# Patient Record
Sex: FEMALE | Race: WHITE | Employment: OTHER | URBAN - NONMETROPOLITAN AREA
[De-identification: names, ages, dates, MRNs, and addresses within clinical notes are randomized per-mention and may not be internally consistent; named-entity substitution may affect disease eponyms.]

---

## 2017-01-03 RX ORDER — LISINOPRIL 20 MG/1
20 TABLET ORAL DAILY
Qty: 90 TABLET | Refills: 1 | Status: SHIPPED | OUTPATIENT
Start: 2017-01-03 | End: 2017-05-02 | Stop reason: SDUPTHER

## 2017-01-05 RX ORDER — METOPROLOL TARTRATE 50 MG/1
25 TABLET, FILM COATED ORAL 2 TIMES DAILY
Qty: 90 TABLET | Refills: 1 | Status: SHIPPED | OUTPATIENT
Start: 2017-01-05 | End: 2017-05-02 | Stop reason: SDUPTHER

## 2017-01-09 ENCOUNTER — HOSPITAL ENCOUNTER (OUTPATIENT)
Dept: LAB | Age: 82
Discharge: HOME | End: 2017-01-09
Attending: INTERNAL MEDICINE | Admitting: INTERNAL MEDICINE

## 2017-01-09 LAB
ALBUMIN SERPL-MCNC: 4.1 GM/DL (ref 3.4–5)
ALP BLD-CCNC: 45 IU/L (ref 40–129)
ALT SERPL-CCNC: 9 U/L (ref 10–40)
ANION GAP SERPL CALCULATED.3IONS-SCNC: 10 MMOL/L (ref 4–16)
AST SERPL-CCNC: 20 IU/L (ref 15–37)
BILIRUB SERPL-MCNC: 0.5 MG/DL (ref 0–1)
BUN BLDV-MCNC: 12 MG/DL (ref 6–23)
CALCIUM SERPL-MCNC: 10 MG/DL (ref 8.3–10.6)
CHLORIDE BLD-SCNC: 96 MMOL/L (ref 99–110)
CHOLESTEROL: 141 MG/DL
CO2: 30 MMOL/L (ref 21–32)
CREAT SERPL-MCNC: 0.7 MG/DL (ref 0.6–1.1)
GFR AFRICAN AMERICAN: >60 ML/MIN/1.73M2
GFR NON-AFRICAN AMERICAN: >60 ML/MIN/1.73M2
GLUCOSE FASTING: 145 MG/DL (ref 70–99)
HDLC SERPL-MCNC: 42 MG/DL
LDL CHOLESTEROL DIRECT: 77 MG/DL
POTASSIUM SERPL-SCNC: 4.5 MMOL/L (ref 3.5–5.1)
SODIUM BLD-SCNC: 136 MMOL/L (ref 135–145)
TOTAL PROTEIN: 7.4 GM/DL (ref 6.4–8.2)
TRIGL SERPL-MCNC: 194 MG/DL

## 2017-01-12 ENCOUNTER — OFFICE VISIT (OUTPATIENT)
Dept: INTERNAL MEDICINE CLINIC | Age: 82
End: 2017-01-12

## 2017-01-12 VITALS
WEIGHT: 238 LBS | DIASTOLIC BLOOD PRESSURE: 72 MMHG | HEART RATE: 88 BPM | SYSTOLIC BLOOD PRESSURE: 138 MMHG | RESPIRATION RATE: 16 BRPM | OXYGEN SATURATION: 95 % | TEMPERATURE: 98.1 F | BODY MASS INDEX: 40.22 KG/M2

## 2017-01-12 DIAGNOSIS — M54.50 CHRONIC MIDLINE LOW BACK PAIN WITHOUT SCIATICA: ICD-10-CM

## 2017-01-12 DIAGNOSIS — E11.9 TYPE 2 DIABETES MELLITUS WITHOUT COMPLICATION, WITHOUT LONG-TERM CURRENT USE OF INSULIN (HCC): Primary | ICD-10-CM

## 2017-01-12 DIAGNOSIS — E66.09 NON MORBID OBESITY DUE TO EXCESS CALORIES: ICD-10-CM

## 2017-01-12 DIAGNOSIS — H40.9 GLAUCOMA OF BOTH EYES, UNSPECIFIED GLAUCOMA: ICD-10-CM

## 2017-01-12 DIAGNOSIS — I25.10 CORONARY ARTERY DISEASE INVOLVING NATIVE CORONARY ARTERY OF NATIVE HEART WITHOUT ANGINA PECTORIS: ICD-10-CM

## 2017-01-12 DIAGNOSIS — G89.29 CHRONIC MIDLINE LOW BACK PAIN WITHOUT SCIATICA: ICD-10-CM

## 2017-01-12 DIAGNOSIS — Z12.31 ENCOUNTER FOR SCREENING MAMMOGRAM FOR BREAST CANCER: ICD-10-CM

## 2017-01-12 DIAGNOSIS — I10 ESSENTIAL HYPERTENSION: ICD-10-CM

## 2017-01-12 DIAGNOSIS — E78.2 MIXED HYPERLIPIDEMIA: ICD-10-CM

## 2017-01-12 PROCEDURE — G8484 FLU IMMUNIZE NO ADMIN: HCPCS | Performed by: INTERNAL MEDICINE

## 2017-01-12 PROCEDURE — 99213 OFFICE O/P EST LOW 20 MIN: CPT | Performed by: INTERNAL MEDICINE

## 2017-01-12 PROCEDURE — G8419 CALC BMI OUT NRM PARAM NOF/U: HCPCS | Performed by: INTERNAL MEDICINE

## 2017-01-12 PROCEDURE — 1090F PRES/ABSN URINE INCON ASSESS: CPT | Performed by: INTERNAL MEDICINE

## 2017-01-12 PROCEDURE — 1123F ACP DISCUSS/DSCN MKR DOCD: CPT | Performed by: INTERNAL MEDICINE

## 2017-01-12 PROCEDURE — 1036F TOBACCO NON-USER: CPT | Performed by: INTERNAL MEDICINE

## 2017-01-12 PROCEDURE — G8427 DOCREV CUR MEDS BY ELIG CLIN: HCPCS | Performed by: INTERNAL MEDICINE

## 2017-01-12 PROCEDURE — G8599 NO ASA/ANTIPLAT THER USE RNG: HCPCS | Performed by: INTERNAL MEDICINE

## 2017-01-12 PROCEDURE — G8399 PT W/DXA RESULTS DOCUMENT: HCPCS | Performed by: INTERNAL MEDICINE

## 2017-01-12 PROCEDURE — 4040F PNEUMOC VAC/ADMIN/RCVD: CPT | Performed by: INTERNAL MEDICINE

## 2017-01-12 RX ORDER — GLIPIZIDE AND METFORMIN HCL 2.5; 5 MG/1; MG/1
2 TABLET, FILM COATED ORAL
Qty: 360 TABLET | Refills: 3 | Status: SHIPPED | OUTPATIENT
Start: 2017-01-12 | End: 2017-12-12 | Stop reason: SDUPTHER

## 2017-01-12 RX ORDER — ROPINIROLE 0.25 MG/1
0.25 TABLET, FILM COATED ORAL NIGHTLY
Qty: 30 TABLET | Refills: 3 | Status: SHIPPED | OUTPATIENT
Start: 2017-01-12 | End: 2017-02-24 | Stop reason: DRUGHIGH

## 2017-01-12 RX ORDER — ROSUVASTATIN CALCIUM 10 MG/1
10 TABLET, COATED ORAL NIGHTLY
Qty: 90 TABLET | Refills: 1 | Status: SHIPPED | OUTPATIENT
Start: 2017-01-12 | End: 2017-09-27 | Stop reason: SDUPTHER

## 2017-01-12 ASSESSMENT — PATIENT HEALTH QUESTIONNAIRE - PHQ9
SUM OF ALL RESPONSES TO PHQ QUESTIONS 1-9: 0
SUM OF ALL RESPONSES TO PHQ9 QUESTIONS 1 & 2: 0
1. LITTLE INTEREST OR PLEASURE IN DOING THINGS: 0
2. FEELING DOWN, DEPRESSED OR HOPELESS: 0

## 2017-01-18 ASSESSMENT — ENCOUNTER SYMPTOMS
RESPIRATORY NEGATIVE: 1
EYES NEGATIVE: 1
GASTROINTESTINAL NEGATIVE: 1

## 2017-02-24 RX ORDER — ROPINIROLE 0.5 MG/1
0.5 TABLET, FILM COATED ORAL NIGHTLY
Qty: 30 TABLET | Refills: 1 | Status: SHIPPED | OUTPATIENT
Start: 2017-02-24 | End: 2017-05-02 | Stop reason: SDUPTHER

## 2017-03-10 ENCOUNTER — TELEPHONE (OUTPATIENT)
Dept: INTERNAL MEDICINE CLINIC | Age: 82
End: 2017-03-10

## 2017-05-02 ENCOUNTER — OFFICE VISIT (OUTPATIENT)
Dept: INTERNAL MEDICINE CLINIC | Age: 82
End: 2017-05-02

## 2017-05-02 VITALS
RESPIRATION RATE: 16 BRPM | DIASTOLIC BLOOD PRESSURE: 78 MMHG | SYSTOLIC BLOOD PRESSURE: 136 MMHG | WEIGHT: 237 LBS | HEART RATE: 72 BPM | OXYGEN SATURATION: 96 % | BODY MASS INDEX: 40.05 KG/M2 | TEMPERATURE: 98.3 F

## 2017-05-02 DIAGNOSIS — M72.0 DUPUYTREN'S CONTRACTURE OF RIGHT HAND: ICD-10-CM

## 2017-05-02 DIAGNOSIS — E66.09 NON MORBID OBESITY DUE TO EXCESS CALORIES: ICD-10-CM

## 2017-05-02 DIAGNOSIS — K92.2 GASTROINTESTINAL HEMORRHAGE, UNSPECIFIED GASTROINTESTINAL HEMORRHAGE TYPE: ICD-10-CM

## 2017-05-02 DIAGNOSIS — E11.9 TYPE 2 DIABETES MELLITUS WITHOUT COMPLICATION, WITHOUT LONG-TERM CURRENT USE OF INSULIN (HCC): ICD-10-CM

## 2017-05-02 DIAGNOSIS — H40.9 GLAUCOMA OF BOTH EYES, UNSPECIFIED GLAUCOMA: ICD-10-CM

## 2017-05-02 DIAGNOSIS — N28.1 CYST, KIDNEY, ACQUIRED: ICD-10-CM

## 2017-05-02 DIAGNOSIS — I10 ESSENTIAL HYPERTENSION: Primary | ICD-10-CM

## 2017-05-02 DIAGNOSIS — G89.29 CHRONIC MIDLINE LOW BACK PAIN WITHOUT SCIATICA: ICD-10-CM

## 2017-05-02 DIAGNOSIS — Z23 NEED FOR PROPHYLACTIC VACCINATION AGAINST STREPTOCOCCUS PNEUMONIAE (PNEUMOCOCCUS): ICD-10-CM

## 2017-05-02 DIAGNOSIS — I25.10 CORONARY ARTERY DISEASE INVOLVING NATIVE CORONARY ARTERY OF NATIVE HEART WITHOUT ANGINA PECTORIS: ICD-10-CM

## 2017-05-02 DIAGNOSIS — E78.2 MIXED HYPERLIPIDEMIA: ICD-10-CM

## 2017-05-02 DIAGNOSIS — M54.50 CHRONIC MIDLINE LOW BACK PAIN WITHOUT SCIATICA: ICD-10-CM

## 2017-05-02 DIAGNOSIS — M19.90 ARTHRITIS: ICD-10-CM

## 2017-05-02 LAB — HBA1C MFR BLD: 6.4 %

## 2017-05-02 PROCEDURE — 1090F PRES/ABSN URINE INCON ASSESS: CPT | Performed by: INTERNAL MEDICINE

## 2017-05-02 PROCEDURE — 1036F TOBACCO NON-USER: CPT | Performed by: INTERNAL MEDICINE

## 2017-05-02 PROCEDURE — G8599 NO ASA/ANTIPLAT THER USE RNG: HCPCS | Performed by: INTERNAL MEDICINE

## 2017-05-02 PROCEDURE — 4040F PNEUMOC VAC/ADMIN/RCVD: CPT | Performed by: INTERNAL MEDICINE

## 2017-05-02 PROCEDURE — G8417 CALC BMI ABV UP PARAM F/U: HCPCS | Performed by: INTERNAL MEDICINE

## 2017-05-02 PROCEDURE — G8427 DOCREV CUR MEDS BY ELIG CLIN: HCPCS | Performed by: INTERNAL MEDICINE

## 2017-05-02 PROCEDURE — G0009 ADMIN PNEUMOCOCCAL VACCINE: HCPCS | Performed by: INTERNAL MEDICINE

## 2017-05-02 PROCEDURE — 1123F ACP DISCUSS/DSCN MKR DOCD: CPT | Performed by: INTERNAL MEDICINE

## 2017-05-02 PROCEDURE — 90670 PCV13 VACCINE IM: CPT | Performed by: INTERNAL MEDICINE

## 2017-05-02 PROCEDURE — 83036 HEMOGLOBIN GLYCOSYLATED A1C: CPT | Performed by: INTERNAL MEDICINE

## 2017-05-02 PROCEDURE — G8399 PT W/DXA RESULTS DOCUMENT: HCPCS | Performed by: INTERNAL MEDICINE

## 2017-05-02 PROCEDURE — 99214 OFFICE O/P EST MOD 30 MIN: CPT | Performed by: INTERNAL MEDICINE

## 2017-05-02 RX ORDER — ROPINIROLE 1 MG/1
1 TABLET, FILM COATED ORAL NIGHTLY
Qty: 30 TABLET | Refills: 3 | Status: SHIPPED | OUTPATIENT
Start: 2017-05-02 | End: 2017-05-17 | Stop reason: ALTCHOICE

## 2017-05-02 RX ORDER — METOPROLOL TARTRATE 50 MG/1
25 TABLET, FILM COATED ORAL 2 TIMES DAILY
Qty: 90 TABLET | Refills: 1 | Status: SHIPPED | OUTPATIENT
Start: 2017-05-02 | End: 2017-12-12 | Stop reason: SDUPTHER

## 2017-05-02 RX ORDER — LISINOPRIL 20 MG/1
20 TABLET ORAL DAILY
Qty: 90 TABLET | Refills: 1 | Status: SHIPPED | OUTPATIENT
Start: 2017-05-02 | End: 2017-12-12 | Stop reason: ALTCHOICE

## 2017-05-02 ASSESSMENT — ENCOUNTER SYMPTOMS
HEARTBURN: 0
NAUSEA: 0
GASTROINTESTINAL NEGATIVE: 1
RESPIRATORY NEGATIVE: 1
ABDOMINAL PAIN: 0
EYES NEGATIVE: 1
VOMITING: 0

## 2017-05-17 DIAGNOSIS — G25.81 RESTLESS LEG SYNDROME: Primary | ICD-10-CM

## 2017-05-17 RX ORDER — PRAMIPEXOLE DIHYDROCHLORIDE 0.12 MG/1
0.12 TABLET ORAL NIGHTLY
Qty: 30 TABLET | Refills: 0 | Status: SHIPPED | OUTPATIENT
Start: 2017-05-17 | End: 2017-08-22

## 2017-08-18 ENCOUNTER — HOSPITAL ENCOUNTER (OUTPATIENT)
Dept: LAB | Age: 82
Discharge: HOME | End: 2017-08-18
Attending: INTERNAL MEDICINE | Admitting: INTERNAL MEDICINE

## 2017-08-18 LAB
ALBUMIN SERPL-MCNC: 4 GM/DL (ref 3.4–5)
ALP BLD-CCNC: 44 IU/L (ref 40–129)
ALT SERPL-CCNC: 7 U/L (ref 10–40)
ANION GAP SERPL CALCULATED.3IONS-SCNC: 7 MMOL/L (ref 4–16)
AST SERPL-CCNC: 16 IU/L (ref 15–37)
BASOPHILS ABSOLUTE: 0.1 K/CU MM
BASOPHILS RELATIVE PERCENT: 0.9 % (ref 0–1)
BILIRUB SERPL-MCNC: 0.4 MG/DL (ref 0–1)
BUN BLDV-MCNC: 12 MG/DL (ref 6–23)
CALCIUM SERPL-MCNC: 9.9 MG/DL (ref 8.3–10.6)
CHLORIDE BLD-SCNC: 96 MMOL/L (ref 99–110)
CHOLESTEROL, FASTING: 134 MG/DL
CO2: 32 MMOL/L (ref 21–32)
CREAT SERPL-MCNC: 0.8 MG/DL (ref 0.6–1.1)
DIFFERENTIAL TYPE: ABNORMAL
EOSINOPHILS ABSOLUTE: 0.3 K/CU MM
EOSINOPHILS RELATIVE PERCENT: 4.4 % (ref 0–3)
GFR AFRICAN AMERICAN: >60 ML/MIN/1.73M2
GFR NON-AFRICAN AMERICAN: >60 ML/MIN/1.73M2
GLUCOSE FASTING: 119 MG/DL (ref 70–99)
HCT VFR BLD CALC: 40 % (ref 37–47)
HDLC SERPL-MCNC: 44 MG/DL
HEMOGLOBIN: 12.8 GM/DL (ref 12.5–16)
IMMATURE NEUTROPHIL %: 0.4 % (ref 0–0.43)
LDL CHOLESTEROL DIRECT: 69 MG/DL
LYMPHOCYTES ABSOLUTE: 3.1 K/CU MM
LYMPHOCYTES RELATIVE PERCENT: 41.5 % (ref 24–44)
MCH RBC QN AUTO: 30.1 PG (ref 27–31)
MCHC RBC AUTO-ENTMCNC: 32 % (ref 32–36)
MCV RBC AUTO: 94.1 FL (ref 78–100)
MONOCYTES ABSOLUTE: 0.8 K/CU MM
MONOCYTES RELATIVE PERCENT: 10.8 % (ref 0–4)
PDW BLD-RTO: 13.2 % (ref 11.7–14.9)
PLATELET # BLD: 273 K/CU MM (ref 140–440)
PMV BLD AUTO: 9.4 FL (ref 7.5–11.1)
POTASSIUM SERPL-SCNC: 4.1 MMOL/L (ref 3.5–5.1)
RBC # BLD: 4.25 M/CU MM (ref 4.2–5.4)
SEGMENTED NEUTROPHILS ABSOLUTE COUNT: 3.2 K/CU MM
SEGMENTED NEUTROPHILS RELATIVE PERCENT: 42 % (ref 36–66)
SODIUM BLD-SCNC: 135 MMOL/L (ref 135–145)
TOTAL IMMATURE NEUTOROPHIL: 0.03 K/CU MM
TOTAL PROTEIN: 6.9 GM/DL (ref 6.4–8.2)
TRIGLYCERIDE, FASTING: 159 MG/DL
WBC # BLD: 7.5 K/CU MM (ref 4–10.5)

## 2017-08-22 ENCOUNTER — OFFICE VISIT (OUTPATIENT)
Dept: INTERNAL MEDICINE CLINIC | Age: 82
End: 2017-08-22

## 2017-08-22 VITALS
HEIGHT: 65 IN | OXYGEN SATURATION: 95 % | SYSTOLIC BLOOD PRESSURE: 118 MMHG | BODY MASS INDEX: 39.45 KG/M2 | RESPIRATION RATE: 16 BRPM | HEART RATE: 64 BPM | WEIGHT: 236.8 LBS | DIASTOLIC BLOOD PRESSURE: 72 MMHG | TEMPERATURE: 98.2 F

## 2017-08-22 DIAGNOSIS — M19.90 ARTHRITIS: ICD-10-CM

## 2017-08-22 DIAGNOSIS — E78.2 MIXED HYPERLIPIDEMIA: ICD-10-CM

## 2017-08-22 DIAGNOSIS — K92.2 GASTROINTESTINAL HEMORRHAGE, UNSPECIFIED GASTROINTESTINAL HEMORRHAGE TYPE: ICD-10-CM

## 2017-08-22 DIAGNOSIS — H40.9 GLAUCOMA OF BOTH EYES, UNSPECIFIED GLAUCOMA: ICD-10-CM

## 2017-08-22 DIAGNOSIS — E11.9 TYPE 2 DIABETES MELLITUS WITHOUT COMPLICATION, WITH LONG-TERM CURRENT USE OF INSULIN (HCC): Primary | ICD-10-CM

## 2017-08-22 DIAGNOSIS — E66.09 NON MORBID OBESITY DUE TO EXCESS CALORIES: ICD-10-CM

## 2017-08-22 DIAGNOSIS — N28.1 CYST, KIDNEY, ACQUIRED: ICD-10-CM

## 2017-08-22 DIAGNOSIS — Z79.4 TYPE 2 DIABETES MELLITUS WITHOUT COMPLICATION, WITH LONG-TERM CURRENT USE OF INSULIN (HCC): Primary | ICD-10-CM

## 2017-08-22 DIAGNOSIS — M72.0 DUPUYTREN'S CONTRACTURE OF RIGHT HAND: ICD-10-CM

## 2017-08-22 DIAGNOSIS — I10 ESSENTIAL HYPERTENSION: ICD-10-CM

## 2017-08-22 DIAGNOSIS — G25.81 RESTLESS LEG SYNDROME: ICD-10-CM

## 2017-08-22 DIAGNOSIS — I25.10 CORONARY ARTERY DISEASE INVOLVING NATIVE CORONARY ARTERY OF NATIVE HEART WITHOUT ANGINA PECTORIS: ICD-10-CM

## 2017-08-22 LAB — HBA1C MFR BLD: 6.2 %

## 2017-08-22 PROCEDURE — 4040F PNEUMOC VAC/ADMIN/RCVD: CPT | Performed by: INTERNAL MEDICINE

## 2017-08-22 PROCEDURE — G8599 NO ASA/ANTIPLAT THER USE RNG: HCPCS | Performed by: INTERNAL MEDICINE

## 2017-08-22 PROCEDURE — G8427 DOCREV CUR MEDS BY ELIG CLIN: HCPCS | Performed by: INTERNAL MEDICINE

## 2017-08-22 PROCEDURE — G8399 PT W/DXA RESULTS DOCUMENT: HCPCS | Performed by: INTERNAL MEDICINE

## 2017-08-22 PROCEDURE — G8417 CALC BMI ABV UP PARAM F/U: HCPCS | Performed by: INTERNAL MEDICINE

## 2017-08-22 PROCEDURE — 99214 OFFICE O/P EST MOD 30 MIN: CPT | Performed by: INTERNAL MEDICINE

## 2017-08-22 PROCEDURE — 1036F TOBACCO NON-USER: CPT | Performed by: INTERNAL MEDICINE

## 2017-08-22 PROCEDURE — 1090F PRES/ABSN URINE INCON ASSESS: CPT | Performed by: INTERNAL MEDICINE

## 2017-08-22 PROCEDURE — 1123F ACP DISCUSS/DSCN MKR DOCD: CPT | Performed by: INTERNAL MEDICINE

## 2017-08-22 PROCEDURE — 83036 HEMOGLOBIN GLYCOSYLATED A1C: CPT | Performed by: INTERNAL MEDICINE

## 2017-08-24 ASSESSMENT — ENCOUNTER SYMPTOMS
RESPIRATORY NEGATIVE: 1
HEARTBURN: 0
NAUSEA: 0
VOMITING: 0
EYES NEGATIVE: 1

## 2017-09-28 RX ORDER — ROSUVASTATIN CALCIUM 10 MG/1
10 TABLET, COATED ORAL NIGHTLY
Qty: 90 TABLET | Refills: 1 | Status: SHIPPED | OUTPATIENT
Start: 2017-09-28 | End: 2018-04-05 | Stop reason: SDUPTHER

## 2017-10-20 ENCOUNTER — OFFICE VISIT (OUTPATIENT)
Dept: INTERNAL MEDICINE CLINIC | Age: 82
End: 2017-10-20

## 2017-10-20 ENCOUNTER — CARE COORDINATION (OUTPATIENT)
Dept: CARE COORDINATION | Age: 82
End: 2017-10-20

## 2017-10-20 VITALS
HEART RATE: 72 BPM | WEIGHT: 242.2 LBS | BODY MASS INDEX: 40.35 KG/M2 | HEIGHT: 65 IN | DIASTOLIC BLOOD PRESSURE: 76 MMHG | OXYGEN SATURATION: 96 % | RESPIRATION RATE: 12 BRPM | TEMPERATURE: 98.3 F | SYSTOLIC BLOOD PRESSURE: 136 MMHG

## 2017-10-20 DIAGNOSIS — Z23 NEED FOR PROPHYLACTIC VACCINATION AND INOCULATION AGAINST INFLUENZA: ICD-10-CM

## 2017-10-20 DIAGNOSIS — M25.551 RIGHT HIP PAIN: ICD-10-CM

## 2017-10-20 DIAGNOSIS — M54.41 ACUTE RIGHT-SIDED LOW BACK PAIN WITH RIGHT-SIDED SCIATICA: Primary | ICD-10-CM
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
## 2017-10-20 PROCEDURE — 4040F PNEUMOC VAC/ADMIN/RCVD: CPT | Performed by: INTERNAL MEDICINE

## 2017-10-20 PROCEDURE — 99213 OFFICE O/P EST LOW 20 MIN: CPT | Performed by: INTERNAL MEDICINE

## 2017-10-20 PROCEDURE — G8399 PT W/DXA RESULTS DOCUMENT: HCPCS | Performed by: INTERNAL MEDICINE

## 2017-10-20 PROCEDURE — 90662 IIV NO PRSV INCREASED AG IM: CPT | Performed by: INTERNAL MEDICINE

## 2017-10-20 PROCEDURE — G8417 CALC BMI ABV UP PARAM F/U: HCPCS | Performed by: INTERNAL MEDICINE

## 2017-10-20 PROCEDURE — G0008 ADMIN INFLUENZA VIRUS VAC: HCPCS | Performed by: INTERNAL MEDICINE

## 2017-10-20 PROCEDURE — 1090F PRES/ABSN URINE INCON ASSESS: CPT | Performed by: INTERNAL MEDICINE

## 2017-10-20 PROCEDURE — 1123F ACP DISCUSS/DSCN MKR DOCD: CPT | Performed by: INTERNAL MEDICINE

## 2017-10-20 PROCEDURE — G8484 FLU IMMUNIZE NO ADMIN: HCPCS | Performed by: INTERNAL MEDICINE

## 2017-10-20 PROCEDURE — 1036F TOBACCO NON-USER: CPT | Performed by: INTERNAL MEDICINE

## 2017-10-20 PROCEDURE — G8599 NO ASA/ANTIPLAT THER USE RNG: HCPCS | Performed by: INTERNAL MEDICINE

## 2017-10-20 PROCEDURE — G8427 DOCREV CUR MEDS BY ELIG CLIN: HCPCS | Performed by: INTERNAL MEDICINE

## 2017-10-20 ASSESSMENT — ENCOUNTER SYMPTOMS
EYES NEGATIVE: 1
GASTROINTESTINAL NEGATIVE: 1
RESPIRATORY NEGATIVE: 1
HEMOPTYSIS: 0
SHORTNESS OF BREATH: 0

## 2017-10-20 NOTE — PROGRESS NOTES
Kristi Sanchez  Patient's  is 1935  Seen in office on 10/20/2017      SUBJECTIVE:  Garcia Harley is a [de-identified] y. o.year old female presents today   Chief Complaint   Patient presents with    Other     ED f/u low back/R hip pain  s/p fall x2w    Results     xray and CT    Other     needs raised toilet seat     C/o pain in the right hip and back  Pt states she fell 2 weeks ago at home  She was bringing the grocery and one of the can fell on the ground. She stepped on can and it rolled and pt fell on the concrete. She fell on left knee and back  Pt states she got up some how. She was able to walk. [facility] and she walked on cement. Pain increased in the low back and right hip posteriorly and pain going down the leg  No fever or chills. No problems controlling bladder or bowel. Having difficulty getting up from toilet seat  Taking medications regularly. No side effects noted. Review of Systems   Constitutional: Negative. HENT: Negative. Negative for hearing loss and tinnitus. Eyes: Negative. Respiratory: Negative. Negative for hemoptysis and shortness of breath. Cardiovascular: Negative. Negative for palpitations and leg swelling. Gastrointestinal: Negative. Genitourinary: Positive for frequency. Musculoskeletal: Positive for back pain and joint pain. Skin: Negative. Neurological: Negative. Negative for dizziness and headaches. Endo/Heme/Allergies: Negative. Psychiatric/Behavioral: Negative. Negative for depression and suicidal ideas.        OBJECTIVE: /76 (Site: Left Arm, Position: Sitting)   Pulse 72   Temp 98.3 °F (36.8 °C)   Resp 12   Ht 5' 4.5\" (1.638 m) Comment: W shoes  Wt 242 lb 3.2 oz (109.9 kg)   LMP  (LMP Unknown)   SpO2 96%   BMI 40.93 kg/m²     Wt Readings from Last 3 Encounters:   10/20/17 242 lb 3.2 oz (109.9 kg)   10/18/17 230 lb (104.3 kg)   17 236 lb 12.8 oz (107.4 kg)      GENERAL:  Alert, oriented, pleasant, in no 08/18/2017    HGB 12.8 08/18/2017    HCT 40.0 08/18/2017     08/18/2017     Lipids:   Lab Results   Component Value Date    CHOL 141 01/09/2017    TRIG 194 (H) 01/09/2017    HDL 44 08/18/2017    LDLDIRECT 69 08/18/2017    TRIGLYCFAST 159 (H) 08/18/2017     Renal:   Lab Results   Component Value Date    BUN 12 08/18/2017    CREATININE 0.8 08/18/2017     08/18/2017    K 4.1 08/18/2017    ALT 7 08/18/2017    AST 16 08/18/2017    GLUCOSE 93 03/24/2016     PT/INR:   Lab Results   Component Value Date    INR 1.05 09/24/2014     A1C:   Lab Results   Component Value Date    LABA1C 6.2 08/22/2017           Art Verdugo MD, 10/20/2017 , 12:07 PM

## 2017-10-21 ASSESSMENT — ENCOUNTER SYMPTOMS: BACK PAIN: 1

## 2017-10-26 ENCOUNTER — TELEPHONE (OUTPATIENT)
Dept: INTERNAL MEDICINE CLINIC | Age: 82
End: 2017-10-26

## 2017-11-27 ENCOUNTER — TELEPHONE (OUTPATIENT)
Dept: INTERNAL MEDICINE CLINIC | Age: 82
End: 2017-11-27

## 2017-11-27 NOTE — TELEPHONE ENCOUNTER
Patient called to cancel her appt for tomorrow because she is ill, coughing with AMRCELO, x 1 week. Stressed importance of keeping appt and appt offered for today and pt refused. Instructed to call office if feeling worse.

## 2017-12-12 ENCOUNTER — OFFICE VISIT (OUTPATIENT)
Dept: INTERNAL MEDICINE CLINIC | Age: 82
End: 2017-12-12

## 2017-12-12 VITALS
HEIGHT: 65 IN | HEART RATE: 72 BPM | RESPIRATION RATE: 12 BRPM | SYSTOLIC BLOOD PRESSURE: 138 MMHG | DIASTOLIC BLOOD PRESSURE: 72 MMHG | BODY MASS INDEX: 39.32 KG/M2 | TEMPERATURE: 98.1 F | OXYGEN SATURATION: 95 % | WEIGHT: 236 LBS

## 2017-12-12 DIAGNOSIS — G89.29 CHRONIC MIDLINE LOW BACK PAIN WITHOUT SCIATICA: ICD-10-CM

## 2017-12-12 DIAGNOSIS — E66.09 CLASS 2 OBESITY DUE TO EXCESS CALORIES WITHOUT SERIOUS COMORBIDITY WITH BODY MASS INDEX (BMI) OF 39.0 TO 39.9 IN ADULT: ICD-10-CM

## 2017-12-12 DIAGNOSIS — I10 ESSENTIAL HYPERTENSION: ICD-10-CM

## 2017-12-12 DIAGNOSIS — M72.0 DUPUYTREN'S CONTRACTURE OF RIGHT HAND: ICD-10-CM

## 2017-12-12 DIAGNOSIS — E11.9 TYPE 2 DIABETES MELLITUS WITHOUT COMPLICATION, WITHOUT LONG-TERM CURRENT USE OF INSULIN (HCC): ICD-10-CM

## 2017-12-12 DIAGNOSIS — G25.81 RESTLESS LEG SYNDROME: ICD-10-CM

## 2017-12-12 DIAGNOSIS — M54.50 CHRONIC MIDLINE LOW BACK PAIN WITHOUT SCIATICA: ICD-10-CM

## 2017-12-12 DIAGNOSIS — R05.9 COUGH: Primary | ICD-10-CM

## 2017-12-12 DIAGNOSIS — I25.10 CORONARY ARTERY DISEASE INVOLVING NATIVE CORONARY ARTERY OF NATIVE HEART WITHOUT ANGINA PECTORIS: ICD-10-CM

## 2017-12-12 DIAGNOSIS — E78.2 MIXED HYPERLIPIDEMIA: ICD-10-CM

## 2017-12-12 DIAGNOSIS — N28.1 CYST, KIDNEY, ACQUIRED: ICD-10-CM

## 2017-12-12 LAB — HBA1C MFR BLD: 5.9 %

## 2017-12-12 PROCEDURE — G8427 DOCREV CUR MEDS BY ELIG CLIN: HCPCS | Performed by: INTERNAL MEDICINE

## 2017-12-12 PROCEDURE — 1123F ACP DISCUSS/DSCN MKR DOCD: CPT | Performed by: INTERNAL MEDICINE

## 2017-12-12 PROCEDURE — G8399 PT W/DXA RESULTS DOCUMENT: HCPCS | Performed by: INTERNAL MEDICINE

## 2017-12-12 PROCEDURE — G8484 FLU IMMUNIZE NO ADMIN: HCPCS | Performed by: INTERNAL MEDICINE

## 2017-12-12 PROCEDURE — 83036 HEMOGLOBIN GLYCOSYLATED A1C: CPT | Performed by: INTERNAL MEDICINE

## 2017-12-12 PROCEDURE — G8417 CALC BMI ABV UP PARAM F/U: HCPCS | Performed by: INTERNAL MEDICINE

## 2017-12-12 PROCEDURE — 1090F PRES/ABSN URINE INCON ASSESS: CPT | Performed by: INTERNAL MEDICINE

## 2017-12-12 PROCEDURE — 4040F PNEUMOC VAC/ADMIN/RCVD: CPT | Performed by: INTERNAL MEDICINE

## 2017-12-12 PROCEDURE — 99214 OFFICE O/P EST MOD 30 MIN: CPT | Performed by: INTERNAL MEDICINE

## 2017-12-12 PROCEDURE — G8599 NO ASA/ANTIPLAT THER USE RNG: HCPCS | Performed by: INTERNAL MEDICINE

## 2017-12-12 PROCEDURE — 1036F TOBACCO NON-USER: CPT | Performed by: INTERNAL MEDICINE

## 2017-12-12 RX ORDER — LISINOPRIL 20 MG/1
20 TABLET ORAL DAILY
Qty: 90 TABLET | Refills: 1 | Status: CANCELLED | OUTPATIENT
Start: 2017-12-12

## 2017-12-12 RX ORDER — VALSARTAN 160 MG/1
160 TABLET ORAL DAILY
Qty: 30 TABLET | Refills: 3 | Status: SHIPPED | OUTPATIENT
Start: 2017-12-12 | End: 2018-04-05 | Stop reason: SDUPTHER

## 2017-12-12 RX ORDER — GLIPIZIDE AND METFORMIN HCL 2.5; 5 MG/1; MG/1
2 TABLET, FILM COATED ORAL
Qty: 360 TABLET | Refills: 3 | Status: SHIPPED | OUTPATIENT
Start: 2017-12-12 | End: 2019-01-15 | Stop reason: SDUPTHER

## 2017-12-12 RX ORDER — METOPROLOL TARTRATE 50 MG/1
25 TABLET, FILM COATED ORAL 2 TIMES DAILY
Qty: 90 TABLET | Refills: 1 | Status: SHIPPED | OUTPATIENT
Start: 2017-12-12 | End: 2018-07-19 | Stop reason: SDUPTHER

## 2017-12-12 ASSESSMENT — ENCOUNTER SYMPTOMS
GASTROINTESTINAL NEGATIVE: 1
BACK PAIN: 0
DIARRHEA: 0
SPUTUM PRODUCTION: 0
CONSTIPATION: 0
WHEEZING: 0
SHORTNESS OF BREATH: 0
COUGH: 1
BLOOD IN STOOL: 0
SINUS PAIN: 0
HEMOPTYSIS: 0
EYES NEGATIVE: 1
SORE THROAT: 0

## 2017-12-12 NOTE — ASSESSMENT & PLAN NOTE
Pt's Hga1c is 5.9  Patient has DM. No hypoglycemia. No numbness or weakness. No dizziness. Blood sugars are good at home <100. Continue the medications.

## 2017-12-29 ENCOUNTER — HOSPITAL ENCOUNTER (OUTPATIENT)
Dept: GENERAL RADIOLOGY | Age: 82
Discharge: HOME | End: 2017-12-29
Attending: INTERNAL MEDICINE | Admitting: INTERNAL MEDICINE

## 2017-12-29 DIAGNOSIS — R05.9 COUGH: ICD-10-CM

## 2018-01-03 ENCOUNTER — TELEPHONE (OUTPATIENT)
Dept: INTERNAL MEDICINE CLINIC | Age: 83
End: 2018-01-03

## 2018-02-27 ENCOUNTER — TELEPHONE (OUTPATIENT)
Dept: INTERNAL MEDICINE CLINIC | Age: 83
End: 2018-02-27

## 2018-02-28 ENCOUNTER — TELEPHONE (OUTPATIENT)
Dept: INTERNAL MEDICINE CLINIC | Age: 83
End: 2018-02-28

## 2018-02-28 PROBLEM — I10 HYPERTENSION: Status: ACTIVE | Noted: 2018-02-28

## 2018-02-28 PROBLEM — R06.02 SHORTNESS OF BREATH: Status: ACTIVE | Noted: 2018-02-28

## 2018-02-28 PROBLEM — J10.1 INFLUENZA A: Status: ACTIVE | Noted: 2018-02-28

## 2018-02-28 PROBLEM — E11.9 TYPE 2 DIABETES MELLITUS (HCC): Status: ACTIVE | Noted: 2018-02-28

## 2018-02-28 PROBLEM — M19.90 ARTHRITIS: Status: ACTIVE | Noted: 2018-02-28

## 2018-02-28 PROBLEM — R11.2 NAUSEA AND VOMITING: Status: ACTIVE | Noted: 2018-02-28

## 2018-02-28 PROBLEM — E66.9 OBESITY: Status: ACTIVE | Noted: 2018-02-28

## 2018-02-28 PROBLEM — E78.5 HYPERLIPIDEMIA: Status: ACTIVE | Noted: 2018-02-28

## 2018-03-01 ENCOUNTER — HOSPITAL ENCOUNTER (OUTPATIENT)
Dept: OTHER | Age: 83
Discharge: HOME | End: 2018-03-01
Attending: INTERNAL MEDICINE | Admitting: INTERNAL MEDICINE

## 2018-03-07 ENCOUNTER — TELEPHONE (OUTPATIENT)
Dept: INTERNAL MEDICINE CLINIC | Age: 83
End: 2018-03-07

## 2018-03-07 NOTE — TELEPHONE ENCOUNTER
Patient phoned stating that she is in hospital with dx of pneumonia and not sure if she will be able to keep next week's appt time. She cancelled and will call us with an update next week.

## 2018-03-08 ENCOUNTER — TELEPHONE (OUTPATIENT)
Dept: INTERNAL MEDICINE CLINIC | Age: 83
End: 2018-03-08

## 2018-03-08 PROBLEM — F41.9 ANXIETY: Chronic | Status: ACTIVE | Noted: 2018-03-08

## 2018-03-08 PROBLEM — R06.02 SHORTNESS OF BREATH: Status: RESOLVED | Noted: 2018-02-28 | Resolved: 2018-03-08

## 2018-03-08 PROBLEM — R11.2 NAUSEA AND VOMITING: Status: RESOLVED | Noted: 2018-02-28 | Resolved: 2018-03-08

## 2018-03-09 ENCOUNTER — TELEPHONE (OUTPATIENT)
Dept: INTERNAL MEDICINE CLINIC | Age: 83
End: 2018-03-09

## 2018-03-12 LAB
ALBUMIN SERPL-MCNC: 3.5 GM/DL (ref 3.4–5)
ALP BLD-CCNC: 39 IU/L (ref 40–128)
ALT SERPL-CCNC: 7 U/L (ref 10–40)
ANION GAP SERPL CALCULATED.3IONS-SCNC: 11 MMOL/L (ref 4–16)
AST SERPL-CCNC: 16 IU/L (ref 15–37)
BILIRUB SERPL-MCNC: 0.3 MG/DL (ref 0–1)
BUN BLDV-MCNC: 15 MG/DL (ref 6–23)
CALCIUM SERPL-MCNC: 9 MG/DL (ref 8.3–10.6)
CHLORIDE BLD-SCNC: 93 MMOL/L (ref 99–110)
CO2: 30 MMOL/L (ref 21–32)
CREAT SERPL-MCNC: 0.8 MG/DL (ref 0.6–1.1)
GFR AFRICAN AMERICAN: >60 ML/MIN/1.73M2
GFR NON-AFRICAN AMERICAN: >60 ML/MIN/1.73M2
GLUCOSE BLD-MCNC: 126 MG/DL (ref 70–99)
HCT VFR BLD CALC: 38.4 % (ref 37–47)
HEMOGLOBIN: 12.3 GM/DL (ref 12.5–16)
MCH RBC QN AUTO: 30.4 PG (ref 27–31)
MCHC RBC AUTO-ENTMCNC: 32 % (ref 32–36)
MCV RBC AUTO: 94.8 FL (ref 78–100)
PDW BLD-RTO: 13.8 % (ref 11.7–14.9)
PLATELET # BLD: 301 K/CU MM (ref 140–440)
PMV BLD AUTO: 9.2 FL (ref 7.5–11.1)
POTASSIUM SERPL-SCNC: 4.6 MMOL/L (ref 3.5–5.1)
RBC # BLD: 4.05 M/CU MM (ref 4.2–5.4)
SODIUM BLD-SCNC: 134 MMOL/L (ref 135–145)
TOTAL PROTEIN: 5.7 GM/DL (ref 6.4–8.2)
WBC # BLD: 13.8 K/CU MM (ref 4–10.5)

## 2018-03-22 ENCOUNTER — TELEPHONE (OUTPATIENT)
Dept: INTERNAL MEDICINE CLINIC | Age: 83
End: 2018-03-22

## 2018-04-05 ENCOUNTER — OFFICE VISIT (OUTPATIENT)
Dept: INTERNAL MEDICINE CLINIC | Age: 83
End: 2018-04-05

## 2018-04-05 VITALS
HEART RATE: 98 BPM | RESPIRATION RATE: 16 BRPM | BODY MASS INDEX: 39.07 KG/M2 | SYSTOLIC BLOOD PRESSURE: 134 MMHG | TEMPERATURE: 97.5 F | OXYGEN SATURATION: 97 % | DIASTOLIC BLOOD PRESSURE: 80 MMHG | WEIGHT: 227.6 LBS

## 2018-04-05 DIAGNOSIS — R21 RASH AND NONSPECIFIC SKIN ERUPTION: ICD-10-CM

## 2018-04-05 DIAGNOSIS — G25.81 RESTLESS LEG SYNDROME: ICD-10-CM

## 2018-04-05 DIAGNOSIS — E11.9 TYPE 2 DIABETES MELLITUS WITHOUT COMPLICATION, WITHOUT LONG-TERM CURRENT USE OF INSULIN (HCC): ICD-10-CM

## 2018-04-05 DIAGNOSIS — I25.10 CORONARY ARTERY DISEASE INVOLVING NATIVE CORONARY ARTERY OF NATIVE HEART WITHOUT ANGINA PECTORIS: ICD-10-CM

## 2018-04-05 DIAGNOSIS — G89.29 CHRONIC MIDLINE LOW BACK PAIN WITHOUT SCIATICA: ICD-10-CM

## 2018-04-05 DIAGNOSIS — M54.50 CHRONIC MIDLINE LOW BACK PAIN WITHOUT SCIATICA: ICD-10-CM

## 2018-04-05 DIAGNOSIS — E78.2 MIXED HYPERLIPIDEMIA: ICD-10-CM

## 2018-04-05 DIAGNOSIS — J18.9 PNEUMONIA OF RIGHT LOWER LOBE DUE TO INFECTIOUS ORGANISM: Primary | ICD-10-CM

## 2018-04-05 DIAGNOSIS — I10 ESSENTIAL HYPERTENSION: ICD-10-CM

## 2018-04-05 PROCEDURE — 1111F DSCHRG MED/CURRENT MED MERGE: CPT | Performed by: INTERNAL MEDICINE

## 2018-04-05 PROCEDURE — G8399 PT W/DXA RESULTS DOCUMENT: HCPCS | Performed by: INTERNAL MEDICINE

## 2018-04-05 PROCEDURE — 1123F ACP DISCUSS/DSCN MKR DOCD: CPT | Performed by: INTERNAL MEDICINE

## 2018-04-05 PROCEDURE — 99214 OFFICE O/P EST MOD 30 MIN: CPT | Performed by: INTERNAL MEDICINE

## 2018-04-05 PROCEDURE — 1036F TOBACCO NON-USER: CPT | Performed by: INTERNAL MEDICINE

## 2018-04-05 PROCEDURE — 1090F PRES/ABSN URINE INCON ASSESS: CPT | Performed by: INTERNAL MEDICINE

## 2018-04-05 PROCEDURE — G8598 ASA/ANTIPLAT THER USED: HCPCS | Performed by: INTERNAL MEDICINE

## 2018-04-05 PROCEDURE — G8427 DOCREV CUR MEDS BY ELIG CLIN: HCPCS | Performed by: INTERNAL MEDICINE

## 2018-04-05 PROCEDURE — 4040F PNEUMOC VAC/ADMIN/RCVD: CPT | Performed by: INTERNAL MEDICINE

## 2018-04-05 PROCEDURE — G8417 CALC BMI ABV UP PARAM F/U: HCPCS | Performed by: INTERNAL MEDICINE

## 2018-04-05 RX ORDER — ROSUVASTATIN CALCIUM 10 MG/1
10 TABLET, COATED ORAL NIGHTLY
Qty: 90 TABLET | Refills: 1 | Status: SHIPPED | OUTPATIENT
Start: 2018-04-05 | End: 2018-08-14 | Stop reason: SDUPTHER

## 2018-04-05 RX ORDER — TRIAMCINOLONE ACETONIDE 1 MG/G
CREAM TOPICAL
Qty: 45 G | Refills: 0 | Status: SHIPPED | OUTPATIENT
Start: 2018-04-05 | End: 2018-08-09

## 2018-04-05 RX ORDER — VALSARTAN 160 MG/1
160 TABLET ORAL DAILY
Qty: 90 TABLET | Refills: 1 | Status: SHIPPED | OUTPATIENT
Start: 2018-04-05 | End: 2018-07-24 | Stop reason: ALTCHOICE

## 2018-04-05 RX ORDER — ROPINIROLE 1 MG/1
1 TABLET, FILM COATED ORAL NIGHTLY
Refills: 3 | COMMUNITY
Start: 2018-03-29 | End: 2018-05-25 | Stop reason: SDUPTHER

## 2018-04-05 ASSESSMENT — PATIENT HEALTH QUESTIONNAIRE - PHQ9
SUM OF ALL RESPONSES TO PHQ QUESTIONS 1-9: 0
SUM OF ALL RESPONSES TO PHQ9 QUESTIONS 1 & 2: 0
2. FEELING DOWN, DEPRESSED OR HOPELESS: 0
1. LITTLE INTEREST OR PLEASURE IN DOING THINGS: 0

## 2018-04-10 ENCOUNTER — HOSPITAL ENCOUNTER (OUTPATIENT)
Dept: GENERAL RADIOLOGY | Age: 83
Discharge: HOME | End: 2018-04-10
Attending: INTERNAL MEDICINE | Admitting: INTERNAL MEDICINE

## 2018-04-10 DIAGNOSIS — J18.1 UNRESOLVED LOBAR PNEUMONIA (HCC): ICD-10-CM

## 2018-04-11 ENCOUNTER — TELEPHONE (OUTPATIENT)
Dept: INTERNAL MEDICINE CLINIC | Age: 83
End: 2018-04-11

## 2018-04-15 ASSESSMENT — ENCOUNTER SYMPTOMS
BLURRED VISION: 0
DOUBLE VISION: 0
GASTROINTESTINAL NEGATIVE: 1
EYES NEGATIVE: 1

## 2018-04-23 ENCOUNTER — HOSPITAL ENCOUNTER (OUTPATIENT)
Dept: LAB | Age: 83
Discharge: HOME | End: 2018-04-23
Attending: INTERNAL MEDICINE | Admitting: INTERNAL MEDICINE

## 2018-04-23 LAB
ANION GAP SERPL CALCULATED.3IONS-SCNC: 14 MMOL/L (ref 4–16)
BASOPHILS ABSOLUTE: 0.1 K/CU MM
BASOPHILS RELATIVE PERCENT: 1 % (ref 0–1)
BUN BLDV-MCNC: 10 MG/DL (ref 6–23)
CALCIUM SERPL-MCNC: 9.8 MG/DL (ref 8.3–10.6)
CHLORIDE BLD-SCNC: 97 MMOL/L (ref 99–110)
CO2: 29 MMOL/L (ref 21–32)
CREAT SERPL-MCNC: 0.7 MG/DL (ref 0.6–1.1)
DIFFERENTIAL TYPE: ABNORMAL
EOSINOPHILS ABSOLUTE: 0.3 K/CU MM
EOSINOPHILS RELATIVE PERCENT: 3.1 % (ref 0–3)
GFR AFRICAN AMERICAN: >60 ML/MIN/1.73M2
GFR NON-AFRICAN AMERICAN: >60 ML/MIN/1.73M2
GLUCOSE FASTING: 126 MG/DL (ref 70–99)
HCT VFR BLD CALC: 42.5 % (ref 37–47)
HEMOGLOBIN: 13.6 GM/DL (ref 12.5–16)
IMMATURE NEUTROPHIL %: 0.5 % (ref 0–0.43)
LYMPHOCYTES ABSOLUTE: 3.7 K/CU MM
LYMPHOCYTES RELATIVE PERCENT: 37.6 % (ref 24–44)
MAGNESIUM: 1.8 MG/DL (ref 1.8–2.4)
MCH RBC QN AUTO: 30.9 PG (ref 27–31)
MCHC RBC AUTO-ENTMCNC: 32 % (ref 32–36)
MCV RBC AUTO: 96.6 FL (ref 78–100)
MONOCYTES ABSOLUTE: 1.2 K/CU MM
MONOCYTES RELATIVE PERCENT: 12.1 % (ref 0–4)
PDW BLD-RTO: 13.2 % (ref 11.7–14.9)
PLATELET # BLD: 255 K/CU MM (ref 140–440)
PMV BLD AUTO: 9.3 FL (ref 7.5–11.1)
POTASSIUM SERPL-SCNC: 4.3 MMOL/L (ref 3.5–5.1)
RBC # BLD: 4.4 M/CU MM (ref 4.2–5.4)
SEGMENTED NEUTROPHILS ABSOLUTE COUNT: 4.4 K/CU MM
SEGMENTED NEUTROPHILS RELATIVE PERCENT: 45.7 % (ref 36–66)
SODIUM BLD-SCNC: 140 MMOL/L (ref 135–145)
TOTAL IMMATURE NEUTOROPHIL: 0.05 K/CU MM
WBC # BLD: 9.7 K/CU MM (ref 4–10.5)

## 2018-05-04 ENCOUNTER — OFFICE VISIT (OUTPATIENT)
Dept: INTERNAL MEDICINE CLINIC | Age: 83
End: 2018-05-04

## 2018-05-04 VITALS
DIASTOLIC BLOOD PRESSURE: 80 MMHG | BODY MASS INDEX: 39.96 KG/M2 | TEMPERATURE: 97.4 F | RESPIRATION RATE: 16 BRPM | WEIGHT: 232.8 LBS | SYSTOLIC BLOOD PRESSURE: 132 MMHG | OXYGEN SATURATION: 93 % | HEART RATE: 72 BPM

## 2018-05-04 DIAGNOSIS — E11.9 TYPE 2 DIABETES MELLITUS WITHOUT COMPLICATION, WITHOUT LONG-TERM CURRENT USE OF INSULIN (HCC): ICD-10-CM

## 2018-05-04 DIAGNOSIS — K92.2 GASTROINTESTINAL HEMORRHAGE, UNSPECIFIED GASTROINTESTINAL HEMORRHAGE TYPE: ICD-10-CM

## 2018-05-04 DIAGNOSIS — I25.10 CORONARY ARTERY DISEASE INVOLVING NATIVE CORONARY ARTERY OF NATIVE HEART WITHOUT ANGINA PECTORIS: ICD-10-CM

## 2018-05-04 DIAGNOSIS — I10 ESSENTIAL HYPERTENSION: Primary | ICD-10-CM

## 2018-05-04 DIAGNOSIS — F41.9 ANXIETY: Chronic | ICD-10-CM

## 2018-05-04 DIAGNOSIS — E66.09 CLASS 2 OBESITY DUE TO EXCESS CALORIES WITHOUT SERIOUS COMORBIDITY WITH BODY MASS INDEX (BMI) OF 39.0 TO 39.9 IN ADULT: ICD-10-CM

## 2018-05-04 DIAGNOSIS — E78.2 MIXED HYPERLIPIDEMIA: ICD-10-CM

## 2018-05-04 PROCEDURE — G8417 CALC BMI ABV UP PARAM F/U: HCPCS | Performed by: INTERNAL MEDICINE

## 2018-05-04 PROCEDURE — 1123F ACP DISCUSS/DSCN MKR DOCD: CPT | Performed by: INTERNAL MEDICINE

## 2018-05-04 PROCEDURE — 99213 OFFICE O/P EST LOW 20 MIN: CPT | Performed by: INTERNAL MEDICINE

## 2018-05-04 PROCEDURE — 4040F PNEUMOC VAC/ADMIN/RCVD: CPT | Performed by: INTERNAL MEDICINE

## 2018-05-04 PROCEDURE — 1090F PRES/ABSN URINE INCON ASSESS: CPT | Performed by: INTERNAL MEDICINE

## 2018-05-04 PROCEDURE — G8427 DOCREV CUR MEDS BY ELIG CLIN: HCPCS | Performed by: INTERNAL MEDICINE

## 2018-05-04 PROCEDURE — G8598 ASA/ANTIPLAT THER USED: HCPCS | Performed by: INTERNAL MEDICINE

## 2018-05-04 PROCEDURE — 1036F TOBACCO NON-USER: CPT | Performed by: INTERNAL MEDICINE

## 2018-05-04 PROCEDURE — G8399 PT W/DXA RESULTS DOCUMENT: HCPCS | Performed by: INTERNAL MEDICINE

## 2018-05-04 ASSESSMENT — ENCOUNTER SYMPTOMS
RESPIRATORY NEGATIVE: 1
GASTROINTESTINAL NEGATIVE: 1
EYES NEGATIVE: 1
COUGH: 0

## 2018-05-24 ENCOUNTER — TELEPHONE (OUTPATIENT)
Dept: INTERNAL MEDICINE CLINIC | Age: 83
End: 2018-05-24

## 2018-05-25 RX ORDER — ROPINIROLE 1 MG/1
TABLET, FILM COATED ORAL
Qty: 135 TABLET | Refills: 1 | Status: SHIPPED | OUTPATIENT
Start: 2018-05-25 | End: 2018-12-17 | Stop reason: SDUPTHER

## 2018-07-19 RX ORDER — METOPROLOL TARTRATE 50 MG/1
25 TABLET, FILM COATED ORAL 2 TIMES DAILY
Qty: 90 TABLET | Refills: 1 | Status: SHIPPED | OUTPATIENT
Start: 2018-07-19 | End: 2019-01-15 | Stop reason: SDUPTHER

## 2018-07-24 ENCOUNTER — TELEPHONE (OUTPATIENT)
Dept: INTERNAL MEDICINE CLINIC | Age: 83
End: 2018-07-24

## 2018-07-24 DIAGNOSIS — I10 ESSENTIAL HYPERTENSION: Primary | ICD-10-CM

## 2018-07-24 RX ORDER — LOSARTAN POTASSIUM 50 MG/1
50 TABLET ORAL DAILY
Qty: 90 TABLET | Refills: 0 | Status: SHIPPED | OUTPATIENT
Start: 2018-07-24 | End: 2018-08-09

## 2018-07-31 ENCOUNTER — TELEPHONE (OUTPATIENT)
Dept: INTERNAL MEDICINE CLINIC | Age: 83
End: 2018-07-31

## 2018-07-31 NOTE — TELEPHONE ENCOUNTER
Patient called and complaining since starting losartan last week her legs and feet feel really heavy, doesn't feel good and hurts to walk. Appt made. Will not take any more Losartan.

## 2018-08-01 ENCOUNTER — TELEPHONE (OUTPATIENT)
Dept: INTERNAL MEDICINE CLINIC | Age: 83
End: 2018-08-01

## 2018-08-01 ENCOUNTER — OFFICE VISIT (OUTPATIENT)
Dept: INTERNAL MEDICINE CLINIC | Age: 83
End: 2018-08-01

## 2018-08-01 VITALS
OXYGEN SATURATION: 97 % | DIASTOLIC BLOOD PRESSURE: 80 MMHG | HEART RATE: 67 BPM | TEMPERATURE: 97.4 F | WEIGHT: 230.2 LBS | SYSTOLIC BLOOD PRESSURE: 128 MMHG | BODY MASS INDEX: 39.51 KG/M2

## 2018-08-01 DIAGNOSIS — I10 ESSENTIAL HYPERTENSION: ICD-10-CM

## 2018-08-01 DIAGNOSIS — E11.9 TYPE 2 DIABETES MELLITUS WITHOUT COMPLICATION, WITHOUT LONG-TERM CURRENT USE OF INSULIN (HCC): ICD-10-CM

## 2018-08-01 DIAGNOSIS — I25.10 CORONARY ARTERY DISEASE INVOLVING NATIVE CORONARY ARTERY OF NATIVE HEART WITHOUT ANGINA PECTORIS: ICD-10-CM

## 2018-08-01 DIAGNOSIS — R53.83 FATIGUE, UNSPECIFIED TYPE: ICD-10-CM

## 2018-08-01 DIAGNOSIS — R06.02 SOB (SHORTNESS OF BREATH): ICD-10-CM

## 2018-08-01 DIAGNOSIS — F51.01 PRIMARY INSOMNIA: Primary | ICD-10-CM

## 2018-08-01 DIAGNOSIS — E66.09 CLASS 2 OBESITY DUE TO EXCESS CALORIES WITHOUT SERIOUS COMORBIDITY WITH BODY MASS INDEX (BMI) OF 39.0 TO 39.9 IN ADULT: ICD-10-CM

## 2018-08-01 DIAGNOSIS — G25.81 RESTLESS LEG SYNDROME: ICD-10-CM

## 2018-08-01 DIAGNOSIS — E78.2 MIXED HYPERLIPIDEMIA: ICD-10-CM

## 2018-08-01 PROCEDURE — G8399 PT W/DXA RESULTS DOCUMENT: HCPCS | Performed by: INTERNAL MEDICINE

## 2018-08-01 PROCEDURE — 1101F PT FALLS ASSESS-DOCD LE1/YR: CPT | Performed by: INTERNAL MEDICINE

## 2018-08-01 PROCEDURE — 1036F TOBACCO NON-USER: CPT | Performed by: INTERNAL MEDICINE

## 2018-08-01 PROCEDURE — G8417 CALC BMI ABV UP PARAM F/U: HCPCS | Performed by: INTERNAL MEDICINE

## 2018-08-01 PROCEDURE — G8598 ASA/ANTIPLAT THER USED: HCPCS | Performed by: INTERNAL MEDICINE

## 2018-08-01 PROCEDURE — 4040F PNEUMOC VAC/ADMIN/RCVD: CPT | Performed by: INTERNAL MEDICINE

## 2018-08-01 PROCEDURE — 1090F PRES/ABSN URINE INCON ASSESS: CPT | Performed by: INTERNAL MEDICINE

## 2018-08-01 PROCEDURE — 1123F ACP DISCUSS/DSCN MKR DOCD: CPT | Performed by: INTERNAL MEDICINE

## 2018-08-01 PROCEDURE — G8427 DOCREV CUR MEDS BY ELIG CLIN: HCPCS | Performed by: INTERNAL MEDICINE

## 2018-08-01 PROCEDURE — 99214 OFFICE O/P EST MOD 30 MIN: CPT | Performed by: INTERNAL MEDICINE

## 2018-08-01 RX ORDER — TRAZODONE HYDROCHLORIDE 50 MG/1
50 TABLET ORAL NIGHTLY
Qty: 30 TABLET | Refills: 1 | Status: SHIPPED | OUTPATIENT
Start: 2018-08-01 | End: 2018-08-09

## 2018-08-01 NOTE — TELEPHONE ENCOUNTER
Patient called stating that she forgot to ask if doctor wanted her to continue the losartan 50 mg daily. YES per  Patient aware.

## 2018-08-01 NOTE — PROGRESS NOTES
Noble Hayden  Patient's  is 1935  Seen in office on 2018      SUBJECTIVE:  Willia Halsted is a 81 y. o.year old female presents today   Chief Complaint   Patient presents with    Medication Check     Patient is here for her routine checkup for hypertension and hyperlipidemia and diabetes. Patient was on Valsartan for hypertension but as it was recalled for contamination that was discontinued and patient was started on losartan milligrams daily when I was on CME. Patient is tolerating the medication. She does complain of shortness of breath without any chest pain. No pedal edema. No cough or sputum production. No nausea or vomiting. Patient has hyperlipidemia and is taking Crestor. Taking medications regularly. No side effects noted. Complains of insomnia also. Complains of fatigue  Restless leg syndrome is little better after Requip is increased    Review of Systems   Constitutional: Negative. Negative for chills and fever. HENT: Negative. Eyes: Negative. Respiratory: Negative. Cardiovascular: Negative. Negative for chest pain, palpitations and leg swelling. Gastrointestinal: Negative. Negative for blood in stool, heartburn, nausea and vomiting. Genitourinary: Negative. Musculoskeletal: Negative. Skin: Negative. Neurological: Negative. Negative for dizziness, tingling, seizures and headaches. Endo/Heme/Allergies: Negative. Psychiatric/Behavioral: The patient has insomnia. OBJECTIVE: /80   Pulse 67   Temp 97.4 °F (36.3 °C)   Wt 230 lb 3.2 oz (104.4 kg)   LMP  (LMP Unknown)   SpO2 97%   Breastfeeding? No   BMI 39.51 kg/m²     Wt Readings from Last 3 Encounters:   18 230 lb 3.2 oz (104.4 kg)   18 232 lb 12.8 oz (105.6 kg)   18 227 lb 9.6 oz (103.2 kg)      GENERAL:  Alert, oriented, pleasant, in no apparent distress. HEENT:  Conjunctiva pink, no scleral icterus. ENT clear. NECK:  Supple. No jugular venous distention noted. No masses felt,  CARDIOVASCULAR:  Normal S1 and S2    PULMONARY:  No respiratory distress. No wheezes or rales. ABDOMEN:  Soft and non-tender,no masses  or organomegaly. EXTREMITIES:  No cyanosis, clubbing, or significant edema. SKIN: Skin is warm and dry. NEUROLOGICAL:  Cranial nerves II through XII are grossly intact. IMPRESSION:    Encounter Diagnoses   Name Primary?  Primary insomnia Yes    Type 2 diabetes mellitus without complication, without long-term current use of insulin (HCC)     Essential hypertension     Mixed hyperlipidemia     Class 2 obesity due to excess calories without serious comorbidity with body mass index (BMI) of 39.0 to 39.9 in adult     CAD s/p MI, TPA in 1998     Restless leg syndrome     Fatigue, unspecified type     SOB (shortness of breath)        ASSESSMENT/PLAN:    1. SOB on exertion : refer to cardio  2. Fatigue labs ordered. CBC CMP and a TSH  3. RLS : cont requip  4. Insomnia : trazodone   5. For hypertension continue losartan 50 mg daily  6. Diabetes mellitus in control. Continue glipizidemetformin. Follow diet and  7. Coronary artery disease Continue aspirin, beta blocker and statin. Reevaluate patient because of shortness of breath. Referred to cardiologist  8. Return to office in 2 months      Hyperlipidemia continue Crestor    Orders Placed This Encounter   Procedures    CBC Auto Differential    Comprehensive Metabolic Panel    TSH without Reflex   [facility], Farzana Hope MD       Mediations reviewed with the patient. Continue current medications. Appropriate prescriptions are addressed. After visit summery provided. Follow up as directed sooner if needed. Questions answered and patient verbalizes understanding. Call for any problems, questions, or concerns.        No Known Allergies  Current Outpatient Prescriptions   Medication Sig Dispense Refill    traZODone (DESYREL) 50 MG tablet Take 1 tablet by mouth nightly

## 2018-08-07 DIAGNOSIS — E78.2 MIXED HYPERLIPIDEMIA: ICD-10-CM

## 2018-08-07 DIAGNOSIS — R53.83 FATIGUE, UNSPECIFIED TYPE: ICD-10-CM

## 2018-08-07 DIAGNOSIS — I10 ESSENTIAL HYPERTENSION: ICD-10-CM

## 2018-08-07 LAB
A/G RATIO: 1.6 (ref 1.1–2.2)
ALBUMIN SERPL-MCNC: 4 G/DL (ref 3.4–5)
ALP BLD-CCNC: 49 U/L (ref 40–129)
ALT SERPL-CCNC: 6 U/L (ref 10–40)
ANION GAP SERPL CALCULATED.3IONS-SCNC: 10 MMOL/L (ref 3–16)
AST SERPL-CCNC: 14 U/L (ref 15–37)
BASOPHILS ABSOLUTE: 0.1 K/UL (ref 0–0.2)
BASOPHILS RELATIVE PERCENT: 1 %
BILIRUB SERPL-MCNC: <0.2 MG/DL (ref 0–1)
BILIRUBIN DIRECT: <0.2 MG/DL (ref 0–0.3)
BILIRUBIN, INDIRECT: ABNORMAL MG/DL (ref 0–1)
BUN BLDV-MCNC: 12 MG/DL (ref 7–20)
CALCIUM SERPL-MCNC: 9.9 MG/DL (ref 8.3–10.6)
CHLORIDE BLD-SCNC: 95 MMOL/L (ref 99–110)
CHOLESTEROL, TOTAL: 121 MG/DL (ref 0–199)
CO2: 31 MMOL/L (ref 21–32)
CREAT SERPL-MCNC: 0.6 MG/DL (ref 0.6–1.2)
EOSINOPHILS ABSOLUTE: 0.2 K/UL (ref 0–0.6)
EOSINOPHILS RELATIVE PERCENT: 2.6 %
GFR AFRICAN AMERICAN: >60
GFR NON-AFRICAN AMERICAN: >60
GLOBULIN: 2.5 G/DL
GLUCOSE BLD-MCNC: 128 MG/DL (ref 70–99)
HCT VFR BLD CALC: 39.3 % (ref 36–48)
HDLC SERPL-MCNC: 44 MG/DL (ref 40–60)
HEMOGLOBIN: 12.8 G/DL (ref 12–16)
LDL CHOLESTEROL CALCULATED: 51 MG/DL
LYMPHOCYTES ABSOLUTE: 2.4 K/UL (ref 1–5.1)
LYMPHOCYTES RELATIVE PERCENT: 34.6 %
MAGNESIUM: 2 MG/DL (ref 1.8–2.4)
MCH RBC QN AUTO: 30.4 PG (ref 26–34)
MCHC RBC AUTO-ENTMCNC: 32.6 G/DL (ref 31–36)
MCV RBC AUTO: 93.3 FL (ref 80–100)
MONOCYTES ABSOLUTE: 0.9 K/UL (ref 0–1.3)
MONOCYTES RELATIVE PERCENT: 13.3 %
NEUTROPHILS ABSOLUTE: 3.3 K/UL (ref 1.7–7.7)
NEUTROPHILS RELATIVE PERCENT: 48.5 %
PDW BLD-RTO: 13.7 % (ref 12.4–15.4)
PLATELET # BLD: 266 K/UL (ref 135–450)
PMV BLD AUTO: 7.6 FL (ref 5–10.5)
POTASSIUM SERPL-SCNC: 5 MMOL/L (ref 3.5–5.1)
RBC # BLD: 4.22 M/UL (ref 4–5.2)
SODIUM BLD-SCNC: 136 MMOL/L (ref 136–145)
TOTAL PROTEIN: 6.5 G/DL (ref 6.4–8.2)
TRIGL SERPL-MCNC: 128 MG/DL (ref 0–150)
TSH SERPL DL<=0.05 MIU/L-ACNC: 2.61 UIU/ML (ref 0.27–4.2)
VLDLC SERPL CALC-MCNC: 26 MG/DL
WBC # BLD: 6.9 K/UL (ref 4–11)

## 2018-08-09 ENCOUNTER — INITIAL CONSULT (OUTPATIENT)
Dept: CARDIOLOGY CLINIC | Age: 83
End: 2018-08-09

## 2018-08-09 VITALS
SYSTOLIC BLOOD PRESSURE: 110 MMHG | BODY MASS INDEX: 38.52 KG/M2 | HEART RATE: 60 BPM | WEIGHT: 231.2 LBS | HEIGHT: 65 IN | DIASTOLIC BLOOD PRESSURE: 80 MMHG

## 2018-08-09 DIAGNOSIS — E11.9 TYPE 2 DIABETES MELLITUS WITHOUT COMPLICATION, WITHOUT LONG-TERM CURRENT USE OF INSULIN (HCC): ICD-10-CM

## 2018-08-09 DIAGNOSIS — I25.10 CORONARY ARTERY DISEASE INVOLVING NATIVE CORONARY ARTERY OF NATIVE HEART WITHOUT ANGINA PECTORIS: ICD-10-CM

## 2018-08-09 DIAGNOSIS — I50.32 CHRONIC DIASTOLIC CONGESTIVE HEART FAILURE (HCC): ICD-10-CM

## 2018-08-09 DIAGNOSIS — R06.02 SHORTNESS OF BREATH: Primary | ICD-10-CM

## 2018-08-09 DIAGNOSIS — E78.2 MIXED HYPERLIPIDEMIA: ICD-10-CM

## 2018-08-09 DIAGNOSIS — I10 ESSENTIAL HYPERTENSION: ICD-10-CM

## 2018-08-09 PROCEDURE — G8427 DOCREV CUR MEDS BY ELIG CLIN: HCPCS | Performed by: INTERNAL MEDICINE

## 2018-08-09 PROCEDURE — 1101F PT FALLS ASSESS-DOCD LE1/YR: CPT | Performed by: INTERNAL MEDICINE

## 2018-08-09 PROCEDURE — 1090F PRES/ABSN URINE INCON ASSESS: CPT | Performed by: INTERNAL MEDICINE

## 2018-08-09 PROCEDURE — 99204 OFFICE O/P NEW MOD 45 MIN: CPT | Performed by: INTERNAL MEDICINE

## 2018-08-09 PROCEDURE — G8417 CALC BMI ABV UP PARAM F/U: HCPCS | Performed by: INTERNAL MEDICINE

## 2018-08-09 PROCEDURE — 93000 ELECTROCARDIOGRAM COMPLETE: CPT | Performed by: INTERNAL MEDICINE

## 2018-08-09 RX ORDER — FUROSEMIDE 40 MG/1
40 TABLET ORAL DAILY
Qty: 30 TABLET | Refills: 3 | Status: SHIPPED | OUTPATIENT
Start: 2018-08-09 | End: 2018-09-06 | Stop reason: SDUPTHER

## 2018-08-09 NOTE — PATIENT INSTRUCTIONS
Patient Education        Limiting Sodium and Fluids With Heart Failure: Care Instructions  Your Care Instructions    Sodium causes your body to hold on to extra water. This may cause your heart failure symptoms to get worse. Limiting sodium may help you feel better and lower your risk of having to go to the hospital.  People get most of their sodium from processed foods. Fast food and restaurant meals also tend to be very high in sodium. Your doctor may suggest that you limit sodium to 2,000 milligrams (mg) a day or less. That is less than 1 teaspoon of salt a day, including all the salt you eat in cooked or packaged foods. Usually, you have to limit the amount of liquids you drink only if your heart failure is severe. Limiting sodium alone often is enough to help your body get rid of extra fluids. However, your doctor may tell you to limit your fluid intake to a set amount each day. Follow-up care is a key part of your treatment and safety. Be sure to make and go to all appointments, and call your doctor if you are having problems. It's also a good idea to know your test results and keep a list of the medicines you take. How can you care for yourself at home? Read food labels  · Read food labels on cans and food packages. The labels tell you how much sodium is in each serving. Make sure that you look at the serving size. If you eat more than the serving size, you have eaten more sodium than is listed for one serving. · Food labels also tell you the Percent Daily Value. If the Percent Daily Value says 50%, it means that you will get at least 50% of all the sodium you need for the entire day in one serving. Choose products with low Percent Daily Values for sodium. · Be aware that sodium can come in forms other than salt, including monosodium glutamate (MSG), sodium citrate, and sodium bicarbonate (baking soda). MSG is often added to Asian food. You can sometimes ask for food without MSG or salt.   Buy low-sodium foods  · Buy foods that are labeled \"unsalted\" (no salt added), \"sodium-free\" (less than 5 mg of sodium per serving), or \"low-sodium\" (less than 140 mg of sodium per serving). A food labeled \"light sodium\" has less than half of the full-sodium version of that food. Foods labeled \"reduced-sodium\" may still have too much sodium. · Buy fresh vegetables or plain, frozen vegetables. Buy low-sodium versions of canned vegetables, soups, and other canned goods. Prepare low-sodium meals  · Use less salt each day when cooking. Reducing salt in this way will help you adjust to the taste. Do not add salt after cooking. Take the salt shaker off the table. · Flavor your food with garlic, lemon juice, onion, vinegar, herbs, and spices instead of salt. Do not use soy sauce, steak sauce, onion salt, garlic salt, mustard, or ketchup on your food. · Make your own salad dressings, sauces, and ketchup without adding salt. · Use less salt (or none) when recipes call for it. You can often use half the salt a recipe calls for without losing flavor. Other dishes like rice, pasta, and grains do not need added salt. · Rinse canned vegetables. This removes some-but not all-of the salt. · Avoid water that has a naturally high sodium content or that has been treated with water softeners, which add sodium. Call your local water company to find out the sodium content of your water supply. If you buy bottled water, read the label and choose a sodium-free brand. Avoid high-sodium foods, such as:  · Smoked, cured, salted, and canned meat, fish, and poultry. · Ham, shelton, hot dogs, and luncheon meats. · Regular, hard, and processed cheese and regular peanut butter. · Crackers with salted tops. · Frozen prepared meals. · Canned and dried soups, broths, and bouillon, unless labeled sodium-free or low-sodium. · Canned vegetables, unless labeled sodium-free or low-sodium. · Salted snack foods such as chips and pretzels.   · [place] fries, pizza, tacos, and other fast foods. · Pickles, olives, ketchup, and other condiments, especially soy sauce, unless labeled sodium-free or low-sodium. If you cannot cook for yourself  · Have family members or friends help you, or have someone cook low-sodium meals. · Check with your local senior nutrition program to find out where meals are served and whether they offer a low-sodium option. You can often find these programs through your local health department or hospital.  · Have meals delivered to your home. Most [facility] have a Meals on [address]. These programs provide one hot meal a day for older adults, delivered to their homes. Ask whether these meals are low-sodium. Let them know that you are on a low-sodium diet. Limiting fluid intake  · Find a method that works for you. You might simply write down how much you drink every time you do. Some people keep a container filled with the amount of fluid allowed for that day. If they drink from a source other than the container, then they pour out that amount. · Measure your regular drinking glasses to find out how much fluid each one holds. Once you know this, you will not have to measure every time. · Besides water, milk, juices, and other drinks, some foods have a lot of fluid. Count any foods that will melt (such as ice cream or gelatin dessert) or liquid foods (such as soup) as part of your fluid intake for the day. Where can you learn more? Go to https://[email or web address].[email or web address] org and sign in to your [employer] account. Enter A166 in the [facility] box to learn more about \"Limiting Sodium and Fluids With Heart Failure: Care Instructions. \"     If you do not have an account, please click on the \"Sign Up Now\" link. Current as of: December 6, 2017  Content Version: 11.7  © 8979-1917 [employer]. Care instructions adapted under license by [facility] ([facility]).  If you have questions about a medical condition or this instruction, always ask your healthcare professional. [place] [address] any warranty or liability for your use of this information.

## 2018-08-09 NOTE — ASSESSMENT & PLAN NOTE
ASCVD , H/o 1997 treated with TPA , h/o cardiomyopathy Ef was 40% .  We will gets stress test ( geovanny can ) she cannot walk much

## 2018-08-09 NOTE — PROGRESS NOTES
02/28/2018    LABA1C 5.9 12/12/2017     Lab Results   Component Value Date    CHOL 121 08/07/2018    TRIG 128 08/07/2018    HDL 44 08/07/2018    LDLCALC 51 08/07/2018    LDLDIRECT 69 08/18/2017     Lab Results   Component Value Date    ALT 6 (L) 08/07/2018    AST 14 (L) 08/07/2018     TSH:   Lab Results   Component Value Date    TSH 2.61 08/07/2018         All labs, medications and tests reviewed by myself including data and history from outside source , patient and available family . Assessment & Plan:      1. Shortness of breath    2. CAD s/p MI, TPA in 1998    3. Essential hypertension    4. Mixed hyperlipidemia    5. Type 2 diabetes mellitus without complication, without long-term current use of insulin ([facility] [address]    6. Chronic diastolic congestive heart failure (HCC)         Chronic diastolic congestive heart failure (HCC)  SOB since her pneumonia few months ago, she has ankle swelling for last few weeks , will start lasix and also check echo, check bnp     CAD s/p MI, TPA in 1998  ASCVD , H/o 1997 treated with TPA , h/o cardiomyopathy Ef was 40% . We will gets stress test ( geovanny can ) she cannot walk much     Essential hypertension  She says blood pressure is generally well controlled she checks it at home. Dyslipidemia :  Abran Taylor had lab work recently,  Lipid profile was reviewed with patient. Counseled extensively and medication compliance urged. We discussed that for the  prevention of ASCVD our  goal is aggressive risk modification. Patient is encouraged to exercise even a brisk walk for 30 minutes  at least 3 to 4 times a week   Various goals were discussed and questions answered. Continue current medications. Appropriate prescriptions are addressed and refills ordered. Questions answered and patient verbalizes understanding. Call for any problems, questions, or concerns. Continue all other medications of all above medical condition listed as is.     Return in about 1 month (around

## 2018-08-09 NOTE — ASSESSMENT & PLAN NOTE
SOB since her pneumonia few months ago, she has ankle swelling for last few weeks , will start lasix and also check echo, check bnp

## 2018-08-10 ENCOUNTER — TELEPHONE (OUTPATIENT)
Dept: CARDIOLOGY CLINIC | Age: 83
End: 2018-08-10

## 2018-08-10 DIAGNOSIS — Z51.81 ENCOUNTER FOR MONITORING DIURETIC THERAPY: ICD-10-CM

## 2018-08-10 DIAGNOSIS — Z79.899 ENCOUNTER FOR MONITORING DIURETIC THERAPY: ICD-10-CM

## 2018-08-10 DIAGNOSIS — I50.32 CHRONIC DIASTOLIC CONGESTIVE HEART FAILURE (HCC): Primary | ICD-10-CM

## 2018-08-13 DIAGNOSIS — R06.02 SHORTNESS OF BREATH: ICD-10-CM

## 2018-08-13 DIAGNOSIS — I25.10 CORONARY ARTERY DISEASE INVOLVING NATIVE CORONARY ARTERY OF NATIVE HEART WITHOUT ANGINA PECTORIS: ICD-10-CM

## 2018-08-13 DIAGNOSIS — I50.32 CHRONIC DIASTOLIC CONGESTIVE HEART FAILURE (HCC): ICD-10-CM

## 2018-08-13 DIAGNOSIS — E78.2 MIXED HYPERLIPIDEMIA: ICD-10-CM

## 2018-08-13 DIAGNOSIS — Z79.899 ENCOUNTER FOR MONITORING DIURETIC THERAPY: ICD-10-CM

## 2018-08-13 DIAGNOSIS — E11.9 TYPE 2 DIABETES MELLITUS WITHOUT COMPLICATION, WITHOUT LONG-TERM CURRENT USE OF INSULIN (HCC): ICD-10-CM

## 2018-08-13 DIAGNOSIS — I10 ESSENTIAL HYPERTENSION: ICD-10-CM

## 2018-08-13 DIAGNOSIS — Z51.81 ENCOUNTER FOR MONITORING DIURETIC THERAPY: ICD-10-CM

## 2018-08-13 LAB
ANION GAP SERPL CALCULATED.3IONS-SCNC: 13 MMOL/L (ref 3–16)
BUN BLDV-MCNC: 18 MG/DL (ref 7–20)
CALCIUM SERPL-MCNC: 10.3 MG/DL (ref 8.3–10.6)
CHLORIDE BLD-SCNC: 93 MMOL/L (ref 99–110)
CO2: 29 MMOL/L (ref 21–32)
CREAT SERPL-MCNC: 0.7 MG/DL (ref 0.6–1.2)
GFR AFRICAN AMERICAN: >60
GFR NON-AFRICAN AMERICAN: >60
GLUCOSE BLD-MCNC: 112 MG/DL (ref 70–99)
POTASSIUM SERPL-SCNC: 4.3 MMOL/L (ref 3.5–5.1)
PRO-BNP: 75 PG/ML (ref 0–449)
SODIUM BLD-SCNC: 135 MMOL/L (ref 136–145)

## 2018-08-14 ENCOUNTER — OFFICE VISIT (OUTPATIENT)
Dept: INTERNAL MEDICINE CLINIC | Age: 83
End: 2018-08-14

## 2018-08-14 ENCOUNTER — CARE COORDINATION (OUTPATIENT)
Dept: CARE COORDINATION | Age: 83
End: 2018-08-14

## 2018-08-14 VITALS
WEIGHT: 229 LBS | OXYGEN SATURATION: 99 % | BODY MASS INDEX: 38.15 KG/M2 | RESPIRATION RATE: 16 BRPM | HEIGHT: 65 IN | TEMPERATURE: 97.7 F | HEART RATE: 70 BPM | DIASTOLIC BLOOD PRESSURE: 68 MMHG | SYSTOLIC BLOOD PRESSURE: 98 MMHG

## 2018-08-14 DIAGNOSIS — E66.09 CLASS 2 OBESITY DUE TO EXCESS CALORIES WITHOUT SERIOUS COMORBIDITY WITH BODY MASS INDEX (BMI) OF 39.0 TO 39.9 IN ADULT: ICD-10-CM

## 2018-08-14 DIAGNOSIS — M54.50 CHRONIC MIDLINE LOW BACK PAIN WITHOUT SCIATICA: ICD-10-CM

## 2018-08-14 DIAGNOSIS — E11.9 TYPE 2 DIABETES MELLITUS WITHOUT COMPLICATION, WITHOUT LONG-TERM CURRENT USE OF INSULIN (HCC): ICD-10-CM

## 2018-08-14 DIAGNOSIS — G89.29 CHRONIC MIDLINE LOW BACK PAIN WITHOUT SCIATICA: ICD-10-CM

## 2018-08-14 DIAGNOSIS — I50.32 CHRONIC DIASTOLIC CONGESTIVE HEART FAILURE (HCC): ICD-10-CM

## 2018-08-14 DIAGNOSIS — G25.81 RESTLESS LEG SYNDROME: ICD-10-CM

## 2018-08-14 DIAGNOSIS — I25.10 CORONARY ARTERY DISEASE INVOLVING NATIVE CORONARY ARTERY OF NATIVE HEART WITHOUT ANGINA PECTORIS: ICD-10-CM

## 2018-08-14 DIAGNOSIS — I10 ESSENTIAL HYPERTENSION: Primary | ICD-10-CM

## 2018-08-14 DIAGNOSIS — E78.2 MIXED HYPERLIPIDEMIA: ICD-10-CM

## 2018-08-14 DIAGNOSIS — R21 RASH AND NONSPECIFIC SKIN ERUPTION: ICD-10-CM

## 2018-08-14 PROBLEM — J18.9 PNEUMONIA OF RIGHT LOWER LOBE DUE TO INFECTIOUS ORGANISM: Status: RESOLVED | Noted: 2018-04-05 | Resolved: 2018-08-14

## 2018-08-14 PROCEDURE — G8598 ASA/ANTIPLAT THER USED: HCPCS | Performed by: INTERNAL MEDICINE

## 2018-08-14 PROCEDURE — G8427 DOCREV CUR MEDS BY ELIG CLIN: HCPCS | Performed by: INTERNAL MEDICINE

## 2018-08-14 PROCEDURE — 1036F TOBACCO NON-USER: CPT | Performed by: INTERNAL MEDICINE

## 2018-08-14 PROCEDURE — G8399 PT W/DXA RESULTS DOCUMENT: HCPCS | Performed by: INTERNAL MEDICINE

## 2018-08-14 PROCEDURE — 99213 OFFICE O/P EST LOW 20 MIN: CPT | Performed by: INTERNAL MEDICINE

## 2018-08-14 PROCEDURE — 4040F PNEUMOC VAC/ADMIN/RCVD: CPT | Performed by: INTERNAL MEDICINE

## 2018-08-14 PROCEDURE — G8417 CALC BMI ABV UP PARAM F/U: HCPCS | Performed by: INTERNAL MEDICINE

## 2018-08-14 PROCEDURE — 1101F PT FALLS ASSESS-DOCD LE1/YR: CPT | Performed by: INTERNAL MEDICINE

## 2018-08-14 PROCEDURE — 1090F PRES/ABSN URINE INCON ASSESS: CPT | Performed by: INTERNAL MEDICINE

## 2018-08-14 PROCEDURE — 1123F ACP DISCUSS/DSCN MKR DOCD: CPT | Performed by: INTERNAL MEDICINE

## 2018-08-14 RX ORDER — ROSUVASTATIN CALCIUM 10 MG/1
10 TABLET, COATED ORAL NIGHTLY
Qty: 90 TABLET | Refills: 1 | Status: SHIPPED | OUTPATIENT
Start: 2018-08-14 | End: 2019-01-15 | Stop reason: SDUPTHER

## 2018-08-14 RX ORDER — TRIAMCINOLONE ACETONIDE 1 MG/G
CREAM TOPICAL
Qty: 45 G | Refills: 0 | Status: SHIPPED | OUTPATIENT
Start: 2018-08-14 | End: 2018-09-17

## 2018-08-14 ASSESSMENT — ENCOUNTER SYMPTOMS
GASTROINTESTINAL NEGATIVE: 1
ORTHOPNEA: 0
SHORTNESS OF BREATH: 1
EYES NEGATIVE: 1

## 2018-08-16 DIAGNOSIS — R60.9 EDEMA, UNSPECIFIED TYPE: Primary | ICD-10-CM

## 2018-08-16 DIAGNOSIS — I50.9 CHRONIC CONGESTIVE HEART FAILURE, UNSPECIFIED HEART FAILURE TYPE (HCC): ICD-10-CM

## 2018-08-18 RX ORDER — LOSARTAN POTASSIUM 50 MG/1
50 TABLET ORAL DAILY
COMMUNITY
End: 2018-10-19 | Stop reason: SDUPTHER

## 2018-08-18 ASSESSMENT — ENCOUNTER SYMPTOMS
BLOOD IN STOOL: 0
EYES NEGATIVE: 1
HEARTBURN: 0
VOMITING: 0
RESPIRATORY NEGATIVE: 1
NAUSEA: 0
GASTROINTESTINAL NEGATIVE: 1

## 2018-08-23 DIAGNOSIS — I10 ESSENTIAL HYPERTENSION: ICD-10-CM

## 2018-08-23 LAB
ANION GAP SERPL CALCULATED.3IONS-SCNC: 15 MMOL/L (ref 3–16)
BUN BLDV-MCNC: 15 MG/DL (ref 7–20)
CALCIUM SERPL-MCNC: 10.5 MG/DL (ref 8.3–10.6)
CHLORIDE BLD-SCNC: 97 MMOL/L (ref 99–110)
CO2: 29 MMOL/L (ref 21–32)
CREAT SERPL-MCNC: 0.7 MG/DL (ref 0.6–1.2)
GFR AFRICAN AMERICAN: >60
GFR NON-AFRICAN AMERICAN: >60
GLUCOSE BLD-MCNC: 244 MG/DL (ref 70–99)
POTASSIUM SERPL-SCNC: 4.5 MMOL/L (ref 3.5–5.1)
PRO-BNP: 164 PG/ML (ref 0–449)
SODIUM BLD-SCNC: 141 MMOL/L (ref 136–145)

## 2018-08-28 ENCOUNTER — PROCEDURE VISIT (OUTPATIENT)
Dept: CARDIOLOGY CLINIC | Age: 83
End: 2018-08-28

## 2018-08-28 DIAGNOSIS — R06.02 SHORTNESS OF BREATH: ICD-10-CM

## 2018-08-28 DIAGNOSIS — I25.10 CORONARY ARTERY DISEASE INVOLVING NATIVE CORONARY ARTERY OF NATIVE HEART WITHOUT ANGINA PECTORIS: ICD-10-CM

## 2018-08-28 DIAGNOSIS — E11.9 TYPE 2 DIABETES MELLITUS WITHOUT COMPLICATION, WITHOUT LONG-TERM CURRENT USE OF INSULIN (HCC): ICD-10-CM

## 2018-08-28 DIAGNOSIS — I10 ESSENTIAL HYPERTENSION: ICD-10-CM

## 2018-08-28 DIAGNOSIS — E78.2 MIXED HYPERLIPIDEMIA: ICD-10-CM

## 2018-08-28 DIAGNOSIS — R06.02 SHORTNESS OF BREATH: Primary | ICD-10-CM

## 2018-08-28 LAB
LV EF: 53 %
LV EF: 59 %
LVEF MODALITY: NORMAL
LVEF MODALITY: NORMAL

## 2018-08-28 PROCEDURE — 93016 CV STRESS TEST SUPVJ ONLY: CPT | Performed by: INTERNAL MEDICINE

## 2018-08-28 PROCEDURE — 93017 CV STRESS TEST TRACING ONLY: CPT | Performed by: INTERNAL MEDICINE

## 2018-08-28 PROCEDURE — 93306 TTE W/DOPPLER COMPLETE: CPT | Performed by: INTERNAL MEDICINE

## 2018-08-28 PROCEDURE — 93018 CV STRESS TEST I&R ONLY: CPT | Performed by: INTERNAL MEDICINE

## 2018-08-28 PROCEDURE — 78452 HT MUSCLE IMAGE SPECT MULT: CPT | Performed by: INTERNAL MEDICINE

## 2018-08-28 PROCEDURE — A9500 TC99M SESTAMIBI: HCPCS | Performed by: INTERNAL MEDICINE

## 2018-08-30 ENCOUNTER — TELEPHONE (OUTPATIENT)
Dept: CARDIOLOGY CLINIC | Age: 83
End: 2018-08-30

## 2018-08-30 DIAGNOSIS — R21 RASH: Primary | ICD-10-CM

## 2018-09-04 ENCOUNTER — TELEPHONE (OUTPATIENT)
Dept: INTERNAL MEDICINE CLINIC | Age: 83
End: 2018-09-04

## 2018-09-06 ENCOUNTER — OFFICE VISIT (OUTPATIENT)
Dept: CARDIOLOGY CLINIC | Age: 83
End: 2018-09-06

## 2018-09-06 VITALS
BODY MASS INDEX: 37.82 KG/M2 | HEIGHT: 65 IN | DIASTOLIC BLOOD PRESSURE: 58 MMHG | HEART RATE: 72 BPM | RESPIRATION RATE: 16 BRPM | WEIGHT: 227 LBS | SYSTOLIC BLOOD PRESSURE: 110 MMHG

## 2018-09-06 DIAGNOSIS — I25.10 CORONARY ARTERY DISEASE INVOLVING NATIVE CORONARY ARTERY OF NATIVE HEART WITHOUT ANGINA PECTORIS: ICD-10-CM

## 2018-09-06 DIAGNOSIS — I50.32 CHRONIC DIASTOLIC CONGESTIVE HEART FAILURE (HCC): ICD-10-CM

## 2018-09-06 DIAGNOSIS — I10 ESSENTIAL HYPERTENSION: Primary | ICD-10-CM

## 2018-09-06 DIAGNOSIS — E11.9 TYPE 2 DIABETES MELLITUS WITHOUT COMPLICATION, WITHOUT LONG-TERM CURRENT USE OF INSULIN (HCC): ICD-10-CM

## 2018-09-06 DIAGNOSIS — E78.2 MIXED HYPERLIPIDEMIA: ICD-10-CM

## 2018-09-06 PROCEDURE — 1123F ACP DISCUSS/DSCN MKR DOCD: CPT | Performed by: INTERNAL MEDICINE

## 2018-09-06 PROCEDURE — 1090F PRES/ABSN URINE INCON ASSESS: CPT | Performed by: INTERNAL MEDICINE

## 2018-09-06 PROCEDURE — G8399 PT W/DXA RESULTS DOCUMENT: HCPCS | Performed by: INTERNAL MEDICINE

## 2018-09-06 PROCEDURE — G8417 CALC BMI ABV UP PARAM F/U: HCPCS | Performed by: INTERNAL MEDICINE

## 2018-09-06 PROCEDURE — G8427 DOCREV CUR MEDS BY ELIG CLIN: HCPCS | Performed by: INTERNAL MEDICINE

## 2018-09-06 PROCEDURE — 1101F PT FALLS ASSESS-DOCD LE1/YR: CPT | Performed by: INTERNAL MEDICINE

## 2018-09-06 PROCEDURE — 99214 OFFICE O/P EST MOD 30 MIN: CPT | Performed by: INTERNAL MEDICINE

## 2018-09-06 PROCEDURE — 1036F TOBACCO NON-USER: CPT | Performed by: INTERNAL MEDICINE

## 2018-09-06 PROCEDURE — 4040F PNEUMOC VAC/ADMIN/RCVD: CPT | Performed by: INTERNAL MEDICINE

## 2018-09-06 PROCEDURE — G8598 ASA/ANTIPLAT THER USED: HCPCS | Performed by: INTERNAL MEDICINE

## 2018-09-06 RX ORDER — KETOCONAZOLE 20 MG/G
CREAM TOPICAL
Refills: 1 | COMMUNITY
Start: 2018-08-30 | End: 2018-09-17 | Stop reason: SDUPTHER

## 2018-09-06 RX ORDER — MAGNESIUM OXIDE 400 MG/1
400 TABLET ORAL DAILY
Qty: 30 TABLET | Refills: 1 | Status: SHIPPED | OUTPATIENT
Start: 2018-09-06 | End: 2018-11-19 | Stop reason: SDUPTHER

## 2018-09-06 RX ORDER — FUROSEMIDE 40 MG/1
40 TABLET ORAL PRN
Qty: 30 TABLET | Refills: 3 | Status: ON HOLD | OUTPATIENT
Start: 2018-09-06 | End: 2019-09-05 | Stop reason: HOSPADM

## 2018-09-06 RX ORDER — POTASSIUM CHLORIDE 750 MG/1
10 TABLET, FILM COATED, EXTENDED RELEASE ORAL DAILY
Qty: 60 TABLET | Refills: 3 | Status: SHIPPED | OUTPATIENT
Start: 2018-09-06 | End: 2018-11-19

## 2018-09-06 NOTE — PROGRESS NOTES
CARDIOLOGY  NOTE    Chief Complaint: SOB    HPI:   Cristobal Bacon is a 81y.o. year old who has history as noted below. She reports that ever since her pneumonia in March 2018 her breathing was worse . She ahd stress test and started  lasix after her last visit    She feels her breathing is better after lasix but she feels her arthritis kicked in due to it . She is having kings horses,   She has no chest pressure or pain. But she cannot walk much. She has remote history of MI treated with TPA. She did have cardiomyopathy . Current Outpatient Prescriptions   Medication Sig Dispense Refill    ketoconazole (NIZORAL) 2 % cream   1    furosemide (LASIX) 40 MG tablet Take 1 tablet by mouth as needed (swelling) 30 tablet 3    magnesium oxide (MAG-OX) 400 MG tablet Take 1 tablet by mouth daily 30 tablet 1    potassium chloride (KLOR-CON) 10 MEQ extended release tablet Take 1 tablet by mouth daily 60 tablet 3    econazole nitrate 1 % cream Apply 1 g topically 2 times daily Apply topically daily.  econazole nitrate 1 % cream Apply 1 each topically 2 times daily 1 Tube 1    losartan (COZAAR) 50 MG tablet Take 50 mg by mouth daily      rosuvastatin (CRESTOR) 10 MG tablet Take 1 tablet by mouth nightly 90 tablet 1    triamcinolone (KENALOG) 0.1 % cream Apply topically 2 times daily.  45 g 0    Handicap Placard MISC by Does not apply route Duration 5 years 1 each 0    metoprolol tartrate (LOPRESSOR) 50 MG tablet Take 0.5 tablets by mouth 2 times daily 90 tablet 1    blood glucose test strips (TRUETEST TEST) strip 1 each by Other route 2 times daily DX=E11.9 100 each 5    UNABLE TO FIND Please dispense  True metrix lancets  Testing twice daily  DX=E11.9 100 each 2    rOPINIRole (REQUIP) 1 MG tablet Take 1.5 mg by mouth at hs (1 1/2 tab) 135 tablet 1    glipiZIDE-metformin (METAGLIP) 2.5-500 MG per tablet Take 2 tablets by mouth 2 times daily (before meals) 360 tablet apical-carotid delay  Eyes:  PERRL, EOMI, Conjunctiva normal, No discharge. Respiratory:  Normal breath sounds, No respiratory distress, No wheezing, No chest tenderness. ,no use of accessory muscles, NO crackles  Cardiovascular: (PMI) apex non displaced,no lifts no thrills,S1 and S2 audible, No added heart sounds, 1+ ankle edema, or volume overload, elevated JVD, No crackles  GI:  Bowel sounds normal, Soft, No tenderness, No masses, No gross visceromegaly   :  No costovertebral angle tenderness   Musculoskeletal:  No edema, no tenderness, no deformities. Back- no tenderness  Integument:  Well hydrated, no rash   Lymphatic:  No lymphadenopathy noted   Neurologic:  Alert & oriented x 3, CN 2-12 normal, normal motor function, normal sensory function, no focal deficits noted   Psychiatric:  Speech and behavior appropriate       Medical decision making and Data review:  DATA:  Lab Results   Component Value Date    TROPONINT <0.010 02/28/2018     BNP:    Lab Results   Component Value Date    PROBNP 164 08/23/2018     PT/INR:  No results found for: [employer]  Lab Results   Component Value Date    LABA1C 6.4 (H) 02/28/2018    LABA1C 5.9 12/12/2017     Lab Results   Component Value Date    CHOL 121 08/07/2018    TRIG 128 08/07/2018    HDL 44 08/07/2018    LDLCALC 51 08/07/2018    LDLDIRECT 69 08/18/2017     Lab Results   Component Value Date    ALT 6 (L) 08/07/2018    AST 14 (L) 08/07/2018     TSH:   Lab Results   Component Value Date    TSH 2.61 08/07/2018     Nuclear stress test 8/28/18     [facility] physician Dr. Ree Cheney .   [place] portion of stress test is negative for ischemia by diagnostic criteria.    fixed inferior large size severe defect in rest and stress images    Inferior wall is hypokinetic    Ef is 59 %    Mild hubert infarct ischemia    Abnormal stress test     Echo 8/28/18   Summary   Left ventricular systolic function is normal with an ejection fraction of   50-55%.  E/A flow reversal noted. Suggestive of diastolic dysfunction.   Mild septal wall asymmetrical left ventricular hypertrophy.   Mitral annular calcification is present.   No other significant valvulopathy seen.   No evidence of pericardial effusion.       All labs, medications and tests reviewed by myself including data and history from outside source , patient and available family . Assessment & Plan:      1. Essential hypertension    2. Mixed hyperlipidemia    3. Type 2 diabetes mellitus without complication, without long-term current use of insulin ([address]    4. CAD s/p MI, TPA in 1998    5. Chronic diastolic congestive heart failure (HCC)         Chronic diastolic congestive heart failure (HCC)  SOB since her pneumonia few months ago, she has ankle swelling for last few weeks , change lasix to as needed basis . Start magnesium and kdur    CAD s/p MI, TPA in 1998  ASCVD , H/o 1997 treated with TPA , h/o cardiomyopathy Ef was 40% . Stress test showed fixed inferior scar    Essential hypertension  She says blood pressure is generally well controlled she checks it at home. but she feesl dizzy , bnp is normal , change lasix to as needed basis      Dyslipidemia :  Porsche Ewing had lab work recently,  Lipid profile was reviewed with patient. Counseled extensively and medication compliance urged. We discussed that for the  prevention of ASCVD our  goal is aggressive risk modification. Patient is encouraged to exercise even a brisk walk for 30 minutes  at least 3 to 4 times a week   Various goals were discussed and questions answered. Continue current medications. Appropriate prescriptions are addressed and refills ordered. Questions answered and patient verbalizes understanding. Call for any problems, questions, or concerns. Continue all other medications of all above medical condition listed as is. Return in about 6 months (around 3/6/2019).     Please note this report has been partially produced using speech recognition software and may

## 2018-09-17 ENCOUNTER — CARE COORDINATION (OUTPATIENT)
Dept: CARE COORDINATION | Age: 83
End: 2018-09-17

## 2018-09-17 ENCOUNTER — TELEPHONE (OUTPATIENT)
Dept: INTERNAL MEDICINE CLINIC | Age: 83
End: 2018-09-17

## 2018-09-17 ENCOUNTER — OFFICE VISIT (OUTPATIENT)
Dept: INTERNAL MEDICINE CLINIC | Age: 83
End: 2018-09-17

## 2018-09-17 VITALS
DIASTOLIC BLOOD PRESSURE: 62 MMHG | WEIGHT: 227.8 LBS | OXYGEN SATURATION: 95 % | BODY MASS INDEX: 38.5 KG/M2 | TEMPERATURE: 98.3 F | SYSTOLIC BLOOD PRESSURE: 122 MMHG | HEART RATE: 80 BPM | RESPIRATION RATE: 20 BRPM

## 2018-09-17 DIAGNOSIS — E11.9 TYPE 2 DIABETES MELLITUS WITHOUT COMPLICATION, WITHOUT LONG-TERM CURRENT USE OF INSULIN (HCC): Primary | ICD-10-CM

## 2018-09-17 LAB — HBA1C MFR BLD: 7.1 %

## 2018-09-17 PROCEDURE — 1101F PT FALLS ASSESS-DOCD LE1/YR: CPT | Performed by: INTERNAL MEDICINE

## 2018-09-17 PROCEDURE — 83036 HEMOGLOBIN GLYCOSYLATED A1C: CPT | Performed by: INTERNAL MEDICINE

## 2018-09-17 PROCEDURE — 1036F TOBACCO NON-USER: CPT | Performed by: INTERNAL MEDICINE

## 2018-09-17 PROCEDURE — G8427 DOCREV CUR MEDS BY ELIG CLIN: HCPCS | Performed by: INTERNAL MEDICINE

## 2018-09-17 PROCEDURE — 99213 OFFICE O/P EST LOW 20 MIN: CPT | Performed by: INTERNAL MEDICINE

## 2018-09-17 PROCEDURE — G8399 PT W/DXA RESULTS DOCUMENT: HCPCS | Performed by: INTERNAL MEDICINE

## 2018-09-17 PROCEDURE — G8598 ASA/ANTIPLAT THER USED: HCPCS | Performed by: INTERNAL MEDICINE

## 2018-09-17 PROCEDURE — G8417 CALC BMI ABV UP PARAM F/U: HCPCS | Performed by: INTERNAL MEDICINE

## 2018-09-17 PROCEDURE — 4040F PNEUMOC VAC/ADMIN/RCVD: CPT | Performed by: INTERNAL MEDICINE

## 2018-09-17 PROCEDURE — 1090F PRES/ABSN URINE INCON ASSESS: CPT | Performed by: INTERNAL MEDICINE

## 2018-09-17 PROCEDURE — 1123F ACP DISCUSS/DSCN MKR DOCD: CPT | Performed by: INTERNAL MEDICINE

## 2018-09-17 RX ORDER — KETOCONAZOLE 20 MG/G
CREAM TOPICAL
Qty: 30 G | Refills: 1 | Status: CANCELLED | OUTPATIENT
Start: 2018-09-17

## 2018-09-17 NOTE — PROGRESS NOTES
Procedures    POCT glycosylated hemoglobin (Hb A1C)     Orders Placed This Encounter   Medications    ketoconazole (NIZORAL) 2 % cream     Sig: Apply to skin daily     Dispense:  30 g     Refill:  1           Mediations reviewed with the patient. Continue current medications. Appropriate prescriptions are addressed. After visit summery provided. Follow up as directed sooner if needed. Questions answered and patient verbalizes understanding. Call for any problems, questions, or concerns. No Known Allergies  Current Outpatient Prescriptions   Medication Sig Dispense Refill    ketoconazole (NIZORAL) 2 % cream   1    furosemide (LASIX) 40 MG tablet Take 1 tablet by mouth as needed (swelling) 30 tablet 3    magnesium oxide (MAG-OX) 400 MG tablet Take 1 tablet by mouth daily 30 tablet 1    potassium chloride (KLOR-CON) 10 MEQ extended release tablet Take 1 tablet by mouth daily 60 tablet 3    econazole nitrate 1 % cream Apply 1 g topically 2 times daily Apply topically daily.  losartan (COZAAR) 50 MG tablet Take 50 mg by mouth daily      rosuvastatin (CRESTOR) 10 MG tablet Take 1 tablet by mouth nightly 90 tablet 1    metoprolol tartrate (LOPRESSOR) 50 MG tablet Take 0.5 tablets by mouth 2 times daily 90 tablet 1    blood glucose test strips (TRUETEST TEST) strip 1 each by Other route 2 times daily DX=E11.9 100 each 5    rOPINIRole (REQUIP) 1 MG tablet Take 1.5 mg by mouth at hs (1 1/2 tab) 135 tablet 1    glipiZIDE-metformin (METAGLIP) 2.5-500 MG per tablet Take 2 tablets by mouth 2 times daily (before meals) 360 tablet 3    aspirin 81 MG EC tablet Take 1 tablet by mouth nightly.  30 tablet     Coenzyme Q10 (CO Q 10 PO) Take 200 mg by mouth 2 times daily       Handicap Placard MISC by Does not apply route Duration 5 years 1 each 0    UNABLE TO FIND Please dispense  True metrix lancets  Testing twice daily  DX=E11.9 100 each 2    Cholecalciferol (VITAMIN D PO) Take 5,000 Units by mouth three times daily        No current facility-administered medications for this visit. Past Medical History:   Diagnosis Date    Arthritis     left knee pain, sees Dr. Turner Males CAD (coronary artery disease)     sees Dr. Saloni Lujan Chronic midline low back pain without sciatica 9/28/2016    Had PT in 2016   [address] Colonoscopy refused     discussed in 7/15    DM (diabetes mellitus), type 2 ([address] 02/2006    Dupuytren's contracture of right hand     Endometrial stripe increased 9/25/2014    Saw NP Gavin Tang. Was normal exam per pt.  Gastrointestinal hemorrhage 11/2/2015    Patient had GI bleeding. Colon refused. Discussed with patient in detail.  Glaucoma 4/1/2014    H/O echocardiogram 10/02/2014    Mildly depressed left ventricular function; ejection fraction of 45%. Moderate pulmonary hypertension.  H/O echocardiogram 08/28/2018    EF 50-55%.  Mitral annular calcification is present. No other significant valvulopathy seen.  History of nuclear stress test 08/28/2018    EF 59%. ECG portion of stress test is negative for ischemia by diagnostic criteria. fixed inferior large size severe defect in rest and stress images. Mild hubert infarct ischemia.     HTN (hypertension)     Hyperlipidemia     Kidney stone     hx-\"been about 3 yrs ago\"    MI (myocardial infarction) ([address] 02/1998    treated with TPA    Obesity     Pneumonia of right lower lobe due to infectious organism ([facility] 4/5/2018    Vertigo     'have been having this since I had cataract surgery\"\"that is why they are doing the MRA of my brain(10/31/2014)     Past Surgical History:   Procedure Laterality Date    CATARACT REMOVAL Bilateral     5/14,8/14    INGUINAL HERNIA REPAIR Left 45 yrs ago     Social History   Substance Use Topics    Smoking status: Never Smoker    Smokeless tobacco: Never Used    Alcohol use No       LAB REVIEW:  CBC:   Lab Results   Component Value Date    WBC 6.9 08/07/2018    HGB 12.8 08/07/2018    HCT 39.3 08/07/2018     08/07/2018     Lipids:   Lab Results   Component Value Date    CHOL 121 08/07/2018    TRIG 128 08/07/2018    HDL 44 08/07/2018    LDLCALC 51 08/07/2018    LDLDIRECT 69 08/18/2017    TRIGLYCFAST 159 (H) 08/18/2017     Renal:   Lab Results   Component Value Date    BUN 15 08/23/2018    CREATININE 0.7 08/23/2018     08/23/2018    K 4.5 08/23/2018    K 3.0 03/08/2018    ALT 6 08/07/2018    AST 14 08/07/2018    GLUCOSE 244 08/23/2018     PT/INR:   Lab Results   Component Value Date    INR 1.05 09/24/2014     A1C:   Lab Results   Component Value Date    LABA1C 6.4 (H) 02/28/2018           Jyothi Hilton MD, 9/17/2018 , 1:55 PM

## 2018-09-17 NOTE — TELEPHONE ENCOUNTER
Patient phoned stating that her BG reading last pm was 285, felt diaphoretic went to bed after taking medication, awoke at 0400 and BG was 90 then 2 hours after eating at 0930 AH=523. \"Just dont feel right\". appt made for today and inst to bring in her BG log. Verbal understanding returned.

## 2018-09-18 RX ORDER — KETOCONAZOLE 20 MG/G
CREAM TOPICAL
Qty: 30 G | Refills: 1 | Status: SHIPPED | OUTPATIENT
Start: 2018-09-18 | End: 2019-09-16

## 2018-09-20 ENCOUNTER — CARE COORDINATION (OUTPATIENT)
Dept: CARE COORDINATION | Age: 83
End: 2018-09-20

## 2018-10-19 RX ORDER — LOSARTAN POTASSIUM 50 MG/1
50 TABLET ORAL DAILY
Qty: 30 TABLET | Refills: 5 | Status: SHIPPED | OUTPATIENT
Start: 2018-10-19 | End: 2019-05-13 | Stop reason: SDUPTHER

## 2018-10-24 ENCOUNTER — CARE COORDINATION (OUTPATIENT)
Dept: CARE COORDINATION | Age: 83
End: 2018-10-24

## 2018-10-24 ENCOUNTER — OFFICE VISIT (OUTPATIENT)
Dept: INTERNAL MEDICINE CLINIC | Age: 83
End: 2018-10-24
Payer: MEDICARE

## 2018-10-24 VITALS
SYSTOLIC BLOOD PRESSURE: 118 MMHG | WEIGHT: 230.8 LBS | OXYGEN SATURATION: 96 % | HEIGHT: 64 IN | DIASTOLIC BLOOD PRESSURE: 80 MMHG | BODY MASS INDEX: 39.4 KG/M2 | HEART RATE: 77 BPM

## 2018-10-24 DIAGNOSIS — I10 ESSENTIAL HYPERTENSION: ICD-10-CM

## 2018-10-24 DIAGNOSIS — N28.1 CYST, KIDNEY, ACQUIRED: ICD-10-CM

## 2018-10-24 DIAGNOSIS — M72.0 DUPUYTREN'S CONTRACTURE OF RIGHT HAND: ICD-10-CM

## 2018-10-24 DIAGNOSIS — I25.10 CORONARY ARTERY DISEASE INVOLVING NATIVE CORONARY ARTERY OF NATIVE HEART WITHOUT ANGINA PECTORIS: ICD-10-CM

## 2018-10-24 DIAGNOSIS — E78.2 MIXED HYPERLIPIDEMIA: ICD-10-CM

## 2018-10-24 DIAGNOSIS — G25.81 RESTLESS LEG SYNDROME: ICD-10-CM

## 2018-10-24 DIAGNOSIS — E11.9 TYPE 2 DIABETES MELLITUS WITHOUT COMPLICATION, WITHOUT LONG-TERM CURRENT USE OF INSULIN (HCC): ICD-10-CM

## 2018-10-24 DIAGNOSIS — E66.09 CLASS 2 OBESITY DUE TO EXCESS CALORIES WITHOUT SERIOUS COMORBIDITY WITH BODY MASS INDEX (BMI) OF 39.0 TO 39.9 IN ADULT: ICD-10-CM

## 2018-10-24 DIAGNOSIS — I50.32 CHRONIC DIASTOLIC CONGESTIVE HEART FAILURE (HCC): ICD-10-CM

## 2018-10-24 DIAGNOSIS — M19.90 ARTHRITIS: ICD-10-CM

## 2018-10-24 PROCEDURE — 4040F PNEUMOC VAC/ADMIN/RCVD: CPT | Performed by: INTERNAL MEDICINE

## 2018-10-24 PROCEDURE — G8598 ASA/ANTIPLAT THER USED: HCPCS | Performed by: INTERNAL MEDICINE

## 2018-10-24 PROCEDURE — G8399 PT W/DXA RESULTS DOCUMENT: HCPCS | Performed by: INTERNAL MEDICINE

## 2018-10-24 PROCEDURE — 1090F PRES/ABSN URINE INCON ASSESS: CPT | Performed by: INTERNAL MEDICINE

## 2018-10-24 PROCEDURE — 1123F ACP DISCUSS/DSCN MKR DOCD: CPT | Performed by: INTERNAL MEDICINE

## 2018-10-24 PROCEDURE — G8417 CALC BMI ABV UP PARAM F/U: HCPCS | Performed by: INTERNAL MEDICINE

## 2018-10-24 PROCEDURE — G8427 DOCREV CUR MEDS BY ELIG CLIN: HCPCS | Performed by: INTERNAL MEDICINE

## 2018-10-24 PROCEDURE — 99214 OFFICE O/P EST MOD 30 MIN: CPT | Performed by: INTERNAL MEDICINE

## 2018-10-24 PROCEDURE — 1101F PT FALLS ASSESS-DOCD LE1/YR: CPT | Performed by: INTERNAL MEDICINE

## 2018-10-24 PROCEDURE — G8484 FLU IMMUNIZE NO ADMIN: HCPCS | Performed by: INTERNAL MEDICINE

## 2018-10-24 PROCEDURE — 1036F TOBACCO NON-USER: CPT | Performed by: INTERNAL MEDICINE

## 2018-10-24 NOTE — PROGRESS NOTES
are grossly intact. IMPRESSION:    Encounter Diagnoses   Name Primary?  Type 2 diabetes mellitus without complication, without long-term current use of insulin (HCC)     Arthritis     CAD s/p MI, TPA in 1998     Chronic diastolic congestive heart failure (HCC)     Cyst, kidney, acquired     Dupuytren's contracture of right hand     Essential hypertension     Mixed hyperlipidemia     Class 2 obesity due to excess calories without serious comorbidity with body mass index (BMI) of 39.0 to 39.9 in adult     Restless leg syndrome        ASSESSMENT/PLAN:    Type 2 diabetes mellitus without complication (HCC)  Pt has DM  Taking glipizide-metformin   Follow diet and exercise  BS are good at home now. Continue same     Arthritis  Pt states she got steroid injection in one inj each shoulders  She goes to arthritic clinic in [place]    CAD s/p MI, TPA in 1998  MI, TPA,sees Dr. Patricia Becerra  Patient is on aspirin, beta blocker and statins  Patient is stable. Continue current treatment. Chronic diastolic congestive heart failure ([address]  Patient is stable. Continue current treatment. Cyst, kidney, acquired  Sees Dr. Izabel Lindsey  2-3 mm cyst . Last seen 3/16. Pt needs to see him again    Dupuytren's contracture of right hand  Pt has contracture of right hand    Essential hypertension  Hypertension in control. Follow low salt diet. Continue current treatment. Continue losartan and metoprolol    Mixed hyperlipidemia  Hyperlipidemia is stable. Follow low cholesterol diet. Continue current treatment. Patient is on crestor. Obesity  Lose weight and exercise. Restless leg syndrome  Takes Requip daily at night    Orders Placed This Encounter   Procedures    Comprehensive Metabolic Panel    Lipid, Fasting           Mediations reviewed with the patient. Continue current medications. Appropriate prescriptions are addressed. After visit summeryprovided. Follow up as directed sooner if needed.   Questions

## 2018-10-31 ASSESSMENT — ENCOUNTER SYMPTOMS
EYES NEGATIVE: 1
ALLERGIC/IMMUNOLOGIC NEGATIVE: 1
GASTROINTESTINAL NEGATIVE: 1
RESPIRATORY NEGATIVE: 1

## 2018-11-01 ENCOUNTER — HOSPITAL ENCOUNTER (OUTPATIENT)
Dept: ULTRASOUND IMAGING | Age: 83
Discharge: HOME | End: 2018-11-01
Payer: MEDICARE

## 2018-11-01 DIAGNOSIS — R10.2 ADNEXAL PAIN: ICD-10-CM

## 2018-11-01 DIAGNOSIS — N95.0 POSTMENOPAUSAL BLEEDING: ICD-10-CM

## 2018-11-01 DIAGNOSIS — N95.0 POST-MENOPAUSAL BLEEDING: ICD-10-CM

## 2018-11-01 PROCEDURE — 76830 TRANSVAGINAL US NON-OB: CPT

## 2018-11-01 PROCEDURE — 76856 US EXAM PELVIC COMPLETE: CPT

## 2018-11-16 ENCOUNTER — TELEPHONE (OUTPATIENT)
Dept: INTERNAL MEDICINE CLINIC | Age: 83
End: 2018-11-16

## 2018-11-19 ENCOUNTER — CARE COORDINATION (OUTPATIENT)
Dept: CARE COORDINATION | Age: 83
End: 2018-11-19

## 2018-11-19 ENCOUNTER — OFFICE VISIT (OUTPATIENT)
Dept: INTERNAL MEDICINE CLINIC | Age: 83
End: 2018-11-19
Payer: MEDICARE

## 2018-11-19 VITALS
DIASTOLIC BLOOD PRESSURE: 86 MMHG | SYSTOLIC BLOOD PRESSURE: 152 MMHG | RESPIRATION RATE: 20 BRPM | WEIGHT: 235 LBS | BODY MASS INDEX: 39.73 KG/M2 | HEART RATE: 72 BPM | OXYGEN SATURATION: 98 % | TEMPERATURE: 98.3 F

## 2018-11-19 DIAGNOSIS — K92.2 GASTROINTESTINAL HEMORRHAGE, UNSPECIFIED GASTROINTESTINAL HEMORRHAGE TYPE: ICD-10-CM

## 2018-11-19 DIAGNOSIS — G25.81 RESTLESS LEG SYNDROME: ICD-10-CM

## 2018-11-19 DIAGNOSIS — G89.29 CHRONIC MIDLINE LOW BACK PAIN WITHOUT SCIATICA: ICD-10-CM

## 2018-11-19 DIAGNOSIS — I50.32 CHRONIC DIASTOLIC CONGESTIVE HEART FAILURE (HCC): ICD-10-CM

## 2018-11-19 DIAGNOSIS — E78.2 MIXED HYPERLIPIDEMIA: ICD-10-CM

## 2018-11-19 DIAGNOSIS — M54.50 CHRONIC MIDLINE LOW BACK PAIN WITHOUT SCIATICA: ICD-10-CM

## 2018-11-19 DIAGNOSIS — M19.90 ARTHRITIS: ICD-10-CM

## 2018-11-19 DIAGNOSIS — I10 ESSENTIAL HYPERTENSION: ICD-10-CM

## 2018-11-19 DIAGNOSIS — I25.10 CORONARY ARTERY DISEASE INVOLVING NATIVE CORONARY ARTERY OF NATIVE HEART WITHOUT ANGINA PECTORIS: ICD-10-CM

## 2018-11-19 DIAGNOSIS — E11.9 TYPE 2 DIABETES MELLITUS WITHOUT COMPLICATION, WITHOUT LONG-TERM CURRENT USE OF INSULIN (HCC): ICD-10-CM

## 2018-11-19 DIAGNOSIS — K92.1 MELENA: Primary | ICD-10-CM

## 2018-11-19 PROCEDURE — 1036F TOBACCO NON-USER: CPT | Performed by: INTERNAL MEDICINE

## 2018-11-19 PROCEDURE — G8484 FLU IMMUNIZE NO ADMIN: HCPCS | Performed by: INTERNAL MEDICINE

## 2018-11-19 PROCEDURE — 36415 COLL VENOUS BLD VENIPUNCTURE: CPT | Performed by: INTERNAL MEDICINE

## 2018-11-19 PROCEDURE — G8427 DOCREV CUR MEDS BY ELIG CLIN: HCPCS | Performed by: INTERNAL MEDICINE

## 2018-11-19 PROCEDURE — 1101F PT FALLS ASSESS-DOCD LE1/YR: CPT | Performed by: INTERNAL MEDICINE

## 2018-11-19 PROCEDURE — 1123F ACP DISCUSS/DSCN MKR DOCD: CPT | Performed by: INTERNAL MEDICINE

## 2018-11-19 PROCEDURE — 1090F PRES/ABSN URINE INCON ASSESS: CPT | Performed by: INTERNAL MEDICINE

## 2018-11-19 PROCEDURE — G8399 PT W/DXA RESULTS DOCUMENT: HCPCS | Performed by: INTERNAL MEDICINE

## 2018-11-19 PROCEDURE — G8417 CALC BMI ABV UP PARAM F/U: HCPCS | Performed by: INTERNAL MEDICINE

## 2018-11-19 PROCEDURE — G8598 ASA/ANTIPLAT THER USED: HCPCS | Performed by: INTERNAL MEDICINE

## 2018-11-19 PROCEDURE — 4040F PNEUMOC VAC/ADMIN/RCVD: CPT | Performed by: INTERNAL MEDICINE

## 2018-11-19 PROCEDURE — 99214 OFFICE O/P EST MOD 30 MIN: CPT | Performed by: INTERNAL MEDICINE

## 2018-11-19 RX ORDER — MAGNESIUM OXIDE 400 MG/1
400 TABLET ORAL DAILY
Qty: 30 TABLET | Refills: 6 | Status: SHIPPED | OUTPATIENT
Start: 2018-11-19 | End: 2019-07-08

## 2018-11-19 NOTE — PROGRESS NOTES
Helen Joshi  Patient's  is 1935  Seen in office on 2018      SUBJECTIVE:  Isela Bhagat anand 81 y. o.year old female presents today   Chief Complaint   Patient presents with    Rectal Bleeding     has noticed dark black stool x 7-8 days. , states that she has bloating and pressure feeling in lower abd     Patient states she has black colored stool for more than a week. Denies any abdominal pain. No bright red blood. Patient did not go to the emergency room. Patient has refused colonoscopy in the past.  Patient had GI bleeding in  but has refused colonoscopy at that time. Taking medications regularly. No side effects noted. Review of Systems    OBJECTIVE: BP (!) 152/86   Pulse 72   Temp 98.3 °F (36.8 °C) (Oral)   Resp 20   Wt 235 lb (106.6 kg)   LMP  (LMP Unknown)   SpO2 98%   BMI 39.73 kg/m²     Wt Readings from Last 3 Encounters:   18 235 lb (106.6 kg)   10/24/18 230 lb 12.8 oz (104.7 kg)   18 227 lb 12.8 oz (103.3 kg)      GENERAL: - Alert, oriented, pleasant, in no apparent distress. HEENT: - Conjunctiva pink, no scleral icterus. ENT clear. NECK: -Supple. No jugular venous distention noted. No masses felt,  CARDIOVASCULAR: - Normal S1 and S2    PULMONARY: - No respiratory distress. No wheezes or rales. ABDOMEN: - Soft and non-tender,no masses  ororganomegaly. EXTREMITIES: - No cyanosis, clubbing, or significant edema. SKIN: Skin is warm and dry. NEUROLOGICAL: - Cranial nerves II through XII are grossly intact. Rectal : external hemorrhoids, stools not black. guaic neg stools. No masses. IMPRESSION:    Encounter Diagnoses   Name Primary?     Melena Yes    Essential hypertension     Mixed hyperlipidemia     Type 2 diabetes mellitus without complication, without long-term current use of insulin (HCC)     [facility]     CAD s/p MI, TPA in      Gastrointestinal hemorrhage, unspecified gastrointestinal hemorrhage type     Chronic midline low back pain without sciatica     Restless leg syndrome     Arthritis        ASSESSMENT/PLAN:    Refer to Dr Kailee Turner . Discussed with the patient in detail  Hypertension in control. Follow low salt diet. Continue current treatment. Hyperlipidemia is stable. Follow low cholesterol diet. Continue current treatment. RLS : Patient is stable. Continue current treatment. On requip  Coronary artery disease stable no angina  Congestive heart failure, diastolic stable. On Lasix  Diabetes mellitus stable. On metal clip      Mediations reviewed with the patient. Continue current medications. Appropriate prescriptions are addressed. After visit summeryprovided. Follow up as directed sooner if needed. Questions answered and patient verbalizes understanding. Call for any problems, questions, or concerns. No Known Allergies  Current Outpatient Prescriptions   Medication Sig Dispense Refill    magnesium oxide (MAG-OX) 400 MG tablet Take 1 tablet by mouth daily 30 tablet 6    losartan (COZAAR) 50 MG tablet Take 1 tablet by mouth daily 30 tablet 5    ketoconazole (NIZORAL) 2 % cream Apply to skin daily 30 g 1    furosemide (LASIX) 40 MG tablet Take 1 tablet by mouth as needed (swelling) 30 tablet 3    rosuvastatin (CRESTOR) 10 MG tablet Take 1 tablet by mouth nightly 90 tablet 1    metoprolol tartrate (LOPRESSOR) 50 MG tablet Take 0.5 tablets by mouth 2 times daily 90 tablet 1    blood glucose test strips (TRUETEST TEST) strip 1 each by Other route 2 times daily DX=E11.9 100 each 5    rOPINIRole (REQUIP) 1 MG tablet Take 1.5 mg by mouth at hs (1 1/2 tab) 135 tablet 1    glipiZIDE-metformin (METAGLIP) 2.5-500 MG per tablet Take 2 tablets by mouth 2 times daily (before meals) 360 tablet 3    aspirin 81 MG EC tablet Take 1 tablet by mouth nightly.  30 tablet     Cholecalciferol (VITAMIN D PO) Take 5,000 Units by mouth three times daily       Coenzyme Q10 (CO Q 10 PO) Take 200

## 2018-11-19 NOTE — TELEPHONE ENCOUNTER
Patient called requesting refill for Magnesium.  Refill routed to Dr. Cervantes Officer for approval.

## 2018-11-20 LAB
A/G RATIO: 1.9 (ref 1.1–2.2)
ALBUMIN SERPL-MCNC: 4.5 G/DL (ref 3.4–5)
ALP BLD-CCNC: 47 U/L (ref 40–129)
ALT SERPL-CCNC: 9 U/L (ref 10–40)
ANION GAP SERPL CALCULATED.3IONS-SCNC: 13 MMOL/L (ref 3–16)
AST SERPL-CCNC: 16 U/L (ref 15–37)
BASOPHILS ABSOLUTE: 0.1 K/UL (ref 0–0.2)
BASOPHILS RELATIVE PERCENT: 1 %
BILIRUB SERPL-MCNC: <0.2 MG/DL (ref 0–1)
BUN BLDV-MCNC: 15 MG/DL (ref 7–20)
CALCIUM SERPL-MCNC: 10.2 MG/DL (ref 8.3–10.6)
CHLORIDE BLD-SCNC: 98 MMOL/L (ref 99–110)
CO2: 28 MMOL/L (ref 21–32)
CREAT SERPL-MCNC: 0.6 MG/DL (ref 0.6–1.2)
EOSINOPHILS ABSOLUTE: 0.2 K/UL (ref 0–0.6)
EOSINOPHILS RELATIVE PERCENT: 2 %
GFR AFRICAN AMERICAN: >60
GFR NON-AFRICAN AMERICAN: >60
GLOBULIN: 2.4 G/DL
GLUCOSE BLD-MCNC: 92 MG/DL (ref 70–99)
HCT VFR BLD CALC: 41.3 % (ref 36–48)
HEMOGLOBIN: 13.5 G/DL (ref 12–16)
LYMPHOCYTES ABSOLUTE: 3.3 K/UL (ref 1–5.1)
LYMPHOCYTES RELATIVE PERCENT: 35 %
MCH RBC QN AUTO: 30.9 PG (ref 26–34)
MCHC RBC AUTO-ENTMCNC: 32.6 G/DL (ref 31–36)
MCV RBC AUTO: 94.8 FL (ref 80–100)
MONOCYTES ABSOLUTE: 0.9 K/UL (ref 0–1.3)
MONOCYTES RELATIVE PERCENT: 10 %
NEUTROPHILS ABSOLUTE: 4.8 K/UL (ref 1.7–7.7)
NEUTROPHILS RELATIVE PERCENT: 52 %
PDW BLD-RTO: 14.6 % (ref 12.4–15.4)
PLATELET # BLD: 288 K/UL (ref 135–450)
PLATELET SLIDE REVIEW: ADEQUATE
PMV BLD AUTO: 8.3 FL (ref 5–10.5)
POTASSIUM SERPL-SCNC: 4.9 MMOL/L (ref 3.5–5.1)
RBC # BLD: 4.36 M/UL (ref 4–5.2)
RBC # BLD: NORMAL 10*6/UL
SLIDE REVIEW: NORMAL
SODIUM BLD-SCNC: 139 MMOL/L (ref 136–145)
TOTAL PROTEIN: 6.9 G/DL (ref 6.4–8.2)
WBC # BLD: 9.3 K/UL (ref 4–11)

## 2018-11-21 ENCOUNTER — NURSE ONLY (OUTPATIENT)
Dept: INTERNAL MEDICINE CLINIC | Age: 83
End: 2018-11-21
Payer: MEDICARE

## 2018-11-21 DIAGNOSIS — K92.1 BLOOD IN STOOL: Primary | ICD-10-CM

## 2018-11-21 LAB
HEMOCCULT STL QL: NEGATIVE
HEMOCCULT STL QL: NEGATIVE
HEMOCCULT STL QL: NORMAL

## 2018-11-21 PROCEDURE — 82270 OCCULT BLOOD FECES: CPT | Performed by: INTERNAL MEDICINE

## 2018-11-26 ENCOUNTER — TELEPHONE (OUTPATIENT)
Dept: GASTROENTEROLOGY | Age: 83
End: 2018-11-26

## 2018-11-30 ENCOUNTER — TELEPHONE (OUTPATIENT)
Dept: CARDIOLOGY CLINIC | Age: 83
End: 2018-11-30

## 2018-12-03 ENCOUNTER — HOSPITAL ENCOUNTER (OUTPATIENT)
Dept: MAMMOGRAPHY | Age: 83
Discharge: HOME | End: 2018-12-03
Payer: MEDICARE

## 2018-12-03 DIAGNOSIS — Z12.31 VISIT FOR SCREENING MAMMOGRAM: ICD-10-CM

## 2018-12-03 PROCEDURE — 77067 SCR MAMMO BI INCL CAD: CPT

## 2018-12-04 ENCOUNTER — OFFICE VISIT (OUTPATIENT)
Dept: GASTROENTEROLOGY | Age: 83
End: 2018-12-04
Payer: MEDICARE

## 2018-12-04 ENCOUNTER — TELEPHONE (OUTPATIENT)
Dept: GASTROENTEROLOGY | Age: 83
End: 2018-12-04

## 2018-12-04 VITALS
BODY MASS INDEX: 40.34 KG/M2 | HEART RATE: 70 BPM | OXYGEN SATURATION: 96 % | SYSTOLIC BLOOD PRESSURE: 124 MMHG | DIASTOLIC BLOOD PRESSURE: 80 MMHG | HEIGHT: 64 IN

## 2018-12-04 DIAGNOSIS — K92.1 MELENA: Primary | ICD-10-CM

## 2018-12-04 PROCEDURE — 1101F PT FALLS ASSESS-DOCD LE1/YR: CPT | Performed by: NURSE PRACTITIONER

## 2018-12-04 PROCEDURE — 99204 OFFICE O/P NEW MOD 45 MIN: CPT | Performed by: NURSE PRACTITIONER

## 2018-12-04 PROCEDURE — 4040F PNEUMOC VAC/ADMIN/RCVD: CPT | Performed by: NURSE PRACTITIONER

## 2018-12-04 PROCEDURE — G8399 PT W/DXA RESULTS DOCUMENT: HCPCS | Performed by: NURSE PRACTITIONER

## 2018-12-04 PROCEDURE — G8598 ASA/ANTIPLAT THER USED: HCPCS | Performed by: NURSE PRACTITIONER

## 2018-12-04 PROCEDURE — G8417 CALC BMI ABV UP PARAM F/U: HCPCS | Performed by: NURSE PRACTITIONER

## 2018-12-04 PROCEDURE — 1090F PRES/ABSN URINE INCON ASSESS: CPT | Performed by: NURSE PRACTITIONER

## 2018-12-04 PROCEDURE — G8427 DOCREV CUR MEDS BY ELIG CLIN: HCPCS | Performed by: NURSE PRACTITIONER

## 2018-12-04 PROCEDURE — 1036F TOBACCO NON-USER: CPT | Performed by: NURSE PRACTITIONER

## 2018-12-04 PROCEDURE — 1123F ACP DISCUSS/DSCN MKR DOCD: CPT | Performed by: NURSE PRACTITIONER

## 2018-12-04 PROCEDURE — G8484 FLU IMMUNIZE NO ADMIN: HCPCS | Performed by: NURSE PRACTITIONER

## 2018-12-04 ASSESSMENT — ENCOUNTER SYMPTOMS
NAUSEA: 0
COUGH: 0
ABDOMINAL PAIN: 0
VOMITING: 0
EYE PAIN: 0
COLOR CHANGE: 0
WHEEZING: 1
BACK PAIN: 0
CONSTIPATION: 0
SHORTNESS OF BREATH: 0
DIARRHEA: 0
BLOOD IN STOOL: 0

## 2018-12-04 NOTE — PROGRESS NOTES
shortness of breath. Cardiovascular: Positive for leg swelling. Negative for chest pain and palpitations. Gastrointestinal: Negative for abdominal pain, blood in stool, constipation, diarrhea, nausea and vomiting. Endocrine: Negative for cold intolerance and heat intolerance. Genitourinary: Negative for dysuria, frequency and urgency. Musculoskeletal: Negative for back pain, myalgias and neck pain. Skin: Positive for rash (right leg). Negative for color change. Allergic/Immunologic: Negative for environmental allergies and food allergies. Neurological: Positive for dizziness and headaches. Negative for seizures. Hematological: Does not bruise/bleed easily. Psychiatric/Behavioral: Positive for sleep disturbance. Negative for dysphoric mood and suicidal ideas. The patient is not nervous/anxious. Allergies  No Known Allergies    Medications  Current Outpatient Prescriptions   Medication Sig Dispense Refill    bisacodyl (BISACODYL) 5 MG EC tablet Take 4 tablets once for colonoscopy prep 4 tablet 0    magnesium oxide (MAG-OX) 400 MG tablet Take 1 tablet by mouth daily 30 tablet 6    losartan (COZAAR) 50 MG tablet Take 1 tablet by mouth daily 30 tablet 5    ketoconazole (NIZORAL) 2 % cream Apply to skin daily 30 g 1    furosemide (LASIX) 40 MG tablet Take 1 tablet by mouth as needed (swelling) 30 tablet 3    econazole nitrate 1 % cream Apply 1 g topically 2 times daily Apply topically daily.        rosuvastatin (CRESTOR) 10 MG tablet Take 1 tablet by mouth nightly 90 tablet 1    metoprolol tartrate (LOPRESSOR) 50 MG tablet Take 0.5 tablets by mouth 2 times daily 90 tablet 1    blood glucose test strips (TRUETEST TEST) strip 1 each by Other route 2 times daily DX=E11.9 100 each 5    rOPINIRole (REQUIP) 1 MG tablet Take 1.5 mg by mouth at hs (1 1/2 tab) 135 tablet 1    glipiZIDE-metformin (METAGLIP) 2.5-500 MG per tablet Take 2 tablets by mouth 2 times daily (before meals) 360 tablet 3 Normal range of motion. Neck supple. No JVD present. No tracheal deviation present. No thyromegaly present. Cardiovascular: Normal rate, regular rhythm, normal heart sounds and intact distal pulses. Exam reveals no gallop and no friction rub. No murmur heard. Pulmonary/Chest: Effort normal and breath sounds normal. No respiratory distress. She has no wheezes. She has no rales. She exhibits no tenderness. Abdominal: Soft. Bowel sounds are normal. She exhibits no distension and no mass. There is no tenderness. There is no rebound and no guarding. Obese   Musculoskeletal: Normal range of motion. Lymphadenopathy:     She has no cervical adenopathy. Neurological: She is alert and oriented to person, place, and time. Skin: Skin is warm and dry. Psychiatric: She has a normal mood and affect. Vitals reviewed.       Nurse Only on 11/21/2018   Component Date Value Ref Range Status    OCCULT BLOOD FECAL 11/21/2018 negative   Final    11-  only two cards given    OCCULT BLOOD FECAL 11/21/2018 negative   Final    11-   Office Visit on 11/19/2018   Component Date Value Ref Range Status    WBC 11/19/2018 9.3  4.0 - 11.0 K/uL Final    RBC 11/19/2018 4.36  4.00 - 5.20 M/uL Final    Hemoglobin 11/19/2018 13.5  12.0 - 16.0 g/dL Final    Hematocrit 11/19/2018 41.3  36.0 - 48.0 % Final    MCV 11/19/2018 94.8  80.0 - 100.0 fL Final    MCH 11/19/2018 30.9  26.0 - 34.0 pg Final    MCHC 11/19/2018 32.6  31.0 - 36.0 g/dL Final    RDW 11/19/2018 14.6  12.4 - 15.4 % Final    Platelets 67/19/8598 288  135 - 450 K/uL Final    MPV 11/19/2018 8.3  5.0 - 10.5 fL Final    PLATELET SLIDE REVIEW 11/19/2018 Adequate   Final    SLIDE REVIEW 11/19/2018 see below   Final    Slide review agrees with reported results    Neutrophils % 11/19/2018 52.0  % Final    Lymphocytes % 11/19/2018 35.0  % Final    Monocytes % 11/19/2018 10.0  % Final    Eosinophils % 11/19/2018 2.0  % Final    Basophils % 11/19/2018

## 2018-12-04 NOTE — LETTER
[address]    Your procedure with Dr. Blanca Fernandes has been scheduled at the     [facility] located at     [address] [place] [address].                        2/18/19 /      10:30am_______________            Procedure Date                Arrival Time (1978 [facility] 1 hour early)       11:30am_____________  Procedure Time                               If an emergency arises and you are unable to keep your procedure appointment, you must call [facility] at [phone] and our office at [phone] within 24 hours to let us know. Not giving 24 hour notice or not showing for your procedure appointment may result in immediate dismissal from Dr. Eligio Arteaga practice. COLONOSCOPY  MIRALAX AND DULCOLAX SPLIT BOWEL PREP    To prepare for this procedure, you will need to clean out your bowels or large intestines. Review all the information you are given as soon as you can so that you are prepared and know what to expect during and after your procedure. You may need to make changes to your diet or medications. Begin this bowel prep 1 day prior to your scheduled procedure. You will need an adult to drive you home after the procedure. Your  will need to check in with you at the facility so the staff are aware of who they are and needs to stay at the facility during the entire procedure. If you  does leave during the procedure, he or she needs to give the staff a phone number where they can be reached and stay within 30 minutes of the facility. If you are taking a cab, bus, or medical transportation service home after the procedure, an adult, other than the , needs to ride with you for your safety.  You should have an adult with you to help you and check on you after the procedure at home for at least 6 hours after ? Start to drink the prep medicine mixture. Drink one, 8-ounce cup of the mixture every 10 to 15 minutes until you finish half the mixture. It is better to drink each cup quickly rather than taking small sips  ? Drink half the mixture this evening. Place the other half of the mixture in the refrigerator. You will need to drink the rest of the mixture in the morning. ? Continue to drink other clear liquids through the evening  Morning of the procedure    ? Six hours before your procedure, drink the rest of the Miralax mixture as before. You may need to set your alarm to get up to finish the prep medication if you have an early appointment time  ? Drink two 8-ounce cups of clear liquids after you finish the prep medication  ? You can drink clear liquids up to 4 hours prior to the procedure  ? Take your medicines for blood pressure, heart issues, seizures or pain with a sip of water up to 2 hours before the procedure      If you are vomiting up your prep medicine, have not had a bowel movement, or your bowels are not clear, please call our office at [phone]. If you call after normal business hours, please leave us a detailed voicemail so we can call you as soon as we get back into the office.

## 2018-12-04 NOTE — PATIENT INSTRUCTIONS
scope, to cut off some tissue. You will not feel a biopsy, if one is taken. The doctor also can use the tools to stop bleeding or to do other treatments, if needed.     · You will stay at the hospital or surgery center for 1 to 2 hours until the medicine you were given wears off. Going home   · Be sure you have someone to drive you home. Anesthesia and pain medicine make it unsafe for you to drive.     · You will be given more specific instructions about recovering from your procedure. They will cover things like diet, wound care, follow-up care, driving, and getting back to your normal routine. When should you call your doctor? · You have questions or concerns.     · You don't understand how to prepare for your procedure.     · You become ill before the procedure (such as fever, flu, or a cold).     · You need to reschedule or have changed your mind about having the procedure. Where can you learn more? Go to [email or web address] org and sign in to your [employer] account. Enter P790 in the [employer] box to learn more about \"Upper GI Endoscopy: Before Your Procedure. \"     If you do not have an account, please click on the \"Sign Up Now\" link. Current as of: March 28, 2018  Content Version: 11.8  © 3369-4161 [employer], [employer]. Care instructions adapted under license by [facility] ([facility]). If you have questions about a medical condition or this instruction, always ask your healthcare professional. [place] [address] any warranty or liability for your use of this information. Patient Education        Lower Gastrointestinal Bleeding: Care Instructions  Your Care Instructions    The digestive or gastrointestinal tract goes from the mouth to the anus. It is often called the GI tract. Bleeding in the lower GI tract can happen anywhere in your small or large intestine. It can also happen in your rectum or anus.  In some cases, it is caused by an infection,

## 2018-12-07 ENCOUNTER — TELEPHONE (OUTPATIENT)
Dept: CARDIOLOGY CLINIC | Age: 83
End: 2018-12-07

## 2018-12-07 NOTE — TELEPHONE ENCOUNTER
PA request for magnesium oxide was denied being excluded from coverage since it is available OTC. I called and spoke to pharmacist Artemio Storey) at [facility] in [place]. He states this will not be a problem at all, as this med is very inexpensive, probably less than $5/month and he will take care of letting patient know.

## 2018-12-17 RX ORDER — ROPINIROLE 1 MG/1
TABLET, FILM COATED ORAL
Qty: 135 TABLET | Refills: 1 | Status: SHIPPED | OUTPATIENT
Start: 2018-12-17 | End: 2019-01-15 | Stop reason: SDUPTHER

## 2019-01-11 DIAGNOSIS — K92.1 MELENA: ICD-10-CM

## 2019-01-11 LAB
A/G RATIO: 1.6 (ref 1.1–2.2)
A/G RATIO: 1.9 (ref 1.1–2.2)
ALBUMIN SERPL-MCNC: 4.3 G/DL (ref 3.4–5)
ALBUMIN SERPL-MCNC: 4.4 G/DL (ref 3.4–5)
ALP BLD-CCNC: 47 U/L (ref 40–129)
ALP BLD-CCNC: 48 U/L (ref 40–129)
ALT SERPL-CCNC: 6 U/L (ref 10–40)
ALT SERPL-CCNC: <5 U/L (ref 10–40)
ANION GAP SERPL CALCULATED.3IONS-SCNC: 12 MMOL/L (ref 3–16)
ANION GAP SERPL CALCULATED.3IONS-SCNC: 17 MMOL/L (ref 3–16)
AST SERPL-CCNC: 10 U/L (ref 15–37)
AST SERPL-CCNC: 11 U/L (ref 15–37)
BASOPHILS ABSOLUTE: 0 K/UL (ref 0–0.2)
BASOPHILS RELATIVE PERCENT: 0.3 %
BILIRUB SERPL-MCNC: 0.3 MG/DL (ref 0–1)
BILIRUB SERPL-MCNC: 0.3 MG/DL (ref 0–1)
BILIRUBIN DIRECT: <0.2 MG/DL (ref 0–0.3)
BILIRUBIN, INDIRECT: NORMAL MG/DL (ref 0–1)
BUN BLDV-MCNC: 18 MG/DL (ref 7–20)
BUN BLDV-MCNC: 18 MG/DL (ref 7–20)
CALCIUM SERPL-MCNC: 10 MG/DL (ref 8.3–10.6)
CALCIUM SERPL-MCNC: 10.1 MG/DL (ref 8.3–10.6)
CHLORIDE BLD-SCNC: 95 MMOL/L (ref 99–110)
CHLORIDE BLD-SCNC: 98 MMOL/L (ref 99–110)
CHOLESTEROL, FASTING: 126 MG/DL (ref 0–199)
CO2: 24 MMOL/L (ref 21–32)
CO2: 26 MMOL/L (ref 21–32)
CREAT SERPL-MCNC: <0.5 MG/DL (ref 0.6–1.2)
CREAT SERPL-MCNC: <0.5 MG/DL (ref 0.6–1.2)
EOSINOPHILS ABSOLUTE: 0 K/UL (ref 0–0.6)
EOSINOPHILS RELATIVE PERCENT: 0.1 %
GFR AFRICAN AMERICAN: >60
GFR AFRICAN AMERICAN: >60
GFR NON-AFRICAN AMERICAN: >60
GFR NON-AFRICAN AMERICAN: >60
GLOBULIN: 2.3 G/DL
GLOBULIN: 2.7 G/DL
GLUCOSE BLD-MCNC: 132 MG/DL (ref 70–99)
GLUCOSE BLD-MCNC: 135 MG/DL (ref 70–99)
HCT VFR BLD CALC: 40.3 % (ref 36–48)
HDLC SERPL-MCNC: 51 MG/DL (ref 40–60)
HEMOGLOBIN: 13.2 G/DL (ref 12–16)
INR BLD: 1 (ref 0.86–1.14)
LDL CHOLESTEROL CALCULATED: 53 MG/DL
LYMPHOCYTES ABSOLUTE: 2.7 K/UL (ref 1–5.1)
LYMPHOCYTES RELATIVE PERCENT: 22.9 %
MCH RBC QN AUTO: 31.2 PG (ref 26–34)
MCHC RBC AUTO-ENTMCNC: 32.9 G/DL (ref 31–36)
MCV RBC AUTO: 95 FL (ref 80–100)
MONOCYTES ABSOLUTE: 1.2 K/UL (ref 0–1.3)
MONOCYTES RELATIVE PERCENT: 10.7 %
NEUTROPHILS ABSOLUTE: 7.7 K/UL (ref 1.7–7.7)
NEUTROPHILS RELATIVE PERCENT: 66 %
PDW BLD-RTO: 14.4 % (ref 12.4–15.4)
PLATELET # BLD: 301 K/UL (ref 135–450)
PMV BLD AUTO: 7.9 FL (ref 5–10.5)
POTASSIUM SERPL-SCNC: 4.8 MMOL/L (ref 3.5–5.1)
POTASSIUM SERPL-SCNC: 5 MMOL/L (ref 3.5–5.1)
PROTHROMBIN TIME: 11.4 SEC (ref 9.8–13)
RBC # BLD: 4.24 M/UL (ref 4–5.2)
SODIUM BLD-SCNC: 136 MMOL/L (ref 136–145)
SODIUM BLD-SCNC: 136 MMOL/L (ref 136–145)
TOTAL PROTEIN: 6.7 G/DL (ref 6.4–8.2)
TOTAL PROTEIN: 7 G/DL (ref 6.4–8.2)
TRIGLYCERIDE, FASTING: 109 MG/DL (ref 0–150)
VLDLC SERPL CALC-MCNC: 22 MG/DL
WBC # BLD: 11.6 K/UL (ref 4–11)

## 2019-01-15 ENCOUNTER — CARE COORDINATION (OUTPATIENT)
Dept: CARE COORDINATION | Age: 84
End: 2019-01-15

## 2019-01-15 ENCOUNTER — OFFICE VISIT (OUTPATIENT)
Dept: INTERNAL MEDICINE CLINIC | Age: 84
End: 2019-01-15
Payer: MEDICARE

## 2019-01-15 VITALS
HEART RATE: 82 BPM | TEMPERATURE: 97.6 F | BODY MASS INDEX: 40.37 KG/M2 | SYSTOLIC BLOOD PRESSURE: 120 MMHG | WEIGHT: 235.2 LBS | OXYGEN SATURATION: 97 % | DIASTOLIC BLOOD PRESSURE: 68 MMHG | RESPIRATION RATE: 16 BRPM

## 2019-01-15 DIAGNOSIS — E11.9 TYPE 2 DIABETES MELLITUS WITHOUT COMPLICATION, WITHOUT LONG-TERM CURRENT USE OF INSULIN (HCC): ICD-10-CM

## 2019-01-15 DIAGNOSIS — E78.2 MIXED HYPERLIPIDEMIA: Primary | ICD-10-CM

## 2019-01-15 DIAGNOSIS — M72.0 DUPUYTREN'S CONTRACTURE OF RIGHT HAND: ICD-10-CM

## 2019-01-15 DIAGNOSIS — R29.818 SUSPECTED SLEEP APNEA: ICD-10-CM

## 2019-01-15 DIAGNOSIS — I10 ESSENTIAL HYPERTENSION: ICD-10-CM

## 2019-01-15 DIAGNOSIS — I25.10 CORONARY ARTERY DISEASE INVOLVING NATIVE CORONARY ARTERY OF NATIVE HEART WITHOUT ANGINA PECTORIS: ICD-10-CM

## 2019-01-15 DIAGNOSIS — G25.81 RESTLESS LEG SYNDROME: ICD-10-CM

## 2019-01-15 DIAGNOSIS — E66.09 CLASS 2 OBESITY DUE TO EXCESS CALORIES WITHOUT SERIOUS COMORBIDITY WITH BODY MASS INDEX (BMI) OF 39.0 TO 39.9 IN ADULT: ICD-10-CM

## 2019-01-15 DIAGNOSIS — M19.90 ARTHRITIS: ICD-10-CM

## 2019-01-15 DIAGNOSIS — K92.2 GASTROINTESTINAL HEMORRHAGE, UNSPECIFIED GASTROINTESTINAL HEMORRHAGE TYPE: ICD-10-CM

## 2019-01-15 DIAGNOSIS — I50.32 CHRONIC DIASTOLIC CONGESTIVE HEART FAILURE (HCC): ICD-10-CM

## 2019-01-15 DIAGNOSIS — E66.01 MORBID OBESITY WITH BMI OF 40.0-44.9, ADULT (HCC): ICD-10-CM

## 2019-01-15 PROCEDURE — G8399 PT W/DXA RESULTS DOCUMENT: HCPCS | Performed by: INTERNAL MEDICINE

## 2019-01-15 PROCEDURE — 99214 OFFICE O/P EST MOD 30 MIN: CPT | Performed by: INTERNAL MEDICINE

## 2019-01-15 PROCEDURE — G8482 FLU IMMUNIZE ORDER/ADMIN: HCPCS | Performed by: INTERNAL MEDICINE

## 2019-01-15 PROCEDURE — G8599 NO ASA/ANTIPLAT THER USE RNG: HCPCS | Performed by: INTERNAL MEDICINE

## 2019-01-15 PROCEDURE — 4040F PNEUMOC VAC/ADMIN/RCVD: CPT | Performed by: INTERNAL MEDICINE

## 2019-01-15 PROCEDURE — 1036F TOBACCO NON-USER: CPT | Performed by: INTERNAL MEDICINE

## 2019-01-15 PROCEDURE — 1101F PT FALLS ASSESS-DOCD LE1/YR: CPT | Performed by: INTERNAL MEDICINE

## 2019-01-15 PROCEDURE — G8427 DOCREV CUR MEDS BY ELIG CLIN: HCPCS | Performed by: INTERNAL MEDICINE

## 2019-01-15 PROCEDURE — 1123F ACP DISCUSS/DSCN MKR DOCD: CPT | Performed by: INTERNAL MEDICINE

## 2019-01-15 PROCEDURE — 1090F PRES/ABSN URINE INCON ASSESS: CPT | Performed by: INTERNAL MEDICINE

## 2019-01-15 PROCEDURE — G8417 CALC BMI ABV UP PARAM F/U: HCPCS | Performed by: INTERNAL MEDICINE

## 2019-01-15 RX ORDER — ROPINIROLE 1 MG/1
TABLET, FILM COATED ORAL
Qty: 135 TABLET | Refills: 1 | Status: SHIPPED | OUTPATIENT
Start: 2019-01-15 | End: 2019-03-06 | Stop reason: ALTCHOICE

## 2019-01-15 RX ORDER — GLIPIZIDE AND METFORMIN HCL 2.5; 5 MG/1; MG/1
2 TABLET, FILM COATED ORAL
Qty: 360 TABLET | Refills: 3 | Status: SHIPPED | OUTPATIENT
Start: 2019-01-15 | End: 2019-08-20 | Stop reason: ALTCHOICE

## 2019-01-15 RX ORDER — METOPROLOL TARTRATE 50 MG/1
25 TABLET, FILM COATED ORAL 2 TIMES DAILY
Qty: 90 TABLET | Refills: 1 | Status: SHIPPED | OUTPATIENT
Start: 2019-01-15 | End: 2019-05-13 | Stop reason: SDUPTHER

## 2019-01-15 RX ORDER — ROSUVASTATIN CALCIUM 10 MG/1
10 TABLET, COATED ORAL NIGHTLY
Qty: 90 TABLET | Refills: 1 | Status: SHIPPED | OUTPATIENT
Start: 2019-01-15 | End: 2019-08-12 | Stop reason: SDUPTHER

## 2019-01-15 ASSESSMENT — ENCOUNTER SYMPTOMS
RESPIRATORY NEGATIVE: 1
ALLERGIC/IMMUNOLOGIC NEGATIVE: 1
GASTROINTESTINAL NEGATIVE: 1
EYES NEGATIVE: 1

## 2019-02-13 ENCOUNTER — TELEPHONE (OUTPATIENT)
Dept: GASTROENTEROLOGY | Age: 84
End: 2019-02-13

## 2019-03-06 ENCOUNTER — OFFICE VISIT (OUTPATIENT)
Dept: INTERNAL MEDICINE CLINIC | Age: 84
End: 2019-03-06
Payer: MEDICARE

## 2019-03-06 VITALS
RESPIRATION RATE: 20 BRPM | OXYGEN SATURATION: 98 % | WEIGHT: 235.8 LBS | HEART RATE: 80 BPM | TEMPERATURE: 97.5 F | BODY MASS INDEX: 40.47 KG/M2 | DIASTOLIC BLOOD PRESSURE: 68 MMHG | SYSTOLIC BLOOD PRESSURE: 138 MMHG

## 2019-03-06 DIAGNOSIS — G25.81 RLS (RESTLESS LEGS SYNDROME): Primary | ICD-10-CM

## 2019-03-06 DIAGNOSIS — K92.2 GASTROINTESTINAL HEMORRHAGE, UNSPECIFIED GASTROINTESTINAL HEMORRHAGE TYPE: ICD-10-CM

## 2019-03-06 PROCEDURE — 4040F PNEUMOC VAC/ADMIN/RCVD: CPT | Performed by: INTERNAL MEDICINE

## 2019-03-06 PROCEDURE — G8427 DOCREV CUR MEDS BY ELIG CLIN: HCPCS | Performed by: INTERNAL MEDICINE

## 2019-03-06 PROCEDURE — G8399 PT W/DXA RESULTS DOCUMENT: HCPCS | Performed by: INTERNAL MEDICINE

## 2019-03-06 PROCEDURE — 1101F PT FALLS ASSESS-DOCD LE1/YR: CPT | Performed by: INTERNAL MEDICINE

## 2019-03-06 PROCEDURE — 1036F TOBACCO NON-USER: CPT | Performed by: INTERNAL MEDICINE

## 2019-03-06 PROCEDURE — 1090F PRES/ABSN URINE INCON ASSESS: CPT | Performed by: INTERNAL MEDICINE

## 2019-03-06 PROCEDURE — 36415 COLL VENOUS BLD VENIPUNCTURE: CPT | Performed by: INTERNAL MEDICINE

## 2019-03-06 PROCEDURE — 1123F ACP DISCUSS/DSCN MKR DOCD: CPT | Performed by: INTERNAL MEDICINE

## 2019-03-06 PROCEDURE — 99213 OFFICE O/P EST LOW 20 MIN: CPT | Performed by: INTERNAL MEDICINE

## 2019-03-06 PROCEDURE — G8599 NO ASA/ANTIPLAT THER USE RNG: HCPCS | Performed by: INTERNAL MEDICINE

## 2019-03-06 PROCEDURE — G8417 CALC BMI ABV UP PARAM F/U: HCPCS | Performed by: INTERNAL MEDICINE

## 2019-03-06 PROCEDURE — G8482 FLU IMMUNIZE ORDER/ADMIN: HCPCS | Performed by: INTERNAL MEDICINE

## 2019-03-06 RX ORDER — POTASSIUM CHLORIDE 750 MG/1
10 TABLET, FILM COATED, EXTENDED RELEASE ORAL DAILY
Refills: 3 | COMMUNITY
Start: 2019-02-08 | End: 2019-03-28

## 2019-03-06 RX ORDER — PRAMIPEXOLE DIHYDROCHLORIDE 0.12 MG/1
0.12 TABLET ORAL NIGHTLY
Qty: 30 TABLET | Refills: 0 | Status: SHIPPED | OUTPATIENT
Start: 2019-03-06 | End: 2019-03-28

## 2019-03-06 ASSESSMENT — PATIENT HEALTH QUESTIONNAIRE - PHQ9
1. LITTLE INTEREST OR PLEASURE IN DOING THINGS: 1
SUM OF ALL RESPONSES TO PHQ9 QUESTIONS 1 & 2: 1
SUM OF ALL RESPONSES TO PHQ QUESTIONS 1-9: 1
SUM OF ALL RESPONSES TO PHQ QUESTIONS 1-9: 1
2. FEELING DOWN, DEPRESSED OR HOPELESS: 0

## 2019-03-07 LAB — FERRITIN: 34.4 NG/ML (ref 15–150)

## 2019-03-11 ENCOUNTER — CARE COORDINATION (OUTPATIENT)
Dept: CARE COORDINATION | Age: 84
End: 2019-03-11

## 2019-03-28 ENCOUNTER — OFFICE VISIT (OUTPATIENT)
Dept: CARDIOLOGY CLINIC | Age: 84
End: 2019-03-28
Payer: MEDICARE

## 2019-03-28 VITALS
DIASTOLIC BLOOD PRESSURE: 66 MMHG | WEIGHT: 236 LBS | RESPIRATION RATE: 16 BRPM | HEART RATE: 64 BPM | BODY MASS INDEX: 39.32 KG/M2 | SYSTOLIC BLOOD PRESSURE: 120 MMHG | HEIGHT: 65 IN

## 2019-03-28 DIAGNOSIS — E78.2 MIXED HYPERLIPIDEMIA: ICD-10-CM

## 2019-03-28 DIAGNOSIS — I25.10 CORONARY ARTERY DISEASE INVOLVING NATIVE CORONARY ARTERY OF NATIVE HEART WITHOUT ANGINA PECTORIS: Primary | ICD-10-CM

## 2019-03-28 DIAGNOSIS — I50.32 CHRONIC DIASTOLIC CONGESTIVE HEART FAILURE (HCC): ICD-10-CM

## 2019-03-28 DIAGNOSIS — I10 ESSENTIAL HYPERTENSION: ICD-10-CM

## 2019-03-28 PROCEDURE — 1036F TOBACCO NON-USER: CPT | Performed by: NURSE PRACTITIONER

## 2019-03-28 PROCEDURE — 1123F ACP DISCUSS/DSCN MKR DOCD: CPT | Performed by: NURSE PRACTITIONER

## 2019-03-28 PROCEDURE — G8482 FLU IMMUNIZE ORDER/ADMIN: HCPCS | Performed by: NURSE PRACTITIONER

## 2019-03-28 PROCEDURE — 99214 OFFICE O/P EST MOD 30 MIN: CPT | Performed by: NURSE PRACTITIONER

## 2019-03-28 PROCEDURE — G8599 NO ASA/ANTIPLAT THER USE RNG: HCPCS | Performed by: NURSE PRACTITIONER

## 2019-03-28 PROCEDURE — G8417 CALC BMI ABV UP PARAM F/U: HCPCS | Performed by: NURSE PRACTITIONER

## 2019-03-28 PROCEDURE — 4040F PNEUMOC VAC/ADMIN/RCVD: CPT | Performed by: NURSE PRACTITIONER

## 2019-03-28 PROCEDURE — G8399 PT W/DXA RESULTS DOCUMENT: HCPCS | Performed by: NURSE PRACTITIONER

## 2019-03-28 PROCEDURE — G8427 DOCREV CUR MEDS BY ELIG CLIN: HCPCS | Performed by: NURSE PRACTITIONER

## 2019-03-28 PROCEDURE — 1090F PRES/ABSN URINE INCON ASSESS: CPT | Performed by: NURSE PRACTITIONER

## 2019-03-28 RX ORDER — ROPINIROLE 1 MG/1
1 TABLET, FILM COATED ORAL NIGHTLY
Refills: 1 | COMMUNITY
Start: 2019-03-08 | End: 2019-05-13 | Stop reason: SDUPTHER

## 2019-04-11 ENCOUNTER — OFFICE VISIT (OUTPATIENT)
Dept: SURGERY | Age: 84
End: 2019-04-11
Payer: MEDICARE

## 2019-04-11 VITALS
SYSTOLIC BLOOD PRESSURE: 140 MMHG | WEIGHT: 228.2 LBS | OXYGEN SATURATION: 97 % | BODY MASS INDEX: 38.57 KG/M2 | DIASTOLIC BLOOD PRESSURE: 80 MMHG | HEART RATE: 78 BPM | RESPIRATION RATE: 16 BRPM

## 2019-04-11 DIAGNOSIS — Z12.11 SCREEN FOR COLON CANCER: Primary | ICD-10-CM

## 2019-04-11 PROCEDURE — G8599 NO ASA/ANTIPLAT THER USE RNG: HCPCS | Performed by: SURGERY

## 2019-04-11 PROCEDURE — G8427 DOCREV CUR MEDS BY ELIG CLIN: HCPCS | Performed by: SURGERY

## 2019-04-11 PROCEDURE — 1036F TOBACCO NON-USER: CPT | Performed by: SURGERY

## 2019-04-11 PROCEDURE — 4040F PNEUMOC VAC/ADMIN/RCVD: CPT | Performed by: SURGERY

## 2019-04-11 PROCEDURE — 1123F ACP DISCUSS/DSCN MKR DOCD: CPT | Performed by: SURGERY

## 2019-04-11 PROCEDURE — G8417 CALC BMI ABV UP PARAM F/U: HCPCS | Performed by: SURGERY

## 2019-04-11 PROCEDURE — G8399 PT W/DXA RESULTS DOCUMENT: HCPCS | Performed by: SURGERY

## 2019-04-11 PROCEDURE — 1090F PRES/ABSN URINE INCON ASSESS: CPT | Performed by: SURGERY

## 2019-04-11 PROCEDURE — 99203 OFFICE O/P NEW LOW 30 MIN: CPT | Performed by: SURGERY

## 2019-04-11 RX ORDER — HYDROXYCHLOROQUINE SULFATE 200 MG/1
200 TABLET, FILM COATED ORAL DAILY
COMMUNITY

## 2019-04-27 ENCOUNTER — HOSPITAL ENCOUNTER (EMERGENCY)
Age: 84
Discharge: HOME | End: 2019-04-27
Attending: EMERGENCY MEDICINE
Payer: MEDICARE

## 2019-04-27 ENCOUNTER — APPOINTMENT (OUTPATIENT)
Dept: CT IMAGING | Age: 84
End: 2019-04-27
Payer: MEDICARE

## 2019-04-27 VITALS
RESPIRATION RATE: 20 BRPM | SYSTOLIC BLOOD PRESSURE: 146 MMHG | TEMPERATURE: 97.5 F | OXYGEN SATURATION: 99 % | DIASTOLIC BLOOD PRESSURE: 79 MMHG | HEART RATE: 88 BPM | WEIGHT: 228 LBS | HEIGHT: 65 IN | BODY MASS INDEX: 37.99 KG/M2
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
## 2019-04-27 DIAGNOSIS — S20.219A CONTUSION OF CHEST WALL, UNSPECIFIED LATERALITY, INITIAL ENCOUNTER: ICD-10-CM

## 2019-04-27 PROCEDURE — 6370000000 HC RX 637 (ALT 250 FOR IP): Performed by: EMERGENCY MEDICINE

## 2019-04-27 PROCEDURE — 93005 ELECTROCARDIOGRAM TRACING: CPT | Performed by: EMERGENCY MEDICINE

## 2019-04-27 PROCEDURE — 99284 EMERGENCY DEPT VISIT MOD MDM: CPT

## 2019-04-27 PROCEDURE — 93010 ELECTROCARDIOGRAM REPORT: CPT | Performed by: INTERNAL MEDICINE

## 2019-04-27 PROCEDURE — 71250 CT THORAX DX C-: CPT

## 2019-04-27 RX ORDER — ACETAMINOPHEN 500 MG
1000 TABLET ORAL ONCE
Status: COMPLETED | OUTPATIENT
Start: 2019-04-27 | End: 2019-04-27

## 2019-04-27 RX ADMIN — ACETAMINOPHEN 1000 MG: 500 TABLET ORAL at 12:45

## 2019-04-27 ASSESSMENT — PAIN DESCRIPTION - LOCATION: LOCATION: CHEST

## 2019-04-27 ASSESSMENT — PAIN DESCRIPTION - ORIENTATION: ORIENTATION: RIGHT;LEFT

## 2019-04-27 ASSESSMENT — PAIN SCALES - GENERAL
PAINLEVEL_OUTOF10: 7
PAINLEVEL_OUTOF10: 7

## 2019-04-27 ASSESSMENT — PAIN DESCRIPTION - DESCRIPTORS: DESCRIPTORS: ACHING

## 2019-04-27 ASSESSMENT — PAIN DESCRIPTION - FREQUENCY: FREQUENCY: CONTINUOUS
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
## 2019-04-27 NOTE — ED NOTES
Discharge instructions were reviewed and she called her brother who will come and pick the patient up. The patient was walked to the waiting room where she is waiting on her family.           Adis Jj RN  04/27/19 [id number]

## 2019-04-27 NOTE — ED NOTES
The patient was given a phone to call her family.                        Florentino Guzmán RN  04/27/19 [id number]
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
## 2019-04-28 ASSESSMENT — ENCOUNTER SYMPTOMS
SORE THROAT: 0
CHOKING: 0
PHOTOPHOBIA: 0
ANAL BLEEDING: 0
RECTAL PAIN: 0
COLOR CHANGE: 0
STRIDOR: 0
APNEA: 0
CONSTIPATION: 0
EYE REDNESS: 0
BACK PAIN: 0
EYE ITCHING: 0

## 2019-04-28 NOTE — PROGRESS NOTES
Chief Complaint   Patient presents with    New Patient     Discuss possible colonoscopy       SUBJECTIVE:  HPI: Pt presents today forevaluation and possible need of colonoscopy. Pt has  had colonoscopy in the past.  Patient has been experiencing no pain. Denies bleeding. There is no family history of colon cancer. I have reviewed the patient's(pertinent information to this visit) medical history, family history(scanned in  the Media tab under \"patient questioner\"), social history and review ofsystems with the patient today in the office. Past Surgical History:   Procedure Laterality Date    CATARACT REMOVAL Bilateral     5/14,8/14    INGUINAL HERNIA REPAIR Left 39 yrs ago     Past Medical History:   Diagnosis Date    Arthritis     left knee pain, sees Dr. Nicolette Hirsch CAD (coronary artery disease)     sees Dr. Ema Sams Chronic midline low back pain without sciatica 9/28/2016    Had PT in 2016   [address] [facility] Colonoscopy refused     discussed in 7/15    DM (diabetes mellitus), type 2 ([facility] [address] 02/2006    Dupuytren's contracture of right hand     Endometrial stripe increased 9/25/2014    Saw NP Jerral Aquas. Was normal exam per pt.  Gastrointestinal hemorrhage 11/2/2015    Patient had GI bleeding. Colon refused. Discussed with patient in detail.  Glaucoma 4/1/2014    H/O echocardiogram 10/02/2014    Mildly depressed left ventricular function; ejection fraction of 45%. Moderate pulmonary hypertension.  H/O echocardiogram 08/28/2018    EF 50-55%.  Mitral annular calcification is present. No other significant valvulopathy seen.  History of nuclear stress test 08/28/2018    EF 59%. ECG portion of stress test is negative for ischemia by diagnostic criteria. fixed inferior large size severe defect in rest and stress images. Mild hubert infarct ischemia.     HTN (hypertension)     Hyperlipidemia     Kidney stone     hx-\"been about 3 yrs ago\"    MI (myocardial infarction) ([facility] [address] 02/1998 treated with TPA    Obesity     Pneumonia of right lower lobe due to infectious organism [facility]) 4/5/2018    Vertigo     'have been having this since I had cataract surgery\"\"that is why they are doing the MRA of my brain(10/31/2014)     Family History   Problem Relation Age of Onset   [facility] Parkinsonism Mother     Heart Attack Father     Heart Disease Father     Stroke Sister     Cancer Sister         breast ca    High Blood Pressure Brother     High Blood Pressure Brother     Cancer Brother         \"stomach cancer\"    Colon Cancer Neg Hx     Stomach Cancer Neg Hx      Social History     Socioeconomic History    Marital status: Single     Spouse name: Not on file    Number of children: Not on file    Years of education: Not on file    Highest education level: Not on file   Occupational History    Not on file   Social Needs    Financial resource strain: Not on file    Food insecurity:     Worry: Not on file     Inability: Not on file    Transportation needs:     Medical: Not on file     Non-medical: Not on file   Tobacco Use    Smoking status: Never Smoker    Smokeless tobacco: Never Used   Substance and Sexual Activity    Alcohol use: No     Alcohol/week: 0.0 oz    Drug use: No    Sexual activity: Not Currently     Partners: Male   Lifestyle    Physical activity:     Days per week: Not on file     Minutes per session: Not on file    Stress: Not on file   Relationships    Social connections:     Talks on phone: Not on file     Gets together: Not on file     Attends [employer] service: Not on file     Active member of club or organization: Not on file     Attends meetings of clubs or organizations: Not on file     Relationship status: Not on file    Intimate partner violence:     Fear of current or ex partner: Not on file     Emotionally abused: Not on file     Physically abused: Not on file     Forced sexual activity: Not on file   Other Topics Concern    Not on file   Social History Narrative  Not on file       Current Outpatient Medications   Medication Sig Dispense Refill    hydroxychloroquine (PLAQUENIL) 200 MG tablet Take by mouth daily      rOPINIRole (REQUIP) 1 MG tablet Take 1 mg by mouth nightly   1    rosuvastatin (CRESTOR) 10 MG tablet Take 1 tablet by mouth nightly 90 tablet 1    metoprolol tartrate (LOPRESSOR) 50 MG tablet Take 0.5 tablets by mouth 2 times daily (Patient taking differently: Take 25 mg by mouth 2 times daily Pt has been taking 50 mg, twice daily.) 90 tablet 1    glipiZIDE-metformin (METAGLIP) 2.5-500 MG per tablet Take 2 tablets by mouth 2 times daily (before meals) 360 tablet 3    bisacodyl (BISACODYL) 5 MG EC tablet Take 4 tablets once for colonoscopy prep 4 tablet 0    magnesium oxide (MAG-OX) 400 MG tablet Take 1 tablet by mouth daily 30 tablet 6    losartan (COZAAR) 50 MG tablet Take 1 tablet by mouth daily 30 tablet 5    ketoconazole (NIZORAL) 2 % cream Apply to skin daily 30 g 1    furosemide (LASIX) 40 MG tablet Take 1 tablet by mouth as needed (swelling) 30 tablet 3    econazole nitrate 1 % cream Apply 1 g topically 2 times daily Apply topically daily.  blood glucose test strips (TRUETEST TEST) strip 1 each by Other route 2 times daily DX=E11.9 100 each 5    UNABLE TO FIND Please dispense  True metrix lancets  Testing twice daily  DX=E11.9 100 each 2    aspirin 81 MG EC tablet Take 1 tablet by mouth nightly. 30 tablet     Cholecalciferol (VITAMIN D PO) Take 5,000 Units by mouth three times daily       Coenzyme Q10 (CO Q 10 PO) Take 200 mg by mouth 2 times daily        No current facility-administered medications for this visit. No Known Allergies    Review of Systems:         Review of Systems   Constitutional: Negative for chills and fever. HENT: Negative for ear pain, mouth sores, sore throat and tinnitus. Eyes: Negative for photophobia, redness and itching. Respiratory: Negative for apnea, choking and stridor.     Cardiovascular:

## 2019-05-01 LAB
EKG ATRIAL RATE: 81 BPM
EKG DIAGNOSIS: NORMAL
EKG P AXIS: -1 DEGREES
EKG P-R INTERVAL: 124 MS
EKG Q-T INTERVAL: 392 MS
EKG QRS DURATION: 88 MS
EKG QTC CALCULATION (BAZETT): 455 MS
EKG R AXIS: 14 DEGREES
EKG T AXIS: 22 DEGREES
EKG VENTRICULAR RATE: 81 BPM

## 2019-05-13 ENCOUNTER — CARE COORDINATION (OUTPATIENT)
Dept: CARE COORDINATION | Age: 84
End: 2019-05-13

## 2019-05-13 ENCOUNTER — OFFICE VISIT (OUTPATIENT)
Dept: INTERNAL MEDICINE CLINIC | Age: 84
End: 2019-05-13
Payer: MEDICARE

## 2019-05-13 VITALS
HEART RATE: 71 BPM | WEIGHT: 236 LBS | HEIGHT: 65 IN | DIASTOLIC BLOOD PRESSURE: 64 MMHG | BODY MASS INDEX: 39.32 KG/M2 | OXYGEN SATURATION: 96 % | SYSTOLIC BLOOD PRESSURE: 132 MMHG

## 2019-05-13 DIAGNOSIS — E11.9 TYPE 2 DIABETES MELLITUS WITHOUT COMPLICATION, WITHOUT LONG-TERM CURRENT USE OF INSULIN (HCC): Primary | ICD-10-CM

## 2019-05-13 DIAGNOSIS — K92.2 GASTROINTESTINAL HEMORRHAGE, UNSPECIFIED GASTROINTESTINAL HEMORRHAGE TYPE: ICD-10-CM

## 2019-05-13 DIAGNOSIS — R29.818 SUSPECTED SLEEP APNEA: ICD-10-CM

## 2019-05-13 DIAGNOSIS — M19.90 ARTHRITIS: ICD-10-CM

## 2019-05-13 DIAGNOSIS — G25.81 RESTLESS LEG SYNDROME: ICD-10-CM

## 2019-05-13 DIAGNOSIS — N28.1 CYST, KIDNEY, ACQUIRED: ICD-10-CM

## 2019-05-13 DIAGNOSIS — E66.01 MORBID OBESITY WITH BMI OF 40.0-44.9, ADULT (HCC): ICD-10-CM

## 2019-05-13 DIAGNOSIS — E78.2 MIXED HYPERLIPIDEMIA: ICD-10-CM

## 2019-05-13 DIAGNOSIS — I10 ESSENTIAL HYPERTENSION: ICD-10-CM

## 2019-05-13 DIAGNOSIS — I25.10 CORONARY ARTERY DISEASE INVOLVING NATIVE CORONARY ARTERY OF NATIVE HEART WITHOUT ANGINA PECTORIS: ICD-10-CM

## 2019-05-13 DIAGNOSIS — M72.0 DUPUYTREN'S CONTRACTURE OF RIGHT HAND: ICD-10-CM

## 2019-05-13 DIAGNOSIS — I50.32 CHRONIC DIASTOLIC CONGESTIVE HEART FAILURE (HCC): ICD-10-CM

## 2019-05-13 PROCEDURE — 1090F PRES/ABSN URINE INCON ASSESS: CPT | Performed by: INTERNAL MEDICINE

## 2019-05-13 PROCEDURE — 99214 OFFICE O/P EST MOD 30 MIN: CPT | Performed by: INTERNAL MEDICINE

## 2019-05-13 PROCEDURE — 1036F TOBACCO NON-USER: CPT | Performed by: INTERNAL MEDICINE

## 2019-05-13 PROCEDURE — 1123F ACP DISCUSS/DSCN MKR DOCD: CPT | Performed by: INTERNAL MEDICINE

## 2019-05-13 PROCEDURE — G8427 DOCREV CUR MEDS BY ELIG CLIN: HCPCS | Performed by: INTERNAL MEDICINE

## 2019-05-13 PROCEDURE — G8399 PT W/DXA RESULTS DOCUMENT: HCPCS | Performed by: INTERNAL MEDICINE

## 2019-05-13 PROCEDURE — G8417 CALC BMI ABV UP PARAM F/U: HCPCS | Performed by: INTERNAL MEDICINE

## 2019-05-13 PROCEDURE — G8599 NO ASA/ANTIPLAT THER USE RNG: HCPCS | Performed by: INTERNAL MEDICINE

## 2019-05-13 PROCEDURE — 4040F PNEUMOC VAC/ADMIN/RCVD: CPT | Performed by: INTERNAL MEDICINE

## 2019-05-13 RX ORDER — LOSARTAN POTASSIUM 50 MG/1
50 TABLET ORAL DAILY
Qty: 30 TABLET | Refills: 5 | Status: SHIPPED | OUTPATIENT
Start: 2019-05-13 | End: 2019-10-23 | Stop reason: SDUPTHER

## 2019-05-13 RX ORDER — ROPINIROLE 0.5 MG/1
0.5 TABLET, FILM COATED ORAL 3 TIMES DAILY
Qty: 90 TABLET | Refills: 3 | Status: SHIPPED | OUTPATIENT
Start: 2019-05-13 | End: 2019-07-30 | Stop reason: SDUPTHER

## 2019-05-13 RX ORDER — METOPROLOL TARTRATE 50 MG/1
50 TABLET, FILM COATED ORAL 2 TIMES DAILY
Qty: 180 TABLET | Refills: 1 | Status: SHIPPED | OUTPATIENT
Start: 2019-05-13 | End: 2019-11-06 | Stop reason: DRUGHIGH
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
## 2019-05-19 ASSESSMENT — ENCOUNTER SYMPTOMS
ALLERGIC/IMMUNOLOGIC NEGATIVE: 1
GASTROINTESTINAL NEGATIVE: 1
EYES NEGATIVE: 1

## 2019-05-19 NOTE — ASSESSMENT & PLAN NOTE
Patient has diabetes mellitus.   Blood sugars are stable  Continue current medications of glipizide and metformin

## 2019-05-19 NOTE — ASSESSMENT & PLAN NOTE
Patient has coronary artery disease stable. Sees cardiologist.  Continue taking the medications.   No angina

## 2019-05-19 NOTE — ASSESSMENT & PLAN NOTE
Hyperlipidemia is stable. Follow low cholesterol diet. Continue current treatment. Patient is on crestor.

## 2019-06-13 ENCOUNTER — OFFICE VISIT (OUTPATIENT)
Dept: INTERNAL MEDICINE CLINIC | Age: 84
End: 2019-06-13
Payer: MEDICARE

## 2019-06-13 ENCOUNTER — CARE COORDINATION (OUTPATIENT)
Dept: CARE COORDINATION | Age: 84
End: 2019-06-13

## 2019-06-13 VITALS
SYSTOLIC BLOOD PRESSURE: 118 MMHG | BODY MASS INDEX: 39.28 KG/M2 | DIASTOLIC BLOOD PRESSURE: 70 MMHG | WEIGHT: 232.4 LBS | HEART RATE: 76 BPM | RESPIRATION RATE: 16 BRPM | OXYGEN SATURATION: 96 % | TEMPERATURE: 97.7 F

## 2019-06-13 DIAGNOSIS — K52.9 GASTROENTERITIS: ICD-10-CM

## 2019-06-13 DIAGNOSIS — R19.7 DIARRHEA, UNSPECIFIED TYPE: Primary | ICD-10-CM

## 2019-06-13 LAB
A/G RATIO: 1.5 (ref 1.1–2.2)
ALBUMIN SERPL-MCNC: 4 G/DL (ref 3.4–5)
ALP BLD-CCNC: 47 U/L (ref 40–129)
ALT SERPL-CCNC: 7 U/L (ref 10–40)
ANION GAP SERPL CALCULATED.3IONS-SCNC: 13 MMOL/L (ref 3–16)
AST SERPL-CCNC: 17 U/L (ref 15–37)
BASOPHILS ABSOLUTE: 0.1 K/UL (ref 0–0.2)
BASOPHILS RELATIVE PERCENT: 0.8 %
BILIRUB SERPL-MCNC: 0.3 MG/DL (ref 0–1)
BUN BLDV-MCNC: 8 MG/DL (ref 7–20)
CALCIUM SERPL-MCNC: 10.4 MG/DL (ref 8.3–10.6)
CHLORIDE BLD-SCNC: 97 MMOL/L (ref 99–110)
CO2: 28 MMOL/L (ref 21–32)
CREAT SERPL-MCNC: 0.6 MG/DL (ref 0.6–1.2)
EOSINOPHILS ABSOLUTE: 0.1 K/UL (ref 0–0.6)
EOSINOPHILS RELATIVE PERCENT: 1.6 %
GFR AFRICAN AMERICAN: >60
GFR NON-AFRICAN AMERICAN: >60
GLOBULIN: 2.7 G/DL
GLUCOSE BLD-MCNC: 86 MG/DL (ref 70–99)
HCT VFR BLD CALC: 38.7 % (ref 36–48)
HEMOGLOBIN: 13.1 G/DL (ref 12–16)
LYMPHOCYTES ABSOLUTE: 1.8 K/UL (ref 1–5.1)
LYMPHOCYTES RELATIVE PERCENT: 24.7 %
MCH RBC QN AUTO: 31.8 PG (ref 26–34)
MCHC RBC AUTO-ENTMCNC: 33.8 G/DL (ref 31–36)
MCV RBC AUTO: 94.3 FL (ref 80–100)
MONOCYTES ABSOLUTE: 1.1 K/UL (ref 0–1.3)
MONOCYTES RELATIVE PERCENT: 15.2 %
NEUTROPHILS ABSOLUTE: 4.1 K/UL (ref 1.7–7.7)
NEUTROPHILS RELATIVE PERCENT: 57.7 %
PDW BLD-RTO: 13.3 % (ref 12.4–15.4)
PLATELET # BLD: 253 K/UL (ref 135–450)
PMV BLD AUTO: 8.9 FL (ref 5–10.5)
POTASSIUM SERPL-SCNC: 4.6 MMOL/L (ref 3.5–5.1)
RBC # BLD: 4.1 M/UL (ref 4–5.2)
SODIUM BLD-SCNC: 138 MMOL/L (ref 136–145)
TOTAL PROTEIN: 6.7 G/DL (ref 6.4–8.2)
WBC # BLD: 7.1 K/UL (ref 4–11)

## 2019-06-13 PROCEDURE — 1036F TOBACCO NON-USER: CPT | Performed by: INTERNAL MEDICINE

## 2019-06-13 PROCEDURE — 1123F ACP DISCUSS/DSCN MKR DOCD: CPT | Performed by: INTERNAL MEDICINE

## 2019-06-13 PROCEDURE — 1090F PRES/ABSN URINE INCON ASSESS: CPT | Performed by: INTERNAL MEDICINE

## 2019-06-13 PROCEDURE — G8599 NO ASA/ANTIPLAT THER USE RNG: HCPCS | Performed by: INTERNAL MEDICINE

## 2019-06-13 PROCEDURE — G8399 PT W/DXA RESULTS DOCUMENT: HCPCS | Performed by: INTERNAL MEDICINE

## 2019-06-13 PROCEDURE — 4040F PNEUMOC VAC/ADMIN/RCVD: CPT | Performed by: INTERNAL MEDICINE

## 2019-06-13 PROCEDURE — G8427 DOCREV CUR MEDS BY ELIG CLIN: HCPCS | Performed by: INTERNAL MEDICINE

## 2019-06-13 PROCEDURE — 99213 OFFICE O/P EST LOW 20 MIN: CPT | Performed by: INTERNAL MEDICINE

## 2019-06-13 PROCEDURE — 36415 COLL VENOUS BLD VENIPUNCTURE: CPT | Performed by: INTERNAL MEDICINE

## 2019-06-13 PROCEDURE — G8417 CALC BMI ABV UP PARAM F/U: HCPCS | Performed by: INTERNAL MEDICINE

## 2019-06-13 RX ORDER — ONDANSETRON 4 MG/1
4 TABLET, FILM COATED ORAL EVERY 8 HOURS PRN
Qty: 15 TABLET | Refills: 0 | Status: SHIPPED | OUTPATIENT
Start: 2019-06-13 | End: 2019-07-08

## 2019-06-13 RX ORDER — CIPROFLOXACIN 500 MG/1
500 TABLET, FILM COATED ORAL 2 TIMES DAILY
Qty: 6 TABLET | Refills: 0 | Status: SHIPPED | OUTPATIENT
Start: 2019-06-13 | End: 2019-07-08

## 2019-06-13 NOTE — CARE COORDINATION
Met with pt briefly at pcp ov as she did not feel well and preferred cc f/u with her at later date. Encouraged hydration and educated on diabetes sick day info, pt voices undertsanding. Will continue to follow.

## 2019-06-13 NOTE — PROGRESS NOTES
Deana Delgado  Patient's  is 1935  Seen in office on 2019      SUBJECTIVE:  Franklyn Morrissey anand 81 y. o.year old female presents today   Chief Complaint   Patient presents with    Nausea & Vomiting     since Saturday    Diarrhea     Pt states she developed NVD diarrhea on 19  Pt took imodium and diarrhea improved but started again tomorrow  Has loose stools watery. No blood in the stools  No fever or chills  No HA  Nausea , no vomiting   No on any antibiotic   Taking medications regularly. No side effects noted. Review of Systems    OBJECTIVE: /70   Pulse 76   Temp 97.7 °F (36.5 °C)   Resp 16   Wt 232 lb 6.4 oz (105.4 kg)   LMP  (LMP Unknown)   SpO2 96%   BMI 39.28 kg/m²     Wt Readings from Last 3 Encounters:   19 232 lb 6.4 oz (105.4 kg)   19 236 lb (107 kg)   19 228 lb (103.4 kg)      GENERAL: - Alert, oriented, pleasant, in no apparent distress. HEENT: - Conjunctiva pink, no scleral icterus. ENT clear. NECK: -Supple. No jugular venous distention noted. No masses felt,  CARDIOVASCULAR: - Normal S1 and S2    PULMONARY: - No respiratory distress. No wheezes or rales. ABDOMEN: - Soft and non-tender,no masses  ororganomegaly. EXTREMITIES: - No cyanosis, clubbing, or significant edema. SKIN: Skin is warm and dry. NEUROLOGICAL: - Cranial nerves II through XII are grossly intact. IMPRESSION:    Encounter Diagnoses   Name Primary?  Diarrhea, unspecified type Yes    Gastroenteritis        ASSESSMENT/PLAN:    Will check CBC  And CMP  If not better : stool c/s  Increase fluid intake and take gatorade  cipro 500 mg bid for 3 days  May take imodium  zofran 4 mg q 8 hours prn  If not better go to ER and RTO. Mediations reviewed with the patient. Continue current medications. Appropriate prescriptions are addressed. After visit summeryprovided. Follow up as directed sooner if needed. Questions answered and patient verbalizes understanding.  Call for any problems, questions, or concerns. No Known Allergies  Current Outpatient Medications   Medication Sig Dispense Refill    rOPINIRole (REQUIP) 0.5 MG tablet Take 1 tablet by mouth 3 times daily 90 tablet 3    metoprolol tartrate (LOPRESSOR) 50 MG tablet Take 1 tablet by mouth 2 times daily 180 tablet 1    losartan (COZAAR) 50 MG tablet Take 1 tablet by mouth daily 30 tablet 5    hydroxychloroquine (PLAQUENIL) 200 MG tablet Take by mouth daily      rosuvastatin (CRESTOR) 10 MG tablet Take 1 tablet by mouth nightly 90 tablet 1    glipiZIDE-metformin (METAGLIP) 2.5-500 MG per tablet Take 2 tablets by mouth 2 times daily (before meals) 360 tablet 3    magnesium oxide (MAG-OX) 400 MG tablet Take 1 tablet by mouth daily 30 tablet 6    ketoconazole (NIZORAL) 2 % cream Apply to skin daily 30 g 1    furosemide (LASIX) 40 MG tablet Take 1 tablet by mouth as needed (swelling) 30 tablet 3    econazole nitrate 1 % cream Apply 1 g topically 2 times daily Apply topically daily.  aspirin 81 MG EC tablet Take 1 tablet by mouth nightly. 30 tablet     Cholecalciferol (VITAMIN D PO) Take 5,000 Units by mouth three times daily       Coenzyme Q10 (CO Q 10 PO) Take 200 mg by mouth 2 times daily       bisacodyl (BISACODYL) 5 MG EC tablet Take 4 tablets once for colonoscopy prep 4 tablet 0    blood glucose test strips (TRUETEST TEST) strip 1 each by Other route 2 times daily DX=E11.9 100 each 5    UNABLE TO FIND Please dispense  True metrix lancets  Testing twice daily  DX=E11.9 100 each 2     No current facility-administered medications for this visit.       Past Medical History:   Diagnosis Date    Arthritis     left knee pain, sees Dr. Mckeon Risk CAD (coronary artery disease)     sees Dr. Flores Common Chronic midline low back pain without sciatica 9/28/2016    Had PT in 2016   [address] Colonoscopy refused     discussed in 7/15    [facility]di[facility]    Dupuytren's contracture of right hand     Endometrial stripe increased 9/25/2014    Saw NP Jayce Hernandez. Was normal exam per pt.  Gastrointestinal hemorrhage 11/2/2015    Patient had GI bleeding. Colon refused. Discussed with patient in detail.  Glaucoma 4/1/2014    H/O echocardiogram 10/02/2014    Mildly depressed left ventricular function; ejection fraction of 45%. Moderate pulmonary hypertension.  H/O echocardiogram 08/28/2018    EF 50-55%.  Mitral annular calcification is present. No other significant valvulopathy seen.  History of nuclear stress test 08/28/2018    EF 59%. ECG portion of stress test is negative for ischemia by diagnostic criteria. fixed inferior large size severe defect in rest and stress images. Mild hubert infarct ischemia.     HTN (hypertension)     Hyperlipidemia     Kidney stone     hx-\"been about 3 yrs ago\"    MI (myocardial infarction) ([facility] 02/1998    treated with TPA    Obesity     Pneumonia of right lower lobe due to infectious organism ([facility] 4/5/2018    Vertigo     'have been having this since I had cataract surgery\"\"that is why they are doing the MRA of my brain(10/31/2014)     Past Surgical History:   Procedure Laterality Date    CATARACT REMOVAL Bilateral     5/14,8/14    INGUINAL HERNIA REPAIR Left 39 yrs ago     Social History     Tobacco Use    Smoking status: Never Smoker    Smokeless tobacco: Never Used   Substance Use Topics    Alcohol use: No     Alcohol/week: 0.0 oz       LAB REVIEW:  CBC:   Lab Results   Component Value Date    WBC 11.6 01/11/2019    HGB 13.2 01/11/2019    HCT 40.3 01/11/2019     01/11/2019     Lipids:   Lab Results   Component Value Date    CHOL 121 08/07/2018    TRIG 128 08/07/2018    HDL 51 01/11/2019    LDLCALC 53 01/11/2019    LDLDIRECT 69 08/18/2017    TRIGLYCFAST 109 01/11/2019     Renal:   Lab Results   Component Value Date    BUN 18 01/11/2019    BUN 18 01/11/2019    CREATININE <0.5 01/11/2019    CREATININE <0.5 01/11/2019     01/11/2019     01/11/2019    K 4.8 01/11/2019    K 5.0 01/11/2019    K 3.0 03/08/2018    ALT <5 01/11/2019    ALT 6 01/11/2019    AST 10 01/11/2019    AST 11 01/11/2019    GLUCOSE 132 01/11/2019    GLUCOSE 135 01/11/2019     PT/INR:   Lab Results   Component Value Date    INR 1.00 01/11/2019     A1C:   Lab Results   Component Value Date    LABA1C 7.1 09/17/2018           Jesica Pierce MD, 6/13/2019 , 1:28 PM

## 2019-07-08 ENCOUNTER — HOSPITAL ENCOUNTER (EMERGENCY)
Age: 84
Discharge: HOME | End: 2019-07-08
Attending: EMERGENCY MEDICINE
Payer: MEDICARE

## 2019-07-08 ENCOUNTER — TELEPHONE (OUTPATIENT)
Dept: INTERNAL MEDICINE CLINIC | Age: 84
End: 2019-07-08

## 2019-07-08 VITALS
TEMPERATURE: 97.6 F | HEIGHT: 64 IN | BODY MASS INDEX: 37.56 KG/M2 | OXYGEN SATURATION: 98 % | SYSTOLIC BLOOD PRESSURE: 152 MMHG | WEIGHT: 220 LBS | HEART RATE: 70 BPM | DIASTOLIC BLOOD PRESSURE: 61 MMHG | RESPIRATION RATE: 16 BRPM

## 2019-07-08 DIAGNOSIS — R19.7 DIARRHEA, UNSPECIFIED TYPE: Primary | ICD-10-CM

## 2019-07-08 LAB
ANION GAP SERPL CALCULATED.3IONS-SCNC: 8 MMOL/L (ref 4–16)
BASOPHILS ABSOLUTE: 0.1 K/CU MM
BASOPHILS RELATIVE PERCENT: 0.7 % (ref 0–1)
BUN BLDV-MCNC: 12 MG/DL (ref 6–23)
CALCIUM SERPL-MCNC: 9.7 MG/DL (ref 8.3–10.6)
CHLORIDE BLD-SCNC: 90 MMOL/L (ref 99–110)
CO2: 30 MMOL/L (ref 21–32)
CREAT SERPL-MCNC: 0.8 MG/DL (ref 0.6–1.1)
DIFFERENTIAL TYPE: ABNORMAL
EOSINOPHILS ABSOLUTE: 0 K/CU MM
EOSINOPHILS RELATIVE PERCENT: 0.3 % (ref 0–3)
GFR AFRICAN AMERICAN: >60 ML/MIN/1.73M2
GFR NON-AFRICAN AMERICAN: >60 ML/MIN/1.73M2
GLUCOSE BLD-MCNC: 100 MG/DL (ref 70–99)
HCT VFR BLD CALC: 40.4 % (ref 37–47)
HEMOCCULT SP1 STL QL: NORMAL
HEMOGLOBIN: 13.2 GM/DL (ref 12.5–16)
IMMATURE NEUTROPHIL %: 0.5 % (ref 0–0.43)
LYMPHOCYTES ABSOLUTE: 1.8 K/CU MM
LYMPHOCYTES RELATIVE PERCENT: 18.5 % (ref 24–44)
MCH RBC QN AUTO: 30.1 PG (ref 27–31)
MCHC RBC AUTO-ENTMCNC: 32.7 % (ref 32–36)
MCV RBC AUTO: 92 FL (ref 78–100)
MONOCYTES ABSOLUTE: 1.5 K/CU MM
MONOCYTES RELATIVE PERCENT: 14.7 % (ref 0–4)
PDW BLD-RTO: 12.7 % (ref 11.7–14.9)
PLATELET # BLD: 249 K/CU MM (ref 140–440)
PMV BLD AUTO: 9.4 FL (ref 7.5–11.1)
POTASSIUM SERPL-SCNC: 4.6 MMOL/L (ref 3.5–5.1)
RBC # BLD: 4.39 M/CU MM (ref 4.2–5.4)
SEGMENTED NEUTROPHILS ABSOLUTE COUNT: 6.5 K/CU MM
SEGMENTED NEUTROPHILS RELATIVE PERCENT: 65.3 % (ref 36–66)
SODIUM BLD-SCNC: 128 MMOL/L (ref 135–145)
TOTAL IMMATURE NEUTOROPHIL: 0.05 K/CU MM
WBC # BLD: 9.9 K/CU MM (ref 4–10.5)

## 2019-07-08 PROCEDURE — 2580000003 HC RX 258: Performed by: EMERGENCY MEDICINE

## 2019-07-08 PROCEDURE — 87324 CLOSTRIDIUM AG IA: CPT

## 2019-07-08 PROCEDURE — 80048 BASIC METABOLIC PNL TOTAL CA: CPT

## 2019-07-08 PROCEDURE — 85025 COMPLETE CBC W/AUTO DIFF WBC: CPT

## 2019-07-08 PROCEDURE — 87077 CULTURE AEROBIC IDENTIFY: CPT

## 2019-07-08 PROCEDURE — 87046 STOOL CULTR AEROBIC BACT EA: CPT

## 2019-07-08 PROCEDURE — G0328 FECAL BLOOD SCRN IMMUNOASSAY: HCPCS

## 2019-07-08 PROCEDURE — 99284 EMERGENCY DEPT VISIT MOD MDM: CPT

## 2019-07-08 PROCEDURE — 87045 FECES CULTURE AEROBIC BACT: CPT

## 2019-07-08 RX ORDER — 0.9 % SODIUM CHLORIDE 0.9 %
500 INTRAVENOUS SOLUTION INTRAVENOUS ONCE
Status: COMPLETED | OUTPATIENT
Start: 2019-07-08 | End: 2019-07-08

## 2019-07-08 RX ORDER — ANTACID TABLETS 500 MG/1
2 TABLET, CHEWABLE ORAL 2 TIMES DAILY
COMMUNITY
End: 2019-07-30

## 2019-07-08 RX ADMIN — SODIUM CHLORIDE 500 ML: 9 INJECTION, SOLUTION INTRAVENOUS at 11:40

## 2019-07-08 ASSESSMENT — PAIN DESCRIPTION - LOCATION: LOCATION: ABDOMEN

## 2019-07-08 ASSESSMENT — ENCOUNTER SYMPTOMS
RESPIRATORY NEGATIVE: 1
NAUSEA: 1
DIARRHEA: 1
EYES NEGATIVE: 1

## 2019-07-08 ASSESSMENT — PAIN DESCRIPTION - PAIN TYPE: TYPE: ACUTE PAIN

## 2019-07-08 ASSESSMENT — PAIN DESCRIPTION - FREQUENCY: FREQUENCY: CONTINUOUS

## 2019-07-08 ASSESSMENT — PAIN SCALES - GENERAL: PAINLEVEL_OUTOF10: 8

## 2019-07-08 ASSESSMENT — PAIN DESCRIPTION - ORIENTATION: ORIENTATION: UPPER;LOWER

## 2019-07-08 NOTE — ED NOTES
Discharge instructions reviewed; patient verbalized understanding; patient transferred from ED via private vehicle by family.       Yinka Gold RN  07/08/19 [id number]

## 2019-07-08 NOTE — ED PROVIDER NOTES
mouth nightly. 30 tablet     Cholecalciferol (VITAMIN D PO) Take 5,000 Units by mouth three times daily       Coenzyme Q10 (CO Q 10 PO) Take 200 mg by mouth 2 times daily        No Known Allergies      ROS:    Review of Systems   Constitutional: Positive for fatigue. Negative for chills, diaphoresis and fever. HENT: Negative. Eyes: Negative. Respiratory: Negative. Gastrointestinal: Positive for diarrhea and nausea. Genitourinary: Negative. All other systems reviewed and are negative. Nursing Notes Reviewed    Physical Exam:  ED Triage Vitals   Enc Vitals Group      BP       Pulse       Resp       Temp       Temp src       SpO2       Weight       Height       Head Circumference       Peak Flow       Pain Score       Pain Loc       Pain Edu? Excl. in [address]? Physical Exam   Constitutional: She is oriented to person, place, and time. Vital signs are normal. She appears well-developed and well-nourished. She is active. HENT:   Head: Normocephalic and atraumatic. Right Ear: External ear normal.   Left Ear: External ear normal.   Mouth/Throat: Oropharynx is clear and moist.   Eyes: Pupils are equal, round, and reactive to light. Conjunctivae and EOM are normal. Right eye exhibits no discharge. Left eye exhibits no discharge. No scleral icterus. Neck: Normal range of motion. Neck supple. No JVD present. No tracheal deviation present. No thyromegaly present. Cardiovascular: Normal rate, regular rhythm, normal heart sounds and intact distal pulses. Exam reveals no gallop and no friction rub. No murmur heard. Pulmonary/Chest: Effort normal and breath sounds normal. No stridor. No respiratory distress. She has no wheezes. She has no rales. She exhibits no tenderness. Abdominal: Soft. She exhibits no distension and no mass. Bowel sounds are increased. There is no tenderness. There is no rebound, no guarding and no CVA tenderness. No hernia. Musculoskeletal: Normal range of motion. She exhibits no edema, tenderness or deformity. Lymphadenopathy:     She has no cervical adenopathy. Neurological: She is alert and oriented to person, place, and time. She has normal strength and normal reflexes. She displays normal reflexes. No cranial nerve deficit or sensory deficit. Skin: Skin is warm and dry. No rash noted. No erythema. No pallor. Psychiatric: She has a normal mood and affect. Her speech is normal and behavior is normal. Judgment and thought content normal.   Nursing note and vitals reviewed.       I have reviewed and interpreted all of the currently available lab results from this visit (ifapplicable):  Results for orders placed or performed during the hospital encounter of 97/00/56   Basic Metabolic Panel w/ Reflex to MG   Result Value Ref Range    Sodium 128 (L) 135 - 145 MMOL/L    Potassium 4.6 3.5 - 5.1 MMOL/L    Chloride 90 (L) 99 - 110 mMol/L    CO2 30 21 - 32 MMOL/L    Anion Gap 8 4 - 16    BUN 12 6 - 23 MG/DL    CREATININE 0.8 0.6 - 1.1 MG/DL    Glucose 100 (H) 70 - 99 MG/DL    Calcium 9.7 8.3 - 10.6 MG/DL    GFR Non-African American >60 >60 mL/min/1.73m2    GFR African American >60 >60 mL/min/1.73m2   CBC Auto Differential   Result Value Ref Range    WBC 9.9 4.0 - 10.5 K/CU MM    RBC 4.39 4.2 - 5.4 M/CU MM    Hemoglobin 13.2 12.5 - 16.0 GM/DL    Hematocrit 40.4 37 - 47 %    MCV 92.0 78 - 100 FL    MCH 30.1 27 - 31 PG    MCHC 32.7 32.0 - 36.0 %    RDW 12.7 11.7 - 14.9 %    Platelets 227 335 - 858 K/CU MM    MPV 9.4 7.5 - 11.1 FL    Differential Type AUTOMATED DIFFERENTIAL     Segs Relative 65.3 36 - 66 %    Lymphocytes % 18.5 (L) 24 - 44 %    Monocytes % 14.7 (H) 0 - 4 %    Eosinophils % 0.3 0 - 3 %    Basophils % 0.7 0 - 1 %    Segs Absolute 6.5 K/CU MM    Lymphocytes # 1.8 K/CU MM    Monocytes # 1.5 K/CU MM    Eosinophils # 0.0 K/CU MM    Basophils # 0.1 K/CU MM    Immature Neutrophil % 0.5 (H) 0 - 0.43 %    Total Immature Neutrophil 0.05 K/CU MM   Blood occult stool screen

## 2019-07-09 ENCOUNTER — CARE COORDINATION (OUTPATIENT)
Dept: CARE COORDINATION | Age: 84
End: 2019-07-09

## 2019-07-09 LAB
CLOSTRIDIUM DIFFICILE, PCR: NORMAL
CLOSTRIDIUM DIFFICILE, PCR: NORMAL

## 2019-07-10 ENCOUNTER — TELEPHONE (OUTPATIENT)
Dept: INTERNAL MEDICINE CLINIC | Age: 84
End: 2019-07-10

## 2019-07-11 ENCOUNTER — OFFICE VISIT (OUTPATIENT)
Dept: INTERNAL MEDICINE CLINIC | Age: 84
End: 2019-07-11
Payer: MEDICARE

## 2019-07-11 VITALS
SYSTOLIC BLOOD PRESSURE: 132 MMHG | WEIGHT: 233 LBS | OXYGEN SATURATION: 98 % | HEIGHT: 64 IN | DIASTOLIC BLOOD PRESSURE: 78 MMHG | HEART RATE: 62 BPM | BODY MASS INDEX: 39.78 KG/M2

## 2019-07-11 DIAGNOSIS — R19.7 DIARRHEA, UNSPECIFIED TYPE: Primary | ICD-10-CM

## 2019-07-11 DIAGNOSIS — E11.9 TYPE 2 DIABETES MELLITUS WITHOUT COMPLICATION, WITHOUT LONG-TERM CURRENT USE OF INSULIN (HCC): ICD-10-CM

## 2019-07-11 PROCEDURE — 1036F TOBACCO NON-USER: CPT | Performed by: INTERNAL MEDICINE

## 2019-07-11 PROCEDURE — G8399 PT W/DXA RESULTS DOCUMENT: HCPCS | Performed by: INTERNAL MEDICINE

## 2019-07-11 PROCEDURE — 1123F ACP DISCUSS/DSCN MKR DOCD: CPT | Performed by: INTERNAL MEDICINE

## 2019-07-11 PROCEDURE — G8427 DOCREV CUR MEDS BY ELIG CLIN: HCPCS | Performed by: INTERNAL MEDICINE

## 2019-07-11 PROCEDURE — 1090F PRES/ABSN URINE INCON ASSESS: CPT | Performed by: INTERNAL MEDICINE

## 2019-07-11 PROCEDURE — G8599 NO ASA/ANTIPLAT THER USE RNG: HCPCS | Performed by: INTERNAL MEDICINE

## 2019-07-11 PROCEDURE — 4040F PNEUMOC VAC/ADMIN/RCVD: CPT | Performed by: INTERNAL MEDICINE

## 2019-07-11 PROCEDURE — G8417 CALC BMI ABV UP PARAM F/U: HCPCS | Performed by: INTERNAL MEDICINE

## 2019-07-11 PROCEDURE — 99213 OFFICE O/P EST LOW 20 MIN: CPT | Performed by: INTERNAL MEDICINE

## 2019-07-11 RX ORDER — CHOLESTYRAMINE 4 G/9G
1 POWDER, FOR SUSPENSION ORAL 2 TIMES DAILY
Qty: 60 PACKET | Refills: 0 | Status: SHIPPED | OUTPATIENT
Start: 2019-07-11 | End: 2019-08-09 | Stop reason: SDUPTHER

## 2019-07-11 NOTE — PROGRESS NOTES
Jose Andrade  Patient's  is 1935  Seen in office on 2019      SUBJECTIVE:  Pinky Melecio michel 81 y. o.year old female presents today   Chief Complaint   Patient presents with    [facility] she had diarrhea again and felt very weak. Went to ER>   Diarrhea : loose yellow watery. CBC normal     Stool culture is normal  C.diff neg   Hemoccult is neg. No fever or chills  No NVD  Patient is metformin for DM. Pt had refused colonoscopy in the past. She has seen Dr Robert Yang who told her to get colonoscopy but she has not made up her mind maria teresa. Taking medications regularly. No side effects noted. Review of Systems    OBJECTIVE: /78   Pulse 62   Ht 5' 4\" (1.626 m)   Wt 233 lb (105.7 kg)   LMP  (LMP Unknown)   SpO2 98%   BMI 39.99 kg/m²     Wt Readings from Last 3 Encounters:   19 233 lb (105.7 kg)   19 220 lb (99.8 kg)   19 232 lb 6.4 oz (105.4 kg)      GENERAL: - Alert, oriented, pleasant, in no apparent distress. HEENT: - Conjunctiva pink, no scleral icterus. ENT clear. NECK: -Supple. No jugular venous distention noted. No masses felt,  CARDIOVASCULAR: - Normal S1 and S2    PULMONARY: - No respiratory distress. No wheezes or rales. ABDOMEN: - Soft and non-tender,no masses  ororganomegaly. EXTREMITIES: - No cyanosis, clubbing, or significant edema. SKIN: Skin is warm and dry. NEUROLOGICAL: - Cranial nerves II through XII are grossly intact. IMPRESSION:    Encounter Diagnoses   Name Primary?  Diarrhea, unspecified type Yes    Type 2 diabetes mellitus without complication, without long-term current use of insulin ([facility])        ASSESSMENT/PLAN:    Diarrhea : take Rexine Balling  If persist may need to d/c metformin  Needs GI w/u also which pt declined. Keep f/u appt. Mediations reviewed with the patient. Continue current medications. Appropriate prescriptions are addressed. After visit summeryprovided.   Follow up as directed

## 2019-07-12 LAB
CULTURE: NORMAL
Lab: NORMAL
SPECIMEN: NORMAL

## 2019-07-24 ENCOUNTER — TELEPHONE (OUTPATIENT)
Dept: INTERNAL MEDICINE CLINIC | Age: 84
End: 2019-07-24

## 2019-07-25 ENCOUNTER — TELEPHONE (OUTPATIENT)
Dept: INTERNAL MEDICINE CLINIC | Age: 84
End: 2019-07-25

## 2019-07-30 ENCOUNTER — OFFICE VISIT (OUTPATIENT)
Dept: INTERNAL MEDICINE CLINIC | Age: 84
End: 2019-07-30
Payer: MEDICARE

## 2019-07-30 VITALS
SYSTOLIC BLOOD PRESSURE: 124 MMHG | OXYGEN SATURATION: 96 % | TEMPERATURE: 97.8 F | HEART RATE: 92 BPM | RESPIRATION RATE: 20 BRPM | BODY MASS INDEX: 38.72 KG/M2 | DIASTOLIC BLOOD PRESSURE: 64 MMHG | WEIGHT: 225.6 LBS

## 2019-07-30 DIAGNOSIS — R29.818 SUSPECTED SLEEP APNEA: ICD-10-CM

## 2019-07-30 DIAGNOSIS — I10 ESSENTIAL HYPERTENSION: ICD-10-CM

## 2019-07-30 DIAGNOSIS — K92.2 GASTROINTESTINAL HEMORRHAGE, UNSPECIFIED GASTROINTESTINAL HEMORRHAGE TYPE: ICD-10-CM

## 2019-07-30 DIAGNOSIS — M19.90 ARTHRITIS: ICD-10-CM

## 2019-07-30 DIAGNOSIS — I50.32 CHRONIC DIASTOLIC CONGESTIVE HEART FAILURE (HCC): ICD-10-CM

## 2019-07-30 DIAGNOSIS — E78.2 MIXED HYPERLIPIDEMIA: ICD-10-CM

## 2019-07-30 DIAGNOSIS — G25.81 RESTLESS LEG SYNDROME: ICD-10-CM

## 2019-07-30 DIAGNOSIS — E11.9 TYPE 2 DIABETES MELLITUS WITHOUT COMPLICATION, WITHOUT LONG-TERM CURRENT USE OF INSULIN (HCC): Primary | ICD-10-CM

## 2019-07-30 DIAGNOSIS — K59.1 FUNCTIONAL DIARRHEA: ICD-10-CM

## 2019-07-30 DIAGNOSIS — I25.10 CORONARY ARTERY DISEASE INVOLVING NATIVE CORONARY ARTERY OF NATIVE HEART WITHOUT ANGINA PECTORIS: ICD-10-CM

## 2019-07-30 LAB — HBA1C MFR BLD: 6 %

## 2019-07-30 PROCEDURE — 4040F PNEUMOC VAC/ADMIN/RCVD: CPT | Performed by: INTERNAL MEDICINE

## 2019-07-30 PROCEDURE — 1090F PRES/ABSN URINE INCON ASSESS: CPT | Performed by: INTERNAL MEDICINE

## 2019-07-30 PROCEDURE — G8399 PT W/DXA RESULTS DOCUMENT: HCPCS | Performed by: INTERNAL MEDICINE

## 2019-07-30 PROCEDURE — 36415 COLL VENOUS BLD VENIPUNCTURE: CPT | Performed by: INTERNAL MEDICINE

## 2019-07-30 PROCEDURE — 1036F TOBACCO NON-USER: CPT | Performed by: INTERNAL MEDICINE

## 2019-07-30 PROCEDURE — G8427 DOCREV CUR MEDS BY ELIG CLIN: HCPCS | Performed by: INTERNAL MEDICINE

## 2019-07-30 PROCEDURE — 99213 OFFICE O/P EST LOW 20 MIN: CPT | Performed by: INTERNAL MEDICINE

## 2019-07-30 PROCEDURE — 83036 HEMOGLOBIN GLYCOSYLATED A1C: CPT | Performed by: INTERNAL MEDICINE

## 2019-07-30 PROCEDURE — G8417 CALC BMI ABV UP PARAM F/U: HCPCS | Performed by: INTERNAL MEDICINE

## 2019-07-30 PROCEDURE — G8599 NO ASA/ANTIPLAT THER USE RNG: HCPCS | Performed by: INTERNAL MEDICINE

## 2019-07-30 PROCEDURE — 1123F ACP DISCUSS/DSCN MKR DOCD: CPT | Performed by: INTERNAL MEDICINE

## 2019-07-30 RX ORDER — GLIPIZIDE 5 MG/1
5 TABLET, FILM COATED, EXTENDED RELEASE ORAL
Qty: 60 TABLET | Refills: 3 | Status: SHIPPED | OUTPATIENT
Start: 2019-07-30 | End: 2019-11-06 | Stop reason: DRUGHIGH

## 2019-07-30 RX ORDER — ROPINIROLE 0.5 MG/1
0.5 TABLET, FILM COATED ORAL 3 TIMES DAILY
Qty: 90 TABLET | Refills: 3 | Status: SHIPPED | OUTPATIENT
Start: 2019-07-30 | End: 2019-10-17 | Stop reason: SDUPTHER

## 2019-07-31 LAB
ANION GAP SERPL CALCULATED.3IONS-SCNC: 13 MMOL/L (ref 3–16)
BUN BLDV-MCNC: 10 MG/DL (ref 7–20)
CALCIUM SERPL-MCNC: 10.5 MG/DL (ref 8.3–10.6)
CHLORIDE BLD-SCNC: 86 MMOL/L (ref 99–110)
CO2: 28 MMOL/L (ref 21–32)
CREAT SERPL-MCNC: 0.7 MG/DL (ref 0.6–1.2)
GFR AFRICAN AMERICAN: >60
GFR NON-AFRICAN AMERICAN: >60
GLUCOSE BLD-MCNC: 102 MG/DL (ref 70–99)
POTASSIUM SERPL-SCNC: 5 MMOL/L (ref 3.5–5.1)
SODIUM BLD-SCNC: 127 MMOL/L (ref 136–145)

## 2019-08-05 ASSESSMENT — ENCOUNTER SYMPTOMS
ABDOMINAL DISTENTION: 0
RESPIRATORY NEGATIVE: 1
EYES NEGATIVE: 1
ABDOMINAL PAIN: 0
ALLERGIC/IMMUNOLOGIC NEGATIVE: 1
ANAL BLEEDING: 0

## 2019-08-09 RX ORDER — CHOLESTYRAMINE LIGHT 4 G/5.7G
POWDER, FOR SUSPENSION ORAL
Qty: 60 PACKET | Refills: 3 | Status: SHIPPED | OUTPATIENT
Start: 2019-08-09 | End: 2020-01-13

## 2019-08-12 DIAGNOSIS — E78.2 MIXED HYPERLIPIDEMIA: ICD-10-CM

## 2019-08-12 RX ORDER — ROSUVASTATIN CALCIUM 10 MG/1
10 TABLET, COATED ORAL NIGHTLY
Qty: 90 TABLET | Refills: 1 | Status: SHIPPED | OUTPATIENT
Start: 2019-08-12 | End: 2020-02-03 | Stop reason: SDUPTHER

## 2019-08-20 ENCOUNTER — APPOINTMENT (OUTPATIENT)
Dept: GENERAL RADIOLOGY | Age: 84
End: 2019-08-20
Payer: MEDICARE

## 2019-08-20 ENCOUNTER — HOSPITAL ENCOUNTER (EMERGENCY)
Age: 84
Discharge: HOME | End: 2019-08-20
Attending: FAMILY MEDICINE
Payer: MEDICARE

## 2019-08-20 ENCOUNTER — APPOINTMENT (OUTPATIENT)
Dept: CT IMAGING | Age: 84
End: 2019-08-20
Payer: MEDICARE

## 2019-08-20 VITALS
WEIGHT: 220 LBS | OXYGEN SATURATION: 95 % | HEART RATE: 68 BPM | SYSTOLIC BLOOD PRESSURE: 148 MMHG | DIASTOLIC BLOOD PRESSURE: 73 MMHG | HEIGHT: 64 IN | BODY MASS INDEX: 37.56 KG/M2 | TEMPERATURE: 97.8 F | RESPIRATION RATE: 15 BRPM

## 2019-08-20 DIAGNOSIS — K44.9 HIATAL HERNIA WITH GASTROESOPHAGEAL REFLUX: ICD-10-CM

## 2019-08-20 DIAGNOSIS — R10.13 EPIGASTRIC PAIN: Primary | ICD-10-CM

## 2019-08-20 DIAGNOSIS — K21.9 HIATAL HERNIA WITH GASTROESOPHAGEAL REFLUX: ICD-10-CM

## 2019-08-20 LAB
ALBUMIN SERPL-MCNC: 4 GM/DL (ref 3.4–5)
ALP BLD-CCNC: 45 IU/L (ref 40–129)
ALT SERPL-CCNC: 6 U/L (ref 10–40)
ANION GAP SERPL CALCULATED.3IONS-SCNC: 10 MMOL/L (ref 4–16)
AST SERPL-CCNC: 17 IU/L (ref 15–37)
BACTERIA: ABNORMAL /HPF
BASOPHILS ABSOLUTE: 0.1 K/CU MM
BASOPHILS RELATIVE PERCENT: 0.7 % (ref 0–1)
BILIRUB SERPL-MCNC: 0.4 MG/DL (ref 0–1)
BILIRUBIN URINE: NEGATIVE MG/DL
BLOOD, URINE: NEGATIVE
BUN BLDV-MCNC: 9 MG/DL (ref 6–23)
CALCIUM SERPL-MCNC: 10.1 MG/DL (ref 8.3–10.6)
CAST TYPE: ABNORMAL /HPF
CHLORIDE BLD-SCNC: 93 MMOL/L (ref 99–110)
CLARITY: CLEAR
CO2: 29 MMOL/L (ref 21–32)
COLOR: YELLOW
CREAT SERPL-MCNC: 0.8 MG/DL (ref 0.6–1.1)
CRYSTAL TYPE: ABNORMAL /HPF
DIFFERENTIAL TYPE: ABNORMAL
EOSINOPHILS ABSOLUTE: 0.1 K/CU MM
EOSINOPHILS RELATIVE PERCENT: 1 % (ref 0–3)
EPITHELIAL CELLS, UA: ABNORMAL /HPF
GFR AFRICAN AMERICAN: >60 ML/MIN/1.73M2
GFR NON-AFRICAN AMERICAN: >60 ML/MIN/1.73M2
GLUCOSE BLD-MCNC: 102 MG/DL (ref 70–99)
GLUCOSE BLD-MCNC: 110 MG/DL (ref 70–99)
GLUCOSE, URINE: NEGATIVE MG/DL
HCT VFR BLD CALC: 38.6 % (ref 37–47)
HEMOGLOBIN: 12.7 GM/DL (ref 12.5–16)
IMMATURE NEUTROPHIL %: 0.4 % (ref 0–0.43)
KETONES, URINE: NEGATIVE MG/DL
LEUKOCYTE ESTERASE, URINE: NEGATIVE
LIPASE: 18 IU/L (ref 13–60)
LYMPHOCYTES ABSOLUTE: 1.7 K/CU MM
LYMPHOCYTES RELATIVE PERCENT: 23.7 % (ref 24–44)
MCH RBC QN AUTO: 30.3 PG (ref 27–31)
MCHC RBC AUTO-ENTMCNC: 32.9 % (ref 32–36)
MCV RBC AUTO: 92.1 FL (ref 78–100)
MONOCYTES ABSOLUTE: 1.1 K/CU MM
MONOCYTES RELATIVE PERCENT: 15.6 % (ref 0–4)
MUCUS: NEGATIVE HPF
NITRITE URINE, QUANTITATIVE: NEGATIVE
PDW BLD-RTO: 13.2 % (ref 11.7–14.9)
PH, URINE: 5.5 (ref 5–8)
PLATELET # BLD: 247 K/CU MM (ref 140–440)
PMV BLD AUTO: 9.2 FL (ref 7.5–11.1)
POTASSIUM SERPL-SCNC: 4.5 MMOL/L (ref 3.5–5.1)
PROTEIN UA: NEGATIVE MG/DL
RBC # BLD: 4.19 M/CU MM (ref 4.2–5.4)
RBC URINE: ABNORMAL /HPF (ref 0–6)
SEGMENTED NEUTROPHILS ABSOLUTE COUNT: 4.3 K/CU MM
SEGMENTED NEUTROPHILS RELATIVE PERCENT: 58.6 % (ref 36–66)
SODIUM BLD-SCNC: 132 MMOL/L (ref 135–145)
SPECIFIC GRAVITY UA: 1.01 (ref 1–1.03)
TOTAL IMMATURE NEUTOROPHIL: 0.03 K/CU MM
TOTAL PROTEIN: 6.8 GM/DL (ref 6.4–8.2)
TROPONIN T: <0.01 NG/ML
UROBILINOGEN, URINE: 0.2 MG/DL (ref 0.2–1)
VOLUME, (UVOL): 12 ML (ref 10–12)
WBC # BLD: 7.3 K/CU MM (ref 4–10.5)
WBC UA: ABNORMAL /HPF (ref 0–5)

## 2019-08-20 PROCEDURE — 6360000004 HC RX CONTRAST MEDICATION: Performed by: FAMILY MEDICINE

## 2019-08-20 PROCEDURE — 93005 ELECTROCARDIOGRAM TRACING: CPT | Performed by: FAMILY MEDICINE

## 2019-08-20 PROCEDURE — 85025 COMPLETE CBC W/AUTO DIFF WBC: CPT

## 2019-08-20 PROCEDURE — 83690 ASSAY OF LIPASE: CPT

## 2019-08-20 PROCEDURE — 6370000000 HC RX 637 (ALT 250 FOR IP): Performed by: FAMILY MEDICINE

## 2019-08-20 PROCEDURE — 99285 EMERGENCY DEPT VISIT HI MDM: CPT

## 2019-08-20 PROCEDURE — 81001 URINALYSIS AUTO W/SCOPE: CPT

## 2019-08-20 PROCEDURE — 96375 TX/PRO/DX INJ NEW DRUG ADDON: CPT

## 2019-08-20 PROCEDURE — 82962 GLUCOSE BLOOD TEST: CPT

## 2019-08-20 PROCEDURE — 84484 ASSAY OF TROPONIN QUANT: CPT

## 2019-08-20 PROCEDURE — 6360000002 HC RX W HCPCS: Performed by: FAMILY MEDICINE

## 2019-08-20 PROCEDURE — 2580000003 HC RX 258: Performed by: FAMILY MEDICINE

## 2019-08-20 PROCEDURE — 80053 COMPREHEN METABOLIC PANEL: CPT

## 2019-08-20 PROCEDURE — 96374 THER/PROPH/DIAG INJ IV PUSH: CPT

## 2019-08-20 PROCEDURE — 71045 X-RAY EXAM CHEST 1 VIEW: CPT

## 2019-08-20 PROCEDURE — 74177 CT ABD & PELVIS W/CONTRAST: CPT

## 2019-08-20 RX ORDER — SODIUM CHLORIDE 9 MG/ML
INJECTION, SOLUTION INTRAVENOUS CONTINUOUS
Status: DISCONTINUED | OUTPATIENT
Start: 2019-08-20 | End: 2019-08-21 | Stop reason: HOSPADM

## 2019-08-20 RX ORDER — DIPHENHYDRAMINE HYDROCHLORIDE 50 MG/ML
12.5 INJECTION INTRAMUSCULAR; INTRAVENOUS ONCE
Status: COMPLETED | OUTPATIENT
Start: 2019-08-20 | End: 2019-08-20

## 2019-08-20 RX ORDER — KETOROLAC TROMETHAMINE 30 MG/ML
15 INJECTION, SOLUTION INTRAMUSCULAR; INTRAVENOUS ONCE
Status: COMPLETED | OUTPATIENT
Start: 2019-08-20 | End: 2019-08-20

## 2019-08-20 RX ORDER — METOCLOPRAMIDE HYDROCHLORIDE 5 MG/ML
5 INJECTION INTRAMUSCULAR; INTRAVENOUS ONCE
Status: COMPLETED | OUTPATIENT
Start: 2019-08-20 | End: 2019-08-20

## 2019-08-20 RX ORDER — OMEPRAZOLE 20 MG/1
20 CAPSULE, DELAYED RELEASE ORAL
Qty: 30 CAPSULE | Refills: 0 | Status: SHIPPED | OUTPATIENT
Start: 2019-08-20 | End: 2019-11-06 | Stop reason: ALTCHOICE

## 2019-08-20 RX ORDER — MAGNESIUM HYDROXIDE/ALUMINUM HYDROXICE/SIMETHICONE 120; 1200; 1200 MG/30ML; MG/30ML; MG/30ML
30 SUSPENSION ORAL ONCE
Status: COMPLETED | OUTPATIENT
Start: 2019-08-20 | End: 2019-08-20

## 2019-08-20 RX ADMIN — IOPAMIDOL 75 ML: 755 INJECTION, SOLUTION INTRAVENOUS at 22:28

## 2019-08-20 RX ADMIN — DIPHENHYDRAMINE HYDROCHLORIDE 12.5 MG: 50 INJECTION INTRAMUSCULAR; INTRAVENOUS at 21:16

## 2019-08-20 RX ADMIN — ALUMINUM HYDROXIDE, MAGNESIUM HYDROXIDE, AND SIMETHICONE 30 ML: 200; 200; 20 SUSPENSION ORAL at 21:19

## 2019-08-20 RX ADMIN — KETOROLAC TROMETHAMINE 15 MG: 30 INJECTION, SOLUTION INTRAMUSCULAR at 21:16

## 2019-08-20 RX ADMIN — SODIUM CHLORIDE: 9 INJECTION, SOLUTION INTRAVENOUS at 21:16

## 2019-08-20 RX ADMIN — METOCLOPRAMIDE 5 MG: 5 INJECTION, SOLUTION INTRAMUSCULAR; INTRAVENOUS at 21:16

## 2019-08-20 ASSESSMENT — PAIN DESCRIPTION - LOCATION: LOCATION: ABDOMEN;CHEST;HEAD

## 2019-08-20 ASSESSMENT — PAIN SCALES - GENERAL
PAINLEVEL_OUTOF10: 6
PAINLEVEL_OUTOF10: 6

## 2019-08-20 ASSESSMENT — PAIN DESCRIPTION - PAIN TYPE: TYPE: ACUTE PAIN

## 2019-08-21 ENCOUNTER — CARE COORDINATION (OUTPATIENT)
Dept: CARE COORDINATION | Age: 84
End: 2019-08-21

## 2019-08-21 PROCEDURE — 93010 ELECTROCARDIOGRAM REPORT: CPT | Performed by: INTERNAL MEDICINE

## 2019-08-21 NOTE — ED NOTES
Patient given AVS, discharge instructions and prescriptions. Verbalizes understanding no further needs identified at this time.      Krystal Panda RN  08/20/19 [id number]

## 2019-08-21 NOTE — ED PROVIDER NOTES
Triage Chief Complaint:   Chest Pain (States that she thinks its just indigestion, however she has a heart hx, S&S started yesterday evening ) and Headache (states it is bothering her eyes )    [place]:  Shawn Kelsey is a 81 y.o. female that presents with reflux type symptoms to include midepigastric pain and burning under her sternum as well as several increased episodes of belching. Patient also complains of chronic near daily headaches that she currently has 1. No thunderclap onset, no change in previous character or intensity, not the worst headache of her life. No unilateral vision changes, unilateral weakness, word finding difficulty or aphasia. No treatment tried for any of the above. Symptoms primarily started after a large meal last night.     ROS:  General:  No fevers, no chills, no weakness  Eyes:  No recent vison changes, no discharge  ENT:  No sore throat, no nasal congestion, no hearing changes  Cardiovascular: As above  Respiratory:  No shortness of breath, no cough, no wheezing  Gastrointestinal: As above, chronic diarrhea, midepigastric pain   Musculoskeletal:  No muscle pain, no joint pain  Skin:  No rash, no pruritis, no easy bruising  Neurologic:  No speech problems, mild headache, no extremity numbness, no extremity tingling, no extremity weakness  Psychiatric:  No anxiety, no hallucinations or delusions, no suicidal or homicidal ideation  Genitourinary:  No dysuria, no hematuria  Endocrine:  No unexpected weight gain, no unexpected weight loss  Extremities:  no edema, no pain    Past Medical History:   Diagnosis Date    Arthritis     left knee pain, sees Dr. Kelvin Chang CAD (coronary artery disease)     sees Dr. Annmarie Chauhan Chronic midline low back pain without sciatica 9/28/2016    Had PT in 2016    Claustrophobia     Colonoscopy refused     discussed in 7/15    DM [facility]di[facility] 02/2006    Dupuytren's contracture of right hand     Endometrial stripe increased 9/25/2014 skin daily 30 g 1    furosemide (LASIX) 40 MG tablet Take 1 tablet by mouth as needed (swelling) 30 tablet 3    econazole nitrate 1 % cream Apply 1 g topically 2 times daily Apply topically daily.  blood glucose test strips (TRUETEST TEST) strip 1 each by Other route 2 times daily DX=E11.9 100 each 5    UNABLE TO FIND Please dispense  True metrix lancets  Testing twice daily  DX=E11.9 100 each 2    aspirin 81 MG EC tablet Take 1 tablet by mouth nightly. 30 tablet     Cholecalciferol (VITAMIN D PO) Take 5,000 Units by mouth three times daily       Coenzyme Q10 (CO Q 10 PO) Take 200 mg by mouth 2 times daily        No Known Allergies    Nursing Notes Reviewed    Physical Exam:  ED Triage Vitals   Enc Vitals Group      BP 08/20/19 2036 (!) 147/68      Pulse 08/20/19 2036 63      Resp 08/20/19 2036 20      Temp 08/20/19 2036 97.8 °F (36.6 °C)      Temp Source 08/20/19 2036 Oral      SpO2 08/20/19 2045 95 %      Weight 08/20/19 2036 220 lb (99.8 kg)      Height 08/20/19 2036 5' 4\" (1.626 m)      Head Circumference --       Peak Flow --       Pain Score --       Pain Loc --       Pain Edu? --       Excl. in [address]? --        My pulse ox interpretation is - normal    General appearance:  No acute distress. Patient appears younger than stated age of 81. Vital signs are normal.  Skin:  Warm. Dry. No petechiae or purpura. Eye:  Extraocular movements intact. PERRLA  Ears, nose, mouth and throat:  Oral mucosa moist, no trismus. Tympanic membranes bilaterally normal.  Oropharynx with no exudate or erythema. Neck:  Trachea midline. Supple. No cervical lymphadenopathy  Extremity:  No swelling. Normal ROM. No calf pain or asymmetric swelling. No lower extremity edema  Heart:  Regular rate and rhythm, normal S1 & S2, no extra heart sounds. Perfusion:  Intact, capillary refill less than 2 seconds  Respiratory:  Lungs clear to auscultation bilaterally. Respirations nonlabored.      Abdominal:  Normal bowel sounds. Soft. No peritoneal signs. No hepatosplenomegaly. Mild midepigastric and right upper quadrant tenderness with no guarding and a negative Herzog's  Back:  No CVA tenderness to palpation. No bruising. No CTL tenderness to palpation or step-off  Neurological:  Alert and oriented times 3. No focal neuro deficits. Cranial nerves II through XII are grossly intact. Patient is surprisingly spry with a normal gait taking to account her age. No pronator drift.   Negative Romberg          Psychiatric:  Appropriate    I have reviewed and interpreted all of the currently available lab results from this visit (if applicable):  Results for orders placed or performed during the hospital encounter of 08/20/19   CBC Auto Differential   Result Value Ref Range    WBC 7.3 4.0 - 10.5 K/CU MM    RBC 4.19 (L) 4.2 - 5.4 M/CU MM    Hemoglobin 12.7 12.5 - 16.0 GM/DL    Hematocrit 38.6 37 - 47 %    MCV 92.1 78 - 100 FL    MCH 30.3 27 - 31 PG    MCHC 32.9 32.0 - 36.0 %    RDW 13.2 11.7 - 14.9 %    Platelets 717 315 - 139 K/CU MM    MPV 9.2 7.5 - 11.1 FL    Differential Type AUTOMATED DIFFERENTIAL     Segs Relative 58.6 36 - 66 %    Lymphocytes % 23.7 (L) 24 - 44 %    Monocytes % 15.6 (H) 0 - 4 %    Eosinophils % 1.0 0 - 3 %    Basophils % 0.7 0 - 1 %    Segs Absolute 4.3 K/CU MM    Lymphocytes # 1.7 K/CU MM    Monocytes # 1.1 K/CU MM    Eosinophils # 0.1 K/CU MM    Basophils # 0.1 K/CU MM    Immature Neutrophil % 0.4 0 - 0.43 %    Total Immature Neutrophil 0.03 K/CU MM   Comprehensive Metabolic Panel   Result Value Ref Range    Sodium 132 (L) 135 - 145 MMOL/L    Potassium 4.5 3.5 - 5.1 MMOL/L    Chloride 93 (L) 99 - 110 mMol/L    CO2 29 21 - 32 MMOL/L    BUN 9 6 - 23 MG/DL    CREATININE 0.8 0.6 - 1.1 MG/DL    Glucose 110 (H) 70 - 99 MG/DL    Calcium 10.1 8.3 - 10.6 MG/DL    Alb 4.0 3.4 - 5.0 GM/DL    Total Protein 6.8 6.4 - 8.2 GM/DL    Total Bilirubin 0.4 0.0 - 1.0 MG/DL    ALT 6 (L) 10 - 40 U/L    AST 17 15 - 37 IU/L Alkaline Phosphatase 45 40 - 129 IU/L    GFR Non-African American >60 >60 mL/min/1.73m2    GFR African American >60 >60 mL/min/1.73m2    Anion Gap 10 4 - 16   Lipase   Result Value Ref Range    Lipase 18 13 - 60 IU/L   Troponin   Result Value Ref Range    Troponin T <0.010 <0.01 NG/ML   Urinalysis with microscopic   Result Value Ref Range    Color, UA YELLOW YELLOW    Clarity, UA CLEAR CLEAR    Glucose, Urine NEGATIVE NEGATIVE MG/DL    Bilirubin Urine NEGATIVE NEGATIVE MG/DL    Ketones, Urine NEGATIVE NEGATIVE MG/DL    Specific Gravity, UA 1.010 1.001 - 1.035    Blood, Urine NEGATIVE NEGATIVE    pH, Urine 5.5 5.0 - 8.0    Protein, UA NEGATIVE NEGATIVE MG/DL    Urobilinogen, Urine 0.2 0.2 - 1.0 MG/DL    Nitrite Urine, Quantitative NEGATIVE NEGATIVE    Leukocyte Esterase, Urine NEGATIVE NEGATIVE    Volume, (UVOL) 12 10 - 12 ML    RBC, UA NO CELLS SEEN 0 - 6 /HPF    WBC, UA 1 TO 2 0 - 5 /HPF    Epi Cells 3 TO 5  SQUAMOUS   /HPF    Cast Type NO CAST FORMS SEEN NO CAST FORMS SEEN /HPF    Bacteria, UA OCCASIONAL (A) NEGATIVE /HPF    Crystal Type NONE SEEN NEGATIVE /HPF    Mucus, UA NEGATIVE NEGATIVE HPF   POCT Glucose   Result Value Ref Range    POC Glucose 102 (H) 70 - 99 MG/DL   EKG 12 Lead   Result Value Ref Range    Ventricular Rate 62 BPM    Atrial Rate 62 BPM    P-R Interval 150 ms    QRS Duration 94 ms    Q-T Interval 440 ms    QTc Calculation (Bazett) 446 ms    P Axis 45 degrees    R Axis 39 degrees    T Axis 33 degrees    Diagnosis       Normal sinus rhythm  Normal ECG  When compared with ECG of 27-APR-2019 12:53,  No significant change was found        Radiographs (if obtained):  [] The following radiograph was interpreted by myself in the absence of a radiologist:   [] Radiologist's Report Reviewed:  CT ABDOMEN PELVIS W IV CONTRAST   Preliminary Result   1. No acute findings in the abdomen or pelvis. 2. Unchanged small hiatal hernia.    3. Indeterminate 1 cm exophytic right upper pole renal lesion, unchanged in   size since 2014. Consider yearly follow-up imaging to ensure stability. 4. Coronary artery disease and mitral valvular calcification. 5. Atherosclerosis. XR CHEST PORTABLE   Final Result   No acute findings in the chest.               EKG (if obtained): (All EKG's are interpreted by myself in the absence of a cardiologist) EKG compared to April of this year is negative for dynamic or morphologic change. Normal sinus rhythm. Rate 62. Normal axis. . QRS 94. QTc 446. Normal R wave progression. No flipped T wave. No Q waves. No ST elevation or depression. No Brugada. Normal EKG. Chart review shows recent radiographs:  Ct Abdomen Pelvis W Iv Contrast    Result Date: 8/20/2019  EXAMINATION: CT OF THE ABDOMEN AND PELVIS WITH CONTRAST 8/20/2019 9:58 pm TECHNIQUE: CT of the abdomen and pelvis was performed with the administration of intravenous contrast. Multiplanar reformatted images are provided for review. Dose modulation, iterative reconstruction, and/or weight based adjustment of the mA/kV was utilized to reduce the radiation dose to as low as reasonably achievable. COMPARISON: 09/24/2014 HISTORY: ORDERING SYSTEM PROVIDED HISTORY: same as cxr, additionally diarrhea for three months TECHNOLOGIST PROVIDED HISTORY: IV contrast only. Thank you. Reason for Exam: Epigastric pain, additionally diarrhea for three months, 75 ml isovue 370 Acuity: Acute Type of Exam: Initial Additional signs and symptoms: Epigastric pain, additionally diarrhea for three months, 75 ml isovue 370 FINDINGS: Lower Chest:  Visualized portion of the lower chest demonstrates no acute abnormality. Unchanged right medial lower lobe scarring. There are coronary artery calcifications and dense mitral valvular calcifications. Organs: The liver, spleen, pancreas, kidneys and adrenal glands are without acute findings.   A few hypoattenuating lesions in the liver were present on the comparison study and likely reflect hepatic hemangiomas. The gallbladder is present without radiopaque cholelithiasis. There is an indeterminate 1 cm exophytic right upper pole renal lesion which appears grossly unchanged since 2014. GI/Bowel: Unchanged small hiatal hernia. There is no mechanical bowel obstruction. The appendix is normal in caliber. Pelvis: The uterus is present. No adnexal mass. A 1.7 cm left ovarian cystic lesion is likely benign. The urinary bladder is partially distended without contour abnormality. Peritoneum/Retroperitoneum: No ascites or pneumoperitoneum. Severe calcified atherosclerotic plaque throughout the aorta and its branching vessels. No retroperitoneal or mesenteric lymphadenopathy. Bones/Soft Tissues: No acute or aggressive osseous lesions. 1. No acute findings in the abdomen or pelvis. 2. Unchanged small hiatal hernia. 3. Indeterminate 1 cm exophytic right upper pole renal lesion, unchanged in size since 2014. Consider yearly follow-up imaging to ensure stability. 4. Coronary artery disease and mitral valvular calcification. 5. Atherosclerosis. Xr Chest Portable    Result Date: 8/20/2019  EXAMINATION: ONE XRAY VIEW OF THE CHEST 8/20/2019 8:50 pm COMPARISON: Chest radiograph 04/10/2018 HISTORY: ORDERING SYSTEM PROVIDED HISTORY: epigastric, ruq pain also with referred right shoulder pain and reflux symtpoms TECHNOLOGIST PROVIDED HISTORY: Reason for exam:->epigastric, ruq pain also with referred right shoulder pain and reflux symtpoms Reason for Exam: epigastric, ruq pain also with referred right shoulder pain and reflux symtpoms Acuity: Acute Type of Exam: Initial Additional signs and symptoms: epigastric, ruq pain also with referred right shoulder pain and reflux symtpoms FINDINGS: Clear lungs. No findings of pneumothorax or pleural effusion. Normal mediastinal, hilar, and cardiac contours. Atherosclerotic calcification in the aorta. No obvious acute fracture. Joints maintain anatomic alignment.

## 2019-08-22 ENCOUNTER — TELEPHONE (OUTPATIENT)
Dept: INTERNAL MEDICINE CLINIC | Age: 84
End: 2019-08-22

## 2019-08-22 NOTE — TELEPHONE ENCOUNTER
[facility] Transitions Initial Follow Up Call    Outreach made within 2 business days of discharge: Yes    Patient: Cesar Swain Patient : 1935   MRN: [id number]  Reason for Admission: There are no discharge diagnoses documented for the most recent discharge. Discharge Date: 19       Spoke with: Patient     Discharge department/facility: [facility]     TCM Interactive Patient Contact:  Was patient able to fill all prescriptions: Yes  Was patient instructed to bring all medications to the follow-up visit: Yes  Is patient taking all medications as directed in the discharge summary? Yes  Does patient understand their discharge instructions: Yes  Does patient have questions or concerns that need addressed prior to 7-14 day follow up office visit: no    Scheduled appointment with PCP within 7-14 days- Patient states she spoke with Martha Chawla already and will call back in a few days to make an appointment.      Follow Up  Future Appointments   Date Time Provider Rose Rendon   2019  2:15 PM Cleveland Holden MD [facility] GEN SURG [facility]   10/2/2019  1:00 PM Shravan Plasencia MD [facility]   10/3/2019  1:30 PM Linda Concepcion MD [place], [place]

## 2019-08-23 LAB
EKG ATRIAL RATE: 62 BPM
EKG DIAGNOSIS: NORMAL
EKG P AXIS: 45 DEGREES
EKG P-R INTERVAL: 150 MS
EKG Q-T INTERVAL: 440 MS
EKG QRS DURATION: 94 MS
EKG QTC CALCULATION (BAZETT): 446 MS
EKG R AXIS: 39 DEGREES
EKG T AXIS: 33 DEGREES
EKG VENTRICULAR RATE: 62 BPM

## 2019-08-27 ENCOUNTER — TELEPHONE (OUTPATIENT)
Dept: INTERNAL MEDICINE CLINIC | Age: 84
End: 2019-08-27

## 2019-09-01 ENCOUNTER — APPOINTMENT (OUTPATIENT)
Dept: GENERAL RADIOLOGY | Age: 84
DRG: 641 | End: 2019-09-01
Payer: MEDICARE

## 2019-09-01 ENCOUNTER — HOSPITAL ENCOUNTER (INPATIENT)
Age: 84
LOS: 4 days | Discharge: HOME HEALTH | DRG: 641 | End: 2019-09-05
Attending: EMERGENCY MEDICINE | Admitting: HOSPITALIST
Payer: MEDICARE

## 2019-09-01 DIAGNOSIS — J06.9 UPPER RESPIRATORY TRACT INFECTION, UNSPECIFIED TYPE: ICD-10-CM

## 2019-09-01 DIAGNOSIS — E87.1 HYPONATREMIA: Primary | ICD-10-CM

## 2019-09-01 LAB
ALBUMIN SERPL-MCNC: 4.1 GM/DL (ref 3.4–5)
ALP BLD-CCNC: 53 IU/L (ref 40–129)
ALT SERPL-CCNC: 5 U/L (ref 7–35)
ANION GAP SERPL CALCULATED.3IONS-SCNC: 19 MMOL/L (ref 4–16)
AST SERPL-CCNC: 21 IU/L (ref 15–37)
BACTERIA: NEGATIVE /HPF
BASOPHILS ABSOLUTE: 0 K/CU MM
BASOPHILS RELATIVE PERCENT: 0.3 % (ref 0–1)
BILIRUB SERPL-MCNC: 0.5 MG/DL (ref 0–1)
BILIRUBIN URINE: NEGATIVE MG/DL
BLOOD, URINE: NEGATIVE
BUN BLDV-MCNC: 6 MG/DL (ref 6–23)
BUN BLDV-MCNC: 6 MG/DL (ref 6–23)
CALCIUM SERPL-MCNC: 9.3 MG/DL (ref 8.3–10.6)
CALCIUM SERPL-MCNC: 9.5 MG/DL (ref 8.3–10.6)
CAST TYPE: ABNORMAL /HPF
CHLORIDE BLD-SCNC: 80 MMOL/L (ref 99–110)
CHLORIDE BLD-SCNC: 82 MMOL/L (ref 99–110)
CLARITY: CLEAR
CO2: 19 MMOL/L (ref 21–32)
CO2: 25 MMOL/L (ref 21–32)
COLOR: YELLOW
CREAT SERPL-MCNC: 0.5 MG/DL (ref 0.6–1.1)
CREAT SERPL-MCNC: 0.5 MG/DL (ref 0.6–1.1)
CRYSTAL TYPE: ABNORMAL /HPF
DIFFERENTIAL TYPE: ABNORMAL
EOSINOPHILS ABSOLUTE: 0 K/CU MM
EOSINOPHILS RELATIVE PERCENT: 0 % (ref 0–3)
EPITHELIAL CELLS, UA: ABNORMAL /HPF
GFR AFRICAN AMERICAN: >60 ML/MIN/1.73M2
GFR AFRICAN AMERICAN: >60 ML/MIN/1.73M2
GFR NON-AFRICAN AMERICAN: >60 ML/MIN/1.73M2
GFR NON-AFRICAN AMERICAN: >60 ML/MIN/1.73M2
GLUCOSE BLD-MCNC: 103 MG/DL (ref 70–99)
GLUCOSE BLD-MCNC: 107 MG/DL (ref 70–99)
GLUCOSE BLD-MCNC: 150 MG/DL (ref 70–99)
GLUCOSE BLD-MCNC: 97 MG/DL (ref 70–99)
GLUCOSE, URINE: NEGATIVE MG/DL
HCT VFR BLD CALC: 39.3 % (ref 37–47)
HEMOGLOBIN: 13.3 GM/DL (ref 12.5–16)
IMMATURE NEUTROPHIL %: 0.5 % (ref 0–0.43)
KETONES, URINE: ABNORMAL MG/DL
LEUKOCYTE ESTERASE, URINE: NEGATIVE
LYMPHOCYTES ABSOLUTE: 1 K/CU MM
LYMPHOCYTES RELATIVE PERCENT: 15.1 % (ref 24–44)
MCH RBC QN AUTO: 30 PG (ref 27–31)
MCHC RBC AUTO-ENTMCNC: 33.8 % (ref 32–36)
MCV RBC AUTO: 88.7 FL (ref 78–100)
MONOCYTES ABSOLUTE: 1.1 K/CU MM
MONOCYTES RELATIVE PERCENT: 16.7 % (ref 0–4)
MUCUS: NEGATIVE HPF
NITRITE URINE, QUANTITATIVE: NEGATIVE
PDW BLD-RTO: 12.9 % (ref 11.7–14.9)
PH, URINE: 5.5 (ref 5–8)
PLATELET # BLD: 205 K/CU MM (ref 140–440)
PMV BLD AUTO: 9.4 FL (ref 7.5–11.1)
POTASSIUM SERPL-SCNC: 4 MMOL/L (ref 3.5–5.1)
POTASSIUM SERPL-SCNC: 4.1 MMOL/L (ref 3.5–5.1)
PRO-BNP: 834.2 PG/ML
PROTEIN UA: NEGATIVE MG/DL
RBC # BLD: 4.43 M/CU MM (ref 4.2–5.4)
RBC URINE: ABNORMAL /HPF (ref 0–6)
SEGMENTED NEUTROPHILS ABSOLUTE COUNT: 4.4 K/CU MM
SEGMENTED NEUTROPHILS RELATIVE PERCENT: 67.4 % (ref 36–66)
SODIUM BLD-SCNC: 120 MMOL/L (ref 135–145)
SODIUM BLD-SCNC: ABNORMAL MMOL/L (ref 135–145)
SPECIFIC GRAVITY UA: 1 (ref 1–1.03)
TOTAL IMMATURE NEUTOROPHIL: 0.03 K/CU MM
TOTAL PROTEIN: 7 GM/DL (ref 6.4–8.2)
TROPONIN T: <0.01 NG/ML
UROBILINOGEN, URINE: 0.2 MG/DL (ref 0.2–1)
VOLUME, (UVOL): 12 ML (ref 10–12)
WBC # BLD: 6.5 K/CU MM (ref 4–10.5)
WBC UA: ABNORMAL /HPF (ref 0–5)

## 2019-09-01 PROCEDURE — 83930 ASSAY OF BLOOD OSMOLALITY: CPT

## 2019-09-01 PROCEDURE — 94640 AIRWAY INHALATION TREATMENT: CPT

## 2019-09-01 PROCEDURE — 80053 COMPREHEN METABOLIC PANEL: CPT

## 2019-09-01 PROCEDURE — 6370000000 HC RX 637 (ALT 250 FOR IP): Performed by: HOSPITALIST

## 2019-09-01 PROCEDURE — 84484 ASSAY OF TROPONIN QUANT: CPT

## 2019-09-01 PROCEDURE — 82962 GLUCOSE BLOOD TEST: CPT

## 2019-09-01 PROCEDURE — 80048 BASIC METABOLIC PNL TOTAL CA: CPT

## 2019-09-01 PROCEDURE — 99284 EMERGENCY DEPT VISIT MOD MDM: CPT

## 2019-09-01 PROCEDURE — 6370000000 HC RX 637 (ALT 250 FOR IP): Performed by: NURSE PRACTITIONER

## 2019-09-01 PROCEDURE — 2140000000 HC CCU INTERMEDIATE R&B

## 2019-09-01 PROCEDURE — 83880 ASSAY OF NATRIURETIC PEPTIDE: CPT

## 2019-09-01 PROCEDURE — 71045 X-RAY EXAM CHEST 1 VIEW: CPT

## 2019-09-01 PROCEDURE — 2580000003 HC RX 258: Performed by: EMERGENCY MEDICINE

## 2019-09-01 PROCEDURE — 6370000000 HC RX 637 (ALT 250 FOR IP): Performed by: EMERGENCY MEDICINE

## 2019-09-01 PROCEDURE — 36415 COLL VENOUS BLD VENIPUNCTURE: CPT

## 2019-09-01 PROCEDURE — 83935 ASSAY OF URINE OSMOLALITY: CPT

## 2019-09-01 PROCEDURE — 81001 URINALYSIS AUTO W/SCOPE: CPT

## 2019-09-01 PROCEDURE — 6360000002 HC RX W HCPCS: Performed by: HOSPITALIST

## 2019-09-01 PROCEDURE — 85025 COMPLETE CBC W/AUTO DIFF WBC: CPT

## 2019-09-01 PROCEDURE — 84300 ASSAY OF URINE SODIUM: CPT

## 2019-09-01 RX ORDER — ACETAMINOPHEN 325 MG/1
650 TABLET ORAL EVERY 4 HOURS PRN
Status: DISCONTINUED | OUTPATIENT
Start: 2019-09-01 | End: 2019-09-05 | Stop reason: HOSPADM

## 2019-09-01 RX ORDER — METOPROLOL TARTRATE 50 MG/1
50 TABLET, FILM COATED ORAL 2 TIMES DAILY
Status: DISCONTINUED | OUTPATIENT
Start: 2019-09-01 | End: 2019-09-05 | Stop reason: HOSPADM

## 2019-09-01 RX ORDER — PANTOPRAZOLE SODIUM 40 MG/1
40 TABLET, DELAYED RELEASE ORAL
Status: DISCONTINUED | OUTPATIENT
Start: 2019-09-02 | End: 2019-09-05 | Stop reason: HOSPADM

## 2019-09-01 RX ORDER — GLIPIZIDE 5 MG/1
5 TABLET ORAL
Status: DISCONTINUED | OUTPATIENT
Start: 2019-09-02 | End: 2019-09-05 | Stop reason: HOSPADM

## 2019-09-01 RX ORDER — SODIUM CHLORIDE 0.9 % (FLUSH) 0.9 %
10 SYRINGE (ML) INJECTION EVERY 12 HOURS SCHEDULED
Status: DISCONTINUED | OUTPATIENT
Start: 2019-09-01 | End: 2019-09-05 | Stop reason: HOSPADM

## 2019-09-01 RX ORDER — POTASSIUM CHLORIDE 7.45 MG/ML
10 INJECTION INTRAVENOUS PRN
Status: DISCONTINUED | OUTPATIENT
Start: 2019-09-01 | End: 2019-09-04

## 2019-09-01 RX ORDER — HYDROXYCHLOROQUINE SULFATE 200 MG/1
200 TABLET, FILM COATED ORAL 2 TIMES DAILY
Status: DISCONTINUED | OUTPATIENT
Start: 2019-09-01 | End: 2019-09-05 | Stop reason: HOSPADM

## 2019-09-01 RX ORDER — HYDROXYCHLOROQUINE SULFATE 200 MG/1
200 TABLET, FILM COATED ORAL 2 TIMES DAILY
Status: DISCONTINUED | OUTPATIENT
Start: 2019-09-02 | End: 2019-09-01

## 2019-09-01 RX ORDER — SODIUM CHLORIDE 9 MG/ML
INJECTION, SOLUTION INTRAVENOUS CONTINUOUS
Status: DISCONTINUED | OUTPATIENT
Start: 2019-09-01 | End: 2019-09-01

## 2019-09-01 RX ORDER — MAGNESIUM SULFATE 1 G/100ML
1 INJECTION INTRAVENOUS PRN
Status: DISCONTINUED | OUTPATIENT
Start: 2019-09-01 | End: 2019-09-05 | Stop reason: HOSPADM

## 2019-09-01 RX ORDER — DEXTROSE MONOHYDRATE 50 MG/ML
100 INJECTION, SOLUTION INTRAVENOUS PRN
Status: DISCONTINUED | OUTPATIENT
Start: 2019-09-01 | End: 2019-09-05 | Stop reason: HOSPADM

## 2019-09-01 RX ORDER — DEXTROSE MONOHYDRATE 25 G/50ML
12.5 INJECTION, SOLUTION INTRAVENOUS PRN
Status: DISCONTINUED | OUTPATIENT
Start: 2019-09-01 | End: 2019-09-05 | Stop reason: HOSPADM

## 2019-09-01 RX ORDER — LANOLIN ALCOHOL/MO/W.PET/CERES
3 CREAM (GRAM) TOPICAL ONCE
Status: COMPLETED | OUTPATIENT
Start: 2019-09-02 | End: 2019-09-04

## 2019-09-01 RX ORDER — ASPIRIN 81 MG/1
81 TABLET ORAL NIGHTLY
Status: DISCONTINUED | OUTPATIENT
Start: 2019-09-01 | End: 2019-09-01

## 2019-09-01 RX ORDER — SODIUM CHLORIDE 0.9 % (FLUSH) 0.9 %
10 SYRINGE (ML) INJECTION PRN
Status: DISCONTINUED | OUTPATIENT
Start: 2019-09-01 | End: 2019-09-05 | Stop reason: HOSPADM

## 2019-09-01 RX ORDER — KETOROLAC TROMETHAMINE 30 MG/ML
15 INJECTION, SOLUTION INTRAMUSCULAR; INTRAVENOUS ONCE
Status: DISCONTINUED | OUTPATIENT
Start: 2019-09-01 | End: 2019-09-01

## 2019-09-01 RX ORDER — HYDROXYCHLOROQUINE SULFATE 200 MG/1
200 TABLET, FILM COATED ORAL DAILY
Status: DISCONTINUED | OUTPATIENT
Start: 2019-09-01 | End: 2019-09-01

## 2019-09-01 RX ORDER — ROPINIROLE 0.25 MG/1
0.5 TABLET, FILM COATED ORAL 3 TIMES DAILY
Status: DISCONTINUED | OUTPATIENT
Start: 2019-09-01 | End: 2019-09-05 | Stop reason: HOSPADM

## 2019-09-01 RX ORDER — ROSUVASTATIN CALCIUM 5 MG/1
10 TABLET, COATED ORAL NIGHTLY
Status: DISCONTINUED | OUTPATIENT
Start: 2019-09-02 | End: 2019-09-01 | Stop reason: CLARIF

## 2019-09-01 RX ORDER — IPRATROPIUM BROMIDE AND ALBUTEROL SULFATE 2.5; .5 MG/3ML; MG/3ML
1 SOLUTION RESPIRATORY (INHALATION) ONCE
Status: COMPLETED | OUTPATIENT
Start: 2019-09-01 | End: 2019-09-01

## 2019-09-01 RX ORDER — LOSARTAN POTASSIUM 50 MG/1
50 TABLET ORAL DAILY
Status: DISCONTINUED | OUTPATIENT
Start: 2019-09-01 | End: 2019-09-04

## 2019-09-01 RX ORDER — ONDANSETRON 2 MG/ML
4 INJECTION INTRAMUSCULAR; INTRAVENOUS EVERY 6 HOURS PRN
Status: DISCONTINUED | OUTPATIENT
Start: 2019-09-01 | End: 2019-09-05 | Stop reason: HOSPADM

## 2019-09-01 RX ORDER — GUAIFENESIN/DEXTROMETHORPHAN 100-10MG/5
5 SYRUP ORAL EVERY 4 HOURS PRN
Status: DISCONTINUED | OUTPATIENT
Start: 2019-09-01 | End: 2019-09-02

## 2019-09-01 RX ORDER — NICOTINE POLACRILEX 4 MG
15 LOZENGE BUCCAL PRN
Status: DISCONTINUED | OUTPATIENT
Start: 2019-09-01 | End: 2019-09-05 | Stop reason: HOSPADM

## 2019-09-01 RX ORDER — SODIUM CHLORIDE 9 MG/ML
INJECTION, SOLUTION INTRAVENOUS CONTINUOUS
Status: DISCONTINUED | OUTPATIENT
Start: 2019-09-01 | End: 2019-09-04

## 2019-09-01 RX ORDER — ASPIRIN 81 MG/1
81 TABLET ORAL DAILY
Status: DISCONTINUED | OUTPATIENT
Start: 2019-09-01 | End: 2019-09-05 | Stop reason: HOSPADM

## 2019-09-01 RX ORDER — ATORVASTATIN CALCIUM 40 MG/1
40 TABLET, FILM COATED ORAL NIGHTLY
Status: DISCONTINUED | OUTPATIENT
Start: 2019-09-01 | End: 2019-09-05 | Stop reason: HOSPADM

## 2019-09-01 RX ORDER — POTASSIUM CHLORIDE 20 MEQ/1
40 TABLET, EXTENDED RELEASE ORAL PRN
Status: DISCONTINUED | OUTPATIENT
Start: 2019-09-01 | End: 2019-09-04

## 2019-09-01 RX ADMIN — IPRATROPIUM BROMIDE AND ALBUTEROL SULFATE 1 AMPULE: .5; 3 SOLUTION RESPIRATORY (INHALATION) at 15:57

## 2019-09-01 RX ADMIN — HYDROXYCHLOROQUINE SULFATE 200 MG: 200 TABLET, FILM COATED ENTERAL at 20:05

## 2019-09-01 RX ADMIN — ENOXAPARIN SODIUM 40 MG: 40 INJECTION SUBCUTANEOUS at 17:25

## 2019-09-01 RX ADMIN — METOPROLOL TARTRATE 50 MG: 50 TABLET ORAL at 20:05

## 2019-09-01 RX ADMIN — ACETAMINOPHEN 650 MG: 325 TABLET ORAL at 20:57

## 2019-09-01 RX ADMIN — VITAMIN D, TAB 1000IU (100/BT) 1000 UNITS: 25 TAB at 20:57

## 2019-09-01 RX ADMIN — GUAIFENESIN AND DEXTROMETHORPHAN 5 ML: 100; 10 SYRUP ORAL at 22:08

## 2019-09-01 RX ADMIN — SODIUM CHLORIDE: 900 INJECTION INTRAVENOUS at 15:56

## 2019-09-01 RX ADMIN — ATORVASTATIN CALCIUM 40 MG: 40 TABLET, FILM COATED ORAL at 20:05

## 2019-09-01 RX ADMIN — ROPINIROLE HYDROCHLORIDE 0.5 MG: 0.25 TABLET, FILM COATED ORAL at 20:05

## 2019-09-01 RX ADMIN — ACETAMINOPHEN 650 MG: 325 TABLET ORAL at 17:24

## 2019-09-01 ASSESSMENT — ENCOUNTER SYMPTOMS
SINUS PAIN: 1
SHORTNESS OF BREATH: 1
COUGH: 1
EYE ITCHING: 1
RHINORRHEA: 1
VOMITING: 1
ABDOMINAL PAIN: 0
DIARRHEA: 0
SORE THROAT: 1

## 2019-09-01 ASSESSMENT — PAIN SCALES - GENERAL
PAINLEVEL_OUTOF10: 3
PAINLEVEL_OUTOF10: 6
PAINLEVEL_OUTOF10: 0
PAINLEVEL_OUTOF10: 8
PAINLEVEL_OUTOF10: 0
PAINLEVEL_OUTOF10: 0

## 2019-09-01 ASSESSMENT — PAIN DESCRIPTION - PAIN TYPE
TYPE: ACUTE PAIN
TYPE: ACUTE PAIN

## 2019-09-01 ASSESSMENT — PAIN DESCRIPTION - LOCATION
LOCATION: OTHER (COMMENT)
LOCATION: THROAT;CHEST

## 2019-09-01 ASSESSMENT — PAIN DESCRIPTION - DESCRIPTORS: DESCRIPTORS: SORE

## 2019-09-01 NOTE — H&P
[address] HOSPITALIST History & Physical      PCP: Any Barba MD    Date of Admission: 9/1/2019    of Service: Pt seen/examined on 09/01/19 and Admitted to Inpatient    Hx taken from patient    Chief Complaint: Cough  History Of Present Illness: The patient is a 81 y.o. female PMHx obesity, MI, hypertension, diabetes mellitus, arthritis  Patient states that for the last week she has had a cold. She describes it as a sore throat and runny nose and sinus pain. She also had eye itching. Her sore throat got better with gargling. Her cough sometimes brings up some yellow phlegm. She occasionally has some shortness of breath. She has been feeling weak for the past 1 week. She has had some vomiting from her cough and dizziness from her cough. No abdominal pain and no diarrhea. She states that she has been drinking a lot of water since she has been vomiting so much and has not been able to eat. No chest pain. No fevers. In the ER she was found to have a sodium level of 118. Past Medical History:        Diagnosis Date    Arthritis     left knee pain, sees Dr. Limmie Gilford CAD (coronary artery disease)     sees Dr. Michael Owens Chronic midline low back pain without sciatica 9/28/2016    Had PT in 2016   [address] Colonoscopy refused     discussed in 7/15    DM (diabetes mellitus), type 2 ([facility] 02/2006    Dupuytren's contracture of right hand     Endometrial stripe increased 9/25/2014    Saw NP La Nash. Was normal exam per pt.  Gastrointestinal hemorrhage 11/2/2015    Patient had GI bleeding. Colon refused. Discussed with patient in detail.  Glaucoma 4/1/2014    H/O echocardiogram 10/02/2014    Mildly depressed left ventricular function; ejection fraction of 45%. Moderate pulmonary hypertension.  H/O echocardiogram 08/28/2018    EF 50-55%.  Mitral annular calcification is present. No other significant valvulopathy seen.  History of nuclear stress test 08/28/2018    EF 59%.

## 2019-09-02 ENCOUNTER — APPOINTMENT (OUTPATIENT)
Dept: CT IMAGING | Age: 84
DRG: 641 | End: 2019-09-02
Payer: MEDICARE

## 2019-09-02 LAB
ADENOVIRUS DETECTION BY PCR: NOT DETECTED
ANION GAP SERPL CALCULATED.3IONS-SCNC: 2 MMOL/L (ref 4–16)
ANION GAP SERPL CALCULATED.3IONS-SCNC: 6 MMOL/L (ref 4–16)
ANION GAP SERPL CALCULATED.3IONS-SCNC: 7 MMOL/L (ref 4–16)
BORDETELLA PERTUSSIS PCR: NOT DETECTED
BUN BLDV-MCNC: 5 MG/DL (ref 6–23)
BUN BLDV-MCNC: 6 MG/DL (ref 6–23)
BUN BLDV-MCNC: 6 MG/DL (ref 6–23)
CALCIUM SERPL-MCNC: 9.1 MG/DL (ref 8.3–10.6)
CALCIUM SERPL-MCNC: 9.3 MG/DL (ref 8.3–10.6)
CALCIUM SERPL-MCNC: 9.6 MG/DL (ref 8.3–10.6)
CHLAMYDOPHILA PNEUMONIA PCR: NOT DETECTED
CHLORIDE BLD-SCNC: 84 MMOL/L (ref 99–110)
CHLORIDE BLD-SCNC: 86 MMOL/L (ref 99–110)
CHLORIDE BLD-SCNC: 89 MMOL/L (ref 99–110)
CO2: 30 MMOL/L (ref 21–32)
CO2: 32 MMOL/L (ref 21–32)
CO2: 36 MMOL/L (ref 21–32)
CORONAVIRUS 229E PCR: NOT DETECTED
CORONAVIRUS HKU1 PCR: NOT DETECTED
CORONAVIRUS NL63 PCR: NOT DETECTED
CORONAVIRUS OC43 PCR: NOT DETECTED
CREAT SERPL-MCNC: 0.5 MG/DL (ref 0.6–1.1)
CREAT SERPL-MCNC: 0.5 MG/DL (ref 0.6–1.1)
CREAT SERPL-MCNC: 0.6 MG/DL (ref 0.6–1.1)
GFR AFRICAN AMERICAN: >60 ML/MIN/1.73M2
GFR NON-AFRICAN AMERICAN: >60 ML/MIN/1.73M2
GLUCOSE BLD-MCNC: 101 MG/DL (ref 70–99)
GLUCOSE BLD-MCNC: 107 MG/DL (ref 70–99)
GLUCOSE BLD-MCNC: 124 MG/DL (ref 70–99)
GLUCOSE BLD-MCNC: 132 MG/DL (ref 70–99)
GLUCOSE BLD-MCNC: 149 MG/DL (ref 70–99)
GLUCOSE BLD-MCNC: 84 MG/DL (ref 70–99)
GLUCOSE BLD-MCNC: 97 MG/DL (ref 70–99)
GLUCOSE BLD-MCNC: 98 MG/DL (ref 70–99)
HCT VFR BLD CALC: 37.7 % (ref 37–47)
HEMOGLOBIN: 12.6 GM/DL (ref 12.5–16)
HUMAN METAPNEUMOVIRUS PCR: NOT DETECTED
INFLUENZA A BY PCR: NOT DETECTED
INFLUENZA A H1 (2009) PCR: NOT DETECTED
INFLUENZA A H1 PANDEMIC PCR: NOT DETECTED
INFLUENZA A H3 PCR: NOT DETECTED
INFLUENZA B BY PCR: NOT DETECTED
MCH RBC QN AUTO: 30.1 PG (ref 27–31)
MCHC RBC AUTO-ENTMCNC: 33.4 % (ref 32–36)
MCV RBC AUTO: 90 FL (ref 78–100)
MYCOPLASMA PNEUMONIAE PCR: NOT DETECTED
OSMOLALITY URINE: ABNORMAL MOS/L (ref 292–1090)
OSMOLALITY: ABNORMAL MOS/L (ref 280–300)
PARAINFLUENZA 1 PCR: ABNORMAL
PARAINFLUENZA 2 PCR: NOT DETECTED
PARAINFLUENZA 3 PCR: NOT DETECTED
PARAINFLUENZA 4 PCR: NOT DETECTED
PDW BLD-RTO: 12.9 % (ref 11.7–14.9)
PLATELET # BLD: 192 K/CU MM (ref 140–440)
PMV BLD AUTO: 9.6 FL (ref 7.5–11.1)
POTASSIUM SERPL-SCNC: 3.9 MMOL/L (ref 3.5–5.1)
POTASSIUM SERPL-SCNC: 4 MMOL/L (ref 3.5–5.1)
POTASSIUM SERPL-SCNC: 4.2 MMOL/L (ref 3.5–5.1)
PROCALCITONIN: 0.06
RBC # BLD: 4.19 M/CU MM (ref 4.2–5.4)
RHINOVIRUS ENTEROVIRUS PCR: NOT DETECTED
RSV PCR: NOT DETECTED
SODIUM BLD-SCNC: 123 MMOL/L (ref 135–145)
SODIUM BLD-SCNC: 124 MMOL/L (ref 135–145)
SODIUM BLD-SCNC: 125 MMOL/L (ref 135–145)
SODIUM URINE: 13 MMOL/L (ref 35–167)
WBC # BLD: 6.8 K/CU MM (ref 4–10.5)

## 2019-09-02 PROCEDURE — 94150 VITAL CAPACITY TEST: CPT

## 2019-09-02 PROCEDURE — 2140000000 HC CCU INTERMEDIATE R&B

## 2019-09-02 PROCEDURE — 87081 CULTURE SCREEN ONLY: CPT

## 2019-09-02 PROCEDURE — 87581 M.PNEUMON DNA AMP PROBE: CPT

## 2019-09-02 PROCEDURE — 70450 CT HEAD/BRAIN W/O DYE: CPT

## 2019-09-02 PROCEDURE — 6370000000 HC RX 637 (ALT 250 FOR IP): Performed by: HOSPITALIST

## 2019-09-02 PROCEDURE — 87798 DETECT AGENT NOS DNA AMP: CPT

## 2019-09-02 PROCEDURE — 2580000003 HC RX 258: Performed by: HOSPITALIST

## 2019-09-02 PROCEDURE — 87486 CHLMYD PNEUM DNA AMP PROBE: CPT

## 2019-09-02 PROCEDURE — 85027 COMPLETE CBC AUTOMATED: CPT

## 2019-09-02 PROCEDURE — 71250 CT THORAX DX C-: CPT

## 2019-09-02 PROCEDURE — 84145 PROCALCITONIN (PCT): CPT

## 2019-09-02 PROCEDURE — 87633 RESP VIRUS 12-25 TARGETS: CPT

## 2019-09-02 PROCEDURE — 6360000002 HC RX W HCPCS: Performed by: HOSPITALIST

## 2019-09-02 PROCEDURE — 80048 BASIC METABOLIC PNL TOTAL CA: CPT

## 2019-09-02 PROCEDURE — 87430 STREP A AG IA: CPT

## 2019-09-02 PROCEDURE — 6370000000 HC RX 637 (ALT 250 FOR IP): Performed by: NURSE PRACTITIONER

## 2019-09-02 PROCEDURE — 82962 GLUCOSE BLOOD TEST: CPT

## 2019-09-02 PROCEDURE — 36415 COLL VENOUS BLD VENIPUNCTURE: CPT

## 2019-09-02 PROCEDURE — 87324 CLOSTRIDIUM AG IA: CPT

## 2019-09-02 RX ORDER — PSEUDOEPHEDRINE HYDROCHLORIDE 30 MG/1
30 TABLET ORAL EVERY 4 HOURS PRN
Status: DISCONTINUED | OUTPATIENT
Start: 2019-09-02 | End: 2019-09-05 | Stop reason: HOSPADM

## 2019-09-02 RX ORDER — BENZONATATE 100 MG/1
100 CAPSULE ORAL 3 TIMES DAILY PRN
Status: DISCONTINUED | OUTPATIENT
Start: 2019-09-02 | End: 2019-09-05 | Stop reason: HOSPADM

## 2019-09-02 RX ORDER — FLUTICASONE PROPIONATE 50 MCG
1 SPRAY, SUSPENSION (ML) NASAL DAILY
Status: DISCONTINUED | OUTPATIENT
Start: 2019-09-02 | End: 2019-09-05 | Stop reason: HOSPADM

## 2019-09-02 RX ORDER — GUAIFENESIN/DEXTROMETHORPHAN 100-10MG/5
10 SYRUP ORAL EVERY 4 HOURS PRN
Status: DISCONTINUED | OUTPATIENT
Start: 2019-09-02 | End: 2019-09-05 | Stop reason: HOSPADM

## 2019-09-02 RX ADMIN — METOPROLOL TARTRATE 50 MG: 50 TABLET ORAL at 20:44

## 2019-09-02 RX ADMIN — ONDANSETRON 4 MG: 2 INJECTION INTRAMUSCULAR; INTRAVENOUS at 21:41

## 2019-09-02 RX ADMIN — GUAIFENESIN AND DEXTROMETHORPHAN 10 ML: 100; 10 SYRUP ORAL at 12:10

## 2019-09-02 RX ADMIN — PANTOPRAZOLE SODIUM 40 MG: 40 TABLET, DELAYED RELEASE ORAL at 06:18

## 2019-09-02 RX ADMIN — ENOXAPARIN SODIUM 40 MG: 40 INJECTION SUBCUTANEOUS at 08:24

## 2019-09-02 RX ADMIN — METOPROLOL TARTRATE 50 MG: 50 TABLET ORAL at 08:24

## 2019-09-02 RX ADMIN — LOSARTAN POTASSIUM 50 MG: 50 TABLET ORAL at 08:24

## 2019-09-02 RX ADMIN — GUAIFENESIN AND DEXTROMETHORPHAN 10 ML: 100; 10 SYRUP ORAL at 23:16

## 2019-09-02 RX ADMIN — BENZONATATE 100 MG: 100 CAPSULE ORAL at 21:43

## 2019-09-02 RX ADMIN — SODIUM CHLORIDE: 9 INJECTION, SOLUTION INTRAVENOUS at 12:10

## 2019-09-02 RX ADMIN — ACETAMINOPHEN 650 MG: 325 TABLET ORAL at 20:43

## 2019-09-02 RX ADMIN — HYDROXYCHLOROQUINE SULFATE 200 MG: 200 TABLET, FILM COATED ENTERAL at 08:24

## 2019-09-02 RX ADMIN — PSEUDOEPHEDRINE HCL 30 MG: 30 TABLET, FILM COATED ORAL at 09:59

## 2019-09-02 RX ADMIN — BENZONATATE 100 MG: 100 CAPSULE ORAL at 16:33

## 2019-09-02 RX ADMIN — PSEUDOEPHEDRINE HCL 30 MG: 30 TABLET, FILM COATED ORAL at 19:34

## 2019-09-02 RX ADMIN — BENZONATATE 100 MG: 100 CAPSULE ORAL at 08:23

## 2019-09-02 RX ADMIN — GUAIFENESIN AND DEXTROMETHORPHAN 5 ML: 100; 10 SYRUP ORAL at 03:10

## 2019-09-02 RX ADMIN — ACETAMINOPHEN 650 MG: 325 TABLET ORAL at 04:53

## 2019-09-02 RX ADMIN — ASPIRIN 81 MG: 81 TABLET ORAL at 08:23

## 2019-09-02 RX ADMIN — ACETAMINOPHEN 650 MG: 325 TABLET ORAL at 00:56

## 2019-09-02 RX ADMIN — ROPINIROLE HYDROCHLORIDE 0.5 MG: 0.25 TABLET, FILM COATED ORAL at 08:23

## 2019-09-02 RX ADMIN — ACETAMINOPHEN 650 MG: 325 TABLET ORAL at 16:33

## 2019-09-02 RX ADMIN — VITAMIN D, TAB 1000IU (100/BT) 1000 UNITS: 25 TAB at 08:23

## 2019-09-02 RX ADMIN — ATORVASTATIN CALCIUM 40 MG: 40 TABLET, FILM COATED ORAL at 20:44

## 2019-09-02 RX ADMIN — GUAIFENESIN AND DEXTROMETHORPHAN 10 ML: 100; 10 SYRUP ORAL at 19:07

## 2019-09-02 RX ADMIN — VITAMIN D, TAB 1000IU (100/BT) 1000 UNITS: 25 TAB at 20:43

## 2019-09-02 RX ADMIN — ROPINIROLE HYDROCHLORIDE 0.5 MG: 0.25 TABLET, FILM COATED ORAL at 20:43

## 2019-09-02 RX ADMIN — ROPINIROLE HYDROCHLORIDE 0.5 MG: 0.25 TABLET, FILM COATED ORAL at 15:26

## 2019-09-02 RX ADMIN — FLUTICASONE PROPIONATE 1 SPRAY: 50 SPRAY, METERED NASAL at 09:59

## 2019-09-02 RX ADMIN — HYDROXYCHLOROQUINE SULFATE 200 MG: 200 TABLET, FILM COATED ENTERAL at 20:44

## 2019-09-02 ASSESSMENT — PAIN DESCRIPTION - PAIN TYPE
TYPE: ACUTE PAIN

## 2019-09-02 ASSESSMENT — PAIN SCALES - GENERAL
PAINLEVEL_OUTOF10: 0
PAINLEVEL_OUTOF10: 0
PAINLEVEL_OUTOF10: 2
PAINLEVEL_OUTOF10: 3
PAINLEVEL_OUTOF10: 0
PAINLEVEL_OUTOF10: 2
PAINLEVEL_OUTOF10: 0
PAINLEVEL_OUTOF10: 3
PAINLEVEL_OUTOF10: 3

## 2019-09-02 ASSESSMENT — PAIN DESCRIPTION - DESCRIPTORS
DESCRIPTORS: HEADACHE
DESCRIPTORS: HEADACHE

## 2019-09-02 ASSESSMENT — PAIN DESCRIPTION - LOCATION
LOCATION: HEAD
LOCATION: HEAD

## 2019-09-02 NOTE — PLAN OF CARE
Problem: Falls - Risk of:  Goal: Will remain free from falls  9/2/2019 0754 by Amira Clayton RN  Outcome: Ongoing  9/1/2019 2112 by Yoli Dougherty RN  Outcome: Ongoing  Goal: Absence of physical injury  9/2/2019 0754 by Amira Clayton RN  Outcome: Ongoing  9/1/2019 2112 by Yoli Dougherty RN  Outcome: Ongoing     Problem: Pain:  Goal: Pain level will decrease  9/2/2019 0754 by Amira Clayton RN  Outcome: Ongoing  9/1/2019 2112 by Yoil Dougherty RN  Outcome: Ongoing  Goal: Control of acute pain  9/2/2019 0754 by Amira Clayton RN  Outcome: Ongoing  9/1/2019 2112 by Yoli Dougherty RN  Outcome: Ongoing  Goal: Control of chronic pain  9/2/2019 0754 by Amira Clayton RN  Outcome: Ongoing  9/1/2019 2112 by Yoli Dougherty RN  Outcome: Ongoing     Problem: Discharge Planning:  Goal: Discharged to appropriate level of care  9/2/2019 0754 by Amira Clayton RN  Outcome: Ongoing  9/1/2019 2112 by Yoli Dougherty RN  Outcome: Ongoing

## 2019-09-02 NOTE — PROGRESS NOTES
Patient c/o wanting something \"to make her cough go away\". Simone LLANES notified , new order for one time dose of melatonin 3 mg received.      Yuri Mcdermott   11:43 PM

## 2019-09-03 LAB
ANION GAP SERPL CALCULATED.3IONS-SCNC: 10 MMOL/L (ref 4–16)
ANION GAP SERPL CALCULATED.3IONS-SCNC: 3 MMOL/L (ref 4–16)
ANION GAP SERPL CALCULATED.3IONS-SCNC: 9 MMOL/L (ref 4–16)
BUN BLDV-MCNC: 5 MG/DL (ref 6–23)
BUN BLDV-MCNC: 6 MG/DL (ref 6–23)
BUN BLDV-MCNC: 6 MG/DL (ref 6–23)
CALCIUM SERPL-MCNC: 9 MG/DL (ref 8.3–10.6)
CALCIUM SERPL-MCNC: 9.1 MG/DL (ref 8.3–10.6)
CALCIUM SERPL-MCNC: 9.3 MG/DL (ref 8.3–10.6)
CHLORIDE BLD-SCNC: 88 MMOL/L (ref 99–110)
CHLORIDE BLD-SCNC: 90 MMOL/L (ref 99–110)
CHLORIDE BLD-SCNC: 90 MMOL/L (ref 99–110)
CLOSTRIDIUM DIFFICILE, PCR: NORMAL
CO2: 26 MMOL/L (ref 21–32)
CO2: 29 MMOL/L (ref 21–32)
CO2: 34 MMOL/L (ref 21–32)
CREAT SERPL-MCNC: 0.6 MG/DL (ref 0.6–1.1)
GFR AFRICAN AMERICAN: >60 ML/MIN/1.73M2
GFR NON-AFRICAN AMERICAN: >60 ML/MIN/1.73M2
GLUCOSE BLD-MCNC: 104 MG/DL (ref 70–99)
GLUCOSE BLD-MCNC: 106 MG/DL (ref 70–99)
GLUCOSE BLD-MCNC: 107 MG/DL (ref 70–99)
GLUCOSE BLD-MCNC: 113 MG/DL (ref 70–99)
GLUCOSE BLD-MCNC: 121 MG/DL (ref 70–99)
GLUCOSE BLD-MCNC: 94 MG/DL (ref 70–99)
GLUCOSE BLD-MCNC: 97 MG/DL (ref 70–99)
POTASSIUM SERPL-SCNC: 3.4 MMOL/L (ref 3.5–5.1)
POTASSIUM SERPL-SCNC: 3.8 MMOL/L (ref 3.5–5.1)
POTASSIUM SERPL-SCNC: 3.8 MMOL/L (ref 3.5–5.1)
SODIUM BLD-SCNC: 124 MMOL/L (ref 135–145)
SODIUM BLD-SCNC: 127 MMOL/L (ref 135–145)
SODIUM BLD-SCNC: 128 MMOL/L (ref 135–145)

## 2019-09-03 PROCEDURE — 36415 COLL VENOUS BLD VENIPUNCTURE: CPT

## 2019-09-03 PROCEDURE — 6370000000 HC RX 637 (ALT 250 FOR IP): Performed by: HOSPITALIST

## 2019-09-03 PROCEDURE — 94760 N-INVAS EAR/PLS OXIMETRY 1: CPT

## 2019-09-03 PROCEDURE — 2140000000 HC CCU INTERMEDIATE R&B

## 2019-09-03 PROCEDURE — 82962 GLUCOSE BLOOD TEST: CPT

## 2019-09-03 PROCEDURE — 6370000000 HC RX 637 (ALT 250 FOR IP): Performed by: NURSE PRACTITIONER

## 2019-09-03 PROCEDURE — 2580000003 HC RX 258: Performed by: HOSPITALIST

## 2019-09-03 PROCEDURE — 94640 AIRWAY INHALATION TREATMENT: CPT

## 2019-09-03 PROCEDURE — 6370000000 HC RX 637 (ALT 250 FOR IP): Performed by: FAMILY MEDICINE

## 2019-09-03 PROCEDURE — 6360000002 HC RX W HCPCS: Performed by: HOSPITALIST

## 2019-09-03 PROCEDURE — 80048 BASIC METABOLIC PNL TOTAL CA: CPT

## 2019-09-03 RX ORDER — IPRATROPIUM BROMIDE AND ALBUTEROL SULFATE 2.5; .5 MG/3ML; MG/3ML
1 SOLUTION RESPIRATORY (INHALATION)
Status: DISCONTINUED | OUTPATIENT
Start: 2019-09-03 | End: 2019-09-05 | Stop reason: HOSPADM

## 2019-09-03 RX ADMIN — ATORVASTATIN CALCIUM 40 MG: 40 TABLET, FILM COATED ORAL at 21:29

## 2019-09-03 RX ADMIN — PSEUDOEPHEDRINE HCL 30 MG: 30 TABLET, FILM COATED ORAL at 08:44

## 2019-09-03 RX ADMIN — ROPINIROLE HYDROCHLORIDE 0.5 MG: 0.25 TABLET, FILM COATED ORAL at 08:44

## 2019-09-03 RX ADMIN — PSEUDOEPHEDRINE HCL 30 MG: 30 TABLET, FILM COATED ORAL at 14:15

## 2019-09-03 RX ADMIN — ACETAMINOPHEN 650 MG: 325 TABLET ORAL at 21:33

## 2019-09-03 RX ADMIN — ASPIRIN 81 MG: 81 TABLET ORAL at 08:44

## 2019-09-03 RX ADMIN — HYDROXYCHLOROQUINE SULFATE 200 MG: 200 TABLET, FILM COATED ENTERAL at 08:44

## 2019-09-03 RX ADMIN — LOSARTAN POTASSIUM 50 MG: 50 TABLET ORAL at 08:44

## 2019-09-03 RX ADMIN — VITAMIN D, TAB 1000IU (100/BT) 1000 UNITS: 25 TAB at 08:44

## 2019-09-03 RX ADMIN — GUAIFENESIN AND DEXTROMETHORPHAN 10 ML: 100; 10 SYRUP ORAL at 21:33

## 2019-09-03 RX ADMIN — HYDROXYCHLOROQUINE SULFATE 200 MG: 200 TABLET, FILM COATED ENTERAL at 21:29

## 2019-09-03 RX ADMIN — METOPROLOL TARTRATE 50 MG: 50 TABLET ORAL at 21:29

## 2019-09-03 RX ADMIN — PSEUDOEPHEDRINE HCL 30 MG: 30 TABLET, FILM COATED ORAL at 21:35

## 2019-09-03 RX ADMIN — ACETAMINOPHEN 650 MG: 325 TABLET ORAL at 02:41

## 2019-09-03 RX ADMIN — METOPROLOL TARTRATE 50 MG: 50 TABLET ORAL at 08:44

## 2019-09-03 RX ADMIN — BENZONATATE 100 MG: 100 CAPSULE ORAL at 14:16

## 2019-09-03 RX ADMIN — ACETAMINOPHEN 650 MG: 325 TABLET ORAL at 08:44

## 2019-09-03 RX ADMIN — ENOXAPARIN SODIUM 40 MG: 40 INJECTION SUBCUTANEOUS at 08:44

## 2019-09-03 RX ADMIN — BENZONATATE 100 MG: 100 CAPSULE ORAL at 08:44

## 2019-09-03 RX ADMIN — FLUTICASONE PROPIONATE 1 SPRAY: 50 SPRAY, METERED NASAL at 08:43

## 2019-09-03 RX ADMIN — IPRATROPIUM BROMIDE AND ALBUTEROL SULFATE 1 AMPULE: .5; 3 SOLUTION RESPIRATORY (INHALATION) at 14:00

## 2019-09-03 RX ADMIN — ROPINIROLE HYDROCHLORIDE 0.5 MG: 0.25 TABLET, FILM COATED ORAL at 14:15

## 2019-09-03 RX ADMIN — SODIUM CHLORIDE: 9 INJECTION, SOLUTION INTRAVENOUS at 02:43

## 2019-09-03 RX ADMIN — IPRATROPIUM BROMIDE AND ALBUTEROL SULFATE 1 AMPULE: .5; 3 SOLUTION RESPIRATORY (INHALATION) at 20:05

## 2019-09-03 RX ADMIN — ROPINIROLE HYDROCHLORIDE 0.5 MG: 0.25 TABLET, FILM COATED ORAL at 21:29

## 2019-09-03 RX ADMIN — PSEUDOEPHEDRINE HCL 30 MG: 30 TABLET, FILM COATED ORAL at 02:41

## 2019-09-03 RX ADMIN — ACETAMINOPHEN 650 MG: 325 TABLET ORAL at 14:16

## 2019-09-03 RX ADMIN — GUAIFENESIN AND DEXTROMETHORPHAN 10 ML: 100; 10 SYRUP ORAL at 11:24

## 2019-09-03 RX ADMIN — GUAIFENESIN AND DEXTROMETHORPHAN 10 ML: 100; 10 SYRUP ORAL at 04:32

## 2019-09-03 RX ADMIN — VITAMIN D, TAB 1000IU (100/BT) 1000 UNITS: 25 TAB at 21:29

## 2019-09-03 ASSESSMENT — PAIN DESCRIPTION - DESCRIPTORS
DESCRIPTORS: HEADACHE

## 2019-09-03 ASSESSMENT — PAIN SCALES - GENERAL
PAINLEVEL_OUTOF10: 3
PAINLEVEL_OUTOF10: 3
PAINLEVEL_OUTOF10: 0
PAINLEVEL_OUTOF10: 3
PAINLEVEL_OUTOF10: 3
PAINLEVEL_OUTOF10: 0
PAINLEVEL_OUTOF10: 0
PAINLEVEL_OUTOF10: 3
PAINLEVEL_OUTOF10: 0

## 2019-09-03 ASSESSMENT — PAIN DESCRIPTION - PAIN TYPE
TYPE: ACUTE PAIN

## 2019-09-03 ASSESSMENT — PAIN DESCRIPTION - LOCATION
LOCATION: HEAD

## 2019-09-04 LAB
ANION GAP SERPL CALCULATED.3IONS-SCNC: 3 MMOL/L (ref 4–16)
ANION GAP SERPL CALCULATED.3IONS-SCNC: 5 MMOL/L (ref 4–16)
ANION GAP SERPL CALCULATED.3IONS-SCNC: 6 MMOL/L (ref 4–16)
ANION GAP SERPL CALCULATED.3IONS-SCNC: 9 MMOL/L (ref 4–16)
BUN BLDV-MCNC: 4 MG/DL (ref 6–23)
BUN BLDV-MCNC: 4 MG/DL (ref 6–23)
BUN BLDV-MCNC: 5 MG/DL (ref 6–23)
BUN BLDV-MCNC: 6 MG/DL (ref 6–23)
CALCIUM SERPL-MCNC: 9 MG/DL (ref 8.3–10.6)
CALCIUM SERPL-MCNC: 9.2 MG/DL (ref 8.3–10.6)
CALCIUM SERPL-MCNC: 9.3 MG/DL (ref 8.3–10.6)
CALCIUM SERPL-MCNC: 9.3 MG/DL (ref 8.3–10.6)
CHLORIDE BLD-SCNC: 91 MMOL/L (ref 99–110)
CHLORIDE BLD-SCNC: 91 MMOL/L (ref 99–110)
CHLORIDE BLD-SCNC: 92 MMOL/L (ref 99–110)
CHLORIDE BLD-SCNC: 94 MMOL/L (ref 99–110)
CO2: 30 MMOL/L (ref 21–32)
CO2: 32 MMOL/L (ref 21–32)
CO2: 32 MMOL/L (ref 21–32)
CO2: 34 MMOL/L (ref 21–32)
CREAT SERPL-MCNC: 0.5 MG/DL (ref 0.6–1.1)
CREAT SERPL-MCNC: 0.6 MG/DL (ref 0.6–1.1)
CULTURE: ABNORMAL
GFR AFRICAN AMERICAN: >60 ML/MIN/1.73M2
GFR NON-AFRICAN AMERICAN: >60 ML/MIN/1.73M2
GLUCOSE BLD-MCNC: 101 MG/DL (ref 70–99)
GLUCOSE BLD-MCNC: 102 MG/DL (ref 70–99)
GLUCOSE BLD-MCNC: 111 MG/DL (ref 70–99)
GLUCOSE BLD-MCNC: 113 MG/DL (ref 70–99)
GLUCOSE BLD-MCNC: 159 MG/DL (ref 70–99)
GLUCOSE BLD-MCNC: 193 MG/DL (ref 70–99)
GLUCOSE BLD-MCNC: 68 MG/DL (ref 70–99)
GLUCOSE BLD-MCNC: 83 MG/DL (ref 70–99)
GLUCOSE BLD-MCNC: 84 MG/DL (ref 70–99)
Lab: ABNORMAL
POTASSIUM SERPL-SCNC: 3.5 MMOL/L (ref 3.5–5.1)
POTASSIUM SERPL-SCNC: 3.5 MMOL/L (ref 3.5–5.1)
POTASSIUM SERPL-SCNC: 3.7 MMOL/L (ref 3.5–5.1)
POTASSIUM SERPL-SCNC: 3.8 MMOL/L (ref 3.5–5.1)
SODIUM BLD-SCNC: 128 MMOL/L (ref 135–145)
SODIUM BLD-SCNC: 129 MMOL/L (ref 135–145)
SODIUM BLD-SCNC: 129 MMOL/L (ref 135–145)
SODIUM BLD-SCNC: 133 MMOL/L (ref 135–145)
SPECIMEN: ABNORMAL
STREP A DIRECT SCREEN: NEGATIVE

## 2019-09-04 PROCEDURE — 36415 COLL VENOUS BLD VENIPUNCTURE: CPT

## 2019-09-04 PROCEDURE — 2580000003 HC RX 258: Performed by: HOSPITALIST

## 2019-09-04 PROCEDURE — 6370000000 HC RX 637 (ALT 250 FOR IP): Performed by: FAMILY MEDICINE

## 2019-09-04 PROCEDURE — 2140000000 HC CCU INTERMEDIATE R&B

## 2019-09-04 PROCEDURE — 6370000000 HC RX 637 (ALT 250 FOR IP): Performed by: HOSPITALIST

## 2019-09-04 PROCEDURE — 6370000000 HC RX 637 (ALT 250 FOR IP): Performed by: NURSE PRACTITIONER

## 2019-09-04 PROCEDURE — 6360000002 HC RX W HCPCS: Performed by: HOSPITALIST

## 2019-09-04 PROCEDURE — 80048 BASIC METABOLIC PNL TOTAL CA: CPT

## 2019-09-04 PROCEDURE — 82962 GLUCOSE BLOOD TEST: CPT

## 2019-09-04 PROCEDURE — 94640 AIRWAY INHALATION TREATMENT: CPT

## 2019-09-04 RX ORDER — AMLODIPINE BESYLATE 5 MG/1
5 TABLET ORAL DAILY
Status: DISCONTINUED | OUTPATIENT
Start: 2019-09-04 | End: 2019-09-05

## 2019-09-04 RX ORDER — LOSARTAN POTASSIUM 100 MG/1
100 TABLET ORAL DAILY
Status: DISCONTINUED | OUTPATIENT
Start: 2019-09-05 | End: 2019-09-05 | Stop reason: HOSPADM

## 2019-09-04 RX ADMIN — METOPROLOL TARTRATE 50 MG: 50 TABLET ORAL at 20:59

## 2019-09-04 RX ADMIN — GUAIFENESIN AND DEXTROMETHORPHAN 10 ML: 100; 10 SYRUP ORAL at 11:26

## 2019-09-04 RX ADMIN — ACETAMINOPHEN 650 MG: 325 TABLET ORAL at 18:42

## 2019-09-04 RX ADMIN — GUAIFENESIN AND DEXTROMETHORPHAN 10 ML: 100; 10 SYRUP ORAL at 18:42

## 2019-09-04 RX ADMIN — BENZONATATE 100 MG: 100 CAPSULE ORAL at 18:42

## 2019-09-04 RX ADMIN — ACETAMINOPHEN 650 MG: 325 TABLET ORAL at 11:26

## 2019-09-04 RX ADMIN — LOSARTAN POTASSIUM 50 MG: 50 TABLET ORAL at 08:01

## 2019-09-04 RX ADMIN — IPRATROPIUM BROMIDE AND ALBUTEROL SULFATE 1 AMPULE: .5; 3 SOLUTION RESPIRATORY (INHALATION) at 15:35

## 2019-09-04 RX ADMIN — ATORVASTATIN CALCIUM 40 MG: 40 TABLET, FILM COATED ORAL at 20:59

## 2019-09-04 RX ADMIN — VITAMIN D, TAB 1000IU (100/BT) 1000 UNITS: 25 TAB at 08:01

## 2019-09-04 RX ADMIN — HYDROXYCHLOROQUINE SULFATE 200 MG: 200 TABLET, FILM COATED ENTERAL at 20:59

## 2019-09-04 RX ADMIN — PSEUDOEPHEDRINE HCL 30 MG: 30 TABLET, FILM COATED ORAL at 05:06

## 2019-09-04 RX ADMIN — VITAMIN D, TAB 1000IU (100/BT) 1000 UNITS: 25 TAB at 20:59

## 2019-09-04 RX ADMIN — ROPINIROLE HYDROCHLORIDE 0.5 MG: 0.25 TABLET, FILM COATED ORAL at 20:58

## 2019-09-04 RX ADMIN — PSEUDOEPHEDRINE HCL 30 MG: 30 TABLET, FILM COATED ORAL at 11:27

## 2019-09-04 RX ADMIN — GLIPIZIDE 5 MG: 5 TABLET ORAL at 15:32

## 2019-09-04 RX ADMIN — ACETAMINOPHEN 650 MG: 325 TABLET ORAL at 03:55

## 2019-09-04 RX ADMIN — GUAIFENESIN AND DEXTROMETHORPHAN 10 ML: 100; 10 SYRUP ORAL at 03:56

## 2019-09-04 RX ADMIN — IPRATROPIUM BROMIDE AND ALBUTEROL SULFATE 1 AMPULE: .5; 3 SOLUTION RESPIRATORY (INHALATION) at 19:44

## 2019-09-04 RX ADMIN — BENZONATATE 100 MG: 100 CAPSULE ORAL at 11:27

## 2019-09-04 RX ADMIN — HYDROXYCHLOROQUINE SULFATE 200 MG: 200 TABLET, FILM COATED ENTERAL at 08:01

## 2019-09-04 RX ADMIN — SODIUM CHLORIDE, PRESERVATIVE FREE 10 ML: 5 INJECTION INTRAVENOUS at 20:59

## 2019-09-04 RX ADMIN — AMLODIPINE BESYLATE 5 MG: 5 TABLET ORAL at 11:26

## 2019-09-04 RX ADMIN — SODIUM CHLORIDE: 9 INJECTION, SOLUTION INTRAVENOUS at 07:56

## 2019-09-04 RX ADMIN — ROPINIROLE HYDROCHLORIDE 0.5 MG: 0.25 TABLET, FILM COATED ORAL at 08:01

## 2019-09-04 RX ADMIN — PSEUDOEPHEDRINE HCL 30 MG: 30 TABLET, FILM COATED ORAL at 18:43

## 2019-09-04 RX ADMIN — PANTOPRAZOLE SODIUM 40 MG: 40 TABLET, DELAYED RELEASE ORAL at 05:56

## 2019-09-04 RX ADMIN — ASPIRIN 81 MG: 81 TABLET ORAL at 08:01

## 2019-09-04 RX ADMIN — MELATONIN TAB 3 MG 3 MG: 3 TAB at 20:58

## 2019-09-04 RX ADMIN — ROPINIROLE HYDROCHLORIDE 0.5 MG: 0.25 TABLET, FILM COATED ORAL at 15:32

## 2019-09-04 RX ADMIN — ENOXAPARIN SODIUM 40 MG: 40 INJECTION SUBCUTANEOUS at 08:01

## 2019-09-04 RX ADMIN — IPRATROPIUM BROMIDE AND ALBUTEROL SULFATE 1 AMPULE: .5; 3 SOLUTION RESPIRATORY (INHALATION) at 07:20

## 2019-09-04 ASSESSMENT — PAIN DESCRIPTION - LOCATION
LOCATION: HEAD
LOCATION: HEAD;EAR
LOCATION: HEAD;EAR

## 2019-09-04 ASSESSMENT — PAIN SCALES - GENERAL
PAINLEVEL_OUTOF10: 3
PAINLEVEL_OUTOF10: 0
PAINLEVEL_OUTOF10: 3
PAINLEVEL_OUTOF10: 0
PAINLEVEL_OUTOF10: 3
PAINLEVEL_OUTOF10: 0
PAINLEVEL_OUTOF10: 3
PAINLEVEL_OUTOF10: 0

## 2019-09-04 ASSESSMENT — PAIN DESCRIPTION - PAIN TYPE
TYPE: ACUTE PAIN

## 2019-09-04 ASSESSMENT — PAIN DESCRIPTION - DESCRIPTORS
DESCRIPTORS: HEADACHE
DESCRIPTORS: ACHING;HEADACHE;DISCOMFORT
DESCRIPTORS: ACHING;HEADACHE

## 2019-09-04 ASSESSMENT — PAIN DESCRIPTION - PROGRESSION
CLINICAL_PROGRESSION: GRADUALLY WORSENING
CLINICAL_PROGRESSION: GRADUALLY IMPROVING
CLINICAL_PROGRESSION: RAPIDLY WORSENING

## 2019-09-04 ASSESSMENT — PAIN DESCRIPTION - ONSET
ONSET: ON-GOING
ONSET: ON-GOING

## 2019-09-04 ASSESSMENT — PAIN DESCRIPTION - FREQUENCY
FREQUENCY: CONTINUOUS
FREQUENCY: CONTINUOUS

## 2019-09-04 ASSESSMENT — PAIN DESCRIPTION - ORIENTATION
ORIENTATION: RIGHT
ORIENTATION: RIGHT

## 2019-09-04 ASSESSMENT — PAIN - FUNCTIONAL ASSESSMENT
PAIN_FUNCTIONAL_ASSESSMENT: PREVENTS OR INTERFERES WITH MANY ACTIVE NOT PASSIVE ACTIVITIES
PAIN_FUNCTIONAL_ASSESSMENT: PREVENTS OR INTERFERES WITH ALL ACTIVE AND SOME PASSIVE ACTIVITIES

## 2019-09-04 NOTE — PROGRESS NOTES
NA reported to this RN abd fold was noted to have some excoriation and redness, with some areas having slihgt blood drainage. This RN assessed abd fold and noted tiny areas of open weeping tears. Serosangiousness drainage noted. Calazime applied with inter-dry. Patient c/o of \"tight feeling in my feet\", feet noted to be edematous as noted in shift assessment, edema stable. Patient educated and encouraged to elevate bilat feet instead of allowing feet to dangle. Patient verbalized understanding with teach back, refused to elevate feet at this time because \"they hurt and I keep coughing and urinating myself so I'll wait a little bit. \" Patient re-educated on importance of decreasing edema with bilat feet elevation. Patient appears stable, no s/s of distress, able to make needs known, call light in reach. Will continue to monitor.

## 2019-09-04 NOTE — PLAN OF CARE
Patient educated on the following at this time, patient verbalized understanding with teach back. All questions/concerns addressed, will continue to educate.        Problem: Falls - Risk of:  Goal: Will remain free from falls  Description  Will remain free from falls  Outcome: Ongoing  Goal: Absence of physical injury  Description  Absence of physical injury  Outcome: Ongoing     Problem: Pain:  Goal: Pain level will decrease  Description  Pain level will decrease  Outcome: Ongoing  Goal: Control of acute pain  Description  Control of acute pain  Outcome: Ongoing  Goal: Control of chronic pain  Description  Control of chronic pain  Outcome: Ongoing     Problem: Discharge Planning:  Goal: Discharged to appropriate level of care  Description  Discharged to appropriate level of care  Outcome: Ongoing     Problem: Serum Glucose Level - Abnormal:  Goal: Ability to maintain appropriate glucose levels will improve  Description  Ability to maintain appropriate glucose levels will improve  Outcome: Ongoing     Problem: Sensory Perception - Impaired:  Goal: Ability to maintain a stable neurologic state will improve  Description  Ability to maintain a stable neurologic state will improve  Outcome: Ongoing

## 2019-09-05 VITALS
RESPIRATION RATE: 16 BRPM | DIASTOLIC BLOOD PRESSURE: 70 MMHG | WEIGHT: 220.9 LBS | BODY MASS INDEX: 37.71 KG/M2 | HEIGHT: 64 IN | OXYGEN SATURATION: 97 % | HEART RATE: 67 BPM | TEMPERATURE: 97.4 F | SYSTOLIC BLOOD PRESSURE: 156 MMHG

## 2019-09-05 PROBLEM — J06.9 UPPER RESPIRATORY TRACT INFECTION: Status: RESOLVED | Noted: 2019-09-05 | Resolved: 2019-09-05

## 2019-09-05 PROBLEM — J06.9 UPPER RESPIRATORY TRACT INFECTION: Status: ACTIVE | Noted: 2019-09-05

## 2019-09-05 PROBLEM — E87.1 HYPONATREMIA: Status: RESOLVED | Noted: 2019-09-01 | Resolved: 2019-09-05

## 2019-09-05 LAB
ANION GAP SERPL CALCULATED.3IONS-SCNC: 10 MMOL/L (ref 4–16)
ANION GAP SERPL CALCULATED.3IONS-SCNC: 16 MMOL/L (ref 4–16)
BUN BLDV-MCNC: 4 MG/DL (ref 6–23)
BUN BLDV-MCNC: 4 MG/DL (ref 6–23)
CALCIUM SERPL-MCNC: 9.4 MG/DL (ref 8.3–10.6)
CALCIUM SERPL-MCNC: 9.5 MG/DL (ref 8.3–10.6)
CHLORIDE BLD-SCNC: 93 MMOL/L (ref 99–110)
CHLORIDE BLD-SCNC: 93 MMOL/L (ref 99–110)
CO2: 23 MMOL/L (ref 21–32)
CO2: 30 MMOL/L (ref 21–32)
CREAT SERPL-MCNC: 0.5 MG/DL (ref 0.6–1.1)
CREAT SERPL-MCNC: 0.5 MG/DL (ref 0.6–1.1)
GFR AFRICAN AMERICAN: >60 ML/MIN/1.73M2
GFR AFRICAN AMERICAN: >60 ML/MIN/1.73M2
GFR NON-AFRICAN AMERICAN: >60 ML/MIN/1.73M2
GFR NON-AFRICAN AMERICAN: >60 ML/MIN/1.73M2
GLUCOSE BLD-MCNC: 100 MG/DL (ref 70–99)
GLUCOSE BLD-MCNC: 86 MG/DL (ref 70–99)
GLUCOSE BLD-MCNC: 94 MG/DL (ref 70–99)
GLUCOSE BLD-MCNC: 97 MG/DL (ref 70–99)
POTASSIUM SERPL-SCNC: 3.5 MMOL/L (ref 3.5–5.1)
POTASSIUM SERPL-SCNC: 3.8 MMOL/L (ref 3.5–5.1)
SODIUM BLD-SCNC: 132 MMOL/L (ref 135–145)
SODIUM BLD-SCNC: 133 MMOL/L (ref 135–145)

## 2019-09-05 PROCEDURE — 94640 AIRWAY INHALATION TREATMENT: CPT

## 2019-09-05 PROCEDURE — 6370000000 HC RX 637 (ALT 250 FOR IP): Performed by: HOSPITALIST

## 2019-09-05 PROCEDURE — 6370000000 HC RX 637 (ALT 250 FOR IP): Performed by: NURSE PRACTITIONER

## 2019-09-05 PROCEDURE — 82962 GLUCOSE BLOOD TEST: CPT

## 2019-09-05 PROCEDURE — 6360000002 HC RX W HCPCS: Performed by: HOSPITALIST

## 2019-09-05 PROCEDURE — 6370000000 HC RX 637 (ALT 250 FOR IP): Performed by: FAMILY MEDICINE

## 2019-09-05 PROCEDURE — 36415 COLL VENOUS BLD VENIPUNCTURE: CPT

## 2019-09-05 PROCEDURE — 80048 BASIC METABOLIC PNL TOTAL CA: CPT

## 2019-09-05 RX ORDER — GUAIFENESIN/DEXTROMETHORPHAN 100-10MG/5
10 SYRUP ORAL EVERY 4 HOURS PRN
Qty: 118 ML | Refills: 0 | Status: SHIPPED | OUTPATIENT
Start: 2019-09-05 | End: 2019-09-10

## 2019-09-05 RX ORDER — AMLODIPINE BESYLATE 10 MG/1
10 TABLET ORAL DAILY
Qty: 30 TABLET | Refills: 0 | Status: SHIPPED | OUTPATIENT
Start: 2019-09-05 | End: 2020-02-03

## 2019-09-05 RX ORDER — AMLODIPINE BESYLATE 10 MG/1
10 TABLET ORAL DAILY
Status: DISCONTINUED | OUTPATIENT
Start: 2019-09-05 | End: 2019-09-05 | Stop reason: HOSPADM

## 2019-09-05 RX ORDER — ALBUTEROL SULFATE 90 UG/1
2 AEROSOL, METERED RESPIRATORY (INHALATION) EVERY 6 HOURS PRN
Qty: 1 INHALER | Refills: 0 | Status: SHIPPED | OUTPATIENT
Start: 2019-09-05 | End: 2020-01-13

## 2019-09-05 RX ADMIN — BENZONATATE 100 MG: 100 CAPSULE ORAL at 05:19

## 2019-09-05 RX ADMIN — IPRATROPIUM BROMIDE AND ALBUTEROL SULFATE 1 AMPULE: .5; 3 SOLUTION RESPIRATORY (INHALATION) at 07:30

## 2019-09-05 RX ADMIN — ROPINIROLE HYDROCHLORIDE 0.5 MG: 0.25 TABLET, FILM COATED ORAL at 13:53

## 2019-09-05 RX ADMIN — GUAIFENESIN AND DEXTROMETHORPHAN 10 ML: 100; 10 SYRUP ORAL at 13:52

## 2019-09-05 RX ADMIN — GUAIFENESIN AND DEXTROMETHORPHAN 10 ML: 100; 10 SYRUP ORAL at 09:36

## 2019-09-05 RX ADMIN — IPRATROPIUM BROMIDE AND ALBUTEROL SULFATE 1 AMPULE: .5; 3 SOLUTION RESPIRATORY (INHALATION) at 00:17

## 2019-09-05 RX ADMIN — HYDROXYCHLOROQUINE SULFATE 200 MG: 200 TABLET, FILM COATED ENTERAL at 09:35

## 2019-09-05 RX ADMIN — PSEUDOEPHEDRINE HCL 30 MG: 30 TABLET, FILM COATED ORAL at 10:42

## 2019-09-05 RX ADMIN — ACETAMINOPHEN 650 MG: 325 TABLET ORAL at 13:52

## 2019-09-05 RX ADMIN — ACETAMINOPHEN 650 MG: 325 TABLET ORAL at 00:21

## 2019-09-05 RX ADMIN — METOPROLOL TARTRATE 50 MG: 50 TABLET ORAL at 09:34

## 2019-09-05 RX ADMIN — ASPIRIN 81 MG: 81 TABLET ORAL at 09:34

## 2019-09-05 RX ADMIN — PANTOPRAZOLE SODIUM 40 MG: 40 TABLET, DELAYED RELEASE ORAL at 05:19

## 2019-09-05 RX ADMIN — GUAIFENESIN AND DEXTROMETHORPHAN 10 ML: 100; 10 SYRUP ORAL at 05:19

## 2019-09-05 RX ADMIN — IPRATROPIUM BROMIDE AND ALBUTEROL SULFATE 1 AMPULE: .5; 3 SOLUTION RESPIRATORY (INHALATION) at 10:59

## 2019-09-05 RX ADMIN — ACETAMINOPHEN 650 MG: 325 TABLET ORAL at 05:38

## 2019-09-05 RX ADMIN — VITAMIN D, TAB 1000IU (100/BT) 1000 UNITS: 25 TAB at 09:34

## 2019-09-05 RX ADMIN — ACETAMINOPHEN 650 MG: 325 TABLET ORAL at 09:36

## 2019-09-05 RX ADMIN — GLIPIZIDE 5 MG: 5 TABLET ORAL at 09:34

## 2019-09-05 RX ADMIN — AMLODIPINE BESYLATE 10 MG: 10 TABLET ORAL at 09:34

## 2019-09-05 RX ADMIN — LOSARTAN POTASSIUM 100 MG: 100 TABLET ORAL at 09:34

## 2019-09-05 RX ADMIN — GUAIFENESIN AND DEXTROMETHORPHAN 10 ML: 100; 10 SYRUP ORAL at 00:15

## 2019-09-05 RX ADMIN — ENOXAPARIN SODIUM 40 MG: 40 INJECTION SUBCUTANEOUS at 09:35

## 2019-09-05 RX ADMIN — ROPINIROLE HYDROCHLORIDE 0.5 MG: 0.25 TABLET, FILM COATED ORAL at 09:34

## 2019-09-05 ASSESSMENT — PAIN DESCRIPTION - LOCATION: LOCATION: HEAD

## 2019-09-05 ASSESSMENT — PAIN SCALES - GENERAL
PAINLEVEL_OUTOF10: 3
PAINLEVEL_OUTOF10: 3
PAINLEVEL_OUTOF10: 0
PAINLEVEL_OUTOF10: 3
PAINLEVEL_OUTOF10: 0
PAINLEVEL_OUTOF10: 3

## 2019-09-05 ASSESSMENT — PAIN DESCRIPTION - PAIN TYPE
TYPE: CHRONIC PAIN
TYPE: CHRONIC PAIN

## 2019-09-05 ASSESSMENT — PAIN DESCRIPTION - DESCRIPTORS
DESCRIPTORS: ACHING
DESCRIPTORS: ACHING

## 2019-09-05 ASSESSMENT — PAIN DESCRIPTION - FREQUENCY: FREQUENCY: INTERMITTENT

## 2019-09-05 NOTE — PLAN OF CARE
Problem: Falls - Risk of:  Goal: Will remain free from falls  Description  Will remain free from falls  9/4/2019 2212 by Corina Mendez RN  Outcome: Ongoing  9/4/2019 2111 by Corina Mendez RN  Outcome: Ongoing  9/4/2019 1322 by Shantel Brar RN  Outcome: Ongoing  Goal: Absence of physical injury  Description  Absence of physical injury  9/4/2019 2212 by Corina Mendez RN  Outcome: Ongoing  9/4/2019 2111 by Corina Mendez RN  Outcome: Ongoing  9/4/2019 1322 by Shantel Brar RN  Outcome: Ongoing     Problem: Pain:  Goal: Pain level will decrease  Description  Pain level will decrease  9/4/2019 2212 by Corina Mendez RN  Outcome: Ongoing  9/4/2019 2111 by Corina Mendez RN  Outcome: Ongoing  9/4/2019 1322 by Shantel Brar RN  Outcome: Ongoing  Goal: Control of acute pain  Description  Control of acute pain  9/4/2019 2212 by Corina Mendez RN  Outcome: Ongoing  9/4/2019 2111 by Corina Mendez RN  Outcome: Ongoing  9/4/2019 1322 by Shantel Brar RN  Outcome: Ongoing  Goal: Control of chronic pain  Description  Control of chronic pain  9/4/2019 2212 by Corina Mendez RN  Outcome: Ongoing  9/4/2019 2111 by Corina Mendez RN  Outcome: Ongoing  9/4/2019 1322 by Shantel Brar RN  Outcome: Ongoing     Problem: Discharge Planning:  Goal: Discharged to appropriate level of care  Description  Discharged to appropriate level of care  9/4/2019 2212 by Corina Mendez RN  Outcome: Ongoing  9/4/2019 2111 by Corina Mendez RN  Outcome: Ongoing  9/4/2019 1322 by Shantel Brar RN  Outcome: Ongoing     Problem: Serum Glucose Level - Abnormal:  Goal: Ability to maintain appropriate glucose levels will improve  Description  Ability to maintain appropriate glucose levels will improve  9/4/2019 2212 by Corina Mendez RN  Outcome: Ongoing  9/4/2019 2111 by Corina eMndez RN  Outcome: Ongoing  9/4/2019 1322 by Shantel Brar RN  Outcome: Ongoing     Problem: Sensory Perception -

## 2019-09-05 NOTE — PROGRESS NOTES
Skin assessment performed by this nurse prior to discharge. Scattered bruising noted over lower abdomen and BUE's. Pt has redness in groin area that has been treated with calmoseptne. BLE's slightly swolen with +1 pitting edema.

## 2019-09-05 NOTE — PROGRESS NOTES
[facility]. Prescriptions called in and hardcopies were shredded.

## 2019-09-05 NOTE — DISCHARGE SUMMARY
MG extended release tablet  Take 1 tablet by mouth 2 times daily (before meals)             guaiFENesin-dextromethorphan (ROBITUSSIN DM) 100-10 MG/5ML syrup  Take 10 mLs by mouth every 4 hours as needed for Cough             hydroxychloroquine (PLAQUENIL) 200 MG tablet  Take 200 mg by mouth 2 times daily              ketoconazole (NIZORAL) 2 % cream  Apply to skin daily             losartan (COZAAR) 50 MG tablet  Take 1 tablet by mouth daily             metoprolol tartrate (LOPRESSOR) 50 MG tablet  Take 1 tablet by mouth 2 times daily             omeprazole (PRILOSEC) 20 MG delayed release capsule  Take 1 capsule by mouth every morning (before breakfast)             rOPINIRole (REQUIP) 0.5 MG tablet  Take 1 tablet by mouth 3 times daily             rosuvastatin (CRESTOR) 10 MG tablet  Take 1 tablet by mouth nightly             UNABLE TO FIND  Please dispense  True metrix lancets  Testing twice daily  DX=E11.9                  Code Status: Full Code     Consults: PT/OT and     Diet: Diabetic    Activity: As tolerated   work:    Discharged Condition: Stable    Prognosis: Good    Disposition: Home with home health      Follow-up with   1. PCP within   2-3    Days    Follow up labs: Basic       Discharge Physician Signed: Jessica Garnica M.D. Time spent on discharge in the examination, evaluation, counseling and review of medications and discharge plan: 50 minutes    Electronically signed by Jessica Garnica MD on 9/5/2019 at 9:38 AM      Comment: Please note this report has been produced using speech recognition software and may contain errors related to that system including errors in grammar, punctuation, and spelling, as well as words and phrases that may be inappropriate.  If there are any questions or concerns please feel free to contact the dictating provider for clarification

## 2019-09-05 NOTE — PROGRESS NOTES
Patient refuses to have bed alarm on , she has been given education about this since this RN admitted the patient by all staff. Patient verbalized understanding of purpose/reasons for bed and chair alarm . She says \" I don't want them on. \"    Kaylee Cunningham  10:40 PM

## 2019-09-05 NOTE — PROGRESS NOTES
Patient's blood sugar is 68. She is asymptomatic and is provided with evening snacks and juice. Blood glucose is rechecked and now 159. Will monitor.      Nikhil Cherry  10:14 PM

## 2019-09-06 ENCOUNTER — CARE COORDINATION (OUTPATIENT)
Dept: CASE MANAGEMENT | Age: 84
End: 2019-09-06

## 2019-09-06 DIAGNOSIS — J18.9 PNEUMONIA DUE TO INFECTIOUS ORGANISM, UNSPECIFIED LATERALITY, UNSPECIFIED PART OF LUNG: Primary | ICD-10-CM

## 2019-09-06 PROCEDURE — 1111F DSCHRG MED/CURRENT MED MERGE: CPT | Performed by: INTERNAL MEDICINE

## 2019-09-09 ENCOUNTER — CARE COORDINATION (OUTPATIENT)
Dept: CASE MANAGEMENT | Age: 84
End: 2019-09-09

## 2019-09-09 ENCOUNTER — TELEPHONE (OUTPATIENT)
Dept: INTERNAL MEDICINE CLINIC | Age: 84
End: 2019-09-09

## 2019-09-12 ENCOUNTER — CARE COORDINATION (OUTPATIENT)
Dept: CASE MANAGEMENT | Age: 84
End: 2019-09-12

## 2019-09-12 NOTE — CARE COORDINATION
[facility] Transitions Follow Up Call    2019    Patient: Ranjan Medrano  Patient : 1935   MRN: [id number]  Reason for Admission:   Hyponatremia  Discharge Date: 19 RARS: Readmission Risk Score: 25         Spoke with:   patient    Care Transitions Subsequent and Final Call    Schedule Follow Up Appointment with PCP:  Completed  Subsequent and Final Calls  Have your medications changed?:  No  Do you have any questions related to your medications?:  No  Do you currently have any active services?:  No  Are you currently active with any services?:  Home Health  Do you have any needs or concerns that I can assist you with?:  No  Identified Barriers:  None  Care Transitions Interventions  Other Interventions: Follow Up:   Spoke with patient. Reports that she is feeling better. Still has slight cough. Taking cough medication as directed. Patient reports that her appetite and her energy level are slightly improved. Denies increased SOB, CP or worsening symptoms. Reviewed CHF zone management and s/s to report to MD.   Reports that she is not sure if she has a scale. Informed of CTN plan to update [facility] with request for follow up. Patient is active with [facility] and [facility]. Instructed on the availability of a [place] [id number] nurse on call  for any change in patient condition or worsening symptoms. Confirmed PCP follow up tomorrow. Patient denies any questions or concerns. Agreeable to continued Care Transition. Confirmed CTN contact information. Encouraged call back if needs arise. Collaborated with [facility]/ [facility] in r/t patient need for scale. [facility] to follow up.    Future Appointments   Date Time Provider Rose Rendon   2019  9:45 AM Katt Wesley MD [facility]   10/2/2019  1:00 PM Katt Wesley MD [facility]   10/3/2019  1:30 PM Majo Ta MD [facility] Obrien RN

## 2019-09-13 ENCOUNTER — TELEPHONE (OUTPATIENT)
Dept: INTERNAL MEDICINE CLINIC | Age: 84
End: 2019-09-13

## 2019-09-13 ENCOUNTER — CARE COORDINATION (OUTPATIENT)
Dept: CARE COORDINATION | Age: 84
End: 2019-09-13

## 2019-09-13 ENCOUNTER — CARE COORDINATION (OUTPATIENT)
Dept: CASE MANAGEMENT | Age: 84
End: 2019-09-13

## 2019-09-13 NOTE — CARE COORDINATION
[facility] Transitions Follow Up Call    2019    Patient: Jomar Mcintyre  Patient : 1935   MRN: [id number]  Reason for Admission:  hyponatremia  Discharge Date: 19 RARS: Readmission Risk Score: 25         Spoke with:   patient    Care Transitions Subsequent and Final Call    Schedule Follow Up Appointment with PCP:  Declined  Subsequent and Final Calls  Have your medications changed?:  No  Do you have any questions related to your medications?:  No  Do you currently have any active services?:  No  Are you currently active with any services?:  Home Health  Do you have any needs or concerns that I can assist you with?:  No  Identified Barriers:  None  Care Transitions Interventions  Other Interventions: Follow Up:  Collaborated with [facility]. Reports that patient left her OV today d/t vomiting. Re-scheduled for Monday. Spoke with patient. Reports that she is feeling better since she got home. Patient reports that she was coughing so hard in the Provider's office that she felt like she was going to vomit. Reports that she thinks that a scent may have triggered her cough. Reports that her cough is not as bad as it was in the hospital.  Reports that she is taking her cough medication as directed and is drinking plenty of fluids. Appetite is fair. Patient reports that she is sleeping well. Reminded patient of [facility] RN on call availability  for any change in condition or worsening symptoms. Patient verbalized understanding. Denies any questions or concerns. Agreeable to continued Care Transition. CTN contact information given. Encouraged call back if needs arise.    Future Appointments   Date Time Provider Rose Rendon   2019  2:00 PM Roxi García MD [facility]   10/2/2019  1:00 PM Roxi García MD [facility]   10/3/2019  1:30 PM Danyell Lora MD [facility] Ryan RN

## 2019-09-16 ENCOUNTER — CARE COORDINATION (OUTPATIENT)
Dept: CARE COORDINATION | Age: 84
End: 2019-09-16

## 2019-09-16 ENCOUNTER — OFFICE VISIT (OUTPATIENT)
Dept: INTERNAL MEDICINE CLINIC | Age: 84
End: 2019-09-16
Payer: MEDICARE

## 2019-09-16 VITALS
SYSTOLIC BLOOD PRESSURE: 124 MMHG | DIASTOLIC BLOOD PRESSURE: 60 MMHG | WEIGHT: 219 LBS | BODY MASS INDEX: 37.59 KG/M2 | OXYGEN SATURATION: 96 % | HEART RATE: 68 BPM | TEMPERATURE: 97.9 F | RESPIRATION RATE: 16 BRPM

## 2019-09-16 DIAGNOSIS — E78.2 MIXED HYPERLIPIDEMIA: ICD-10-CM

## 2019-09-16 DIAGNOSIS — N28.1 CYST, KIDNEY, ACQUIRED: ICD-10-CM

## 2019-09-16 DIAGNOSIS — K59.1 FUNCTIONAL DIARRHEA: ICD-10-CM

## 2019-09-16 DIAGNOSIS — E11.9 TYPE 2 DIABETES MELLITUS WITHOUT COMPLICATION, WITHOUT LONG-TERM CURRENT USE OF INSULIN (HCC): ICD-10-CM

## 2019-09-16 DIAGNOSIS — B34.8 PARAINFLUENZA VIRUS INFECTION: ICD-10-CM

## 2019-09-16 DIAGNOSIS — M19.90 ARTHRITIS: ICD-10-CM

## 2019-09-16 DIAGNOSIS — E66.01 MORBID OBESITY WITH BMI OF 40.0-44.9, ADULT (HCC): ICD-10-CM

## 2019-09-16 DIAGNOSIS — I25.10 CORONARY ARTERY DISEASE INVOLVING NATIVE CORONARY ARTERY OF NATIVE HEART WITHOUT ANGINA PECTORIS: ICD-10-CM

## 2019-09-16 DIAGNOSIS — E87.1 HYPONATREMIA: Primary | ICD-10-CM

## 2019-09-16 DIAGNOSIS — G25.81 RESTLESS LEG SYNDROME: ICD-10-CM

## 2019-09-16 DIAGNOSIS — I10 ESSENTIAL HYPERTENSION: ICD-10-CM

## 2019-09-16 DIAGNOSIS — I50.32 CHRONIC DIASTOLIC CONGESTIVE HEART FAILURE (HCC): ICD-10-CM

## 2019-09-16 LAB
ANION GAP SERPL CALCULATED.3IONS-SCNC: 14 MMOL/L (ref 3–16)
BUN BLDV-MCNC: 10 MG/DL (ref 7–20)
CALCIUM SERPL-MCNC: 9.9 MG/DL (ref 8.3–10.6)
CHLORIDE BLD-SCNC: 96 MMOL/L (ref 99–110)
CO2: 25 MMOL/L (ref 21–32)
CREAT SERPL-MCNC: 0.5 MG/DL (ref 0.6–1.2)
GFR AFRICAN AMERICAN: >60
GFR NON-AFRICAN AMERICAN: >60
GLUCOSE BLD-MCNC: 82 MG/DL (ref 70–99)
POTASSIUM SERPL-SCNC: 4.8 MMOL/L (ref 3.5–5.1)
SODIUM BLD-SCNC: 135 MMOL/L (ref 136–145)

## 2019-09-16 PROCEDURE — 36415 COLL VENOUS BLD VENIPUNCTURE: CPT | Performed by: INTERNAL MEDICINE

## 2019-09-16 PROCEDURE — 1111F DSCHRG MED/CURRENT MED MERGE: CPT | Performed by: INTERNAL MEDICINE

## 2019-09-16 PROCEDURE — 99495 TRANSJ CARE MGMT MOD F2F 14D: CPT | Performed by: INTERNAL MEDICINE

## 2019-09-16 NOTE — PROGRESS NOTES
Post-Discharge Transitional Care Management Services or Hospital Follow Up      Rosmery Sandoval   YOB: 1935    Date of Office Visit:  9/16/2019  Date of Hospital Admission: 9/1/19  Date of Hospital Discharge: 9/5/19  Risk of hospital readmission (high >=14%. Medium >=10%) :Readmission Risk Score: 18      Care management risk score Rising risk (score 2-5) and Complex Care (Scores >=6): 7     Non face to face  following discharge, date last encounter closed (first attempt may have been earlier): 9/6/2019 10:40 AM    Call initiated 2 business days of discharge: Yes    Patient Active Problem List   Diagnosis    Essential hypertension    Mixed hyperlipidemia    [facility] mellitus without complication [facility] [facility]    Dupuytren's contracture of right hand    Arthritis    CAD s/p MI, TPA in 1998    Gastrointestinal hemorrhage    Cyst, kidney, acquired    Chronic midline low back pain without sciatica    Restless leg syndrome    Anxiety    Chronic diastolic congestive heart failure ([facility] [facility]    Suspected sleep apnea    Morbid obesity with BMI of 40.0-44.9, adult ([facility] [facility]    Functional diarrhea       No Known Allergies    Medications listed as ordered at the time of discharge from hospital   Jayshree Tate   Home Medication Instructions AGUEDA:    Printed on:09/16/19 1500   Medication Information                      albuterol sulfate HFA (PROAIR HFA) 108 (90 Base) MCG/ACT inhaler  Inhale 2 puffs into the lungs every 6 hours as needed for Wheezing             amLODIPine (NORVASC) 10 MG tablet  Take 1 tablet by mouth daily             aspirin 81 MG EC tablet  Take 1 tablet by mouth nightly.              blood glucose test strips (TRUETEST TEST) strip  1 each by Other route 2 times daily DX=E11.9             Cholecalciferol (VITAMIN D PO)  Take 5,000 Units by mouth three times daily              cholestyramine light 4 g packet  TAKE 1 PACKET BY MOUTH 2 TIMES DAILY             Coenzyme Q10 (CO Q 10 PO)  Take 200 mg by mouth 2 times daily Indications: takes 400mg daily              glipiZIDE (GLUCOTROL XL) 5 MG extended release tablet  Take 1 tablet by mouth 2 times daily (before meals)             hydroxychloroquine (PLAQUENIL) 200 MG tablet  Take 200 mg by mouth 2 times daily              losartan (COZAAR) 50 MG tablet  Take 1 tablet by mouth daily             metoprolol tartrate (LOPRESSOR) 50 MG tablet  Take 1 tablet by mouth 2 times daily             omeprazole (PRILOSEC) 20 MG delayed release capsule  Take 1 capsule by mouth every morning (before breakfast)             rOPINIRole (REQUIP) 0.5 MG tablet  Take 1 tablet by mouth 3 times daily             rosuvastatin (CRESTOR) 10 MG tablet  Take 1 tablet by mouth nightly             UNABLE TO FIND  Please dispense  True metrix lancets  Testing twice daily  DX=E11.9                   Medications marked \"taking\" at this time  Outpatient Medications Marked as Taking for the 9/16/19 encounter (Office Visit) with Justin Keith MD   Medication Sig Dispense Refill    amLODIPine (NORVASC) 10 MG tablet Take 1 tablet by mouth daily 30 tablet 0    albuterol sulfate HFA (PROAIR HFA) 108 (90 Base) MCG/ACT inhaler Inhale 2 puffs into the lungs every 6 hours as needed for Wheezing 1 Inhaler 0    omeprazole (PRILOSEC) 20 MG delayed release capsule Take 1 capsule by mouth every morning (before breakfast) (Patient taking differently: Take 20 mg by mouth daily as needed ) 30 capsule 0    rosuvastatin (CRESTOR) 10 MG tablet Take 1 tablet by mouth nightly 90 tablet 1    glipiZIDE (GLUCOTROL XL) 5 MG extended release tablet Take 1 tablet by mouth 2 times daily (before meals) 60 tablet 3    rOPINIRole (REQUIP) 0.5 MG tablet Take 1 tablet by mouth 3 times daily 90 tablet 3    metoprolol tartrate (LOPRESSOR) 50 MG tablet Take 1 tablet by mouth 2 times daily 180 tablet 1    losartan (COZAAR) 50 MG tablet Take 1 tablet by mouth daily 30 tablet 5    hydroxychloroquine

## 2019-09-16 NOTE — CARE COORDINATION
read food labels?:  Yes  How many restaurant meals do you eat per week?:  0  Do you salt your food before tasting it?:  No     No patient-reported symptoms      Symptoms:   None:  Yes      Symptom course:  stable  Weight trend:  stable  Salt intake watch compared to last visit:  improved      and   General Assessment    Do you have any symptoms that are causing concern?:  No

## 2019-09-17 NOTE — ASSESSMENT & PLAN NOTE
Patient has diabetes mellitus blood sugars are staying normal.  Patient is on glipizide 5 mg twice a day. No hypoglycemia reported. Hemoglobin A1c was 6.0 in July 2019.

## 2019-09-18 ENCOUNTER — CARE COORDINATION (OUTPATIENT)
Dept: CASE MANAGEMENT | Age: 84
End: 2019-09-18

## 2019-09-19 LAB
ALBUMIN SERPL-MCNC: 3.7 G/DL
ALP BLD-CCNC: 46 U/L
ALT SERPL-CCNC: 16 U/L
ANION GAP SERPL CALCULATED.3IONS-SCNC: 1.4 MMOL/L
AST SERPL-CCNC: 25 U/L
BILIRUB SERPL-MCNC: 0.4 MG/DL (ref 0.1–1.4)
BUN BLDV-MCNC: 13 MG/DL
CALCIUM SERPL-MCNC: 9.5 MG/DL
CHLORIDE BLD-SCNC: 98 MMOL/L
CO2: 30 MMOL/L
CREAT SERPL-MCNC: 0.6 MG/DL
GFR CALCULATED: 95.7
GLUCOSE BLD-MCNC: 103 MG/DL
POTASSIUM SERPL-SCNC: 4.5 MMOL/L
SEDIMENTATION RATE, ERYTHROCYTE: 6
SODIUM BLD-SCNC: 134 MMOL/L
TOTAL PROTEIN: 6.4

## 2019-09-25 ENCOUNTER — CARE COORDINATION (OUTPATIENT)
Dept: CASE MANAGEMENT | Age: 84
End: 2019-09-25

## 2019-09-26 ENCOUNTER — TELEPHONE (OUTPATIENT)
Dept: INTERNAL MEDICINE CLINIC | Age: 84
End: 2019-09-26

## 2019-09-27 ENCOUNTER — TELEPHONE (OUTPATIENT)
Dept: INTERNAL MEDICINE CLINIC | Age: 84
End: 2019-09-27

## 2019-09-27 NOTE — PATIENT INSTRUCTIONS
Patient Education        Learning About Low Blood Sugar (Hypoglycemia) in Diabetes  What is low blood sugar (hypoglycemia)? Hypoglycemia means that your blood sugar is low and your body (especially your brain) is not getting enough fuel. If you have diabetes, your blood sugar can go too low if you take too much of some diabetes medicines. It can also go too low if you miss a meal. And it can happen if you exercise too hard without eating enough food. Some medicines used to treat other health problems can cause low blood sugar too. What are the symptoms? Symptoms of low blood sugar can start quickly. It may take just 10 to 15 minutes. If you have had diabetes for many years, you may not realize that your blood sugar is low until it drops very low. · If your blood sugar level drops below 70 (mild low blood sugar), you may feel tired, anxious, dizzy, weak, shaky, or sweaty. You may have a fast heartbeat or blurry vision. · If your blood sugar level continues to drop (usually below 40), your behavior may change. You may feel more irritable. You may find it hard to concentrate or talk. And you may feel unsteady when you stand or walk. You may become too weak or confused to eat something with sugar to raise your blood sugar level. · If your blood sugar level drops very low (usually below 20), you may pass out (lose consciousness). Or you may have a seizure or stroke. If you have symptoms of severe low blood sugar, you need to get medical care right away. If you had a low blood sugar level during the night, you may wake up tired or with a headache. Or you may sweat so much during the night that your pajamas or sheets are damp when you wake up. How is low blood sugar treated? You can treat low blood sugar by eating or drinking something that has 15 grams of carbohydrate. These should be quick-sugar foods.  Check your blood sugar level again 15 minutes after having a quick-sugar food to make sure your level is

## 2019-10-03 ENCOUNTER — OFFICE VISIT (OUTPATIENT)
Dept: INTERNAL MEDICINE CLINIC | Age: 84
End: 2019-10-03
Payer: MEDICARE

## 2019-10-03 ENCOUNTER — OFFICE VISIT (OUTPATIENT)
Dept: CARDIOLOGY CLINIC | Age: 84
End: 2019-10-03
Payer: MEDICARE

## 2019-10-03 ENCOUNTER — TELEPHONE (OUTPATIENT)
Dept: CARDIOLOGY CLINIC | Age: 84
End: 2019-10-03

## 2019-10-03 VITALS
OXYGEN SATURATION: 97 % | WEIGHT: 218 LBS | RESPIRATION RATE: 16 BRPM | HEART RATE: 68 BPM | BODY MASS INDEX: 37.42 KG/M2 | TEMPERATURE: 97.9 F | DIASTOLIC BLOOD PRESSURE: 76 MMHG | SYSTOLIC BLOOD PRESSURE: 130 MMHG

## 2019-10-03 VITALS
BODY MASS INDEX: 37.05 KG/M2 | DIASTOLIC BLOOD PRESSURE: 62 MMHG | HEART RATE: 76 BPM | HEIGHT: 64 IN | WEIGHT: 217 LBS | RESPIRATION RATE: 16 BRPM | SYSTOLIC BLOOD PRESSURE: 104 MMHG

## 2019-10-03 DIAGNOSIS — N28.1 CYST, KIDNEY, ACQUIRED: ICD-10-CM

## 2019-10-03 DIAGNOSIS — E78.2 MIXED HYPERLIPIDEMIA: ICD-10-CM

## 2019-10-03 DIAGNOSIS — G89.29 CHRONIC MIDLINE LOW BACK PAIN WITHOUT SCIATICA: ICD-10-CM

## 2019-10-03 DIAGNOSIS — E87.1 HYPONATREMIA: ICD-10-CM

## 2019-10-03 DIAGNOSIS — R29.818 SUSPECTED SLEEP APNEA: ICD-10-CM

## 2019-10-03 DIAGNOSIS — I50.32 CHRONIC DIASTOLIC CONGESTIVE HEART FAILURE (HCC): ICD-10-CM

## 2019-10-03 DIAGNOSIS — M54.50 CHRONIC MIDLINE LOW BACK PAIN WITHOUT SCIATICA: ICD-10-CM

## 2019-10-03 DIAGNOSIS — E11.9 TYPE 2 DIABETES MELLITUS WITHOUT COMPLICATION, WITHOUT LONG-TERM CURRENT USE OF INSULIN (HCC): ICD-10-CM

## 2019-10-03 DIAGNOSIS — I10 ESSENTIAL HYPERTENSION: ICD-10-CM

## 2019-10-03 DIAGNOSIS — I25.10 CORONARY ARTERY DISEASE INVOLVING NATIVE CORONARY ARTERY OF NATIVE HEART WITHOUT ANGINA PECTORIS: Primary | ICD-10-CM

## 2019-10-03 DIAGNOSIS — E66.01 MORBID OBESITY WITH BMI OF 40.0-44.9, ADULT (HCC): ICD-10-CM

## 2019-10-03 DIAGNOSIS — G25.81 RESTLESS LEG SYNDROME: ICD-10-CM

## 2019-10-03 DIAGNOSIS — K92.2 GASTROINTESTINAL HEMORRHAGE, UNSPECIFIED GASTROINTESTINAL HEMORRHAGE TYPE: ICD-10-CM

## 2019-10-03 DIAGNOSIS — K59.1 FUNCTIONAL DIARRHEA: ICD-10-CM

## 2019-10-03 DIAGNOSIS — I25.10 CORONARY ARTERY DISEASE INVOLVING NATIVE CORONARY ARTERY OF NATIVE HEART WITHOUT ANGINA PECTORIS: ICD-10-CM

## 2019-10-03 PROCEDURE — G8427 DOCREV CUR MEDS BY ELIG CLIN: HCPCS | Performed by: INTERNAL MEDICINE

## 2019-10-03 PROCEDURE — G8484 FLU IMMUNIZE NO ADMIN: HCPCS | Performed by: INTERNAL MEDICINE

## 2019-10-03 PROCEDURE — 1090F PRES/ABSN URINE INCON ASSESS: CPT | Performed by: INTERNAL MEDICINE

## 2019-10-03 PROCEDURE — G8428 CUR MEDS NOT DOCUMENT: HCPCS | Performed by: INTERNAL MEDICINE

## 2019-10-03 PROCEDURE — 1111F DSCHRG MED/CURRENT MED MERGE: CPT | Performed by: INTERNAL MEDICINE

## 2019-10-03 PROCEDURE — G8598 ASA/ANTIPLAT THER USED: HCPCS | Performed by: INTERNAL MEDICINE

## 2019-10-03 PROCEDURE — 4040F PNEUMOC VAC/ADMIN/RCVD: CPT | Performed by: INTERNAL MEDICINE

## 2019-10-03 PROCEDURE — 1036F TOBACCO NON-USER: CPT | Performed by: INTERNAL MEDICINE

## 2019-10-03 PROCEDURE — 99214 OFFICE O/P EST MOD 30 MIN: CPT | Performed by: INTERNAL MEDICINE

## 2019-10-03 PROCEDURE — 1123F ACP DISCUSS/DSCN MKR DOCD: CPT | Performed by: INTERNAL MEDICINE

## 2019-10-03 PROCEDURE — G8399 PT W/DXA RESULTS DOCUMENT: HCPCS | Performed by: INTERNAL MEDICINE

## 2019-10-03 PROCEDURE — G8417 CALC BMI ABV UP PARAM F/U: HCPCS | Performed by: INTERNAL MEDICINE

## 2019-10-03 RX ORDER — BLOOD-GLUCOSE METER
EACH MISCELLANEOUS
COMMUNITY
End: 2019-10-03 | Stop reason: SDUPTHER

## 2019-10-03 RX ORDER — GRISEOFULVIN 500 MG/1
500 TABLET ORAL DAILY
Refills: 1 | COMMUNITY
Start: 2019-09-27 | End: 2020-02-03 | Stop reason: ALTCHOICE

## 2019-10-03 RX ORDER — POTASSIUM CHLORIDE 20 MEQ/1
20 TABLET, EXTENDED RELEASE ORAL DAILY
Qty: 90 TABLET | Refills: 1 | Status: SHIPPED | OUTPATIENT
Start: 2019-10-03 | End: 2020-04-15 | Stop reason: SDUPTHER

## 2019-10-03 RX ORDER — CLOTRIMAZOLE AND BETAMETHASONE DIPROPIONATE 10; .64 MG/G; MG/G
CREAM TOPICAL
Qty: 45 G | Refills: 0 | Status: SHIPPED | OUTPATIENT
Start: 2019-10-03 | End: 2019-11-06 | Stop reason: ALTCHOICE

## 2019-10-03 RX ORDER — BLOOD-GLUCOSE METER
EACH MISCELLANEOUS
Qty: 1 DEVICE | Refills: 0 | Status: SHIPPED | OUTPATIENT
Start: 2019-10-03 | End: 2020-09-24 | Stop reason: SDUPTHER

## 2019-10-03 RX ORDER — FUROSEMIDE 20 MG/1
20 TABLET ORAL DAILY
Qty: 90 TABLET | Refills: 1 | Status: SHIPPED | OUTPATIENT
Start: 2019-10-03 | End: 2020-07-08 | Stop reason: SDUPTHER

## 2019-10-04 ENCOUNTER — TELEPHONE (OUTPATIENT)
Dept: INTERNAL MEDICINE CLINIC | Age: 84
End: 2019-10-04

## 2019-10-07 RX ORDER — WEIGH SCALE
MISCELLANEOUS MISCELLANEOUS
COMMUNITY
End: 2019-10-07 | Stop reason: SDUPTHER

## 2019-10-07 RX ORDER — DIAPER,BRIEF,ADULT, DISPOSABLE
EACH MISCELLANEOUS
COMMUNITY
End: 2019-10-07 | Stop reason: SDUPTHER

## 2019-10-07 RX ORDER — DIAPER,BRIEF,ADULT, DISPOSABLE
EACH MISCELLANEOUS
Qty: 60 EACH | Refills: 5 | Status: SHIPPED | OUTPATIENT
Start: 2019-10-07 | End: 2019-11-06 | Stop reason: ALTCHOICE

## 2019-10-07 RX ORDER — WEIGH SCALE
MISCELLANEOUS MISCELLANEOUS
Qty: 1 EACH | Refills: 0 | Status: SHIPPED | OUTPATIENT
Start: 2019-10-07 | End: 2019-11-06 | Stop reason: ALTCHOICE

## 2019-10-17 ENCOUNTER — NURSE ONLY (OUTPATIENT)
Dept: INTERNAL MEDICINE CLINIC | Age: 84
End: 2019-10-17

## 2019-10-17 DIAGNOSIS — I10 ESSENTIAL HYPERTENSION: ICD-10-CM

## 2019-10-17 DIAGNOSIS — E78.2 MIXED HYPERLIPIDEMIA: ICD-10-CM

## 2019-10-17 DIAGNOSIS — I50.32 CHRONIC DIASTOLIC CONGESTIVE HEART FAILURE (HCC): Primary | ICD-10-CM

## 2019-10-17 DIAGNOSIS — I25.10 CORONARY ARTERY DISEASE INVOLVING NATIVE CORONARY ARTERY OF NATIVE HEART WITHOUT ANGINA PECTORIS: ICD-10-CM

## 2019-10-17 LAB
A/G RATIO: 1.7 (ref 1.1–2.2)
ALBUMIN SERPL-MCNC: 4.5 G/DL (ref 3.4–5)
ALP BLD-CCNC: 56 U/L (ref 40–129)
ALT SERPL-CCNC: 7 U/L (ref 10–40)
ANION GAP SERPL CALCULATED.3IONS-SCNC: 12 MMOL/L (ref 3–16)
AST SERPL-CCNC: 17 U/L (ref 15–37)
BILIRUB SERPL-MCNC: 0.4 MG/DL (ref 0–1)
BUN BLDV-MCNC: 13 MG/DL (ref 7–20)
CALCIUM SERPL-MCNC: 10.5 MG/DL (ref 8.3–10.6)
CHLORIDE BLD-SCNC: 99 MMOL/L (ref 99–110)
CHOLESTEROL, FASTING: 161 MG/DL (ref 0–199)
CO2: 28 MMOL/L (ref 21–32)
CREAT SERPL-MCNC: 0.6 MG/DL (ref 0.6–1.2)
GFR AFRICAN AMERICAN: >60
GFR NON-AFRICAN AMERICAN: >60
GLOBULIN: 2.6 G/DL
GLUCOSE BLD-MCNC: 126 MG/DL (ref 70–99)
HDLC SERPL-MCNC: 71 MG/DL (ref 40–60)
LDL CHOLESTEROL CALCULATED: 60 MG/DL
POTASSIUM SERPL-SCNC: 4.4 MMOL/L (ref 3.5–5.1)
SODIUM BLD-SCNC: 139 MMOL/L (ref 136–145)
TOTAL PROTEIN: 7.1 G/DL (ref 6.4–8.2)
TRIGLYCERIDE, FASTING: 151 MG/DL (ref 0–150)
VLDLC SERPL CALC-MCNC: 30 MG/DL

## 2019-10-17 RX ORDER — ROPINIROLE 0.5 MG/1
0.5 TABLET, FILM COATED ORAL 3 TIMES DAILY
Qty: 90 TABLET | Refills: 3 | Status: SHIPPED | OUTPATIENT
Start: 2019-10-17 | End: 2019-11-06 | Stop reason: ALTCHOICE

## 2019-10-22 ENCOUNTER — TELEPHONE (OUTPATIENT)
Dept: CARDIOLOGY CLINIC | Age: 84
End: 2019-10-22

## 2019-10-23 RX ORDER — LOSARTAN POTASSIUM 50 MG/1
50 TABLET ORAL DAILY
Qty: 90 TABLET | Refills: 1 | Status: SHIPPED | OUTPATIENT
Start: 2019-10-23 | End: 2020-02-03 | Stop reason: DRUGHIGH

## 2019-11-06 ENCOUNTER — OFFICE VISIT (OUTPATIENT)
Dept: INTERNAL MEDICINE CLINIC | Age: 84
End: 2019-11-06
Payer: MEDICARE

## 2019-11-06 ENCOUNTER — CARE COORDINATION (OUTPATIENT)
Dept: CARE COORDINATION | Age: 84
End: 2019-11-06

## 2019-11-06 VITALS
TEMPERATURE: 98.7 F | HEIGHT: 64 IN | OXYGEN SATURATION: 98 % | HEART RATE: 72 BPM | WEIGHT: 225 LBS | SYSTOLIC BLOOD PRESSURE: 130 MMHG | DIASTOLIC BLOOD PRESSURE: 70 MMHG | BODY MASS INDEX: 38.41 KG/M2

## 2019-11-06 DIAGNOSIS — Z23 NEED FOR PROPHYLACTIC VACCINATION AND INOCULATION AGAINST VARICELLA: ICD-10-CM

## 2019-11-06 DIAGNOSIS — I10 ESSENTIAL HYPERTENSION: ICD-10-CM

## 2019-11-06 DIAGNOSIS — K92.2 GASTROINTESTINAL HEMORRHAGE, UNSPECIFIED GASTROINTESTINAL HEMORRHAGE TYPE: ICD-10-CM

## 2019-11-06 DIAGNOSIS — M54.50 CHRONIC MIDLINE LOW BACK PAIN WITHOUT SCIATICA: ICD-10-CM

## 2019-11-06 DIAGNOSIS — I25.10 CORONARY ARTERY DISEASE INVOLVING NATIVE CORONARY ARTERY OF NATIVE HEART WITHOUT ANGINA PECTORIS: ICD-10-CM

## 2019-11-06 DIAGNOSIS — E78.2 MIXED HYPERLIPIDEMIA: ICD-10-CM

## 2019-11-06 DIAGNOSIS — G25.81 RESTLESS LEG SYNDROME: ICD-10-CM

## 2019-11-06 DIAGNOSIS — K59.1 FUNCTIONAL DIARRHEA: ICD-10-CM

## 2019-11-06 DIAGNOSIS — R29.818 SUSPECTED SLEEP APNEA: ICD-10-CM

## 2019-11-06 DIAGNOSIS — M19.90 ARTHRITIS: ICD-10-CM

## 2019-11-06 DIAGNOSIS — I50.32 CHRONIC DIASTOLIC CONGESTIVE HEART FAILURE (HCC): ICD-10-CM

## 2019-11-06 DIAGNOSIS — G89.29 CHRONIC MIDLINE LOW BACK PAIN WITHOUT SCIATICA: ICD-10-CM

## 2019-11-06 DIAGNOSIS — E11.9 TYPE 2 DIABETES MELLITUS WITHOUT COMPLICATION, WITHOUT LONG-TERM CURRENT USE OF INSULIN (HCC): Primary | ICD-10-CM

## 2019-11-06 DIAGNOSIS — M72.0 DUPUYTREN'S CONTRACTURE OF RIGHT HAND: ICD-10-CM

## 2019-11-06 LAB
CHP ED QC CHECK: ABNORMAL
GLUCOSE BLD-MCNC: 140 MG/DL

## 2019-11-06 PROCEDURE — 1123F ACP DISCUSS/DSCN MKR DOCD: CPT | Performed by: INTERNAL MEDICINE

## 2019-11-06 PROCEDURE — G8598 ASA/ANTIPLAT THER USED: HCPCS | Performed by: INTERNAL MEDICINE

## 2019-11-06 PROCEDURE — G8484 FLU IMMUNIZE NO ADMIN: HCPCS | Performed by: INTERNAL MEDICINE

## 2019-11-06 PROCEDURE — 82962 GLUCOSE BLOOD TEST: CPT | Performed by: INTERNAL MEDICINE

## 2019-11-06 PROCEDURE — G8417 CALC BMI ABV UP PARAM F/U: HCPCS | Performed by: INTERNAL MEDICINE

## 2019-11-06 PROCEDURE — 1090F PRES/ABSN URINE INCON ASSESS: CPT | Performed by: INTERNAL MEDICINE

## 2019-11-06 PROCEDURE — 4040F PNEUMOC VAC/ADMIN/RCVD: CPT | Performed by: INTERNAL MEDICINE

## 2019-11-06 PROCEDURE — G8399 PT W/DXA RESULTS DOCUMENT: HCPCS | Performed by: INTERNAL MEDICINE

## 2019-11-06 PROCEDURE — G8427 DOCREV CUR MEDS BY ELIG CLIN: HCPCS | Performed by: INTERNAL MEDICINE

## 2019-11-06 PROCEDURE — 99214 OFFICE O/P EST MOD 30 MIN: CPT | Performed by: INTERNAL MEDICINE

## 2019-11-06 PROCEDURE — 1036F TOBACCO NON-USER: CPT | Performed by: INTERNAL MEDICINE

## 2019-11-06 RX ORDER — GLIPIZIDE 5 MG/1
5 TABLET, FILM COATED, EXTENDED RELEASE ORAL DAILY
Qty: 30 TABLET | Refills: 3 | Status: SHIPPED
Start: 2019-11-06 | End: 2020-01-31 | Stop reason: SDUPTHER

## 2019-11-06 RX ORDER — PRAMIPEXOLE DIHYDROCHLORIDE 0.12 MG/1
0.12 TABLET ORAL NIGHTLY
Qty: 30 TABLET | Refills: 1 | Status: SHIPPED | OUTPATIENT
Start: 2019-11-06 | End: 2020-01-13

## 2019-11-11 RX ORDER — ROPINIROLE 0.5 MG/1
TABLET, FILM COATED ORAL
Qty: 90 TABLET | Refills: 3 | Status: SHIPPED | OUTPATIENT
Start: 2019-11-11 | End: 2020-01-13 | Stop reason: ALTCHOICE

## 2019-12-26 ENCOUNTER — CARE COORDINATION (OUTPATIENT)
Dept: CARE COORDINATION | Age: 84
End: 2019-12-26

## 2020-01-03 ENCOUNTER — TELEPHONE (OUTPATIENT)
Dept: INTERNAL MEDICINE CLINIC | Age: 85
End: 2020-01-03

## 2020-01-13 ENCOUNTER — OFFICE VISIT (OUTPATIENT)
Dept: INTERNAL MEDICINE CLINIC | Age: 85
End: 2020-01-13
Payer: MEDICARE

## 2020-01-13 ENCOUNTER — TELEPHONE (OUTPATIENT)
Dept: INTERNAL MEDICINE CLINIC | Age: 85
End: 2020-01-13

## 2020-01-13 VITALS
OXYGEN SATURATION: 97 % | HEART RATE: 68 BPM | RESPIRATION RATE: 16 BRPM | BODY MASS INDEX: 39.31 KG/M2 | TEMPERATURE: 97.5 F | DIASTOLIC BLOOD PRESSURE: 72 MMHG | SYSTOLIC BLOOD PRESSURE: 138 MMHG | WEIGHT: 229 LBS

## 2020-01-13 PROCEDURE — 1036F TOBACCO NON-USER: CPT | Performed by: INTERNAL MEDICINE

## 2020-01-13 PROCEDURE — 4040F PNEUMOC VAC/ADMIN/RCVD: CPT | Performed by: INTERNAL MEDICINE

## 2020-01-13 PROCEDURE — G8417 CALC BMI ABV UP PARAM F/U: HCPCS | Performed by: INTERNAL MEDICINE

## 2020-01-13 PROCEDURE — 1090F PRES/ABSN URINE INCON ASSESS: CPT | Performed by: INTERNAL MEDICINE

## 2020-01-13 PROCEDURE — 99213 OFFICE O/P EST LOW 20 MIN: CPT | Performed by: INTERNAL MEDICINE

## 2020-01-13 PROCEDURE — G8399 PT W/DXA RESULTS DOCUMENT: HCPCS | Performed by: INTERNAL MEDICINE

## 2020-01-13 PROCEDURE — 1123F ACP DISCUSS/DSCN MKR DOCD: CPT | Performed by: INTERNAL MEDICINE

## 2020-01-13 PROCEDURE — G8484 FLU IMMUNIZE NO ADMIN: HCPCS | Performed by: INTERNAL MEDICINE

## 2020-01-13 PROCEDURE — G8427 DOCREV CUR MEDS BY ELIG CLIN: HCPCS | Performed by: INTERNAL MEDICINE

## 2020-01-13 RX ORDER — ROPINIROLE 2 MG/1
2 TABLET, FILM COATED, EXTENDED RELEASE ORAL NIGHTLY
Qty: 30 TABLET | Refills: 3 | Status: SHIPPED | OUTPATIENT
Start: 2020-01-13 | End: 2020-02-03 | Stop reason: ALTCHOICE

## 2020-01-13 ASSESSMENT — PATIENT HEALTH QUESTIONNAIRE - PHQ9
1. LITTLE INTEREST OR PLEASURE IN DOING THINGS: 1
SUM OF ALL RESPONSES TO PHQ9 QUESTIONS 1 & 2: 2
SUM OF ALL RESPONSES TO PHQ QUESTIONS 1-9: 2
2. FEELING DOWN, DEPRESSED OR HOPELESS: 1
SUM OF ALL RESPONSES TO PHQ QUESTIONS 1-9: 2

## 2020-01-13 NOTE — PROGRESS NOTES
Lennox Millstone  Patient's  is 1935  Seen in office on 2020      SUBJECTIVE:  Keisha Lynch anand 81 y. o.year old female presents today   Chief Complaint   Patient presents with    Follow-up     arthritis    Leg Pain      medication not really helping    Fatigue     Is here for follow-up of leg pain and arthritis. Says some fatigue  Pt has RLS and is taking 4 pills of requip and I will start helping the specialist here. Chest pain. No shortness of breath. No cough or sputum production  Taking medications regularly. No side effects noted. Review of Systems    OBJECTIVE: /72   Pulse 68   Temp 97.5 °F (36.4 °C) (Oral)   Resp 16   Wt 229 lb (103.9 kg)   LMP  (LMP Unknown)   SpO2 97%   BMI 39.31 kg/m²     Wt Readings from Last 3 Encounters:   20 229 lb (103.9 kg)   19 225 lb (102.1 kg)   10/03/19 218 lb (98.9 kg)      GENERAL: - Alert, oriented, pleasant, in no apparent distress. HEENT: - Conjunctiva pink, no scleral icterus. ENT clear. NECK: -Supple. No jugular venous distention noted. No masses felt,  CARDIOVASCULAR: - Normal S1 and S2    PULMONARY: - No respiratory distress. No wheezes or rales. ABDOMEN: - Soft and non-tender,no masses  ororganomegaly. EXTREMITIES: - No cyanosis, clubbing, or significant edema. Calf tenderness. Is no edema  SKIN: Skin is warm and dry. NEUROLOGICAL: - Cranial nerves II through XII are grossly intact. IMPRESSION:    Encounter Diagnoses   Name Primary?  Arthritis Yes    Restless leg syndrome     Visit for screening mammogram        ASSESSMENT/PLAN:    Arthritis : arthritis clinic : she declined steroid injections in the shoulder and right hip   RLS : change requip to XL 2 mg at hs. Keep follow-up appointment  Mediations reviewed with the patient. Continue current medications. Appropriate prescriptions are addressed. After visit summeryprovided. Follow up as directed sooner if needed.   Questions answered and patient verbalizes understanding. Call for any problems, questions, or concerns. No Known Allergies  Current Outpatient Medications   Medication Sig Dispense Refill    rOPINIRole (REQUIP) 0.5 MG tablet Take 0.5 mg at hs for 3 days and then 1.0 mg at hs for 3 days and then 1.5 mg at hs daily (Patient taking differently: Take 0.5 mg by mouth 3 times daily Take 0.5 mg at hs for 3 days and then 1.0 mg at hs for 3 days and then 1.5 mg at hs daily) 90 tablet 3    glipiZIDE (GLUCOTROL XL) 5 MG extended release tablet Take 1 tablet by mouth daily 30 tablet 3    metoprolol tartrate (LOPRESSOR) 25 MG tablet TAKE ONE TABLET BY MOUTH TWO TIMES A  tablet 1    losartan (COZAAR) 50 MG tablet Take 1 tablet by mouth daily (Patient taking differently: Take 25 mg by mouth 2 times daily ) 90 tablet 1    furosemide (LASIX) 20 MG tablet Take 1 tablet by mouth daily (Patient taking differently: Take 20 mg by mouth as needed Indications: per Dr. Edwin Camilo ) 90 tablet 1    potassium chloride (KLOR-CON M) 20 MEQ extended release tablet Take 1 tablet by mouth daily 90 tablet 1    Blood Glucose Monitoring Suppl (TRUE METRIX METER) JUSTO Dispense one meter, DX E11.9 testing once daily 1 Device 0    amLODIPine (NORVASC) 10 MG tablet Take 1 tablet by mouth daily 30 tablet 0    rosuvastatin (CRESTOR) 10 MG tablet Take 1 tablet by mouth nightly 90 tablet 1    hydroxychloroquine (PLAQUENIL) 200 MG tablet Take 200 mg by mouth 2 times daily       aspirin 81 MG EC tablet Take 1 tablet by mouth nightly.  30 tablet     Cholecalciferol (VITAMIN D PO) Take 5,000 Units by mouth three times daily       Coenzyme Q10 (CO Q 10 PO) Take 200 mg by mouth 2 times daily Indications: takes 400mg daily       blood glucose test strips (TRUE METRIX BLOOD GLUCOSE TEST) strip Dispense true metrix test strips, DX=E11.9, testing once daily 100 each 3    griseofulvin (GRIFULVIN V) 500 MG tablet Take 500 mg by mouth daily  1    blood glucose test strips drinks       LAB REVIEW:  CBC:   Lab Results   Component Value Date    WBC 6.8 09/02/2019    HGB 12.6 09/02/2019    HCT 37.7 09/02/2019     09/02/2019     Lipids:   Lab Results   Component Value Date    CHOL 121 08/07/2018    TRIG 128 08/07/2018    HDL 71 (H) 10/17/2019    LDLCALC 60 10/17/2019    LDLDIRECT 69 08/18/2017    TRIGLYCFAST 151 (H) 10/17/2019     Renal:   Lab Results   Component Value Date    BUN 13 10/17/2019    CREATININE 0.6 10/17/2019     10/17/2019    K 4.4 10/17/2019    K 3.0 03/08/2018    ALT 7 10/17/2019    AST 17 10/17/2019    GLUCOSE 140 11/06/2019     PT/INR:   Lab Results   Component Value Date    INR 1.00 01/11/2019     A1C:   Lab Results   Component Value Date    LABA1C 6.0 07/30/2019           Kalee Welch MD, 1/13/2020 , 1:21 PM

## 2020-01-31 RX ORDER — GLIPIZIDE 5 MG/1
5 TABLET, FILM COATED, EXTENDED RELEASE ORAL DAILY
Qty: 30 TABLET | Refills: 3 | Status: SHIPPED | OUTPATIENT
Start: 2020-01-31 | End: 2020-06-02

## 2020-02-03 ENCOUNTER — OFFICE VISIT (OUTPATIENT)
Dept: INTERNAL MEDICINE CLINIC | Age: 85
End: 2020-02-03
Payer: MEDICARE

## 2020-02-03 VITALS
DIASTOLIC BLOOD PRESSURE: 68 MMHG | HEART RATE: 82 BPM | BODY MASS INDEX: 39.87 KG/M2 | WEIGHT: 232.3 LBS | TEMPERATURE: 97.8 F | RESPIRATION RATE: 16 BRPM | OXYGEN SATURATION: 93 % | SYSTOLIC BLOOD PRESSURE: 136 MMHG

## 2020-02-03 LAB — HBA1C MFR BLD: 6.5 %

## 2020-02-03 PROCEDURE — 1090F PRES/ABSN URINE INCON ASSESS: CPT | Performed by: INTERNAL MEDICINE

## 2020-02-03 PROCEDURE — 4040F PNEUMOC VAC/ADMIN/RCVD: CPT | Performed by: INTERNAL MEDICINE

## 2020-02-03 PROCEDURE — G8484 FLU IMMUNIZE NO ADMIN: HCPCS | Performed by: INTERNAL MEDICINE

## 2020-02-03 PROCEDURE — 83036 HEMOGLOBIN GLYCOSYLATED A1C: CPT | Performed by: INTERNAL MEDICINE

## 2020-02-03 PROCEDURE — G8399 PT W/DXA RESULTS DOCUMENT: HCPCS | Performed by: INTERNAL MEDICINE

## 2020-02-03 PROCEDURE — G8427 DOCREV CUR MEDS BY ELIG CLIN: HCPCS | Performed by: INTERNAL MEDICINE

## 2020-02-03 PROCEDURE — 1036F TOBACCO NON-USER: CPT | Performed by: INTERNAL MEDICINE

## 2020-02-03 PROCEDURE — 1123F ACP DISCUSS/DSCN MKR DOCD: CPT | Performed by: INTERNAL MEDICINE

## 2020-02-03 PROCEDURE — 99214 OFFICE O/P EST MOD 30 MIN: CPT | Performed by: INTERNAL MEDICINE

## 2020-02-03 PROCEDURE — G8417 CALC BMI ABV UP PARAM F/U: HCPCS | Performed by: INTERNAL MEDICINE

## 2020-02-03 RX ORDER — ROSUVASTATIN CALCIUM 10 MG/1
10 TABLET, COATED ORAL NIGHTLY
Qty: 90 TABLET | Refills: 1 | Status: SHIPPED | OUTPATIENT
Start: 2020-02-03 | End: 2020-09-09 | Stop reason: SDUPTHER

## 2020-02-03 RX ORDER — LOSARTAN POTASSIUM 50 MG/1
50 TABLET ORAL DAILY
COMMUNITY
End: 2020-04-24

## 2020-02-03 RX ORDER — ROPINIROLE 1 MG/1
1 TABLET, FILM COATED ORAL 2 TIMES DAILY
Qty: 60 TABLET | Refills: 3 | Status: SHIPPED | OUTPATIENT
Start: 2020-02-03 | End: 2020-05-22

## 2020-02-03 NOTE — PROGRESS NOTES
Crys Thorpe  Patient's  is 1935  Seen in office on 2/3/2020      SUBJECTIVE:  Prakashette Slider anand 81 y. o.year old female presents today   Chief Complaint   Patient presents with    Diabetes     A1C=6.5     Patient is here for follow-up of diabetes, hypertension, hyperlipidemia and diarrhea  Patient was supposed to get a colonoscopy done but she refused she does not want a colonoscopy done. Diarrhea has resolved  Patient has diabetes mellitus blood sugars are stable. Patient has hypertension. Taking medications No headaches, no chest pain, no palpitations and no dizziness. Patient has hyperlipidemia. Taking medications. No abdominal pain, no nausea or vomiting. No myalgias. Taking medications regularly. No side effects noted. Review of Systems   Constitutional: Negative. HENT: Negative. Eyes: Negative. Respiratory: Negative. Cardiovascular: Negative. Gastrointestinal: Negative. Endocrine: Negative. Genitourinary: Negative. Musculoskeletal: Negative. Skin: Negative. Allergic/Immunologic: Negative. Neurological: Negative. Psychiatric/Behavioral: Negative. OBJECTIVE: /68   Pulse 82   Temp 97.8 °F (36.6 °C)   Resp 16   Wt 232 lb 4.8 oz (105.4 kg)   LMP  (LMP Unknown)   SpO2 93%   BMI 39.87 kg/m²     Wt Readings from Last 3 Encounters:   20 232 lb 4.8 oz (105.4 kg)   20 229 lb (103.9 kg)   19 225 lb (102.1 kg)      GENERAL: - Alert, oriented, pleasant, in no apparent distress. HEENT: - Conjunctiva pink, no scleral icterus. ENT clear. NECK: -Supple. No jugular venous distention noted. No masses felt,  CARDIOVASCULAR: - Normal S1 and S2    PULMONARY: - No respiratory distress. No wheezes or rales. ABDOMEN: - Soft and non-tender,no masses  ororganomegaly. EXTREMITIES: - No cyanosis, clubbing, or significant edema. SKIN: Skin is warm and dry. NEUROLOGICAL: - Cranial nerves II through XII are grossly intact. IMPRESSION:    Encounter Diagnoses   Name Primary?  Type 2 diabetes mellitus without complication, without long-term current use of insulin ([facility]) Yes    Mixed hyperlipidemia     Arthritis     CAD s/p MI, TPA in 1998     Chronic diastolic congestive heart failure (HCC)     Cyst, kidney, acquired     Dupuytren's contracture of right hand     Essential hypertension     Functional diarrhea     Gastrointestinal hemorrhage, unspecified gastrointestinal hemorrhage type     Morbid obesity with BMI of 40.0-44.9, adult ([facility])     Restless leg syndrome     Suspected sleep apnea        ASSESSMENT/PLAN:     Type 2 diabetes mellitus without complication ([facility] [address]  She has diabetes mellitus. Hemoglobin A1c is 6.5. Patient is not on metformin anymore. Taking glipizide and the blood sugars are stable. Arthritis  Patient is going to the arthritis clinic. She is taking Plaquenil for pain control    CAD s/p MI, TPA in 1998  Patient has history of coronary artery disease. Denies any angina. Is taking Crestor, beta-blocker and aspirin. Patient denies any angina    Chronic diastolic congestive heart failure ([facility] [address]  Patient is stable no angina no congestive heart failure symptoms    Cyst, kidney, acquired  Patient is a cyst in the kidney for some time and is stable    Dupuytren's contracture of right hand  She has contracture of the right hand. Essential hypertension  She has hypertension that is well controlled. Continue losartan and metoprolol    Functional diarrhea  Diarrhea has resolved now. Gastrointestinal hemorrhage  Does not want to get colonoscopy done. Discussed with patient detail that she has GI bleeding and the diarrhea needs colonoscopy but is still she is refusing. Mixed hyperlipidemia  Hyperlipidemia is stable. Follow low cholesterol diet. Continue current treatment. Patient is on crestor.     Morbid obesity with BMI of 40.0-44.9, adult ([facility] [address]  Diet and lose weight    Restless leg syndrome  Takes Requip 2 mg daily at night    Suspected sleep apnea  Pt declined sleep test       Diarrhea resolved. refused colonosocopy     Orders Placed This Encounter   Procedures    POCT glycosylated hemoglobin (Hb A1C)         Mediations reviewed with the patient. Continue current medications. Appropriate prescriptions are addressed. After visit summeryprovided. Follow up as directed sooner if needed. Questions answered and patient verbalizes understanding. Call for any problems, questions, or concerns. No Known Allergies  Current Outpatient Medications   Medication Sig Dispense Refill    losartan (COZAAR) 50 MG tablet Take 50 mg by mouth daily      glipiZIDE (GLUCOTROL XL) 5 MG extended release tablet Take 1 tablet by mouth daily 30 tablet 3    rOPINIRole (REQUIP XL) 2 MG extended release tablet Take 1 tablet by mouth nightly 30 tablet 3    metoprolol tartrate (LOPRESSOR) 25 MG tablet TAKE ONE TABLET BY MOUTH TWO TIMES A  tablet 1    furosemide (LASIX) 20 MG tablet Take 1 tablet by mouth daily (Patient taking differently: Take 20 mg by mouth as needed Indications: per Dr. Sofi Fonseca ) 90 tablet 1    potassium chloride (KLOR-CON M) 20 MEQ extended release tablet Take 1 tablet by mouth daily 90 tablet 1    Blood Glucose Monitoring Suppl (TRUE METRIX METER) JUSTO Dispense one meter, DX E11.9 testing once daily 1 Device 0    blood glucose test strips (TRUE METRIX BLOOD GLUCOSE TEST) strip Dispense true metrix test strips, DX=E11.9, testing once daily 100 each 3    rosuvastatin (CRESTOR) 10 MG tablet Take 1 tablet by mouth nightly 90 tablet 1    hydroxychloroquine (PLAQUENIL) 200 MG tablet Take 200 mg by mouth 2 times daily       blood glucose test strips (TRUETEST TEST) strip 1 each by Other route 2 times daily DX=E11.9 100 each 5    aspirin 81 MG EC tablet Take 1 tablet by mouth nightly.  30 tablet     Cholecalciferol (VITAMIN D PO) Take 5,000 Units by mouth three times daily      

## 2020-02-09 ASSESSMENT — ENCOUNTER SYMPTOMS
GASTROINTESTINAL NEGATIVE: 1
ALLERGIC/IMMUNOLOGIC NEGATIVE: 1
EYES NEGATIVE: 1
RESPIRATORY NEGATIVE: 1

## 2020-02-10 NOTE — ASSESSMENT & PLAN NOTE
She has diabetes mellitus. Hemoglobin A1c is 6.5. Patient is not on metformin anymore. Taking glipizide and the blood sugars are stable.

## 2020-02-10 NOTE — ASSESSMENT & PLAN NOTE
Does not want to get colonoscopy done. Discussed with patient detail that she has GI bleeding and the diarrhea needs colonoscopy but is still she is refusing.

## 2020-04-15 RX ORDER — POTASSIUM CHLORIDE 20 MEQ/1
20 TABLET, EXTENDED RELEASE ORAL DAILY
Qty: 90 TABLET | Refills: 1 | Status: SHIPPED | OUTPATIENT
Start: 2020-04-15 | End: 2020-09-24 | Stop reason: SDUPTHER

## 2020-04-16 RX ORDER — POTASSIUM CHLORIDE 20 MEQ/1
20 TABLET, EXTENDED RELEASE ORAL DAILY
Qty: 90 TABLET | Refills: 1 | OUTPATIENT
Start: 2020-04-16

## 2020-04-24 RX ORDER — LOSARTAN POTASSIUM 50 MG/1
50 TABLET ORAL DAILY
Qty: 90 TABLET | Refills: 1 | Status: SHIPPED
Start: 2020-04-24 | End: 2020-06-12 | Stop reason: SDUPTHER

## 2020-04-24 RX ORDER — LOSARTAN POTASSIUM 50 MG/1
TABLET ORAL
Qty: 90 TABLET | Refills: 1 | Status: SHIPPED | OUTPATIENT
Start: 2020-04-24 | End: 2020-09-24 | Stop reason: SDUPTHER

## 2020-04-30 ENCOUNTER — TELEPHONE (OUTPATIENT)
Dept: PRIMARY CARE CLINIC | Age: 85
End: 2020-04-30

## 2020-05-22 RX ORDER — ROPINIROLE 1 MG/1
1 TABLET, FILM COATED ORAL 2 TIMES DAILY
Qty: 60 TABLET | Refills: 3 | Status: SHIPPED | OUTPATIENT
Start: 2020-05-22 | End: 2020-09-09 | Stop reason: SDUPTHER

## 2020-05-22 RX ORDER — ROPINIROLE 1 MG/1
1 TABLET, FILM COATED ORAL 2 TIMES DAILY
Qty: 60 TABLET | Refills: 3 | OUTPATIENT
Start: 2020-05-22

## 2020-06-01 ENCOUNTER — TELEPHONE (OUTPATIENT)
Dept: INTERNAL MEDICINE CLINIC | Age: 85
End: 2020-06-01

## 2020-06-01 NOTE — TELEPHONE ENCOUNTER
Patient stated she started taking mucinex D3 due to not feeling well.  She stopped and wanted to make sure it was ok to take

## 2020-06-02 RX ORDER — GLIPIZIDE 5 MG/1
5 TABLET, FILM COATED, EXTENDED RELEASE ORAL DAILY
Qty: 30 TABLET | Refills: 3 | Status: SHIPPED | OUTPATIENT
Start: 2020-06-02 | End: 2020-09-24 | Stop reason: SDUPTHER

## 2020-06-05 ENCOUNTER — TELEPHONE (OUTPATIENT)
Dept: INTERNAL MEDICINE CLINIC | Age: 85
End: 2020-06-05

## 2020-06-05 RX ORDER — ALBUTEROL SULFATE 90 UG/1
2 AEROSOL, METERED RESPIRATORY (INHALATION) 4 TIMES DAILY PRN
Qty: 1 INHALER | Refills: 0 | Status: SHIPPED | OUTPATIENT
Start: 2020-06-05 | End: 2020-07-24 | Stop reason: SDUPTHER

## 2020-06-12 ENCOUNTER — VIRTUAL VISIT (OUTPATIENT)
Dept: INTERNAL MEDICINE CLINIC | Age: 85
End: 2020-06-12
Payer: MEDICARE

## 2020-06-12 VITALS — HEIGHT: 64 IN | WEIGHT: 232.37 LBS | BODY MASS INDEX: 39.67 KG/M2

## 2020-06-12 PROCEDURE — 99213 OFFICE O/P EST LOW 20 MIN: CPT | Performed by: INTERNAL MEDICINE

## 2020-06-12 PROCEDURE — 4040F PNEUMOC VAC/ADMIN/RCVD: CPT | Performed by: INTERNAL MEDICINE

## 2020-06-12 PROCEDURE — G0438 PPPS, INITIAL VISIT: HCPCS | Performed by: INTERNAL MEDICINE

## 2020-06-12 PROCEDURE — 1123F ACP DISCUSS/DSCN MKR DOCD: CPT | Performed by: INTERNAL MEDICINE

## 2020-06-12 SDOH — ECONOMIC STABILITY: TRANSPORTATION INSECURITY
IN THE PAST 12 MONTHS, HAS LACK OF TRANSPORTATION KEPT YOU FROM MEETINGS, WORK, OR FROM GETTING THINGS NEEDED FOR DAILY LIVING?: PATIENT DECLINED

## 2020-06-12 SDOH — ECONOMIC STABILITY: TRANSPORTATION INSECURITY
IN THE PAST 12 MONTHS, HAS THE LACK OF TRANSPORTATION KEPT YOU FROM MEDICAL APPOINTMENTS OR FROM GETTING MEDICATIONS?: PATIENT DECLINED

## 2020-06-12 SDOH — ECONOMIC STABILITY: INCOME INSECURITY: HOW HARD IS IT FOR YOU TO PAY FOR THE VERY BASICS LIKE FOOD, HOUSING, MEDICAL CARE, AND HEATING?: PATIENT DECLINED

## 2020-06-12 SDOH — ECONOMIC STABILITY: FOOD INSECURITY: WITHIN THE PAST 12 MONTHS, THE FOOD YOU BOUGHT JUST DIDN'T LAST AND YOU DIDN'T HAVE MONEY TO GET MORE.: PATIENT DECLINED

## 2020-06-12 SDOH — ECONOMIC STABILITY: FOOD INSECURITY: WITHIN THE PAST 12 MONTHS, YOU WORRIED THAT YOUR FOOD WOULD RUN OUT BEFORE YOU GOT MONEY TO BUY MORE.: PATIENT DECLINED

## 2020-06-12 ASSESSMENT — PATIENT HEALTH QUESTIONNAIRE - PHQ9
SUM OF ALL RESPONSES TO PHQ QUESTIONS 1-9: 2
SUM OF ALL RESPONSES TO PHQ QUESTIONS 1-9: 2

## 2020-06-12 ASSESSMENT — LIFESTYLE VARIABLES: HOW OFTEN DO YOU HAVE A DRINK CONTAINING ALCOHOL: 0

## 2020-06-12 NOTE — PATIENT INSTRUCTIONS
Personalized Preventive Plan for Michael Mac - 6/12/2020  Medicare offers a range of preventive health benefits. Some of the tests and screenings are paid in full while other may be subject to a deductible, co-insurance, and/or copay. Some of these benefits include a comprehensive review of your medical history including lifestyle, illnesses that may run in your family, and various assessments and screenings as appropriate. After reviewing your medical record and screening and assessments performed today your provider may have ordered immunizations, labs, imaging, and/or referrals for you. A list of these orders (if applicable) as well as your Preventive Care list are included within your After Visit Summary for your review. Other Preventive Recommendations:    · A preventive eye exam performed by an eye specialist is recommended every 1-2 years to screen for glaucoma; cataracts, macular degeneration, and other eye disorders. · A preventive dental visit is recommended every 6 months. · Try to get at least 150 minutes of exercise per week or 10,000 steps per day on a pedometer . · Order or download the FREE \"Exercise & Physical Activity: Your Everyday Guide\" from The "[employer]. Call 2-799.929.9657 or search The [employer] online. · You need 0661-5894 mg of calcium and 8824-7982 IU of vitamin D per day. It is possible to meet your calcium requirement with diet alone, but a vitamin D supplement is usually necessary to meet this goal.  · When exposed to the sun, use a sunscreen that protects against both UVA and UVB radiation with an SPF of 30 or greater. Reapply every 2 to 3 hours or after sweating, drying off with a towel, or swimming. · Always wear a seat belt when traveling in a car. Always wear a helmet when riding a bicycle or motorcycle.

## 2020-06-12 NOTE — PROGRESS NOTES
Daryle Cosier is a 81 y.o. female evaluated via telephone on 6/12/2020. Consent:  She and/or health care decision maker is aware that that she may receive a bill for this telephone service, depending on her insurance coverage, and has provided verbal consent to proceed: Yes      Documentation:  I communicated with the patient and/or health care decision maker about     Patient states she had cold symptoms about 10 days ago and states that she feels short of breath when she is doing well. She denies any pedal edema  She did vacuum today without much problem  She has requested albuterol inhaler because that has helped her in the past  She has not filled it yet. Patient denies any chest pain. No orthopnea. No pedal edema. She does complain of cramps in the leg bilaterally and she is using Requip up to 4 4 mg a day  Advised patient not to use with more than what is directed. Stay at 2 mg at bedtime  She wants the medicine to be changed when she comes for office visit in 2 weeks. Details of this discussion including any medical advice provided:     Diabetes mellitus in good control per patient  Restless leg syndrome still bothering her  Mild shortness of breath and requesting albuterol. Does not want any x-rays etc.    Advised patient to return to office in 2 weeks needs some blood test to be done      I affirm this is a Patient Initiated Episode with a Patient who has not had a related appointment within my department in the past 7 days or scheduled within the next 24 hours.     Patient identification was verified at the start of the visit: Yes    Total Time: minutes: 11-20 minutes    Note: not billable if this call serves to triage the patient into an appointment for the relevant concern      Amanda Yun

## 2020-06-16 ENCOUNTER — TELEPHONE (OUTPATIENT)
Dept: INTERNAL MEDICINE CLINIC | Age: 85
End: 2020-06-16

## 2020-06-23 ENCOUNTER — OFFICE VISIT (OUTPATIENT)
Dept: INTERNAL MEDICINE CLINIC | Age: 85
End: 2020-06-23
Payer: MEDICARE

## 2020-06-23 VITALS
DIASTOLIC BLOOD PRESSURE: 68 MMHG | SYSTOLIC BLOOD PRESSURE: 132 MMHG | OXYGEN SATURATION: 96 % | RESPIRATION RATE: 16 BRPM | HEART RATE: 76 BPM | TEMPERATURE: 97.9 F

## 2020-06-23 PROCEDURE — 1036F TOBACCO NON-USER: CPT | Performed by: INTERNAL MEDICINE

## 2020-06-23 PROCEDURE — 1123F ACP DISCUSS/DSCN MKR DOCD: CPT | Performed by: INTERNAL MEDICINE

## 2020-06-23 PROCEDURE — G8427 DOCREV CUR MEDS BY ELIG CLIN: HCPCS | Performed by: INTERNAL MEDICINE

## 2020-06-23 PROCEDURE — G8417 CALC BMI ABV UP PARAM F/U: HCPCS | Performed by: INTERNAL MEDICINE

## 2020-06-23 PROCEDURE — 99214 OFFICE O/P EST MOD 30 MIN: CPT | Performed by: INTERNAL MEDICINE

## 2020-06-23 PROCEDURE — G8399 PT W/DXA RESULTS DOCUMENT: HCPCS | Performed by: INTERNAL MEDICINE

## 2020-06-23 PROCEDURE — 36415 COLL VENOUS BLD VENIPUNCTURE: CPT | Performed by: INTERNAL MEDICINE

## 2020-06-23 PROCEDURE — 1090F PRES/ABSN URINE INCON ASSESS: CPT | Performed by: INTERNAL MEDICINE

## 2020-06-23 PROCEDURE — 4040F PNEUMOC VAC/ADMIN/RCVD: CPT | Performed by: INTERNAL MEDICINE

## 2020-06-23 ASSESSMENT — ENCOUNTER SYMPTOMS
ALLERGIC/IMMUNOLOGIC NEGATIVE: 1
GASTROINTESTINAL NEGATIVE: 1
EYES NEGATIVE: 1
SHORTNESS OF BREATH: 1

## 2020-06-23 NOTE — PROGRESS NOTES
Beatrice Lanes  Patient's  is 1935  Seen in office on 2020      SUBJECTIVE:  Tracee Garcia anand 81 y. o.year old female presents today   Chief Complaint   Patient presents with    Diabetes     BG this am 112 mg/dL     Pt is here for f/u of DM HTN arthritis   Patient has DM. No hypoglycemia. No numbness or weakness. No dizziness. Blood sugars are good at home. Today   Pt has chronic diastolic CHF> she is on lasix. Pt c/o GIORDANO when she is outside and walks more that usual. No SOB at home. Denies any chest pain. Mild edema. Patient has hypertension. Taking medications No headaches, no chest pain, no palpitations and no dizziness. No fever or chills. No h/o smoking. Pt requested albuterol inhaler few weeks ago and pt states it helps SOB>   No PFT      Taking medications regularly. No side effects noted. Review of Systems   Constitutional: Negative. HENT: Negative. Eyes: Negative. Respiratory: Positive for shortness of breath. Cardiovascular: Negative. Negative for chest pain, palpitations and leg swelling. Gastrointestinal: Negative. Endocrine: Negative. Genitourinary: Negative. Musculoskeletal: Negative. Skin: Negative. Allergic/Immunologic: Negative. Neurological: Negative. Hematological: Negative. Psychiatric/Behavioral: Negative. OBJECTIVE: /68   Pulse 76   Temp 97.9 °F (36.6 °C) (Oral)   Resp 16   LMP  (LMP Unknown)   SpO2 96%     Wt Readings from Last 3 Encounters:   20 232 lb 5.8 oz (105.4 kg)   20 232 lb 4.8 oz (105.4 kg)   20 229 lb (103.9 kg)      GENERAL: - Alert, oriented, pleasant, in no apparent distress. HEENT: - Conjunctiva pink, no scleral icterus. ENT clear. NECK: -Supple. No jugular venous distention noted. No masses felt,  CARDIOVASCULAR: - Normal S1 and S2    PULMONARY: - No respiratory distress. No wheezes or rales. ABDOMEN: - Soft and non-tender,no masses  ororganomegaly.   EXTREMITIES: - No of right hand    Return to office in 3 months. Mediations reviewed with the patient. Continue current medications. Appropriate prescriptions are addressed. After visit summeryprovided. Follow up as directed sooner if needed. Questions answered and patient verbalizes understanding. Call for any problems, questions, or concerns. No Known Allergies  Current Outpatient Medications   Medication Sig Dispense Refill    albuterol sulfate HFA (VENTOLIN HFA) 108 (90 Base) MCG/ACT inhaler Inhale 2 puffs into the lungs 4 times daily as needed for Wheezing 1 Inhaler 0    glipiZIDE (GLUCOTROL XL) 5 MG extended release tablet TAKE 1 TABLET BY MOUTH DAILY 30 tablet 3    rOPINIRole (REQUIP) 1 MG tablet TAKE 1 TABLET BY MOUTH 2 TIMES DAILY 60 tablet 3    losartan (COZAAR) 50 MG tablet TAKE 1 TABLET BY MOUTH DAILY 90 tablet 1    metoprolol tartrate (LOPRESSOR) 25 MG tablet TAKE ONE TABLET BY MOUTH TWO TIMES A  tablet 1    potassium chloride (KLOR-CON M) 20 MEQ extended release tablet Take 1 tablet by mouth daily 90 tablet 1    rosuvastatin (CRESTOR) 10 MG tablet Take 1 tablet by mouth nightly 90 tablet 1    furosemide (LASIX) 20 MG tablet Take 1 tablet by mouth daily (Patient taking differently: Take 20 mg by mouth as needed Indications: per Dr. Filipe Gonzalez ) 90 tablet 1    Blood Glucose Monitoring Suppl (TRUE METRIX METER) JUSTO Dispense one meter, DX E11.9 testing once daily 1 Device 0    blood glucose test strips (TRUE METRIX BLOOD GLUCOSE TEST) strip Dispense true metrix test strips, DX=E11.9, testing once daily 100 each 3    hydroxychloroquine (PLAQUENIL) 200 MG tablet Take 200 mg by mouth 2 times daily       blood glucose test strips (TRUETEST TEST) strip 1 each by Other route 2 times daily DX=E11.9 100 each 5    aspirin 81 MG EC tablet Take 1 tablet by mouth nightly.  30 tablet     Cholecalciferol (VITAMIN D PO) Take 5,000 Units by mouth 2 times daily as needed Indications: pt states take TID      [facility] Coenzyme Q10 (CO Q 10 PO) Take 200 mg by mouth 2 times daily Indications: takes 400mg daily        No current facility-administered medications for this visit. Past Medical History:   Diagnosis Date    Arthritis     left knee pain, sees Dr. Cindi Raymond CAD (coronary artery disease)     sees Dr. Francis Hernandez Chronic midline low back pain without sciatica 9/28/2016    Had PT in 2016   [address] Colonoscopy refused     discussed in 7/15    DM (diabetes mellitus), type 2 ([facility] [address] 02/2006    Dupuytren's contracture of right hand     Endometrial stripe increased 9/25/2014    Saw NP Teresitai Brunner. Was normal exam per pt.  Gastrointestinal hemorrhage 11/2/2015    Patient had GI bleeding. Colon refused. Discussed with patient in detail.  Glaucoma 4/1/2014    H/O echocardiogram 10/02/2014    Mildly depressed left ventricular function; ejection fraction of 45%. Moderate pulmonary hypertension.  H/O echocardiogram 08/28/2018    EF 50-55%.  Mitral annular calcification is present. No other significant valvulopathy seen.  History of nuclear stress test 08/28/2018    EF 59%. ECG portion of stress test is negative for ischemia by diagnostic criteria. fixed inferior large size severe defect in rest and stress images. Mild hubert infarct ischemia.  HTN (hypertension)     Hyperlipidemia     Kidney stone     hx-\"been about 3 yrs ago\"    MI (myocardial infarction) ([facility] [address] 02/1998    treated with TPA    Obesity     Pneumonia of right lower lobe due to infectious organism ([facility] [address] 4/5/2018    Vertigo     'have been having this since I had cataract surgery\"\"that is why they are doing the MRA of my brain(10/31/2014)     Past Surgical History:   Procedure Laterality Date    CATARACT REMOVAL Bilateral     5/14,8/14    INGUINAL HERNIA REPAIR Left 45 yrs ago     Social History     Tobacco Use    Smoking status: Never Smoker    Smokeless tobacco: Never Used   Substance Use Topics    Alcohol use:  No Alcohol/week: 0.0 standard drinks       LAB REVIEW:  CBC:   Lab Results   Component Value Date    WBC 6.8 09/02/2019    HGB 12.6 09/02/2019    HCT 37.7 09/02/2019     09/02/2019     Lipids:   Lab Results   Component Value Date    CHOL 121 08/07/2018    TRIG 128 08/07/2018    HDL 71 (H) 10/17/2019    LDLCALC 60 10/17/2019    LDLDIRECT 69 08/18/2017    TRIGLYCFAST 151 (H) 10/17/2019     Renal:   Lab Results   Component Value Date    BUN 13 10/17/2019    CREATININE 0.6 10/17/2019     10/17/2019    K 4.4 10/17/2019    K 3.0 03/08/2018    ALT 7 10/17/2019    AST 17 10/17/2019    GLUCOSE 140 11/06/2019     PT/INR:   Lab Results   Component Value Date    INR 1.00 01/11/2019     A1C:   Lab Results   Component Value Date    LABA1C 6.5 02/03/2020           Sara Boone MD, 6/23/2020 , 8:30 AM

## 2020-06-24 ENCOUNTER — TELEPHONE (OUTPATIENT)
Dept: INTERNAL MEDICINE CLINIC | Age: 85
End: 2020-06-24

## 2020-06-24 LAB
A/G RATIO: 1.8 (ref 1.1–2.2)
ALBUMIN SERPL-MCNC: 4 G/DL (ref 3.4–5)
ALP BLD-CCNC: 57 U/L (ref 40–129)
ALT SERPL-CCNC: <5 U/L (ref 10–40)
ANION GAP SERPL CALCULATED.3IONS-SCNC: 13 MMOL/L (ref 3–16)
AST SERPL-CCNC: 15 U/L (ref 15–37)
BASOPHILS ABSOLUTE: 0 K/UL (ref 0–0.2)
BASOPHILS RELATIVE PERCENT: 0.8 %
BILIRUB SERPL-MCNC: 0.3 MG/DL (ref 0–1)
BUN BLDV-MCNC: 13 MG/DL (ref 7–20)
CALCIUM SERPL-MCNC: 9.9 MG/DL (ref 8.3–10.6)
CHLORIDE BLD-SCNC: 95 MMOL/L (ref 99–110)
CHOLESTEROL, FASTING: 125 MG/DL (ref 0–199)
CO2: 28 MMOL/L (ref 21–32)
CREAT SERPL-MCNC: 0.6 MG/DL (ref 0.6–1.2)
EOSINOPHILS ABSOLUTE: 0.1 K/UL (ref 0–0.6)
EOSINOPHILS RELATIVE PERCENT: 1.8 %
ESTIMATED AVERAGE GLUCOSE: 131.2 MG/DL
GFR AFRICAN AMERICAN: >60
GFR NON-AFRICAN AMERICAN: >60
GLOBULIN: 2.2 G/DL
GLUCOSE BLD-MCNC: 110 MG/DL (ref 70–99)
HBA1C MFR BLD: 6.2 %
HCT VFR BLD CALC: 40.5 % (ref 36–48)
HDLC SERPL-MCNC: 50 MG/DL (ref 40–60)
HEMOGLOBIN: 13.6 G/DL (ref 12–16)
LDL CHOLESTEROL CALCULATED: 47 MG/DL
LYMPHOCYTES ABSOLUTE: 1.8 K/UL (ref 1–5.1)
LYMPHOCYTES RELATIVE PERCENT: 31.5 %
MCH RBC QN AUTO: 31.8 PG (ref 26–34)
MCHC RBC AUTO-ENTMCNC: 33.5 G/DL (ref 31–36)
MCV RBC AUTO: 94.9 FL (ref 80–100)
MONOCYTES ABSOLUTE: 0.8 K/UL (ref 0–1.3)
MONOCYTES RELATIVE PERCENT: 14.9 %
NEUTROPHILS ABSOLUTE: 2.9 K/UL (ref 1.7–7.7)
NEUTROPHILS RELATIVE PERCENT: 51 %
PDW BLD-RTO: 14.6 % (ref 12.4–15.4)
PLATELET # BLD: 194 K/UL (ref 135–450)
PMV BLD AUTO: 8.5 FL (ref 5–10.5)
POTASSIUM SERPL-SCNC: 4.6 MMOL/L (ref 3.5–5.1)
RBC # BLD: 4.27 M/UL (ref 4–5.2)
SODIUM BLD-SCNC: 136 MMOL/L (ref 136–145)
TOTAL PROTEIN: 6.2 G/DL (ref 6.4–8.2)
TRIGLYCERIDE, FASTING: 141 MG/DL (ref 0–150)
VLDLC SERPL CALC-MCNC: 28 MG/DL
WBC # BLD: 5.7 K/UL (ref 4–11)

## 2020-07-08 RX ORDER — FUROSEMIDE 20 MG/1
20 TABLET ORAL PRN
Qty: 30 TABLET | Refills: 3 | Status: SHIPPED | OUTPATIENT
Start: 2020-07-08 | End: 2021-03-08 | Stop reason: SDUPTHER

## 2020-07-24 RX ORDER — ALBUTEROL SULFATE 90 UG/1
2 AEROSOL, METERED RESPIRATORY (INHALATION) 4 TIMES DAILY PRN
Qty: 1 INHALER | Refills: 5 | Status: SHIPPED | OUTPATIENT
Start: 2020-07-24 | End: 2021-03-01

## 2020-07-27 ENCOUNTER — VIRTUAL VISIT (OUTPATIENT)
Dept: INTERNAL MEDICINE CLINIC | Age: 85
End: 2020-07-27
Payer: MEDICARE

## 2020-07-27 PROBLEM — S91.301A OPEN WOUND OF RIGHT FOOT: Status: ACTIVE | Noted: 2020-07-27

## 2020-07-27 PROCEDURE — 99442 PR PHYS/QHP TELEPHONE EVALUATION 11-20 MIN: CPT | Performed by: INTERNAL MEDICINE

## 2020-07-28 ENCOUNTER — HOSPITAL ENCOUNTER (EMERGENCY)
Age: 85
Discharge: HOME | End: 2020-07-28
Attending: EMERGENCY MEDICINE
Payer: MEDICARE

## 2020-07-28 VITALS
RESPIRATION RATE: 16 BRPM | BODY MASS INDEX: 39.27 KG/M2 | WEIGHT: 230 LBS | SYSTOLIC BLOOD PRESSURE: 125 MMHG | OXYGEN SATURATION: 99 % | HEART RATE: 74 BPM | HEIGHT: 64 IN | TEMPERATURE: 98.2 F | DIASTOLIC BLOOD PRESSURE: 66 MMHG

## 2020-07-28 PROCEDURE — 99282 EMERGENCY DEPT VISIT SF MDM: CPT

## 2020-07-28 PROCEDURE — 6370000000 HC RX 637 (ALT 250 FOR IP): Performed by: EMERGENCY MEDICINE

## 2020-07-28 RX ORDER — LIDOCAINE HYDROCHLORIDE 10 MG/ML
5 INJECTION, SOLUTION EPIDURAL; INFILTRATION; INTRACAUDAL; PERINEURAL ONCE
Status: DISCONTINUED | OUTPATIENT
Start: 2020-07-28 | End: 2020-07-28

## 2020-07-28 RX ORDER — DIAPER,BRIEF,INFANT-TODD,DISP
EACH MISCELLANEOUS ONCE
Status: COMPLETED | OUTPATIENT
Start: 2020-07-28 | End: 2020-07-28

## 2020-07-28 RX ADMIN — BACITRACIN: 500 OINTMENT TOPICAL at 10:00

## 2020-07-28 ASSESSMENT — PAIN DESCRIPTION - FREQUENCY: FREQUENCY: CONTINUOUS

## 2020-07-28 ASSESSMENT — PAIN DESCRIPTION - ORIENTATION: ORIENTATION: RIGHT

## 2020-07-28 ASSESSMENT — PAIN DESCRIPTION - DESCRIPTORS: DESCRIPTORS: THROBBING

## 2020-07-28 ASSESSMENT — PAIN DESCRIPTION - LOCATION: LOCATION: FOOT

## 2020-07-28 ASSESSMENT — PAIN DESCRIPTION - PAIN TYPE: TYPE: ACUTE PAIN

## 2020-07-28 ASSESSMENT — PAIN SCALES - GENERAL: PAINLEVEL_OUTOF10: 8

## 2020-07-28 NOTE — ED NOTES
Discharge instructions reviewed; patient verbalized understanding; patient transferred from ED via private vehicle.       Cami Cabrera RN  07/28/20 7000

## 2020-07-28 NOTE — ED PROVIDER NOTES
Triage Chief Complaint:   Other (states (put a bandaid on bottom of right foot. Tore skin off when took bandaid off. Spoke with PCP. Told she should call the squad and go to the ER). )    VESTA Bass is a 81 y.o. female that presents to the ED complaint of a wound to the bottom of her foot. She took a Band-Aid toward off she has a laceration. She is concerned because she is diabetic. She was told to call 911 but came to the ED. Her doctor called her in a topical antibiotic cream.  Pain is minimal.  She has no history of known diabetic neuropathy. Her sensation is decreased at the bedside however    Past Medical History:   Diagnosis Date    Arthritis     left knee pain, sees Dr. Maeve Comer CAD (coronary artery disease)     sees Dr. Kaushal Khan Chronic midline low back pain without sciatica 9/28/2016    Had PT in 2016   [address] Colonoscopy refused     discussed in 7/15    DM (diabetes mellitus), type 2 ([facility] [address] 02/2006    Dupuytren's contracture of right hand     Endometrial stripe increased 9/25/2014    Saw NP Justine Adame. Was normal exam per pt.  Gastrointestinal hemorrhage 11/2/2015    Patient had GI bleeding. Colon refused. Discussed with patient in detail.  Glaucoma 4/1/2014    H/O echocardiogram 10/02/2014    Mildly depressed left ventricular function; ejection fraction of 45%. Moderate pulmonary hypertension.  H/O echocardiogram 08/28/2018    EF 50-55%.  Mitral annular calcification is present. No other significant valvulopathy seen.  History of nuclear stress test 08/28/2018    EF 59%. ECG portion of stress test is negative for ischemia by diagnostic criteria. fixed inferior large size severe defect in rest and stress images. Mild hubert infarct ischemia.     HTN (hypertension)     Hyperlipidemia     Kidney stone     hx-\"been about 3 yrs ago\"    MI (myocardial infarction) ([facility] [address] 02/1998    treated with TPA    Obesity     Pneumonia of right lower lobe due to infectious organism [facility]) 4/5/2018    Vertigo     'have been having this since I had cataract surgery\"\"that is why they are doing the MRA of my brain(10/31/2014)     Past Surgical History:   Procedure Laterality Date    CATARACT REMOVAL Bilateral     5/14,8/14    INGUINAL HERNIA REPAIR Left 39 yrs ago     Family History   Problem Relation Age of Onset   [facility] Parkinsonism Mother     Heart Attack Father     Heart Disease Father     Stroke Sister     Cancer Sister         breast ca    High Blood Pressure Brother     High Blood Pressure Brother     Cancer Brother         \"stomach cancer\"    Colon Cancer Neg Hx     Stomach Cancer Neg Hx      Social History     Socioeconomic History    Marital status: Single     Spouse name: Not on file    Number of children: Not on file    Years of education: Not on file    Highest education level: Not on file   Occupational History    Not on file   Social Needs    Financial resource strain: Patient refused    Food insecurity     Worry: Patient refused     Inability: Patient refused    Transportation needs     Medical: Patient refused     Non-medical: Patient refused   Tobacco Use    Smoking status: Never Smoker    Smokeless tobacco: Never Used   Substance and Sexual Activity    Alcohol use: No     Alcohol/week: 0.0 standard drinks    Drug use: No    Sexual activity: Not Currently     Partners: Male   Lifestyle    Physical activity     Days per week: Not on file     Minutes per session: Not on file    Stress: Not on file   Relationships    Social connections     Talks on phone: Not on file     Gets together: Not on file     Attends [employer] service: Not on file     Active member of club or organization: Not on file     Attends meetings of clubs or organizations: Not on file     Relationship status: Not on file    Intimate partner violence     Fear of current or ex partner: Not on file     Emotionally abused: Not on file     Physically abused: Not on file Forced sexual activity: Not on file   Other Topics Concern    Not on file   Social History Narrative    Not on file     Current Facility-Administered Medications   Medication Dose Route Frequency Provider Last Rate Last Dose    bacitracin zinc ointment   Topical Once Eula Antoine, DO         Current Outpatient Medications   Medication Sig Dispense Refill    mupirocin (BACTROBAN) 2 % ointment Apply 3 times daily. 30 g 0    albuterol sulfate HFA (VENTOLIN HFA) 108 (90 Base) MCG/ACT inhaler Inhale 2 puffs into the lungs 4 times daily as needed for Wheezing 1 Inhaler 5    furosemide (LASIX) 20 MG tablet Take 1 tablet by mouth as needed (prn) Indications: per Dr. Modesto Murphy 30 tablet 3    UNABLE TO FIND Hand brace. 1 Units 0    glipiZIDE (GLUCOTROL XL) 5 MG extended release tablet TAKE 1 TABLET BY MOUTH DAILY 30 tablet 3    losartan (COZAAR) 50 MG tablet TAKE 1 TABLET BY MOUTH DAILY 90 tablet 1    metoprolol tartrate (LOPRESSOR) 25 MG tablet TAKE ONE TABLET BY MOUTH TWO TIMES A  tablet 1    potassium chloride (KLOR-CON M) 20 MEQ extended release tablet Take 1 tablet by mouth daily 90 tablet 1    rosuvastatin (CRESTOR) 10 MG tablet Take 1 tablet by mouth nightly 90 tablet 1    Blood Glucose Monitoring Suppl (TRUE METRIX METER) JUSTO Dispense one meter, DX E11.9 testing once daily 1 Device 0    blood glucose test strips (TRUE METRIX BLOOD GLUCOSE TEST) strip Dispense true metrix test strips, DX=E11.9, testing once daily 100 each 3    hydroxychloroquine (PLAQUENIL) 200 MG tablet Take 200 mg by mouth 2 times daily       blood glucose test strips (TRUETEST TEST) strip 1 each by Other route 2 times daily DX=E11.9 100 each 5    aspirin 81 MG EC tablet Take 1 tablet by mouth nightly.  30 tablet     Cholecalciferol (VITAMIN D PO) Take 5,000 Units by mouth 2 times daily as needed Indications: pt states take TID       Coenzyme Q10 (CO Q 10 PO) Take 200 mg by mouth 2 times daily Indications: takes 400mg daily  rOPINIRole (REQUIP) 1 MG tablet TAKE 1 TABLET BY MOUTH 2 TIMES DAILY 60 tablet 3     No Known Allergies      ROS:    Review of Systems   Skin: Positive for wound. All other systems reviewed and are negative. Nursing Notes Reviewed    Physical Exam:  ED Triage Vitals [07/28/20 0843]   Enc Vitals Group      /66      Pulse 74      Resp 16      Temp 98.2 °F (36.8 °C)      Temp Source Oral      SpO2 99 %      Weight 230 lb (104.3 kg)      Height 5' 4\" (1.626 m)      Head Circumference       Peak Flow       Pain Score       Pain Loc       Pain Edu? Excl. in [address]? Physical Exam  Vitals signs and nursing note reviewed. Exam conducted with a chaperone present. Constitutional:       Appearance: She is well-developed. She is obese. HENT:      Head: Normocephalic and atraumatic. Eyes:      Pupils: Pupils are equal, round, and reactive to light. Neck:      Musculoskeletal: Normal range of motion and neck supple. Musculoskeletal: Normal range of motion. Comments: Right foot reveals normal pulses decreased sensation. She has some cracking to the bottom of her foot she has a large superficial area of skin loss in the mid plantar aspect around the second and third metatarsal phalangeal joint region. There is no active bleeding. No retained foreign body. No deformity   Skin:     General: Skin is warm and dry. Neurological:      Mental Status: She is alert and oriented to person, place, and time. I have reviewed and interpreted all of the currently available lab results from this visit (ifapplicable):  No results found for this visit on 07/28/20.    Radiographs (if obtained):  [] The following radiograph wasinterpreted by myself in the absence of a radiologist:   [] Radiologist's Report Reviewed:  No orders to display         EKG (if obtained): (All EKG's are interpreted by myself in the absence of a cardiologist)    Chart review shows recent radiographs:  No results DO Jenna, FACEP      Comment: Please note this report has been produced using speech recognition software and maycontain errors related to that system including errors in grammar, punctuation, and spelling, as well as words and phrases that may be inappropriate. If there are any questions or concerns please feel free to contact thedictating provider for clarification.         Vinicio Hester DO  07/28/20 [id number]

## 2020-07-30 ENCOUNTER — OFFICE VISIT (OUTPATIENT)
Dept: INTERNAL MEDICINE CLINIC | Age: 85
End: 2020-07-30
Payer: MEDICARE

## 2020-07-30 VITALS
RESPIRATION RATE: 20 BRPM | TEMPERATURE: 97 F | SYSTOLIC BLOOD PRESSURE: 148 MMHG | DIASTOLIC BLOOD PRESSURE: 88 MMHG | WEIGHT: 244 LBS | HEART RATE: 66 BPM | BODY MASS INDEX: 41.88 KG/M2 | OXYGEN SATURATION: 96 %

## 2020-07-30 PROCEDURE — 1123F ACP DISCUSS/DSCN MKR DOCD: CPT | Performed by: INTERNAL MEDICINE

## 2020-07-30 PROCEDURE — G8399 PT W/DXA RESULTS DOCUMENT: HCPCS | Performed by: INTERNAL MEDICINE

## 2020-07-30 PROCEDURE — G8427 DOCREV CUR MEDS BY ELIG CLIN: HCPCS | Performed by: INTERNAL MEDICINE

## 2020-07-30 PROCEDURE — 1090F PRES/ABSN URINE INCON ASSESS: CPT | Performed by: INTERNAL MEDICINE

## 2020-07-30 PROCEDURE — 99213 OFFICE O/P EST LOW 20 MIN: CPT | Performed by: INTERNAL MEDICINE

## 2020-07-30 PROCEDURE — 4040F PNEUMOC VAC/ADMIN/RCVD: CPT | Performed by: INTERNAL MEDICINE

## 2020-07-30 PROCEDURE — 1036F TOBACCO NON-USER: CPT | Performed by: INTERNAL MEDICINE

## 2020-07-30 PROCEDURE — G8417 CALC BMI ABV UP PARAM F/U: HCPCS | Performed by: INTERNAL MEDICINE

## 2020-07-30 RX ORDER — CEPHALEXIN 500 MG/1
500 CAPSULE ORAL 2 TIMES DAILY
Qty: 20 CAPSULE | Refills: 0 | Status: SHIPPED | OUTPATIENT
Start: 2020-07-30 | End: 2020-09-21

## 2020-07-30 NOTE — PROGRESS NOTES
initial encounter Yes    Type 2 diabetes mellitus without complication, without long-term current use of insulin ([facility])        ASSESSMENT/PLAN:  Keflex 500 mg bid   Refer to wound clinic  Keep the wound clean  Discussed with patient at length as she has a wound in the foot and has a diabetes mellitus she needs to see the wound clinic for further management. Control the blood sugars better  Wound clinic to follow the wound if any problems she can call back. Advised patient to keep the follow-up appointment in September 2020    Mediations reviewed with the patient. Continue current medications. Appropriate prescriptions are addressed. After visit summeryprovided. Follow up as directed sooner if needed. Questions answered and patient verbalizes understanding. Call for any problems, questions, or concerns. No Known Allergies  Current Outpatient Medications   Medication Sig Dispense Refill    mupirocin (BACTROBAN) 2 % ointment Apply 3 times daily. 30 g 0    albuterol sulfate HFA (VENTOLIN HFA) 108 (90 Base) MCG/ACT inhaler Inhale 2 puffs into the lungs 4 times daily as needed for Wheezing 1 Inhaler 5    furosemide (LASIX) 20 MG tablet Take 1 tablet by mouth as needed (prn) Indications: per Dr. Pardo Fam 30 tablet 3    glipiZIDE (GLUCOTROL XL) 5 MG extended release tablet TAKE 1 TABLET BY MOUTH DAILY 30 tablet 3    rOPINIRole (REQUIP) 1 MG tablet TAKE 1 TABLET BY MOUTH 2 TIMES DAILY 60 tablet 3    losartan (COZAAR) 50 MG tablet TAKE 1 TABLET BY MOUTH DAILY 90 tablet 1    metoprolol tartrate (LOPRESSOR) 25 MG tablet TAKE ONE TABLET BY MOUTH TWO TIMES A  tablet 1    potassium chloride (KLOR-CON M) 20 MEQ extended release tablet Take 1 tablet by mouth daily 90 tablet 1    rosuvastatin (CRESTOR) 10 MG tablet Take 1 tablet by mouth nightly 90 tablet 1    hydroxychloroquine (PLAQUENIL) 200 MG tablet Take 200 mg by mouth daily       aspirin 81 MG EC tablet Take 1 tablet by mouth nightly.  30 tablet    

## 2020-08-03 ENCOUNTER — HOSPITAL ENCOUNTER (OUTPATIENT)
Dept: WOUND CARE | Age: 85
Discharge: HOME | End: 2020-08-03
Payer: MEDICARE

## 2020-08-03 VITALS
TEMPERATURE: 98.4 F | DIASTOLIC BLOOD PRESSURE: 66 MMHG | SYSTOLIC BLOOD PRESSURE: 152 MMHG | HEART RATE: 66 BPM | RESPIRATION RATE: 18 BRPM

## 2020-08-03 PROBLEM — L97.512: Status: ACTIVE | Noted: 2020-08-03

## 2020-08-03 PROCEDURE — 99213 OFFICE O/P EST LOW 20 MIN: CPT

## 2020-08-03 ASSESSMENT — PAIN SCALES - GENERAL: PAINLEVEL_OUTOF10: 8

## 2020-08-03 ASSESSMENT — PAIN DESCRIPTION - LOCATION: LOCATION: FOOT

## 2020-08-03 ASSESSMENT — PAIN DESCRIPTION - ORIENTATION: ORIENTATION: RIGHT

## 2020-08-03 ASSESSMENT — PAIN DESCRIPTION - PAIN TYPE: TYPE: ACUTE PAIN

## 2020-08-03 NOTE — PROGRESS NOTES
[facility] Initial Visit      Jayshree Tate  AGE: 81 y.o. GENDER: female  : 1935  EPISODE DATE:  8/3/2020   Referred by:Rafa Doyle MD    Subjective:     CHIEF COMPLAINT ulcer right foot     HISTORY of PRESENT ILLNESS      Rocky Chaudhari is a 81 y.o. female who presents to the [address] [place] [address] for an initial visit for evaluation and treatment of Acute traumatic  ulcer(s) of  Rt. foot planter lateral.  The condition is of moderate severity. The ulcer has been present for 1 weeks. The underlying cause is thought to be trauma. The patients care to date has included bactroban oint. . The patient has significant underlying medical conditions as below. Wound Pain Timing/Severity: mild  Quality of pain: dull  Severity of pain:  2 / 10   Modifying Factors: diabetes  Associated Signs/Symptoms: none        PAST MEDICAL HISTORY        Diagnosis Date    Arthritis     left knee pain, sees Dr. Caio Luciano CAD (coronary artery disease)     sees Dr. Orlando Gutierrez Chronic midline low back pain without sciatica 2016    Had PT in    [facility] Colonoscopy refused     discussed in 7/15    DM (diabetes mellitus), type 2 ([facility] 2006    Dupuytren's contracture of right hand     Endometrial stripe increased 2014    Saw NP Dionne Bruno. Was normal exam per pt.  Gastrointestinal hemorrhage 2015    Patient had GI bleeding. Colon refused. Discussed with patient in detail.  Glaucoma 2014    H/O echocardiogram 10/02/2014    Mildly depressed left ventricular function; ejection fraction of 45%. Moderate pulmonary hypertension.  H/O echocardiogram 2018    EF 50-55%.  Mitral annular calcification is present. No other significant valvulopathy seen.  History of nuclear stress test 2018    EF 59%. ECG portion of stress test is negative for ischemia by diagnostic criteria. fixed inferior large size severe defect in rest and stress images. Mild hubert infarct ischemia.     HTN (hypertension)     Hyperlipidemia     Kidney stone     hx-\"been about 3 yrs ago\"    MI (myocardial infarction) ([facility] 02/1998    treated with TPA    Obesity     Pneumonia of right lower lobe due to infectious organism ([facility] 4/5/2018    Vertigo     'have been having this since I had cataract surgery\"\"that is why they are doing the MRA of my brain(10/31/2014)    WD-Traumatic ulcer of foot, right, with fat layer exposed ([facility] 8/3/2020       PAST SURGICAL HISTORY    Past Surgical History:   Procedure Laterality Date    CATARACT REMOVAL Bilateral     5/14,8/14    INGUINAL HERNIA REPAIR Left 39 yrs ago       FAMILY HISTORY    Family History   Problem Relation Age of Onset    Parkinsonism Mother     Heart Attack Father     Heart Disease Father     Stroke Sister     Cancer Sister         breast ca    High Blood Pressure Brother     High Blood Pressure Brother     Cancer Brother         \"stomach cancer\"    Colon Cancer Neg Hx     Stomach Cancer Neg Hx        SOCIAL HISTORY    Social History     Tobacco Use    Smoking status: Never Smoker    Smokeless tobacco: Never Used   Substance Use Topics    Alcohol use: No     Alcohol/week: 0.0 standard drinks    Drug use: No       ALLERGIES    No Known Allergies    MEDICATIONS    Current Outpatient Medications on File Prior to Encounter   Medication Sig Dispense Refill    cephALEXin (KEFLEX) 500 MG capsule Take 1 capsule by mouth 2 times daily 20 capsule 0    mupirocin (BACTROBAN) 2 % ointment Apply 3 times daily. 30 g 0    albuterol sulfate HFA (VENTOLIN HFA) 108 (90 Base) MCG/ACT inhaler Inhale 2 puffs into the lungs 4 times daily as needed for Wheezing 1 Inhaler 5    furosemide (LASIX) 20 MG tablet Take 1 tablet by mouth as needed (prn) Indications: per Dr. Barbara De Dios 30 tablet 3    UNABLE TO FIND Hand brace.  1 Units 0    glipiZIDE (GLUCOTROL XL) 5 MG extended release tablet TAKE 1 TABLET BY MOUTH DAILY 30 tablet 3    rOPINIRole (REQUIP) 1 MG tablet TAKE 1 TABLET BY MOUTH 2 TIMES DAILY 60 tablet 3    losartan (COZAAR) 50 MG tablet TAKE 1 TABLET BY MOUTH DAILY 90 tablet 1    metoprolol tartrate (LOPRESSOR) 25 MG tablet TAKE ONE TABLET BY MOUTH TWO TIMES A  tablet 1    potassium chloride (KLOR-CON M) 20 MEQ extended release tablet Take 1 tablet by mouth daily 90 tablet 1    rosuvastatin (CRESTOR) 10 MG tablet Take 1 tablet by mouth nightly 90 tablet 1    Blood Glucose Monitoring Suppl (TRUE METRIX METER) JUSTO Dispense one meter, DX E11.9 testing once daily 1 Device 0    blood glucose test strips (TRUE METRIX BLOOD GLUCOSE TEST) strip Dispense true metrix test strips, DX=E11.9, testing once daily 100 each 3    hydroxychloroquine (PLAQUENIL) 200 MG tablet Take 200 mg by mouth daily       blood glucose test strips (TRUETEST TEST) strip 1 each by Other route 2 times daily DX=E11.9 100 each 5    aspirin 81 MG EC tablet Take 1 tablet by mouth nightly. 30 tablet     Cholecalciferol (VITAMIN D PO) Take 5,000 Units by mouth 2 times daily as needed Indications: pt states take TID       Coenzyme Q10 (CO Q 10 PO) Take 200 mg by mouth 2 times daily Indications: takes 400mg daily        No current facility-administered medications on file prior to encounter.         PROBLEM LIST    Patient Active Problem List   Diagnosis    Essential hypertension    Mixed hyperlipidemia    Type 2 diabetes mellitus without complication ([facility]    Dupuytren's contracture of right hand    Arthritis    CAD s/p MI, TPA in 1998    Gastrointestinal hemorrhage    Cyst, kidney, acquired    Chronic midline low back pain without sciatica    Restless leg syndrome    Anxiety    Chronic diastolic congestive heart failure ([address]    Suspected sleep apnea    Morbid obesity with BMI of 40.0-44.9, adult ([address]    Functional diarrhea    Open wound of right foot    WD-Traumatic ulcer of foot, right, with fat layer exposed ([address]       REVIEW OF SYSTEMS          Objective:      BP (!) 152/66 08/03/20 1004   Red%Wound Bed 100 08/03/20 1004   Yellow%Wound Bed 0 08/03/20 1004   Black%Wound Bed 0 08/03/20 1004   Purple%Wound Bed 0 08/03/20 1004   Other%Wound Bed 0 08/03/20 1004   Number of days: 0         Plan:     Discharge instructions:    Discharge Instructions       PHYSICIAN ORDERS AND DISCHARGE INSTRUCTIONS    NOTE: Upon discharge from the [address], you will receive a patient experience survey. We would be grateful if you would take the time to fill this survey out. Wound care order history:     SULTANA's   Right       Left    Date    Vascular studies:   Date    Imaging:   Date    Cultures:   Date    Labs/ HbA1c:   6/23/20 6.2   Grafts:  Date    HBO:     Antibiotics:               Earlier Wound care treatments:                Authorizations:      Consults:   Date     PCP: Yanira    Continuing wound care orders and information:              Residence:               Continue home health care with:    Your wound-care supplies will be provided by: Adarsh BRENNAN provider:   Compression with   Off loading:  Date    Wound Meds:    Wound cleansing:      Do not scrub or use excessive force. Wash hands with soap and water before and after dressing changes. Prior to applying a clean dressing, cleanse wound with normal saline,    wound cleanser, or mild soap and water. Ask your physician or nurse before getting the wound(s) wet in the shower. Daily Wound management:    Keep weight off wounds and reposition every 2 hours. Avoid standing for long periods of time. Apply wraps/stockings in AM and remove at bedtime. Elevate legs to the level of the heart or above for 30 minutes 4-5 times a day and/or when sitting. When taking antibiotics take entire prescription as ordered by MD do not stop taking until medicine is all gone. Orders for this week (8/3/20): Right plantar- Was with mild soap and water. Apply A&D to foot and leg.  Apply bactroban to wound bed, cover with saline dampened

## 2020-08-04 NOTE — CARE COORDINATION
[facility] Transitions Follow Up Call    2019    Patient: Cecilia Cooper  Patient : 1935   MRN: [id number]  Reason for Admission: Hyponatremia; Dehydration; Hx-CHF, DM  Discharge Date: 19 RARS: Readmission Risk Score: 25    Spoke with: Patient  Care Transitions Subsequent and Final Call    Schedule Follow Up Appointment with PCP:  Declined  Subsequent and Final Calls  Do you have any ongoing symptoms?:  Yes  Onset of Patient-reported symptoms:  Other  Patient-reported symptoms:  Cough  Interventions for patient-reported symptoms:  Other  Have your medications changed?:  No  Do you have any questions related to your medications?:  No  Do you currently have any active services?:  Yes  Are you currently active with any services?:  Home Health  Do you have any needs or concerns that I can assist you with?:  No  Identified Barriers: Other, Lack of Education  Care Transitions Interventions  No Identified Needs  Other Interventions: Follow Up:  Spoke w/ Patient for Care Transition. Reports ongoing non-productive cough. Denies acute distress, sob, wheezing, orthopnea, edema, wt gain, chest discomfort, dizziness, muscle cramping. Reports taking meds as directed. Denies issue w/ cost of rx copays. Reviewed CHF/DM Zone Mgt. Patient reports she was given a new scale however has not been weighing daily. Discussed importance of daily weights, keeping written wt log w/ any accompanying sx; reviewed sx and wt flucuations which need to be reported to MD as can be a sign of exacerbation. Reports check blood sugar daily w/ average mid 90's to 107. Reports adhering to low sodium/low carb diet however reports \"I eat a lot of soups\". Discussed high sodium content in canned soups/vegetables, lunch meats, fast food. Encouraged fresh produce.       Mailed the following education to home:  -DM Zone Mgt  -Learning About Diabetes Food Guidelines  -Learning About Meal Planning for Diabetes  -CHF Zone
1

## 2020-08-10 ENCOUNTER — HOSPITAL ENCOUNTER (OUTPATIENT)
Dept: WOUND CARE | Age: 85
Discharge: HOME | End: 2020-08-10
Payer: MEDICARE

## 2020-08-10 VITALS
TEMPERATURE: 98 F | RESPIRATION RATE: 17 BRPM | SYSTOLIC BLOOD PRESSURE: 161 MMHG | HEART RATE: 65 BPM | DIASTOLIC BLOOD PRESSURE: 81 MMHG

## 2020-08-10 PROCEDURE — 99213 OFFICE O/P EST LOW 20 MIN: CPT

## 2020-08-10 NOTE — PROGRESS NOTES
Wound Care Center Progress Note       Peri Tate  AGE: 81 y.o. GENDER: female  : 1935  TODAY'S DATE:  8/10/2020        Subjective:     Chief Complaint   Patient presents with    Wound Check     right foot         HISTORY of PRESENT ILLNESS     Enriqueta Dupree is a 81 y.o. female who presents today for wound evaluation of Chronic diabetic and pressure wound(s) of Rt. foot planter. The wound is of moderate severity. The underlying cause of the wound is pressure. diabetes  Wound Pain Timing/Severity: none  Quality of pain: N/A  Severity of pain:  0 / 10   Modifying Factors: diabetes and chronic pressure  Associated Signs/Symptoms: none        PAST MEDICAL HISTORY        Diagnosis Date    Arthritis     left knee pain, sees Dr. Garrett Grant CAD (coronary artery disease)     sees Dr. Queenie Scott Chronic midline low back pain without sciatica 2016    Had PT in    [address] [facility] Colonoscopy refused     discussed in 7/15    DM (diabetes mellitus), type 2 ([facility] 2006    Dupuytren's contracture of right hand     Endometrial stripe increased 2014    Saw NP Mely Doll. Was normal exam per pt.  Gastrointestinal hemorrhage 2015    Patient had GI bleeding. Colon refused. Discussed with patient in detail.  Glaucoma 2014    H/O echocardiogram 10/02/2014    Mildly depressed left ventricular function; ejection fraction of 45%. Moderate pulmonary hypertension.  H/O echocardiogram 2018    EF 50-55%.  Mitral annular calcification is present. No other significant valvulopathy seen.  History of nuclear stress test 2018    EF 59%. ECG portion of stress test is negative for ischemia by diagnostic criteria. fixed inferior large size severe defect in rest and stress images. Mild hubert infarct ischemia.     HTN (hypertension)     Hyperlipidemia     Kidney stone     hx-\"been about 3 yrs ago\"    MI (myocardial infarction) ([facility] 02/1998    treated with TPA    Obesity     Pneumonia of right lower lobe due to infectious organism ([facility] 4/5/2018    Vertigo     'have been having this since I had cataract surgery\"\"that is why they are doing the MRA of my brain(10/31/2014)    WD-Traumatic ulcer of foot, right, with fat layer exposed ([facility] 8/3/2020       PAST SURGICAL HISTORY    Past Surgical History:   Procedure Laterality Date    CATARACT REMOVAL Bilateral     5/14,8/14    INGUINAL HERNIA REPAIR Left 39 yrs ago       FAMILY HISTORY    Family History   Problem Relation Age of Onset    Parkinsonism Mother     Heart Attack Father     Heart Disease Father     Stroke Sister     Cancer Sister         breast ca    High Blood Pressure Brother     High Blood Pressure Brother     Cancer Brother         \"stomach cancer\"    Colon Cancer Neg Hx     Stomach Cancer Neg Hx        SOCIAL HISTORY    Social History     Tobacco Use    Smoking status: Never Smoker    Smokeless tobacco: Never Used   Substance Use Topics    Alcohol use: No     Alcohol/week: 0.0 standard drinks    Drug use: No       ALLERGIES    No Known Allergies    MEDICATIONS    Current Outpatient Medications on File Prior to Encounter   Medication Sig Dispense Refill    cephALEXin (KEFLEX) 500 MG capsule Take 1 capsule by mouth 2 times daily 20 capsule 0    albuterol sulfate HFA (VENTOLIN HFA) 108 (90 Base) MCG/ACT inhaler Inhale 2 puffs into the lungs 4 times daily as needed for Wheezing 1 Inhaler 5    furosemide (LASIX) 20 MG tablet Take 1 tablet by mouth as needed (prn) Indications: per Dr. De La Cruz [place] 30 tablet 3    UNABLE TO FIND Hand brace.  1 Units 0    glipiZIDE (GLUCOTROL XL) 5 MG extended release tablet TAKE 1 TABLET BY MOUTH DAILY 30 tablet 3    rOPINIRole (REQUIP) 1 MG tablet TAKE 1 TABLET BY MOUTH 2 TIMES DAILY 60 tablet 3    losartan (COZAAR) 50 MG tablet TAKE 1 TABLET BY MOUTH DAILY 90 tablet 1    metoprolol tartrate (LOPRESSOR) 25 MG tablet TAKE ONE TABLET BY MOUTH TWO TIMES A  tablet 1    potassium chloride (KLOR-CON M) 20 MEQ extended release tablet Take 1 tablet by mouth daily 90 tablet 1    rosuvastatin (CRESTOR) 10 MG tablet Take 1 tablet by mouth nightly 90 tablet 1    Blood Glucose Monitoring Suppl (TRUE METRIX METER) JUSTO Dispense one meter, DX E11.9 testing once daily 1 Device 0    blood glucose test strips (TRUE METRIX BLOOD GLUCOSE TEST) strip Dispense true metrix test strips, DX=E11.9, testing once daily 100 each 3    hydroxychloroquine (PLAQUENIL) 200 MG tablet Take 200 mg by mouth daily       blood glucose test strips (TRUETEST TEST) strip 1 each by Other route 2 times daily DX=E11.9 100 each 5    aspirin 81 MG EC tablet Take 1 tablet by mouth nightly. 30 tablet     Cholecalciferol (VITAMIN D PO) Take 5,000 Units by mouth 2 times daily as needed Indications: pt states take TID       Coenzyme Q10 (CO Q 10 PO) Take 200 mg by mouth 2 times daily Indications: takes 400mg daily        No current facility-administered medications on file prior to encounter. REVIEW OF SYSTEMS      Constitutional: Negative for systemic symptoms including fever, chills and malaise. Objective:      BP (!) 161/81   Pulse 65   Temp 98 °F (36.7 °C) (Temporal)   Resp 17   LMP  (LMP Unknown)     PHYSICAL EXAM  Constitutional: Negative for systemic symptoms including fever, chills and malaise. General: The patient is in no acute distress. Mental status:  Patient is appropriate, is  oriented to place and plan of care. Dermatologic exam: Visual inspection of the periwound reveals the skin to be dry. Wound exam:  see wound description below     All active wounds listed below with today's date are evaluated      Wound 08/03/20 Foot Right #1 Right lateral plantar (Active)   Wound Image   08/03/20 1004   Wound Traumatic 08/10/20 1038   Dressing Status Clean;Dry; Intact 08/03/20 1019   Dressing Changed Changed/New 08/03/20 1019   Wound Cleansed Rinsed/Irrigated with saline 08/10/20 1038   Wound Length (cm) 0 cm 08/10/20 1038   Wound Width (cm) 0 cm 08/10/20 1038   Wound Depth (cm) 0 cm 08/10/20 1038   Wound Surface Area (cm^2) 0 cm^2 08/10/20 1038   Change in Wound Size % (l*w) 100 08/10/20 1038   Wound Volume (cm^3) 0 cm^3 08/10/20 1038   Wound Healing % 100 08/10/20 1038   Distance Tunneling (cm) 0 cm 08/10/20 1038   Tunneling Position ___ O'Clock 0 08/10/20 1038   Undermining Starts ___ O'Clock 0 08/10/20 1038   Undermining Ends___ O'Clock 0 08/10/20 1038   Undermining Maxium Distance (cm) 0 08/10/20 1038   Wound Assessment Pink 08/10/20 1038   Drainage Amount None 08/10/20 1038   Drainage Description Serosanguinous 08/03/20 1004   Odor None 08/10/20 1038   Margins Defined edges 08/10/20 1038   Vera-wound Assessment Pink 08/03/20 1004   Non-staged Wound Description Full thickness 08/03/20 1004   [place]%Wound Bed 0 08/03/20 1004   Red%Wound Bed 100 08/03/20 1004   Yellow%Wound Bed 0 08/03/20 1004   Black%Wound Bed 0 08/03/20 1004   Purple%Wound Bed 0 08/03/20 1004   Other%Wound Bed 0 08/03/20 1004   Number of days: 7       Assessment:       Problem List Items Addressed This Visit     None          Wound is healed      Plan:     Discharge Instructions             PHYSICIAN ORDERS AND DISCHARGE INSTRUCTIONS     NOTE: Upon discharge from the [address], you will receive a patient experience survey.  We would be grateful if you would take the time to fill this survey out.     Wound care order history:                 SULTANA's   Right       Left               Date               Vascular studies:   Date               Imaging:   Date               Cultures:   Date               Labs/ HbA1c:   6/23/20 6.2              Grafts:  Date               HBO:                Antibiotics:               Earlier Wound care treatments:                Authorizations:                        Consults:   Date                           PCP: Yanira     Continuing wound care orders and information:              Residence:               Continue home health care with:               Your wound-care supplies will be provided by: Collette Ruts DME provider:              Compression with              Off loading:  Date               Wound Meds:              Wound cleansing:                           Do not scrub or use excessive force. Wash hands with soap and water before and after dressing changes. Prior to applying a clean dressing, cleanse wound with normal saline,                          wound cleanser, or mild soap and water. Ask your physician or nurse before getting the wound(s) wet in the shower. Daily Wound management:                          Keep weight off wounds and reposition every 2 hours. Avoid standing for long periods of time. Apply wraps/stockings in AM and remove at bedtime. Elevate legs to the level of the heart or above for 30 minutes 4-5 times a day and/or when sitting. When taking antibiotics take entire prescription as ordered by MD do not stop taking until medicine is all gone.                                                                 Orders for this week (8/3/20): Right plantar- Was with mild soap and water. Lotion to area. Discharge  Follow up with Dr. Estrella Berger if needed   Call [phone] for any questions or concerns.   Date__________   Time____________          Treatment Note      Written Patient Dismissal Instructions Given            Electronically signed by Ellie Smith MD on 8/10/2020 at 10:44 AM

## 2020-09-08 RX ORDER — GLIPIZIDE 5 MG/1
5 TABLET, FILM COATED, EXTENDED RELEASE ORAL DAILY
Qty: 30 TABLET | Refills: 3 | Status: CANCELLED | OUTPATIENT
Start: 2020-09-08

## 2020-09-08 RX ORDER — POTASSIUM CHLORIDE 20 MEQ/1
20 TABLET, EXTENDED RELEASE ORAL DAILY
Qty: 90 TABLET | Refills: 1 | Status: CANCELLED | OUTPATIENT
Start: 2020-09-08

## 2020-09-09 RX ORDER — ROPINIROLE 1 MG/1
1 TABLET, FILM COATED ORAL 2 TIMES DAILY
Qty: 60 TABLET | Refills: 5 | Status: SHIPPED | OUTPATIENT
Start: 2020-09-09 | End: 2020-12-03 | Stop reason: SDUPTHER

## 2020-09-09 RX ORDER — CALCIUM CITRATE/VITAMIN D3 200MG-6.25
TABLET ORAL
Qty: 100 EACH | Refills: 3 | Status: SHIPPED | OUTPATIENT
Start: 2020-09-09 | End: 2021-12-20

## 2020-09-09 RX ORDER — ROSUVASTATIN CALCIUM 10 MG/1
10 TABLET, COATED ORAL NIGHTLY
Qty: 90 TABLET | Refills: 1 | Status: SHIPPED | OUTPATIENT
Start: 2020-09-09 | End: 2021-03-08 | Stop reason: SDUPTHER

## 2020-09-21 ENCOUNTER — HOSPITAL ENCOUNTER (EMERGENCY)
Age: 85
Discharge: HOME | End: 2020-09-21
Attending: EMERGENCY MEDICINE
Payer: MEDICARE

## 2020-09-21 VITALS
TEMPERATURE: 97.5 F | DIASTOLIC BLOOD PRESSURE: 96 MMHG | SYSTOLIC BLOOD PRESSURE: 130 MMHG | OXYGEN SATURATION: 98 % | HEIGHT: 64 IN | WEIGHT: 230 LBS | HEART RATE: 65 BPM | BODY MASS INDEX: 39.27 KG/M2 | RESPIRATION RATE: 16 BRPM

## 2020-09-21 LAB
BACTERIA: ABNORMAL /HPF
BILIRUBIN URINE: NEGATIVE MG/DL
BLOOD, URINE: NEGATIVE
CAST TYPE: ABNORMAL /HPF
CHP ED QC CHECK: NORMAL
CLARITY: CLEAR
COLOR: YELLOW
COMMENT UA: ABNORMAL
CRYSTAL TYPE: NEGATIVE /HPF
EPITHELIAL CELLS, UA: 3 /HPF
GLUCOSE BLD-MCNC: 96 MG/DL
GLUCOSE BLD-MCNC: 96 MG/DL (ref 70–99)
GLUCOSE, URINE: NEGATIVE MG/DL
KETONES, URINE: NEGATIVE MG/DL
LEUKOCYTE ESTERASE, URINE: NEGATIVE
NITRITE URINE, QUANTITATIVE: NEGATIVE
PH, URINE: 5.5 (ref 5–8)
PROTEIN UA: NEGATIVE MG/DL
RBC URINE: 0 /HPF (ref 0–6)
SPECIFIC GRAVITY UA: 1.03 (ref 1–1.03)
UROBILINOGEN, URINE: 0.2 MG/DL (ref 0.2–1)
WBC UA: 7 /HPF (ref 0–5)

## 2020-09-21 PROCEDURE — 81001 URINALYSIS AUTO W/SCOPE: CPT

## 2020-09-21 PROCEDURE — 6370000000 HC RX 637 (ALT 250 FOR IP): Performed by: EMERGENCY MEDICINE

## 2020-09-21 PROCEDURE — 82962 GLUCOSE BLOOD TEST: CPT

## 2020-09-21 PROCEDURE — 99284 EMERGENCY DEPT VISIT MOD MDM: CPT

## 2020-09-21 RX ORDER — CEPHALEXIN 500 MG/1
500 CAPSULE ORAL 2 TIMES DAILY
Qty: 14 CAPSULE | Refills: 0 | Status: SHIPPED | OUTPATIENT
Start: 2020-09-21 | End: 2020-09-28

## 2020-09-21 RX ORDER — OXYCODONE HYDROCHLORIDE AND ACETAMINOPHEN 5; 325 MG/1; MG/1
1 TABLET ORAL EVERY 8 HOURS PRN
Qty: 12 TABLET | Refills: 0 | Status: SHIPPED | OUTPATIENT
Start: 2020-09-21 | End: 2020-09-24

## 2020-09-21 RX ORDER — OXYCODONE HYDROCHLORIDE AND ACETAMINOPHEN 5; 325 MG/1; MG/1
1 TABLET ORAL ONCE
Status: DISCONTINUED | OUTPATIENT
Start: 2020-09-21 | End: 2020-09-21

## 2020-09-21 RX ORDER — OXYCODONE HYDROCHLORIDE AND ACETAMINOPHEN 5; 325 MG/1; MG/1
1 TABLET ORAL ONCE
Status: COMPLETED | OUTPATIENT
Start: 2020-09-21 | End: 2020-09-21

## 2020-09-21 RX ORDER — CEPHALEXIN 500 MG/1
500 CAPSULE ORAL ONCE
Status: COMPLETED | OUTPATIENT
Start: 2020-09-21 | End: 2020-09-21

## 2020-09-21 RX ADMIN — CEPHALEXIN 500 MG: 500 CAPSULE ORAL at 15:24

## 2020-09-21 RX ADMIN — OXYCODONE AND ACETAMINOPHEN 1 TABLET: 5; 325 TABLET ORAL at 15:24

## 2020-09-21 ASSESSMENT — PAIN SCALES - GENERAL
PAINLEVEL_OUTOF10: 9
PAINLEVEL_OUTOF10: 4

## 2020-09-21 ASSESSMENT — ENCOUNTER SYMPTOMS
BACK PAIN: 1
RESPIRATORY NEGATIVE: 1
GASTROINTESTINAL NEGATIVE: 1

## 2020-09-21 ASSESSMENT — PAIN DESCRIPTION - ORIENTATION: ORIENTATION: MID

## 2020-09-21 ASSESSMENT — PAIN DESCRIPTION - PAIN TYPE: TYPE: ACUTE PAIN

## 2020-09-21 ASSESSMENT — PAIN DESCRIPTION - LOCATION: LOCATION: BACK

## 2020-09-21 NOTE — ED PROVIDER NOTES
Triage Chief Complaint:   Back Pain; Constipation; and Abdominal Pain    [place]:  Valeria Henson is a 81 y.o. female that presents to the ED complaining of 2 complaints first back pain then urinary frequency and hematuria. Patient is a 52-year-old female presents to the ED she was seen last Tuesday and Thursday at the [address] [place] [address] and received \"injections\" in her back. Patient is unsure if these were epidural steroid injections facet blocks or simple trigger blocks. The patient is on tramadol week which is a new prescription have not helped and she is isolated back pain no radiation into her belly. She is having some lower abdominal suprapubic tenderness fullness urinary frequency and blood in her private area she is unsure if it is vaginal.  She denies any GYN malignancies    Past Medical History:   Diagnosis Date    Arthritis     left knee pain, sees Dr. Beata Morrow CAD (coronary artery disease)     sees Dr. Galindo Gaspar Chronic midline low back pain without sciatica 9/28/2016    Had PT in 2016   [address] Colonoscopy refused     discussed in 7/15    DM (diabetes mellitus), type 2 ([facility] [address] 02/2006    Dupuytren's contracture of right hand     Endometrial stripe increased 9/25/2014    Saw NP Rulojerson Butt. Was normal exam per pt.  Gastrointestinal hemorrhage 11/2/2015    Patient had GI bleeding. Colon refused. Discussed with patient in detail.  Glaucoma 4/1/2014    H/O echocardiogram 10/02/2014    Mildly depressed left ventricular function; ejection fraction of 45%. Moderate pulmonary hypertension.  H/O echocardiogram 08/28/2018    EF 50-55%.  Mitral annular calcification is present. No other significant valvulopathy seen.  History of nuclear stress test 08/28/2018    EF 59%. ECG portion of stress test is negative for ischemia by diagnostic criteria. fixed inferior large size severe defect in rest and stress images. Mild hubert infarct ischemia.     HTN (hypertension)     Hyperlipidemia     Kidney stone     hx-\"been about 3 yrs ago\"    MI (myocardial infarction) ([facility] [address] 02/1998    treated with TPA    Obesity     Pneumonia of right lower lobe due to infectious organism ([facility] [address] 4/5/2018    Vertigo     'have been having this since I had cataract surgery\"\"that is why they are doing the MRA of my brain(10/31/2014)    WD-Traumatic ulcer of foot, right, with fat layer exposed ([facility] [address] 8/3/2020     Past Surgical History:   Procedure Laterality Date    CATARACT REMOVAL Bilateral     5/14,8/14    INGUINAL HERNIA REPAIR Left 39 yrs ago     Family History   Problem Relation Age of Onset    Parkinsonism Mother     Heart Attack Father     Heart Disease Father     Stroke Sister     Cancer Sister         breast ca    High Blood Pressure Brother     High Blood Pressure Brother     Cancer Brother         \"stomach cancer\"    Colon Cancer Neg Hx     Stomach Cancer Neg Hx      Social History     Socioeconomic History    Marital status: Single     Spouse name: Not on file    Number of children: Not on file    Years of education: Not on file    Highest education level: Not on file   Occupational History    Not on file   Social Needs    Financial resource strain: Patient refused    Food insecurity     Worry: Patient refused     Inability: Patient refused    Transportation needs     Medical: Patient refused     Non-medical: Patient refused   Tobacco Use    Smoking status: Never Smoker    Smokeless tobacco: Never Used   Substance and Sexual Activity    Alcohol use: No     Alcohol/week: 0.0 standard drinks    Drug use: No    Sexual activity: Not Currently     Partners: Male   Lifestyle    Physical activity     Days per week: Not on file     Minutes per session: Not on file    Stress: Not on file   Relationships    Social connections     Talks on phone: Not on file     Gets together: Not on file     Attends [employer] service: Not on file     Active member of club or organization: Not on file     Attends meetings of clubs or organizations: Not on file     Relationship status: Not on file    Intimate partner violence     Fear of current or ex partner: Not on file     Emotionally abused: Not on file     Physically abused: Not on file     Forced sexual activity: Not on file   Other Topics Concern    Not on file   Social History Narrative    Not on file     No current facility-administered medications for this encounter. Current Outpatient Medications   Medication Sig Dispense Refill    oxyCODONE-acetaminophen (PERCOCET) 5-325 MG per tablet Take 1 tablet by mouth every 8 hours as needed for Pain for up to 3 days. Intended supply: 3 days.  Take lowest dose possible to manage pain 12 tablet 0    cephALEXin (KEFLEX) 500 MG capsule Take 1 capsule by mouth 2 times daily for 7 days 14 capsule 0    rOPINIRole (REQUIP) 1 MG tablet Take 1 tablet by mouth 2 times daily 60 tablet 5    rosuvastatin (CRESTOR) 10 MG tablet Take 1 tablet by mouth nightly 90 tablet 1    blood glucose test strips (TRUE METRIX BLOOD GLUCOSE TEST) strip Dispense true metrix test strips, DX=E11.9, testing once daily 100 each 3    albuterol sulfate HFA (VENTOLIN HFA) 108 (90 Base) MCG/ACT inhaler Inhale 2 puffs into the lungs 4 times daily as needed for Wheezing 1 Inhaler 5    furosemide (LASIX) 20 MG tablet Take 1 tablet by mouth as needed (prn) Indications: per Dr. Paiz Elbert 30 tablet 3    glipiZIDE (GLUCOTROL XL) 5 MG extended release tablet TAKE 1 TABLET BY MOUTH DAILY 30 tablet 3    losartan (COZAAR) 50 MG tablet TAKE 1 TABLET BY MOUTH DAILY 90 tablet 1    metoprolol tartrate (LOPRESSOR) 25 MG tablet TAKE ONE TABLET BY MOUTH TWO TIMES A  tablet 1    potassium chloride (KLOR-CON M) 20 MEQ extended release tablet Take 1 tablet by mouth daily 90 tablet 1    Blood Glucose Monitoring Suppl (TRUE METRIX METER) JUSTO Dispense one meter, DX E11.9 testing once daily 1 Device 0    hydroxychloroquine (PLAQUENIL) 200 MG distress. Breath sounds: Normal breath sounds. No stridor. No wheezing or rales. Chest:      Chest wall: No tenderness. Abdominal:      General: Bowel sounds are normal. There is no distension. Palpations: Abdomen is soft. There is no mass. Tenderness: There is no guarding or rebound. Hernia: No hernia is present. Genitourinary:     Cervix: Normal.      Uterus: Not enlarged and not tender. Comments: Atrophic vagina no active bleeding  Musculoskeletal:         General: No deformity. Lumbar back: She exhibits decreased range of motion and tenderness. She exhibits no bony tenderness. Back:    Lymphadenopathy:      Cervical: No cervical adenopathy. Skin:     General: Skin is warm and dry. Capillary Refill: Capillary refill takes less than 2 seconds. Coloration: Skin is not pale. Findings: No erythema or rash. Neurological:      Mental Status: She is alert and oriented to person, place, and time. Cranial Nerves: No cranial nerve deficit. Sensory: Sensation is intact. No sensory deficit. Motor: Motor function is intact. Coordination: Coordination is intact. Deep Tendon Reflexes: Reflexes normal. Babinski sign absent on the right side. Babinski sign absent on the left side. Reflex Scores:       Patellar reflexes are 1+ on the right side and 1+ on the left side. Achilles reflexes are 1+ on the right side and 1+ on the left side. Comments: Negative straight leg raise    Clonus not present    Gait not ataxic uses cane chronically nothing new   Psychiatric:         Speech: Speech normal.         Behavior: Behavior normal.         Thought Content:  Thought content normal.         Judgment: Judgment normal.         I have reviewed and interpreted all of the currently available lab results from this visit (ifapplicable):  Results for orders placed or performed during the hospital encounter of 09/21/20   Urinalysis, reflex to microscopic   Result Value Ref Range    Color, UA YELLOW YELLOW    Clarity, UA CLEAR CLEAR    Glucose, Urine NEGATIVE NEGATIVE MG/DL    Bilirubin Urine NEGATIVE NEGATIVE MG/DL    Ketones, Urine NEGATIVE NEGATIVE MG/DL    Specific Gravity, UA 1.030 1.001 - 1.035    Blood, Urine NEGATIVE NEGATIVE    pH, Urine 5.5 5.0 - 8.0    Protein, UA NEGATIVE NEGATIVE MG/DL    Urobilinogen, Urine 0.2 0.2 - 1.0 MG/DL    Nitrite Urine, Quantitative NEGATIVE NEGATIVE    Leukocyte Esterase, Urine NEGATIVE NEGATIVE    RBC, UA 0 0 - 6 /HPF    WBC, UA 7 (H) 0 - 5 /HPF    Epithelial Cells, UA 3 /HPF    Cast Type NO CAST FORMS SEEN NO CAST FORMS SEEN /HPF    Bacteria, UA MANY (A) NEGATIVE /HPF    Crystal Type NEGATIVE NEGATIVE /HPF    Urinalysis Comments MANY    POCT Glucose   Result Value Ref Range    Glucose 96 mg/dL    QC OK? y    POCT Glucose   Result Value Ref Range    POC Glucose 96 70 - 99 MG/DL      Radiographs (if obtained):  [] The following radiograph wasinterpreted by myself in the absence of a radiologist:   [] Radiologist's Report Reviewed:  No orders to display         EKG (if obtained): (All EKG's are interpreted by myself in the absence of a cardiologist)    Chart review shows recent radiographs:  No results found. MDM:      Presents with 2 complaints first low back discomfort. It is palpable where her injection sites are she received a dose of Percocet a prescription to go home with. There is no acute focal motor or sensory deficit. No suspicion of a cauda equina regarding the patient's of leaving this is no doubt urinary she has a culture pending from Friday I will place her on Keflex 500 twice daily. Please note that portions of this note may have been completed with a voice recognition/dictation program. Efforts were made to edit the dictations but occasionally words are mis-transcribed.      All pertinent Lab data and radiographic results reviewed with patient at bedside.  The patient and/or the family were

## 2020-09-21 NOTE — ED NOTES
Patient given AVS, discharge instructions and prescriptions. Verbalizes understanding no further needs identified at this time.      Miles Zhao RN  09/21/20 [id number]

## 2020-09-22 ENCOUNTER — TELEPHONE (OUTPATIENT)
Dept: INTERNAL MEDICINE CLINIC | Age: 85
End: 2020-09-22

## 2020-09-22 ENCOUNTER — CARE COORDINATION (OUTPATIENT)
Dept: CARE COORDINATION | Age: 85
End: 2020-09-22

## 2020-09-22 NOTE — TELEPHONE ENCOUNTER
Was seen in ED 9-, was given percocet for pain and has not had a BM before starting pain med and is asking for something OTC . Edson Given for colace stool softner, if no success let office know, has follow up on 9-. Verbal understanding returned.

## 2020-09-23 ENCOUNTER — CARE COORDINATION (OUTPATIENT)
Dept: CARE COORDINATION | Age: 85
End: 2020-09-23

## 2020-09-23 NOTE — CARE COORDINATION
Patient contacted regarding Marcelo Go. Discussed COVID-19 related testing which was not done at this time. Test results were not done. Patient informed of results, if available? No    Care Transition Nurse/ Ambulatory Care Manager contacted the patient by telephone to perform post discharge assessment. Call within 2 business days of discharge: Yes. Verified name and  with patient as identifiers. Provided introduction to self, and explanation of the CTN/ACM role, and reason for call due to risk factors for infection and/or exposure to COVID-19. Symptoms reviewed with patient who verbalized the following symptoms: reports still feeling miserable, no new symptoms. Due to ongoing symptoms encounter was routed to provider for escalation. Discussed follow-up appointments. If no appointment was previously scheduled, appointment scheduling offered: [facility] follow up appointment(s):   Future Appointments   Date Time Provider Rose Rendon   2020  1:00 PM John Robins MD [facility]     Non-[facility] follow up appointment(s):     Non-face-to-face services provided:  Obtained and reviewed discharge summary and/or continuity of care documents      Patient has following risk factors of: immunocompromised. CTN/ACM reviewed discharge instructions, medical action plan and red flags such as increased shortness of breath, increasing fever and signs of decompensation with patient who verbalized understanding. Discussed exposure protocols and quarantine with CDC Guidelines What to do if you are sick with coronavirus disease .  Patient was given an opportunity for questions and concerns. The patient agrees to contact the [facility] exposure line [phone], local health department and PCP office for questions related to their healthcare. CTN/ACM provided contact information for future needs.     Reviewed and educated patient on any new and changed medications related to discharge diagnosis Patient/family/caregiver given information for [employer] and agrees to enroll no  Patient's preferred e-mail:    Patient's preferred phone number:   Based on Loop alert triggers, patient will be contacted by nurse care manager for worsening symptoms. Plan for follow-up call in 5-7 days based on severity of symptoms and risk factors. Reports The atb for kidney infection has not kicked in yet, encouraged rest/hydration and to f/u with pcp. Is drinking water 8 glasses daily she reports. Has pcp ov [facility].

## 2020-09-24 ENCOUNTER — OFFICE VISIT (OUTPATIENT)
Dept: INTERNAL MEDICINE CLINIC | Age: 85
End: 2020-09-24
Payer: MEDICARE

## 2020-09-24 VITALS
HEART RATE: 72 BPM | WEIGHT: 236.8 LBS | OXYGEN SATURATION: 97 % | RESPIRATION RATE: 16 BRPM | SYSTOLIC BLOOD PRESSURE: 136 MMHG | TEMPERATURE: 97 F | DIASTOLIC BLOOD PRESSURE: 72 MMHG | BODY MASS INDEX: 40.65 KG/M2

## 2020-09-24 PROBLEM — L97.512: Status: RESOLVED | Noted: 2020-08-03 | Resolved: 2020-09-24

## 2020-09-24 LAB
CHP ED QC CHECK: NORMAL
GLUCOSE BLD-MCNC: 97 MG/DL

## 2020-09-24 PROCEDURE — 82962 GLUCOSE BLOOD TEST: CPT | Performed by: INTERNAL MEDICINE

## 2020-09-24 PROCEDURE — G8399 PT W/DXA RESULTS DOCUMENT: HCPCS | Performed by: INTERNAL MEDICINE

## 2020-09-24 PROCEDURE — G8427 DOCREV CUR MEDS BY ELIG CLIN: HCPCS | Performed by: INTERNAL MEDICINE

## 2020-09-24 PROCEDURE — 1036F TOBACCO NON-USER: CPT | Performed by: INTERNAL MEDICINE

## 2020-09-24 PROCEDURE — 99214 OFFICE O/P EST MOD 30 MIN: CPT | Performed by: INTERNAL MEDICINE

## 2020-09-24 PROCEDURE — 4040F PNEUMOC VAC/ADMIN/RCVD: CPT | Performed by: INTERNAL MEDICINE

## 2020-09-24 PROCEDURE — 1111F DSCHRG MED/CURRENT MED MERGE: CPT | Performed by: INTERNAL MEDICINE

## 2020-09-24 PROCEDURE — 1123F ACP DISCUSS/DSCN MKR DOCD: CPT | Performed by: INTERNAL MEDICINE

## 2020-09-24 PROCEDURE — 1090F PRES/ABSN URINE INCON ASSESS: CPT | Performed by: INTERNAL MEDICINE

## 2020-09-24 PROCEDURE — G8417 CALC BMI ABV UP PARAM F/U: HCPCS | Performed by: INTERNAL MEDICINE

## 2020-09-24 RX ORDER — POTASSIUM CHLORIDE 20 MEQ/1
20 TABLET, EXTENDED RELEASE ORAL DAILY
Qty: 90 TABLET | Refills: 1 | Status: SHIPPED | OUTPATIENT
Start: 2020-09-24 | End: 2021-03-08 | Stop reason: SDUPTHER

## 2020-09-24 RX ORDER — GLIPIZIDE 5 MG/1
5 TABLET, FILM COATED, EXTENDED RELEASE ORAL DAILY
Qty: 90 TABLET | Refills: 1 | Status: SHIPPED | OUTPATIENT
Start: 2020-09-24 | End: 2021-03-24

## 2020-09-24 RX ORDER — LOSARTAN POTASSIUM 50 MG/1
TABLET ORAL
Qty: 90 TABLET | Refills: 1 | Status: SHIPPED | OUTPATIENT
Start: 2020-09-24 | End: 2021-03-08 | Stop reason: SDUPTHER

## 2020-09-24 RX ORDER — BLOOD-GLUCOSE METER
EACH MISCELLANEOUS
Qty: 1 DEVICE | Refills: 0 | Status: SHIPPED | OUTPATIENT
Start: 2020-09-24 | End: 2021-12-20

## 2020-09-24 RX ORDER — CIPROFLOXACIN 250 MG/1
250 TABLET, FILM COATED ORAL 2 TIMES DAILY
Qty: 10 TABLET | Refills: 0 | Status: SHIPPED | OUTPATIENT
Start: 2020-09-24 | End: 2020-09-29

## 2020-09-24 NOTE — PROGRESS NOTES
Denia Malik  Patient's  is 1935  Seen in office on 2020      SUBJECTIVE:  Charlee Powers anand 81 y. o.year old female presents today   Chief Complaint   Patient presents with    [facility]     [facility]    Medication Refill     Pt states she went to arthritis clinic and was given 2 injections and tramadol  Later she developed urinary symptoms with pain in the suprapubic area and swelling of vagina  ER physician did pelvic exam and it was normal   Went to ER and was given Keflex  Pt still has lower abdominal pain. No fever or chills  No HA  No NVD. Taking medications regularly. No side effects noted. Review of Systems    OBJECTIVE: /72   Pulse 72   Temp 97 °F (36.1 °C) (Infrared)   Resp 16   Wt 236 lb 12.8 oz (107.4 kg)   LMP  (LMP Unknown)   SpO2 97%   BMI 40.65 kg/m²     Wt Readings from Last 3 Encounters:   20 236 lb 12.8 oz (107.4 kg)   20 230 lb (104.3 kg)   20 244 lb (110.7 kg)      GENERAL: - Alert, oriented, pleasant, in no apparent distress. HEENT: - Conjunctiva pink, no scleral icterus. ENT clear. NECK: -Supple. No jugular venous distention noted. No masses felt,  CARDIOVASCULAR: - Normal S1 and S2    PULMONARY: - No respiratory distress. No wheezes or rales. ABDOMEN: - Soft and non-tender,no masses  ororganomegaly. EXTREMITIES: - No cyanosis, clubbing, or significant edema. SKIN: Skin is warm and dry. NEUROLOGICAL: - Cranial nerves II through XII are grossly intact. IMPRESSION:    Encounter Diagnoses   Name Primary?     Lower abdominal pain Yes    Essential hypertension     Mixed hyperlipidemia     Type 2 diabetes mellitus without complication, without long-term current use of insulin ([address]     CAD s/p MI, TPA in      Gastrointestinal hemorrhage, unspecified gastrointestinal hemorrhage type     Cyst, kidney, acquired        ASSESSMENT/PLAN:    UTI On keflex :   Back pain : CT abdomen : kid lesion/cyst f/u due is due for that also. Cipro 250 mg bid x 5 days     Pt has tramadol with tylenol for pain  Hypertension in control. Follow low salt diet. Continue current treatment. Hyperlipidemia is stable. Follow low cholesterol diet. Continue current treatment. CAD : Patient is stable. Continue current treatment. Cyst Kidney : f/u CT done. Return to office in 3 months. Mediations reviewed with the patient. Continue current medications. Appropriate prescriptions are addressed. After visit summeryprovided. Follow up as directed sooner if needed. Questions answered and patient verbalizes understanding. Call for any problems, questions, or concerns. No Known Allergies  Current Outpatient Medications   Medication Sig Dispense Refill    oxyCODONE-acetaminophen (PERCOCET) 5-325 MG per tablet Take 1 tablet by mouth every 8 hours as needed for Pain for up to 3 days. Intended supply: 3 days.  Take lowest dose possible to manage pain 12 tablet 0    cephALEXin (KEFLEX) 500 MG capsule Take 1 capsule by mouth 2 times daily for 7 days 14 capsule 0    rOPINIRole (REQUIP) 1 MG tablet Take 1 tablet by mouth 2 times daily 60 tablet 5    rosuvastatin (CRESTOR) 10 MG tablet Take 1 tablet by mouth nightly 90 tablet 1    blood glucose test strips (TRUE METRIX BLOOD GLUCOSE TEST) strip Dispense true metrix test strips, DX=E11.9, testing once daily 100 each 3    albuterol sulfate HFA (VENTOLIN HFA) 108 (90 Base) MCG/ACT inhaler Inhale 2 puffs into the lungs 4 times daily as needed for Wheezing 1 Inhaler 5    furosemide (LASIX) 20 MG tablet Take 1 tablet by mouth as needed (prn) Indications: per Dr. Yolis Orellana 30 tablet 3    glipiZIDE (GLUCOTROL XL) 5 MG extended release tablet TAKE 1 TABLET BY MOUTH DAILY 30 tablet 3    losartan (COZAAR) 50 MG tablet TAKE 1 TABLET BY MOUTH DAILY 90 tablet 1    metoprolol tartrate (LOPRESSOR) 25 MG tablet TAKE ONE TABLET BY MOUTH TWO TIMES A  tablet 1    potassium chloride (KLOR-CON M) 20 MEQ extended release tablet Take 1 tablet by mouth daily 90 tablet 1    Blood Glucose Monitoring Suppl (TRUE METRIX METER) JUSTO Dispense one meter, DX E11.9 testing once daily 1 Device 0    hydroxychloroquine (PLAQUENIL) 200 MG tablet Take 200 mg by mouth daily       blood glucose test strips (TRUETEST TEST) strip 1 each by Other route 2 times daily DX=E11.9 100 each 5    aspirin 81 MG EC tablet Take 1 tablet by mouth nightly. 30 tablet     Cholecalciferol (VITAMIN D PO) Take 5,000 Units by mouth 2 times daily as needed Indications: pt states take TID       Coenzyme Q10 (CO Q 10 PO) Take 200 mg by mouth 2 times daily Indications: takes 400mg daily       UNABLE TO FIND Hand brace. 1 Units 0     No current facility-administered medications for this visit. Past Medical History:   Diagnosis Date    Arthritis     left knee pain, sees Dr. Av Buck CAD (coronary artery disease)     sees Dr. Gómez Giraldo Chronic midline low back pain without sciatica 9/28/2016    Had PT in 2016   [facility] Colonoscopy refused     discussed in 7/15    DM (diabetes mellitus), type 2 ([address] 02/2006    Dupuytren's contracture of right hand     Endometrial stripe increased 9/25/2014    Saw NP Benson Mustache. Was normal exam per pt.  Gastrointestinal hemorrhage 11/2/2015    Patient had GI bleeding. Colon refused. Discussed with patient in detail.  Glaucoma 4/1/2014    H/O echocardiogram 10/02/2014    Mildly depressed left ventricular function; ejection fraction of 45%. Moderate pulmonary hypertension.  H/O echocardiogram 08/28/2018    EF 50-55%.  Mitral annular calcification is present. No other significant valvulopathy seen.  History of nuclear stress test 08/28/2018    EF 59%. ECG portion of stress test is negative for ischemia by diagnostic criteria. fixed inferior large size severe defect in rest and stress images. Mild hubert infarct ischemia.     HTN (hypertension)     Hyperlipidemia     Kidney stone     hx-\"been about 3 yrs ago\"    MI (myocardial infarction) ([facility] [address] 02/1998    treated with TPA    Obesity     Pneumonia of right lower lobe due to infectious organism ([facility] [address] 4/5/2018    Vertigo     'have been having this since I had cataract surgery\"\"that is why they are doing the MRA of my brain(10/31/2014)    WD-Traumatic ulcer of foot, right, with fat layer exposed ([facility] [address] 8/3/2020     Past Surgical History:   Procedure Laterality Date    CATARACT REMOVAL Bilateral     5/14,8/14    INGUINAL HERNIA REPAIR Left 45 yrs ago     Social History     Tobacco Use    Smoking status: Never Smoker    Smokeless tobacco: Never Used   Substance Use Topics    Alcohol use: No     Alcohol/week: 0.0 standard drinks       LAB REVIEW:  CBC:   Lab Results   Component Value Date    WBC 5.7 06/23/2020    HGB 13.6 06/23/2020    HCT 40.5 06/23/2020     06/23/2020     Lipids:   Lab Results   Component Value Date    CHOL 121 08/07/2018    TRIG 128 08/07/2018    HDL 50 06/23/2020    LDLCALC 47 06/23/2020    LDLDIRECT 69 08/18/2017    TRIGLYCFAST 141 06/23/2020     Renal:   Lab Results   Component Value Date    BUN 13 06/23/2020    CREATININE 0.6 06/23/2020     06/23/2020    K 4.6 06/23/2020    K 3.0 03/08/2018    ALT <5 06/23/2020    AST 15 06/23/2020    GLUCOSE 96 09/21/2020     PT/INR:   Lab Results   Component Value Date    INR 1.00 01/11/2019     A1C:   Lab Results   Component Value Date    LABA1C 6.2 06/23/2020           Areli García MD, 9/24/2020 , 1:35 PM

## 2020-09-25 ENCOUNTER — HOSPITAL ENCOUNTER (OUTPATIENT)
Dept: CT IMAGING | Age: 85
Discharge: HOME | End: 2020-09-25
Payer: MEDICARE

## 2020-09-25 PROCEDURE — 74176 CT ABD & PELVIS W/O CONTRAST: CPT

## 2020-09-28 ENCOUNTER — TELEPHONE (OUTPATIENT)
Dept: INTERNAL MEDICINE CLINIC | Age: 85
End: 2020-09-28

## 2020-10-01 ENCOUNTER — OFFICE VISIT (OUTPATIENT)
Dept: INTERNAL MEDICINE CLINIC | Age: 85
End: 2020-10-01
Payer: MEDICARE

## 2020-10-01 ENCOUNTER — CARE COORDINATION (OUTPATIENT)
Dept: CARE COORDINATION | Age: 85
End: 2020-10-01

## 2020-10-01 VITALS
OXYGEN SATURATION: 98 % | RESPIRATION RATE: 16 BRPM | DIASTOLIC BLOOD PRESSURE: 88 MMHG | BODY MASS INDEX: 40.27 KG/M2 | HEART RATE: 68 BPM | TEMPERATURE: 97.6 F | WEIGHT: 234.58 LBS | SYSTOLIC BLOOD PRESSURE: 124 MMHG

## 2020-10-01 PROBLEM — N76.1 SUBACUTE VAGINITIS: Status: ACTIVE | Noted: 2020-10-01

## 2020-10-01 PROCEDURE — G8417 CALC BMI ABV UP PARAM F/U: HCPCS | Performed by: INTERNAL MEDICINE

## 2020-10-01 PROCEDURE — G0008 ADMIN INFLUENZA VIRUS VAC: HCPCS | Performed by: INTERNAL MEDICINE

## 2020-10-01 PROCEDURE — G8484 FLU IMMUNIZE NO ADMIN: HCPCS | Performed by: INTERNAL MEDICINE

## 2020-10-01 PROCEDURE — 1036F TOBACCO NON-USER: CPT | Performed by: INTERNAL MEDICINE

## 2020-10-01 PROCEDURE — G8427 DOCREV CUR MEDS BY ELIG CLIN: HCPCS | Performed by: INTERNAL MEDICINE

## 2020-10-01 PROCEDURE — 90694 VACC AIIV4 NO PRSRV 0.5ML IM: CPT | Performed by: INTERNAL MEDICINE

## 2020-10-01 PROCEDURE — 99213 OFFICE O/P EST LOW 20 MIN: CPT | Performed by: INTERNAL MEDICINE

## 2020-10-01 PROCEDURE — G8399 PT W/DXA RESULTS DOCUMENT: HCPCS | Performed by: INTERNAL MEDICINE

## 2020-10-01 PROCEDURE — 4040F PNEUMOC VAC/ADMIN/RCVD: CPT | Performed by: INTERNAL MEDICINE

## 2020-10-01 PROCEDURE — 1090F PRES/ABSN URINE INCON ASSESS: CPT | Performed by: INTERNAL MEDICINE

## 2020-10-01 PROCEDURE — 1123F ACP DISCUSS/DSCN MKR DOCD: CPT | Performed by: INTERNAL MEDICINE

## 2020-10-01 RX ORDER — HYDROCODONE BITARTRATE AND ACETAMINOPHEN 5; 325 MG/1; MG/1
1 TABLET ORAL 2 TIMES DAILY
COMMUNITY
Start: 2020-09-21 | End: 2020-12-03

## 2020-10-01 NOTE — PROGRESS NOTES
Mirian Jeffries  Patient's  is 1935  Seen in office on 10/1/2020      SUBJECTIVE:  Bernadette michel 81 y. o.year old female presents today   Chief Complaint   Patient presents with    Follow-up     abd pain     Patient had pain in the vaginal area. No dysuria  No burning on micturation  No fever or chills  She finished keflex and now on cipro  CT scan of the abdomen was negative for any pelvic abnormality. DM : BS today is 78. Taking medications regularly. No side effects noted. Review of Systems    OBJECTIVE: /88   Pulse 68   Temp 97.6 °F (36.4 °C)   Resp 16   Wt 234 lb 9.3 oz (106.4 kg)   LMP  (LMP Unknown)   SpO2 98%   BMI 40.27 kg/m²     Wt Readings from Last 3 Encounters:   10/01/20 234 lb 9.3 oz (106.4 kg)   20 236 lb 12.8 oz (107.4 kg)   20 230 lb (104.3 kg)      GENERAL: - Alert, oriented, pleasant, in no apparent distress. HEENT: - Conjunctiva pink, no scleral icterus. ENT clear. NECK: -Supple. No jugular venous distention noted. No masses felt,  CARDIOVASCULAR: - Normal S1 and S2    PULMONARY: - No respiratory distress. No wheezes or rales. ABDOMEN: - Soft and non-tender,no masses  ororganomegaly. EXTREMITIES: - No cyanosis, clubbing, or significant edema. SKIN: Skin is warm and dry. NEUROLOGICAL: - Cranial nerves II through XII are grossly intact. CT scan of the abdomen and pelvis     1.  No CT evidence of an acute intra-abdominal or intrapelvic process. 2. Small hiatal hernia. 3. Likely reactive mild right upper quadrant lymph node enlargement is stable. 4.  Calcific atherosclerotic disease aorta.                    IMPRESSION:    Encounter Diagnoses   Name Primary?     Essential hypertension Yes    Subacute vaginitis     Type 2 diabetes mellitus without complication, without long-term current use of insulin ([facility])        ASSESSMENT/PLAN:    Patient had pain in the vaginal area  Patient has finished Keflex and is on Cipro and is feeling better  Advised patient to see gynecologist.  Patient states she herself will make the appointment  She will make an appointment. Patient sees nurse practitioner Valorie Adame  A copy of the CT scan of the abdomen and pelvis was given to the patient to take to the nurse practitioner  Patient is requesting for influenza vaccine  Given today    Orders Placed This Encounter   Procedures    INFLUENZA, QUADV, ADJUVANTED, 72 YRS =, IM, PF, PREFILL SYR, 0.5ML (FLUAD)         Mediations reviewed with the patient. Continue current medications. Appropriate prescriptions are addressed. After visit summeryprovided. Follow up as directed sooner if needed. Questions answered and patient verbalizes understanding. Call for any problems, questions, or concerns. No Known Allergies  Current Outpatient Medications   Medication Sig Dispense Refill    HYDROcodone-acetaminophen (NORCO) 5-325 MG per tablet Take 1 tablet by mouth 2 times daily.  metoprolol tartrate (LOPRESSOR) 25 MG tablet TAKE ONE TABLET BY MOUTH TWO TIMES A  tablet 1    potassium chloride (KLOR-CON M) 20 MEQ extended release tablet Take 1 tablet by mouth daily 90 tablet 1    losartan (COZAAR) 50 MG tablet TAKE 1 TABLET BY MOUTH DAILY 90 tablet 1    glipiZIDE (GLUCOTROL XL) 5 MG extended release tablet Take 1 tablet by mouth daily 90 tablet 1    rOPINIRole (REQUIP) 1 MG tablet Take 1 tablet by mouth 2 times daily 60 tablet 5    rosuvastatin (CRESTOR) 10 MG tablet Take 1 tablet by mouth nightly 90 tablet 1    albuterol sulfate HFA (VENTOLIN HFA) 108 (90 Base) MCG/ACT inhaler Inhale 2 puffs into the lungs 4 times daily as needed for Wheezing 1 Inhaler 5    furosemide (LASIX) 20 MG tablet Take 1 tablet by mouth as needed (prn) Indications: per Dr. Suman Addison 30 tablet 3    hydroxychloroquine (PLAQUENIL) 200 MG tablet Take 200 mg by mouth daily       aspirin 81 MG EC tablet Take 1 tablet by mouth nightly.  30 tablet     Cholecalciferol (VITAMIN D PO) Take 5,000 Units by mouth 2 times daily as needed Indications: pt states take TID       Coenzyme Q10 (CO Q 10 PO) Take 200 mg by mouth 2 times daily Indications: takes 400mg daily       Blood Glucose Monitoring Suppl (TRUE METRIX METER) JUSTO Dispense one meter, DX E11.9 testing once daily 1 Device 0    blood glucose test strips (TRUE METRIX BLOOD GLUCOSE TEST) strip Dispense true metrix test strips, DX=E11.9, testing once daily 100 each 3    UNABLE TO FIND Hand brace. 1 Units 0    blood glucose test strips (TRUETEST TEST) strip 1 each by Other route 2 times daily DX=E11.9 100 each 5     No current facility-administered medications for this visit. Past Medical History:   Diagnosis Date    Arthritis     left knee pain, sees Dr. Estrella Rausch CAD (coronary artery disease)     sees Dr. Rojas Grade Chronic midline low back pain without sciatica 9/28/2016    Had PT in 2016   [address] Colonoscopy refused     discussed in 7/15    DM (diabetes mellitus), type 2 ([facility] [address] 02/2006    Dupuytren's contracture of right hand     Endometrial stripe increased 9/25/2014    Saw NP Marlene Owen. Was normal exam per pt.  Gastrointestinal hemorrhage 11/2/2015    Patient had GI bleeding. Colon refused. Discussed with patient in detail.  Glaucoma 4/1/2014    H/O echocardiogram 10/02/2014    Mildly depressed left ventricular function; ejection fraction of 45%. Moderate pulmonary hypertension.  H/O echocardiogram 08/28/2018    EF 50-55%.  Mitral annular calcification is present. No other significant valvulopathy seen.  History of nuclear stress test 08/28/2018    EF 59%. ECG portion of stress test is negative for ischemia by diagnostic criteria. fixed inferior large size severe defect in rest and stress images. Mild hubert infarct ischemia.     HTN (hypertension)     Hyperlipidemia     Kidney stone     hx-\"been about 3 yrs ago\"    MI (myocardial infarction) ([facility] [address] 02/1998    treated with TPA    Obesity  Pneumonia of right lower lobe due to infectious organism 4/5/2018    Vertigo     'have been having this since I had cataract surgery\"\"that is why they are doing the MRA of my brain(10/31/2014)    WD-Traumatic ulcer of foot, right, with fat layer exposed ([address] 8/3/2020     Past Surgical History:   Procedure Laterality Date    CATARACT REMOVAL Bilateral     5/14,8/14    INGUINAL HERNIA REPAIR Left 45 yrs ago     Social History     Tobacco Use    Smoking status: Never Smoker    Smokeless tobacco: Never Used   Substance Use Topics    Alcohol use: No     Alcohol/week: 0.0 standard drinks       LAB REVIEW:  CBC:   Lab Results   Component Value Date    WBC 5.7 06/23/2020    HGB 13.6 06/23/2020    HCT 40.5 06/23/2020     06/23/2020     Lipids:   Lab Results   Component Value Date    CHOL 121 08/07/2018    TRIG 128 08/07/2018    HDL 50 06/23/2020    LDLCALC 47 06/23/2020    LDLDIRECT 69 08/18/2017    TRIGLYCFAST 141 06/23/2020     Renal:   Lab Results   Component Value Date    BUN 13 06/23/2020    CREATININE 0.6 06/23/2020     06/23/2020    K 4.6 06/23/2020    K 3.0 03/08/2018    ALT <5 06/23/2020    AST 15 06/23/2020    GLUCOSE 97 09/24/2020     PT/INR:   Lab Results   Component Value Date    INR 1.00 01/11/2019     A1C:   Lab Results   Component Value Date    LABA1C 6.2 06/23/2020           Bill Cruz MD, 10/1/2020 , 4:24 PM

## 2020-10-01 NOTE — PROGRESS NOTES
Vaccine Information Sheet, \"Influenza - Inactivated\"  given to [employer], or parent/legal guardian of  [employer] and verbalized understanding. Patient responses:    Have you ever had a reaction to a flu vaccine? No  Do you have any current illness? No  Have you ever had [place] Syndrome? No  Do you have a serious allergy to any of the follow: Neomycin, Polymyxin, Thimerosal, eggs or egg products? No    Flu vaccine given per order. Please see immunization tab. Risks and benefits explained. Current VIS given.

## 2020-10-01 NOTE — CARE COORDINATION
Call to pt fot ed f/u. Pt reports I have to go to the doctors in just a little bit and pt ended call. Will reattempt outreach again at later date.

## 2020-10-21 ENCOUNTER — CARE COORDINATION (OUTPATIENT)
Dept: CARE COORDINATION | Age: 85
End: 2020-10-21

## 2020-10-21 ENCOUNTER — HOSPITAL ENCOUNTER (OUTPATIENT)
Dept: ULTRASOUND IMAGING | Age: 85
Discharge: HOME | End: 2020-10-21
Payer: MEDICARE

## 2020-10-21 PROCEDURE — 76830 TRANSVAGINAL US NON-OB: CPT

## 2020-10-21 NOTE — CARE COORDINATION
Call to pt for final ed f/u call. No answer. No further outreach scheduled with this CTN/ACM. Episode of Care resolved. Patient has this CTN/ACM contact information if future needs arise.

## 2020-12-03 ENCOUNTER — OFFICE VISIT (OUTPATIENT)
Dept: INTERNAL MEDICINE CLINIC | Age: 85
End: 2020-12-03
Payer: MEDICARE

## 2020-12-03 VITALS
BODY MASS INDEX: 41.68 KG/M2 | SYSTOLIC BLOOD PRESSURE: 136 MMHG | WEIGHT: 242.8 LBS | OXYGEN SATURATION: 93 % | RESPIRATION RATE: 16 BRPM | DIASTOLIC BLOOD PRESSURE: 86 MMHG | HEART RATE: 96 BPM | TEMPERATURE: 97.7 F

## 2020-12-03 LAB
ANION GAP SERPL CALCULATED.3IONS-SCNC: 10 MMOL/L (ref 3–16)
BASOPHILS ABSOLUTE: 0.1 K/UL (ref 0–0.2)
BASOPHILS RELATIVE PERCENT: 0.8 %
BUN BLDV-MCNC: 13 MG/DL (ref 7–20)
CALCIUM SERPL-MCNC: 9.6 MG/DL (ref 8.3–10.6)
CHLORIDE BLD-SCNC: 97 MMOL/L (ref 99–110)
CO2: 27 MMOL/L (ref 21–32)
CREAT SERPL-MCNC: <0.5 MG/DL (ref 0.6–1.2)
EOSINOPHILS ABSOLUTE: 0.1 K/UL (ref 0–0.6)
EOSINOPHILS RELATIVE PERCENT: 1.7 %
GFR AFRICAN AMERICAN: >60
GFR NON-AFRICAN AMERICAN: >60
GLUCOSE BLD-MCNC: 98 MG/DL (ref 70–99)
HCT VFR BLD CALC: 40.1 % (ref 36–48)
HEMOGLOBIN: 13.4 G/DL (ref 12–16)
LYMPHOCYTES ABSOLUTE: 1.6 K/UL (ref 1–5.1)
LYMPHOCYTES RELATIVE PERCENT: 23 %
MCH RBC QN AUTO: 31.4 PG (ref 26–34)
MCHC RBC AUTO-ENTMCNC: 33.5 G/DL (ref 31–36)
MCV RBC AUTO: 93.8 FL (ref 80–100)
MONOCYTES ABSOLUTE: 1.2 K/UL (ref 0–1.3)
MONOCYTES RELATIVE PERCENT: 17.8 %
NEUTROPHILS ABSOLUTE: 4 K/UL (ref 1.7–7.7)
NEUTROPHILS RELATIVE PERCENT: 56.7 %
PDW BLD-RTO: 14.2 % (ref 12.4–15.4)
PLATELET # BLD: 260 K/UL (ref 135–450)
PMV BLD AUTO: 7.6 FL (ref 5–10.5)
POTASSIUM SERPL-SCNC: 4.4 MMOL/L (ref 3.5–5.1)
RBC # BLD: 4.28 M/UL (ref 4–5.2)
SODIUM BLD-SCNC: 134 MMOL/L (ref 136–145)
WBC # BLD: 7 K/UL (ref 4–11)

## 2020-12-03 PROCEDURE — G8484 FLU IMMUNIZE NO ADMIN: HCPCS | Performed by: INTERNAL MEDICINE

## 2020-12-03 PROCEDURE — 4040F PNEUMOC VAC/ADMIN/RCVD: CPT | Performed by: INTERNAL MEDICINE

## 2020-12-03 PROCEDURE — 36415 COLL VENOUS BLD VENIPUNCTURE: CPT | Performed by: INTERNAL MEDICINE

## 2020-12-03 PROCEDURE — 1090F PRES/ABSN URINE INCON ASSESS: CPT | Performed by: INTERNAL MEDICINE

## 2020-12-03 PROCEDURE — G8417 CALC BMI ABV UP PARAM F/U: HCPCS | Performed by: INTERNAL MEDICINE

## 2020-12-03 PROCEDURE — 99214 OFFICE O/P EST MOD 30 MIN: CPT | Performed by: INTERNAL MEDICINE

## 2020-12-03 PROCEDURE — G8399 PT W/DXA RESULTS DOCUMENT: HCPCS | Performed by: INTERNAL MEDICINE

## 2020-12-03 PROCEDURE — 1123F ACP DISCUSS/DSCN MKR DOCD: CPT | Performed by: INTERNAL MEDICINE

## 2020-12-03 PROCEDURE — G8427 DOCREV CUR MEDS BY ELIG CLIN: HCPCS | Performed by: INTERNAL MEDICINE

## 2020-12-03 PROCEDURE — 1036F TOBACCO NON-USER: CPT | Performed by: INTERNAL MEDICINE

## 2020-12-03 RX ORDER — ROPINIROLE 2 MG/1
2 TABLET, FILM COATED ORAL NIGHTLY
Qty: 30 TABLET | Refills: 3 | Status: SHIPPED | OUTPATIENT
Start: 2020-12-03 | End: 2021-03-08 | Stop reason: SDUPTHER

## 2020-12-03 NOTE — PROGRESS NOTES
Yolis Moore  Patient's  is 1935  Seen in office on 12/3/2020      SUBJECTIVE:  Melonie Fine anand 81 y. o.year old female presents today   Chief Complaint   Patient presents with    Hypertension    Other     saw Susy Calle     Discuss Medications     would like Ropinirole Hcl to be increased, along with albuteral inh to be increased   Patient is seen here for multiple problems including  Diabetes mellitus  Hypertension  Hyperlipidemia  Restless leg syndrome    Patient states she is soft OB nurse practitioner Elgin garrett for vaginal problem and she gave estrogen cream which she uses  Her symptoms have resolved. She is also complaining of restless leg syndrome. She is taking Requip 1 mg twice a day. Patient states not helping her at night. Patient has DM. No hypoglycemia. No numbness or weakness. No dizziness. Blood sugars are good at home. Patient has hypertension. Taking medications No headaches, no chest pain, no palpitations and no dizziness. Patient has hyperlipidemia. Taking medications. No abdominal pain, no nausea or vomiting. No myalgias. Patient has coronary artery disease and denies any angina. Taking medications regularly. No side effects noted. Review of Systems   Constitutional: Negative for chills, diaphoresis and fever. HENT: Negative. Eyes: Negative. Respiratory: Negative. Negative for cough, shortness of breath and wheezing. Cardiovascular: Negative. Negative for chest pain and leg swelling. Gastrointestinal: Negative. Genitourinary: Negative. Musculoskeletal: Positive for gait problem and myalgias. Hematological: Negative. Psychiatric/Behavioral: Negative.         OBJECTIVE: /86   Pulse 96   Temp 97.7 °F (36.5 °C)   Resp 16   Wt 242 lb 12.8 oz (110.1 kg)   LMP  (LMP Unknown)   SpO2 93%   BMI 41.68 kg/m²     Wt Readings from Last 3 Encounters:   20 242 lb 12.8 oz (110.1 kg)   10/01/20 234 lb 9.3 oz (106.4 kg)   20 236 lb 12.8 oz (107.4 kg)        Patient was seen taking COVID-19 precautions. Face mask, gloves and eyes protectors were used. Patient also wore facemask. GENERAL: - Alert, oriented, pleasant, in no apparent distress. Obese  HEENT: - Conjunctiva pink, no scleral icterus. ENT clear. NECK: -Supple. No jugular venous distention noted. No masses felt,  CARDIOVASCULAR: - Normal S1 and S2    PULMONARY: - No respiratory distress. No wheezes or rales. ABDOMEN: - Soft and non-tender,no masses  ororganomegaly. EXTREMITIES: - No cyanosis, clubbing, or significant edema. No calf tenderness  SKIN: Skin is warm and dry. NEUROLOGICAL: - Cranial nerves II through XII are grossly intact. IMPRESSION:    Encounter Diagnoses   Name Primary?  Type 2 diabetes mellitus without complication, without long-term current use of insulin (HCC) Yes    Restless leg syndrome     Essential hypertension     Functional diarrhea     Gastrointestinal hemorrhage, unspecified gastrointestinal hemorrhage type     Mixed hyperlipidemia     Morbid obesity with BMI of 40.0-44.9, adult (HCC)     Subacute vaginitis     Suspected sleep apnea     Arthritis     CAD s/p MI, TPA in 1998     Chronic diastolic congestive heart failure (HCC)     Cyst, kidney, acquired        ASSESSMENT/PLAN:    Type 2 diabetes mellitus without complication ([facility] [address]  Patient has diabetes mellitus and the blood sugars are running good. Today it was 101. Patient is taking glipizide and is tolerating it well. Continue the same  Follow diet. Essential hypertension  Patient has hypertension and is on losartan and metoprolol. Continue the same. Follow low-salt diet    Functional diarrhea  Patient stated diarrhea has resolved. Gastrointestinal hemorrhage  Patient was told to get colonoscopy. But she refused. Was referred but she did not keep the appointment. Mixed hyperlipidemia  Patient has history of hyperlipidemia and is on Crestor.   Continue the same    Morbid obesity with BMI of 40.0-44.9, adult [facility])  Advised patient to watch diet and lose weight. Restless leg syndrome  Patient is taking Requip 1 mg twice a day  We will change that to 2 mg at bedtime and see RLS is better. Advised patient to take Tylenol also as she has arthritis in the knees. Subacute vaginitis  Patient seen Clari Lewis. She is using estrogen and is feeling better. Suspected sleep apnea  Patient declines sleep apnea studies. Arthritis  Has arthritis of hands,left knee pain, sees Dr. Reyes Likes  Patient is taking Plaquenil  Advised patient to take Tylenol and discussed with rheumatologist about the knee pain    CAD s/p MI, TPA in 1998  Patient has coronary artery disease which is stable. No angina. Continue aspirin. Chronic diastolic congestive heart failure (HCC)  Congestive heart failure stable. No edema. Continue current medications    Cyst, kidney, acquired  Patient has cyst in the kidney. Stable. Return to office in 3 months. Orders Placed This Encounter   Procedures    Basic Metabolic Panel    CBC Auto Differential    MAGNESIUM         Mediations reviewed with the patient. Continue current medications. Appropriate prescriptions are addressed. After visit summeryprovided. Follow up as directed sooner if needed. Questions answered and patient verbalizes understanding. Call for any problems, questions, or concerns.        No Known Allergies  Current Outpatient Medications   Medication Sig Dispense Refill    metoprolol tartrate (LOPRESSOR) 25 MG tablet TAKE ONE TABLET BY MOUTH TWO TIMES A  tablet 1    potassium chloride (KLOR-CON M) 20 MEQ extended release tablet Take 1 tablet by mouth daily 90 tablet 1    losartan (COZAAR) 50 MG tablet TAKE 1 TABLET BY MOUTH DAILY 90 tablet 1    glipiZIDE (GLUCOTROL XL) 5 MG extended release tablet Take 1 tablet by mouth daily 90 tablet 1    Blood Glucose Monitoring Suppl (TRUE METRIX METER) Karen Counter one meter, DX E11.9 testing once daily 1 Device 0    rOPINIRole (REQUIP) 1 MG tablet Take 1 tablet by mouth 2 times daily 60 tablet 5    rosuvastatin (CRESTOR) 10 MG tablet Take 1 tablet by mouth nightly 90 tablet 1    blood glucose test strips (TRUE METRIX BLOOD GLUCOSE TEST) strip Dispense true metrix test strips, DX=E11.9, testing once daily 100 each 3    albuterol sulfate HFA (VENTOLIN HFA) 108 (90 Base) MCG/ACT inhaler Inhale 2 puffs into the lungs 4 times daily as needed for Wheezing 1 Inhaler 5    furosemide (LASIX) 20 MG tablet Take 1 tablet by mouth as needed (prn) Indications: per Dr. Marti Everett 30 tablet 3    hydroxychloroquine (PLAQUENIL) 200 MG tablet Take 200 mg by mouth daily       blood glucose test strips (TRUETEST TEST) strip 1 each by Other route 2 times daily DX=E11.9 100 each 5    aspirin 81 MG EC tablet Take 1 tablet by mouth nightly. 30 tablet     Cholecalciferol (VITAMIN D PO) Take 5,000 Units by mouth 2 times daily as needed Indications: pt states take TID       Coenzyme Q10 (CO Q 10 PO) Take 200 mg by mouth 2 times daily Indications: takes 400mg daily       UNABLE TO FIND Hand brace. 1 Units 0     No current facility-administered medications for this visit. Past Medical History:   Diagnosis Date    Arthritis     left knee pain, sees Dr. Estrella Rausch CAD (coronary artery disease)     sees Dr. Rojas Grade Chronic midline low back pain without sciatica 9/28/2016    Had PT in 2016   [facility] Colonoscopy refused     discussed in 7/15    DM (diabetes mellitus), type 2 ([facility] [address] 02/2006    Dupuytren's contracture of right hand     Endometrial stripe increased 9/25/2014    Saw NP Marlene Owen. Was normal exam per pt.  Gastrointestinal hemorrhage 11/2/2015    Patient had GI bleeding. Colon refused. Discussed with patient in detail.  Glaucoma 4/1/2014    H/O echocardiogram 10/02/2014    Mildly depressed left ventricular function; ejection fraction of 45%. Moderate pulmonary hypertension.  H/O echocardiogram 08/28/2018    EF 50-55%.  Mitral annular calcification is present. No other significant valvulopathy seen.  History of nuclear stress test 08/28/2018    EF 59%. ECG portion of stress test is negative for ischemia by diagnostic criteria. fixed inferior large size severe defect in rest and stress images. Mild hubert infarct ischemia.     HTN (hypertension)     Hyperlipidemia     Kidney stone     hx-\"been about 3 yrs ago\"    MI (myocardial infarction) ([facility] [address] 02/1998    treated with TPA    Obesity     Pneumonia of right lower lobe due to infectious organism 4/5/2018    Vertigo     'have been having this since I had cataract surgery\"\"that is why they are doing the MRA of my brain(10/31/2014)    WD-Traumatic ulcer of foot, right, with fat layer exposed ([facility] [address] 8/3/2020     Past Surgical History:   Procedure Laterality Date    CATARACT REMOVAL Bilateral     5/14,8/14    INGUINAL HERNIA REPAIR Left 45 yrs ago     Social History     Tobacco Use    Smoking status: Never Smoker    Smokeless tobacco: Never Used   Substance Use Topics    Alcohol use: No     Alcohol/week: 0.0 standard drinks       LAB REVIEW:  CBC:   Lab Results   Component Value Date    WBC 5.7 06/23/2020    HGB 13.6 06/23/2020    HCT 40.5 06/23/2020     06/23/2020     Lipids:   Lab Results   Component Value Date    CHOL 121 08/07/2018    TRIG 128 08/07/2018    HDL 50 06/23/2020    LDLCALC 47 06/23/2020    LDLDIRECT 69 08/18/2017    TRIGLYCFAST 141 06/23/2020     Renal:   Lab Results   Component Value Date    BUN 13 06/23/2020    CREATININE 0.6 06/23/2020     06/23/2020    K 4.6 06/23/2020    K 3.0 03/08/2018    ALT <5 06/23/2020    AST 15 06/23/2020    GLUCOSE 97 09/24/2020     PT/INR:   Lab Results   Component Value Date    INR 1.00 01/11/2019     A1C:   Lab Results   Component Value Date    LABA1C 6.2 06/23/2020           Jessica Bolden MD, 12/3/2020 , 2:30 PM

## 2020-12-09 PROBLEM — S91.301A OPEN WOUND OF RIGHT FOOT: Status: RESOLVED | Noted: 2020-07-27 | Resolved: 2020-12-09

## 2020-12-09 ASSESSMENT — ENCOUNTER SYMPTOMS
RESPIRATORY NEGATIVE: 1
WHEEZING: 0
SHORTNESS OF BREATH: 0
GASTROINTESTINAL NEGATIVE: 1
EYES NEGATIVE: 1
COUGH: 0

## 2020-12-10 NOTE — ASSESSMENT & PLAN NOTE
Patient has hypertension and is on losartan and metoprolol. Continue the same.   Follow low-salt diet

## 2020-12-10 NOTE — ASSESSMENT & PLAN NOTE
Has arthritis of hands,left knee pain, sees Dr. Kayla White  Patient is taking Plaquenil  Advised patient to take Tylenol and discussed with rheumatologist about the knee pain

## 2020-12-10 NOTE — ASSESSMENT & PLAN NOTE
Patient has diabetes mellitus and the blood sugars are running good. Today it was 101. Patient is taking glipizide and is tolerating it well. Continue the same  Follow diet.

## 2020-12-22 ENCOUNTER — TELEPHONE (OUTPATIENT)
Dept: INTERNAL MEDICINE CLINIC | Age: 85
End: 2020-12-22

## 2021-01-18 ENCOUNTER — TELEPHONE (OUTPATIENT)
Dept: INTERNAL MEDICINE CLINIC | Age: 86
End: 2021-01-18

## 2021-01-18 NOTE — TELEPHONE ENCOUNTER
Pt stated got up this morning feeling like everything is swimming. Pt stated the only change in meds is that she started taking Lisa 50 mg once a day with breakfast. Pt had home health visit and they checked her BP IT was 138/78. Pt denies any other symptoms. Please review and advise.

## 2021-01-18 NOTE — TELEPHONE ENCOUNTER
Patient called and reported she is feeling dizzy this morning. Patient would like to know if a medical assistant can contact her.

## 2021-01-28 ENCOUNTER — TELEPHONE (OUTPATIENT)
Dept: INTERNAL MEDICINE CLINIC | Age: 86
End: 2021-01-28

## 2021-01-28 NOTE — TELEPHONE ENCOUNTER
Patient canceled her appointment for 1/28/2021. Patient says she does not feel well and she just received her COVID 1st does the other day. Patient states she will call if she feels like she needs to be seen but does not want to go out at this time. Patient declined to reschedule at this time and will call when she is ready to come in.

## 2021-02-23 ENCOUNTER — TELEPHONE (OUTPATIENT)
Dept: INTERNAL MEDICINE CLINIC | Age: 86
End: 2021-02-23

## 2021-02-23 NOTE — TELEPHONE ENCOUNTER
Patient is in need of a rollator w/seat and wheels.  Please fax script to: [employer] Attn:  [facility]  [phone]

## 2021-03-01 RX ORDER — ALBUTEROL SULFATE 90 UG/1
2 AEROSOL, METERED RESPIRATORY (INHALATION) 4 TIMES DAILY PRN
Qty: 8.5 G | Refills: 5 | Status: SHIPPED | OUTPATIENT
Start: 2021-03-01 | End: 2021-10-11

## 2021-03-08 ENCOUNTER — OFFICE VISIT (OUTPATIENT)
Dept: INTERNAL MEDICINE CLINIC | Age: 86
End: 2021-03-08
Payer: MEDICARE

## 2021-03-08 VITALS
SYSTOLIC BLOOD PRESSURE: 128 MMHG | DIASTOLIC BLOOD PRESSURE: 68 MMHG | BODY MASS INDEX: 42.81 KG/M2 | TEMPERATURE: 98.1 F | OXYGEN SATURATION: 96 % | HEART RATE: 68 BPM | WEIGHT: 249.4 LBS

## 2021-03-08 DIAGNOSIS — I50.32 CHRONIC DIASTOLIC CONGESTIVE HEART FAILURE (HCC): ICD-10-CM

## 2021-03-08 DIAGNOSIS — K59.1 FUNCTIONAL DIARRHEA: ICD-10-CM

## 2021-03-08 DIAGNOSIS — E11.9 TYPE 2 DIABETES MELLITUS WITHOUT COMPLICATION, WITHOUT LONG-TERM CURRENT USE OF INSULIN (HCC): Primary | ICD-10-CM

## 2021-03-08 DIAGNOSIS — R26.9 GAIT DISTURBANCE: ICD-10-CM

## 2021-03-08 DIAGNOSIS — I25.10 CORONARY ARTERY DISEASE INVOLVING NATIVE CORONARY ARTERY OF NATIVE HEART WITHOUT ANGINA PECTORIS: ICD-10-CM

## 2021-03-08 DIAGNOSIS — E78.2 MIXED HYPERLIPIDEMIA: ICD-10-CM

## 2021-03-08 DIAGNOSIS — I10 ESSENTIAL HYPERTENSION: ICD-10-CM

## 2021-03-08 DIAGNOSIS — N28.1 CYST, KIDNEY, ACQUIRED: ICD-10-CM

## 2021-03-08 DIAGNOSIS — M19.90 ARTHRITIS: ICD-10-CM

## 2021-03-08 DIAGNOSIS — E66.01 MORBID OBESITY WITH BMI OF 40.0-44.9, ADULT (HCC): ICD-10-CM

## 2021-03-08 DIAGNOSIS — M54.50 CHRONIC MIDLINE LOW BACK PAIN WITHOUT SCIATICA: ICD-10-CM

## 2021-03-08 DIAGNOSIS — R29.818 SUSPECTED SLEEP APNEA: ICD-10-CM

## 2021-03-08 DIAGNOSIS — K92.2 GASTROINTESTINAL HEMORRHAGE, UNSPECIFIED GASTROINTESTINAL HEMORRHAGE TYPE: ICD-10-CM

## 2021-03-08 DIAGNOSIS — G89.29 CHRONIC MIDLINE LOW BACK PAIN WITHOUT SCIATICA: ICD-10-CM

## 2021-03-08 DIAGNOSIS — G25.81 RESTLESS LEG SYNDROME: ICD-10-CM

## 2021-03-08 PROCEDURE — G8484 FLU IMMUNIZE NO ADMIN: HCPCS | Performed by: INTERNAL MEDICINE

## 2021-03-08 PROCEDURE — 1036F TOBACCO NON-USER: CPT | Performed by: INTERNAL MEDICINE

## 2021-03-08 PROCEDURE — 1090F PRES/ABSN URINE INCON ASSESS: CPT | Performed by: INTERNAL MEDICINE

## 2021-03-08 PROCEDURE — 1123F ACP DISCUSS/DSCN MKR DOCD: CPT | Performed by: INTERNAL MEDICINE

## 2021-03-08 PROCEDURE — G8427 DOCREV CUR MEDS BY ELIG CLIN: HCPCS | Performed by: INTERNAL MEDICINE

## 2021-03-08 PROCEDURE — G8399 PT W/DXA RESULTS DOCUMENT: HCPCS | Performed by: INTERNAL MEDICINE

## 2021-03-08 PROCEDURE — 4040F PNEUMOC VAC/ADMIN/RCVD: CPT | Performed by: INTERNAL MEDICINE

## 2021-03-08 PROCEDURE — G8417 CALC BMI ABV UP PARAM F/U: HCPCS | Performed by: INTERNAL MEDICINE

## 2021-03-08 PROCEDURE — 99214 OFFICE O/P EST MOD 30 MIN: CPT | Performed by: INTERNAL MEDICINE

## 2021-03-08 RX ORDER — BIOTIN 1 MG
1 TABLET ORAL 2 TIMES DAILY
Qty: 100 EACH | Refills: 5 | Status: SHIPPED | OUTPATIENT
Start: 2021-03-08 | End: 2021-12-20

## 2021-03-08 RX ORDER — POTASSIUM CHLORIDE 20 MEQ/1
20 TABLET, EXTENDED RELEASE ORAL DAILY
Qty: 90 TABLET | Refills: 1 | Status: SHIPPED | OUTPATIENT
Start: 2021-03-08 | End: 2021-09-15 | Stop reason: SDUPTHER

## 2021-03-08 RX ORDER — LOSARTAN POTASSIUM 50 MG/1
TABLET ORAL
Qty: 90 TABLET | Refills: 1 | Status: SHIPPED | OUTPATIENT
Start: 2021-03-08 | End: 2021-09-15 | Stop reason: SDUPTHER

## 2021-03-08 RX ORDER — ROPINIROLE 2 MG/1
2 TABLET, FILM COATED ORAL NIGHTLY
Qty: 90 TABLET | Refills: 1 | Status: SHIPPED | OUTPATIENT
Start: 2021-03-08 | End: 2021-08-19

## 2021-03-08 RX ORDER — ROSUVASTATIN CALCIUM 10 MG/1
10 TABLET, COATED ORAL NIGHTLY
Qty: 90 TABLET | Refills: 1 | Status: SHIPPED | OUTPATIENT
Start: 2021-03-08 | End: 2021-09-15 | Stop reason: SDUPTHER

## 2021-03-08 RX ORDER — FUROSEMIDE 20 MG/1
20 TABLET ORAL PRN
Qty: 90 TABLET | Refills: 1 | Status: SHIPPED | OUTPATIENT
Start: 2021-03-08 | End: 2021-09-15 | Stop reason: SDUPTHER

## 2021-03-08 ASSESSMENT — PATIENT HEALTH QUESTIONNAIRE - PHQ9
SUM OF ALL RESPONSES TO PHQ9 QUESTIONS 1 & 2: 0
2. FEELING DOWN, DEPRESSED OR HOPELESS: 0
SUM OF ALL RESPONSES TO PHQ QUESTIONS 1-9: 0

## 2021-03-08 NOTE — ASSESSMENT & PLAN NOTE
Patient has restless leg syndrome and takes Requip 2 mg daily. Tolerating well without any side effects.

## 2021-03-08 NOTE — PROGRESS NOTES
IMPRESSION:    Encounter Diagnoses   Name Primary?  Type 2 diabetes mellitus without complication, without long-term current use of insulin (HCC) Yes    Mixed hyperlipidemia     Restless leg syndrome     Morbid obesity with BMI of 40.0-44.9, adult ([facility]     Essential hypertension     Chronic midline low back pain without sciatica     CAD s/p MI, TPA in 1998     Arthritis     Gait disturbance        ASSESSMENT/PLAN:    Essential hypertension  Patient has hypertension that is controlled. Continue losartan and metoprolol. Patient is also on Lasix as needed    Functional diarrhea  Patient denies any more diarrhea    Mixed hyperlipidemia  Patient has hyperlipidemia. She is taking Crestor. Will order lipid profile    Gastrointestinal hemorrhage  Advised patient to get colonoscopy. Morbid obesity with BMI of 40.0-44.9, adult ([facility]  Advised patient to lose weight and follow diet    Restless leg syndrome  Patient has restless leg syndrome and takes Requip 2 mg daily. Tolerating well without any side effects. Suspected sleep apnea  Patient refused to get sleep studies. Type 2 diabetes mellitus without complication ([facility]  Patient has diabetes mellitus. Taking glipizide and her blood sugars are running well. Will check hemoglobin A1c    Arthritis  Patient has pain in multiple joints. She is taking Plaquenil. She goes to rheumatologist in [place]. She is having difficulty ambulating and requesting a wheeled walker with seat. CAD s/p MI, TPA in 1998  Patient has coronary artery disease stable. No angina. Chronic diastolic congestive heart failure (HCC)  Chronic diastolic congestive heart failure stable. No edema no shortness of breath    Chronic midline low back pain without sciatica  Patient has chronic lower back pain. Stable    Cyst, kidney, acquired  Sees Dr. Salvador Fernandez  2-3 mm cyst . Last seen 3/16. Pt needs to see him again  8/2019.   Cyst on the right kidney unchanged since 2014 about a centimeter in size  9/25/2020: 2 cm cyst on the left kidney appears benign        Elza Tate requires the assistance of a 4 wheeled walker with seat to successfully complete daily living tasks such as: bathing, toileting, dressing and grooming. A 4 wheeled walker is necessary due to the patient's unsteady gait, upper body weakness, inability to  an ambulation device, ambulating only short distances by pushing a walker, and the need to sit for a short time before resuming ambulation. These tasks cannot be completed with a lesser ambulation device such as a cane, crutch, or standard walker. Don Tee is able to safely use a 4 wheeled walker in functional tasks around the home. Mediations reviewed with the patient. Continue current medications. Appropriate prescriptions are addressed. After visit summeryprovided. Follow up as directed sooner if needed. Questions answered and patient verbalizes understanding. Call for any problems, questions, or concerns. Return to office in 3 months.   Orders Placed This Encounter   Procedures    Lipid, Fasting    Hemoglobin A1C    Comprehensive Metabolic Panel    DME Order for Walker as OP         No Known Allergies  Current Outpatient Medications   Medication Sig Dispense Refill    albuterol sulfate  (90 Base) MCG/ACT inhaler INHALE 2 PUFFS INTO THE LUNGS 4 TIMES DAILY AS NEEDED FOR WHEEZING 8.5 g 5    rOPINIRole (REQUIP) 2 MG tablet Take 1 tablet by mouth nightly 30 tablet 3    metoprolol tartrate (LOPRESSOR) 25 MG tablet TAKE ONE TABLET BY MOUTH TWO TIMES A  tablet 1    potassium chloride (KLOR-CON M) 20 MEQ extended release tablet Take 1 tablet by mouth daily 90 tablet 1    losartan (COZAAR) 50 MG tablet TAKE 1 TABLET BY MOUTH DAILY 90 tablet 1    glipiZIDE (GLUCOTROL XL) 5 MG extended release tablet Take 1 tablet by mouth daily 90 tablet 1    rosuvastatin (CRESTOR) 10 MG tablet Take 1 tablet by mouth nightly 90 tablet 1    furosemide (LASIX) 20 MG tablet Take 1 tablet by mouth as needed (prn) Indications: per Dr. Cr Thompson 30 tablet 3    hydroxychloroquine (PLAQUENIL) 200 MG tablet Take 200 mg by mouth daily       aspirin 81 MG EC tablet Take 1 tablet by mouth nightly. 30 tablet     Cholecalciferol (VITAMIN D PO) Take 5,000 Units by mouth 2 times daily as needed Indications: pt states take TID       Coenzyme Q10 (CO Q 10 PO) Take 200 mg by mouth 2 times daily Indications: takes 400mg daily       Blood Glucose Monitoring Suppl (TRUE METRIX METER) JUSTO Dispense one meter, DX E11.9 testing once daily 1 Device 0    blood glucose test strips (TRUE METRIX BLOOD GLUCOSE TEST) strip Dispense true metrix test strips, DX=E11.9, testing once daily 100 each 3    UNABLE TO FIND Hand brace. 1 Units 0    blood glucose test strips (TRUETEST TEST) strip 1 each by Other route 2 times daily DX=E11.9 100 each 5     No current facility-administered medications for this visit. Past Medical History:   Diagnosis Date    Arthritis     left knee pain, sees Dr. Meka Hall CAD (coronary artery disease)     sees Dr. Lucille Blackman Chronic midline low back pain without sciatica 9/28/2016    Had PT in 2016   [address] Colonoscopy refused     discussed in 7/15    DM (diabetes mellitus), type 2 ([facility] 02/2006    Dupuytren's contracture of right hand     Endometrial stripe increased 9/25/2014    Saw NP James Tee. Was normal exam per pt.  Gastrointestinal hemorrhage 11/2/2015    Patient had GI bleeding. Colon refused. Discussed with patient in detail.  Glaucoma 4/1/2014    H/O echocardiogram 10/02/2014    Mildly depressed left ventricular function; ejection fraction of 45%. Moderate pulmonary hypertension.  H/O echocardiogram 08/28/2018    EF 50-55%.  Mitral annular calcification is present. No other significant valvulopathy seen.  History of nuclear stress test 08/28/2018    EF 59%.   ECG portion of stress test is negative for ischemia by diagnostic criteria. fixed inferior large size severe defect in rest and stress images. Mild hubert infarct ischemia.     HTN (hypertension)     Hyperlipidemia     Kidney stone     hx-\"been about 3 yrs ago\"    MI (myocardial infarction) ([facility] 02/1998    treated with TPA    Obesity     Open wound of right foot 7/27/2020    Pneumonia of right lower lobe due to infectious organism 4/5/2018    Vertigo     'have been having this since I had cataract surgery\"\"that is why they are doing the MRA of my brain(10/31/2014)    WD-Traumatic ulcer of foot, right, with fat layer exposed ([facility] 8/3/2020     Past Surgical History:   Procedure Laterality Date    CATARACT REMOVAL Bilateral     5/14,8/14    INGUINAL HERNIA REPAIR Left 45 yrs ago     Social History     Tobacco Use    Smoking status: Never Smoker    Smokeless tobacco: Never Used   Substance Use Topics    Alcohol use: No     Alcohol/week: 0.0 standard drinks       LAB REVIEW:  CBC:   Lab Results   Component Value Date    WBC 7.0 12/03/2020    HGB 13.4 12/03/2020    HCT 40.1 12/03/2020     12/03/2020     Lipids:   Lab Results   Component Value Date    HDL 50 06/23/2020    LDLCALC 47 06/23/2020    LDLDIRECT 69 08/18/2017    TRIGLYCFAST 141 06/23/2020    CHOLFAST 125 06/23/2020     Renal:   Lab Results   Component Value Date    BUN 13 12/03/2020    CREATININE <0.5 12/03/2020     12/03/2020    K 4.4 12/03/2020    K 3.0 03/08/2018    ALT <5 06/23/2020    AST 15 06/23/2020    GLUCOSE 98 12/03/2020    GLUF 126 04/23/2018     PT/INR:   Lab Results   Component Value Date    INR 1.00 01/11/2019     A1C:   Lab Results   Component Value Date    LABA1C 6.2 06/23/2020           Brian Dickerson MD, 3/8/2021 , 1:25 PM

## 2021-03-08 NOTE — ASSESSMENT & PLAN NOTE
Patient has diabetes mellitus. Taking glipizide and her blood sugars are running well.   Will check hemoglobin A1c

## 2021-03-08 NOTE — ASSESSMENT & PLAN NOTE
Patient has hypertension that is controlled. Continue losartan and metoprolol.   Patient is also on Lasix as needed

## 2021-03-08 NOTE — ASSESSMENT & PLAN NOTE
Patient has pain in multiple joints. She is taking Plaquenil. She goes to rheumatologist in [facility]. She is having difficulty ambulating and requesting a wheeled walker with seat.

## 2021-03-08 NOTE — ASSESSMENT & PLAN NOTE
Sees Dr. Mary Garay  2-3 mm cyst . Last seen 3/16. Pt needs to see him again  8/2019.   Cyst on the right kidney unchanged since 2014 about a centimeter in size  9/25/2020: 2 cm cyst on the left kidney appears benign

## 2021-03-09 ENCOUNTER — TELEPHONE (OUTPATIENT)
Dept: INTERNAL MEDICINE CLINIC | Age: 86
End: 2021-03-09

## 2021-03-09 NOTE — TELEPHONE ENCOUNTER
Faxed DME order for walker and last office notes to [facility].       Confirmation of fax received

## 2021-03-17 ENCOUNTER — HOSPITAL ENCOUNTER (EMERGENCY)
Age: 86
Discharge: HOME | End: 2021-03-17
Attending: EMERGENCY MEDICINE
Payer: MEDICARE

## 2021-03-17 VITALS
HEART RATE: 67 BPM | HEIGHT: 64 IN | SYSTOLIC BLOOD PRESSURE: 160 MMHG | RESPIRATION RATE: 14 BRPM | BODY MASS INDEX: 39.27 KG/M2 | TEMPERATURE: 98 F | DIASTOLIC BLOOD PRESSURE: 73 MMHG | OXYGEN SATURATION: 98 % | WEIGHT: 230 LBS

## 2021-03-17 DIAGNOSIS — H92.02 LEFT EAR PAIN: ICD-10-CM

## 2021-03-17 DIAGNOSIS — S00.412A EAR CANAL ABRASION, LEFT, INITIAL ENCOUNTER: Primary | ICD-10-CM

## 2021-03-17 PROCEDURE — 99283 EMERGENCY DEPT VISIT LOW MDM: CPT

## 2021-03-17 PROCEDURE — 6370000000 HC RX 637 (ALT 250 FOR IP): Performed by: EMERGENCY MEDICINE

## 2021-03-17 RX ORDER — NEOMYCIN SULFATE, POLYMYXIN B SULFATE AND HYDROCORTISONE 10; 3.5; 1 MG/ML; MG/ML; [USP'U]/ML
3 SUSPENSION/ DROPS AURICULAR (OTIC) ONCE
Status: COMPLETED | OUTPATIENT
Start: 2021-03-17 | End: 2021-03-17

## 2021-03-17 RX ORDER — NEOMYCIN SULFATE, POLYMYXIN B SULFATE, HYDROCORTISONE 3.5; 10000; 1 MG/ML; [USP'U]/ML; MG/ML
2 SOLUTION/ DROPS AURICULAR (OTIC) EVERY 8 HOURS SCHEDULED
Qty: 10 ML | Refills: 0 | Status: SHIPPED | OUTPATIENT
Start: 2021-03-17 | End: 2021-03-24

## 2021-03-17 RX ADMIN — NEOMYCIN SULFATE, POLYMYXIN B SULFATE AND HYDROCORTISONE 3 DROP: 10; 3.5; 1 SUSPENSION/ DROPS AURICULAR (OTIC) at 11:30

## 2021-03-17 NOTE — ED NOTES
Patient given AVS, discharge instructions and prescriptions. Verbalizes understanding no further needs identified at this time.      Bernadette Flower RN  03/17/21 0239

## 2021-03-17 NOTE — ED PROVIDER NOTES
Emergency Department Encounter    Patient: Jemima Choe  MRN: [id number]  : 1935  Date of Evaluation: 3/17/2021  ED Provider:  LAURENCE VICTOR    Triage Chief Complaint:   Foreign Body in Ear (States she has a foreign object in left ear since yesterday morning and could not get it out, \"tried peroxide but nothing came out\" states she thinks there is a bug in there)    [place]:  Jemima Choe is a 81 y.o. female that presents to the emergency department reporting that he have a bug in her left ear. Sometime yesterday afternoon, patient noted some discomfort in the left ear. She felt like something was moving in there. She had tried to clean her ear with a Q-tip. She had also tried to put hydrogen peroxide in the ear to drown the bug, but she noted no obvious insects when the fluid flushed out. There is discomfort in the ear without kaylen pain. She denies any muffled hearing. She denies any sore throat, runny nose, or earaches. There is some discomfort at the posterior mandible on that side. She denies frequent ear infections, pharyngitis or tonsillitis, or other problematic infections. She has non-insulin-dependent diabetes. Patient has felt well otherwise.     ROS - see HPI, below listed is current ROS at time of my eval:  General:  No fevers, no chills, no weakness  Eyes:  No recent vison changes, no discharge  ENT: No sore throat, no nasal congestion, no hearing changes  Cardiovascular:  No chest pain, no palpitations  Respiratory:  No shortness of breath, no cough, no wheezing  Gastrointestinal:  No pain, no nausea, no vomiting, no diarrhea  Musculoskeletal:  No muscle pain  Skin:  No rash,   Neurologic:  No speech problems, no headache  Genitourinary:  No dysuria  Extremities:  no edema, no pain    Past Medical History:   Diagnosis Date    Arthritis     left knee pain, sees Dr. Meka Hall CAD (coronary artery disease)     sees Dr. Lucille Blackman Chronic midline low back pain without sciatica 2016 Had PT in 2016 [facility] Colonoscopy refused     discussed in 7/15    DM (diabetes mellitus), type 2 ([facility] 02/2006    Dupuytren's contracture of right hand     Endometrial stripe increased 9/25/2014    Saw NP Loren Nicole. Was normal exam per pt.  Gastrointestinal hemorrhage 11/2/2015    Patient had GI bleeding. Colon refused. Discussed with patient in detail.  Glaucoma 4/1/2014    H/O echocardiogram 10/02/2014    Mildly depressed left ventricular function; ejection fraction of 45%. Moderate pulmonary hypertension.  H/O echocardiogram 08/28/2018    EF 50-55%.  Mitral annular calcification is present. No other significant valvulopathy seen.  History of nuclear stress test 08/28/2018    EF 59%. ECG portion of stress test is negative for ischemia by diagnostic criteria. fixed inferior large size severe defect in rest and stress images. Mild hubert infarct ischemia.     HTN (hypertension)     Hyperlipidemia     Kidney stone     hx-\"been about 3 yrs ago\"    MI (myocardial infarction) ([facility] 02/1998    treated with TPA    Obesity     Open wound of right foot 7/27/2020    Pneumonia of right lower lobe due to infectious organism 4/5/2018    Vertigo     'have been having this since I had cataract surgery\"\"that is why they are doing the MRA of my brain(10/31/2014)    WD-Traumatic ulcer of foot, right, with fat layer exposed ([facility] 8/3/2020     Past Surgical History:   Procedure Laterality Date    CATARACT REMOVAL Bilateral     5/14,8/14    INGUINAL HERNIA REPAIR Left 39 yrs ago     Family History   Problem Relation Age of Onset    Parkinsonism Mother     Heart Attack Father     Heart Disease Father     Stroke Sister     Cancer Sister         breast ca    High Blood Pressure Brother     High Blood Pressure Brother     Cancer Brother         \"stomach cancer\"    Colon Cancer Neg Hx     Stomach Cancer Neg Hx      Social History     Socioeconomic History    Marital status: Single     Spouse name: Not on file    Number of children: Not on file    Years of education: Not on file    Highest education level: Not on file   Occupational History    Not on file   Social Needs    Financial resource strain: Patient refused    Food insecurity     Worry: Patient refused     Inability: Patient refused    Transportation needs     Medical: Patient refused     Non-medical: Patient refused   Tobacco Use    Smoking status: Never Smoker    Smokeless tobacco: Never Used   Substance and Sexual Activity    Alcohol use: No     Alcohol/week: 0.0 standard drinks    Drug use: No    Sexual activity: Not Currently     Partners: Male   Lifestyle    Physical activity     Days per week: Not on file     Minutes per session: Not on file    Stress: Not on file   Relationships    Social connections     Talks on phone: Not on file     Gets together: Not on file     Attends Taoist service: Not on file     Active member of club or organization: Not on file     Attends meetings of clubs or organizations: Not on file     Relationship status: Not on file    Intimate partner violence     Fear of current or ex partner: Not on file     Emotionally abused: Not on file     Physically abused: Not on file     Forced sexual activity: Not on file   Other Topics Concern    Not on file   Social History Narrative    Not on file     No current facility-administered medications for this encounter.       Current Outpatient Medications   Medication Sig Dispense Refill    neomycin-polymyxin-hydrocortisone 1 % SOLN otic solution Place 2 drops into the left ear every 8 hours for 7 days 10 mL 0    blood glucose test strips (TRUETEST TEST) strip 1 each by Other route 2 times daily DX=E11.9 100 each 5    furosemide (LASIX) 20 MG tablet Take 1 tablet by mouth as needed (prn) Indications: per Dr. Doretha Woods 90 tablet 1    losartan (COZAAR) 50 MG tablet TAKE 1 TABLET BY MOUTH DAILY 90 tablet 1    metoprolol tartrate (LOPRESSOR) 25 MG tablet TAKE ONE TABLET BY MOUTH TWO TIMES A  tablet 1    potassium chloride (KLOR-CON M) 20 MEQ extended release tablet Take 1 tablet by mouth daily 90 tablet 1    rosuvastatin (CRESTOR) 10 MG tablet Take 1 tablet by mouth nightly 90 tablet 1    rOPINIRole (REQUIP) 2 MG tablet Take 1 tablet by mouth nightly 90 tablet 1    albuterol sulfate  (90 Base) MCG/ACT inhaler INHALE 2 PUFFS INTO THE LUNGS 4 TIMES DAILY AS NEEDED FOR WHEEZING 8.5 g 5    glipiZIDE (GLUCOTROL XL) 5 MG extended release tablet Take 1 tablet by mouth daily 90 tablet 1    Blood Glucose Monitoring Suppl (TRUE METRIX METER) JUSTO Dispense one meter, DX E11.9 testing once daily 1 Device 0    blood glucose test strips (TRUE METRIX BLOOD GLUCOSE TEST) strip Dispense true metrix test strips, DX=E11.9, testing once daily 100 each 3    UNABLE TO FIND Hand brace. 1 Units 0    hydroxychloroquine (PLAQUENIL) 200 MG tablet Take 200 mg by mouth daily       aspirin 81 MG EC tablet Take 1 tablet by mouth nightly. 30 tablet     Cholecalciferol (VITAMIN D PO) Take 5,000 Units by mouth 2 times daily as needed Indications: pt states take TID       Coenzyme Q10 (CO Q 10 PO) Take 200 mg by mouth 2 times daily Indications: takes 400mg daily        No Known Allergies    Nursing Notes Reviewed    Physical Exam:  Triage VS:    ED Triage Vitals [03/17/21 1050]   Enc Vitals Group      BP (!) 160/73      Pulse 67      Resp 14      Temp 98 °F (36.7 °C)      Temp Source Oral      SpO2 98 %      Weight 230 lb (104.3 kg)      Height 5' 4\" (1.626 m)      Head Circumference       Peak Flow       Pain Score       Pain Loc       Pain Edu? Excl. in [address]? My pulse ox interpretation is - normal    General appearance:  No acute distress. Skin:  Warm. Dry. Eye:  Extraocular movements intact. Ears, nose, mouth and throat:  Oral mucosa moist.  Left external auditory canal has a small abrasion at the outer 3 o'clock position.   There is a smaller abrasion at the 7 o'clock position. Small amount of earwax is adherent to about the 10 o'clock position further into the ear canal.  No impaction noted. No visible tympanic membrane perforation. Tympanic membranes with mild symmetric erythema bilaterally with opacity. No foreign body is otherwise noted in either ear. Posterior pharynx with erythema but no exudate, nasal congestion noted   Neck:  Trachea midline. No palpable tender lymphadenopathy. Very mild tenderness at the posterior mandible on the left consistent with the location of the eustachian tube. Extremity:   Normal ROM     Heart:  Regular rate and rhythm   Perfusion:  intact  Respiratory:  Lungs clear to auscultation bilaterally. Respirations nonlabored. Abdominal:  Normal bowel sounds. Soft. Nontender. Non distended. Neurological:  Alert and oriented times 3. I have reviewed and interpreted all of the currently available lab results from this visit (if applicable):  None indicated. Radiographs (if obtained):  Radiologist's Report Reviewed:  None indicated. MDM:  Patient is brought to bed 7-1 and assessed by me. Plan and medical decision making are discussed with the patient. She reports a sensation of having a foreign body in the ear, particularly a bug. There are no insects currently in the ear canal.  Perhaps there was at one time, and with subsequent removal, she sustained the abrasions to the [facility]. Perhaps the abrasions were caused while she was Q-tip, and these abrasions have caused the persistent foreign body sensation. There is a small amount of earwax, but I do not believe that removal of the cerumen is emergently indicated, particularly with the possibility of causing further injury to the ear canal.  Exam does not suggest malignant otitis, mastoiditis, peritonsillar or posterior pharyngeal abscess, Jamie's angina, or other septic process.   Certainly consider a viral upper respiratory tract infection, versus another process early in its course. I estimate there is low risk for, but not limited to, Acute coronary syndrome, pulmonary embolus, aortic dissection, pneumonia requiring admission, sepsis, bacterial meningitis, aortic dissection    Patient is nontoxic appearing, appears well hydrated. No indication for imaging here. Patient is tolerating oral intake without difficulty. Patient understands that at this time there is no evidence for another underlying process, however that early in the process of any illness or infection an initial workup/presentation can be falsely reassuring/negative. Based on history, physical exam and discussion with patient, the patient will be treated symptomatically with topical antibiotic with hydrocortisone and will be discharged home. Patient was instructed on symptomatic treatment, monitoring and outpatient followup. They understand and agree with the plan, return warnings given. We have discussed the symptoms which are most concerning that necessitate immediate return. Clinical Impression:  1. Ear canal abrasion, left, initial encounter    2.  Left ear pain      Disposition referral (if applicable):  Ashly Walter MD  [address]  [phone]    Schedule an appointment as soon as possible for a visit       [facility] Emergency Department  [address] [place] [address]  [phone]  Go to   As needed, If symptoms worsen    Disposition medications (if applicable):  New Prescriptions    NEOMYCIN-POLYMYXIN-HYDROCORTISONE 1 % SOLN OTIC SOLUTION    Place 2 drops into the left ear every 8 hours for 7 days     ED Provider Disposition Time  DISPOSITION Decision To Discharge 03/17/2021 11:29:22 AM      Comment: Please note this report has been produced using speech recognition software and may contain errors related to that system including errors in grammar, punctuation, and spelling, as well as words and phrases that may be inappropriate. Efforts were made to edit the dictations.         Daria Barnhart MD  03/17/21 0629

## 2021-03-24 RX ORDER — GLIPIZIDE 5 MG/1
5 TABLET, FILM COATED, EXTENDED RELEASE ORAL DAILY
Qty: 90 TABLET | Refills: 1 | Status: SHIPPED | OUTPATIENT
Start: 2021-03-24 | End: 2021-09-15 | Stop reason: SDUPTHER

## 2021-04-22 ENCOUNTER — TELEPHONE (OUTPATIENT)
Dept: INTERNAL MEDICINE CLINIC | Age: 86
End: 2021-04-22

## 2021-04-22 ENCOUNTER — NURSE ONLY (OUTPATIENT)
Dept: INTERNAL MEDICINE CLINIC | Age: 86
End: 2021-04-22
Payer: MEDICARE

## 2021-04-22 DIAGNOSIS — E78.2 MIXED HYPERLIPIDEMIA: ICD-10-CM

## 2021-04-22 DIAGNOSIS — E11.9 TYPE 2 DIABETES MELLITUS WITHOUT COMPLICATION, WITHOUT LONG-TERM CURRENT USE OF INSULIN (HCC): ICD-10-CM

## 2021-04-22 DIAGNOSIS — I10 ESSENTIAL HYPERTENSION: ICD-10-CM

## 2021-04-22 DIAGNOSIS — G25.81 RESTLESS LEG SYNDROME: ICD-10-CM

## 2021-04-22 LAB
A/G RATIO: 1.8 (ref 1.1–2.2)
ALBUMIN SERPL-MCNC: 4 G/DL (ref 3.4–5)
ALP BLD-CCNC: 59 U/L (ref 40–129)
ALT SERPL-CCNC: <5 U/L (ref 10–40)
ANION GAP SERPL CALCULATED.3IONS-SCNC: 5 MMOL/L (ref 3–16)
AST SERPL-CCNC: 10 U/L (ref 15–37)
BILIRUB SERPL-MCNC: 0.5 MG/DL (ref 0–1)
BUN BLDV-MCNC: 16 MG/DL (ref 7–20)
CALCIUM SERPL-MCNC: 9.5 MG/DL (ref 8.3–10.6)
CHLORIDE BLD-SCNC: 100 MMOL/L (ref 99–110)
CHOLESTEROL, FASTING: 126 MG/DL (ref 0–199)
CO2: 32 MMOL/L (ref 21–32)
CREAT SERPL-MCNC: 0.6 MG/DL (ref 0.6–1.2)
GFR AFRICAN AMERICAN: >60
GFR NON-AFRICAN AMERICAN: >60
GLOBULIN: 2.2 G/DL
GLUCOSE BLD-MCNC: 101 MG/DL (ref 70–99)
HDLC SERPL-MCNC: 56 MG/DL (ref 40–60)
LDL CHOLESTEROL CALCULATED: 54 MG/DL
MAGNESIUM: 2 MG/DL (ref 1.8–2.4)
POTASSIUM SERPL-SCNC: 4.9 MMOL/L (ref 3.5–5.1)
SODIUM BLD-SCNC: 137 MMOL/L (ref 136–145)
TOTAL PROTEIN: 6.2 G/DL (ref 6.4–8.2)
TRIGLYCERIDE, FASTING: 80 MG/DL (ref 0–150)
VLDLC SERPL CALC-MCNC: 16 MG/DL

## 2021-04-22 PROCEDURE — 36415 COLL VENOUS BLD VENIPUNCTURE: CPT | Performed by: INTERNAL MEDICINE

## 2021-04-22 NOTE — TELEPHONE ENCOUNTER
Patient c/o feeling \"foggy\" and \"eyes dont focus \" for 2 hours after taking morning meds with food. States only change is the color of Glipizide 5 mg  pill she just received. Taking one tab daily,. Blood glucose reading in am have been below 100 mg/dL. Please advise.

## 2021-04-22 NOTE — TELEPHONE ENCOUNTER
Dr Madelyn Rivero notified and would like patient to make an appt.  Patient notified and  Appt made for Monday 4-

## 2021-04-23 LAB
ESTIMATED AVERAGE GLUCOSE: 148.5 MG/DL
HBA1C MFR BLD: 6.8 %

## 2021-04-26 ENCOUNTER — VIRTUAL VISIT (OUTPATIENT)
Dept: INTERNAL MEDICINE CLINIC | Age: 86
End: 2021-04-26
Payer: MEDICARE

## 2021-04-26 DIAGNOSIS — G25.81 RESTLESS LEG SYNDROME: ICD-10-CM

## 2021-04-26 DIAGNOSIS — I10 ESSENTIAL HYPERTENSION: ICD-10-CM

## 2021-04-26 DIAGNOSIS — K59.1 FUNCTIONAL DIARRHEA: ICD-10-CM

## 2021-04-26 DIAGNOSIS — E78.2 MIXED HYPERLIPIDEMIA: ICD-10-CM

## 2021-04-26 DIAGNOSIS — E11.9 TYPE 2 DIABETES MELLITUS WITHOUT COMPLICATION, WITHOUT LONG-TERM CURRENT USE OF INSULIN (HCC): Primary | ICD-10-CM

## 2021-04-26 PROCEDURE — 99443 PR PHYS/QHP TELEPHONE EVALUATION 21-30 MIN: CPT | Performed by: INTERNAL MEDICINE

## 2021-04-26 RX ORDER — VIT A/VIT C/VIT E/ZINC/COPPER 2148-113
1 TABLET ORAL 2 TIMES DAILY
COMMUNITY

## 2021-04-26 NOTE — PROGRESS NOTES
John Oliver is a 81 y.o. female evaluated via telephone on 4/26/2021. Consent:  She and/or health care decision maker is aware that that she may receive a bill for this telephone service, depending on her insurance coverage, and has provided verbal consent to proceed: Yes      Documentation:  I communicated with the patient and/or health care decision maker about   Foggy feeling in the mornings  Diabetes  Review labs    Patient still few times she had a foggy feeling and having difficulty focusing after she gets up in the morning for about 2 hours. Patient states she gets up around 5 AM and takes her blood pressure medications at that time. She takes glipizide with food. Her blood pressure has been normal.  No falls or injuries. Patient states she had diarrhea this morning therefore she did not come to the office. She had some spicy food but since then she is feeling better  No abdominal pain. No nausea vomiting. Patient denies any headaches. No dizziness. No chest pain. No palpitations  No focal weakness. Patient is ambulatory. Details of this discussion including any medical advice provided: All the lab results reviewed with the patient  Hemoglobin A1c 6.8. Lipid profile is very good. CMP is normal  Advised patient to check the blood sugar and blood pressure when she feels foggy  See ophthalmologist Dr. Michaela Tian  Diabetes well controlled continue current medication  Hypertension also stable /71.   Continue the same  Hyperlipidemia: Continue Crestor  RLS: Feeling better with ropinirole  COPD stable    Current Outpatient Medications on File Prior to Visit   Medication Sig Dispense Refill    Multiple Vitamins-Minerals (PRESERVISION AREDS) TABS Take 1 tablet by mouth 2 times daily      glipiZIDE (GLUCOTROL XL) 5 MG extended release tablet TAKE 1 TABLET BY MOUTH DAILY 90 tablet 1    furosemide (LASIX) 20 MG tablet Take 1 tablet by mouth as needed (prn) Indications: per Dr. Mia Mays 90 tablet 1    losartan (COZAAR) 50 MG tablet TAKE 1 TABLET BY MOUTH DAILY 90 tablet 1    metoprolol tartrate (LOPRESSOR) 25 MG tablet TAKE ONE TABLET BY MOUTH TWO TIMES A  tablet 1    potassium chloride (KLOR-CON M) 20 MEQ extended release tablet Take 1 tablet by mouth daily 90 tablet 1    rosuvastatin (CRESTOR) 10 MG tablet Take 1 tablet by mouth nightly 90 tablet 1    rOPINIRole (REQUIP) 2 MG tablet Take 1 tablet by mouth nightly 90 tablet 1    albuterol sulfate  (90 Base) MCG/ACT inhaler INHALE 2 PUFFS INTO THE LUNGS 4 TIMES DAILY AS NEEDED FOR WHEEZING 8.5 g 5    hydroxychloroquine (PLAQUENIL) 200 MG tablet Take 200 mg by mouth 2 times daily       aspirin 81 MG EC tablet Take 1 tablet by mouth nightly. 30 tablet     Cholecalciferol (VITAMIN D PO) Take 5,000 Units by mouth 2 times daily as needed Indications: pt states take TID       Coenzyme Q10 (CO Q 10 PO) Take 200 mg by mouth 2 times daily Indications: takes 400mg daily       ciclopirox (LOPROX) 0.77 % cream       blood glucose test strips (TRUETEST TEST) strip 1 each by Other route 2 times daily DX=E11.9 100 each 5    Blood Glucose Monitoring Suppl (TRUE METRIX METER) JUSTO Dispense one meter, DX E11.9 testing once daily 1 Device 0    blood glucose test strips (TRUE METRIX BLOOD GLUCOSE TEST) strip Dispense true metrix test strips, DX=E11.9, testing once daily 100 each 3    UNABLE TO FIND Hand brace. 1 Units 0     No current facility-administered medications on file prior to visit. I affirm this is a Patient Initiated Episode with a Patient who has not had a related appointment within my department in the past 7 days or scheduled within the next 24 hours.     Patient identification was verified at the start of the visit: Yes    Total Time: minutes: 21-30 minutes    The visit was conducted pursuant to the emergency declaration under the [facility] waiver authority and the Coronavirus Preparedness and Response Supplemental Appropriations Act. Patient identification was verified, and a caregiver was present when appropriate. The patient was located in a state where the provider was credentialed to provide care.     Note: not billable if this call serves to triage the patient into an appointment for the relevant concern      Checo Martínez

## 2021-06-14 ENCOUNTER — OFFICE VISIT (OUTPATIENT)
Dept: INTERNAL MEDICINE CLINIC | Age: 86
End: 2021-06-14
Payer: MEDICARE

## 2021-06-14 VITALS
HEART RATE: 72 BPM | TEMPERATURE: 97.7 F | WEIGHT: 244.8 LBS | SYSTOLIC BLOOD PRESSURE: 130 MMHG | DIASTOLIC BLOOD PRESSURE: 64 MMHG | RESPIRATION RATE: 16 BRPM | BODY MASS INDEX: 42.02 KG/M2 | OXYGEN SATURATION: 96 %

## 2021-06-14 DIAGNOSIS — N28.1 CYST, KIDNEY, ACQUIRED: ICD-10-CM

## 2021-06-14 DIAGNOSIS — E11.9 TYPE 2 DIABETES MELLITUS WITHOUT COMPLICATION, WITHOUT LONG-TERM CURRENT USE OF INSULIN (HCC): ICD-10-CM

## 2021-06-14 DIAGNOSIS — N76.1 SUBACUTE VAGINITIS: ICD-10-CM

## 2021-06-14 DIAGNOSIS — I10 ESSENTIAL HYPERTENSION: ICD-10-CM

## 2021-06-14 DIAGNOSIS — I50.32 CHRONIC DIASTOLIC CONGESTIVE HEART FAILURE (HCC): ICD-10-CM

## 2021-06-14 DIAGNOSIS — I25.10 CORONARY ARTERY DISEASE INVOLVING NATIVE CORONARY ARTERY OF NATIVE HEART WITHOUT ANGINA PECTORIS: ICD-10-CM

## 2021-06-14 DIAGNOSIS — M72.0 DUPUYTREN'S CONTRACTURE OF RIGHT HAND: ICD-10-CM

## 2021-06-14 DIAGNOSIS — E78.2 MIXED HYPERLIPIDEMIA: ICD-10-CM

## 2021-06-14 DIAGNOSIS — G25.81 RESTLESS LEG SYNDROME: ICD-10-CM

## 2021-06-14 DIAGNOSIS — M19.90 ARTHRITIS: ICD-10-CM

## 2021-06-14 DIAGNOSIS — K59.1 FUNCTIONAL DIARRHEA: ICD-10-CM

## 2021-06-14 DIAGNOSIS — E66.01 MORBID OBESITY WITH BMI OF 40.0-44.9, ADULT (HCC): ICD-10-CM

## 2021-06-14 PROCEDURE — 4040F PNEUMOC VAC/ADMIN/RCVD: CPT | Performed by: INTERNAL MEDICINE

## 2021-06-14 PROCEDURE — 1090F PRES/ABSN URINE INCON ASSESS: CPT | Performed by: INTERNAL MEDICINE

## 2021-06-14 PROCEDURE — G8427 DOCREV CUR MEDS BY ELIG CLIN: HCPCS | Performed by: INTERNAL MEDICINE

## 2021-06-14 PROCEDURE — 1123F ACP DISCUSS/DSCN MKR DOCD: CPT | Performed by: INTERNAL MEDICINE

## 2021-06-14 PROCEDURE — G8417 CALC BMI ABV UP PARAM F/U: HCPCS | Performed by: INTERNAL MEDICINE

## 2021-06-14 PROCEDURE — G8399 PT W/DXA RESULTS DOCUMENT: HCPCS | Performed by: INTERNAL MEDICINE

## 2021-06-14 PROCEDURE — 99214 OFFICE O/P EST MOD 30 MIN: CPT | Performed by: INTERNAL MEDICINE

## 2021-06-14 PROCEDURE — 1036F TOBACCO NON-USER: CPT | Performed by: INTERNAL MEDICINE

## 2021-06-14 RX ORDER — HYDROCODONE BITARTRATE AND ACETAMINOPHEN 5; 325 MG/1; MG/1
1 TABLET ORAL PRN
Status: ON HOLD | COMMUNITY
Start: 2021-06-09 | End: 2021-12-05 | Stop reason: ALTCHOICE

## 2021-06-14 ASSESSMENT — ENCOUNTER SYMPTOMS
RESPIRATORY NEGATIVE: 1
COUGH: 0
WHEEZING: 0
EYES NEGATIVE: 1
ALLERGIC/IMMUNOLOGIC NEGATIVE: 1
GASTROINTESTINAL NEGATIVE: 1
SHORTNESS OF BREATH: 0

## 2021-06-14 NOTE — ASSESSMENT & PLAN NOTE
Has arthritis of hands,left knee pain, sees Dr. Luke Plascencia  Patient is taking Plaquenil  Will send last labs to rheumatologist.

## 2021-06-14 NOTE — PROGRESS NOTES
Melecio Hooker  Patient's  is 1935  Seen in office on 2021      SUBJECTIVE:  Stan michel 81 y. o.year old female presents today   Chief Complaint   Patient presents with    Diabetes    Hypertension    Results     results    Other     saw rheumatologist and Dr. Singh Corrales     Patient is here for f/u of DM, HTN HLD . CAD  Patient has DM. No hypoglycemia. No numbness or weakness. No dizziness. Blood sugars are good at home. ~98  Patient has hypertension. Taking medications No headaches, no chest pain, no palpitations and no dizziness. Patient has hyperlipidemia. Taking medications. No abdominal pain, no nausea or vomiting. No myalgias. Pt has CAD : no angina. No SOB  Pt is ambulatory with cane and walker. Taking medications regularly. No side effects noted. Review of Systems   Constitutional: Negative. Negative for chills, diaphoresis and fever. HENT: Negative. Eyes: Negative. Respiratory: Negative. Negative for cough, shortness of breath and wheezing. Cardiovascular: Negative. Negative for chest pain and leg swelling. Gastrointestinal: Negative. Endocrine: Negative. Genitourinary: Negative. Musculoskeletal: Negative. Allergic/Immunologic: Negative. Neurological: Negative. Hematological: Negative. Psychiatric/Behavioral: Negative. OBJECTIVE: /64   Pulse 72   Temp 97.7 °F (36.5 °C) (Oral)   Resp 16   Wt 244 lb 12.8 oz (111 kg)   LMP  (LMP Unknown)   SpO2 96%   BMI 42.02 kg/m²     Wt Readings from Last 3 Encounters:   21 244 lb 12.8 oz (111 kg)   21 230 lb (104.3 kg)   21 249 lb 6.4 oz (113.1 kg)      Patient was seen taking COVID-19 precautions. Face mask, gloves were used. Patient also wore facemask. GENERAL: - Alert, oriented, pleasant, in no apparent distress. HEENT: - Conjunctiva pink, no scleral icterus. ENT clear. NECK: -Supple. No jugular venous distention noted.  No masses felt,  CARDIOVASCULAR: - Normal S1 and S2    PULMONARY: - No respiratory distress. No wheezes or rales. ABDOMEN: - Soft and non-tender,no masses  ororganomegaly. EXTREMITIES: - No cyanosis, clubbing, or significant edema. SKIN: Skin is warm and dry. NEUROLOGICAL: - Cranial nerves II through XII are grossly intact. IMPRESSION:    Encounter Diagnoses   Name Primary?  Type 2 diabetes mellitus without complication, without long-term current use of insulin ([facility]     CAD s/p MI, TPA in 1998     Chronic diastolic congestive heart failure (HCC)     Cyst, kidney, acquired     Dupuytren's contracture of right hand     Essential hypertension     Functional diarrhea     Mixed hyperlipidemia     Morbid obesity with BMI of 40.0-44.9, adult (HCC)     Restless leg syndrome     Subacute vaginitis     Arthritis        ASSESSMENT/PLAN:    Type 2 diabetes mellitus without complication (HCC)  Pt has DM  Taking glipizide-metformin   Follow diet and exercise  Will d/c metformin because of diarrhea and keep only on glipizide   Taking glipizide 5 mg daily     CAD s/p MI, TPA in 1998  MI, TPA,sees Dr. Junito Buck  Patient is on aspirin, beta blocker and statins    Chronic diastolic congestive heart failure ([facility]  Patient is stable. Continue current treatment. Cyst, kidney, acquired  Sees Dr. Krissy Mijares  2-3 mm cyst . Last seen 3/16. Pt needs to see him again  8/2019. Cyst on the right kidney unchanged since 2014 about a centimeter in size  9/25/2020: 2 cm cyst on the left kidney appears benign    Dupuytren's contracture of right hand  Pt has contracture of right hand  Cannot make     Essential hypertension  Hypertension in control. Follow low salt diet. Continue current treatment. Continue losartan and metoprolol    Functional diarrhea  Diarrhea resolved. Mixed hyperlipidemia  Hyperlipidemia is stable. Follow low cholesterol diet. Continue current treatment. Patient is on crestor. Lipid profile is good.      Morbid obesity with BMI of 40.0-44.9, adult [facility])  Follow low chol diet. Restless leg syndrome  Takes Requip 2 mg daily at night    Subacute vaginitis  Resolved. Arthritis  Has arthritis of hands,left knee pain, sees Dr. Lyn Cruz  Patient is taking Plaquenil  Will send last labs to rheumatologist.    Return to office in 3 months. Mediations reviewed with the patient. Continue current medications. Appropriate prescriptions are addressed. After visit summeryprovided. Follow up as directed sooner if needed. Questions answered and patient verbalizes understanding. Call for any problems, questions, or concerns.        No Known Allergies  Current Outpatient Medications   Medication Sig Dispense Refill    HYDROcodone-acetaminophen (NORCO) 5-325 MG per tablet       ciclopirox (LOPROX) 0.77 % cream       Multiple Vitamins-Minerals (PRESERVISION AREDS) TABS Take 1 tablet by mouth 2 times daily      glipiZIDE (GLUCOTROL XL) 5 MG extended release tablet TAKE 1 TABLET BY MOUTH DAILY 90 tablet 1    blood glucose test strips (TRUETEST TEST) strip 1 each by Other route 2 times daily DX=E11.9 100 each 5    furosemide (LASIX) 20 MG tablet Take 1 tablet by mouth as needed (prn) Indications: per Dr. Sha Walls 90 tablet 1    losartan (COZAAR) 50 MG tablet TAKE 1 TABLET BY MOUTH DAILY 90 tablet 1    metoprolol tartrate (LOPRESSOR) 25 MG tablet TAKE ONE TABLET BY MOUTH TWO TIMES A  tablet 1    potassium chloride (KLOR-CON M) 20 MEQ extended release tablet Take 1 tablet by mouth daily 90 tablet 1    rosuvastatin (CRESTOR) 10 MG tablet Take 1 tablet by mouth nightly 90 tablet 1    rOPINIRole (REQUIP) 2 MG tablet Take 1 tablet by mouth nightly 90 tablet 1    albuterol sulfate  (90 Base) MCG/ACT inhaler INHALE 2 PUFFS INTO THE LUNGS 4 TIMES DAILY AS NEEDED FOR WHEEZING 8.5 g 5    blood glucose test strips (TRUE METRIX BLOOD GLUCOSE TEST) strip Dispense true metrix test strips, DX=E11.9, testing once daily 100 each 3    hydroxychloroquine

## 2021-06-14 NOTE — ASSESSMENT & PLAN NOTE
Sees Dr. Corrina Gregory  2-3 mm cyst . Last seen 3/16. Pt needs to see him again  8/2019.   Cyst on the right kidney unchanged since 2014 about a centimeter in size  9/25/2020: 2 cm cyst on the left kidney appears benign

## 2021-06-14 NOTE — ASSESSMENT & PLAN NOTE
Hyperlipidemia is stable. Follow low cholesterol diet. Continue current treatment. Patient is on crestor. Lipid profile is good.

## 2021-06-14 NOTE — ASSESSMENT & PLAN NOTE
Hypertension in control. Follow low salt diet. Continue current treatment.   Continue losartan and metoprolol

## 2021-07-26 ENCOUNTER — TELEPHONE (OUTPATIENT)
Dept: INTERNAL MEDICINE CLINIC | Age: 86
End: 2021-07-26

## 2021-07-26 NOTE — TELEPHONE ENCOUNTER
Patient called stating that her restless leg is really bad and has been taking 1 1/2 tab of requip at hs and is helping. Would like for you to know. (3mg total).

## 2021-08-19 ENCOUNTER — TELEPHONE (OUTPATIENT)
Dept: INTERNAL MEDICINE CLINIC | Age: 86
End: 2021-08-19

## 2021-08-19 NOTE — TELEPHONE ENCOUNTER
Patient needs new prescription for requip for 3 mg she stated that dosage was increased. And she is almost out of her 2 mg  Due to increase.

## 2021-08-20 RX ORDER — ROPINIROLE 2 MG/1
2 TABLET, FILM COATED ORAL NIGHTLY
Qty: 90 TABLET | Refills: 1 | Status: SHIPPED | OUTPATIENT
Start: 2021-08-20 | End: 2022-02-10 | Stop reason: SDUPTHER

## 2021-08-31 ENCOUNTER — TELEPHONE (OUTPATIENT)
Dept: INTERNAL MEDICINE CLINIC | Age: 86
End: 2021-08-31

## 2021-09-15 ENCOUNTER — OFFICE VISIT (OUTPATIENT)
Dept: INTERNAL MEDICINE CLINIC | Age: 86
End: 2021-09-15
Payer: MEDICARE

## 2021-09-15 VITALS
OXYGEN SATURATION: 98 % | TEMPERATURE: 98.4 F | DIASTOLIC BLOOD PRESSURE: 70 MMHG | SYSTOLIC BLOOD PRESSURE: 118 MMHG | RESPIRATION RATE: 16 BRPM | WEIGHT: 248 LBS | BODY MASS INDEX: 42.57 KG/M2 | HEART RATE: 76 BPM

## 2021-09-15 DIAGNOSIS — I10 ESSENTIAL HYPERTENSION: Primary | ICD-10-CM

## 2021-09-15 DIAGNOSIS — I50.32 CHRONIC DIASTOLIC CONGESTIVE HEART FAILURE (HCC): ICD-10-CM

## 2021-09-15 DIAGNOSIS — K92.2 GASTROINTESTINAL HEMORRHAGE, UNSPECIFIED GASTROINTESTINAL HEMORRHAGE TYPE: ICD-10-CM

## 2021-09-15 DIAGNOSIS — I25.10 CORONARY ARTERY DISEASE INVOLVING NATIVE CORONARY ARTERY OF NATIVE HEART WITHOUT ANGINA PECTORIS: ICD-10-CM

## 2021-09-15 DIAGNOSIS — R29.818 SUSPECTED SLEEP APNEA: ICD-10-CM

## 2021-09-15 DIAGNOSIS — M05.79 RHEUMATOID ARTHRITIS INVOLVING MULTIPLE SITES WITH POSITIVE RHEUMATOID FACTOR (HCC): ICD-10-CM

## 2021-09-15 DIAGNOSIS — E11.9 TYPE 2 DIABETES MELLITUS WITHOUT COMPLICATION, WITHOUT LONG-TERM CURRENT USE OF INSULIN (HCC): ICD-10-CM

## 2021-09-15 DIAGNOSIS — N28.1 CYST, KIDNEY, ACQUIRED: ICD-10-CM

## 2021-09-15 DIAGNOSIS — G25.81 RESTLESS LEG SYNDROME: ICD-10-CM

## 2021-09-15 DIAGNOSIS — E78.2 MIXED HYPERLIPIDEMIA: ICD-10-CM

## 2021-09-15 DIAGNOSIS — M72.0 DUPUYTREN'S CONTRACTURE OF RIGHT HAND: ICD-10-CM

## 2021-09-15 PROCEDURE — 4040F PNEUMOC VAC/ADMIN/RCVD: CPT | Performed by: INTERNAL MEDICINE

## 2021-09-15 PROCEDURE — 1090F PRES/ABSN URINE INCON ASSESS: CPT | Performed by: INTERNAL MEDICINE

## 2021-09-15 PROCEDURE — 1123F ACP DISCUSS/DSCN MKR DOCD: CPT | Performed by: INTERNAL MEDICINE

## 2021-09-15 PROCEDURE — G8427 DOCREV CUR MEDS BY ELIG CLIN: HCPCS | Performed by: INTERNAL MEDICINE

## 2021-09-15 PROCEDURE — G8399 PT W/DXA RESULTS DOCUMENT: HCPCS | Performed by: INTERNAL MEDICINE

## 2021-09-15 PROCEDURE — G8417 CALC BMI ABV UP PARAM F/U: HCPCS | Performed by: INTERNAL MEDICINE

## 2021-09-15 PROCEDURE — 99214 OFFICE O/P EST MOD 30 MIN: CPT | Performed by: INTERNAL MEDICINE

## 2021-09-15 PROCEDURE — 1036F TOBACCO NON-USER: CPT | Performed by: INTERNAL MEDICINE

## 2021-09-15 RX ORDER — FUROSEMIDE 20 MG/1
20 TABLET ORAL PRN
Qty: 90 TABLET | Refills: 1 | Status: ON HOLD | OUTPATIENT
Start: 2021-09-15 | End: 2021-12-10 | Stop reason: HOSPADM

## 2021-09-15 RX ORDER — ROSUVASTATIN CALCIUM 10 MG/1
10 TABLET, COATED ORAL NIGHTLY
Qty: 90 TABLET | Refills: 1 | Status: SHIPPED | OUTPATIENT
Start: 2021-09-15 | End: 2022-02-15 | Stop reason: SDUPTHER

## 2021-09-15 RX ORDER — GLIPIZIDE 5 MG/1
5 TABLET, FILM COATED, EXTENDED RELEASE ORAL DAILY
Qty: 90 TABLET | Refills: 1 | Status: SHIPPED | OUTPATIENT
Start: 2021-09-15 | End: 2022-02-10 | Stop reason: SDUPTHER

## 2021-09-15 RX ORDER — LOSARTAN POTASSIUM 50 MG/1
TABLET ORAL
Qty: 90 TABLET | Refills: 1 | Status: SHIPPED | OUTPATIENT
Start: 2021-09-15 | End: 2022-04-25 | Stop reason: SDUPTHER

## 2021-09-15 RX ORDER — POTASSIUM CHLORIDE 20 MEQ/1
20 TABLET, EXTENDED RELEASE ORAL DAILY
Qty: 90 TABLET | Refills: 1 | Status: ON HOLD | OUTPATIENT
Start: 2021-09-15 | End: 2021-12-10 | Stop reason: HOSPADM

## 2021-09-15 ASSESSMENT — ENCOUNTER SYMPTOMS
RESPIRATORY NEGATIVE: 1
ANAL BLEEDING: 0
EYES NEGATIVE: 1
COUGH: 0
BLOOD IN STOOL: 0
ALLERGIC/IMMUNOLOGIC NEGATIVE: 1
SHORTNESS OF BREATH: 0
WHEEZING: 0
CONSTIPATION: 0
STRIDOR: 0

## 2021-09-15 NOTE — ASSESSMENT & PLAN NOTE
Has arthritis of hands,left knee pain, sees Dr. Stephanie Brian  Patient is taking Plaquenil  He gave Norco prn

## 2021-09-15 NOTE — ASSESSMENT & PLAN NOTE
Pt has DM  Did not tolerate metformin   Follow diet and exercise  On glipizide ER 5 mg daily   Last hga1c was 6.8 on 4/22/21

## 2021-09-15 NOTE — ASSESSMENT & PLAN NOTE
MI, TPA,sees Dr. Guevara [facility]  Patient is on aspirin, beta blocker and statins  No angina

## 2021-09-15 NOTE — ASSESSMENT & PLAN NOTE
Hyperlipidemia is stable. Follow low cholesterol diet. Continue current treatment. Patient is on crestor.   Lipid profile is good

## 2021-09-15 NOTE — PROGRESS NOTES
Zafar Edwards  Patient's  is 1935  Seen in office on 9/15/2021      SUBJECTIVE:  Vladimir Stratton anand 81 y. o.year old female presents today   Chief Complaint   Patient presents with    Diabetes    Hypertension    Hyperlipidemia    Medication Refill     Pt is here for DM, HTN HLD   Patient has DM. No hypoglycemia. No numbness or weakness. No dizziness. Blood sugars are good at home.   Patient has hypertension. Taking medications No headaches, no chest pain, no palpitations and no dizziness. Patient has hyperlipidemia. Taking medications. No abdominal pain, no nausea or vomiting. No myalgias. Patient goes to arthritis clinic in: For rheumatoid arthritis and is on Plaquenil. Patient still has some pain in the hands and left shoulder but is manageable. Patient states she got the flu vaccine in his office. Taking medications regularly. No side effects noted. Review of Systems   Constitutional: Negative. HENT: Negative. Eyes: Negative. Respiratory: Negative. Negative for cough, shortness of breath, wheezing and stridor. Cardiovascular: Negative. Negative for chest pain. Gastrointestinal: Negative for anal bleeding, blood in stool and constipation. Endocrine: Negative. Genitourinary: Negative. Musculoskeletal: Positive for arthralgias. Skin: Negative. Allergic/Immunologic: Negative. Hematological: Negative. Psychiatric/Behavioral: Negative. OBJECTIVE: /70   Pulse 76   Temp 98.4 °F (36.9 °C) (Oral)   Resp 16   Wt 248 lb (112.5 kg)   LMP  (LMP Unknown)   SpO2 98%   BMI 42.57 kg/m²     Wt Readings from Last 3 Encounters:   09/15/21 248 lb (112.5 kg)   21 244 lb 12.8 oz (111 kg)   21 230 lb (104.3 kg)      Patient was seen taking COVID-19 precautions. Face mask, gloves were used. Patient also wore facemask. GENERAL: - Alert, oriented, pleasant, in no apparent distress. HEENT: - Conjunctiva pink, no scleral icterus.  ENT clear.  NECK: -Supple. No jugular venous distention noted. No masses felt,  CARDIOVASCULAR: - Normal S1 and S2    PULMONARY: - No respiratory distress. No wheezes or rales. ABDOMEN: - Soft and non-tender,no masses  ororganomegaly. EXTREMITIES: - No cyanosis, clubbing, or significant edema. SKIN: Skin is warm and dry. NEUROLOGICAL: - Cranial nerves II through XII are grossly intact. Patient has some deformities of the PIP and DIP joints of the hands  Tenderness in the left shoulder    IMPRESSION:    Encounter Diagnoses   Name Primary?  Essential hypertension Yes    Mixed hyperlipidemia     Type 2 diabetes mellitus without complication, without long-term current use of insulin (HCC)     Dupuytren's contracture of right hand     Rheumatoid arthritis involving multiple sites with positive rheumatoid factor ([facility]     CAD s/p MI, TPA in 1998     Gastrointestinal hemorrhage, unspecified gastrointestinal hemorrhage type     Cyst, kidney, acquired     Restless leg syndrome     Chronic diastolic congestive heart failure (HCC)     Suspected sleep apnea        ASSESSMENT/PLAN:    Essential hypertension    Hypertension in control. Follow low salt diet. Continue current treatment. Continue losartan and metoprolol    Mixed hyperlipidemia    Hyperlipidemia is stable. Follow low cholesterol diet. Continue current treatment. Patient is on crestor. Lipid profile is good     Type 2 diabetes mellitus without complication (HCC)    Pt has DM  Did not tolerate metformin   Follow diet and exercise  On glipizide ER 5 mg daily   Last hga1c was 6.8 on 4/22/21.   Recheck hemoglobin A1c       Rheumatoid arthritis involving multiple sites with positive rheumatoid factor (HCC)   Has arthritis of hands,left knee pain, sees Dr. Carlos Blanco  Patient is taking Plaquenil  He gave Norco prn   We will send him the recent lab from April 2021 to rheumatologist.    CAD s/p MI, TPA in 1998    MI, TPA,sees Dr. Consuelo Centeno  Patient is on LUNGS 4 TIMES DAILY AS NEEDED FOR WHEEZING 8.5 g 5    aspirin 81 MG EC tablet Take 1 tablet by mouth nightly. 30 tablet     Cholecalciferol (VITAMIN D PO) Take 5,000 Units by mouth daily Indications: pt states take TID       Coenzyme Q10 (CO Q 10 PO) Take 200 mg by mouth 2 times daily Indications: takes 400mg daily       blood glucose test strips (TRUETEST TEST) strip 1 each by Other route 2 times daily DX=E11.9 100 each 5    Blood Glucose Monitoring Suppl (TRUE METRIX METER) JUSTO Dispense one meter, DX E11.9 testing once daily 1 Device 0    blood glucose test strips (TRUE METRIX BLOOD GLUCOSE TEST) strip Dispense true metrix test strips, DX=E11.9, testing once daily 100 each 3    UNABLE TO FIND Hand brace. 1 Units 0    hydroxychloroquine (PLAQUENIL) 200 MG tablet Take 200 mg by mouth 2 times daily        No current facility-administered medications for this visit. Past Medical History:   Diagnosis Date    Arthritis     left knee pain, sees Dr. Janae Perez CAD (coronary artery disease)     sees Dr. Robert Garcia Chronic midline low back pain without sciatica 9/28/2016    Had PT in 2016   [facility] Colonoscopy refused     discussed in 7/15    DM (diabetes mellitus), type 2 ([facility] 02/2006    Dupuytren's contracture of right hand     Endometrial stripe increased 9/25/2014    Saw NP Christal Sandoval. Was normal exam per pt.  Gastrointestinal hemorrhage 11/2/2015    Patient had GI bleeding. Colon refused. Discussed with patient in detail.  Glaucoma 4/1/2014    H/O echocardiogram 10/02/2014    Mildly depressed left ventricular function; ejection fraction of 45%. Moderate pulmonary hypertension.  H/O echocardiogram 08/28/2018    EF 50-55%.  Mitral annular calcification is present. No other significant valvulopathy seen.  History of nuclear stress test 08/28/2018    EF 59%. ECG portion of stress test is negative for ischemia by diagnostic criteria.  fixed inferior large size severe defect in rest and stress images. Mild hubert infarct ischemia.     HTN (hypertension)     Hyperlipidemia     Kidney stone     hx-\"been about 3 yrs ago\"    MI (myocardial infarction) ([facility] [address] 02/1998    treated with TPA    Obesity     Open wound of right foot 7/27/2020    Pneumonia of right lower lobe due to infectious organism 4/5/2018    Vertigo     'have been having this since I had cataract surgery\"\"that is why they are doing the MRA of my brain(10/31/2014)    WD-Traumatic ulcer of foot, right, with fat layer exposed ([facility] [address] 8/3/2020     Past Surgical History:   Procedure Laterality Date    CATARACT REMOVAL Bilateral     5/14,8/14    INGUINAL HERNIA REPAIR Left 45 yrs ago     Social History     Tobacco Use    Smoking status: Never Smoker    Smokeless tobacco: Never Used   Substance Use Topics    Alcohol use: No     Alcohol/week: 0.0 standard drinks       LAB REVIEW:  CBC:   Lab Results   Component Value Date    WBC 7.0 12/03/2020    HGB 13.4 12/03/2020    HCT 40.1 12/03/2020     12/03/2020     Lipids:   Lab Results   Component Value Date    HDL 56 04/22/2021    LDLCALC 54 04/22/2021    LDLDIRECT 69 08/18/2017    TRIGLYCFAST 80 04/22/2021    CHOLFAST 126 04/22/2021     Renal:   Lab Results   Component Value Date    BUN 16 04/22/2021    CREATININE 0.6 04/22/2021     04/22/2021    K 4.9 04/22/2021    K 3.0 03/08/2018    ALT <5 04/22/2021    AST 10 04/22/2021    GLUCOSE 101 04/22/2021    GLUF 126 04/23/2018     PT/INR:   Lab Results   Component Value Date    INR 1.00 01/11/2019     A1C:   Lab Results   Component Value Date    LABA1C 6.8 04/22/2021           Trino Dee MD, 9/15/2021 , 2:13 PM

## 2021-09-15 NOTE — ASSESSMENT & PLAN NOTE
Patient had GI bleeding. Colon refused. Discussed with patient in detail. She was referred few times but she cancelled it and now refused it .     9/15/21 : refused

## 2021-10-11 RX ORDER — ALBUTEROL SULFATE 90 UG/1
2 AEROSOL, METERED RESPIRATORY (INHALATION) 4 TIMES DAILY PRN
Qty: 8.5 G | Refills: 5 | Status: SHIPPED | OUTPATIENT
Start: 2021-10-11 | End: 2022-06-13 | Stop reason: SDUPTHER

## 2021-11-19 ENCOUNTER — NURSE ONLY (OUTPATIENT)
Dept: INTERNAL MEDICINE CLINIC | Age: 86
End: 2021-11-19
Payer: MEDICARE

## 2021-11-19 DIAGNOSIS — E11.9 TYPE 2 DIABETES MELLITUS WITHOUT COMPLICATION, WITHOUT LONG-TERM CURRENT USE OF INSULIN (HCC): ICD-10-CM

## 2021-11-19 DIAGNOSIS — E78.2 MIXED HYPERLIPIDEMIA: ICD-10-CM

## 2021-11-19 LAB
A/G RATIO: 2 (ref 1.1–2.2)
ALBUMIN SERPL-MCNC: 4.3 G/DL (ref 3.4–5)
ALP BLD-CCNC: 74 U/L (ref 40–129)
ALT SERPL-CCNC: <5 U/L (ref 10–40)
ANION GAP SERPL CALCULATED.3IONS-SCNC: 10 MMOL/L (ref 3–16)
AST SERPL-CCNC: 14 U/L (ref 15–37)
BASOPHILS ABSOLUTE: 0.1 K/UL (ref 0–0.2)
BASOPHILS RELATIVE PERCENT: 1.1 %
BILIRUB SERPL-MCNC: 0.5 MG/DL (ref 0–1)
BUN BLDV-MCNC: 10 MG/DL (ref 7–20)
CALCIUM SERPL-MCNC: 9.7 MG/DL (ref 8.3–10.6)
CHLORIDE BLD-SCNC: 97 MMOL/L (ref 99–110)
CHOLESTEROL, FASTING: 123 MG/DL (ref 0–199)
CO2: 28 MMOL/L (ref 21–32)
CREAT SERPL-MCNC: 0.5 MG/DL (ref 0.6–1.2)
EOSINOPHILS ABSOLUTE: 0.1 K/UL (ref 0–0.6)
EOSINOPHILS RELATIVE PERCENT: 2.1 %
GFR AFRICAN AMERICAN: >60
GFR NON-AFRICAN AMERICAN: >60
GLUCOSE BLD-MCNC: 112 MG/DL (ref 70–99)
HCT VFR BLD CALC: 41.1 % (ref 36–48)
HDLC SERPL-MCNC: 47 MG/DL (ref 40–60)
HEMOGLOBIN: 13.4 G/DL (ref 12–16)
LDL CHOLESTEROL CALCULATED: 53 MG/DL
LYMPHOCYTES ABSOLUTE: 1.8 K/UL (ref 1–5.1)
LYMPHOCYTES RELATIVE PERCENT: 32 %
MCH RBC QN AUTO: 30.2 PG (ref 26–34)
MCHC RBC AUTO-ENTMCNC: 32.7 G/DL (ref 31–36)
MCV RBC AUTO: 92.4 FL (ref 80–100)
MONOCYTES ABSOLUTE: 1 K/UL (ref 0–1.3)
MONOCYTES RELATIVE PERCENT: 17.9 %
NEUTROPHILS ABSOLUTE: 2.6 K/UL (ref 1.7–7.7)
NEUTROPHILS RELATIVE PERCENT: 46.9 %
PDW BLD-RTO: 14.5 % (ref 12.4–15.4)
PLATELET # BLD: 208 K/UL (ref 135–450)
PMV BLD AUTO: 8.5 FL (ref 5–10.5)
POTASSIUM SERPL-SCNC: 4.8 MMOL/L (ref 3.5–5.1)
RBC # BLD: 4.44 M/UL (ref 4–5.2)
SODIUM BLD-SCNC: 135 MMOL/L (ref 136–145)
TOTAL PROTEIN: 6.4 G/DL (ref 6.4–8.2)
TRIGLYCERIDE, FASTING: 117 MG/DL (ref 0–150)
VLDLC SERPL CALC-MCNC: 23 MG/DL
WBC # BLD: 5.6 K/UL (ref 4–11)

## 2021-11-19 PROCEDURE — 36415 COLL VENOUS BLD VENIPUNCTURE: CPT | Performed by: INTERNAL MEDICINE

## 2021-11-20 LAB
ESTIMATED AVERAGE GLUCOSE: 137 MG/DL
HBA1C MFR BLD: 6.4 %

## 2021-12-01 ENCOUNTER — OFFICE VISIT (OUTPATIENT)
Dept: INTERNAL MEDICINE CLINIC | Age: 86
End: 2021-12-01
Payer: MEDICARE

## 2021-12-01 ENCOUNTER — HOSPITAL ENCOUNTER (OUTPATIENT)
Age: 86
Discharge: HOME | End: 2021-12-01
Payer: MEDICARE

## 2021-12-01 ENCOUNTER — HOSPITAL ENCOUNTER (OUTPATIENT)
Dept: GENERAL RADIOLOGY | Age: 86
Discharge: HOME | End: 2021-12-01
Payer: MEDICARE

## 2021-12-01 DIAGNOSIS — R52 BODY ACHES: ICD-10-CM

## 2021-12-01 DIAGNOSIS — R06.02 SOB (SHORTNESS OF BREATH): ICD-10-CM

## 2021-12-01 DIAGNOSIS — R05.9 COUGH: ICD-10-CM

## 2021-12-01 DIAGNOSIS — R05.9 COUGH: Primary | ICD-10-CM

## 2021-12-01 DIAGNOSIS — R09.89 CHEST CONGESTION: ICD-10-CM

## 2021-12-01 PROCEDURE — 1090F PRES/ABSN URINE INCON ASSESS: CPT | Performed by: NURSE PRACTITIONER

## 2021-12-01 PROCEDURE — G8417 CALC BMI ABV UP PARAM F/U: HCPCS | Performed by: NURSE PRACTITIONER

## 2021-12-01 PROCEDURE — 99214 OFFICE O/P EST MOD 30 MIN: CPT | Performed by: NURSE PRACTITIONER

## 2021-12-01 PROCEDURE — G8484 FLU IMMUNIZE NO ADMIN: HCPCS | Performed by: NURSE PRACTITIONER

## 2021-12-01 PROCEDURE — 71046 X-RAY EXAM CHEST 2 VIEWS: CPT

## 2021-12-01 PROCEDURE — 4040F PNEUMOC VAC/ADMIN/RCVD: CPT | Performed by: NURSE PRACTITIONER

## 2021-12-01 PROCEDURE — 1036F TOBACCO NON-USER: CPT | Performed by: NURSE PRACTITIONER

## 2021-12-01 PROCEDURE — G8399 PT W/DXA RESULTS DOCUMENT: HCPCS | Performed by: NURSE PRACTITIONER

## 2021-12-01 PROCEDURE — G8428 CUR MEDS NOT DOCUMENT: HCPCS | Performed by: NURSE PRACTITIONER

## 2021-12-01 PROCEDURE — 1123F ACP DISCUSS/DSCN MKR DOCD: CPT | Performed by: NURSE PRACTITIONER

## 2021-12-01 RX ORDER — AZITHROMYCIN 250 MG/1
TABLET, FILM COATED ORAL
Qty: 1 PACKET | Refills: 0 | Status: ON HOLD | OUTPATIENT
Start: 2021-12-01 | End: 2021-12-05

## 2021-12-01 NOTE — PROGRESS NOTES
Darryle Stade (:  1935) is a 81 y.o. female,Established patient, here for evaluation of the following chief complaint(s):  Cough, wheezing, body aches, vomiting,dissiness       ASSESSMENT/PLAN:  1. Cough  -     XR CHEST (2 VW); Future  -   Over the counter sugarfree cough liquid such as delsem 12 hour  2. SOB (shortness of breath)  -     XR CHEST (2 VW); Future  -  contine use of albuterol rescue inhaler and duonebs as needed, denies need for refill at this time  - if symptoms persist may need pulmonary workup - PFT, 6 minute walk  3. Chest congestion  4. Body aches  5. Viral illness      -    Given history of asthma and increase use in rescue inhaler will start of steroids and z pack, advised if breathing worsens go to ED, current O2 is 97% in RA at rest     Return in about 2 weeks (around 12/15/2021), or if symptoms worsen or fail to improve see your pcp. Recommend 6 min walk    Subjective   SUBJECTIVE/OBJECTIVE:  Jay Beebe is an 81year-old patient of Dr. Lee Ann Adler who presents to the pulmonary clinic today for complaints of shortness of breath, cough, generalized weakness, vomiting and dizziness. She states symptoms started several days ago. She does have a history of diabetes 2 and is currently on glipizide. She has not been checking her blood sugars at home. She does have albuterol rescue inhaler and DuoNeb solution at home but has not been using them. She has had emesis X1 with coughing spell. She is unsure if she has had any recent ill contacts or contact with anyone positive for COVID-19. She did have Covid testing within the last 2 weeks which was negative. Review of Systems   Constitutional: Positive for fatigue. Negative for chills and fever. HENT: Negative for congestion and sore throat. Eyes: Negative. Respiratory: Positive for shortness of breath and wheezing. Negative for cough. Cardiovascular: Negative. Gastrointestinal: Positive for nausea and vomiting. Endocrine: Negative. Genitourinary: Negative. Musculoskeletal: Positive for myalgias. Skin: Negative. Neurological: Positive for weakness. Hematological: Negative. Objective   Physical Exam  Vitals reviewed. Constitutional:       Appearance: Normal appearance. She is obese. HENT:      Head: Normocephalic and atraumatic. Right Ear: Tympanic membrane, ear canal and external ear normal.      Left Ear: Tympanic membrane, ear canal and external ear normal.      Nose: Nose normal.      Mouth/Throat:      Mouth: Mucous membranes are moist.      Pharynx: Oropharynx is clear. Eyes:      Conjunctiva/sclera: Conjunctivae normal.   Cardiovascular:      Rate and Rhythm: Normal rate and regular rhythm. Pulses: Normal pulses. Heart sounds: Normal heart sounds. Pulmonary:      Effort: Pulmonary effort is normal.      Breath sounds: Normal breath sounds. Comments: States she used her albuterol rescue inhaler prior to presentation her wheezing  Abdominal:      General: Bowel sounds are normal.      Tenderness: There is no abdominal tenderness. There is no guarding. Musculoskeletal:         General: No swelling. Normal range of motion. Cervical back: Normal range of motion and neck supple. Skin:     General: Skin is dry. Capillary Refill: Capillary refill takes less than 2 seconds. Neurological:      General: No focal deficit present. Mental Status: She is alert and oriented to person, place, and time. Psychiatric:         Mood and Affect: Mood normal.         Behavior: Behavior normal.                  An electronic signature was used to authenticate this note.     --ABA Christianson - CNP

## 2021-12-02 ENCOUNTER — TELEPHONE (OUTPATIENT)
Dept: INTERNAL MEDICINE CLINIC | Age: 86
End: 2021-12-02

## 2021-12-02 ENCOUNTER — HOSPITAL ENCOUNTER (EMERGENCY)
Age: 86
Discharge: HOME | End: 2021-12-02
Attending: EMERGENCY MEDICINE
Payer: MEDICARE

## 2021-12-02 ENCOUNTER — APPOINTMENT (OUTPATIENT)
Dept: GENERAL RADIOLOGY | Age: 86
End: 2021-12-02
Payer: MEDICARE

## 2021-12-02 VITALS
WEIGHT: 248 LBS | TEMPERATURE: 97.9 F | HEIGHT: 64 IN | SYSTOLIC BLOOD PRESSURE: 137 MMHG | HEART RATE: 69 BPM | OXYGEN SATURATION: 95 % | BODY MASS INDEX: 42.34 KG/M2 | RESPIRATION RATE: 20 BRPM | DIASTOLIC BLOOD PRESSURE: 71 MMHG

## 2021-12-02 DIAGNOSIS — J20.4 PARAINFLUENZA VIRUS BRONCHITIS: Primary | ICD-10-CM

## 2021-12-02 LAB
ADENOVIRUS DETECTION BY PCR: NOT DETECTED
ALBUMIN SERPL-MCNC: 3.8 GM/DL (ref 3.4–5)
ALP BLD-CCNC: 68 IU/L (ref 40–129)
ALT SERPL-CCNC: <5 U/L (ref 10–40)
ANION GAP SERPL CALCULATED.3IONS-SCNC: 8 MMOL/L (ref 4–16)
AST SERPL-CCNC: 14 IU/L (ref 15–37)
BASOPHILS ABSOLUTE: 0 K/CU MM
BASOPHILS RELATIVE PERCENT: 0.4 % (ref 0–1)
BILIRUB SERPL-MCNC: 0.5 MG/DL (ref 0–1)
BORDETELLA PARAPERTUSSIS BY PCR: NOT DETECTED
BORDETELLA PERTUSSIS PCR: NOT DETECTED
BUN BLDV-MCNC: 9 MG/DL (ref 6–23)
CALCIUM SERPL-MCNC: 9.3 MG/DL (ref 8.3–10.6)
CHLAMYDOPHILA PNEUMONIA PCR: NOT DETECTED
CHLORIDE BLD-SCNC: 96 MMOL/L (ref 99–110)
CO2: 27 MMOL/L (ref 21–32)
CORONAVIRUS 229E PCR: NOT DETECTED
CORONAVIRUS HKU1 PCR: NOT DETECTED
CORONAVIRUS NL63 PCR: NOT DETECTED
CORONAVIRUS OC43 PCR: NOT DETECTED
CREAT SERPL-MCNC: 0.5 MG/DL (ref 0.6–1.1)
DIFFERENTIAL TYPE: ABNORMAL
EKG ATRIAL RATE: 67 BPM
EKG DIAGNOSIS: NORMAL
EKG P AXIS: 36 DEGREES
EKG P-R INTERVAL: 130 MS
EKG Q-T INTERVAL: 426 MS
EKG QRS DURATION: 86 MS
EKG QTC CALCULATION (BAZETT): 450 MS
EKG R AXIS: 31 DEGREES
EKG T AXIS: 18 DEGREES
EKG VENTRICULAR RATE: 67 BPM
EOSINOPHILS ABSOLUTE: 0.2 K/CU MM
EOSINOPHILS RELATIVE PERCENT: 2.3 % (ref 0–3)
GFR AFRICAN AMERICAN: >60 ML/MIN/1.73M2
GFR NON-AFRICAN AMERICAN: >60 ML/MIN/1.73M2
GLUCOSE BLD-MCNC: 128 MG/DL (ref 70–99)
HCT VFR BLD CALC: 43.4 % (ref 37–47)
HEMOGLOBIN: 14 GM/DL (ref 12.5–16)
HUMAN METAPNEUMOVIRUS PCR: NOT DETECTED
IMMATURE NEUTROPHIL %: 1 % (ref 0–0.43)
INFLUENZA A BY PCR: NOT DETECTED
INFLUENZA A H1 (2009) PCR: NOT DETECTED
INFLUENZA A H1 PANDEMIC PCR: NOT DETECTED
INFLUENZA A H3 PCR: NOT DETECTED
INFLUENZA B BY PCR: NOT DETECTED
LYMPHOCYTES ABSOLUTE: 1.4 K/CU MM
LYMPHOCYTES RELATIVE PERCENT: 16.9 % (ref 24–44)
MCH RBC QN AUTO: 29.9 PG (ref 27–31)
MCHC RBC AUTO-ENTMCNC: 32.3 % (ref 32–36)
MCV RBC AUTO: 92.5 FL (ref 78–100)
MONOCYTES ABSOLUTE: 1.5 K/CU MM
MONOCYTES RELATIVE PERCENT: 17.9 % (ref 0–4)
MYCOPLASMA PNEUMONIAE PCR: NOT DETECTED
PARAINFLUENZA 1 PCR: NOT DETECTED
PARAINFLUENZA 2 PCR: NOT DETECTED
PARAINFLUENZA 3 PCR: NOT DETECTED
PARAINFLUENZA 4 PCR: ABNORMAL
PDW BLD-RTO: 13.4 % (ref 11.7–14.9)
PLATELET # BLD: 194 K/CU MM (ref 140–440)
PMV BLD AUTO: 9.1 FL (ref 7.5–11.1)
POTASSIUM SERPL-SCNC: 4.8 MMOL/L (ref 3.5–5.1)
PRO-BNP: 468.3 PG/ML
RBC # BLD: 4.69 M/CU MM (ref 4.2–5.4)
RHINOVIRUS ENTEROVIRUS PCR: NOT DETECTED
RSV PCR: NOT DETECTED
SARS-COV-2: NOT DETECTED
SEGMENTED NEUTROPHILS ABSOLUTE COUNT: 5 K/CU MM
SEGMENTED NEUTROPHILS RELATIVE PERCENT: 61.5 % (ref 36–66)
SODIUM BLD-SCNC: 131 MMOL/L (ref 135–145)
TOTAL IMMATURE NEUTOROPHIL: 0.08 K/CU MM
TOTAL PROTEIN: 7.1 GM/DL (ref 6.4–8.2)
TROPONIN T: <0.01 NG/ML
WBC # BLD: 8.1 K/CU MM (ref 4–10.5)

## 2021-12-02 PROCEDURE — 85025 COMPLETE CBC W/AUTO DIFF WBC: CPT

## 2021-12-02 PROCEDURE — 93010 ELECTROCARDIOGRAM REPORT: CPT | Performed by: INTERNAL MEDICINE

## 2021-12-02 PROCEDURE — 99285 EMERGENCY DEPT VISIT HI MDM: CPT

## 2021-12-02 PROCEDURE — 83880 ASSAY OF NATRIURETIC PEPTIDE: CPT

## 2021-12-02 PROCEDURE — 6370000000 HC RX 637 (ALT 250 FOR IP): Performed by: EMERGENCY MEDICINE

## 2021-12-02 PROCEDURE — 93005 ELECTROCARDIOGRAM TRACING: CPT | Performed by: EMERGENCY MEDICINE

## 2021-12-02 PROCEDURE — 84484 ASSAY OF TROPONIN QUANT: CPT

## 2021-12-02 PROCEDURE — 94640 AIRWAY INHALATION TREATMENT: CPT

## 2021-12-02 PROCEDURE — 80053 COMPREHEN METABOLIC PANEL: CPT

## 2021-12-02 PROCEDURE — 0202U NFCT DS 22 TRGT SARS-COV-2: CPT

## 2021-12-02 PROCEDURE — 71045 X-RAY EXAM CHEST 1 VIEW: CPT

## 2021-12-02 RX ORDER — PREDNISONE 20 MG/1
60 TABLET ORAL ONCE
Status: COMPLETED | OUTPATIENT
Start: 2021-12-02 | End: 2021-12-02

## 2021-12-02 RX ORDER — ALBUTEROL SULFATE 90 UG/1
2 AEROSOL, METERED RESPIRATORY (INHALATION) ONCE
Status: COMPLETED | OUTPATIENT
Start: 2021-12-02 | End: 2021-12-02

## 2021-12-02 RX ORDER — PREDNISONE 20 MG/1
40 TABLET ORAL DAILY
Qty: 10 TABLET | Refills: 0 | Status: ON HOLD | OUTPATIENT
Start: 2021-12-02 | End: 2021-12-10 | Stop reason: HOSPADM

## 2021-12-02 RX ADMIN — PREDNISONE 60 MG: 20 TABLET ORAL at 16:09

## 2021-12-02 RX ADMIN — ALBUTEROL SULFATE 2 PUFF: 108 INHALANT RESPIRATORY (INHALATION) at 13:36

## 2021-12-02 NOTE — TELEPHONE ENCOUNTER
Pt called c/o SOB, states she has had EMS to her house but refuses to go to ER, believes she needs different medication other than the z-pack. Patient could barely finish a sentence on the phone with how SOB she was, I advised pt to go to ER by EMS as she states she does not have a way to go. Pt refused stating, \" I am not calling EMS to go to ER, I'd rather collapse here\".

## 2021-12-02 NOTE — ED PROVIDER NOTES
eMERGENCY dEPARTMENT eNCOUnter      PCP: Rosana Mendosa MD    CHIEF COMPLAINT    Chief Complaint   Patient presents with    Shortness of Breath     c/o increased SOB, sats 91% on RA, 3Lpm via nasal canula placed. HPI    Sorin Vaca is a 81 y.o. female who presents with shortness of breath. States symptoms started a few weeks ago. Reports she started feeling flulike. States she started with sore throat, congestion. She developed a cough. States is been dry, occasional.  Reports general shortness of breath which has been gradually worsening over the past 2 weeks. States had a temperature up to 100. Reports associated nausea vomiting. States that started yesterday after she was given a Z-Jozef by her primary provider. States he had a chest x-ray which was negative. Reports generalized body aches, nausea, cough, shortness of breath. She denies any history of lung disease. No COPD or emphysema. No previous smoking history. No chest pain or palpitations. No edema. REVIEW OF SYSTEMS    Constitutional:  + fever, chills.    HENT:  Denies sore throat or ear pain   Cardiovascular:  Denies chest pain, palpitations   Respiratory:  + cough or shortness of breath    GI:  Denies abdominal pain, + nausea, vomiting, or diarrhea  :  Denies any urinary symptoms, flank pain  Musculoskeletal:  Denies back pain, extremity pain  Skin:  Denies rash, color change  Neurologic:  Denies headache, focal weakness or sensory changes   Lymphatic:  Denies swollen glands, edema    All other review of systems are negative  See HPI and nursing notes for additional information     PAST MEDICAL AND SURGICAL HISTORY    Past Medical History:   Diagnosis Date    Arthritis     left knee pain, sees Dr. Edu Ch CAD (coronary artery disease)     sees Dr. Denisse Ovalle Chronic midline low back pain without sciatica 9/28/2016    Had PT in 2016   [address] [facility] Colonoscopy refused     discussed in 7/15    DM (diabetes mellitus), tablet Take 1 tablet by mouth as needed (prn) Indications: per Dr. Yessenia Otoole 90 tablet 1    glipiZIDE (GLUCOTROL XL) 5 MG extended release tablet Take 1 tablet by mouth daily 90 tablet 1    losartan (COZAAR) 50 MG tablet TAKE 1 TABLET BY MOUTH DAILY 90 tablet 1    metoprolol tartrate (LOPRESSOR) 25 MG tablet TAKE ONE TABLET BY MOUTH TWO TIMES A  tablet 1    potassium chloride (KLOR-CON M) 20 MEQ extended release tablet Take 1 tablet by mouth daily 90 tablet 1    rosuvastatin (CRESTOR) 10 MG tablet Take 1 tablet by mouth nightly 90 tablet 1    rOPINIRole (REQUIP) 2 MG tablet TAKE 1 TABLET BY MOUTH NIGHTLY 90 tablet 1    HYDROcodone-acetaminophen (NORCO) 5-325 MG per tablet Take 1 tablet by mouth as needed.  ciclopirox (LOPROX) 0.77 % cream       Multiple Vitamins-Minerals (PRESERVISION AREDS) TABS Take 1 tablet by mouth 2 times daily      blood glucose test strips (TRUETEST TEST) strip 1 each by Other route 2 times daily DX=E11.9 100 each 5    Blood Glucose Monitoring Suppl (TRUE METRIX METER) JUSTO Dispense one meter, DX E11.9 testing once daily 1 Device 0    blood glucose test strips (TRUE METRIX BLOOD GLUCOSE TEST) strip Dispense true metrix test strips, DX=E11.9, testing once daily 100 each 3    UNABLE TO FIND Hand brace. 1 Units 0    hydroxychloroquine (PLAQUENIL) 200 MG tablet Take 200 mg by mouth 2 times daily       aspirin 81 MG EC tablet Take 1 tablet by mouth nightly.  30 tablet     Cholecalciferol (VITAMIN D PO) Take 5,000 Units by mouth daily Indications: pt states take TID       Coenzyme Q10 (CO Q 10 PO) Take 200 mg by mouth 2 times daily Indications: takes 400mg daily          ALLERGIES    No Known Allergies    SOCIAL AND FAMILY HISTORY    Social History     Socioeconomic History    Marital status: Single     Spouse name: None    Number of children: None    Years of education: None    Highest education level: None   Occupational History    None   Tobacco Use    Smoking status: Never Smoker    Smokeless tobacco: Never Used   Vaping Use    Vaping Use: Never used   Substance and Sexual Activity    Alcohol use: No     Alcohol/week: 0.0 standard drinks    Drug use: No    Sexual activity: Not Currently     Partners: Male   Other Topics Concern    None   Social History Narrative    None     Social Determinants of Health     Financial Resource Strain:     Difficulty of Paying Living Expenses: Not on file   Food Insecurity:     Worried About Running Out of Food in the Last Year: Not on file    [place] of Food in the Last Year: Not on file   Transportation Needs:     Lack of Transportation (Medical): Not on file    Lack of Transportation (Non-Medical):  Not on file   Physical Activity:     Days of Exercise per Week: Not on file    Minutes of Exercise per Session: Not on file   Stress:     Feeling of Stress : Not on file   Social Connections:     Frequency of Communication with Friends and Family: Not on file    Frequency of Social Gatherings with Friends and Family: Not on file    Attends Taoist Services: Not on file    Active Member of [address] [place] [address] or Organizations: Not on file    Attends Club or Organization Meetings: Not on file    Marital Status: Not on file   Intimate Partner Violence:     Fear of Current or Ex-Partner: Not on file    Emotionally Abused: Not on file    Physically Abused: Not on file    Sexually Abused: Not on file   Housing Stability:     Unable to Pay for Housing in the Last Year: Not on file    Number of [place] in the Last Year: Not on file    Unstable Housing in the Last Year: Not on file     Family History   Problem Relation Age of Onset    Parkinsonism Mother     Heart Attack Father     Heart Disease Father     Stroke Sister     Cancer Sister         breast ca    High Blood Pressure Brother     High Blood Pressure Brother     Cancer Brother         \"stomach cancer\"    Colon Cancer Neg Hx     Stomach Cancer Neg Hx PHYSICAL EXAM    VITAL SIGNS: BP (!) 185/92   Pulse 74   Temp 97.9 °F (36.6 °C) (Temporal)   Resp 24   Ht 5' 4\" (1.626 m)   Wt 248 lb (112.5 kg)   LMP  (LMP Unknown)   SpO2 100%   BMI 42.57 kg/m²    Constitutional:  Well developed, No acute distress. HENT:  Normocephalic, Atraumatic, PERRL. EOMI. Sclera clear. Conjunctiva normal.  Neck: supple without ridigity  Cardiovascular:  Regular rate and rhythm, No murmurs  Respiratory:  Nonlabored breathing. Poor air entry. Decreased breath sounds. Abdomen: Soft, Non tender, bowel sounds present. Musculoskeletal: No edema, No tenderness  Integument:  Warm, Dry, no rash  Neurologic:  Alert & oriented , No focal deficits noted. Speech normal.  Psychiatric:  Affect normal, Mood normal.       Labs:  Labs Reviewed   RESPIRATORY PANEL, MOLECULAR, WITH COVID-19 - Abnormal; Notable for the following components:       Result Value    Parainfluenza 4 PCR DETECTED BY PCR (*)     All other components within normal limits   CBC WITH AUTO DIFFERENTIAL - Abnormal; Notable for the following components:    Lymphocytes % 16.9 (*)     Monocytes % 17.9 (*)     Immature Neutrophil % 1.0 (*)     All other components within normal limits   COMPREHENSIVE METABOLIC PANEL - Abnormal; Notable for the following components:    Sodium 131 (*)     Chloride 96 (*)     CREATININE 0.5 (*)     Glucose 128 (*)     ALT <5 (*)     AST 14 (*)     All other components within normal limits   BRAIN NATRIURETIC PEPTIDE - Abnormal; Notable for the following components:    Pro-.3 (*)     All other components within normal limits   TROPONIN       EKG    This EKG was interpreted by me. Rate is 67, rhythm is sinus. NM and QT intervals are within normal limits. There is no ST segment or T wave changes. This EKG was compared to previous EKG from date 8/20/19. No significant change from previous EKG.     RADIOLOGY    XR CHEST (2 VW)    Result Date: 12/1/2021  EXAMINATION: TWO XRAY VIEWS OF THE CHEST 12/1/2021 11:43 am COMPARISON: 09/01/2019 HISTORY: ORDERING SYSTEM PROVIDED HISTORY: Cough TECHNOLOGIST PROVIDED HISTORY: Reason for exam:->sob and cough Additional signs and symptoms: sob FINDINGS: The cardial-pericardial silhouette is unremarkable in appearance. The lungs are clear. No pneumothorax is found. No free air is seen. No acute bony abnormality. No acute abnormality identified. XR CHEST PORTABLE    Result Date: 12/2/2021  EXAMINATION: ONE XRAY VIEW OF THE CHEST 12/2/2021 12:41 pm COMPARISON: 12/01/2021. HISTORY: ORDERING SYSTEM PROVIDED HISTORY: shortness of breath TECHNOLOGIST PROVIDED HISTORY: Reason for exam:->shortness of breath Reason for Exam: Shortness of Breath Acuity: Acute Type of Exam: Initial FINDINGS: The heart size is within normal limits. The pulmonary vasculature is also within normal limits. No acute infiltrates are seen. The costophrenic angles are sharp bilaterally. No pneumothoraces are noted. 1. No active pulmonary disease. ED COURSE & MEDICAL DECISION MAKING       Vital signs and nursing notes reviewed during ED course. All pertinent Lab data and radiographic results reviewed with patient at bedside. The patient and/or the family were informed of the results of any tests/labs/imaging, the treatment plan, and time was allotted to answer questions. 81-year-old female who presented with shortness of breath. On arrival she was 91 to 92% on room air. She was placed on supplemental oxygen. Work-up was initiated. Chest x-ray was nonacute. Laboratory work-up was unremarkable with the exception of a positive on the respiratory disease panel for parainfluenza. This does seem to be viral illness, likely causing her hypoxia. She was given albuterol, prednisone. She was taken off the oxygen, is 99 to 100% without the oxygen on at this time. I do feel that discharge is reasonable as her oxygenation has improved.   We did discuss albuterol inhaler every 4 hours as needed as well as prednisone over the next several days. We will have her follow-up closely with her primary care provider and she is instructed to return here with any new or worsening signs or symptoms.       Clinical  IMPRESSION    Parainfluenza infection          (Please note the MDM and HPI sections of this note were completed with a voice recognition program.  Efforts were made to edit the dictations but occasionally words are mis-transcribed.)      Brissa Guajardo,   12/02/21 1557

## 2021-12-02 NOTE — ED NOTES
Pt sitting up in chair, no oxygen on, pt speaking in clear sentences, no sob noted     Baylee Dougherty RN  12/02/21 1559

## 2021-12-02 NOTE — ED NOTES
Diana from lab called with positive for para influenza 4.  Nurse notified      Vivian Davis, AXEL  12/02/21 [id number]

## 2021-12-02 NOTE — TELEPHONE ENCOUNTER
Per Dr. Ruba Salazar pt is to go to ED, called and informed as well as called child, Andria Rumcamille and informed of situation, she states she will call pt to discuss with her and thanked me for letting her know. Pt still refusing to call EMS as she states she still has her pajama top on with jeans and is unable to finish getting dressed d/t being so SOB. Pt states she will call son-in-law to see if he can take her to ED.

## 2021-12-03 ENCOUNTER — TELEPHONE (OUTPATIENT)
Dept: INTERNAL MEDICINE CLINIC | Age: 86
End: 2021-12-03

## 2021-12-03 RX ORDER — BENZONATATE 100 MG/1
100 CAPSULE ORAL 3 TIMES DAILY PRN
Qty: 20 CAPSULE | Refills: 0 | Status: SHIPPED | OUTPATIENT
Start: 2021-12-03 | End: 2021-12-20

## 2021-12-03 NOTE — TELEPHONE ENCOUNTER
Pt called stating that she can't stop coughing, was seen in ER yesterday and in walk-In the other day. Dr. Yeimy Spears is gone for the day, could you send in [phone] for her? Please advise.

## 2021-12-04 ENCOUNTER — HOSPITAL ENCOUNTER (INPATIENT)
Age: 86
LOS: 5 days | Discharge: HOME HEALTH | DRG: 202 | End: 2021-12-10
Attending: EMERGENCY MEDICINE | Admitting: INTERNAL MEDICINE
Payer: MEDICARE

## 2021-12-04 ENCOUNTER — APPOINTMENT (OUTPATIENT)
Dept: GENERAL RADIOLOGY | Age: 86
DRG: 202 | End: 2021-12-04
Payer: MEDICARE

## 2021-12-04 DIAGNOSIS — J45.41 MODERATE PERSISTENT ASTHMA WITH EXACERBATION: ICD-10-CM

## 2021-12-04 DIAGNOSIS — B34.8 PARAINFLUENZA VIRUS INFECTION: ICD-10-CM

## 2021-12-04 DIAGNOSIS — R79.89 ELEVATED BRAIN NATRIURETIC PEPTIDE (BNP) LEVEL: ICD-10-CM

## 2021-12-04 DIAGNOSIS — J98.8 RESPIRATORY TRACT INFECTION: Primary | ICD-10-CM

## 2021-12-04 DIAGNOSIS — E87.1 HYPONATREMIA: ICD-10-CM

## 2021-12-04 DIAGNOSIS — E87.5 HYPERKALEMIA: ICD-10-CM

## 2021-12-04 LAB
ALBUMIN SERPL-MCNC: 3.9 GM/DL (ref 3.4–5)
ALP BLD-CCNC: 66 IU/L (ref 40–129)
ALT SERPL-CCNC: 6 U/L (ref 10–40)
ANION GAP SERPL CALCULATED.3IONS-SCNC: 10 MMOL/L (ref 4–16)
AST SERPL-CCNC: 21 IU/L (ref 15–37)
BASOPHILS ABSOLUTE: 0 K/CU MM
BASOPHILS RELATIVE PERCENT: 0.2 % (ref 0–1)
BILIRUB SERPL-MCNC: 0.4 MG/DL (ref 0–1)
BUN BLDV-MCNC: 12 MG/DL (ref 6–23)
CALCIUM SERPL-MCNC: 9.3 MG/DL (ref 8.3–10.6)
CHLORIDE BLD-SCNC: 90 MMOL/L (ref 99–110)
CO2: 25 MMOL/L (ref 21–32)
CREAT SERPL-MCNC: 0.6 MG/DL (ref 0.6–1.1)
DIFFERENTIAL TYPE: ABNORMAL
EOSINOPHILS ABSOLUTE: 0 K/CU MM
EOSINOPHILS RELATIVE PERCENT: 0 % (ref 0–3)
GFR AFRICAN AMERICAN: >60 ML/MIN/1.73M2
GFR NON-AFRICAN AMERICAN: >60 ML/MIN/1.73M2
GLUCOSE BLD-MCNC: 163 MG/DL (ref 70–99)
HCT VFR BLD CALC: 43.5 % (ref 37–47)
HEMOGLOBIN: 14.3 GM/DL (ref 12.5–16)
IMMATURE NEUTROPHIL %: 1.3 % (ref 0–0.43)
LYMPHOCYTES ABSOLUTE: 1.1 K/CU MM
LYMPHOCYTES RELATIVE PERCENT: 11.8 % (ref 24–44)
MCH RBC QN AUTO: 30.1 PG (ref 27–31)
MCHC RBC AUTO-ENTMCNC: 32.9 % (ref 32–36)
MCV RBC AUTO: 91.6 FL (ref 78–100)
MONOCYTES ABSOLUTE: 0.7 K/CU MM
MONOCYTES RELATIVE PERCENT: 7.7 % (ref 0–4)
PDW BLD-RTO: 13 % (ref 11.7–14.9)
PLATELET # BLD: 266 K/CU MM (ref 140–440)
PMV BLD AUTO: 8.8 FL (ref 7.5–11.1)
POTASSIUM SERPL-SCNC: 5.2 MMOL/L (ref 3.5–5.1)
PRO-BNP: 615.8 PG/ML
RBC # BLD: 4.75 M/CU MM (ref 4.2–5.4)
SEGMENTED NEUTROPHILS ABSOLUTE COUNT: 7.4 K/CU MM
SEGMENTED NEUTROPHILS RELATIVE PERCENT: 79 % (ref 36–66)
SODIUM BLD-SCNC: 125 MMOL/L (ref 135–145)
TOTAL IMMATURE NEUTOROPHIL: 0.12 K/CU MM
TOTAL PROTEIN: 6.3 GM/DL (ref 6.4–8.2)
TROPONIN T: <0.01 NG/ML
WBC # BLD: 9.4 K/CU MM (ref 4–10.5)

## 2021-12-04 PROCEDURE — 6370000000 HC RX 637 (ALT 250 FOR IP): Performed by: EMERGENCY MEDICINE

## 2021-12-04 PROCEDURE — 85025 COMPLETE CBC W/AUTO DIFF WBC: CPT

## 2021-12-04 PROCEDURE — 94640 AIRWAY INHALATION TREATMENT: CPT

## 2021-12-04 PROCEDURE — 96374 THER/PROPH/DIAG INJ IV PUSH: CPT

## 2021-12-04 PROCEDURE — 99284 EMERGENCY DEPT VISIT MOD MDM: CPT

## 2021-12-04 PROCEDURE — 71046 X-RAY EXAM CHEST 2 VIEWS: CPT

## 2021-12-04 PROCEDURE — 2580000003 HC RX 258: Performed by: EMERGENCY MEDICINE

## 2021-12-04 PROCEDURE — 93005 ELECTROCARDIOGRAM TRACING: CPT | Performed by: EMERGENCY MEDICINE

## 2021-12-04 PROCEDURE — 96361 HYDRATE IV INFUSION ADD-ON: CPT

## 2021-12-04 PROCEDURE — 84484 ASSAY OF TROPONIN QUANT: CPT

## 2021-12-04 PROCEDURE — 80053 COMPREHEN METABOLIC PANEL: CPT

## 2021-12-04 PROCEDURE — 83880 ASSAY OF NATRIURETIC PEPTIDE: CPT

## 2021-12-04 PROCEDURE — 6360000002 HC RX W HCPCS: Performed by: EMERGENCY MEDICINE

## 2021-12-04 RX ORDER — 0.9 % SODIUM CHLORIDE 0.9 %
1000 INTRAVENOUS SOLUTION INTRAVENOUS ONCE
Status: COMPLETED | OUTPATIENT
Start: 2021-12-04 | End: 2021-12-05

## 2021-12-04 RX ORDER — IPRATROPIUM BROMIDE AND ALBUTEROL SULFATE 2.5; .5 MG/3ML; MG/3ML
1 SOLUTION RESPIRATORY (INHALATION) ONCE
Status: COMPLETED | OUTPATIENT
Start: 2021-12-04 | End: 2021-12-04

## 2021-12-04 RX ORDER — DEXAMETHASONE SODIUM PHOSPHATE 4 MG/ML
4 INJECTION, SOLUTION INTRA-ARTICULAR; INTRALESIONAL; INTRAMUSCULAR; INTRAVENOUS; SOFT TISSUE ONCE
Status: COMPLETED | OUTPATIENT
Start: 2021-12-04 | End: 2021-12-04

## 2021-12-04 RX ORDER — ALBUTEROL SULFATE 90 UG/1
2 AEROSOL, METERED RESPIRATORY (INHALATION) ONCE
Status: DISCONTINUED | OUTPATIENT
Start: 2021-12-04 | End: 2021-12-04

## 2021-12-04 RX ADMIN — IPRATROPIUM BROMIDE AND ALBUTEROL SULFATE 1 AMPULE: 2.5; .5 SOLUTION RESPIRATORY (INHALATION) at 21:46

## 2021-12-04 RX ADMIN — DEXAMETHASONE SODIUM PHOSPHATE 4 MG: 4 INJECTION, SOLUTION INTRAMUSCULAR; INTRAVENOUS at 22:23

## 2021-12-04 RX ADMIN — SODIUM CHLORIDE 1000 ML: 9 INJECTION, SOLUTION INTRAVENOUS at 21:30

## 2021-12-04 RX ADMIN — IPRATROPIUM BROMIDE AND ALBUTEROL SULFATE 1 AMPULE: 2.5; .5 SOLUTION RESPIRATORY (INHALATION) at 19:40

## 2021-12-04 ASSESSMENT — PAIN SCALES - GENERAL: PAINLEVEL_OUTOF10: 8

## 2021-12-04 ASSESSMENT — PAIN DESCRIPTION - DESCRIPTORS: DESCRIPTORS: ACHING

## 2021-12-04 ASSESSMENT — PAIN DESCRIPTION - PAIN TYPE: TYPE: ACUTE PAIN

## 2021-12-05 PROBLEM — B34.8 PARAINFLUENZA INFECTION: Status: ACTIVE | Noted: 2021-12-05

## 2021-12-05 LAB
ANION GAP SERPL CALCULATED.3IONS-SCNC: 8 MMOL/L (ref 4–16)
BUN BLDV-MCNC: 12 MG/DL (ref 6–23)
CALCIUM SERPL-MCNC: 9.4 MG/DL (ref 8.3–10.6)
CHLORIDE BLD-SCNC: 90 MMOL/L (ref 99–110)
CO2: 30 MMOL/L (ref 21–32)
CREAT SERPL-MCNC: 0.6 MG/DL (ref 0.6–1.1)
EKG ATRIAL RATE: 73 BPM
EKG DIAGNOSIS: NORMAL
EKG P-R INTERVAL: 128 MS
EKG Q-T INTERVAL: 418 MS
EKG QRS DURATION: 86 MS
EKG QTC CALCULATION (BAZETT): 460 MS
EKG R AXIS: 22 DEGREES
EKG T AXIS: 29 DEGREES
EKG VENTRICULAR RATE: 73 BPM
GFR AFRICAN AMERICAN: >60 ML/MIN/1.73M2
GFR NON-AFRICAN AMERICAN: >60 ML/MIN/1.73M2
GLUCOSE BLD-MCNC: 106 MG/DL (ref 70–99)
GLUCOSE BLD-MCNC: 121 MG/DL (ref 70–99)
GLUCOSE BLD-MCNC: 135 MG/DL (ref 70–99)
GLUCOSE BLD-MCNC: 151 MG/DL (ref 70–99)
GLUCOSE BLD-MCNC: 160 MG/DL (ref 70–99)
POTASSIUM SERPL-SCNC: 4.3 MMOL/L (ref 3.5–5.1)
PROCALCITONIN: 0.08
SARS-COV-2, NAAT: NOT DETECTED
SODIUM BLD-SCNC: 128 MMOL/L (ref 135–145)
SOURCE: NORMAL

## 2021-12-05 PROCEDURE — 93010 ELECTROCARDIOGRAM REPORT: CPT | Performed by: INTERNAL MEDICINE

## 2021-12-05 PROCEDURE — 1200000000 HC SEMI PRIVATE

## 2021-12-05 PROCEDURE — 2580000003 HC RX 258: Performed by: PHYSICIAN ASSISTANT

## 2021-12-05 PROCEDURE — 84145 PROCALCITONIN (PCT): CPT

## 2021-12-05 PROCEDURE — 2700000000 HC OXYGEN THERAPY PER DAY

## 2021-12-05 PROCEDURE — 94640 AIRWAY INHALATION TREATMENT: CPT

## 2021-12-05 PROCEDURE — 80048 BASIC METABOLIC PNL TOTAL CA: CPT

## 2021-12-05 PROCEDURE — 6370000000 HC RX 637 (ALT 250 FOR IP): Performed by: NURSE PRACTITIONER

## 2021-12-05 PROCEDURE — 6370000000 HC RX 637 (ALT 250 FOR IP): Performed by: PHYSICIAN ASSISTANT

## 2021-12-05 PROCEDURE — 82962 GLUCOSE BLOOD TEST: CPT

## 2021-12-05 PROCEDURE — 87635 SARS-COV-2 COVID-19 AMP PRB: CPT

## 2021-12-05 PROCEDURE — 6360000002 HC RX W HCPCS: Performed by: PHYSICIAN ASSISTANT

## 2021-12-05 PROCEDURE — 2580000003 HC RX 258: Performed by: NURSE PRACTITIONER

## 2021-12-05 RX ORDER — METHYLPREDNISOLONE SODIUM SUCCINATE 40 MG/ML
40 INJECTION, POWDER, LYOPHILIZED, FOR SOLUTION INTRAMUSCULAR; INTRAVENOUS DAILY
Status: DISCONTINUED | OUTPATIENT
Start: 2021-12-05 | End: 2021-12-05

## 2021-12-05 RX ORDER — SODIUM CHLORIDE 9 MG/ML
500 INJECTION, SOLUTION INTRAVENOUS PRN
Status: DISCONTINUED | OUTPATIENT
Start: 2021-12-05 | End: 2021-12-10 | Stop reason: HOSPADM

## 2021-12-05 RX ORDER — PREDNISONE 20 MG/1
40 TABLET ORAL DAILY
Status: DISCONTINUED | OUTPATIENT
Start: 2021-12-06 | End: 2021-12-06

## 2021-12-05 RX ORDER — SODIUM CHLORIDE 0.9 % (FLUSH) 0.9 %
5-40 SYRINGE (ML) INJECTION EVERY 12 HOURS SCHEDULED
Status: DISCONTINUED | OUTPATIENT
Start: 2021-12-05 | End: 2021-12-10 | Stop reason: HOSPADM

## 2021-12-05 RX ORDER — GUAIFENESIN 600 MG/1
600 TABLET, EXTENDED RELEASE ORAL 2 TIMES DAILY
Status: DISCONTINUED | OUTPATIENT
Start: 2021-12-05 | End: 2021-12-10 | Stop reason: HOSPADM

## 2021-12-05 RX ORDER — ROPINIROLE 1 MG/1
2 TABLET, FILM COATED ORAL NIGHTLY
Status: DISCONTINUED | OUTPATIENT
Start: 2021-12-05 | End: 2021-12-10 | Stop reason: HOSPADM

## 2021-12-05 RX ORDER — LOSARTAN POTASSIUM 50 MG/1
50 TABLET ORAL DAILY
Status: DISCONTINUED | OUTPATIENT
Start: 2021-12-05 | End: 2021-12-10 | Stop reason: HOSPADM

## 2021-12-05 RX ORDER — PREDNISONE 20 MG/1
40 TABLET ORAL DAILY
Status: CANCELLED | OUTPATIENT
Start: 2021-12-05

## 2021-12-05 RX ORDER — DEXTROSE MONOHYDRATE 25 G/50ML
12.5 INJECTION, SOLUTION INTRAVENOUS PRN
Status: DISCONTINUED | OUTPATIENT
Start: 2021-12-05 | End: 2021-12-10 | Stop reason: HOSPADM

## 2021-12-05 RX ORDER — HYDROXYCHLOROQUINE SULFATE 200 MG/1
200 TABLET, FILM COATED ORAL 2 TIMES DAILY
Status: DISCONTINUED | OUTPATIENT
Start: 2021-12-05 | End: 2021-12-10 | Stop reason: HOSPADM

## 2021-12-05 RX ORDER — ROPINIROLE 1 MG/1
2 TABLET, FILM COATED ORAL ONCE
Status: COMPLETED | OUTPATIENT
Start: 2021-12-05 | End: 2021-12-05

## 2021-12-05 RX ORDER — NICOTINE POLACRILEX 4 MG
15 LOZENGE BUCCAL PRN
Status: DISCONTINUED | OUTPATIENT
Start: 2021-12-05 | End: 2021-12-10 | Stop reason: HOSPADM

## 2021-12-05 RX ORDER — ONDANSETRON 2 MG/ML
4 INJECTION INTRAMUSCULAR; INTRAVENOUS EVERY 6 HOURS PRN
Status: DISCONTINUED | OUTPATIENT
Start: 2021-12-05 | End: 2021-12-10 | Stop reason: HOSPADM

## 2021-12-05 RX ORDER — ASPIRIN 81 MG/1
81 TABLET ORAL NIGHTLY
Status: DISCONTINUED | OUTPATIENT
Start: 2021-12-05 | End: 2021-12-10 | Stop reason: HOSPADM

## 2021-12-05 RX ORDER — POLYETHYLENE GLYCOL 3350 17 G/17G
17 POWDER, FOR SOLUTION ORAL DAILY PRN
Status: DISCONTINUED | OUTPATIENT
Start: 2021-12-05 | End: 2021-12-10 | Stop reason: HOSPADM

## 2021-12-05 RX ORDER — IPRATROPIUM BROMIDE AND ALBUTEROL SULFATE 2.5; .5 MG/3ML; MG/3ML
1 SOLUTION RESPIRATORY (INHALATION) 4 TIMES DAILY
Status: DISCONTINUED | OUTPATIENT
Start: 2021-12-05 | End: 2021-12-10 | Stop reason: HOSPADM

## 2021-12-05 RX ORDER — ROSUVASTATIN CALCIUM 5 MG/1
10 TABLET, COATED ORAL NIGHTLY
Status: DISCONTINUED | OUTPATIENT
Start: 2021-12-05 | End: 2021-12-10 | Stop reason: HOSPADM

## 2021-12-05 RX ORDER — ALBUTEROL SULFATE 2.5 MG/3ML
2.5 SOLUTION RESPIRATORY (INHALATION) EVERY 4 HOURS PRN
Status: DISCONTINUED | OUTPATIENT
Start: 2021-12-05 | End: 2021-12-10 | Stop reason: HOSPADM

## 2021-12-05 RX ORDER — CODEINE PHOSPHATE AND GUAIFENESIN 10; 100 MG/5ML; MG/5ML
5 SOLUTION ORAL EVERY 4 HOURS PRN
Status: DISCONTINUED | OUTPATIENT
Start: 2021-12-05 | End: 2021-12-10 | Stop reason: HOSPADM

## 2021-12-05 RX ORDER — SODIUM CHLORIDE 9 MG/ML
INJECTION, SOLUTION INTRAVENOUS CONTINUOUS
Status: DISCONTINUED | OUTPATIENT
Start: 2021-12-05 | End: 2021-12-06

## 2021-12-05 RX ORDER — IPRATROPIUM BROMIDE AND ALBUTEROL SULFATE 2.5; .5 MG/3ML; MG/3ML
1 SOLUTION RESPIRATORY (INHALATION) EVERY 4 HOURS PRN
Status: DISCONTINUED | OUTPATIENT
Start: 2021-12-05 | End: 2021-12-10 | Stop reason: HOSPADM

## 2021-12-05 RX ORDER — ACETAMINOPHEN 650 MG/1
650 SUPPOSITORY RECTAL EVERY 6 HOURS PRN
Status: DISCONTINUED | OUTPATIENT
Start: 2021-12-05 | End: 2021-12-10 | Stop reason: HOSPADM

## 2021-12-05 RX ORDER — DEXTROSE MONOHYDRATE 50 MG/ML
1000 INJECTION, SOLUTION INTRAVENOUS PRN
Status: DISCONTINUED | OUTPATIENT
Start: 2021-12-05 | End: 2021-12-10 | Stop reason: HOSPADM

## 2021-12-05 RX ORDER — SODIUM CHLORIDE 9 MG/ML
1000 INJECTION, SOLUTION INTRAVENOUS CONTINUOUS
Status: DISCONTINUED | OUTPATIENT
Start: 2021-12-05 | End: 2021-12-05

## 2021-12-05 RX ORDER — LOSARTAN POTASSIUM 50 MG/1
1 TABLET ORAL DAILY
Status: CANCELLED | OUTPATIENT
Start: 2021-12-05

## 2021-12-05 RX ORDER — SODIUM CHLORIDE 0.9 % (FLUSH) 0.9 %
5-40 SYRINGE (ML) INJECTION PRN
Status: DISCONTINUED | OUTPATIENT
Start: 2021-12-05 | End: 2021-12-10 | Stop reason: HOSPADM

## 2021-12-05 RX ORDER — ACETAMINOPHEN 325 MG/1
650 TABLET ORAL EVERY 6 HOURS PRN
Status: DISCONTINUED | OUTPATIENT
Start: 2021-12-05 | End: 2021-12-10 | Stop reason: HOSPADM

## 2021-12-05 RX ORDER — ONDANSETRON 4 MG/1
4 TABLET, ORALLY DISINTEGRATING ORAL EVERY 8 HOURS PRN
Status: DISCONTINUED | OUTPATIENT
Start: 2021-12-05 | End: 2021-12-10 | Stop reason: HOSPADM

## 2021-12-05 RX ADMIN — IPRATROPIUM BROMIDE AND ALBUTEROL SULFATE 1 AMPULE: 2.5; .5 SOLUTION RESPIRATORY (INHALATION) at 21:26

## 2021-12-05 RX ADMIN — GUAIFENESIN 600 MG: 600 TABLET ORAL at 20:04

## 2021-12-05 RX ADMIN — METHYLPREDNISOLONE SODIUM SUCCINATE 40 MG: 40 INJECTION, POWDER, FOR SOLUTION INTRAMUSCULAR; INTRAVENOUS at 08:54

## 2021-12-05 RX ADMIN — IPRATROPIUM BROMIDE AND ALBUTEROL SULFATE 1 AMPULE: 2.5; .5 SOLUTION RESPIRATORY (INHALATION) at 11:18

## 2021-12-05 RX ADMIN — ASPIRIN 81 MG: 81 TABLET, COATED ORAL at 20:04

## 2021-12-05 RX ADMIN — METOPROLOL TARTRATE 25 MG: 25 TABLET, FILM COATED ORAL at 08:45

## 2021-12-05 RX ADMIN — HYDROXYCHLOROQUINE SULFATE 200 MG: 200 TABLET ORAL at 08:44

## 2021-12-05 RX ADMIN — METOPROLOL TARTRATE 25 MG: 25 TABLET, FILM COATED ORAL at 20:04

## 2021-12-05 RX ADMIN — HYDROXYCHLOROQUINE SULFATE 200 MG: 200 TABLET ORAL at 20:04

## 2021-12-05 RX ADMIN — ROPINIROLE HYDROCHLORIDE 2 MG: 1 TABLET, FILM COATED ORAL at 02:18

## 2021-12-05 RX ADMIN — INSULIN LISPRO 1 UNITS: 100 INJECTION, SOLUTION INTRAVENOUS; SUBCUTANEOUS at 18:00

## 2021-12-05 RX ADMIN — SODIUM CHLORIDE, PRESERVATIVE FREE 10 ML: 5 INJECTION INTRAVENOUS at 08:45

## 2021-12-05 RX ADMIN — SODIUM CHLORIDE, PRESERVATIVE FREE 10 ML: 5 INJECTION INTRAVENOUS at 02:15

## 2021-12-05 RX ADMIN — LOSARTAN POTASSIUM 50 MG: 50 TABLET, FILM COATED ORAL at 08:44

## 2021-12-05 RX ADMIN — IPRATROPIUM BROMIDE AND ALBUTEROL SULFATE 1 AMPULE: 2.5; .5 SOLUTION RESPIRATORY (INHALATION) at 16:20

## 2021-12-05 RX ADMIN — ENOXAPARIN SODIUM 40 MG: 100 INJECTION SUBCUTANEOUS at 08:44

## 2021-12-05 RX ADMIN — SODIUM CHLORIDE 1000 ML: 9 INJECTION, SOLUTION INTRAVENOUS at 02:15

## 2021-12-05 RX ADMIN — ROPINIROLE HYDROCHLORIDE 2 MG: 1 TABLET, FILM COATED ORAL at 20:04

## 2021-12-05 RX ADMIN — SODIUM CHLORIDE: 9 INJECTION, SOLUTION INTRAVENOUS at 11:12

## 2021-12-05 RX ADMIN — SODIUM CHLORIDE, PRESERVATIVE FREE 10 ML: 5 INJECTION INTRAVENOUS at 08:55

## 2021-12-05 RX ADMIN — IPRATROPIUM BROMIDE AND ALBUTEROL SULFATE 1 AMPULE: 2.5; .5 SOLUTION RESPIRATORY (INHALATION) at 08:21

## 2021-12-05 RX ADMIN — ACETAMINOPHEN 650 MG: 325 TABLET ORAL at 15:49

## 2021-12-05 RX ADMIN — ROSUVASTATIN CALCIUM 10 MG: 5 TABLET, FILM COATED ORAL at 20:04

## 2021-12-05 RX ADMIN — ACETAMINOPHEN 650 MG: 325 TABLET ORAL at 02:18

## 2021-12-05 ASSESSMENT — PAIN DESCRIPTION - DESCRIPTORS
DESCRIPTORS: RADIATING;THROBBING
DESCRIPTORS: ACHING

## 2021-12-05 ASSESSMENT — PAIN SCALES - GENERAL
PAINLEVEL_OUTOF10: 3
PAINLEVEL_OUTOF10: 1
PAINLEVEL_OUTOF10: 0
PAINLEVEL_OUTOF10: 9

## 2021-12-05 ASSESSMENT — PAIN DESCRIPTION - LOCATION
LOCATION: GENERALIZED
LOCATION: HEAD

## 2021-12-05 ASSESSMENT — PAIN DESCRIPTION - FREQUENCY: FREQUENCY: CONTINUOUS

## 2021-12-05 ASSESSMENT — PAIN DESCRIPTION - PAIN TYPE
TYPE: ACUTE PAIN
TYPE: CHRONIC PAIN

## 2021-12-05 ASSESSMENT — PAIN DESCRIPTION - ONSET: ONSET: ON-GOING

## 2021-12-05 ASSESSMENT — PAIN - FUNCTIONAL ASSESSMENT: PAIN_FUNCTIONAL_ASSESSMENT: ACTIVITIES ARE NOT PREVENTED

## 2021-12-05 ASSESSMENT — PAIN DESCRIPTION - PROGRESSION: CLINICAL_PROGRESSION: GRADUALLY WORSENING

## 2021-12-05 NOTE — H&P
[facility] Medicine History & Physical      PCP: Johnna Jackson MD    Date of Admission: 12/4/2021    Date of Service: Pt seen/examined on 12/5/2021    Chief Complaint:    Chief Complaint   Patient presents with    Shortness of Breath     pt. c/o increased SOB for 2 to three days. C/O  increased cough w/ wheezing . Pt was here Thursday w/ the same symptoms , but c/o feeling worse         History Of Present Illness: The patient is a 81 y.o. female with CAD, diabetes, rheumatoid arthritis, hyperlipidemia, hypertension who presented to [facility] ED with complaint of SOB. Patient reports she has not been feeling well for about 2 weeks. She went saw her primary care provider was started on azithromycin. She continued to feel worse with increased shortness of breath, wheezing and cough and came to the emergency department 2 days ago. She was diagnosed with parainfluenza and sent home on steroids. She states since that time she is continued to feel worse with increased cough to the point where she vomits. She has not been able to keep down the steroids. She states overall she feels very fatigued and unwell. She feels dehydrated because she has not been able to eat or drink much secondary to the coughing fits and the vomiting. She denies any associated chest pain, abdominal pain. She is vaccinated against Covid    Past Medical History:        Diagnosis Date    Arthritis     left knee pain, sees Dr. Trena Lopez CAD (coronary artery disease)     sees Dr. Mateo Nugent Chronic midline low back pain without sciatica 9/28/2016    Had PT in 2016   [facility] Colonoscopy refused     discussed in 7/15    [facility]di[facility]    Dupuytren's contracture of right hand     Endometrial stripe increased 9/25/2014    Saw NP Jurline Lesches. Was normal exam per pt.  Gastrointestinal hemorrhage 11/2/2015    Patient had GI bleeding. Colon refused. Discussed with patient in detail.     Glaucoma 4/1/2014    H/O echocardiogram 10/02/2014    Mildly depressed left ventricular function; ejection fraction of 45%. Moderate pulmonary hypertension.  H/O echocardiogram 08/28/2018    EF 50-55%.  Mitral annular calcification is present. No other significant valvulopathy seen.  History of nuclear stress test 08/28/2018    EF 59%. ECG portion of stress test is negative for ischemia by diagnostic criteria. fixed inferior large size severe defect in rest and stress images. Mild hubert infarct ischemia.  HTN (hypertension)     Hyperlipidemia     Kidney stone     hx-\"been about 3 yrs ago\"    MI (myocardial infarction) ([facility] 02/1998    treated with TPA    Obesity     Open wound of right foot 7/27/2020    Pneumonia of right lower lobe due to infectious organism 4/5/2018    Vertigo     'have been having this since I had cataract surgery\"\"that is why they are doing the MRA of my brain(10/31/2014)    WD-Traumatic ulcer of foot, right, with fat layer exposed ([facility] 8/3/2020       Past Surgical History:        Procedure Laterality Date    CATARACT REMOVAL Bilateral     5/14,8/14    INGUINAL HERNIA REPAIR Left 45 yrs ago       Medications Prior to Admission:    Prior to Admission medications    Medication Sig Start Date End Date Taking?  Authorizing Provider   benzonatate (TESSALON PERLES) 100 MG capsule Take 1 capsule by mouth 3 times daily as needed for Cough 12/3/21  Yes ABA Lu - CNP   predniSONE (DELTASONE) 20 MG tablet Take 2 tablets by mouth daily for 5 days 12/2/21 12/7/21 Yes Rui Ulloa,    albuterol sulfate  (90 Base) MCG/ACT inhaler INHALE 2 PUFFS INTO THE LUNGS 4 TIMES DAILY AS NEEDED FOR WHEEZING 10/11/21  Yes Meryl Edmondson MD   furosemide (LASIX) 20 MG tablet Take 1 tablet by mouth as needed (prn) Indications: per Dr. Cooper Said 9/15/21  Yes Meryl Edmondson MD   glipiZIDE (GLUCOTROL XL) 5 MG extended release tablet Take 1 tablet by mouth daily 9/15/21  Yes Meryl Edmondson MD   losartan (COZAAR) 50 MG tablet TAKE 1 TABLET BY MOUTH DAILY 9/15/21  Yes Meryl Edmondson MD   metoprolol tartrate (LOPRESSOR) 25 MG tablet TAKE ONE TABLET BY MOUTH TWO TIMES A DAY  Patient taking differently: 25 mg daily  9/15/21  Yes Meryl Edmondson MD   potassium chloride (KLOR-CON M) 20 MEQ extended release tablet Take 1 tablet by mouth daily 9/15/21  Yes Meryl Edmondson MD   rosuvastatin (CRESTOR) 10 MG tablet Take 1 tablet by mouth nightly 9/15/21  Yes Meryl Edmondson MD   rOPINIRole (REQUIP) 2 MG tablet TAKE 1 TABLET BY MOUTH NIGHTLY  Patient taking differently: Take 3 mg by mouth nightly  8/20/21  Yes Meryl Edmondson MD   Multiple Vitamins-Minerals (PRESERVISION AREDS) TABS Take 1 tablet by mouth 2 times daily   Yes Historical Provider, MD   hydroxychloroquine (PLAQUENIL) 200 MG tablet Take 200 mg by mouth daily    Yes Historical Provider, MD   aspirin 81 MG EC tablet Take 81 mg by mouth daily  11/11/14  Yes Meryl Edmondson MD   Cholecalciferol (VITAMIN D PO) Take 5,000 Units by mouth daily Indications: pt states take TID    Yes Historical Provider, MD   Coenzyme Q10 (CO Q 10 PO) Take 200 mg by mouth 2 times daily Indications: takes 400mg daily    Yes Historical Provider, MD   Spacer/Aero-Holding Raynette Sin 1 Device by Does not apply route daily as needed (use with albuterol rescue inhaler) 12/1/21   Tiffany Salazar, ABA - CNP   blood glucose test strips (TRUETEST TEST) strip 1 each by Other route 2 times daily DX=E11.9 3/8/21   Meryl Edmondson MD   Blood Glucose Monitoring Suppl (TRUE METRIX METER) JUSTO Dispense one meter, DX E11.9 testing once daily 9/24/20   Meryl Edmondson MD   blood glucose test strips (TRUE METRIX BLOOD GLUCOSE TEST) strip Dispense true metrix test strips, DX=E11.9, testing once daily 9/9/20   Meryl Edmondson MD   UNABLE TO FIND Hand brace. 6/26/20   Meryl Edmondson MD       Allergies:  Patient has no known allergies.     Social History:  The patient currently lives at home    TOBACCO:   reports that she has never smoked. She has never used smokeless tobacco.  ETOH:   reports current alcohol use. Family History:   Positive as follows:        Problem Relation Age of Onset   [facility] Parkinsonism Mother     Heart Attack Father     Heart Disease Father     Stroke Sister     Cancer Sister         breast ca    High Blood Pressure Brother     High Blood Pressure Brother     Cancer Brother         \"stomach cancer\"    Colon Cancer Neg Hx     Stomach Cancer Neg Hx        REVIEW OF SYSTEMS:     Constitutional: + Fatigue, negative for fever   HENT: Negative for sore throat   Eyes: Negative for redness   Respiratory: + Cough, + SOB  Cardiovascular: Negative for chest pain   Gastrointestinal: + Vomiting (posttussive), negative abdominal pain  Genitourinary: Negative for hematuria   Musculoskeletal: Negative for arthralgias   Skin: Negative for rash   Neurological: Negative for syncope   Hematological: Negative for adenopathy   Psychiatric/Behavorial: Negative for anxiety    PHYSICAL EXAM:    BP (!) 133/101   Pulse 77   Temp 97.7 °F (36.5 °C) (Oral)   Resp 18   Ht 5' 4\" (1.626 m)   Wt 230 lb (104.3 kg)   LMP  (LMP Unknown)   SpO2 100%   BMI 39.48 kg/m²     Gen: Elderly, obese female, no distress. Alert. Eyes:  No conjunctival injection. ENT: No discharge. Pharynx clear. Neck: Trachea midline. Resp: No accessory muscle use. No crackles. + Diffuse wheezes. No rhonchi.  + Coughing fits, + on 2 L nasal cannula  CV: Regular rate. Regular rhythm. No murmur. No rub.  + Edema. Capillary Refill: Brisk,< 3 seconds   Peripheral Pulses: +2 palpable, equal bilaterally   GI: Non-tender. Non-distended. Normal bowel sounds. Skin: Warm and dry. M/S: No cyanosis. No joint deformity. No clubbing. Neuro: Awake. Grossly nonfocal    Psych: Oriented x 3. No anxiety or agitation.      CBC:   Recent Labs     12/02/21  1230 12/04/21  1930   WBC 8.1 9.4   HGB 14.0 14.3   HCT 43.4 43.5   MCV 92.5 91.6    266     BMP:   Recent Labs 12/02/21  1230 12/04/21 1930   * 125*   K 4.8 5.2*   CL 96* 90*   CO2 27 25   BUN 9 12   CREATININE 0.5* 0.6     LIVER PROFILE:   Recent Labs     12/02/21  1230 12/04/21 1930   AST 14* 21   ALT <5* 6*   BILITOT 0.5 0.4   ALKPHOS 68 66     U/A:    Lab Results   Component Value Date    COLORU YELLOW 09/21/2020    WBCUA 7 09/21/2020    RBCUA 0 09/21/2020    MUCUS NEGATIVE 09/01/2019    BACTERIA MANY 09/21/2020    CLARITYU CLEAR 09/21/2020    SPECGRAV 1.030 09/21/2020    LEUKOCYTESUR NEGATIVE 09/21/2020    BLOODU NEGATIVE 09/21/2020     CULTURES  COVID-19 (rapid): Not detected     EKG:  I have reviewed the EKG with the following interpretation:   NSR, Q waves in inferoir leads, stable compared to prior EKG, no acute ST segment changes concerning for acute ischemia     RADIOLOGY  XR CHEST (2 VW)   Final Result   No acute cardiopulmonary abnormality.            Pertinent previous results reviewed   Adenovirus Detection by PCR NOT DETECTED NOT DETECTED    Coronavirus 229E PCR NOT DETECTED NOT DETECTED    Coronavirus HKU1 PCR NOT DETECTED NOT DETECTED    Coronavirus NL63 PCR NOT DETECTED NOT DETECTED    Coronavirus OC43 PCR NOT DETECTED NOT DETECTED    SARS-CoV-2 NOT DETECTED NOT DETECTED       Human Metapneumovirus PCR NOT DETECTED NOT DETECTED    Rhinovirus Enterovirus PCR NOT DETECTED NOT DETECTED    Influenza A by PCR NOT DETECTED NOT DETECTED    Influenza A H1 Pandemic PCR NOT DETECTED NOT DETECTED    Influenza A H1 (2009) PCR NOT DETECTED NOT DETECTED    Influenza A H3 PCR NOT DETECTED NOT DETECTED    Influenza B by PCR NOT DETECTED NOT DETECTED    Parainfluenza 1 PCR NOT DETECTED NOT DETECTED    Parainfluenza 2 PCR NOT DETECTED NOT DETECTED    Parainfluenza 3 PCR NOT DETECTED NOT DETECTED    Parainfluenza 4 PCR NOT DETECTED DETECTED BY PCR Abnormal     RSV PCR NOT DETECTED NOT DETECTED    Bordetella parapertussis by PCR NOT DETECTED NOT DETECTED    B Pertussis by PCR NOT DETECTED NOT DETECTED Chlamydophila Pneumonia PCR NOT DETECTED NOT DETECTED    Mycoplasma pneumo by PCR NOT DETECTED NOT DETECTED          ASSESSMENT/PLAN:  Parainfluenza   - was evaluated in the ER yesterday 12/2, for URI sx and found to be + parainfluenza on respiratory panel   - Continued to be symptomatic after discharge from the ER and returned   - Now with tachypnea, improved with supplemental O2  - Supportive care, albuterol, cough suppressants   - Rapid COVID was negative, she has vaccinated  - CXR without acute findings   - Check PCT   -Was started on steroids after her last ED visit, unable to keep anything down because of the coughing fits, will start IV steroids  - Will need walking pulse ox testing prior to discharge     Hyponatremia   - Mild, Na: 125   - Does take PO Lasix at home, hold   - Will start slow IVF and monitor    Hyperkalemia   - takes K+ supplements at home   - Hold   - IVF will likely correct, will repeat labs and monitor     CAD  - Continue ASA, BB, and statin    - EKG with Q waves in inferior leads, but stable compared to prior EKG   - No concerns for acute ACS     DM2  - Hold home oral Rx   - BG is well controlled   - Continue SSI     HTN  - BP is elevated   - Continue Cozaar, lopressor     HLD  - Continue statin     Rheumatoid Arthritis   - follows with the arthritis clinic   - On Plaquenil->will resume for now as symptoms are mild, no fevers, but would consider holding if not improving      RLS  - Continue Requip    DVT Prophylaxis: Lovenox   Diet: ADULT DIET; Regular; 4 carb choices (60 gm/meal); Low Fat/Low Chol/High Fiber/2 gm Na;  No Added Salt (3-4 gm)   Code Status: Full Code      Marissa Pereyra PA-C  12/5/2021 4:14 AM

## 2021-12-05 NOTE — ED PROVIDER NOTES
Triage Chief Complaint:   Shortness of Breath (pt. c/o increased SOB for 2 to three days. C/O  increased cough w/ wheezing . Pt was here Thursday w/ the same symptoms , but c/o feeling worse)      [place]:  Shabbir Navarrete is a 81 y.o. female that presents to the emergency department with shortness of breath. Patient was seen in this emergency department 2 days ago and diagnosed with parainfluenza. She states that she is having worsening symptoms. Specifically she is having wheezing, cough, shortness of breath and body aches. She does not wear home oxygen. She denies chest pain. She does have a history of diabetes. She does have an albuterol inhaler at home that she is used but does not seem to be helping. .    Past Medical History:   Diagnosis Date    Arthritis     left knee pain, sees Dr. Alberto Dunlap CAD (coronary artery disease)     sees Dr. Patricia Rodney Chronic midline low back pain without sciatica 9/28/2016    Had PT in 2016   [address] Colonoscopy refused     discussed in 7/15    DM (diabetes mellitus), type 2 ([facility] [address] 02/2006    Dupuytren's contracture of right hand     Endometrial stripe increased 9/25/2014    Saw NP Martha Reynoso. Was normal exam per pt.  Gastrointestinal hemorrhage 11/2/2015    Patient had GI bleeding. Colon refused. Discussed with patient in detail.  Glaucoma 4/1/2014    H/O echocardiogram 10/02/2014    Mildly depressed left ventricular function; ejection fraction of 45%. Moderate pulmonary hypertension.  H/O echocardiogram 08/28/2018    EF 50-55%.  Mitral annular calcification is present. No other significant valvulopathy seen.  History of nuclear stress test 08/28/2018    EF 59%. ECG portion of stress test is negative for ischemia by diagnostic criteria. fixed inferior large size severe defect in rest and stress images. Mild hubert infarct ischemia.     HTN (hypertension)     Hyperlipidemia     Kidney stone     hx-\"been about 3 yrs ago\"    MI (myocardial infarction) ([facility] 02/1998    treated with TPA    Obesity     Open wound of right foot 7/27/2020    Pneumonia of right lower lobe due to infectious organism 4/5/2018    Vertigo     'have been having this since I had cataract surgery\"\"that is why they are doing the MRA of my brain(10/31/2014)    WD-Traumatic ulcer of foot, right, with fat layer exposed ([facility] 8/3/2020     Past Surgical History:   Procedure Laterality Date    CATARACT REMOVAL Bilateral     5/14,8/14    INGUINAL HERNIA REPAIR Left 39 yrs ago     Family History   Problem Relation Age of Onset    Parkinsonism Mother     Heart Attack Father     Heart Disease Father     Stroke Sister     Cancer Sister         breast ca    High Blood Pressure Brother     High Blood Pressure Brother     Cancer Brother         \"stomach cancer\"    Colon Cancer Neg Hx     Stomach Cancer Neg Hx      Social History     Socioeconomic History    Marital status: Single     Spouse name: Not on file    Number of children: Not on file    Years of education: Not on file    Highest education level: Not on file   Occupational History    Not on file   Tobacco Use    Smoking status: Never Smoker    Smokeless tobacco: Never Used   Vaping Use    Vaping Use: Never used   Substance and Sexual Activity    Alcohol use: No     Alcohol/week: 0.0 standard drinks    Drug use: No    Sexual activity: Not Currently     Partners: Male   Other Topics Concern    Not on file   Social History Narrative    Not on file     Social Determinants of Health     Financial Resource Strain:     Difficulty of Paying Living Expenses: Not on file   Food Insecurity:     Worried About Running Out of Food in the Last Year: Not on file    [place] of Food in the Last Year: Not on file   Transportation Needs:     Lack of Transportation (Medical): Not on file    Lack of Transportation (Non-Medical):  Not on file   Physical Activity:     Days of Exercise per Week: Not on file    Minutes of (COZAAR) 50 MG tablet TAKE 1 TABLET BY MOUTH DAILY 90 tablet 1    metoprolol tartrate (LOPRESSOR) 25 MG tablet TAKE ONE TABLET BY MOUTH TWO TIMES A  tablet 1    potassium chloride (KLOR-CON M) 20 MEQ extended release tablet Take 1 tablet by mouth daily 90 tablet 1    rosuvastatin (CRESTOR) 10 MG tablet Take 1 tablet by mouth nightly 90 tablet 1    rOPINIRole (REQUIP) 2 MG tablet TAKE 1 TABLET BY MOUTH NIGHTLY 90 tablet 1    HYDROcodone-acetaminophen (NORCO) 5-325 MG per tablet Take 1 tablet by mouth as needed.  ciclopirox (LOPROX) 0.77 % cream       Multiple Vitamins-Minerals (PRESERVISION AREDS) TABS Take 1 tablet by mouth 2 times daily      blood glucose test strips (TRUETEST TEST) strip 1 each by Other route 2 times daily DX=E11.9 100 each 5    Blood Glucose Monitoring Suppl (TRUE METRIX METER) JUSTO Dispense one meter, DX E11.9 testing once daily 1 Device 0    blood glucose test strips (TRUE METRIX BLOOD GLUCOSE TEST) strip Dispense true metrix test strips, DX=E11.9, testing once daily 100 each 3    UNABLE TO FIND Hand brace. 1 Units 0    hydroxychloroquine (PLAQUENIL) 200 MG tablet Take 200 mg by mouth 2 times daily       aspirin 81 MG EC tablet Take 1 tablet by mouth nightly. 30 tablet     Cholecalciferol (VITAMIN D PO) Take 5,000 Units by mouth daily Indications: pt states take TID       Coenzyme Q10 (CO Q 10 PO) Take 200 mg by mouth 2 times daily Indications: takes 400mg daily        No Known Allergies  Nursing Notes Reviewed    ROS:  At least 10 systems reviewed and otherwise negative except as in the [address] Physical Exam:  ED Triage Vitals [12/04/21 1901]   Enc Vitals Group      BP (!) 182/78      Pulse 78      Resp 18      Temp 96.7 °F (35.9 °C)      Temp Source Oral      SpO2 94 %      Weight 230 lb (104.3 kg)      Height 5' 4\" (1.626 m)      Head Circumference       Peak Flow       Pain Score       Pain Loc       Pain Edu? Excl. in [address]?       My pulse oximetry interpretation is which is within the normal range    GENERAL APPEARANCE: Awake and alert. Cooperative. No acute distress. HEAD:  Atraumatic. EYES: EOM's grossly intact. ENT: Mucous membranes are moist.  No trismus. NECK:  Trachea midline. HEART: RRR. Radial pulses 2+. LUNGS: Respirations unlabored. Diminished breath sounds throughout with slight wheezing at bilateral bases  ABDOMEN: Soft. Non-tender. No guarding or rebound. EXTREMITIES: No acute deformities. SKIN: Warm and dry. NEUROLOGICAL: No gross facial drooping. Moves all 4 extremities spontaneously. PSYCHIATRIC: Normal mood. I have reviewed and interpreted all of the currently available lab results from this visit (if applicable):  No results found for this visit on 12/04/21. EKG: (All EKG's are interpreted by myself in the absence of a cardiologist)      MDM:  Patient's vital signs initially show that she is 94% on room air. Her heart rate is normal and she is afebrile. I have ordered blood work, repeat chest x-ray and DuoNeb treatment. Upon chart review she is positive for parainfluenza negative for Covid. Patient signed out to the oncoming provider.   Please refer to his note for disposition of this patient      (Please note that portions of this note may have been completed with a voice recognition program. Efforts were made to edit the dictations but occasionally words are mis-transcribed.)    MD Stan Hebert MD  12/04/21 1930

## 2021-12-05 NOTE — PLAN OF CARE
Problem: Falls - Risk of:  Goal: Will remain free from falls  Description: Will remain free from falls  Outcome: Ongoing  Goal: Absence of physical injury  Description: Absence of physical injury  Outcome: Ongoing     Problem: Breathing Pattern - Ineffective:  Goal: Ability to achieve and maintain a regular respiratory rate will improve  Description: Ability to achieve and maintain a regular respiratory rate will improve  Outcome: Ongoing     Problem:  Activity:  Goal: Risk for activity intolerance will decrease  Description: Risk for activity intolerance will decrease  Outcome: Ongoing     Problem: Coping:  Goal: Ability to adjust to condition or change in health will improve  Description: Ability to adjust to condition or change in health will improve  Outcome: Ongoing     Problem: Fluid Volume:  Goal: Ability to maintain a balanced intake and output will improve  Description: Ability to maintain a balanced intake and output will improve  Outcome: Ongoing     Problem: Health Behavior:  Goal: Ability to identify and utilize available resources and services will improve  Description: Ability to identify and utilize available resources and services will improve  Outcome: Ongoing  Goal: Ability to manage health-related needs will improve  Description: Ability to manage health-related needs will improve  Outcome: Ongoing     Problem: Metabolic:  Goal: Ability to maintain appropriate glucose levels will improve  Description: Ability to maintain appropriate glucose levels will improve  Outcome: Ongoing     Problem: Nutritional:  Goal: Maintenance of adequate nutrition will improve  Description: Maintenance of adequate nutrition will improve  Outcome: Ongoing  Goal: Progress toward achieving an optimal weight will improve  Description: Progress toward achieving an optimal weight will improve  Outcome: Ongoing     Problem: Physical Regulation:  Goal: Complications related to the disease process, condition or treatment

## 2021-12-05 NOTE — ED PROVIDER NOTES
Emergency Department Encounter    Patient: Darryle Stade  MRN: [id number]  : 1935  Date of Evaluation: 2021  ED Provider:  Chai Franco MD      Triage Chief Complaint:   Shortness of Breath (pt. c/o increased SOB for 2 to three days. C/O  increased cough w/ wheezing . Pt was here Thursday w/ the same symptoms , but c/o feeling worse)      [place]:  Darryle Stade is a 81 y.o. female that presents to the emergency department with shortness of breath and cough. Patient reports that she first became ill around , approximately 9 to 10 days ago. She developed some generalized fatigue, body aches, cough and shortness of breath. Within the past 1 to 2 days, the cough has become far more frequent. Cough is occasionally productive of clear to slightly yellow phlegm. She feels chilled at times, but there have been no measured fevers. She denies any chest pain or discomfort. Patient was seen in the emergency department several days ago, and a respiratory viral panel showed a parainfluenza infection. She was COVID-19 negative at that time. She was vaccinated for COVID-19 in 2021, but she has not had a booster. Patient denies pre-existing COPD or home oxygen use. Here in the emergency department, she feels much better with the nasal cannula. Patient reports no other particular provocative or alleviating factors. ROS - see HPI, below listed is current ROS at time of my eval:  CONSTITUTIONAL: As above. EYES: No vision change, redness, drainage, or discharge. HENT: Positive for head congestion. Otherwise, no sore throat, runny nose, or earache. No dental pain. No painful swallowing. RESPIRATORY: As above. CARDIOVASCULAR: No anginal-type chest pain, orthopnea, or edema. GASTROINTESTINAL: No nausea, vomiting, or abdominal pain. No diarrhea or constipation. No hematochezia, melena, or hematemesis. GENITOURINARY: No frequency, urgency, or dysuria.   No hematuria. MUSCULOSKELETAL: No recent injury. No neck, back, or extremity pain. NEUROLOGICAL: No focal weakness, numbness, or tingling. SKIN: No rashes or other lesions reported. No yellowing of the skin. Medical history:  Past Medical History:   Diagnosis Date    Arthritis     left knee pain, sees Dr. Ramesh Arriaza CAD (coronary artery disease)     sees Dr. Magaly Bravo Chronic midline low back pain without sciatica 9/28/2016    Had PT in 2016   [address] Colonoscopy refused     discussed in 7/15    DM (diabetes mellitus), type 2 ([facility] [address] 02/2006    Dupuytren's contracture of right hand     Endometrial stripe increased 9/25/2014    Saw NP Jose M Molina. Was normal exam per pt.  Gastrointestinal hemorrhage 11/2/2015    Patient had GI bleeding. Colon refused. Discussed with patient in detail.  Glaucoma 4/1/2014    H/O echocardiogram 10/02/2014    Mildly depressed left ventricular function; ejection fraction of 45%. Moderate pulmonary hypertension.  H/O echocardiogram 08/28/2018    EF 50-55%.  Mitral annular calcification is present. No other significant valvulopathy seen.  History of nuclear stress test 08/28/2018    EF 59%. ECG portion of stress test is negative for ischemia by diagnostic criteria. fixed inferior large size severe defect in rest and stress images. Mild hubert infarct ischemia.     HTN (hypertension)     Hyperlipidemia     Kidney stone     hx-\"been about 3 yrs ago\"    MI (myocardial infarction) ([facility] [address] 02/1998    treated with TPA    Obesity     Open wound of right foot 7/27/2020    Pneumonia of right lower lobe due to infectious organism 4/5/2018    Vertigo     'have been having this since I had cataract surgery\"\"that is why they are doing the MRA of my brain(10/31/2014)    WD-Traumatic ulcer of foot, right, with fat layer exposed ([facility] [address] 8/3/2020     Past Surgical History:   Procedure Laterality Date    CATARACT REMOVAL Bilateral     5/14,8/14    INGUINAL HERNIA REPAIR Left 39 yrs ago     Family History   Problem Relation Age of Onset   Jacqueline Josr Parkinsonism Mother     Heart Attack Father     Heart Disease Father     Stroke Sister     Cancer Sister         breast ca    High Blood Pressure Brother     High Blood Pressure Brother     Cancer Brother         \"stomach cancer\"    Colon Cancer Neg Hx     Stomach Cancer Neg Hx      Social History     Socioeconomic History    Marital status: Single     Spouse name: Not on file    Number of children: Not on file    Years of education: Not on file    Highest education level: Not on file   Occupational History    Not on file   Tobacco Use    Smoking status: Never Smoker    Smokeless tobacco: Never Used   Vaping Use    Vaping Use: Never used   Substance and Sexual Activity    Alcohol use: No     Alcohol/week: 0.0 standard drinks    Drug use: No    Sexual activity: Not Currently     Partners: Male   Other Topics Concern    Not on file   Social History Narrative    Not on file     Social Determinants of Health     Financial Resource Strain:     Difficulty of Paying Living Expenses: Not on file   Food Insecurity:     Worried About Running Out of Food in the Last Year: Not on file    [place] of Food in the Last Year: Not on file   Transportation Needs:     Lack of Transportation (Medical): Not on file    Lack of Transportation (Non-Medical):  Not on file   Physical Activity:     Days of Exercise per Week: Not on file    Minutes of Exercise per Session: Not on file   Stress:     Feeling of Stress : Not on file   Social Connections:     Frequency of Communication with Friends and Family: Not on file    Frequency of Social Gatherings with Friends and Family: Not on file    Attends Latter-day Services: Not on file    Active Member of Clubs or Organizations: Not on file    Attends Club or Organization Meetings: Not on file    Marital Status: Not on file   Intimate Partner Violence:     Fear of Current or Ex-Partner: Not on file    Emotionally Abused: Not on file    Physically Abused: Not on file    Sexually Abused: Not on file   Housing Stability:     Unable to Pay for Housing in the Last Year: Not on file    Number of Places Lived in the Last Year: Not on file    Unstable Housing in the Last Year: Not on file     No current facility-administered medications for this encounter. Current Outpatient Medications   Medication Sig Dispense Refill    benzonatate (TESSALON PERLES) 100 MG capsule Take 1 capsule by mouth 3 times daily as needed for Cough 20 capsule 0    predniSONE (DELTASONE) 20 MG tablet Take 2 tablets by mouth daily for 5 days 10 tablet 0    Spacer/Aero-Holding Chambers JUSTO 1 Device by Does not apply route daily as needed (use with albuterol rescue inhaler) 1 each 0    azithromycin (ZITHROMAX) 250 MG tablet Take 2 tablets (500 mg) on Day 1, followed by 1 tablet (250 mg) once daily on Days 2 through 5. 1 packet 0    albuterol sulfate  (90 Base) MCG/ACT inhaler INHALE 2 PUFFS INTO THE LUNGS 4 TIMES DAILY AS NEEDED FOR WHEEZING 8.5 g 5    furosemide (LASIX) 20 MG tablet Take 1 tablet by mouth as needed (prn) Indications: per Dr. Abrahan Diaz 90 tablet 1    glipiZIDE (GLUCOTROL XL) 5 MG extended release tablet Take 1 tablet by mouth daily 90 tablet 1    losartan (COZAAR) 50 MG tablet TAKE 1 TABLET BY MOUTH DAILY 90 tablet 1    metoprolol tartrate (LOPRESSOR) 25 MG tablet TAKE ONE TABLET BY MOUTH TWO TIMES A  tablet 1    potassium chloride (KLOR-CON M) 20 MEQ extended release tablet Take 1 tablet by mouth daily 90 tablet 1    rosuvastatin (CRESTOR) 10 MG tablet Take 1 tablet by mouth nightly 90 tablet 1    rOPINIRole (REQUIP) 2 MG tablet TAKE 1 TABLET BY MOUTH NIGHTLY 90 tablet 1    HYDROcodone-acetaminophen (NORCO) 5-325 MG per tablet Take 1 tablet by mouth as needed.        ciclopirox (LOPROX) 0.77 % cream       Multiple Vitamins-Minerals (PRESERVISION AREDS) TABS Take 1 tablet by mouth 2 times daily      blood glucose test strips (TRUETEST TEST) strip 1 each by Other route 2 times daily DX=E11.9 100 each 5    Blood Glucose Monitoring Suppl (TRUE METRIX METER) JUSTO Dispense one meter, DX E11.9 testing once daily 1 Device 0    blood glucose test strips (TRUE METRIX BLOOD GLUCOSE TEST) strip Dispense true metrix test strips, DX=E11.9, testing once daily 100 each 3    UNABLE TO FIND Hand brace. 1 Units 0    hydroxychloroquine (PLAQUENIL) 200 MG tablet Take 200 mg by mouth 2 times daily       aspirin 81 MG EC tablet Take 1 tablet by mouth nightly. 30 tablet     Cholecalciferol (VITAMIN D PO) Take 5,000 Units by mouth daily Indications: pt states take TID       Coenzyme Q10 (CO Q 10 PO) Take 200 mg by mouth 2 times daily Indications: takes 400mg daily        No Known Allergies    Nursing Notes Reviewed    Physical Exam:  Triage VS:    ED Triage Vitals [12/04/21 1901]   Enc Vitals Group      BP (!) 182/78      Pulse 78      Resp 18      Temp 96.7 °F (35.9 °C)      Temp Source Oral      SpO2 94 %      Weight 230 lb (104.3 kg)      Height 5' 4\" (1.626 m)      Head Circumference       Peak Flow       Pain Score       Pain Loc       Pain Edu? Excl. in [address]? My pulse ox interpretation is -98% on room air    GENERAL: Patient is awake, alert, and oriented appropriately. Patient is resting comfortably in a still position on the exam table. Patient speaking in full and complete sentences. Well-nourished and well-developed. HEENT: Normocephalic and atraumatic. Pupils equal, round, and reactive to light. No redness or matting. Bilateral external ears are unremarkable. Nasal mucosa is pink without purulence. Oral mucosa is moist and pink. NECK: Supple with normal range of motion. No Kernig's or Brudzinski sign. No JVD. RESPIRATORY: Mildly diminished but symmetric aeration. No respiratory distress. Faint coarse sounds at the bilateral bases.   Occasional end expiratory wheezes at the right anterior apex. Otherwise, no focal rales or rhonchi. Chest wall stable and nontender. CARDIOVASCULAR: Regular rate and rhythm. No murmurs, rubs, or gallops. No central or peripheral cyanosis. GASTROINTESTINAL: Soft, nontender, and nondistended. No McBurney's or Herzog's point tenderness. No other focal tenderness. No involuntary guarding, rebound, or rigidity. No mass or pulsatile mass. Bowel sounds normal.    NEUROLOGICAL: Awake, alert and oriented x 3. Cranial nerves III through XII are grossly intact as tested without facial droop or dermatomal paresthesias. Of note, forehead wrinkles are symmetric and intact. Conjugate gaze without entrapment. No asymmetry of the corners of the mouth or nasolabial folds. No gross motor or cerebellar deficits. BACK/MUSCULOSKELETAL: No asymmetric edema, Bernetta Neighbor' sign, or cords. SKIN: Normal tone for ethnicity. Normal turgor and brisk capillary refill peripherally. PSYCHIATRIC: Normal mood. Normal affect. Emergency department course. Patient is brought to bed 9 and assessed and reassessed by me. Prior to my initial evaluation, orders were placed by the day ED physician. After initial evaluation, upon reevaluation at approximately 2115, patient still exhibits occasional cough productive of slightly colored phlegm. She does still have a few wheezes in the right lung fields. Additional albuterol MDI treatments are ordered along with dexamethasone 4 mg. Respiratory panel from December 2 is reviewed, and it is positive for parainfluenza virus, but negative for COVID-19. Patient is agreeable to continuing plan. Upon most recent reevaluation, patient remains generally stable. She continues to feel better with the nasal cannula, though her saturations have been persistently normal.  There has been no indication of cyanosis or other hypoperfusion. Chest x-ray shows no visible pneumonia or significant effusion.   She is noted to have hyponatremia and hyperkalemia. She denies history of renal dysfunction, but she states she gets muscle cramps if she does not take her potassium supplement. We have discussed options for disposition, and with the persistent symptoms, the subjective improvement on oxygen, and her second visit for similar symptoms, we have discussed further treatment and evaluation in the hospital. Hospitalist Cheryl Lilly PA-C, is paged at [phone] to discuss admission. Initial verbal orders are discussed at 2821. Hospitalist has requested a repeat rapid Covid test to ensure that she has not converted since the recent evaluation. Decision time to admit: 8279. Patient seen during [facility], I did don appropriate PPE during my encounters with the patient, including n95 (when appropriate) mask and eye protection as appropriate.     I have reviewed and interpreted all of the currently available lab results from this visit (if applicable):  Results for orders placed or performed during the hospital encounter of 12/04/21   COVID-19, Rapid    Specimen: Nasopharyngeal   Result Value Ref Range    Source THROAT     SARS-CoV-2, NAAT NOT DETECTED NOT DETECTED   CBC Auto Differential   Result Value Ref Range    WBC 9.4 4.0 - 10.5 K/CU MM    RBC 4.75 4.2 - 5.4 M/CU MM    Hemoglobin 14.3 12.5 - 16.0 GM/DL    Hematocrit 43.5 37 - 47 %    MCV 91.6 78 - 100 FL    MCH 30.1 27 - 31 PG    MCHC 32.9 32.0 - 36.0 %    RDW 13.0 11.7 - 14.9 %    Platelets 303 672 - 472 K/CU MM    MPV 8.8 7.5 - 11.1 FL    Differential Type AUTOMATED DIFFERENTIAL     Segs Relative 79.0 (H) 36 - 66 %    Lymphocytes % 11.8 (L) 24 - 44 %    Monocytes % 7.7 (H) 0 - 4 %    Eosinophils % 0.0 0 - 3 %    Basophils % 0.2 0 - 1 %    Segs Absolute 7.4 K/CU MM    Lymphocytes Absolute 1.1 K/CU MM    Monocytes Absolute 0.7 K/CU MM    Eosinophils Absolute 0.0 K/CU MM    Basophils Absolute 0.0 K/CU MM    Immature Neutrophil % 1.3 (H) 0 - 0.43 %    Total Immature Neutrophil 0.12 K/CU MM   Comprehensive Metabolic Panel w/ Reflex to MG   Result Value Ref Range    Sodium 125 (L) 135 - 145 MMOL/L    Potassium 5.2 (H) 3.5 - 5.1 MMOL/L    Chloride 90 (L) 99 - 110 mMol/L    CO2 25 21 - 32 MMOL/L    BUN 12 6 - 23 MG/DL    CREATININE 0.6 0.6 - 1.1 MG/DL    Glucose 163 (H) 70 - 99 MG/DL    Calcium 9.3 8.3 - 10.6 MG/DL    Albumin 3.9 3.4 - 5.0 GM/DL    Total Protein 6.3 (L) 6.4 - 8.2 GM/DL    Total Bilirubin 0.4 0.0 - 1.0 MG/DL    ALT 6 (L) 10 - 40 U/L    AST 21 15 - 37 IU/L    Alkaline Phosphatase 66 40 - 129 IU/L    GFR Non-African American >60 >60 mL/min/1.73m2    GFR African American >60 >60 mL/min/1.73m2    Anion Gap 10 4 - 16   Troponin   Result Value Ref Range    Troponin T <0.010 <0.01 NG/ML   Brain Natriuretic Peptide   Result Value Ref Range    Pro-.8 (H) <300 PG/ML   EKG 12 Lead   Result Value Ref Range    Ventricular Rate 73 BPM    Atrial Rate 73 BPM    P-R Interval 128 ms    QRS Duration 86 ms    Q-T Interval 418 ms    QTc Calculation (Bazett) 460 ms    R Axis 22 degrees    T Axis 29 degrees    Diagnosis       Normal sinus rhythm  Possible Inferior infarct , age undetermined  Abnormal ECG  When compared with ECG of 02-DEC-2021 13:18,  No significant change was found          Radiographs (if obtained):  Radiologist's Report Reviewed:  XR CHEST (2 VW)    Result Date: 12/4/2021  EXAMINATION: TWO XRAY VIEWS OF THE CHEST 12/4/2021 9:49 pm COMPARISON: 12/02/2021 HISTORY: ORDERING SYSTEM PROVIDED HISTORY: cough, wheezing TECHNOLOGIST PROVIDED HISTORY: Reason for exam:->cough, wheezing Reason for Exam: coughing, wheezing Acuity: Acute Type of Exam: Initial Additional signs and symptoms: coughing, wheezing Relevant Medical/Surgical History: coughing, wheezing FINDINGS: The lungs are clear. The cardiac and mediastinal contours are normal.  There is no pleural effusion or pneumothorax. No acute osseous abnormality is identified. There are mild degenerative changes in the thoracic spine. Aortic calcifications are present. No acute cardiopulmonary abnormality. EKG:  Twelve-lead EKG obtained at 2001 on 4 December 2021 and interpreted by me in the absence of a cardiologist.  There is no criteria ST elevation or reciprocal change. There are no hyperacute T wave changes. There is no sign of acute ischemia or infarction. This tracing shows a normal sinus rhythm with baseline artifact. Rate and intervals are 73 beats per minute, NC interval 128 milliseconds, QRS duration 86 milliseconds, QTc interval 460 milliseconds, and R axis normal at 22 degrees. There is no acute change compared with the most recent EKG dated December 2, 2021. Additional data:  Respiratory panel obtained through this hospital network December 2, 2021: Positive for parainfluenza virus. COVID-19 negative. Medical decision making:  Patient presents to the emergency room with a constellation of symptoms consistent with recent diagnosis of parainfluenza infection. Lung sounds are somewhat coarse and occasionally bronchospastic. Fortunately, she has been saturating appropriately. Repeat COVID-19 swab is negative. Subjectively however, she feels much better with the nasal cannula. BNP is mildly elevated, but clinical picture at this point does not suggest symptomatic congestive heart failure. In fact, she has been receiving gentle hydration due to hyponatremia and mild hyperkalemia. Creatinine is normal.  I do not believe she would require emergent dialysis. Evaluation does not suggest high risk of acute coronary syndrome. Abdomen has been nonsurgical.    Procedures: None. Consultations: Hospitalist service. Clinical Impression:  1. Respiratory tract infection    2. Parainfluenza virus infection    3. Hyponatremia    4. Hyperkalemia    5. Elevated brain natriuretic peptide (BNP) level      Disposition referral (if applicable):  No follow-up provider specified.   Disposition medications (if applicable):  New Prescriptions    No medications on file     ED Provider Disposition Time  DISPOSITION Admitted 12/05/2021 12:39:47 AM      Comment: Please note this report has been produced using speech recognition software and may contain errors related to that system including errors in grammar, punctuation, and spelling, as well as words and phrases that may be inappropriate. Efforts were made to edit the dictations.         Josiah Gonzalez MD  12/05/21 3890

## 2021-12-05 NOTE — PROGRESS NOTES
The patient's daily home medications were updated in the chart's Home Medication List after being reviewed with:   [x] the patient. [] the patient's facility MAR. [] the patient's family or caregiver.  [] the patient's pharmacy. Pt states she knows her medications and dosages. List was went over with the pt and any changes were made.

## 2021-12-05 NOTE — PROGRESS NOTES
Please see my attestation note for the nurse practitioner history and physical exam note. My attestation is copied and pasted here since my evaluation of the patient occurred on 12/5, but the electronic medical record is showing it on 12 4. This is an 72-year-old female who presents with cough and shortness of breath and mild hypoxia. She was found to have parainfluenza infection. She was placed on intravenous steroids, and bronchodilators. I visited with the patient today, she continues to complain of shortness of breath and cough. She feels also much better while wearing the oxygen supplementation. On exam she does have bilateral diffuse expiratory wheezing and scattered rhonchi. Heart is regular rate and rhythm. Patient tells me she has used albuterol for a year but she is not aware that she has a diagnosis of asthma.     Assessment and plan:  1. Asthma exacerbation, patient likely carries a diagnosis of asthma which flared up due to current viral infection. We will continue with steroids and bronchodilators.     2. Parainfluenza upper respiratory infection, continue supportive care  3. Hyponatremia and hyperkalemia  4. Coronary artery disease, no complaints of chest pain  5. Hypertension  6. Diabetes mellitus type 2  7.  Rheumatoid arthritis

## 2021-12-05 NOTE — PROGRESS NOTES
Pt's admission skin assessment done by this Rn and MARRY Rod LPN. There are no skin issues at this time.

## 2021-12-06 ENCOUNTER — TELEPHONE (OUTPATIENT)
Dept: INTERNAL MEDICINE CLINIC | Age: 86
End: 2021-12-06

## 2021-12-06 ENCOUNTER — APPOINTMENT (OUTPATIENT)
Dept: CT IMAGING | Age: 86
DRG: 202 | End: 2021-12-06
Payer: MEDICARE

## 2021-12-06 PROBLEM — E87.1 HYPONATREMIA: Status: ACTIVE | Noted: 2021-12-06

## 2021-12-06 PROBLEM — J45.41 MODERATE PERSISTENT ASTHMA WITH EXACERBATION: Status: ACTIVE | Noted: 2021-12-06

## 2021-12-06 LAB
ANION GAP SERPL CALCULATED.3IONS-SCNC: 10 MMOL/L (ref 4–16)
BASOPHILS ABSOLUTE: 0 K/CU MM
BASOPHILS RELATIVE PERCENT: 0.1 % (ref 0–1)
BUN BLDV-MCNC: 12 MG/DL (ref 6–23)
CALCIUM SERPL-MCNC: 9.6 MG/DL (ref 8.3–10.6)
CHLORIDE BLD-SCNC: 90 MMOL/L (ref 99–110)
CO2: 29 MMOL/L (ref 21–32)
CREAT SERPL-MCNC: 0.6 MG/DL (ref 0.6–1.1)
DIFFERENTIAL TYPE: ABNORMAL
EOSINOPHILS ABSOLUTE: 0 K/CU MM
EOSINOPHILS RELATIVE PERCENT: 0 % (ref 0–3)
GFR AFRICAN AMERICAN: >60 ML/MIN/1.73M2
GFR NON-AFRICAN AMERICAN: >60 ML/MIN/1.73M2
GLUCOSE BLD-MCNC: 101 MG/DL (ref 70–99)
GLUCOSE BLD-MCNC: 134 MG/DL (ref 70–99)
GLUCOSE BLD-MCNC: 158 MG/DL (ref 70–99)
GLUCOSE BLD-MCNC: 162 MG/DL (ref 70–99)
GLUCOSE BLD-MCNC: 72 MG/DL (ref 70–99)
HCT VFR BLD CALC: 42.1 % (ref 37–47)
HEMOGLOBIN: 13.7 GM/DL (ref 12.5–16)
IMMATURE NEUTROPHIL %: 1.3 % (ref 0–0.43)
LYMPHOCYTES ABSOLUTE: 2.5 K/CU MM
LYMPHOCYTES RELATIVE PERCENT: 16.5 % (ref 24–44)
MCH RBC QN AUTO: 30.5 PG (ref 27–31)
MCHC RBC AUTO-ENTMCNC: 32.5 % (ref 32–36)
MCV RBC AUTO: 93.8 FL (ref 78–100)
MONOCYTES ABSOLUTE: 2.5 K/CU MM
MONOCYTES RELATIVE PERCENT: 16.8 % (ref 0–4)
PDW BLD-RTO: 13.4 % (ref 11.7–14.9)
PLATELET # BLD: 273 K/CU MM (ref 140–440)
PMV BLD AUTO: 9.3 FL (ref 7.5–11.1)
POTASSIUM SERPL-SCNC: 4.4 MMOL/L (ref 3.5–5.1)
RBC # BLD: 4.49 M/CU MM (ref 4.2–5.4)
SEGMENTED NEUTROPHILS ABSOLUTE COUNT: 9.7 K/CU MM
SEGMENTED NEUTROPHILS RELATIVE PERCENT: 65.3 % (ref 36–66)
SODIUM BLD-SCNC: 129 MMOL/L (ref 135–145)
TOTAL IMMATURE NEUTOROPHIL: 0.2 K/CU MM
WBC # BLD: 14.9 K/CU MM (ref 4–10.5)

## 2021-12-06 PROCEDURE — 97162 PT EVAL MOD COMPLEX 30 MIN: CPT

## 2021-12-06 PROCEDURE — 97116 GAIT TRAINING THERAPY: CPT

## 2021-12-06 PROCEDURE — 6370000000 HC RX 637 (ALT 250 FOR IP): Performed by: PHYSICIAN ASSISTANT

## 2021-12-06 PROCEDURE — 71250 CT THORAX DX C-: CPT

## 2021-12-06 PROCEDURE — 6370000000 HC RX 637 (ALT 250 FOR IP): Performed by: NURSE PRACTITIONER

## 2021-12-06 PROCEDURE — 82962 GLUCOSE BLOOD TEST: CPT

## 2021-12-06 PROCEDURE — 85025 COMPLETE CBC W/AUTO DIFF WBC: CPT

## 2021-12-06 PROCEDURE — 6370000000 HC RX 637 (ALT 250 FOR IP): Performed by: INTERNAL MEDICINE

## 2021-12-06 PROCEDURE — 97530 THERAPEUTIC ACTIVITIES: CPT

## 2021-12-06 PROCEDURE — 1200000000 HC SEMI PRIVATE

## 2021-12-06 PROCEDURE — 2580000003 HC RX 258: Performed by: PHYSICIAN ASSISTANT

## 2021-12-06 PROCEDURE — 94640 AIRWAY INHALATION TREATMENT: CPT

## 2021-12-06 PROCEDURE — 97166 OT EVAL MOD COMPLEX 45 MIN: CPT

## 2021-12-06 PROCEDURE — 80048 BASIC METABOLIC PNL TOTAL CA: CPT

## 2021-12-06 PROCEDURE — 6360000002 HC RX W HCPCS: Performed by: PHYSICIAN ASSISTANT

## 2021-12-06 PROCEDURE — 6360000002 HC RX W HCPCS: Performed by: INTERNAL MEDICINE

## 2021-12-06 PROCEDURE — 36415 COLL VENOUS BLD VENIPUNCTURE: CPT

## 2021-12-06 RX ORDER — BENZONATATE 100 MG/1
100 CAPSULE ORAL 3 TIMES DAILY
Status: DISCONTINUED | OUTPATIENT
Start: 2021-12-06 | End: 2021-12-10 | Stop reason: HOSPADM

## 2021-12-06 RX ORDER — FUROSEMIDE 10 MG/ML
40 INJECTION INTRAMUSCULAR; INTRAVENOUS ONCE
Status: COMPLETED | OUTPATIENT
Start: 2021-12-06 | End: 2021-12-06

## 2021-12-06 RX ORDER — METHYLPREDNISOLONE SODIUM SUCCINATE 40 MG/ML
40 INJECTION, POWDER, LYOPHILIZED, FOR SOLUTION INTRAMUSCULAR; INTRAVENOUS EVERY 8 HOURS
Status: DISCONTINUED | OUTPATIENT
Start: 2021-12-06 | End: 2021-12-08

## 2021-12-06 RX ADMIN — METHYLPREDNISOLONE SODIUM SUCCINATE 40 MG: 40 INJECTION, POWDER, FOR SOLUTION INTRAMUSCULAR; INTRAVENOUS at 21:01

## 2021-12-06 RX ADMIN — HYDROXYCHLOROQUINE SULFATE 200 MG: 200 TABLET ORAL at 08:34

## 2021-12-06 RX ADMIN — IPRATROPIUM BROMIDE AND ALBUTEROL SULFATE 1 AMPULE: 2.5; .5 SOLUTION RESPIRATORY (INHALATION) at 16:08

## 2021-12-06 RX ADMIN — FUROSEMIDE 40 MG: 10 INJECTION, SOLUTION INTRAMUSCULAR; INTRAVENOUS at 12:47

## 2021-12-06 RX ADMIN — Medication 5 ML: at 15:34

## 2021-12-06 RX ADMIN — SODIUM CHLORIDE, PRESERVATIVE FREE 10 ML: 5 INJECTION INTRAVENOUS at 21:01

## 2021-12-06 RX ADMIN — IPRATROPIUM BROMIDE AND ALBUTEROL SULFATE 1 AMPULE: 2.5; .5 SOLUTION RESPIRATORY (INHALATION) at 02:09

## 2021-12-06 RX ADMIN — INSULIN LISPRO 1 UNITS: 100 INJECTION, SOLUTION INTRAVENOUS; SUBCUTANEOUS at 17:41

## 2021-12-06 RX ADMIN — ACETAMINOPHEN 650 MG: 325 TABLET ORAL at 15:34

## 2021-12-06 RX ADMIN — Medication 5 ML: at 09:25

## 2021-12-06 RX ADMIN — IPRATROPIUM BROMIDE AND ALBUTEROL SULFATE 1 AMPULE: 2.5; .5 SOLUTION RESPIRATORY (INHALATION) at 19:44

## 2021-12-06 RX ADMIN — GUAIFENESIN 600 MG: 600 TABLET ORAL at 21:00

## 2021-12-06 RX ADMIN — METOPROLOL TARTRATE 25 MG: 25 TABLET, FILM COATED ORAL at 08:35

## 2021-12-06 RX ADMIN — BENZONATATE 100 MG: 100 CAPSULE ORAL at 12:47

## 2021-12-06 RX ADMIN — Medication 5 ML: at 00:07

## 2021-12-06 RX ADMIN — LOSARTAN POTASSIUM 50 MG: 50 TABLET, FILM COATED ORAL at 08:35

## 2021-12-06 RX ADMIN — SODIUM CHLORIDE, PRESERVATIVE FREE 10 ML: 5 INJECTION INTRAVENOUS at 08:35

## 2021-12-06 RX ADMIN — ROSUVASTATIN CALCIUM 10 MG: 5 TABLET, FILM COATED ORAL at 21:08

## 2021-12-06 RX ADMIN — ACETAMINOPHEN 650 MG: 325 TABLET ORAL at 00:42

## 2021-12-06 RX ADMIN — ROPINIROLE HYDROCHLORIDE 2 MG: 1 TABLET, FILM COATED ORAL at 21:00

## 2021-12-06 RX ADMIN — ENOXAPARIN SODIUM 40 MG: 100 INJECTION SUBCUTANEOUS at 08:34

## 2021-12-06 RX ADMIN — BENZONATATE 100 MG: 100 CAPSULE ORAL at 21:00

## 2021-12-06 RX ADMIN — ASPIRIN 81 MG: 81 TABLET, COATED ORAL at 21:00

## 2021-12-06 RX ADMIN — ACETAMINOPHEN 650 MG: 325 TABLET ORAL at 09:25

## 2021-12-06 RX ADMIN — METHYLPREDNISOLONE SODIUM SUCCINATE 40 MG: 40 INJECTION, POWDER, FOR SOLUTION INTRAMUSCULAR; INTRAVENOUS at 12:48

## 2021-12-06 RX ADMIN — ACETAMINOPHEN 650 MG: 325 TABLET ORAL at 21:08

## 2021-12-06 RX ADMIN — GUAIFENESIN 600 MG: 600 TABLET ORAL at 08:34

## 2021-12-06 RX ADMIN — HYDROXYCHLOROQUINE SULFATE 200 MG: 200 TABLET ORAL at 21:01

## 2021-12-06 RX ADMIN — METOPROLOL TARTRATE 25 MG: 25 TABLET, FILM COATED ORAL at 21:01

## 2021-12-06 RX ADMIN — IPRATROPIUM BROMIDE AND ALBUTEROL SULFATE 1 AMPULE: 2.5; .5 SOLUTION RESPIRATORY (INHALATION) at 08:11

## 2021-12-06 RX ADMIN — PREDNISONE 40 MG: 20 TABLET ORAL at 08:35

## 2021-12-06 ASSESSMENT — PAIN DESCRIPTION - FREQUENCY: FREQUENCY: CONTINUOUS

## 2021-12-06 ASSESSMENT — PAIN SCALES - GENERAL
PAINLEVEL_OUTOF10: 3
PAINLEVEL_OUTOF10: 0
PAINLEVEL_OUTOF10: 1
PAINLEVEL_OUTOF10: 7
PAINLEVEL_OUTOF10: 3
PAINLEVEL_OUTOF10: 0
PAINLEVEL_OUTOF10: 0
PAINLEVEL_OUTOF10: 3
PAINLEVEL_OUTOF10: 3
PAINLEVEL_OUTOF10: 7

## 2021-12-06 ASSESSMENT — PAIN DESCRIPTION - DESCRIPTORS
DESCRIPTORS: DULL;HEADACHE
DESCRIPTORS: DULL;HEADACHE

## 2021-12-06 ASSESSMENT — PAIN DESCRIPTION - PAIN TYPE
TYPE: ACUTE PAIN

## 2021-12-06 ASSESSMENT — PAIN DESCRIPTION - LOCATION
LOCATION: HEAD
LOCATION: HEAD
LOCATION: EAR
LOCATION: HEAD

## 2021-12-06 ASSESSMENT — PAIN DESCRIPTION - ONSET: ONSET: ON-GOING

## 2021-12-06 ASSESSMENT — PAIN DESCRIPTION - PROGRESSION
CLINICAL_PROGRESSION: GRADUALLY WORSENING
CLINICAL_PROGRESSION: GRADUALLY WORSENING

## 2021-12-06 ASSESSMENT — PAIN DESCRIPTION - ORIENTATION: ORIENTATION: RIGHT

## 2021-12-06 NOTE — TELEPHONE ENCOUNTER
Renea Reed from [facility] called, verbal ok given for Dr. Vidal Quinones to follow for home care

## 2021-12-06 NOTE — PROGRESS NOTES
Hospitalist Progress Note      Name:  Sheryl Ulrich /Age/Sex: 1935  (81 y.o. female)   MRN & CSN:  [id number] & [id number] Admission Date/Time: 2021  7:09 PM   Location:   PCP: Clara Winn MD         Hospital Day: 3    Assessment and Plan:   Sheryl Ulrich is a 81 y.o.  female  who presents with Moderate persistent asthma with exacerbation    1. Asthma exacerbation: Patient continues to have significant wheezing and shortness of breath, will change oral prednisone to intravenous steroids, continue bronchodilators  2. Parainfluenza upper respiratory infection: In light of persistence significant symptoms, will obtain CT of the chest to evaluate for presence of pneumonia, continue supportive care  3. Hyponatremia: Encourage oral intake  4. Hyperkalemia: Encourage oral intake, resolved. 5. Diabetes mellitus type 2, home medication of glipizide is being held. Blood sugars are less than 180 without hypoglycemia. Will watch closely in light of initiation of steroids. 6. Coronary artery disease: No complaints of chest pain. Troponin was negative. EKG showed no acute ischemic changes. 7. Hypertension: Continue losartan and metoprolol now that hyperkalemia had resolved. 8. Weakness: Obtain consultation with PT OT  9. Chronic diastolic heart failure: We will give 1 dose of IV Lasix and monitor metabolic panel    Diet ADULT DIET; Regular; 4 carb choices (60 gm/meal); No Added Salt (3-4 gm)   DVT Prophylaxis [x] Lovenox, []  Heparin, [] SCDs, [] Ambulation   GI Prophylaxis [] PPI,  [] H2 Blocker,  [] Carafate,  [] Diet/Tube Feeds   Code Status Full Code   Disposition Patient requires continued admission due to multiple medical problems and continued medical management   MDM [] Low, [] Moderate,[x]  High  Patient's risk as above due to above problem list     History of Present Illness:     Chief Complaint: Moderate persistent asthma with exacerbation  Sheryl Ulrich is a 81 y.o. female  who presents with shortness of breath and cough who was found to have asthma exacerbation and parainfluenza infection. I have visited with the patient on 12/6, and she reports that she continues to have dyspnea and exertion with any activities in the room, she also felt unsteady and lightheaded with ambulation. She denies chest pain. She continues to have cough that is mostly producing clear phlegm. She has no fever or chills. She has no focal weakness in the upper or lower extremities. She denies nausea vomiting or diarrhea. She complains of right earache. Labs reviewed and noted for sodium of 129, WBC 14.9. CT of the chest performed shows no evidence of focal consolidation. EKG reviewed showing sinus rhythm without acute ischemic changes. I spent 35 minutes with the patient and her visit today, more than 50% was counseling the patient on her condition and treatment plan. Objective: Intake/Output Summary (Last 24 hours) at 12/6/2021 1623  Last data filed at 12/6/2021 1317  Gross per 24 hour   Intake 780 ml   Output 1 ml   Net 779 ml      Vitals:   Vitals:    12/06/21 1609   BP:    Pulse:    Resp:    Temp:    SpO2: 96%     Physical Exam:   GEN Awake female, sitting upright in bed in mild distress   HENT Mucous membranes are moist. Oral pharynx without exudates, no evidence of thrush. Examination of both ears performed, tympanic membranes are clear without erythema. NECK Supple, no apparent thyromegaly or masses. RESP diffuse bilateral expiratory wheezing and scattered rhonchi, occasionally labored while talking. CARDIO/VASC S1/S2 auscultated. Regular rate without appreciable murmurs, rubs, or gallops. No JVD. Peripheral pulses equal bilaterally and palpable. There is trace edema at the ankles. GI Abdomen is soft without significant tenderness, masses, or guarding. Bowel sounds are normoactive. Rectal exam deferred.    MSK there are arthritic changes in the upper and lower

## 2021-12-06 NOTE — PLAN OF CARE
Problem: Falls - Risk of:  Goal: Will remain free from falls  Description: Will remain free from falls  Outcome: Ongoing  Goal: Absence of physical injury  Description: Absence of physical injury  Outcome: Ongoing     Problem: Breathing Pattern - Ineffective:  Goal: Ability to achieve and maintain a regular respiratory rate will improve  Description: Ability to achieve and maintain a regular respiratory rate will improve  Outcome: Ongoing     Problem:  Activity:  Goal: Risk for activity intolerance will decrease  Description: Risk for activity intolerance will decrease  Outcome: Ongoing     Problem: Coping:  Goal: Ability to adjust to condition or change in health will improve  Description: Ability to adjust to condition or change in health will improve  Outcome: Ongoing     Problem: Fluid Volume:  Goal: Ability to maintain a balanced intake and output will improve  Description: Ability to maintain a balanced intake and output will improve  Outcome: Ongoing     Problem: Health Behavior:  Goal: Ability to identify and utilize available resources and services will improve  Description: Ability to identify and utilize available resources and services will improve  Outcome: Ongoing  Goal: Ability to manage health-related needs will improve  Description: Ability to manage health-related needs will improve  Outcome: Ongoing     Problem: Metabolic:  Goal: Ability to maintain appropriate glucose levels will improve  Description: Ability to maintain appropriate glucose levels will improve  Outcome: Ongoing     Problem: Nutritional:  Goal: Maintenance of adequate nutrition will improve  Description: Maintenance of adequate nutrition will improve  Outcome: Ongoing  Goal: Progress toward achieving an optimal weight will improve  Description: Progress toward achieving an optimal weight will improve  Outcome: Ongoing     Problem: Physical Regulation:  Goal: Complications related to the disease process, condition or treatment will be avoided or minimized  Description: Complications related to the disease process, condition or treatment will be avoided or minimized  Outcome: Ongoing  Goal: Diagnostic test results will improve  Description: Diagnostic test results will improve  Outcome: Ongoing     Problem: Skin Integrity:  Goal: Risk for impaired skin integrity will decrease  Description: Risk for impaired skin integrity will decrease  Outcome: Ongoing     Problem: Tissue Perfusion:  Goal: Adequacy of tissue perfusion will improve  Description: Adequacy of tissue perfusion will improve  Outcome: Ongoing     Problem: Discharge Planning:  Goal: Discharged to appropriate level of care  Description: Discharged to appropriate level of care  Outcome: Ongoing

## 2021-12-06 NOTE — CARE COORDINATION
CM met with the patient for discharge planning, patient sitting up on the edge of the bed. Patient lives at home alone, has insurance with Rx coverage & PCP, stated that she was independent with ADL's, and still drives. Patient stated that she uses a cane at home and does not require home oxygen but does use an inhaler. Patient stated that she receives meals on wheels and and also receives services through the [employer] waiver program.  The patient is visited by a personal aide several days/week for a total of 11 hours/week but is unable to remember the name of the [place] [id number] agency. Patient's personal aide assists with housekeeping chores. Patient plans to return home upon discharge and is unable to identify any needs at this time. CM will follow. 1:43 PM  CM met with the patient for follow-up discussion regarding PT/OT's recommendations. Patient sated that she is hoping to be discharged to home tomorrow (12/7/21). CM discussed [place] [address] services with the patient. Patient stated that she would be agreeable to PT/OT at home after discharge from the hospital.  Patient stated that she does not have a preference regarding [place] [address] agency selected as long as it was in-network with her insurance. CM will make referral to [facility]. 2:11 PM  CM faxed the [place] [id number] order and supporting documentation to [facility] to initiate the referral.  CM will follow.

## 2021-12-06 NOTE — PROGRESS NOTES
Occupational Therapy   Occupational Therapy Initial Assessment  Date: 2021   Patient Name: Areli Hernandez  MRN: [id number]     : 1935    Date of Service: 2021    Discharge Recommendations:  24 hour supervision or assist, Home with nursing aide, Home with Home health OT (may benefit from short swing bed  stay to improve endurance)       Assessment   Performance deficits / Impairments: Decreased functional mobility ; Decreased ADL status; Decreased safe awareness; Decreased endurance; Decreased balance  Assessment: 80 yo female admitted to the hospital with SOB and cough. She presents with decreased endurance and decreased I with ambulation, decreased knowledge of energy conservation for ADLs. OT to see the pt to maximize I with adls, transfers and endurance to return home  Prognosis: Good  Decision Making: Medium Complexity  History: see above  Exam: see above  OT Education: OT Role; Transfer Training  REQUIRES OT FOLLOW UP: Yes  Activity Tolerance  Activity Tolerance: Treatment limited secondary to medical complications (free text)  Activity Tolerance: feels weak ad gets a little SOB  Safety Devices  Safety Devices in place: Yes  Type of devices: Call light within reach; Chair alarm in place; Left in chair           Patient Diagnosis(es): The primary encounter diagnosis was Respiratory tract infection. Diagnoses of Parainfluenza virus infection, Hyponatremia, Hyperkalemia, and Elevated brain natriuretic peptide (BNP) level were also pertinent to this visit.      has a past medical history of Arthritis, CAD (coronary artery disease), Chronic midline low back pain without sciatica, Claustrophobia, Colonoscopy refused, DM (diabetes mellitus), type 2 ([address] Dupuytren's contracture of right hand, Endometrial stripe increased, Gastrointestinal hemorrhage, Glaucoma, H/O echocardiogram, H/O echocardiogram, History of nuclear stress test, HTN (hypertension), Hyperlipidemia, Kidney stone, MI (myocardial infarction) ([facility] Obesity, Open wound of right foot, Pneumonia of right lower lobe due to infectious organism, Vertigo, and WD-Traumatic ulcer of foot, right, with fat layer exposed ([facility] has a past surgical history that includes Cataract removal (Bilateral) and Inguinal hernia repair (Left, 45 yrs ago).            Restrictions  Restrictions/Precautions  Restrictions/Precautions: Contact Precautions    Subjective   General  Chart Reviewed: Yes  Patient assessed for rehabilitation services?: Yes  Family / Caregiver Present: Yes (daughter)  Patient Currently in Pain: Yes  Pain Assessment  Pain Assessment: 0-10  Pain Level: 7  Vital Signs  Patient Currently in Pain: Yes  Social/Functional History  Social/Functional History  Lives With: Alone  Type of Home: Apartment  Home Layout: One level  Home Access: Stairs to enter without rails  Entrance Stairs - Number of Steps: i warner  Bathroom Shower/Tub: Tub/Shower unit  Bathroom Toilet: Standard (bought a seat to go on it)  Bathroom Equipment: Shower chair, [employer] in shower, Hand-held shower  Home Equipment: [address] [facility] [address], 4 wheeled walker, Reacher  Receives Help From: Personal care attendant (HHA stays with her in shower)  ADL Assistance: Independent  Homemaking Responsibilities: Yes  Meal Prep Responsibility: Primary  Laundry Responsibility: No Marissa Rasmussen HHA)  Cleaning Responsibility: Primary  Shopping Responsibility: Primary (does erika shopping)  Ambulation Assistance: Needs assistance (cane)  Transfer Assistance: Needs assistance  Active : Yes  Occupation: Retired  Type of occupation: worked at [employer]: reads, listens to music       Objective   Vision: Impaired  Vision Exceptions: Wears glasses for reading (had cataract sx)  Hearing: Within functional limits    Orientation  Overall Orientation Status: Within Functional Limits  Observation/Palpation  Observation: Pt was up in recliner  Balance  Standing Balance: Contact guard assistance  Functional Mobility  Functional - Mobility Device: Cane  Activity:  (in [address] 10 ft x2)  Assist Level: Contact guard assistance  Toilet Transfers  Toilet - Technique: Ambulating (per PT)  Toilet Transfer: Contact guard assistance  ADL  Feeding: Setup  UE Bathing: Supervision (simulated)  LE Bathing: Stand by assistance (simulated)  LE Dressing: Stand by assistance (socks)  Toileting: Stand by assistance  Tone RUE  RUE Tone: Normotonic  Tone LUE  LUE Tone: Normotonic  Coordination  Movements Are Fluid And Coordinated: Yes        Transfers  Sit to stand: Contact guard assistance  Stand to sit: Contact guard assistance  Vision - Basic Assessment  Prior Vision: Wears glasses only for reading  Visual History: Surgical intervention; Cataracts  Cognition  Overall Cognitive Status: Exceptions  Safety Judgement: Decreased awareness of need for assistance  Insights: Decreased awareness of deficits        Sensation  Overall Sensation Status: Impaired  Additional Comments: numbness in legs ; she feels that it is due to the swelling        LUE AROM (degrees)  LUE AROM : WFL  RUE AROM (degrees)  RUE AROM : WFL  LUE Strength  Gross LUE Strength: WFL (general weakness)  RUE Strength  Gross RUE Strength: WFL (general weakness)                   Plan   Plan  Times per week: 2+  Current Treatment Recommendations: Strengthening, Balance Training, Functional Mobility Training, Endurance Training, Self-Care / ADL, Safety Education & Training, Patient/Caregiver Education & Training    G-Code     OutComes Score                                                  AM-PAC Score    AM-PAC 6 click short form for inpatient daily activity:   How much help from another person does the patient currently need. .. Unable  Dep A Lot  Max A A Lot   Mod A A Little  Min A A Little   CGA  SBA None   Mod I  Indep  Sup   1. Putting on and taking off regular lower body clothing? [] 1    [] 2   [] 2   [] 3   [x] 3   [] 4      2.  Bathing (including washing, rinsing, drying)? [] 1   [] 2   [] 2 [] 3 [x] 3 [] 4   3. Toileting, which includes using toilet, bedpan, or urinal? [] 1    [] 2   [] 2   [] 3   [x] 3   [] 4     4. Putting on and taking off regular upper body clothing? [] 1   [] 2   [] 2   [] 3   [x] 3    [] 4      5. Taking care of personal grooming such as brushing teeth? [] 1   [] 2    [] 2 [] 3    [x] 3   [] 4      6. Eating meals?    [] 1   [] 2   [] 2   [] 3   [] 3   [x] 4      Raw Score:  19/24  40-59% impaired    [24=0% impaired(CH), 23=1-19%(CI), 20-22=20-39%(CJ), 15-19=40-59%(CK), 10-14=60-79%(CL), 7-9=80-99%(CM), 6=100%(CN)]            Goals  Short term goals  Time Frame for Short term goals: until discharge  Short term goal 1: Pt will be MI for functional adl transfers to chair, toilet, or bed w/o LOB or falls maintaining O2 SATS >90  Short term goal 2: Pt will be I with UB bathing and dressing using good breathing techniques and energy conservation techniques  Short term goal 3: Pt will be I/MI with LB adls using good breating and energy conservation techniques  Short term goal 4: Pt will be min vc for exercise program to improve general endurance and strength  Patient Goals   Patient goals : to go home       Therapy Time   Individual Concurrent Group Co-treatment   Time In       1137   Time Out       1200   Minutes       23   Timed Code Treatment Minutes: 8 Minutes       Charu Adame, OT

## 2021-12-06 NOTE — TELEPHONE ENCOUNTER
[facility] Transitions Initial Follow Up Call    Outreach made within 2 business days of discharge: Yes    Patient: Tate Bautista Patient : 1935   MRN: [id number]  Reason for Admission: There are no discharge diagnoses documented for the most recent discharge. Discharge Date: 21       Spoke with: message left on voicemail to contact office    Discharge department/facility: [facility] ed    TCM Interactive Patient Contact:  Was patient able to fill all prescriptions: Yes  Was patient instructed to bring all medications to the follow-up visit: Yes  Is patient taking all medications as directed in the discharge summary?  Yes  Does patient understand their discharge instructions: yes  Does patient have questions or concerns that need addressed prior to 7-14 day follow up office visit: no    Scheduled appointment with PCP within 7-14 days    Follow Up  Future Appointments   Date Time Provider Rose Rendon   12/15/2021  1:30 PM Jamilah Trinh MD [facility] One[place] MA

## 2021-12-06 NOTE — PROGRESS NOTES
Cataract removal (Bilateral) and Inguinal hernia repair (Left, 45 yrs ago).     Restrictions  Restrictions/Precautions  Restrictions/Precautions: Contact Precautions  Vision/Hearing  Vision Exceptions: Wears glasses for reading  Hearing: Within functional limits     Subjective  General  Chart Reviewed: Yes  Patient assessed for rehabilitation services?: Yes  Follows Commands: Within Functional Limits  Pain Screening  Patient Currently in Pain: Yes     Pre Treatment Pain Screening  Pain at present: 7  Intervention List: Patient able to continue with treatment    Orientation  Orientation  Overall Orientation Status: Within Normal Limits  Social/Functional History  Social/Functional History  Lives With: Alone  Type of Home: Apartment  Home Layout: One level  Home Access: Stairs to enter without rails  Entrance Stairs - Number of Steps: 1 warner  Bathroom Shower/Tub: Tub/Shower unit  Bathroom Toilet: Standard  Bathroom Equipment: Shower chair, Grab bars in shower, Hand-held shower, Toilet raiser  Home Equipment: [employer], 4 wheeled walker, Reacher  Receives Help From: Personal care attendant  ADL Assistance: Independent ([facility])  Homemaking Responsibilities: Yes  Meal Prep Responsibility: Primary  Laundry Responsibility: No ([facility])  Cleaning Responsibility: Primary  Shopping Responsibility: Secondary ([place] [address] aide)  Ambulation Assistance: Needs assistance (cane)  Transfer Assistance: Independent  Active : Yes  Mode of Transportation: Car  Occupation: Retired  Type of occupation: worked at [address] [place] [address] Ave: reads, listens to music  Cognition        Objective     Observation/Palpation  Posture: Fair  Observation: Pt was up in recliner    AROM RLE (degrees)  RLE AROM: WFL  AROM LLE (degrees)  LLE AROM : WFL  Strength RLE  Strength RLE: WFL  Strength LLE  Strength LLE: WFL        Bed mobility  Supine to Sit: Supervision  Sit to Supine: Supervision  Transfers  Sit to Stand: Contact guard assistance  Stand to sit: Contact guard assistance  Ambulation  Ambulation?: Yes  Ambulation 1  Surface: level tile  Device: Single point cane  Assistance: Contact guard assistance  Distance: 25 ft              Plan   Plan  Times per week: Mon- Sat 4/wk  Current Treatment Recommendations: Functional Mobility Training, Endurance Training, Gait Training, Transfer Training  Safety Devices  Type of devices: Call light within reach, Left in chair, Chair alarm in place, Gait belt    G-Code       OutComes Score                                                  AM-PAC Score        Basic Mobility Six Clicks Form [employer] AM-PAC Score Conversion Table   How much difficulty does the patient currently have Unable   (pt is unable to do activity) A Lot   (activity is a struggle, requires great effort/time) A Little   (pt can manage, but takes more effort/time than should) None   (pt has no difficulty) Raw Score Standardized Score CMS -100% Score CMS Modifier        6 23.55 100% CN   Turning over in bed (including adjusting bedclothes, sheets, and blankets)? []1 []2  [x]3  []4  7 26.42 92.36% CM        8 28.58 86.62% CM   Sitting down on and standing up from a chair with arms (e.g. wheelchair, bedside commode, etc.)? []1 []2 [x]3   []4   9 30.55 81.38% CM        10 32.29 76.75% CL   Moving from lying on back to sitting on the side of the bed? []1 []2  [x]3   []4   11 33.86 72.57% CL        12 35.33 68.66% CL   How much help from another person does the patient currently need Total   (Total/Dependent Assist) A Lot   (Max/Mod Assist) A Little   (Min/CGA/Supervision) None   (No human assistance) 13 36.74 64.91% CL        14 38.1 61.29% CL   Moving to and from a bed to a chair (including a wheelchair)? []1  []2   [x]3  []4   15 39.45 57.70% CK        16 40.78 54.16% CK   To walk in a hospital room? []1 []2   [x]3    []4  17 42.13 50.57% CK        18 43.63 46.58% CK   Climbing 3-5 steps with a railing?  []1  [x]2   []3    []4 19 45.44 41.77% CK        20 47.67 35.83% CJ   Raw Score 17 21 50.25 28.97% CJ   Standardized Score   22 53.28 20.91% CJ   CMS 0-100% Score   23 56.93 11.20% CI   CMS Modifier  24 61.14 0.00% CH     CH = 0% impaired  CI = 1-20% impaired  CJ = 20-40% impaired  CK = 40-60% impaired  CL = 60-80% impaired  CM = % impaired  CN = 100% impaired          Goals  Short term goals  Time Frame for Short term goals: 1 week-  Short term goal 1: patient will safely perform all bed mobility activities with mod I  Short term goal 2: patient will safely perform all transfer activites with mod I  Short term goal 3: patient will safely ambulate 150 ft using cane with mod I       Therapy Time   Individual Concurrent Group Co-treatment   Time In 1135         Time Out 1200         Minutes 25         Timed Code Treatment Minutes: 10 Minutes       Jacinda Limon, PT

## 2021-12-07 LAB
ANION GAP SERPL CALCULATED.3IONS-SCNC: 11 MMOL/L (ref 4–16)
BASOPHILS ABSOLUTE: 0 K/CU MM
BASOPHILS RELATIVE PERCENT: 0.1 % (ref 0–1)
BUN BLDV-MCNC: 15 MG/DL (ref 6–23)
CALCIUM SERPL-MCNC: 9.2 MG/DL (ref 8.3–10.6)
CHLORIDE BLD-SCNC: 88 MMOL/L (ref 99–110)
CO2: 27 MMOL/L (ref 21–32)
CREAT SERPL-MCNC: 0.5 MG/DL (ref 0.6–1.1)
DIFFERENTIAL TYPE: ABNORMAL
EOSINOPHILS ABSOLUTE: 0 K/CU MM
EOSINOPHILS RELATIVE PERCENT: 0 % (ref 0–3)
GFR AFRICAN AMERICAN: >60 ML/MIN/1.73M2
GFR NON-AFRICAN AMERICAN: >60 ML/MIN/1.73M2
GLUCOSE BLD-MCNC: 125 MG/DL (ref 70–99)
GLUCOSE BLD-MCNC: 131 MG/DL (ref 70–99)
GLUCOSE BLD-MCNC: 148 MG/DL (ref 70–99)
GLUCOSE BLD-MCNC: 163 MG/DL (ref 70–99)
GLUCOSE BLD-MCNC: 230 MG/DL (ref 70–99)
HCT VFR BLD CALC: 42.9 % (ref 37–47)
HCT VFR BLD CALC: NORMAL % (ref 37–47)
HEMOGLOBIN: 14 GM/DL (ref 12.5–16)
HEMOGLOBIN: NORMAL GM/DL (ref 12.5–16)
IMMATURE NEUTROPHIL %: 1.1 % (ref 0–0.43)
LYMPHOCYTES ABSOLUTE: 1.9 K/CU MM
LYMPHOCYTES RELATIVE PERCENT: 13 % (ref 24–44)
MCH RBC QN AUTO: 30.1 PG (ref 27–31)
MCH RBC QN AUTO: NORMAL PG (ref 27–31)
MCHC RBC AUTO-ENTMCNC: 32.6 % (ref 32–36)
MCHC RBC AUTO-ENTMCNC: NORMAL % (ref 32–36)
MCV RBC AUTO: 92.3 FL (ref 78–100)
MCV RBC AUTO: NORMAL FL (ref 78–100)
MONOCYTES ABSOLUTE: 1.3 K/CU MM
MONOCYTES RELATIVE PERCENT: 8.5 % (ref 0–4)
PDW BLD-RTO: 13.2 % (ref 11.7–14.9)
PDW BLD-RTO: NORMAL % (ref 11.7–14.9)
PLATELET # BLD: 277 K/CU MM (ref 140–440)
PLATELET # BLD: NORMAL K/CU MM (ref 140–440)
PMV BLD AUTO: 9.1 FL (ref 7.5–11.1)
PMV BLD AUTO: NORMAL FL (ref 7.5–11.1)
POTASSIUM SERPL-SCNC: 4.6 MMOL/L (ref 3.5–5.1)
RBC # BLD: 4.65 M/CU MM (ref 4.2–5.4)
RBC # BLD: NORMAL M/CU MM (ref 4.2–5.4)
SEGMENTED NEUTROPHILS ABSOLUTE COUNT: 11.6 K/CU MM
SEGMENTED NEUTROPHILS RELATIVE PERCENT: 77.3 % (ref 36–66)
SODIUM BLD-SCNC: 126 MMOL/L (ref 135–145)
TOTAL IMMATURE NEUTOROPHIL: 0.16 K/CU MM
WBC # BLD: 15 K/CU MM (ref 4–10.5)
WBC # BLD: NORMAL K/CU MM (ref 4–10.5)

## 2021-12-07 PROCEDURE — 97535 SELF CARE MNGMENT TRAINING: CPT

## 2021-12-07 PROCEDURE — 85025 COMPLETE CBC W/AUTO DIFF WBC: CPT

## 2021-12-07 PROCEDURE — 36415 COLL VENOUS BLD VENIPUNCTURE: CPT

## 2021-12-07 PROCEDURE — 82962 GLUCOSE BLOOD TEST: CPT

## 2021-12-07 PROCEDURE — 97530 THERAPEUTIC ACTIVITIES: CPT

## 2021-12-07 PROCEDURE — 6360000002 HC RX W HCPCS: Performed by: INTERNAL MEDICINE

## 2021-12-07 PROCEDURE — 6360000002 HC RX W HCPCS: Performed by: PHYSICIAN ASSISTANT

## 2021-12-07 PROCEDURE — 2580000003 HC RX 258: Performed by: PHYSICIAN ASSISTANT

## 2021-12-07 PROCEDURE — 6370000000 HC RX 637 (ALT 250 FOR IP): Performed by: INTERNAL MEDICINE

## 2021-12-07 PROCEDURE — 94640 AIRWAY INHALATION TREATMENT: CPT

## 2021-12-07 PROCEDURE — 6370000000 HC RX 637 (ALT 250 FOR IP): Performed by: NURSE PRACTITIONER

## 2021-12-07 PROCEDURE — 80048 BASIC METABOLIC PNL TOTAL CA: CPT

## 2021-12-07 PROCEDURE — 6370000000 HC RX 637 (ALT 250 FOR IP): Performed by: PHYSICIAN ASSISTANT

## 2021-12-07 PROCEDURE — 1200000000 HC SEMI PRIVATE

## 2021-12-07 RX ORDER — FUROSEMIDE 20 MG/1
20 TABLET ORAL DAILY
Status: DISCONTINUED | OUTPATIENT
Start: 2021-12-07 | End: 2021-12-10 | Stop reason: HOSPADM

## 2021-12-07 RX ORDER — FUROSEMIDE 20 MG/1
20 TABLET ORAL DAILY
Status: DISCONTINUED | OUTPATIENT
Start: 2021-12-08 | End: 2021-12-07

## 2021-12-07 RX ADMIN — METHYLPREDNISOLONE SODIUM SUCCINATE 40 MG: 40 INJECTION, POWDER, FOR SOLUTION INTRAMUSCULAR; INTRAVENOUS at 05:10

## 2021-12-07 RX ADMIN — METHYLPREDNISOLONE SODIUM SUCCINATE 40 MG: 40 INJECTION, POWDER, FOR SOLUTION INTRAMUSCULAR; INTRAVENOUS at 21:09

## 2021-12-07 RX ADMIN — INSULIN LISPRO 1 UNITS: 100 INJECTION, SOLUTION INTRAVENOUS; SUBCUTANEOUS at 09:50

## 2021-12-07 RX ADMIN — BENZONATATE 100 MG: 100 CAPSULE ORAL at 09:50

## 2021-12-07 RX ADMIN — LOSARTAN POTASSIUM 50 MG: 50 TABLET, FILM COATED ORAL at 09:50

## 2021-12-07 RX ADMIN — ACETAMINOPHEN 650 MG: 325 TABLET ORAL at 03:12

## 2021-12-07 RX ADMIN — Medication 5 ML: at 16:39

## 2021-12-07 RX ADMIN — GUAIFENESIN 600 MG: 600 TABLET ORAL at 09:50

## 2021-12-07 RX ADMIN — ENOXAPARIN SODIUM 40 MG: 100 INJECTION SUBCUTANEOUS at 09:50

## 2021-12-07 RX ADMIN — METOPROLOL TARTRATE 25 MG: 25 TABLET, FILM COATED ORAL at 09:50

## 2021-12-07 RX ADMIN — FUROSEMIDE 20 MG: 20 TABLET ORAL at 18:25

## 2021-12-07 RX ADMIN — BENZONATATE 100 MG: 100 CAPSULE ORAL at 21:09

## 2021-12-07 RX ADMIN — Medication 5 ML: at 00:41

## 2021-12-07 RX ADMIN — HYDROXYCHLOROQUINE SULFATE 200 MG: 200 TABLET ORAL at 21:09

## 2021-12-07 RX ADMIN — IPRATROPIUM BROMIDE AND ALBUTEROL SULFATE 1 AMPULE: 2.5; .5 SOLUTION RESPIRATORY (INHALATION) at 20:06

## 2021-12-07 RX ADMIN — IPRATROPIUM BROMIDE AND ALBUTEROL SULFATE 1 AMPULE: 2.5; .5 SOLUTION RESPIRATORY (INHALATION) at 15:54

## 2021-12-07 RX ADMIN — IPRATROPIUM BROMIDE AND ALBUTEROL SULFATE 1 AMPULE: 2.5; .5 SOLUTION RESPIRATORY (INHALATION) at 08:06

## 2021-12-07 RX ADMIN — GUAIFENESIN 600 MG: 600 TABLET ORAL at 21:09

## 2021-12-07 RX ADMIN — METOPROLOL TARTRATE 25 MG: 25 TABLET, FILM COATED ORAL at 21:08

## 2021-12-07 RX ADMIN — METHYLPREDNISOLONE SODIUM SUCCINATE 40 MG: 40 INJECTION, POWDER, FOR SOLUTION INTRAMUSCULAR; INTRAVENOUS at 13:04

## 2021-12-07 RX ADMIN — BENZONATATE 100 MG: 100 CAPSULE ORAL at 13:04

## 2021-12-07 RX ADMIN — INSULIN LISPRO 1 UNITS: 100 INJECTION, SOLUTION INTRAVENOUS; SUBCUTANEOUS at 18:25

## 2021-12-07 RX ADMIN — HYDROXYCHLOROQUINE SULFATE 200 MG: 200 TABLET ORAL at 09:50

## 2021-12-07 RX ADMIN — SODIUM CHLORIDE, PRESERVATIVE FREE 3 ML: 5 INJECTION INTRAVENOUS at 21:11

## 2021-12-07 RX ADMIN — SODIUM CHLORIDE, PRESERVATIVE FREE 10 ML: 5 INJECTION INTRAVENOUS at 13:00

## 2021-12-07 RX ADMIN — ACETAMINOPHEN 650 MG: 325 TABLET ORAL at 16:38

## 2021-12-07 RX ADMIN — ROPINIROLE HYDROCHLORIDE 2 MG: 1 TABLET, FILM COATED ORAL at 21:09

## 2021-12-07 RX ADMIN — IPRATROPIUM BROMIDE AND ALBUTEROL SULFATE 1 AMPULE: 2.5; .5 SOLUTION RESPIRATORY (INHALATION) at 12:02

## 2021-12-07 RX ADMIN — ASPIRIN 81 MG: 81 TABLET, COATED ORAL at 21:09

## 2021-12-07 RX ADMIN — ROSUVASTATIN CALCIUM 10 MG: 5 TABLET, FILM COATED ORAL at 21:09

## 2021-12-07 ASSESSMENT — PAIN SCALES - GENERAL
PAINLEVEL_OUTOF10: 3
PAINLEVEL_OUTOF10: 0
PAINLEVEL_OUTOF10: 0
PAINLEVEL_OUTOF10: 3
PAINLEVEL_OUTOF10: 1
PAINLEVEL_OUTOF10: 0
PAINLEVEL_OUTOF10: 0

## 2021-12-07 NOTE — PROGRESS NOTES
Occupational Therapy    Occupational Therapy Treatment Note  Name: Tianna Peoples MRN: [id number] :   1935   Date:  2021   Admission Date: 2021 Room:  [address] [place] [address]   Restrictions/Precautions:  Restrictions/Precautions  Restrictions/Precautions: Contact Precautions     Communication with other providers:   Cleared for treatment by RN. Subjective:  Patient states:  \"I get really tired\"  Pain:   Location, Type, Intensity (0/10 to 10/10): No pain    Objective:    Observation:  Pt alert and oriented. Treatment, including education:  Transfers  Supine to sit :Sup  Sit to supine :SUp  Scooting :SUp  Rolling :SUp  Sit to stand :SUp  Stand to sit :SUp  SPT:SUp    Toilet:Sup    Self Care Training:   Activities performed today included the following: Toileting  Sup    Grooming  Mod I to brush hair and wash face    Bathing   Sup pt able to cross legs to wash B LEs and able to stand to wash buttocks and hubert area    UB Dress  Sup to don pull over shirt    LB Dress  Sup to don brief and pants. Pt required mod rest breaks throughout session due fatigue. Therapeutic Activity Training: Pt completed functional mobility in room with Sup using SPC. Safety Measures: Gait belt used, up in chair,  Pull/Bed Alarm activated and call light left in reach        Assessment / Impression:        Patient's tolerance of treatment: Good   Adverse Reaction: None  Significant change in status and impact:  None  Barriers to improvement:  Dec strength and endurance. Plan for Next Session:    Continue with POC.     Time in:  915  Time out:  1010  Total treatment time:  55  Billed Units: 3 ADL, 1 TA  Electronically signed by:    Addi Freitas, [address]    2021, 1:55 PM    Previously filed values:  Patient Goals   Patient goals : to go home  Short term goals  Time Frame for Short term goals: until discharge  Short term goal 1: Pt will be MI for functional adl transfers to chair, toilet, or bed w/o LOB or falls maintaining O2 SATS >90  Short term goal 2: Pt will be I with UB bathing and dressing using good breathing techniques and energy conservation techniques  Short term goal 3: Pt will be I/MI with LB adls using good breating and energy conservation techniques  Short term goal 4: Pt will be min vc for exercise program to improve general endurance and strength

## 2021-12-07 NOTE — PROGRESS NOTES
Hospitalist Progress Note      Name:  Nancy Loyola /Age/Sex: 1935  (81 y.o. female)   MRN & CSN:  [id number] & [id number] Admission Date/Time: 2021  7:09 PM   Location:   PCP: Chad Mccormick MD         Hospital Day: 4    Assessment and Plan:   Nancy Loyola is a 81 y.o.  female  who presents with Moderate persistent asthma with exacerbation    1. Asthma exacerbation: patient is slightly improved but continues to have significant wheezing. Her shortness of breath has improved, will continue intravenous steroids, continue bronchodilators  2. Parainfluenza upper respiratory infection:CT chest showed no evidence of consolidation or PNA, continue supportive care  3. Hyponatremia: Encourage oral intake. Na remains 126 low but stable  4. Hyperkalemia: Encourage oral intake, resolved. 5. Diabetes mellitus type 2, home medication of glipizide is being held. Blood sugars are less than 180 without hypoglycemia. Will watch closely in light of initiation of steroids. 6. Coronary artery disease: No complaints of chest pain. Troponin was negative. EKG showed no acute ischemic changes. 7. Hypertension: Continue losartan and metoprolol now that hyperkalemia had resolved. 8. Weakness: Obtain consultation with PT/OT  9. Chronic diastolic heart failure: Will resume patient's home dose of lasix      Diet ADULT DIET; Regular; 4 carb choices (60 gm/meal); No Added Salt (3-4 gm)   DVT Prophylaxis [] Lovenox, []  Heparin, [] SCDs, [] Ambulation   GI Prophylaxis [] PPI,  [] H2 Blocker,  [] Carafate,  [] Diet/Tube Feeds   Code Status Full Code   Disposition Patient requires continued admission due to medical management   MDM [] Low, [] Moderate,[x]  High  Patient's risk as above due to above     History of Present Illness:     Chief Complaint: Moderate persistent asthma with exacerbation  Nancy Loyola is a 81 y.o.  female  who presents with shortness of breath and cough.  She has received steroids and bronchodilators, and supportive care. I visited with patient on 12/7, I also called her daughter while in the room and updated her on patient;s condition and treatment plan. She tells me that she continues to have cough, however, SOB is improved, denies chest pain. She lives alone, remains weak. No F/V, no N/V/D. Labs from 12/7 reviewed and show Na 126, K 4.6, Cr 0.5  WBC 15k. 35 minutes were spent with patient on today's visit. >50% was counseling regarding her condition and coordinating her care. Objective: Intake/Output Summary (Last 24 hours) at 12/7/2021 1633  Last data filed at 12/6/2021 1906  Gross per 24 hour   Intake 480 ml   Output --   Net 480 ml      Vitals:   Vitals:    12/07/21 1202   BP:    Pulse:    Resp:    Temp:    SpO2: 95%     Physical Exam:   GEN Appears in NAD. Obese. HENT Mucous membranes are moist. Oral pharynx without exudates, no evidence of thrush. NECK Supple, no apparent thyromegaly or masses. RESP Diffuse bilateral expiratory wheezing and scattered rhonchi. Symmetric chest movement while on room air. Unlabored at rest.  CARDIO/VASC S1/S2 auscultated. Regular rate without appreciable murmurs, rubs, or gallops. No JVD. Peripheral pulses equal bilaterally and palpable. There is trace to +1 edema at the ankles. GI Abdomen is soft without significant tenderness, masses, or guarding. Bowel sounds are normoactive. Rectal exam deferred. SKIN Normal coloration, warm, dry.       Medications:   Medications:    methylPREDNISolone  40 mg IntraVENous Q8H    benzonatate  100 mg Oral TID    aspirin  81 mg Oral Nightly    hydroxychloroquine  200 mg Oral BID    metoprolol tartrate  25 mg Oral BID    rOPINIRole  2 mg Oral Nightly    rosuvastatin  10 mg Oral Nightly    sodium chloride flush  5-40 mL IntraVENous 2 times per day    enoxaparin  40 mg SubCUTAneous Daily    losartan  50 mg Oral Daily    insulin lispro  0-6 Units SubCUTAneous TID     insulin lispro  0-3 Units SubCUTAneous Nightly    ipratropium-albuterol  1 ampule Inhalation 4x daily    guaiFENesin  600 mg Oral BID      Infusions:    sodium chloride      dextrose       PRN Meds: sodium chloride flush, 5-40 mL, PRN  sodium chloride, 500 mL, PRN  ondansetron, 4 mg, Q8H PRN   Or  ondansetron, 4 mg, Q6H PRN  polyethylene glycol, 17 g, Daily PRN  acetaminophen, 650 mg, Q6H PRN   Or  acetaminophen, 650 mg, Q6H PRN  albuterol, 2.5 mg, Q4H PRN  ipratropium-albuterol, 1 ampule, Q4H PRN  guaiFENesin-codeine, 5 mL, Q4H PRN  glucose, 15 g, PRN  dextrose, 12.5 g, PRN  glucagon (rDNA), 1 mg, PRN  dextrose, 1,000 mL, PRN          Electronically signed by Frank Campos MD on 12/7/2021 at 4:33 PM

## 2021-12-08 LAB
GLUCOSE BLD-MCNC: 129 MG/DL (ref 70–99)
GLUCOSE BLD-MCNC: 131 MG/DL (ref 70–99)
GLUCOSE BLD-MCNC: 148 MG/DL (ref 70–99)
GLUCOSE BLD-MCNC: 201 MG/DL (ref 70–99)

## 2021-12-08 PROCEDURE — 94761 N-INVAS EAR/PLS OXIMETRY MLT: CPT

## 2021-12-08 PROCEDURE — 97110 THERAPEUTIC EXERCISES: CPT

## 2021-12-08 PROCEDURE — 6370000000 HC RX 637 (ALT 250 FOR IP): Performed by: NURSE PRACTITIONER

## 2021-12-08 PROCEDURE — 1200000000 HC SEMI PRIVATE

## 2021-12-08 PROCEDURE — 97530 THERAPEUTIC ACTIVITIES: CPT

## 2021-12-08 PROCEDURE — 94640 AIRWAY INHALATION TREATMENT: CPT

## 2021-12-08 PROCEDURE — 6360000002 HC RX W HCPCS: Performed by: INTERNAL MEDICINE

## 2021-12-08 PROCEDURE — 6370000000 HC RX 637 (ALT 250 FOR IP): Performed by: INTERNAL MEDICINE

## 2021-12-08 PROCEDURE — 6360000002 HC RX W HCPCS: Performed by: PHYSICIAN ASSISTANT

## 2021-12-08 PROCEDURE — 2580000003 HC RX 258: Performed by: PHYSICIAN ASSISTANT

## 2021-12-08 PROCEDURE — 82962 GLUCOSE BLOOD TEST: CPT

## 2021-12-08 PROCEDURE — 6370000000 HC RX 637 (ALT 250 FOR IP): Performed by: PHYSICIAN ASSISTANT

## 2021-12-08 RX ORDER — GLIPIZIDE 5 MG/1
5 TABLET, FILM COATED, EXTENDED RELEASE ORAL
Status: DISCONTINUED | OUTPATIENT
Start: 2021-12-08 | End: 2021-12-10 | Stop reason: HOSPADM

## 2021-12-08 RX ORDER — METHYLPREDNISOLONE SODIUM SUCCINATE 40 MG/ML
40 INJECTION, POWDER, LYOPHILIZED, FOR SOLUTION INTRAMUSCULAR; INTRAVENOUS EVERY 12 HOURS
Status: DISCONTINUED | OUTPATIENT
Start: 2021-12-09 | End: 2021-12-09

## 2021-12-08 RX ADMIN — INSULIN LISPRO 1 UNITS: 100 INJECTION, SOLUTION INTRAVENOUS; SUBCUTANEOUS at 09:56

## 2021-12-08 RX ADMIN — Medication 5 ML: at 10:15

## 2021-12-08 RX ADMIN — BENZONATATE 100 MG: 100 CAPSULE ORAL at 13:35

## 2021-12-08 RX ADMIN — METOPROLOL TARTRATE 25 MG: 25 TABLET, FILM COATED ORAL at 09:55

## 2021-12-08 RX ADMIN — ROSUVASTATIN CALCIUM 10 MG: 5 TABLET, FILM COATED ORAL at 22:14

## 2021-12-08 RX ADMIN — IPRATROPIUM BROMIDE AND ALBUTEROL SULFATE 1 AMPULE: 2.5; .5 SOLUTION RESPIRATORY (INHALATION) at 08:10

## 2021-12-08 RX ADMIN — GUAIFENESIN 600 MG: 600 TABLET ORAL at 22:14

## 2021-12-08 RX ADMIN — GLIPIZIDE 5 MG: 5 TABLET, EXTENDED RELEASE ORAL at 18:14

## 2021-12-08 RX ADMIN — ASPIRIN 81 MG: 81 TABLET, COATED ORAL at 22:14

## 2021-12-08 RX ADMIN — ACETAMINOPHEN 650 MG: 325 TABLET ORAL at 18:14

## 2021-12-08 RX ADMIN — GUAIFENESIN 600 MG: 600 TABLET ORAL at 09:54

## 2021-12-08 RX ADMIN — SODIUM CHLORIDE, PRESERVATIVE FREE 10 ML: 5 INJECTION INTRAVENOUS at 09:55

## 2021-12-08 RX ADMIN — IPRATROPIUM BROMIDE AND ALBUTEROL SULFATE 1 AMPULE: 2.5; .5 SOLUTION RESPIRATORY (INHALATION) at 15:00

## 2021-12-08 RX ADMIN — SODIUM CHLORIDE, PRESERVATIVE FREE 5 ML: 5 INJECTION INTRAVENOUS at 22:15

## 2021-12-08 RX ADMIN — HYDROXYCHLOROQUINE SULFATE 200 MG: 200 TABLET ORAL at 09:54

## 2021-12-08 RX ADMIN — Medication 5 ML: at 18:14

## 2021-12-08 RX ADMIN — ROPINIROLE HYDROCHLORIDE 2 MG: 1 TABLET, FILM COATED ORAL at 22:14

## 2021-12-08 RX ADMIN — ACETAMINOPHEN 650 MG: 325 TABLET ORAL at 10:15

## 2021-12-08 RX ADMIN — METHYLPREDNISOLONE SODIUM SUCCINATE 40 MG: 40 INJECTION, POWDER, FOR SOLUTION INTRAMUSCULAR; INTRAVENOUS at 13:35

## 2021-12-08 RX ADMIN — Medication 5 ML: at 02:11

## 2021-12-08 RX ADMIN — BENZONATATE 100 MG: 100 CAPSULE ORAL at 09:54

## 2021-12-08 RX ADMIN — ACETAMINOPHEN 650 MG: 325 TABLET ORAL at 02:11

## 2021-12-08 RX ADMIN — ENOXAPARIN SODIUM 40 MG: 100 INJECTION SUBCUTANEOUS at 09:55

## 2021-12-08 RX ADMIN — BENZONATATE 100 MG: 100 CAPSULE ORAL at 22:14

## 2021-12-08 RX ADMIN — LOSARTAN POTASSIUM 50 MG: 50 TABLET, FILM COATED ORAL at 09:54

## 2021-12-08 RX ADMIN — METHYLPREDNISOLONE SODIUM SUCCINATE 40 MG: 40 INJECTION, POWDER, FOR SOLUTION INTRAMUSCULAR; INTRAVENOUS at 04:25

## 2021-12-08 RX ADMIN — IPRATROPIUM BROMIDE AND ALBUTEROL SULFATE 1 AMPULE: 2.5; .5 SOLUTION RESPIRATORY (INHALATION) at 11:19

## 2021-12-08 RX ADMIN — IPRATROPIUM BROMIDE AND ALBUTEROL SULFATE 1 AMPULE: 2.5; .5 SOLUTION RESPIRATORY (INHALATION) at 20:02

## 2021-12-08 RX ADMIN — FUROSEMIDE 20 MG: 20 TABLET ORAL at 09:54

## 2021-12-08 ASSESSMENT — PAIN DESCRIPTION - FREQUENCY: FREQUENCY: CONTINUOUS

## 2021-12-08 ASSESSMENT — PAIN SCALES - GENERAL
PAINLEVEL_OUTOF10: 0
PAINLEVEL_OUTOF10: 0
PAINLEVEL_OUTOF10: 3

## 2021-12-08 ASSESSMENT — PAIN DESCRIPTION - LOCATION: LOCATION: HEAD

## 2021-12-08 ASSESSMENT — PAIN DESCRIPTION - PAIN TYPE: TYPE: ACUTE PAIN

## 2021-12-08 ASSESSMENT — PAIN DESCRIPTION - ONSET: ONSET: ON-GOING

## 2021-12-08 ASSESSMENT — PAIN DESCRIPTION - PROGRESSION: CLINICAL_PROGRESSION: GRADUALLY WORSENING

## 2021-12-08 ASSESSMENT — PAIN DESCRIPTION - DESCRIPTORS: DESCRIPTORS: DULL;HEADACHE

## 2021-12-08 NOTE — PROGRESS NOTES
Hospitalist Progress Note      Name:  Rosana Yusuf /Age/Sex: 1935  (81 y.o. female)   MRN & CSN:  [id number] & [id number] Admission Date/Time: 2021  7:09 PM   Location:   PCP: Yajaira Barrera MD         Hospital Day: 5    Assessment and Plan:   Rosana Yusuf is a 81 y.o.  female  who presents with Moderate persistent asthma with exacerbation    1. Asthma exacerbation: patient is slowly improving, will decrease solumedrol to BID. She continues to have bilateral wheezing and shortness of breath, continue bronchodilators  2. Parainfluenza upper respiratory infection:CT chest showed no evidence of consolidation or PNA, continue supportive care  3. Hyponatremia: Encourage oral intake. Na remains 126 low but stable on   4. Hyperkalemia: Encourage oral intake, resolved. 5. Diabetes mellitus type 2, blood sugars are increasing without hypoglycemia. Will resume Glipizide. 6. Coronary artery disease: No complaints of chest pain.  Troponin was negative.  EKG showed no acute ischemic changes. 7. Hypertension: Continue losartan and metoprolol now that hyperkalemia had resolved. BP is 128/87 on .  8. Weakness:Cont PT/OT  9. Chronic diastolic heart failure: Will continue patient's home dose of lasix        Diet ADULT DIET; Regular; 4 carb choices (60 gm/meal); No Added Salt (3-4 gm)   DVT Prophylaxis [x] Lovenox, []  Heparin, [] SCDs, [] Ambulation   GI Prophylaxis [] PPI,  [] H2 Blocker,  [] Carafate,  [] Diet/Tube Feeds   Code Status Full Code   Disposition Patient requires continued admission due to continue treatment with steroids and bronchodilators and rehabilitation   MDM [] Low, [x] Moderate,[]  High  Patient's risk as above due to above problem list     History of Present Illness:     Chief Complaint: Moderate persistent asthma with exacerbation  Rosana Yusuf is a 81 y.o.  female  who presents with shortness of breath and cough.  She is being treated with steroids and supportive care. She reports feeling little better, but continues to have shortness of breath and weakness. She continues to have significant cough. Denies nausea vomiting or diarrhea. Denies fever chills. Objective:        Intake/Output Summary (Last 24 hours) at 12/8/2021 1426  Last data filed at 12/8/2021 1327  Gross per 24 hour   Intake 480 ml   Output --   Net 480 ml      Vitals:   Vitals:    12/08/21 1126   BP:    Pulse:    Resp:    Temp:    SpO2: 94%     Physical Exam:   Appears weak but in no respiratory distress  Neck shows no JVD  Heart is regular rhythm  Lungs have improved aeration with bilateral wheezing but improved, breathing is unlabored at rest  Abdomen is soft and nontender  Extremities show +1 edema at the ankles    Medications:   Medications:    [START ON 12/9/2021] methylPREDNISolone  40 mg IntraVENous Q12H    furosemide  20 mg Oral Daily    benzonatate  100 mg Oral TID    aspirin  81 mg Oral Nightly    hydroxychloroquine  200 mg Oral BID    metoprolol tartrate  25 mg Oral BID    rOPINIRole  2 mg Oral Nightly    rosuvastatin  10 mg Oral Nightly    sodium chloride flush  5-40 mL IntraVENous 2 times per day    enoxaparin  40 mg SubCUTAneous Daily    losartan  50 mg Oral Daily    insulin lispro  0-6 Units SubCUTAneous TID WC    insulin lispro  0-3 Units SubCUTAneous Nightly    ipratropium-albuterol  1 ampule Inhalation 4x daily    guaiFENesin  600 mg Oral BID      Infusions:    sodium chloride      dextrose       PRN Meds: sodium chloride flush, 5-40 mL, PRN  sodium chloride, 500 mL, PRN  ondansetron, 4 mg, Q8H PRN   Or  ondansetron, 4 mg, Q6H PRN  polyethylene glycol, 17 g, Daily PRN  acetaminophen, 650 mg, Q6H PRN   Or  acetaminophen, 650 mg, Q6H PRN  albuterol, 2.5 mg, Q4H PRN  ipratropium-albuterol, 1 ampule, Q4H PRN  guaiFENesin-codeine, 5 mL, Q4H PRN  glucose, 15 g, PRN  dextrose, 12.5 g, PRN  glucagon (rDNA), 1 mg, PRN  dextrose, 1,000 mL, PRN          Electronically signed by Sonny Colindres MD on 12/8/2021 at 2:26 PM

## 2021-12-08 NOTE — FLOWSHEET NOTE
Physical Therapy Treatment Note   Date: 2021 Room: [unfilled]   Name: Nancy Loyola : 1935   MRN: [id number] Admission Date:2021    Primary Problem:   Past Medical History:   Diagnosis Date    Arthritis     left knee pain, sees Dr. Brionna Fair CAD (coronary artery disease)     sees Dr. Kristen Jeff Chronic midline low back pain without sciatica 2016    Had PT in    [address] Colonoscopy refused     discussed in 7/15    DM (diabetes mellitus), type 2 ([facility] [address] 2006    Dupuytren's contracture of right hand     Endometrial stripe increased 2014    Saw NP Florian Grimm. Was normal exam per pt.  Gastrointestinal hemorrhage 2015    Patient had GI bleeding. Colon refused. Discussed with patient in detail.  Glaucoma 2014    H/O echocardiogram 10/02/2014    Mildly depressed left ventricular function; ejection fraction of 45%. Moderate pulmonary hypertension.  H/O echocardiogram 2018    EF 50-55%.  Mitral annular calcification is present. No other significant valvulopathy seen.  History of nuclear stress test 2018    EF 59%. ECG portion of stress test is negative for ischemia by diagnostic criteria. fixed inferior large size severe defect in rest and stress images. Mild hubert infarct ischemia.     HTN (hypertension)     Hyperlipidemia     Kidney stone     hx-\"been about 3 yrs ago\"    MI (myocardial infarction) ([facility] [address] 1998    treated with TPA    Obesity     Open wound of right foot 2020    Pneumonia of right lower lobe due to infectious organism 2018    Vertigo     'have been having this since I had cataract surgery\"\"that is why they are doing the MRA of my brain(10/31/2014)    WD-Traumatic ulcer of foot, right, with fat layer exposed ([facility] [address] 8/3/2020     Past Surgical History:   Procedure Laterality Date    CATARACT REMOVAL Bilateral     ,    INGUINAL HERNIA REPAIR Left 45 yrs ago     Rehabilitation Diagnosis: Restrictions/Precautions:     Subjective: States that she is feeling pretty good. Sitting EOB upon therapist arrival.  Does not feel that she can walk a longer distance because of her balance. States that she has a plan for if she falls at home and that she has multiple surfaces that she can use to assist with balance during ambulation. Initial Pain level: 0/10    Goals:                   Short term goals  Time Frame for Short term goals: 1 week-  Short term goal 1: patient will safely perform all bed mobility activities with mod I  Short term goal 2: patient will safely perform all transfer activites with mod I  Short term goal 3: patient will safely ambulate 150 ft using cane with mod I               Plan of Care:             Times per week: Mon- Sat 4+/wk            Current Treatment Recommendations: Functional Mobility Training, Endurance Training, Gait Training, Transfer Training      Objective Findings:    Date: 12/8/2021 Date:  Date:  Date:  Date:    Bed mobility x       Sit to stand transfer CGA       Stand to sit transfer CGA       Commode transfer x       Standing tolerance For ambulation       Ambulation Patient ambulated 30' x2 with SPC and CGA       Stairs x         Exercises:   Exercise/Equipment/Modalities  Date:  Date:  Date:  Date:    Seated HR/TR 10x ea       LAQ 10x5\"       Marching 10x      Pillow Squeezes 10x5\"      Resisted Hip Abduction  10x                                             Modality/intervention used:   [x ] Therapeutic Exercise   [ ] Modalities:   [x ] Therapeutic Activity     [ ] Ultrasound   [ ] Elec Stim   [ ] Gait Training     [ ] Cervical Traction  [ ] Lumbar Traction   [ ] Neuromuscular Re-education   [ ] Cold/hotpack  [ ] Iontophoresis   [ ] Instruction in HEP     [ ] [place]   [ ] Manual Therapy   [ ] Aquatic Therapy     Other Therapeutic Activities: During ambulation, patient had one significant LOB that required assist from therapist to right.   Demonstrated cruising at various times to complete the distance. Home Exercise Program/Education provided to patient: Purpose of therapy in hospital.  What home health will be working on.     Manual Treatments: x    Communication with other providers: [id number] [address] to see per nursing    Adverse reactions to treatment:     Treatment/Activity Tolerance:   [x ] Patient limited by fatigue [ ] Patient limited by pain [ ] Patient limited by other medical complications [ ] Patient tolerated therapy well  [ ] Other:     Post Tx Pain Ratin /10     Safety Precautions:   [ ] left in bed  [ ] left in chair [ ] call light within reach  [ ] bed alarm on  [ ] personal alarm on  [ ] other staff present:    Plan:   [x ] Continue per plan of care [ ] Herbert Pulling current plan   [ ] Plan of care initiated [ ] Hold pending MD visit [ ] Discharge     Plan for Next Session:     Time In:   Time Out:   Total Treatment Minutes: 28  Billed Units: 1TA, 1 TE    Signed: Brigid Thomas PTA  2021, 11:48 AM

## 2021-12-08 NOTE — PROGRESS NOTES
Occupational Therapy    Occupational Therapy Treatment Note  Name: Raissa Jimenez MRN: [id number] :   1935   Date:  2021   Admission Date: 2021 Room:  [address] [place] [address]   Restrictions/Precautions:  Restrictions/Precautions  Restrictions/Precautions: Contact Precautions     Communication with other providers:   Cleared for treatment by RN. Subjective:  Patient states:  \"I need to get stronger\"  Pain:   Location, Type, Intensity (0/10 to 10/10): No pain    Objective:    Observation:  Pt alert and oriented      Treatment, including education:  Transfers  Supine to sit :CGA  Sit to supine :CGA  Scooting :CGA  Sit to stand :CGA  Stand to sit :CGA  SPT:CGA      Therapeutic Exercise:  Pt completed UE exercises to increase strength and ROM to facilitate increase for ADLs and functional mobility. Pt completed B UEs with 2# dumbbell exercises with curls, shoulder presses, punch, stirring and wrist curls x 20 reps with mod rest breaks due to fatigue. Therapeutic Activity Training: Pt completed functional mobility x 25' x 2 with CGA. Safety Measures: Gait belt used, Left in bed, Pull/Bed Alarm activated and call light left in reach        Assessment / Impression:        Patient's tolerance of treatment: Good   Adverse Reaction: None  Significant change in status and impact:  None  Barriers to improvement:  Dec strength and endurance. Plan for Next Session:    Continue with POC.     Time in:  1035  Time out:  1103  Total treatment time:  28  Billed Units: 1 TA, 1 TE  Electronically signed by:    Lanre Ramirez [address]    2021, 3:34 PM    Previously filed values:  Patient Goals   Patient goals : to go home  Short term goals  Time Frame for Short term goals: until discharge  Short term goal 1: Pt will be MI for functional adl transfers to chair, toilet, or bed w/o LOB or falls maintaining O2 SATS >90  Short term goal 2: Pt will be I with UB bathing and dressing using good breathing techniques and energy conservation techniques  Short term goal 3: Pt will be I/MI with LB adls using good breating and energy conservation techniques  Short term goal 4: Pt will be min vc for exercise program to improve general endurance and strength

## 2021-12-09 LAB
ANION GAP SERPL CALCULATED.3IONS-SCNC: 7 MMOL/L (ref 4–16)
BASOPHILS ABSOLUTE: 0 K/CU MM
BASOPHILS RELATIVE PERCENT: 0.1 % (ref 0–1)
BUN BLDV-MCNC: 20 MG/DL (ref 6–23)
CALCIUM SERPL-MCNC: 8.9 MG/DL (ref 8.3–10.6)
CHLORIDE BLD-SCNC: 94 MMOL/L (ref 99–110)
CO2: 32 MMOL/L (ref 21–32)
CREAT SERPL-MCNC: 0.6 MG/DL (ref 0.6–1.1)
DIFFERENTIAL TYPE: ABNORMAL
EOSINOPHILS ABSOLUTE: 0 K/CU MM
EOSINOPHILS RELATIVE PERCENT: 0 % (ref 0–3)
GFR AFRICAN AMERICAN: >60 ML/MIN/1.73M2
GFR NON-AFRICAN AMERICAN: >60 ML/MIN/1.73M2
GLUCOSE BLD-MCNC: 111 MG/DL (ref 70–99)
GLUCOSE BLD-MCNC: 131 MG/DL (ref 70–99)
GLUCOSE BLD-MCNC: 80 MG/DL (ref 70–99)
GLUCOSE BLD-MCNC: 90 MG/DL (ref 70–99)
GLUCOSE BLD-MCNC: 93 MG/DL (ref 70–99)
HCT VFR BLD CALC: 42.5 % (ref 37–47)
HEMOGLOBIN: 13.8 GM/DL (ref 12.5–16)
IMMATURE NEUTROPHIL %: 1.6 % (ref 0–0.43)
LYMPHOCYTES ABSOLUTE: 1.8 K/CU MM
LYMPHOCYTES RELATIVE PERCENT: 10 % (ref 24–44)
MCH RBC QN AUTO: 30.1 PG (ref 27–31)
MCHC RBC AUTO-ENTMCNC: 32.5 % (ref 32–36)
MCV RBC AUTO: 92.8 FL (ref 78–100)
MONOCYTES ABSOLUTE: 1.9 K/CU MM
MONOCYTES RELATIVE PERCENT: 10.5 % (ref 0–4)
PDW BLD-RTO: 13.6 % (ref 11.7–14.9)
PLATELET # BLD: 250 K/CU MM (ref 140–440)
PMV BLD AUTO: 9.2 FL (ref 7.5–11.1)
POTASSIUM SERPL-SCNC: 4 MMOL/L (ref 3.5–5.1)
RBC # BLD: 4.58 M/CU MM (ref 4.2–5.4)
SEGMENTED NEUTROPHILS ABSOLUTE COUNT: 13.9 K/CU MM
SEGMENTED NEUTROPHILS RELATIVE PERCENT: 77.8 % (ref 36–66)
SODIUM BLD-SCNC: 133 MMOL/L (ref 135–145)
TOTAL IMMATURE NEUTOROPHIL: 0.29 K/CU MM
WBC # BLD: 17.9 K/CU MM (ref 4–10.5)

## 2021-12-09 PROCEDURE — 6360000002 HC RX W HCPCS: Performed by: PHYSICIAN ASSISTANT

## 2021-12-09 PROCEDURE — 94761 N-INVAS EAR/PLS OXIMETRY MLT: CPT

## 2021-12-09 PROCEDURE — 1200000000 HC SEMI PRIVATE

## 2021-12-09 PROCEDURE — 6370000000 HC RX 637 (ALT 250 FOR IP): Performed by: NURSE PRACTITIONER

## 2021-12-09 PROCEDURE — 36415 COLL VENOUS BLD VENIPUNCTURE: CPT

## 2021-12-09 PROCEDURE — 6370000000 HC RX 637 (ALT 250 FOR IP): Performed by: INTERNAL MEDICINE

## 2021-12-09 PROCEDURE — 2580000003 HC RX 258: Performed by: PHYSICIAN ASSISTANT

## 2021-12-09 PROCEDURE — 6370000000 HC RX 637 (ALT 250 FOR IP): Performed by: PHYSICIAN ASSISTANT

## 2021-12-09 PROCEDURE — 85025 COMPLETE CBC W/AUTO DIFF WBC: CPT

## 2021-12-09 PROCEDURE — 82962 GLUCOSE BLOOD TEST: CPT

## 2021-12-09 PROCEDURE — 94640 AIRWAY INHALATION TREATMENT: CPT

## 2021-12-09 PROCEDURE — 6360000002 HC RX W HCPCS: Performed by: INTERNAL MEDICINE

## 2021-12-09 PROCEDURE — 80048 BASIC METABOLIC PNL TOTAL CA: CPT

## 2021-12-09 RX ORDER — FUROSEMIDE 40 MG/1
40 TABLET ORAL ONCE
Status: COMPLETED | OUTPATIENT
Start: 2021-12-09 | End: 2021-12-09

## 2021-12-09 RX ORDER — PREDNISONE 20 MG/1
20 TABLET ORAL DAILY
Status: DISCONTINUED | OUTPATIENT
Start: 2021-12-14 | End: 2021-12-10 | Stop reason: HOSPADM

## 2021-12-09 RX ORDER — ALBUTEROL SULFATE 90 UG/1
2 AEROSOL, METERED RESPIRATORY (INHALATION) EVERY 4 HOURS PRN
Status: DISCONTINUED | OUTPATIENT
Start: 2021-12-09 | End: 2021-12-10 | Stop reason: HOSPADM

## 2021-12-09 RX ORDER — PREDNISONE 20 MG/1
40 TABLET ORAL DAILY
Status: DISCONTINUED | OUTPATIENT
Start: 2021-12-10 | End: 2021-12-10 | Stop reason: HOSPADM

## 2021-12-09 RX ADMIN — ROPINIROLE HYDROCHLORIDE 2 MG: 1 TABLET, FILM COATED ORAL at 20:47

## 2021-12-09 RX ADMIN — METOPROLOL TARTRATE 25 MG: 25 TABLET, FILM COATED ORAL at 09:32

## 2021-12-09 RX ADMIN — ACETAMINOPHEN 650 MG: 325 TABLET ORAL at 18:53

## 2021-12-09 RX ADMIN — LIDOCAINE HYDROCHLORIDE 5 ML: 20 SOLUTION ORAL; TOPICAL at 18:47

## 2021-12-09 RX ADMIN — GUAIFENESIN 600 MG: 600 TABLET ORAL at 20:47

## 2021-12-09 RX ADMIN — ACETAMINOPHEN 650 MG: 325 TABLET ORAL at 03:26

## 2021-12-09 RX ADMIN — LOSARTAN POTASSIUM 50 MG: 50 TABLET, FILM COATED ORAL at 09:31

## 2021-12-09 RX ADMIN — LIDOCAINE HYDROCHLORIDE 5 ML: 20 SOLUTION ORAL; TOPICAL at 20:59

## 2021-12-09 RX ADMIN — HYDROXYCHLOROQUINE SULFATE 200 MG: 200 TABLET ORAL at 09:32

## 2021-12-09 RX ADMIN — ENOXAPARIN SODIUM 40 MG: 100 INJECTION SUBCUTANEOUS at 09:31

## 2021-12-09 RX ADMIN — BENZONATATE 100 MG: 100 CAPSULE ORAL at 13:36

## 2021-12-09 RX ADMIN — FUROSEMIDE 40 MG: 40 TABLET ORAL at 13:36

## 2021-12-09 RX ADMIN — SODIUM CHLORIDE, PRESERVATIVE FREE 6 ML: 5 INJECTION INTRAVENOUS at 20:59

## 2021-12-09 RX ADMIN — GLIPIZIDE 5 MG: 5 TABLET, EXTENDED RELEASE ORAL at 09:32

## 2021-12-09 RX ADMIN — ALBUTEROL SULFATE 2 PUFF: 108 AEROSOL, METERED RESPIRATORY (INHALATION) at 15:30

## 2021-12-09 RX ADMIN — GUAIFENESIN 600 MG: 600 TABLET ORAL at 09:32

## 2021-12-09 RX ADMIN — FUROSEMIDE 20 MG: 20 TABLET ORAL at 09:32

## 2021-12-09 RX ADMIN — METHYLPREDNISOLONE SODIUM SUCCINATE 40 MG: 40 INJECTION, POWDER, FOR SOLUTION INTRAMUSCULAR; INTRAVENOUS at 00:30

## 2021-12-09 RX ADMIN — BENZONATATE 100 MG: 100 CAPSULE ORAL at 09:33

## 2021-12-09 RX ADMIN — BENZONATATE 100 MG: 100 CAPSULE ORAL at 20:48

## 2021-12-09 RX ADMIN — ROSUVASTATIN CALCIUM 10 MG: 5 TABLET, FILM COATED ORAL at 20:47

## 2021-12-09 RX ADMIN — SODIUM CHLORIDE, PRESERVATIVE FREE 5 ML: 5 INJECTION INTRAVENOUS at 00:30

## 2021-12-09 RX ADMIN — Medication 5 ML: at 18:52

## 2021-12-09 RX ADMIN — ASPIRIN 81 MG: 81 TABLET, COATED ORAL at 20:47

## 2021-12-09 RX ADMIN — IPRATROPIUM BROMIDE AND ALBUTEROL SULFATE 1 AMPULE: 2.5; .5 SOLUTION RESPIRATORY (INHALATION) at 08:00

## 2021-12-09 RX ADMIN — IPRATROPIUM BROMIDE AND ALBUTEROL SULFATE 1 AMPULE: 2.5; .5 SOLUTION RESPIRATORY (INHALATION) at 20:07

## 2021-12-09 RX ADMIN — Medication 5 ML: at 03:27

## 2021-12-09 ASSESSMENT — PAIN SCALES - GENERAL
PAINLEVEL_OUTOF10: 4
PAINLEVEL_OUTOF10: 3

## 2021-12-09 ASSESSMENT — PAIN DESCRIPTION - PROGRESSION: CLINICAL_PROGRESSION: GRADUALLY WORSENING

## 2021-12-09 ASSESSMENT — PAIN DESCRIPTION - ONSET: ONSET: PROGRESSIVE

## 2021-12-09 ASSESSMENT — PAIN DESCRIPTION - ORIENTATION: ORIENTATION: RIGHT;LEFT

## 2021-12-09 ASSESSMENT — PAIN DESCRIPTION - LOCATION: LOCATION: HEAD

## 2021-12-09 ASSESSMENT — PAIN - FUNCTIONAL ASSESSMENT: PAIN_FUNCTIONAL_ASSESSMENT: PREVENTS OR INTERFERES SOME ACTIVE ACTIVITIES AND ADLS

## 2021-12-09 ASSESSMENT — PAIN DESCRIPTION - FREQUENCY: FREQUENCY: CONTINUOUS

## 2021-12-09 ASSESSMENT — PAIN DESCRIPTION - DESCRIPTORS: DESCRIPTORS: HEADACHE

## 2021-12-09 ASSESSMENT — PAIN DESCRIPTION - PAIN TYPE: TYPE: ACUTE PAIN

## 2021-12-09 NOTE — PROGRESS NOTES
Physical Therapy  Attempted to see patient today. She complains of being too tired bc she was running to the bathroom all night.    Carrington Hollingsworth [place]  12/9/2021  3:37 PM

## 2021-12-09 NOTE — PROGRESS NOTES
Occupational Therapy  Pt attempted but declined therapy due to not sleeping last and not feeling well. Maria Fernanda Fernandez got me on that lasix and I have been running to bathroom all night long. \"    Arnoldo [id number]

## 2021-12-09 NOTE — PROGRESS NOTES
Inhalation 4x daily    guaiFENesin  600 mg Oral BID      Infusions:    sodium chloride      dextrose       PRN Meds: albuterol sulfate HFA, 2 puff, Q4H PRN  sodium chloride flush, 5-40 mL, PRN  sodium chloride, 500 mL, PRN  ondansetron, 4 mg, Q8H PRN   Or  ondansetron, 4 mg, Q6H PRN  polyethylene glycol, 17 g, Daily PRN  acetaminophen, 650 mg, Q6H PRN   Or  acetaminophen, 650 mg, Q6H PRN  albuterol, 2.5 mg, Q4H PRN  ipratropium-albuterol, 1 ampule, Q4H PRN  guaiFENesin-codeine, 5 mL, Q4H PRN  glucose, 15 g, PRN  dextrose, 12.5 g, PRN  glucagon (rDNA), 1 mg, PRN  dextrose, 1,000 mL, PRN          Electronically signed by Debra Collazo MD on 12/9/2021 at 11:31 AM

## 2021-12-09 NOTE — PLAN OF CARE
Problem: Falls - Risk of:  Goal: Will remain free from falls  Description: Will remain free from falls  12/8/2021 2331 by Gautam Adler LPN  Outcome: Ongoing  12/8/2021 1858 by Maddie Mejia RN  Outcome: Ongoing  Goal: Absence of physical injury  Description: Absence of physical injury  12/8/2021 2331 by Gautam Adler LPN  Outcome: Ongoing  12/8/2021 1858 by Maddie Mejia RN  Outcome: Ongoing     Problem: Breathing Pattern - Ineffective:  Goal: Ability to achieve and maintain a regular respiratory rate will improve  Description: Ability to achieve and maintain a regular respiratory rate will improve  12/8/2021 2331 by Gautam Adler LPN  Outcome: Ongoing  12/8/2021 1858 by Maddie Mejia RN  Outcome: Ongoing     Problem:  Activity:  Goal: Risk for activity intolerance will decrease  Description: Risk for activity intolerance will decrease  12/8/2021 2331 by Gautam Adler LPN  Outcome: Ongoing  12/8/2021 1858 by Maddie Mejia RN  Outcome: Ongoing     Problem: Coping:  Goal: Ability to adjust to condition or change in health will improve  Description: Ability to adjust to condition or change in health will improve  12/8/2021 2331 by Gautam Adler LPN  Outcome: Ongoing  12/8/2021 1858 by Maddie Mejia RN  Outcome: Ongoing     Problem: Fluid Volume:  Goal: Ability to maintain a balanced intake and output will improve  Description: Ability to maintain a balanced intake and output will improve  12/8/2021 2331 by Gautam Adler LPN  Outcome: Ongoing  12/8/2021 1858 by Maddie Mejia RN  Outcome: Ongoing     Problem: Health Behavior:  Goal: Ability to identify and utilize available resources and services will improve  Description: Ability to identify and utilize available resources and services will improve  12/8/2021 2331 by Gautam Adler LPN  Outcome: Ongoing  12/8/2021 1858 by Maddie Mejia RN  Outcome: Ongoing  Goal: Ability to manage health-related needs will improve  Description: Ability to manage health-related needs will improve  12/8/2021 2331 by Crystal Marino LPN  Outcome: Ongoing  12/8/2021 1858 by Luis Osman RN  Outcome: Ongoing     Problem: Metabolic:  Goal: Ability to maintain appropriate glucose levels will improve  Description: Ability to maintain appropriate glucose levels will improve  12/8/2021 2331 by Crystal Marino LPN  Outcome: Ongoing  12/8/2021 1858 by Luis Osman RN  Outcome: Ongoing     Problem: Nutritional:  Goal: Maintenance of adequate nutrition will improve  Description: Maintenance of adequate nutrition will improve  12/8/2021 2331 by Crystal Marino LPN  Outcome: Ongoing  12/8/2021 1858 by Luis Osman RN  Outcome: Ongoing  Goal: Progress toward achieving an optimal weight will improve  Description: Progress toward achieving an optimal weight will improve  12/8/2021 2331 by Crystal Marino LPN  Outcome: Ongoing  12/8/2021 1858 by Luis Osman RN  Outcome: Ongoing     Problem: Physical Regulation:  Goal: Complications related to the disease process, condition or treatment will be avoided or minimized  Description: Complications related to the disease process, condition or treatment will be avoided or minimized  12/8/2021 2331 by Crystal Marino LPN  Outcome: Ongoing  12/8/2021 1858 by Luis Osman RN  Outcome: Ongoing  Goal: Diagnostic test results will improve  Description: Diagnostic test results will improve  12/8/2021 2331 by Crystal Marino LPN  Outcome: Ongoing  12/8/2021 1858 by Luis Osman RN  Outcome: Ongoing     Problem: Skin Integrity:  Goal: Risk for impaired skin integrity will decrease  Description: Risk for impaired skin integrity will decrease  12/8/2021 2331 by Crystal Marino LPN  Outcome: Ongoing  12/8/2021 1858 by Luis Osamn RN  Outcome: Ongoing     Problem: Tissue Perfusion:  Goal: Adequacy of tissue perfusion will improve  Description: Adequacy of tissue perfusion will improve  12/8/2021 2331 by Crystal Marino LPN  Outcome: Ongoing  12/8/2021 1858 by Dwight Louise RN  Outcome: Ongoing     Problem: Discharge Planning:  Goal: Discharged to appropriate level of care  Description: Discharged to appropriate level of care  12/8/2021 2331 by Cornelius Zendejas LPN  Outcome: Ongoing  12/8/2021 1858 by Dwight Louise RN  Outcome: Ongoing     Problem: Pain:  Goal: Pain level will decrease  Description: Pain level will decrease  12/8/2021 2331 by Cornelius Zendejas LPN  Outcome: Ongoing  12/8/2021 1858 by Dwight Louise RN  Outcome: Ongoing  Goal: Control of acute pain  Description: Control of acute pain  12/8/2021 2331 by Cornelius Zendejas LPN  Outcome: Ongoing  12/8/2021 1858 by Dwight Louise RN  Outcome: Ongoing  Goal: Control of chronic pain  Description: Control of chronic pain  12/8/2021 2331 by Cornelius Zendejas LPN  Outcome: Ongoing  12/8/2021 1858 by Dwight Louise RN  Outcome: Ongoing

## 2021-12-10 VITALS
TEMPERATURE: 97.3 F | DIASTOLIC BLOOD PRESSURE: 69 MMHG | RESPIRATION RATE: 16 BRPM | HEIGHT: 64 IN | SYSTOLIC BLOOD PRESSURE: 124 MMHG | BODY MASS INDEX: 40.99 KG/M2 | WEIGHT: 240.1 LBS | HEART RATE: 72 BPM | OXYGEN SATURATION: 91 %

## 2021-12-10 PROBLEM — E87.1 HYPONATREMIA: Status: RESOLVED | Noted: 2021-12-06 | Resolved: 2021-12-10

## 2021-12-10 LAB
GLUCOSE BLD-MCNC: 104 MG/DL (ref 70–99)
GLUCOSE BLD-MCNC: 77 MG/DL (ref 70–99)

## 2021-12-10 PROCEDURE — 82962 GLUCOSE BLOOD TEST: CPT

## 2021-12-10 PROCEDURE — 6370000000 HC RX 637 (ALT 250 FOR IP): Performed by: NURSE PRACTITIONER

## 2021-12-10 PROCEDURE — 2580000003 HC RX 258: Performed by: PHYSICIAN ASSISTANT

## 2021-12-10 PROCEDURE — 6370000000 HC RX 637 (ALT 250 FOR IP): Performed by: INTERNAL MEDICINE

## 2021-12-10 PROCEDURE — 6370000000 HC RX 637 (ALT 250 FOR IP): Performed by: PHYSICIAN ASSISTANT

## 2021-12-10 PROCEDURE — 94761 N-INVAS EAR/PLS OXIMETRY MLT: CPT

## 2021-12-10 PROCEDURE — 94640 AIRWAY INHALATION TREATMENT: CPT

## 2021-12-10 PROCEDURE — 6360000002 HC RX W HCPCS: Performed by: PHYSICIAN ASSISTANT

## 2021-12-10 RX ORDER — PREDNISONE 20 MG/1
20 TABLET ORAL 2 TIMES DAILY
Qty: 8 TABLET | Refills: 0 | Status: SHIPPED | OUTPATIENT
Start: 2021-12-10 | End: 2021-12-14

## 2021-12-10 RX ORDER — GUAIFENESIN 600 MG/1
600 TABLET, EXTENDED RELEASE ORAL 2 TIMES DAILY
Qty: 14 TABLET | Refills: 0 | Status: SHIPPED | OUTPATIENT
Start: 2021-12-10 | End: 2021-12-17

## 2021-12-10 RX ORDER — PREDNISONE 20 MG/1
20 TABLET ORAL DAILY
Qty: 4 TABLET | Refills: 0 | Status: SHIPPED | OUTPATIENT
Start: 2021-12-14 | End: 2021-12-18

## 2021-12-10 RX ORDER — IPRATROPIUM BROMIDE AND ALBUTEROL SULFATE 2.5; .5 MG/3ML; MG/3ML
3 SOLUTION RESPIRATORY (INHALATION) 4 TIMES DAILY
Qty: 360 ML | Refills: 0 | Status: SHIPPED | OUTPATIENT
Start: 2021-12-10 | End: 2022-02-23

## 2021-12-10 RX ORDER — FUROSEMIDE 20 MG/1
20 TABLET ORAL DAILY
Qty: 60 TABLET | Refills: 3 | Status: SHIPPED | OUTPATIENT
Start: 2021-12-10 | End: 2022-06-13 | Stop reason: SDUPTHER

## 2021-12-10 RX ADMIN — HYDROXYCHLOROQUINE SULFATE 200 MG: 200 TABLET ORAL at 10:15

## 2021-12-10 RX ADMIN — Medication 5 ML: at 00:26

## 2021-12-10 RX ADMIN — IPRATROPIUM BROMIDE AND ALBUTEROL SULFATE 1 AMPULE: 2.5; .5 SOLUTION RESPIRATORY (INHALATION) at 08:00

## 2021-12-10 RX ADMIN — GLIPIZIDE 5 MG: 5 TABLET, EXTENDED RELEASE ORAL at 06:11

## 2021-12-10 RX ADMIN — ACETAMINOPHEN 650 MG: 325 TABLET ORAL at 00:26

## 2021-12-10 RX ADMIN — GUAIFENESIN 600 MG: 600 TABLET ORAL at 10:13

## 2021-12-10 RX ADMIN — BENZONATATE 100 MG: 100 CAPSULE ORAL at 14:13

## 2021-12-10 RX ADMIN — LIDOCAINE HYDROCHLORIDE 5 ML: 20 SOLUTION ORAL; TOPICAL at 10:16

## 2021-12-10 RX ADMIN — FUROSEMIDE 20 MG: 20 TABLET ORAL at 10:15

## 2021-12-10 RX ADMIN — ENOXAPARIN SODIUM 40 MG: 100 INJECTION SUBCUTANEOUS at 10:18

## 2021-12-10 RX ADMIN — LOSARTAN POTASSIUM 50 MG: 50 TABLET, FILM COATED ORAL at 10:14

## 2021-12-10 RX ADMIN — BENZONATATE 100 MG: 100 CAPSULE ORAL at 10:15

## 2021-12-10 RX ADMIN — ACETAMINOPHEN 650 MG: 325 TABLET ORAL at 10:23

## 2021-12-10 RX ADMIN — SODIUM CHLORIDE, PRESERVATIVE FREE 10 ML: 5 INJECTION INTRAVENOUS at 10:17

## 2021-12-10 RX ADMIN — METOPROLOL TARTRATE 25 MG: 25 TABLET, FILM COATED ORAL at 10:14

## 2021-12-10 RX ADMIN — PREDNISONE 40 MG: 20 TABLET ORAL at 10:15

## 2021-12-10 RX ADMIN — LIDOCAINE HYDROCHLORIDE 5 ML: 20 SOLUTION ORAL; TOPICAL at 14:13

## 2021-12-10 ASSESSMENT — PAIN SCALES - GENERAL
PAINLEVEL_OUTOF10: 4
PAINLEVEL_OUTOF10: 3

## 2021-12-10 NOTE — CARE COORDINATION
CM met with the patient for follow-up discussion regarding discharge planning, patient sitting up on the edge of the bed finishing her breakfast.  Patient explained that she was being discharged today and was going to stay at her daughter's home through the weekend and will return to her home Monday 12/13. CM explained to the patient that the [place] [id number] agency was planning to start her care Saturday 12/11 and asked for her daughter's home address so it could be provided to the [place] [id number] agency. Patient stated that she did not want [place] [id number] to visit her until she returned to her home Monday 12/13. CM explained that it would not be a problem for the [place] [id number] agency to see her at her daughter's home and she again refused and stated that she did not want them to visit until she was back at her home Monday 12/13. CM spoke with Ricki Bonds at [facility] to inform them of the patient's request to not be seen by [place] [id number] until she returns to her home Monday 12/13/2021. CM available if needs arise. 12:15 PM  CM faxed the physician's discharge summary to [facility]. 2:42 PM  CM faxed the patient's discharge instructions to [facility].

## 2021-12-10 NOTE — DISCHARGE SUMMARY
Discharge Summary    Name:  Walter Patel /Age/Sex: 1935  (81 y.o. female)   MRN & CSN:  [id number] & [id number] Admission Date/Time: 2021  7:09 PM   Attending:  Frank Campos MD Discharging Physician: Frank Campos MD     Hospital Course:   Walter Patel is a 81 y.o.  female  who presents with Moderate persistent asthma with exacerbation  Patient presented with complaints of shortness of breath and cough and overall weakness. Oxygen saturation was borderline between 90 and 92% on room air. She had also imbalance and significant dyspnea on exertion. She was found to have asthma exacerbation and parainfluenza upper respiratory infection. She also had metabolic abnormalities with hyponatremia and hyperkalemia. She required some IV fluids for hydration. She was treated with intravenous steroids and bronchodilators. Patient improved slower than expected. Eventually she had improved air movement with less wheezing and less shortness of breath. I have visited with the patient on 12/10 and she reports that she feels little better. She continues to have cough that is dry at this time. Her dyspnea exertion is improved but not resolved. She continues to feel weak but is able to ambulate with the help of her cane. Her sore throat is improved with the Magic mouthwash. She will be going home to stay with her daughter over the weekend and then in her own place. I discussed the discharge instructions with the patient and her questions were answered and is felt appropriate for discharge home on 12/10. The patient was treated for the following problems:    1. Asthma exacerbation: Patient slowly improved, she will be discharged on a prednisone taper over the next 8 days. 2. Parainfluenza upper respiratory infection: CT chest showed no evidence of consolidation or PNA, continue supportive care  3. Hyponatremia: Encourage oral intake. Na improved to 133 on   4.  Hyperkalemia: Encourage oral intake, resolved. Potassium is 4 on 12/9  5. Diabetes mellitus type 2, blood sugars are for the most part less than 150 and this morning were 77. I advised the patient to hold her glipizide and monitor blood sugars daily and to resume it when she sees blood sugars are rising to 150 or higher. 6. Coronary artery disease: No complaints of chest pain.  Troponin was negative.  EKG showed no acute ischemic changes. 7. Hypertension: Continue losartan and metoprolol. BP is 124/65 on 12/9.  8. Weakness: Cont PT/OT. Patient will receive home health. 9. Chronic diastolic heart failure: Continue Lasix, she is not requiring oral potassium supplementation at this time. 10. Sore throat, continue Magic mouthwash for the duration of treatment with prednisone  11. Leukocytosis, due to treatment with steroids.       Consults this admission:  IP CONSULT TO HOSPITALIST  IP CONSULT TO [address] [place] [address] NEEDS    Discharge Instruction:   Follow-up with PCP within 1 week  Will need a basic metabolic panel in a week    Diet:  diabetic diet   Activity: activity as tolerated  Disposition: Discharged to:   [x]Home, [x]C, []SNF, []Acute Rehab, []Hospice   Condition on discharge: Stable    Discharge Medications:   @DISCHARGEMEDSLIST(<NOROUTINE> error)@    Objective Findings at Discharge:   /69   Pulse 72   Temp 97.3 °F (36.3 °C) (Infrared)   Resp 16   Ht 5' 4\" (1.626 m)   Wt 240 lb 1.6 oz (108.9 kg)   LMP  (LMP Unknown)   SpO2 91%   BMI 41.21 kg/m²            She appears in no acute distress  ENT: No oral thrush noted  Neck shows no JVD or LAD  Heart is regular rate and rhythm  Lungs have moderate air movement with few expiratory wheezing, no crackles appreciated, unlabored at rest  Abdomen soft and nontender  Extremities still show trace to +1 edema, but improved      BMP/CBC  Recent Labs     12/09/21  0600   *   K 4.0   CL 94*   CO2 32   BUN 20   CREATININE 0.6   WBC 17.9*   HCT 42.5          IMAGING:  CT of the chest on 12/6 showed atelectasis of the right lung base. No acute infiltrate.     Discharge Time of 35 minutes    Electronically signed by Kal Roach MD on 12/10/2021 at 10:13 AM

## 2021-12-10 NOTE — PLAN OF CARE
Problem: Falls - Risk of:  Goal: Will remain free from falls  12/10/2021 1405 by Sofiya Ibarra RN  Outcome: Ongoing  12/10/2021 0102 by Donne Libman, LPN  Outcome: Ongoing  Goal: Absence of physical injury  12/10/2021 1405 by Sofiya Ibarra RN  Outcome: Ongoing  12/10/2021 0102 by Donne Libman, LPN  Outcome: Ongoing

## 2021-12-10 NOTE — PLAN OF CARE
Problem: Falls - Risk of:  Goal: Will remain free from falls  Description: Will remain free from falls  12/10/2021 0102 by Chinmay Rangel LPN  Outcome: Ongoing  12/9/2021 1511 by Honorio Villalta RN  Outcome: Ongoing  Goal: Absence of physical injury  Description: Absence of physical injury  12/10/2021 0102 by Chinmay Rangel LPN  Outcome: Ongoing  12/9/2021 1511 by Honorio Villalta RN  Outcome: Ongoing     Problem: Breathing Pattern - Ineffective:  Goal: Ability to achieve and maintain a regular respiratory rate will improve  Description: Ability to achieve and maintain a regular respiratory rate will improve  12/10/2021 0102 by Chinmay Rangel LPN  Outcome: Ongoing  12/9/2021 1511 by Honorio Villalta RN  Outcome: Ongoing     Problem:  Activity:  Goal: Risk for activity intolerance will decrease  Description: Risk for activity intolerance will decrease  12/10/2021 0102 by Chinmay Rangel LPN  Outcome: Ongoing  12/9/2021 1511 by Honorio Villalta RN  Outcome: Ongoing     Problem: Coping:  Goal: Ability to adjust to condition or change in health will improve  Description: Ability to adjust to condition or change in health will improve  12/10/2021 0102 by Chinmay Rangel LPN  Outcome: Ongoing  12/9/2021 1511 by Honorio Villalta RN  Outcome: Ongoing     Problem: Fluid Volume:  Goal: Ability to maintain a balanced intake and output will improve  Description: Ability to maintain a balanced intake and output will improve  12/10/2021 0102 by Chinmay Rangel LPN  Outcome: Ongoing  12/9/2021 1511 by Honorio Villalta RN  Outcome: Ongoing     Problem: Health Behavior:  Goal: Ability to identify and utilize available resources and services will improve  Description: Ability to identify and utilize available resources and services will improve  12/10/2021 0102 by Chinmay Rangel LPN  Outcome: Ongoing  12/9/2021 1511 by Honorio Villalta RN  Outcome: Ongoing  Goal: Ability to manage health-related needs will improve  Description: Ability to manage health-related needs will improve  12/10/2021 0102 by Amanda Aguilar LPN  Outcome: Ongoing  12/9/2021 1511 by Denis Herzog RN  Outcome: Ongoing     Problem: Metabolic:  Goal: Ability to maintain appropriate glucose levels will improve  Description: Ability to maintain appropriate glucose levels will improve  12/10/2021 0102 by Amanda Aguilar LPN  Outcome: Ongoing  12/9/2021 1511 by Denis Herzog RN  Outcome: Ongoing     Problem: Nutritional:  Goal: Maintenance of adequate nutrition will improve  Description: Maintenance of adequate nutrition will improve  12/10/2021 0102 by Amanda Aguilar LPN  Outcome: Ongoing  12/9/2021 1511 by Denis Herzog RN  Outcome: Ongoing  Goal: Progress toward achieving an optimal weight will improve  Description: Progress toward achieving an optimal weight will improve  12/10/2021 0102 by Amanda Aguilar LPN  Outcome: Ongoing  12/9/2021 1511 by Denis Herzog RN  Outcome: Ongoing     Problem: Physical Regulation:  Goal: Complications related to the disease process, condition or treatment will be avoided or minimized  Description: Complications related to the disease process, condition or treatment will be avoided or minimized  12/10/2021 0102 by Amanda Aguilar LPN  Outcome: Ongoing  12/9/2021 1511 by Denis Herzog RN  Outcome: Ongoing  Goal: Diagnostic test results will improve  Description: Diagnostic test results will improve  12/10/2021 0102 by Amanda Aguilar LPN  Outcome: Ongoing  12/9/2021 1511 by Denis Herzog RN  Outcome: Ongoing     Problem: Skin Integrity:  Goal: Risk for impaired skin integrity will decrease  Description: Risk for impaired skin integrity will decrease  12/10/2021 0102 by Amanda Aguilar LPN  Outcome: Ongoing  12/9/2021 1511 by Denis Herzog RN  Outcome: Ongoing     Problem: Tissue Perfusion:  Goal: Adequacy of tissue perfusion will improve  Description: Adequacy of tissue perfusion will improve  12/10/2021 0102 by Zo Roberts LPN  Outcome: Ongoing  12/9/2021 1511 by Ana Rosa Quarles RN  Outcome: Ongoing     Problem: Discharge Planning:  Goal: Discharged to appropriate level of care  Description: Discharged to appropriate level of care  12/10/2021 0102 by Zo Roberts LPN  Outcome: Ongoing  12/9/2021 1511 by Ana Rosa Quarles RN  Outcome: Ongoing     Problem: Pain:  Goal: Pain level will decrease  Description: Pain level will decrease  12/10/2021 0102 by Zo Roberts LPN  Outcome: Ongoing  12/9/2021 1511 by Ana Rosa Quarles RN  Outcome: Ongoing  Goal: Control of acute pain  Description: Control of acute pain  12/10/2021 0102 by Zo Roberts LPN  Outcome: Ongoing  12/9/2021 1511 by Ana Rosa Quarles RN  Outcome: Ongoing  Goal: Control of chronic pain  Description: Control of chronic pain  12/10/2021 0102 by Zo Roberts LPN  Outcome: Ongoing  12/9/2021 1511 by Ana Rosa Quarles RN  Outcome: Ongoing

## 2021-12-13 ENCOUNTER — TELEPHONE (OUTPATIENT)
Dept: INTERNAL MEDICINE CLINIC | Age: 86
End: 2021-12-13

## 2021-12-13 NOTE — TELEPHONE ENCOUNTER
pc from [place] [address] nurse stating that patient went home with daughter from hospital and was told that a med neb machine would be delivered to her home and she is now home and no machine. She has the medication.  I told the [place] [address] nurse that I would print the order from hospital and send it to [address] [place] [address] ([facility] her request)

## 2021-12-14 ENCOUNTER — TELEPHONE (OUTPATIENT)
Dept: INTERNAL MEDICINE CLINIC | Age: 86
End: 2021-12-14

## 2021-12-14 ENCOUNTER — APPOINTMENT (OUTPATIENT)
Dept: CT IMAGING | Age: 86
End: 2021-12-14
Payer: MEDICARE

## 2021-12-14 ENCOUNTER — HOSPITAL ENCOUNTER (EMERGENCY)
Age: 86
Discharge: HOME | End: 2021-12-14
Attending: EMERGENCY MEDICINE
Payer: MEDICARE

## 2021-12-14 VITALS
DIASTOLIC BLOOD PRESSURE: 65 MMHG | RESPIRATION RATE: 20 BRPM | OXYGEN SATURATION: 97 % | HEIGHT: 64 IN | SYSTOLIC BLOOD PRESSURE: 114 MMHG | HEART RATE: 80 BPM | BODY MASS INDEX: 40.97 KG/M2 | TEMPERATURE: 98.3 F | WEIGHT: 240 LBS

## 2021-12-14 DIAGNOSIS — M54.50 ACUTE EXACERBATION OF CHRONIC LOW BACK PAIN: Primary | ICD-10-CM

## 2021-12-14 DIAGNOSIS — M79.661 PAIN AND SWELLING OF RIGHT LOWER LEG: ICD-10-CM

## 2021-12-14 DIAGNOSIS — G89.29 ACUTE EXACERBATION OF CHRONIC LOW BACK PAIN: Primary | ICD-10-CM

## 2021-12-14 DIAGNOSIS — M79.89 PAIN AND SWELLING OF RIGHT LOWER LEG: ICD-10-CM

## 2021-12-14 PROCEDURE — 99285 EMERGENCY DEPT VISIT HI MDM: CPT

## 2021-12-14 PROCEDURE — 96375 TX/PRO/DX INJ NEW DRUG ADDON: CPT

## 2021-12-14 PROCEDURE — 96374 THER/PROPH/DIAG INJ IV PUSH: CPT

## 2021-12-14 PROCEDURE — 6370000000 HC RX 637 (ALT 250 FOR IP): Performed by: EMERGENCY MEDICINE

## 2021-12-14 PROCEDURE — 6360000002 HC RX W HCPCS: Performed by: EMERGENCY MEDICINE

## 2021-12-14 PROCEDURE — 72131 CT LUMBAR SPINE W/O DYE: CPT

## 2021-12-14 RX ORDER — LIDOCAINE 50 MG/G
1 PATCH TOPICAL DAILY
Qty: 10 PATCH | Refills: 1 | Status: SHIPPED | OUTPATIENT
Start: 2021-12-14 | End: 2021-12-24

## 2021-12-14 RX ORDER — MORPHINE SULFATE 4 MG/ML
4 INJECTION, SOLUTION INTRAMUSCULAR; INTRAVENOUS ONCE
Status: COMPLETED | OUTPATIENT
Start: 2021-12-14 | End: 2021-12-14

## 2021-12-14 RX ORDER — ONDANSETRON 2 MG/ML
4 INJECTION INTRAMUSCULAR; INTRAVENOUS ONCE
Status: COMPLETED | OUTPATIENT
Start: 2021-12-14 | End: 2021-12-14

## 2021-12-14 RX ORDER — KETOROLAC TROMETHAMINE 15 MG/ML
15 INJECTION, SOLUTION INTRAMUSCULAR; INTRAVENOUS ONCE
Status: COMPLETED | OUTPATIENT
Start: 2021-12-14 | End: 2021-12-14

## 2021-12-14 RX ORDER — LIDOCAINE 4 G/G
2 PATCH TOPICAL DAILY
Status: DISCONTINUED | OUTPATIENT
Start: 2021-12-14 | End: 2021-12-14 | Stop reason: HOSPADM

## 2021-12-14 RX ADMIN — ONDANSETRON 4 MG: 2 INJECTION INTRAMUSCULAR; INTRAVENOUS at 13:02

## 2021-12-14 RX ADMIN — KETOROLAC TROMETHAMINE 15 MG: 15 INJECTION, SOLUTION INTRAMUSCULAR; INTRAVENOUS at 13:01

## 2021-12-14 RX ADMIN — MORPHINE SULFATE 4 MG: 4 INJECTION INTRAVENOUS at 13:02

## 2021-12-14 ASSESSMENT — PAIN DESCRIPTION - FREQUENCY: FREQUENCY: CONTINUOUS

## 2021-12-14 ASSESSMENT — PAIN SCALES - GENERAL
PAINLEVEL_OUTOF10: 10
PAINLEVEL_OUTOF10: 5
PAINLEVEL_OUTOF10: 10

## 2021-12-14 ASSESSMENT — PAIN DESCRIPTION - DESCRIPTORS: DESCRIPTORS: ACHING;DISCOMFORT;CONSTANT

## 2021-12-14 ASSESSMENT — ENCOUNTER SYMPTOMS
DIARRHEA: 1
SHORTNESS OF BREATH: 0
COUGH: 1
BACK PAIN: 1

## 2021-12-14 ASSESSMENT — PAIN DESCRIPTION - LOCATION: LOCATION: BACK

## 2021-12-14 ASSESSMENT — PAIN DESCRIPTION - PAIN TYPE: TYPE: ACUTE PAIN

## 2021-12-14 ASSESSMENT — PAIN DESCRIPTION - ORIENTATION: ORIENTATION: RIGHT;LEFT;MID

## 2021-12-14 NOTE — ED PROVIDER NOTES
Physical Activity:     Days of Exercise per Week: Not on file    Minutes of Exercise per Session: Not on file   Stress:     Feeling of Stress : Not on file   Social Connections:     Frequency of Communication with Friends and Family: Not on file    Frequency of Social Gatherings with Friends and Family: Not on file    Attends Latter-day Services: Not on file    Active Member of [facility] or Organizations: Not on file    Attends Club or Organization Meetings: Not on file    Marital Status: Not on file   Intimate Partner Violence:     Fear of Current or Ex-Partner: Not on file    Emotionally Abused: Not on file    Physically Abused: Not on file    Sexually Abused: Not on file   Housing Stability:     Unable to Pay for Housing in the Last Year: Not on file    Number of [place] in the Last Year: Not on file    Unstable Housing in the Last Year: Not on file     Current Facility-Administered Medications   Medication Dose Route Frequency Provider Last Rate Last Admin    lidocaine 4 % external patch 2 patch  2 patch TransDERmal Daily Sharona Reyesn, DO   2 patch at 12/14/21 1302     Current Outpatient Medications   Medication Sig Dispense Refill    lidocaine (LIDODERM) 5 % Place 1 patch onto the skin daily for 10 days 12 hours on, 12 hours off.  10 patch 1    ipratropium-albuterol (DUONEB) 0.5-2.5 (3) MG/3ML SOLN nebulizer solution Inhale 3 mLs into the lungs 4 times daily Use four times daily for 2 weeks then q4 hours as needed 360 mL 0    predniSONE (DELTASONE) 20 MG tablet Take 1 tablet by mouth daily for 4 doses 4 tablet 0    predniSONE (DELTASONE) 20 MG tablet Take 1 tablet by mouth 2 times daily for 4 days 8 tablet 0    guaiFENesin (MUCINEX) 600 MG extended release tablet Take 1 tablet by mouth 2 times daily for 7 days 14 tablet 0    furosemide (LASIX) 20 MG tablet Take 1 tablet by mouth daily 60 tablet 3    Magic Mouthwash (MIRACLE MOUTHWASH) Swish and spit 5 mLs 4 times daily for 10 days 200 mL 0    benzonatate (TESSALON PERLES) 100 MG capsule Take 1 capsule by mouth 3 times daily as needed for Cough 20 capsule 0    Spacer/Aero-Holding Chambers JUSTO 1 Device by Does not apply route daily as needed (use with albuterol rescue inhaler) 1 each 0    albuterol sulfate  (90 Base) MCG/ACT inhaler INHALE 2 PUFFS INTO THE LUNGS 4 TIMES DAILY AS NEEDED FOR WHEEZING 8.5 g 5    glipiZIDE (GLUCOTROL XL) 5 MG extended release tablet Take 1 tablet by mouth daily 90 tablet 1    losartan (COZAAR) 50 MG tablet TAKE 1 TABLET BY MOUTH DAILY 90 tablet 1    metoprolol tartrate (LOPRESSOR) 25 MG tablet TAKE ONE TABLET BY MOUTH TWO TIMES A DAY (Patient taking differently: 25 mg daily ) 180 tablet 1    rosuvastatin (CRESTOR) 10 MG tablet Take 1 tablet by mouth nightly 90 tablet 1    rOPINIRole (REQUIP) 2 MG tablet TAKE 1 TABLET BY MOUTH NIGHTLY (Patient taking differently: Take 3 mg by mouth nightly ) 90 tablet 1    Multiple Vitamins-Minerals (PRESERVISION AREDS) TABS Take 1 tablet by mouth 2 times daily      blood glucose test strips (TRUETEST TEST) strip 1 each by Other route 2 times daily DX=E11.9 100 each 5    Blood Glucose Monitoring Suppl (TRUE METRIX METER) JUSTO Dispense one meter, DX E11.9 testing once daily 1 Device 0    blood glucose test strips (TRUE METRIX BLOOD GLUCOSE TEST) strip Dispense true metrix test strips, DX=E11.9, testing once daily 100 each 3    UNABLE TO FIND Hand brace. 1 Units 0    hydroxychloroquine (PLAQUENIL) 200 MG tablet Take 200 mg by mouth daily       aspirin 81 MG EC tablet Take 81 mg by mouth daily  30 tablet     Cholecalciferol (VITAMIN D PO) Take 5,000 Units by mouth daily Indications: pt states take TID        No Known Allergies      ROS:    Review of Systems   Respiratory: Positive for cough. Negative for shortness of breath. Cardiovascular: Negative. Gastrointestinal: Positive for diarrhea. Musculoskeletal: Positive for back pain. Neurological: Negative. All other systems reviewed and are negative. Nursing Notes Reviewed    Physical Exam:  ED Triage Vitals   Enc Vitals Group      BP 12/14/21 1239 135/80      Pulse 12/14/21 1245 80      Resp 12/14/21 1239 20      Temp 12/14/21 1239 98.3 °F (36.8 °C)      Temp Source 12/14/21 1239 Oral      SpO2 12/14/21 1239 95 %      Weight 12/14/21 1239 240 lb (108.9 kg)      Height 12/14/21 1239 5' 4\" (1.626 m)      Head Circumference --       Peak Flow --       Pain Score --       Pain Loc --       Pain Edu? --       Excl. in [address]? --        Physical Exam  Vitals and nursing note reviewed. Exam conducted with a chaperone present. Constitutional:       Appearance: She is well-developed. She is obese. She is ill-appearing. HENT:      Head: Normocephalic and atraumatic. Right Ear: External ear normal.      Left Ear: External ear normal.   Eyes:      General: No scleral icterus. Right eye: No discharge. Left eye: No discharge. Conjunctiva/sclera: Conjunctivae normal.      Pupils: Pupils are equal, round, and reactive to light. Neck:      Thyroid: No thyromegaly. Vascular: No JVD. Trachea: No tracheal deviation. Cardiovascular:      Rate and Rhythm: Normal rate and regular rhythm. Heart sounds: Normal heart sounds. No murmur heard. No friction rub. No gallop. Pulmonary:      Effort: Pulmonary effort is normal. No respiratory distress. Breath sounds: Normal breath sounds. No stridor. No wheezing or rales. Chest:      Chest wall: No tenderness. Abdominal:      General: Bowel sounds are normal. There is no distension. Palpations: Abdomen is soft. There is no mass. Tenderness: There is no abdominal tenderness. There is no guarding or rebound. Hernia: No hernia is present. Musculoskeletal:         General: No deformity. Cervical back: Normal, normal range of motion and neck supple.       Thoracic back: Normal.      Lumbar back: Spasms and tenderness present. No bony tenderness. Decreased range of motion. Negative right straight leg raise test and negative left straight leg raise test.   Lymphadenopathy:      Cervical: No cervical adenopathy. Skin:     General: Skin is warm and dry. Coloration: Skin is not pale. Findings: No erythema or rash. Neurological:      Mental Status: She is alert and oriented to person, place, and time. Cranial Nerves: Cranial nerves are intact. No cranial nerve deficit. Sensory: Sensation is intact. No sensory deficit. Motor: Motor function is intact. Deep Tendon Reflexes: Reflexes normal.      Reflex Scores:       Achilles reflexes are 1+ on the right side and 1+ on the left side. Comments: No seated and straight leg raise P. Psychiatric:         Speech: Speech normal.         Behavior: Behavior normal.         Thought Content: Thought content normal.         Judgment: Judgment normal.         I have reviewed and interpreted all of the currently available lab results from this visit (ifapplicable):  No results found for this visit on 12/14/21. Radiographs (if obtained):  [] The following radiograph wasinterpreted by myself in the absence of a radiologist:   [] Radiologist's Report Reviewed:  CT LUMBAR SPINE WO CONTRAST   Final Result   1. Mild acute L2 superior endplate compression fracture. 2. Moderate chronic L3 superior endplate compression fracture with mildly   worsened loss of height compared with 09/25/2020 and 3 mm retropulsion.       RECOMMENDATIONS:   Unavailable               EKG (if obtained): (All EKG's are interpreted by myself in the absence of a cardiologist)    Chart review shows recent radiographs:  XR CHEST (2 VW)    Result Date: 12/4/2021  EXAMINATION: TWO XRAY VIEWS OF THE CHEST 12/4/2021 9:49 pm COMPARISON: 12/02/2021 HISTORY: ORDERING SYSTEM PROVIDED HISTORY: cough, wheezing TECHNOLOGIST PROVIDED HISTORY: Reason for exam:->cough, wheezing Reason for Exam: coughing, Lungs/pleura: Minimal atelectasis at the right lung base, lungs otherwise appear clear. No evidence of focal consolidation. No pneumothorax is identified. No noncalcified pulmonary nodules or masses are evident. No pleural effusion. Upper Abdomen: The visualized upper abdominal contents are unremarkable. Small hiatal hernia redemonstrated. Soft Tissues/Bones: No acute osseous or soft tissue abnormality identified. Minimal atelectasis at the right lung base, lungs otherwise appear clear. Severe atherosclerotic disease and coronary artery atherosclerosis. Small hiatal hernia. XR CHEST PORTABLE    Result Date: 12/2/2021  EXAMINATION: ONE XRAY VIEW OF THE CHEST 12/2/2021 12:41 pm COMPARISON: 12/01/2021. HISTORY: ORDERING SYSTEM PROVIDED HISTORY: shortness of breath TECHNOLOGIST PROVIDED HISTORY: Reason for exam:->shortness of breath Reason for Exam: Shortness of Breath Acuity: Acute Type of Exam: Initial FINDINGS: The heart size is within normal limits. The pulmonary vasculature is also within normal limits. No acute infiltrates are seen. The costophrenic angles are sharp bilaterally. No pneumothoraces are noted. 1. No active pulmonary disease. MDM:    Patient presents here with acute back pain after she coughs. She has diffuse pain to her low back not midline x-ray rays were not obtained because of her age CT was more definitive I reviewed her images she does have a very subtle L2 superior endplate compression a chronic L3 superior endplate compression this is new since her old x-rays in 2016 and further review this is new from September 2020 since there is no cervical lumbar pain I do not believe the patient is a candidate for kyphoplasty. Treatment will be supportive she has Norco she takes once a day at home I recommended acetaminophen otherwise she will friends and family check on her hospitalization is medically not indicated.   There is no focal neurological deficits    Please note that portions of this note may have been completed with a voice recognition/dictation program. Efforts were made to edit the dictations but occasionally words are mis-transcribed.      All pertinent Lab data and radiographic results reviewed with patient at bedside. The patient and/or the family were informed of the results of any tests/labs/imaging, the treatment plan, and time was allotted to answer questions. See chart for details of medications given during the ED stay.     The likelihood of other entities in the differential is insufficient to justify any further testing for them. This was explained to the patient. The patient was advised that persistent or worsening symptoms would require further evaluation.                  Clinical Impression:  1. Acute exacerbation of chronic low back pain      Disposition referral (if applicable):  Trino Gotti MD  [address]  [phone]    Schedule an appointment as soon as possible for a visit   If symptoms worsen    Disposition medications (if applicable):  New Prescriptions    LIDOCAINE (LIDODERM) 5 %    Place 1 patch onto the skin daily for 10 days 12 hours on, 12 hours off. Juan Miguel West DO, FACEP      Comment: Please note this report has been produced using speech recognition software and maycontain errors related to that system including errors in grammar, punctuation, and spelling, as well as words and phrases that may be inappropriate. If there are any questions or concerns please feel free to contact thedictating provider for clarification.         Jamie Montgomery DO  12/14/21 [id number]

## 2021-12-14 NOTE — ED NOTES
Pt was able to independently transfer herself from the bed to the wheel chair.       Jean Mariee RN  12/14/21 [id number]

## 2021-12-14 NOTE — ED NOTES
Bed: 04  Expected date:   Expected time:   Means of arrival:   Comments:  Squad back pain     Ace Newman RN  12/14/21 7844

## 2021-12-15 ENCOUNTER — VIRTUAL VISIT (OUTPATIENT)
Dept: INTERNAL MEDICINE CLINIC | Age: 86
End: 2021-12-15
Payer: MEDICARE

## 2021-12-15 DIAGNOSIS — J45.41 MODERATE PERSISTENT ASTHMA WITH EXACERBATION: Primary | ICD-10-CM

## 2021-12-15 DIAGNOSIS — E78.2 MIXED HYPERLIPIDEMIA: ICD-10-CM

## 2021-12-15 DIAGNOSIS — I10 ESSENTIAL HYPERTENSION: ICD-10-CM

## 2021-12-15 DIAGNOSIS — M54.50 ACUTE MIDLINE LOW BACK PAIN WITHOUT SCIATICA: ICD-10-CM

## 2021-12-15 DIAGNOSIS — S32.020G COMPRESSION FRACTURE OF L2 VERTEBRA WITH DELAYED HEALING: ICD-10-CM

## 2021-12-15 DIAGNOSIS — B34.8 PARAINFLUENZA INFECTION: ICD-10-CM

## 2021-12-15 PROBLEM — M54.9 ACUTE BACK PAIN: Status: ACTIVE | Noted: 2021-12-15

## 2021-12-15 PROCEDURE — 99443 PR PHYS/QHP TELEPHONE EVALUATION 21-30 MIN: CPT | Performed by: INTERNAL MEDICINE

## 2021-12-15 RX ORDER — CLOTRIMAZOLE AND BETAMETHASONE DIPROPIONATE 10; .64 MG/G; MG/G
CREAM TOPICAL
COMMUNITY
Start: 2021-10-29 | End: 2021-12-20 | Stop reason: ALTCHOICE

## 2021-12-15 NOTE — PROGRESS NOTES
Phoebe Bolanos is a 81 y.o. female evaluated via telephone on 12/15/2021. Consent:  She and/or health care decision maker is aware that that she may receive a bill for this telephone service, depending on her insurance coverage, and has provided verbal consent to proceed: Yes      Documentation:  I communicated with the patient and/or health care decision maker about     Patient was admitted to the hospital on 12/4/2021 for shortness of breath, cough and overall weakness. She had exacerbation of asthma and parainfluenza upper respiratory infection. She was also mildly hyponatremic and hyperkalemic those were corrected. She was treated with a steroid and bronchodilators with some improvement. She was able to walk with the help of cane. Has shortness of breath of exertion was also better therefore she was sent home    Yesterday patient had a coughing spell and has severe back pain a CT scan of the lumbar spine done which showed mild acute L2 superior endplate compression fracture and old L3 superior endplate compression fracture. Patient states she has some [place] from rheumatologist and she was taking that to control the pain. She was put on the steroids and pain patch    Patient states she is having difficulty getting up and ambulating at home  Her pain is not in control  She lives alone  She wants to go to a nursing home    She is breathing much better now. Has cough but no shortness of breath. No abdominal pain. No nausea vomiting or diarrhea. .     CT lumbar spine  Impression   1. Mild acute L2 superior endplate compression fracture. 2. Moderate chronic L3 superior endplate compression fracture with mildly   worsened loss of height compared with 09/25/2020 and 3 mm retropulsion.       RECOMMENDATIONS:   Unavailable               Details of this discussion including any medical advice provided: Genny Navarro Acute on chronic lower back pain. Compression fracture of L2 and L3  . Debility  . Exacerbation of asthma has improved  . Diabetes mellitus. Patient is back on glipizide ER 5 mg daily  . Hypertension. Continue current medications  . Hyperlipidemia. Patient is on Crestor  . Rheumatoid arthritis. Patient is on Plaquenil and takes as needed [place]  .  Coronary artery disease stable no angina. On medications  . Restless leg syndrome. Requip 2 mg at bedtime  . Morbid obesity. Plan:  1. Patient states she is unable to take care of herself at home  2. Patient needs a placement. Home health care and the  is trying to place her in some nursing home for acute rehab  3. We will try to contact nursing home  4. Continue pain pads, Tylenol, Norco for pain control  5. Home health care to evaluate and see the patient if she is unable to get admitted for rehab  6. If the pain is worse she can go to the ER to get admitted in a nursing home. Encounter Diagnoses   Name Primary?  Acute midline low back pain without sciatica     Compression fracture of L2 vertebra with delayed healing     Moderate persistent asthma with exacerbation Yes    Parainfluenza infection     Essential hypertension     Mixed hyperlipidemia        I do not affirm (not billable) this is a Patient Initiated Episode with a Patient who has not had a related appointment within my department in the past 7 days or scheduled within the next 24 hours. Patient identification was verified at the start of the visit: Yes    Total Time: minutes: 21-30 minutes    The visit was conducted pursuant to the emergency declaration under the [facility], [address] [place] [address] authority and the [employer] Act. Patient identification was verified, and a caregiver was present when appropriate. The patient was located in a state where the provider was credentialed to provide care.     Note: not billable if this call serves to triage the patient into an appointment for the relevant concern      Christine Zarate MD

## 2021-12-16 ENCOUNTER — HOSPITAL ENCOUNTER (OUTPATIENT)
Age: 86
Setting detail: SPECIMEN
Discharge: HOME | End: 2021-12-16
Payer: MEDICARE

## 2021-12-16 ENCOUNTER — TELEPHONE (OUTPATIENT)
Dept: INTERNAL MEDICINE CLINIC | Age: 86
End: 2021-12-16

## 2021-12-16 DIAGNOSIS — R26.9 GAIT DISTURBANCE: ICD-10-CM

## 2021-12-16 DIAGNOSIS — S32.020G COMPRESSION FRACTURE OF L2 VERTEBRA WITH DELAYED HEALING: Primary | ICD-10-CM

## 2021-12-16 LAB
ANION GAP SERPL CALCULATED.3IONS-SCNC: 9 MMOL/L (ref 4–16)
BUN BLDV-MCNC: 14 MG/DL (ref 6–23)
CALCIUM SERPL-MCNC: 8.9 MG/DL (ref 8.3–10.6)
CHLORIDE BLD-SCNC: 95 MMOL/L (ref 99–110)
CO2: 31 MMOL/L (ref 21–32)
CREAT SERPL-MCNC: 0.6 MG/DL (ref 0.6–1.1)
GFR AFRICAN AMERICAN: >60 ML/MIN/1.73M2
GFR NON-AFRICAN AMERICAN: >60 ML/MIN/1.73M2
GLUCOSE BLD-MCNC: 90 MG/DL (ref 70–99)
POTASSIUM SERPL-SCNC: 4.5 MMOL/L (ref 3.5–5.1)
SODIUM BLD-SCNC: 135 MMOL/L (ref 135–145)

## 2021-12-16 PROCEDURE — 80048 BASIC METABOLIC PNL TOTAL CA: CPT

## 2021-12-20 ENCOUNTER — TELEPHONE (OUTPATIENT)
Dept: INTERNAL MEDICINE CLINIC | Age: 86
End: 2021-12-20

## 2021-12-20 NOTE — TELEPHONE ENCOUNTER
Patients son-in-law called for her physical therapy and short term rehab order. Stated they took patient to [address] [facility] and the care was not the greatest. Patient left [facility] and son in law is requesting new orders to be sent [address]

## 2021-12-20 NOTE — TELEPHONE ENCOUNTER
Discussed with patient's QUENTIN and he is planning to arrange admit to [facility].  They will contact me once arrangements made

## 2021-12-23 ENCOUNTER — OUTSIDE SERVICES (OUTPATIENT)
Dept: INTERNAL MEDICINE CLINIC | Age: 86
End: 2021-12-23

## 2021-12-23 ENCOUNTER — NURSE ONLY (OUTPATIENT)
Dept: INTERNAL MEDICINE CLINIC | Age: 86
End: 2021-12-23
Payer: MEDICARE

## 2021-12-23 DIAGNOSIS — E11.9 TYPE 2 DIABETES MELLITUS WITHOUT COMPLICATION, WITHOUT LONG-TERM CURRENT USE OF INSULIN (HCC): ICD-10-CM

## 2021-12-23 DIAGNOSIS — G25.81 RESTLESS LEG SYNDROME: ICD-10-CM

## 2021-12-23 DIAGNOSIS — S32.020G COMPRESSION FRACTURE OF L2 VERTEBRA WITH DELAYED HEALING: Primary | ICD-10-CM

## 2021-12-23 DIAGNOSIS — I10 ESSENTIAL HYPERTENSION: ICD-10-CM

## 2021-12-23 DIAGNOSIS — I25.10 CORONARY ARTERY DISEASE INVOLVING NATIVE CORONARY ARTERY OF NATIVE HEART WITHOUT ANGINA PECTORIS: ICD-10-CM

## 2021-12-23 DIAGNOSIS — M54.50 ACUTE MIDLINE LOW BACK PAIN WITHOUT SCIATICA: ICD-10-CM

## 2021-12-23 DIAGNOSIS — M05.79 RHEUMATOID ARTHRITIS INVOLVING MULTIPLE SITES WITH POSITIVE RHEUMATOID FACTOR (HCC): ICD-10-CM

## 2021-12-23 DIAGNOSIS — J45.41 MODERATE PERSISTENT ASTHMA WITH EXACERBATION: ICD-10-CM

## 2021-12-23 DIAGNOSIS — Z20.822 COVID-19 RULED OUT: Primary | ICD-10-CM

## 2021-12-23 PROBLEM — N76.1 SUBACUTE VAGINITIS: Status: RESOLVED | Noted: 2020-10-01 | Resolved: 2021-12-23

## 2021-12-23 LAB
Lab: NORMAL
QC PASS/FAIL: NORMAL
SARS-COV-2 RDRP RESP QL NAA+PROBE: NEGATIVE

## 2021-12-23 PROCEDURE — 87635 SARS-COV-2 COVID-19 AMP PRB: CPT | Performed by: INTERNAL MEDICINE

## 2021-12-23 PROCEDURE — 99305 1ST NF CARE MODERATE MDM 35: CPT | Performed by: INTERNAL MEDICINE

## 2021-12-24 ENCOUNTER — HOSPITAL ENCOUNTER (OUTPATIENT)
Age: 86
Setting detail: SPECIMEN
Discharge: HOME | End: 2021-12-24
Payer: MEDICARE

## 2021-12-24 PROCEDURE — 36415 COLL VENOUS BLD VENIPUNCTURE: CPT

## 2021-12-24 PROCEDURE — 86480 TB TEST CELL IMMUN MEASURE: CPT

## 2021-12-24 NOTE — PROGRESS NOTES
History and Physical.   ___________________________    [place]  Jose's  is 1935  SEEN ON 2021 at [facility]   ___________________________    S:   Patient is seen today and discussed with the nurses. Pt's main c/o is back pain and she is unable to ambulate because of pain and weakness. Recently pt was admitted to hospital for exacerbation of asthma. Pt had cough , dyspnea and hypoxemia. She was aggressive treated and was d/c when improved. Pt continued feel weak and had cough . She coughed and developed severe pain in the back . Brought to ER . CT lumbar spine showed mild acute L2 superior endplate compression fracture . Moderate chronic L3 superior endplate compression fracture with mildly worsened loss of height compared with 2020 and a 3 mm retropulsion. Patient's pain was severe he received morphine in the ER and was discharged on [place].  Patient continued to have pain at home was unable to take care of herself decided to go to a nursing home but did not like it over there and  [address] came home. She called me and wanted to get admitted at [facility]. Arrangements were made. Patient was Covid negative in the office today and she was admitted to the hospital.  Patient still has pain in the lower back. Feels weak. No chest pain. No shortness of breath. No cough or sputum production. No abdominal pain. No nausea, vomiting or diarrhea.   No fever or chills    ALLERGIES:  No Known Allergies       PAST MEDICAL HISTORY:   has a past medical history of Arthritis, CAD (coronary artery disease), Chronic midline low back pain without sciatica, Claustrophobia, Colonoscopy refused, DM (diabetes mellitus), type 2 ([address] Dupuytren's contracture of right hand, Endometrial stripe increased, Gastrointestinal hemorrhage, Glaucoma, H/O echocardiogram, H/O echocardiogram, History of nuclear stress test, HTN (hypertension), Hyperlipidemia, Kidney stone, MI (myocardial infarction) ([address] Obesity, Open wound of right foot, Pneumonia of right lower lobe due to infectious organism, Vertigo, and WD-Traumatic ulcer of foot, right, with fat layer exposed ([address] PAST SURGICAL HISTORY:   has a past surgical history that includes Cataract removal (Bilateral) and Inguinal hernia repair (Left, 45 yrs ago). SOCIAL HISTORY:   reports that she has never smoked. She has never used smokeless tobacco. She reports previous alcohol use. She reports that she does not use drugs. Vital signs:  /72, T 96.5, P 70, RR 18, O2 95 %  GENERAL:  Alert, oriented, pleasant, in no apparent distress. Obese. HEENT:  Conjunctiva pink, no scleral icterus. ENT clear. NECK:  Supple. No jugular venous distention noted. No masses felt,  CARDIOVASCULAR:  Normal S1 and S2    PULMONARY:  No respiratory distress. No wheezes or rales. ABDOMEN:  Soft and non-tender,no masses  or organomegaly. EXTREMITIES:  No cyanosis, clubbing, or significant edema. SKIN: Skin is warm and dry. NEUROLOGICAL:  Cranial nerves II through XII are grossly intact. Tenderness of the lumbar area         Assessment:  . Lumbar compression fracture L2 and L3  . Back pain 2 to above and arthritis   . Gait disturbance   . Diabetes mellitus on glipizide ER 5 mg daily  . Hypertension. On losartan and metoprolol  . Hyperlipidemia: On Crestor  . Rheumatoid arthritis: On Plaquenil and [place] as needed  . Coronary artery disease: On aspirin  . Restless leg syndrome  . Morbid obesity  . Asthma       Plan:  Patient has acute on chronic back pain and with new L2 compression fracture.   We will start patient on Norco, lidocaine patch, PT and OT  Continue albuterol inhaler, Lasix 20 mg daily, glipizide ER 5 mg daily, losartan 50 mg daily, metoprolol ER 25 mg daily, Crestor 10 mg daily, Requip 3 mg at bedtime, Plaquenil 200 mg daily, vitamin D 5000 units daily, aspirin  Medication were reviewed  Continue current treatment.   PT and OT to see patient

## 2021-12-29 ENCOUNTER — HOSPITAL ENCOUNTER (OUTPATIENT)
Age: 86
Setting detail: SPECIMEN
Discharge: HOME | End: 2021-12-29

## 2021-12-29 ENCOUNTER — OUTSIDE SERVICES (OUTPATIENT)
Dept: INTERNAL MEDICINE CLINIC | Age: 86
End: 2021-12-29
Payer: MEDICARE

## 2021-12-29 DIAGNOSIS — E11.9 TYPE 2 DIABETES MELLITUS WITHOUT COMPLICATION, WITHOUT LONG-TERM CURRENT USE OF INSULIN (HCC): ICD-10-CM

## 2021-12-29 DIAGNOSIS — I25.10 CORONARY ARTERY DISEASE INVOLVING NATIVE CORONARY ARTERY OF NATIVE HEART WITHOUT ANGINA PECTORIS: ICD-10-CM

## 2021-12-29 DIAGNOSIS — S32.020G COMPRESSION FRACTURE OF L2 VERTEBRA WITH DELAYED HEALING: Primary | ICD-10-CM

## 2021-12-29 DIAGNOSIS — I10 ESSENTIAL HYPERTENSION: ICD-10-CM

## 2021-12-29 DIAGNOSIS — M54.50 ACUTE MIDLINE LOW BACK PAIN WITHOUT SCIATICA: ICD-10-CM

## 2021-12-29 DIAGNOSIS — E78.2 MIXED HYPERLIPIDEMIA: ICD-10-CM

## 2021-12-29 LAB
QUANTI TB1 MINUS NIL: 0 IU/ML (ref 0–0.34)
QUANTI TB2 MINUS NIL: 0 IU/ML (ref 0–0.34)
QUANTIFERON (R) TB GOLD (INCUBATED): NEGATIVE IU/ML
QUANTIFERON MITOGEN MINUS NIL: 9 IU/ML
QUANTIFERON NIL: 0.03 IU/ML

## 2021-12-29 PROCEDURE — 99306 1ST NF CARE HIGH MDM 50: CPT | Performed by: INTERNAL MEDICINE

## 2021-12-30 ENCOUNTER — HOSPITAL ENCOUNTER (OUTPATIENT)
Age: 86
Setting detail: SPECIMEN
Discharge: HOME | End: 2021-12-30
Payer: MEDICARE

## 2021-12-30 LAB
ALBUMIN SERPL-MCNC: 3.3 GM/DL (ref 3.4–5)
ALP BLD-CCNC: 101 IU/L (ref 40–129)
ALT SERPL-CCNC: <5 U/L (ref 10–40)
ANION GAP SERPL CALCULATED.3IONS-SCNC: 9 MMOL/L (ref 4–16)
AST SERPL-CCNC: 15 IU/L (ref 15–37)
BASOPHILS ABSOLUTE: 0 K/CU MM
BASOPHILS RELATIVE PERCENT: 0.5 % (ref 0–1)
BILIRUB SERPL-MCNC: 0.4 MG/DL (ref 0–1)
BUN BLDV-MCNC: 9 MG/DL (ref 6–23)
CALCIUM SERPL-MCNC: 9.1 MG/DL (ref 8.3–10.6)
CHLORIDE BLD-SCNC: 95 MMOL/L (ref 99–110)
CO2: 28 MMOL/L (ref 21–32)
CREAT SERPL-MCNC: 0.6 MG/DL (ref 0.6–1.1)
DIFFERENTIAL TYPE: ABNORMAL
EOSINOPHILS ABSOLUTE: 0.1 K/CU MM
EOSINOPHILS RELATIVE PERCENT: 0.9 % (ref 0–3)
GFR AFRICAN AMERICAN: >60 ML/MIN/1.73M2
GFR NON-AFRICAN AMERICAN: >60 ML/MIN/1.73M2
GLUCOSE FASTING: 211 MG/DL (ref 70–99)
HCT VFR BLD CALC: 44.2 % (ref 37–47)
HEMOGLOBIN: 13.8 GM/DL (ref 12.5–16)
IMMATURE NEUTROPHIL %: 0.6 % (ref 0–0.43)
LYMPHOCYTES ABSOLUTE: 1.6 K/CU MM
LYMPHOCYTES RELATIVE PERCENT: 19 % (ref 24–44)
MCH RBC QN AUTO: 29.9 PG (ref 27–31)
MCHC RBC AUTO-ENTMCNC: 31.2 % (ref 32–36)
MCV RBC AUTO: 95.7 FL (ref 78–100)
MONOCYTES ABSOLUTE: 0.8 K/CU MM
MONOCYTES RELATIVE PERCENT: 9.7 % (ref 0–4)
PDW BLD-RTO: 14.1 % (ref 11.7–14.9)
PLATELET # BLD: 214 K/CU MM (ref 140–440)
PMV BLD AUTO: 8.8 FL (ref 7.5–11.1)
POTASSIUM SERPL-SCNC: 4.3 MMOL/L (ref 3.5–5.1)
RBC # BLD: 4.62 M/CU MM (ref 4.2–5.4)
SEGMENTED NEUTROPHILS ABSOLUTE COUNT: 6 K/CU MM
SEGMENTED NEUTROPHILS RELATIVE PERCENT: 69.3 % (ref 36–66)
SODIUM BLD-SCNC: 132 MMOL/L (ref 135–145)
TOTAL IMMATURE NEUTOROPHIL: 0.05 K/CU MM
TOTAL PROTEIN: 5.9 GM/DL (ref 6.4–8.2)
WBC # BLD: 8.6 K/CU MM (ref 4–10.5)

## 2021-12-30 PROCEDURE — 85025 COMPLETE CBC W/AUTO DIFF WBC: CPT

## 2021-12-30 PROCEDURE — 36415 COLL VENOUS BLD VENIPUNCTURE: CPT

## 2021-12-30 PROCEDURE — 80053 COMPREHEN METABOLIC PANEL: CPT

## 2021-12-30 NOTE — PROGRESS NOTES
History and Physical.   ___________________________    Jean Pierre Garcia's  is 1935  SEEN ON 2021 at [facility]   ___________________________    S:   Patient is seen today and discussed with the nurses. Pt's main c/o is back pain and she is unable to ambulate because of pain and weakness. Recently pt was admitted to hospital for exacerbation of asthma. Pt had cough , dyspnea and hypoxemia. She was aggressive treated and was d/c when improved. Pt continued feel weak and had cough . She coughed and developed severe pain in the back . Brought to ER . CT lumbar spine showed mild acute L2 superior endplate compression fracture . Moderate chronic L3 superior endplate compression fracture with mildly worsened loss of height compared with 2020 and a 3 mm retropulsion. Patient's pain was severe he received morphine in the ER and was discharged on [place].  Patient continued to have pain at home was unable to take care of herself decided to go to a nursing home but did not like it over there and  [address] came home. She called me and wanted to get admitted at [facility]. Arrangements were made. Patient was Covid negative in the office today and she was admitted to the hospital.  Patient still has pain in the lower back. Feels weak. No chest pain. No shortness of breath. No cough or sputum production. No abdominal pain. No nausea, vomiting or diarrhea.   No fever or chills    ALLERGIES:  No Known Allergies       PAST MEDICAL HISTORY:   has a past medical history of Arthritis, CAD (coronary artery disease), Chronic midline low back pain without sciatica, Claustrophobia, Colonoscopy refused, DM (diabetes mellitus), type 2 ([address] Dupuytren's contracture of right hand, Endometrial stripe increased, Gastrointestinal hemorrhage, Glaucoma, H/O echocardiogram, H/O echocardiogram, History of nuclear stress test, HTN (hypertension), Hyperlipidemia, Kidney stone, MI (myocardial infarction) ([address] Obesity, Open wound of right foot, Pneumonia of right lower lobe due to infectious organism, Vertigo, and WD-Traumatic ulcer of foot, right, with fat layer exposed ([address] PAST SURGICAL HISTORY:   has a past surgical history that includes Cataract removal (Bilateral) and Inguinal hernia repair (Left, 45 yrs ago). SOCIAL HISTORY:   reports that she has never smoked. She has never used smokeless tobacco. She reports previous alcohol use. She reports that she does not use drugs. Vital signs:  /72, T 96.5, P 70, RR 18, O2 95 %  GENERAL:  Alert, oriented, pleasant, in no apparent distress. Obese. HEENT:  Conjunctiva pink, no scleral icterus. ENT clear. NECK:  Supple. No jugular venous distention noted. No masses felt,  CARDIOVASCULAR:  Normal S1 and S2    PULMONARY:  No respiratory distress. No wheezes or rales. ABDOMEN:  Soft and non-tender,no masses  or organomegaly. EXTREMITIES:  No cyanosis, clubbing, or significant edema. SKIN: Skin is warm and dry. NEUROLOGICAL:  Cranial nerves II through XII are grossly intact. Tenderness of the lumbar area         Assessment:  . Lumbar compression fracture L2 and L3  . Back pain 2 to above and arthritis   . Gait disturbance   . Diabetes mellitus on glipizide ER 5 mg daily  . Hypertension. On losartan and metoprolol  . Hyperlipidemia: On Crestor  . Rheumatoid arthritis: On Plaquenil and [place] as needed  . Coronary artery disease: On aspirin  . Restless leg syndrome  . Morbid obesity  . Asthma       Plan:  Patient has acute on chronic back pain and with new L2 compression fracture.   We will start patient on Norco, lidocaine patch, PT and OT  Continue albuterol inhaler, Lasix 20 mg daily, glipizide ER 5 mg daily, losartan 50 mg daily, metoprolol ER 25 mg daily, Crestor 10 mg daily, Requip 3 mg at bedtime, Plaquenil 200 mg daily, vitamin D 5000 units daily, aspirin  Medication were reviewed  Continue current treatment.   PT and OT to see patient

## 2022-01-05 ENCOUNTER — OUTSIDE SERVICES (OUTPATIENT)
Dept: INTERNAL MEDICINE CLINIC | Age: 87
End: 2022-01-05
Payer: MEDICARE

## 2022-01-05 DIAGNOSIS — I25.10 CORONARY ARTERY DISEASE INVOLVING NATIVE CORONARY ARTERY OF NATIVE HEART WITHOUT ANGINA PECTORIS: ICD-10-CM

## 2022-01-05 DIAGNOSIS — I10 ESSENTIAL HYPERTENSION: ICD-10-CM

## 2022-01-05 DIAGNOSIS — M05.79 RHEUMATOID ARTHRITIS INVOLVING MULTIPLE SITES WITH POSITIVE RHEUMATOID FACTOR (HCC): ICD-10-CM

## 2022-01-05 DIAGNOSIS — E11.9 TYPE 2 DIABETES MELLITUS WITHOUT COMPLICATION, WITHOUT LONG-TERM CURRENT USE OF INSULIN (HCC): ICD-10-CM

## 2022-01-05 DIAGNOSIS — G25.81 RESTLESS LEG SYNDROME: ICD-10-CM

## 2022-01-05 DIAGNOSIS — S32.020G COMPRESSION FRACTURE OF L2 VERTEBRA WITH DELAYED HEALING: Primary | ICD-10-CM

## 2022-01-05 DIAGNOSIS — M54.50 ACUTE MIDLINE LOW BACK PAIN WITHOUT SCIATICA: ICD-10-CM

## 2022-01-05 DIAGNOSIS — E78.2 MIXED HYPERLIPIDEMIA: ICD-10-CM

## 2022-01-05 PROCEDURE — 99315 NF DSCHRG MGMT 30 MIN/LESS: CPT | Performed by: INTERNAL MEDICINE

## 2022-01-06 ENCOUNTER — HOSPITAL ENCOUNTER (OUTPATIENT)
Dept: GENERAL RADIOLOGY | Age: 87
Discharge: HOME | End: 2022-01-06
Payer: MEDICARE

## 2022-01-06 ENCOUNTER — HOSPITAL ENCOUNTER (OUTPATIENT)
Age: 87
End: 2022-01-06
Payer: MEDICARE

## 2022-01-06 DIAGNOSIS — R09.81 CHRONIC NASAL CONGESTION: ICD-10-CM

## 2022-01-06 PROCEDURE — 71046 X-RAY EXAM CHEST 2 VIEWS: CPT

## 2022-01-06 NOTE — PROGRESS NOTES
Progress note   ___________________________    Anjum Garcia's  is 1935  SEEN ON 2022 at [facility]   ___________________________    S:  Patient is seen today and discussed with the nurses. Patient is complaining of cough. No fever or chills. No shortness of breath  Her back pain is improving. She is able to ambulate with the help of walker. Previous note :   Recently pt was admitted to hospital for exacerbation of asthma. Pt had cough , dyspnea and hypoxemia. She was aggressive treated and was d/c when improved. Pt continued feel weak and had cough . She coughed and developed severe pain in the back . Brought to ER . CT lumbar spine showed mild acute L2 superior endplate compression fracture . Moderate chronic L3 superior endplate compression fracture with mildly worsened loss of height compared with 2020 and a 3 mm retropulsion. Patient's pain was severe he received morphine in the ER and was discharged on [place].  Patient continued to have pain at home was unable to take care of herself decided to go to a nursing home but did not like it over there and  [address] came home. She called me and wanted to get admitted at [facility]. Arrangements were made. Patient was Covid negative in the office today and she was admitted to the hospital.  Patient still has pain in the lower back. But she is better. No chest pain. No shortness of breath. No abdominal pain. No nausea, vomiting or diarrhea.   No fever or chills    ALLERGIES:  No Known Allergies       PAST MEDICAL HISTORY:   has a past medical history of Arthritis, CAD (coronary artery disease), Chronic midline low back pain without sciatica, Claustrophobia, Colonoscopy refused, DM (diabetes mellitus), type 2 ([address] Dupuytren's contracture of right hand, Endometrial stripe increased, Gastrointestinal hemorrhage, Glaucoma, H/O health care and [facility] will require the following home care treatments or therapies: PT and OT. Home care will be necessary because of generalized weakness. Gait disturbance back pain due to compression fracture. The patient is in agreement to receiving home care. Continue albuterol inhaler, Lasix 20 mg daily, glipizide ER 5 mg daily, losartan 50 mg daily, metoprolol ER 25 mg daily, Crestor 10 mg daily, Requip 3 mg at bedtime, Plaquenil 200 mg daily, vitamin D 5000 units daily, aspirin  Medication were reviewed  Continue current treatment. Patient was getting the pain medication from rheumatologist.  If back pain is not better may need to see neurosurgeon.

## 2022-01-10 ENCOUNTER — TELEPHONE (OUTPATIENT)
Dept: INTERNAL MEDICINE CLINIC | Age: 87
End: 2022-01-10

## 2022-01-10 DIAGNOSIS — M54.50 ACUTE LOW BACK PAIN WITHOUT SCIATICA, UNSPECIFIED BACK PAIN LATERALITY: Primary | ICD-10-CM

## 2022-01-10 RX ORDER — HYDROCODONE BITARTRATE AND ACETAMINOPHEN 5; 325 MG/1; MG/1
1 TABLET ORAL EVERY 6 HOURS PRN
Qty: 12 TABLET | Refills: 0 | Status: SHIPPED | OUTPATIENT
Start: 2022-01-10 | End: 2022-01-11 | Stop reason: SDUPTHER

## 2022-01-10 NOTE — TELEPHONE ENCOUNTER
I can call the pain medication only for 3 days. She needs to come to the office in follow-up and sign a contract for the pain medication. Ask her if she is getting the pain medication from arthritis doctor also.

## 2022-01-10 NOTE — TELEPHONE ENCOUNTER
Need to be evaluated for that.  Check with [employer] if they will supply the lift chair with out evaluation for DME

## 2022-01-10 NOTE — TELEPHONE ENCOUNTER
Patient called stated that she was sent home with out pain medication on Friday and she is in extreme pain and wanted to know if you would call in [address] same medication she was taking at [facility]

## 2022-01-10 NOTE — TELEPHONE ENCOUNTER
Inder Romo with [employer] called requesting a lift chair rx for patient due to patients weakned condition. Please advise.

## 2022-01-11 ENCOUNTER — OFFICE VISIT (OUTPATIENT)
Dept: INTERNAL MEDICINE CLINIC | Age: 87
End: 2022-01-11
Payer: MEDICARE

## 2022-01-11 VITALS
TEMPERATURE: 97.3 F | RESPIRATION RATE: 22 BRPM | DIASTOLIC BLOOD PRESSURE: 68 MMHG | WEIGHT: 224.6 LBS | OXYGEN SATURATION: 95 % | SYSTOLIC BLOOD PRESSURE: 124 MMHG | HEART RATE: 76 BPM | BODY MASS INDEX: 38.55 KG/M2

## 2022-01-11 DIAGNOSIS — E66.01 MORBID OBESITY WITH BMI OF 40.0-44.9, ADULT (HCC): ICD-10-CM

## 2022-01-11 DIAGNOSIS — G89.4 CHRONIC PAIN SYNDROME: ICD-10-CM

## 2022-01-11 DIAGNOSIS — E78.2 MIXED HYPERLIPIDEMIA: ICD-10-CM

## 2022-01-11 DIAGNOSIS — G25.81 RESTLESS LEG SYNDROME: ICD-10-CM

## 2022-01-11 DIAGNOSIS — M54.50 ACUTE LOW BACK PAIN WITHOUT SCIATICA, UNSPECIFIED BACK PAIN LATERALITY: Primary | ICD-10-CM

## 2022-01-11 DIAGNOSIS — J45.41 MODERATE PERSISTENT ASTHMA WITH EXACERBATION: ICD-10-CM

## 2022-01-11 DIAGNOSIS — E11.9 TYPE 2 DIABETES MELLITUS WITHOUT COMPLICATION, WITHOUT LONG-TERM CURRENT USE OF INSULIN (HCC): ICD-10-CM

## 2022-01-11 DIAGNOSIS — S32.020G COMPRESSION FRACTURE OF L2 VERTEBRA WITH DELAYED HEALING: ICD-10-CM

## 2022-01-11 DIAGNOSIS — H91.93 BILATERAL HEARING LOSS, UNSPECIFIED HEARING LOSS TYPE: ICD-10-CM

## 2022-01-11 DIAGNOSIS — M05.79 RHEUMATOID ARTHRITIS INVOLVING MULTIPLE SITES WITH POSITIVE RHEUMATOID FACTOR (HCC): ICD-10-CM

## 2022-01-11 PROCEDURE — G8399 PT W/DXA RESULTS DOCUMENT: HCPCS | Performed by: INTERNAL MEDICINE

## 2022-01-11 PROCEDURE — G8417 CALC BMI ABV UP PARAM F/U: HCPCS | Performed by: INTERNAL MEDICINE

## 2022-01-11 PROCEDURE — G8427 DOCREV CUR MEDS BY ELIG CLIN: HCPCS | Performed by: INTERNAL MEDICINE

## 2022-01-11 PROCEDURE — 1123F ACP DISCUSS/DSCN MKR DOCD: CPT | Performed by: INTERNAL MEDICINE

## 2022-01-11 PROCEDURE — 1090F PRES/ABSN URINE INCON ASSESS: CPT | Performed by: INTERNAL MEDICINE

## 2022-01-11 PROCEDURE — 1036F TOBACCO NON-USER: CPT | Performed by: INTERNAL MEDICINE

## 2022-01-11 PROCEDURE — 4040F PNEUMOC VAC/ADMIN/RCVD: CPT | Performed by: INTERNAL MEDICINE

## 2022-01-11 PROCEDURE — 99214 OFFICE O/P EST MOD 30 MIN: CPT | Performed by: INTERNAL MEDICINE

## 2022-01-11 PROCEDURE — G8484 FLU IMMUNIZE NO ADMIN: HCPCS | Performed by: INTERNAL MEDICINE

## 2022-01-11 RX ORDER — HYDROCODONE BITARTRATE AND ACETAMINOPHEN 5; 325 MG/1; MG/1
1 TABLET ORAL EVERY 6 HOURS PRN
Qty: 120 TABLET | Refills: 0 | Status: SHIPPED | OUTPATIENT
Start: 2022-01-11 | End: 2022-02-10

## 2022-01-11 ASSESSMENT — PATIENT HEALTH QUESTIONNAIRE - PHQ9
SUM OF ALL RESPONSES TO PHQ QUESTIONS 1-9: 2
1. LITTLE INTEREST OR PLEASURE IN DOING THINGS: 1
SUM OF ALL RESPONSES TO PHQ QUESTIONS 1-9: 2
SUM OF ALL RESPONSES TO PHQ9 QUESTIONS 1 & 2: 2
SUM OF ALL RESPONSES TO PHQ QUESTIONS 1-9: 2
SUM OF ALL RESPONSES TO PHQ QUESTIONS 1-9: 2
2. FEELING DOWN, DEPRESSED OR HOPELESS: 1

## 2022-01-11 NOTE — PROGRESS NOTES
Heriberto Majano  Patient's  is 1935  Seen in office on 2022      SUBJECTIVE:  Kj Yang anand 81 y. o.year old female presents today   Chief Complaint   Patient presents with    Other     rx for lift chair    Medication Refill     pain medication    Hearing Problem     x 4-5 days    Other     would like to know if she can get OT and PT at home     Patient states she has sudden onset of back pain on 2021 when she started having coughing spells. She denies any fever or chills. She went to the emergency room on 2021 and had CT scan of the lumbar spine which showed mild acute L2 superior endplate compression fracture and moderate chronic L3 superior endplate compression fracture with mildly worsened loss of height compared to 2020 and a 3 mm retropulsion. Patient states the pain is severe and she was not able to take care of herself at home therefore she was admitted to the nursing home for 2 weeks for physical therapy and OT. Patient did fairly well and was able to walk with the help of the wheeled walker. She still has some difficulty getting up from the chair because of the back pain. Patient is requesting a lift chair. Her pain still fluctuates quite a bit and gets increased when she tries to get up and ambulates. No tingling numbness of the legs. She has pain in the right sacroiliac area also. No fever or chills  Patient not able to get out of her home or drive  Patient is brought by her son in law. Patient was taking Norco in the nursing home that was controlling her pain. Patient denies any side effects to the medication. Taking medications regularly. No side effects noted.     Review of Systems    Review of system is normal except as in HPI    OBJECTIVE: /68   Pulse 76   Temp 97.3 °F (36.3 °C) (Oral)   Resp 22   Wt 224 lb 9.6 oz (101.9 kg)   LMP  (LMP Unknown)   SpO2 95%   BMI 38.55 kg/m²     Wt Readings from Last 3 Encounters:   22 224 lb 9.6 oz (101.9 kg)   12/14/21 240 lb (108.9 kg)   12/05/21 240 lb 1.6 oz (108.9 kg)      Patient was seen taking COVID-19 precautions. Face mask, gloves were used. Patient also wore facemask. GENERAL: - Alert, oriented, pleasant, in no apparent distress. HEENT: - Conjunctiva pink, no scleral icterus. ENT clear. NECK: -Supple. No jugular venous distention noted. No masses felt,  CARDIOVASCULAR: - Normal S1 and S2    PULMONARY: - No respiratory distress. No wheezes or rales. ABDOMEN: - Soft and non-tender,no masses  ororganomegaly. EXTREMITIES: - No cyanosis, clubbing, or significant edema. SKIN: Skin is warm and dry. NEUROLOGICAL: - Cranial nerves II through XII are grossly intact. Back : tenderness in the lower back    IMPRESSION:    Encounter Diagnoses   Name Primary?  Acute low back pain without sciatica, unspecified back pain laterality Yes    Rheumatoid arthritis involving multiple sites with positive rheumatoid factor (HCC)     Compression fracture of L2 vertebra with delayed healing     Bilateral hearing loss, unspecified hearing loss type     Chronic pain syndrome        ASSESSMENT/PLAN:    Back pain : pt pain not in control : had compression fractures also  Will refer pt to neurosurgoen for that  Dr [place] Luevano   PT and OT at home through [address]  Medication contract. Will order Norco 5-3 25 every 6 hours as needed    Rheumatoid arthritis. Patient goes to the rheumatologist in [place]    Compression fracture of L2. Will refer to neurosurgeon    Bilateral hearing loss: Refer patient to ear nose and throat    Diabetes mellitus stable continue current medications    Hypertension controlled continue current medications    Hyperlipidemia.   Patient is on Crestor    Restless leg syndrome.:  Stable    Gait disturbance: Because of the back pain    [employer] was evaluated today and a DME order was entered for a chair lift because she requires this to successfully ambulate out a chair so she can complete daily living tasks. A chair life will effect improvement for her condition. she is able to ambulate once standing. she is incapable of standing up from any chair in her home without a seat-lift mechanism. her diagnosis is compression fracture of L2 and arthritis the need for this equipment was discussed with the patient and she understands and is in agreement. Mavis Soto will require the following home care treatments or therapies: PT and OT. Home care will be necessary because of homebound status. The patient is in agreement to receiving home care. Orders Placed This Encounter   Procedures    Opiate, quantitative, urine    External Referral - Claudia Ambrocio MD, Neurosurgery, [facility] External Referral To ENT    DME Order for [facility]) as OP           Orders Placed This Encounter   Medications    HYDROcodone-acetaminophen (NORCO) 5-325 MG per tablet     Sig: Take 1 tablet by mouth every 6 hours as needed for Pain for up to 30 days. Dispense:  120 tablet     Refill:  0     Reduce doses taken as pain becomes manageable             Mediations reviewed with the patient. Continue current medications. Appropriate prescriptions are addressed. After visit summeryprovided. Follow up as directed sooner if needed. Questions answered and patient verbalizes understanding. Call for any problems, questions, or concerns. No Known Allergies  Current Outpatient Medications   Medication Sig Dispense Refill    HYDROcodone-acetaminophen (NORCO) 5-325 MG per tablet Take 1 tablet by mouth every 6 hours as needed for Pain for up to 3 days. Intended supply: 3 days.  Take lowest dose possible to manage pain 12 tablet 0    furosemide (LASIX) 20 MG tablet Take 1 tablet by mouth daily 60 tablet 3    albuterol sulfate  (90 Base) MCG/ACT inhaler INHALE 2 PUFFS INTO THE LUNGS 4 TIMES DAILY AS NEEDED FOR WHEEZING 8.5 g 5    glipiZIDE (GLUCOTROL XL) 5 MG extended release tablet Take 1 tablet by mouth daily 90 tablet 1    losartan (COZAAR) 50 MG tablet TAKE 1 TABLET BY MOUTH DAILY 90 tablet 1    metoprolol tartrate (LOPRESSOR) 25 MG tablet TAKE ONE TABLET BY MOUTH TWO TIMES A DAY (Patient taking differently: Take 25 mg by mouth 2 times daily ) 180 tablet 1    rosuvastatin (CRESTOR) 10 MG tablet Take 1 tablet by mouth nightly 90 tablet 1    rOPINIRole (REQUIP) 2 MG tablet TAKE 1 TABLET BY MOUTH NIGHTLY (Patient taking differently: Take 3 mg by mouth nightly ) 90 tablet 1    Multiple Vitamins-Minerals (PRESERVISION AREDS) TABS Take 1 tablet by mouth 2 times daily      hydroxychloroquine (PLAQUENIL) 200 MG tablet Take 200 mg by mouth daily       aspirin 81 MG EC tablet Take 81 mg by mouth daily  30 tablet     Cholecalciferol (VITAMIN D PO) Take 5,000 Units by mouth daily Indications: pt states take TID       ipratropium-albuterol (DUONEB) 0.5-2.5 (3) MG/3ML SOLN nebulizer solution Inhale 3 mLs into the lungs 4 times daily Use four times daily for 2 weeks then q4 hours as needed 360 mL 0     No current facility-administered medications for this visit. Past Medical History:   Diagnosis Date    Arthritis     left knee pain, sees Dr. Debo Kim CAD (coronary artery disease)     sees Dr. Chema Cisneros Chronic midline low back pain without sciatica 9/28/2016    Had PT in 2016   [address] Colonoscopy refused     discussed in 7/15    DM [facility]diabetes mellitus[facility] type 2 ([facility] 02/2006    Dupuytren's contracture of right hand     Endometrial stripe increased 9/25/2014    Saw NP Berhane Rinaldi. Was normal exam per pt.  Gastrointestinal hemorrhage 11/2/2015    Patient had GI bleeding. Colon refused. Discussed with patient in detail.  Glaucoma 4/1/2014    H/O echocardiogram 10/02/2014    Mildly depressed left ventricular function; ejection fraction of 45%. Moderate pulmonary hypertension.  H/O echocardiogram 08/28/2018    EF 50-55%.  Mitral annular calcification is present.  No other significant valvulopathy seen.  History of nuclear stress test 08/28/2018    EF 59%. ECG portion of stress test is negative for ischemia by diagnostic criteria. fixed inferior large size severe defect in rest and stress images. Mild hubert infarct ischemia.     HTN (hypertension)     Hyperlipidemia     Kidney stone     hx-\"been about 3 yrs ago\"    MI (myocardial infarction) ([facility] 02/1998    treated with TPA    Obesity     Open wound of right foot 7/27/2020    Pneumonia of right lower lobe due to infectious organism 4/5/2018    Vertigo     'have been having this since I had cataract surgery\"\"that is why they are doing the MRA of my brain(10/31/2014)    WD-Traumatic ulcer of foot, right, with fat layer exposed ([facility] 8/3/2020     Past Surgical History:   Procedure Laterality Date    CATARACT REMOVAL Bilateral     5/14,8/14    INGUINAL HERNIA REPAIR Left 45 yrs ago     Social History     Tobacco Use    Smoking status: Never Smoker    Smokeless tobacco: Never Used   Substance Use Topics    Alcohol use: Not Currently     Comment: OCCASSIONAL       LAB REVIEW:  CBC:   Lab Results   Component Value Date    WBC 8.6 12/30/2021    HGB 13.8 12/30/2021    HCT 44.2 12/30/2021     12/30/2021     Lipids:   Lab Results   Component Value Date    HDL 47 11/19/2021    LDLCALC 53 11/19/2021    LDLDIRECT 69 08/18/2017    TRIGLYCFAST 117 11/19/2021    CHOLFAST 123 11/19/2021     Renal:   Lab Results   Component Value Date    BUN 9 12/30/2021    CREATININE 0.6 12/30/2021     12/30/2021    K 4.3 12/30/2021    K 3.0 03/08/2018    ALT <5 12/30/2021    AST 15 12/30/2021    GLUCOSE 90 12/16/2021    GLUF 211 12/30/2021     PT/INR:   Lab Results   Component Value Date    INR 1.00 01/11/2019     A1C:   Lab Results   Component Value Date    LABA1C 6.4 11/19/2021           Marcela Apley, MD, 1/11/2022 , 3:27 PM

## 2022-01-12 ENCOUNTER — TELEPHONE (OUTPATIENT)
Dept: INTERNAL MEDICINE CLINIC | Age: 87
End: 2022-01-12

## 2022-01-12 NOTE — TELEPHONE ENCOUNTER
----- Message from Lincoln Hane sent at 1/10/2022  1:01 PM EST -----  Subject: Message to Provider    QUESTIONS  Information for Provider? Pt is requesting [place] be sent to her pharmacy   for pain. She was sent home from PT and OT with the [facility] on   Friday and while she was there, they gave her Norco every 6 hours but they   did not give her any medication when she left. She has been in severe pain   to the point that she cries, and she can't sleep.   ---------------------------------------------------------------------------  --------------  CALL BACK INFO  What is the best way for the office to contact you? OK to leave message on   voicemail  Preferred Call Back Phone Number? [phone]  ---------------------------------------------------------------------------  --------------  SCRIPT ANSWERS  Relationship to Patient?  Self

## 2022-01-13 ENCOUNTER — TELEPHONE (OUTPATIENT)
Dept: INTERNAL MEDICINE CLINIC | Age: 87
End: 2022-01-13

## 2022-01-13 NOTE — TELEPHONE ENCOUNTER
Patient called stating that she is constipated and stool softner is not helping. Has tried prune juice to no avail. OK for 1 Fleets enema and continue stool softners. Inst to call office if no results. Verbal understanding returned.

## 2022-01-15 LAB
6AM URINE: <10 NG/ML
CODEINE, URINE: <20 NG/ML
HYDROCODONE, URINE: 1266 NG/ML
HYDROMORPHONE, URINE: <20 NG/ML
MORPHINE URINE: <20 NG/ML
NORHYDROCODONE, URINE: 2524 NG/ML
NOROXYCODONE, URINE: <20 NG/ML
NOROXYMORPHONE, URINE: <20 NG/ML
OXYCODONE, URINE CONFIRMATION: <20 NG/ML
OXYMORPHONE, URINE: <20 NG/ML

## 2022-01-17 RX ORDER — GLUCOSAM/CHON-MSM1/C/MANG/BOSW 500-416.6
TABLET ORAL
Qty: 100 EACH | Refills: 3 | Status: ON HOLD | OUTPATIENT
Start: 2022-01-17 | End: 2022-05-19

## 2022-01-24 ENCOUNTER — TELEPHONE (OUTPATIENT)
Dept: INTERNAL MEDICINE CLINIC | Age: 87
End: 2022-01-24

## 2022-01-24 NOTE — TELEPHONE ENCOUNTER
Patient states that the Aimee Tannerdean is not helping and wants to know if there is anything else to take?  Offered appt for today and she cannot come in til 2-1-22

## 2022-01-24 NOTE — TELEPHONE ENCOUNTER
Needs to see pain clinic for further medications.  Check if she can go to pain clinic in [facility]

## 2022-01-31 ENCOUNTER — TELEPHONE (OUTPATIENT)
Dept: INTERNAL MEDICINE CLINIC | Age: 87
End: 2022-01-31

## 2022-01-31 NOTE — TELEPHONE ENCOUNTER
Patient called stated that you had called her in reference to a appointment with another physician and wanted to know if she needs to keep her appt coming up with Dr. Chloe Joyce also

## 2022-02-01 DIAGNOSIS — M54.50 ACUTE MIDLINE LOW BACK PAIN WITHOUT SCIATICA: Primary | ICD-10-CM

## 2022-02-01 DIAGNOSIS — S32.020G COMPRESSION FRACTURE OF L2 VERTEBRA WITH DELAYED HEALING: ICD-10-CM

## 2022-02-01 NOTE — TELEPHONE ENCOUNTER
Inform patient she is referred to pain clinic. Referral is in epic.   Please fax that the referral to integrated pain clinic in [facility]

## 2022-02-01 NOTE — TELEPHONE ENCOUNTER
Patient wanted to stay in [place]. Faxed referral to Dr. Jarred Goodrich at [facility].

## 2022-02-08 ENCOUNTER — TELEPHONE (OUTPATIENT)
Dept: INTERNAL MEDICINE CLINIC | Age: 87
End: 2022-02-08

## 2022-02-08 NOTE — TELEPHONE ENCOUNTER
KAM SALGADO [facility] with [address] came in requesting an order for a [address] be placed. Please place order so I can fax it over.

## 2022-02-09 DIAGNOSIS — S32.020G COMPRESSION FRACTURE OF L2 VERTEBRA WITH DELAYED HEALING: Primary | ICD-10-CM

## 2022-02-10 ENCOUNTER — TELEPHONE (OUTPATIENT)
Dept: INTERNAL MEDICINE CLINIC | Age: 87
End: 2022-02-10

## 2022-02-10 RX ORDER — ROPINIROLE 3 MG/1
3 TABLET, FILM COATED ORAL NIGHTLY
Qty: 30 TABLET | Refills: 3 | Status: SHIPPED | OUTPATIENT
Start: 2022-02-10 | End: 2022-04-25

## 2022-02-10 RX ORDER — GLIPIZIDE 5 MG/1
5 TABLET, FILM COATED, EXTENDED RELEASE ORAL DAILY
Qty: 90 TABLET | Refills: 1 | Status: ON HOLD | OUTPATIENT
Start: 2022-02-10 | End: 2022-06-02 | Stop reason: HOSPADM

## 2022-02-15 ENCOUNTER — OFFICE VISIT (OUTPATIENT)
Dept: INTERNAL MEDICINE CLINIC | Age: 87
End: 2022-02-15
Payer: MEDICARE

## 2022-02-15 VITALS
HEIGHT: 64 IN | OXYGEN SATURATION: 99 % | WEIGHT: 224 LBS | RESPIRATION RATE: 16 BRPM | HEART RATE: 66 BPM | BODY MASS INDEX: 38.24 KG/M2 | DIASTOLIC BLOOD PRESSURE: 60 MMHG | SYSTOLIC BLOOD PRESSURE: 118 MMHG

## 2022-02-15 DIAGNOSIS — S32.020G COMPRESSION FRACTURE OF L2 VERTEBRA WITH DELAYED HEALING, SUBSEQUENT ENCOUNTER: ICD-10-CM

## 2022-02-15 DIAGNOSIS — E78.2 MIXED HYPERLIPIDEMIA: ICD-10-CM

## 2022-02-15 DIAGNOSIS — M54.50 ACUTE LOW BACK PAIN WITHOUT SCIATICA, UNSPECIFIED BACK PAIN LATERALITY: Primary | ICD-10-CM

## 2022-02-15 PROCEDURE — 1123F ACP DISCUSS/DSCN MKR DOCD: CPT | Performed by: NURSE PRACTITIONER

## 2022-02-15 PROCEDURE — G8417 CALC BMI ABV UP PARAM F/U: HCPCS | Performed by: NURSE PRACTITIONER

## 2022-02-15 PROCEDURE — 1090F PRES/ABSN URINE INCON ASSESS: CPT | Performed by: NURSE PRACTITIONER

## 2022-02-15 PROCEDURE — 4040F PNEUMOC VAC/ADMIN/RCVD: CPT | Performed by: NURSE PRACTITIONER

## 2022-02-15 PROCEDURE — G8427 DOCREV CUR MEDS BY ELIG CLIN: HCPCS | Performed by: NURSE PRACTITIONER

## 2022-02-15 PROCEDURE — G8484 FLU IMMUNIZE NO ADMIN: HCPCS | Performed by: NURSE PRACTITIONER

## 2022-02-15 PROCEDURE — 99213 OFFICE O/P EST LOW 20 MIN: CPT | Performed by: NURSE PRACTITIONER

## 2022-02-15 PROCEDURE — G8399 PT W/DXA RESULTS DOCUMENT: HCPCS | Performed by: NURSE PRACTITIONER

## 2022-02-15 PROCEDURE — 1036F TOBACCO NON-USER: CPT | Performed by: NURSE PRACTITIONER

## 2022-02-15 RX ORDER — HYDROCODONE BITARTRATE AND ACETAMINOPHEN 5; 325 MG/1; MG/1
1 TABLET ORAL EVERY 6 HOURS PRN
COMMUNITY
End: 2022-02-15 | Stop reason: SDUPTHER

## 2022-02-15 RX ORDER — HYDROCODONE BITARTRATE AND ACETAMINOPHEN 5; 325 MG/1; MG/1
1 TABLET ORAL EVERY 6 HOURS PRN
Qty: 32 TABLET | Refills: 0 | Status: SHIPPED | OUTPATIENT
Start: 2022-02-15 | End: 2022-02-23 | Stop reason: ALTCHOICE

## 2022-02-15 RX ORDER — AMPICILLIN TRIHYDRATE 250 MG
200 CAPSULE ORAL 2 TIMES DAILY
Status: ON HOLD | COMMUNITY
End: 2022-05-18 | Stop reason: HOSPADM

## 2022-02-15 RX ORDER — ROSUVASTATIN CALCIUM 10 MG/1
10 TABLET, COATED ORAL NIGHTLY
Qty: 90 TABLET | Refills: 0 | Status: SHIPPED | OUTPATIENT
Start: 2022-02-15 | End: 2022-04-25 | Stop reason: SDUPTHER

## 2022-02-15 RX ORDER — POTASSIUM CHLORIDE 750 MG/1
10 TABLET, EXTENDED RELEASE ORAL 2 TIMES DAILY
COMMUNITY
End: 2022-02-23 | Stop reason: ALTCHOICE

## 2022-02-15 SDOH — ECONOMIC STABILITY: FOOD INSECURITY: WITHIN THE PAST 12 MONTHS, THE FOOD YOU BOUGHT JUST DIDN'T LAST AND YOU DIDN'T HAVE MONEY TO GET MORE.: NEVER TRUE

## 2022-02-15 SDOH — ECONOMIC STABILITY: FOOD INSECURITY: WITHIN THE PAST 12 MONTHS, YOU WORRIED THAT YOUR FOOD WOULD RUN OUT BEFORE YOU GOT MONEY TO BUY MORE.: NEVER TRUE

## 2022-02-15 ASSESSMENT — SOCIAL DETERMINANTS OF HEALTH (SDOH): HOW HARD IS IT FOR YOU TO PAY FOR THE VERY BASICS LIKE FOOD, HOUSING, MEDICAL CARE, AND HEATING?: NOT HARD AT ALL

## 2022-02-15 NOTE — PROGRESS NOTES
her Crestor. Review of Systems   Constitutional: Negative for appetite change, chills, fatigue and fever. HENT: Negative for congestion, ear pain, postnasal drip, rhinorrhea, sinus pressure, sinus pain, sneezing and sore throat. Respiratory: Negative for cough, chest tightness, shortness of breath and wheezing. Cardiovascular: Negative for chest pain and palpitations. Gastrointestinal: Negative for diarrhea, nausea and vomiting. Musculoskeletal: Positive for back pain. Skin: Negative for rash. Neurological: Negative for dizziness, light-headedness and headaches. Current Outpatient Medications   Medication Sig Dispense Refill    Coenzyme Q10 (COQ10) 200 MG CAPS Take 200 mg by mouth daily      potassium chloride (KLOR-CON M) 10 MEQ extended release tablet Take 10 mEq by mouth 2 times daily      rosuvastatin (CRESTOR) 10 MG tablet Take 1 tablet by mouth nightly 90 tablet 0    HYDROcodone-acetaminophen (NORCO) 5-325 MG per tablet Take 1 tablet by mouth every 6 hours as needed for Pain for up to 8 days.  32 tablet 0    rOPINIRole (REQUIP) 3 MG tablet Take 1 tablet by mouth nightly 30 tablet 3    glipiZIDE (GLUCOTROL XL) 5 MG extended release tablet Take 1 tablet by mouth daily 90 tablet 1    TRUEplus Lancets 28G MISC DX=E11.9 100 each 3    furosemide (LASIX) 20 MG tablet Take 1 tablet by mouth daily 60 tablet 3    albuterol sulfate  (90 Base) MCG/ACT inhaler INHALE 2 PUFFS INTO THE LUNGS 4 TIMES DAILY AS NEEDED FOR WHEEZING 8.5 g 5    losartan (COZAAR) 50 MG tablet TAKE 1 TABLET BY MOUTH DAILY 90 tablet 1    metoprolol tartrate (LOPRESSOR) 25 MG tablet TAKE ONE TABLET BY MOUTH TWO TIMES A DAY (Patient taking differently: Take 25 mg by mouth 2 times daily ) 180 tablet 1    Multiple Vitamins-Minerals (PRESERVISION AREDS) TABS Take 1 tablet by mouth 2 times daily      hydroxychloroquine (PLAQUENIL) 200 MG tablet Take 200 mg by mouth daily       aspirin 81 MG EC tablet Take 81 mg by mouth daily  30 tablet     Cholecalciferol (VITAMIN D PO) Take 5,000 Units by mouth daily Indications: pt states take TID       ipratropium-albuterol (DUONEB) 0.5-2.5 (3) MG/3ML SOLN nebulizer solution Inhale 3 mLs into the lungs 4 times daily Use four times daily for 2 weeks then q4 hours as needed 360 mL 0     No current facility-administered medications for this visit. Past medical, family,surgical history reviewed today. Objective:  /60   Pulse 66   Resp 16   Ht 5' 4\" (1.626 m)   Wt 224 lb (101.6 kg)   LMP  (LMP Unknown)   SpO2 99%   BMI 38.45 kg/m²   BP Readings from Last 3 Encounters:   02/15/22 118/60   01/11/22 124/68   12/14/21 114/65     Wt Readings from Last 3 Encounters:   02/15/22 224 lb (101.6 kg)   01/11/22 224 lb 9.6 oz (101.9 kg)   12/14/21 240 lb (108.9 kg)         Physical Exam  Constitutional:       Appearance: Normal appearance. HENT:      Head: Normocephalic. Cardiovascular:      Rate and Rhythm: Normal rate and regular rhythm. Heart sounds: Normal heart sounds. Pulmonary:      Effort: Pulmonary effort is normal.      Breath sounds: Normal breath sounds. Musculoskeletal:      Cervical back: Neck supple. Lumbar back: Tenderness and bony tenderness present. No swelling or edema. Decreased range of motion (due to pain). Skin:     General: Skin is warm and dry. Neurological:      Mental Status: She is alert and oriented to person, place, and time.    Psychiatric:         Mood and Affect: Mood normal.         Behavior: Behavior normal.         Lab Results   Component Value Date    WBC 8.6 12/30/2021    HGB 13.8 12/30/2021    HCT 44.2 12/30/2021    MCV 95.7 12/30/2021     12/30/2021     Lab Results   Component Value Date     (L) 12/30/2021    K 4.3 12/30/2021    CL 95 (L) 12/30/2021    CO2 28 12/30/2021    BUN 9 12/30/2021    CREATININE 0.6 12/30/2021    GLUCOSE 90 12/16/2021    CALCIUM 9.1 12/30/2021    PROT 5.9 (L) 12/30/2021 LABALBU 3.3 (L) 12/30/2021    BILITOT 0.4 12/30/2021    ALKPHOS 101 12/30/2021    AST 15 12/30/2021    ALT <5 (L) 12/30/2021    LABGLOM >60 12/30/2021    GFRAA >60 12/30/2021    AGRATIO 2.0 11/19/2021    GLOB 2.2 04/22/2021     Lab Results   Component Value Date    CHOL 121 08/07/2018    CHOL 141 01/09/2017    CHOL 140 02/08/2016     Lab Results   Component Value Date    TRIG 128 08/07/2018    TRIG 194 (H) 01/09/2017    TRIG 220 (H) 02/08/2016     Lab Results   Component Value Date    HDL 47 11/19/2021    HDL 56 04/22/2021    HDL 50 06/23/2020     Lab Results   Component Value Date    LDLCALC 53 11/19/2021    LDLCALC 54 04/22/2021    LDLCALC 47 06/23/2020     Lab Results   Component Value Date    LABA1C 6.4 11/19/2021     Lab Results   Component Value Date    TSH 2.61 08/07/2018    TSHHS 3.280 09/26/2014         ASSESSMENT/PLAN:      1. Acute low back pain without sciatica, unspecified back pain laterality  Refilled pain medication till patient follows up with PCP on 2/23/2022.  - HYDROcodone-acetaminophen (NORCO) 5-325 MG per tablet; Take 1 tablet by mouth every 6 hours as needed for Pain for up to 8 days. Dispense: 32 tablet; Refill: 0    2. Compression fracture of L2 vertebra with delayed healing, subsequent encounter  See above  - HYDROcodone-acetaminophen (NORCO) 5-325 MG per tablet; Take 1 tablet by mouth every 6 hours as needed for Pain for up to 8 days. Dispense: 32 tablet; Refill: 0    3. Mixed hyperlipidemia  Refilled medication. - rosuvastatin (CRESTOR) 10 MG tablet; Take 1 tablet by mouth nightly  Dispense: 90 tablet; Refill: 0              No orders of the defined types were placed in this encounter. Care discussed with patient. Questions answered. Patient verbalizes understanding and agrees with plan. After visit summary provided. Advised to call for any problems, questions, or concerns. Return if symptoms worsen or fail to improve.      Controlled Substances Monitoring:          No signs of potential drug abuse or diversion identified.  ABA Bryson CNP)                               Signed:  ABA Bryson CNP  02/16/22  1:35 PM

## 2022-02-16 ASSESSMENT — ENCOUNTER SYMPTOMS
BACK PAIN: 1
NAUSEA: 0
WHEEZING: 0
SINUS PRESSURE: 0
SINUS PAIN: 0
RHINORRHEA: 0
COUGH: 0
SHORTNESS OF BREATH: 0
CHEST TIGHTNESS: 0
SORE THROAT: 0
DIARRHEA: 0
VOMITING: 0

## 2022-02-17 NOTE — ASSESSMENT & PLAN NOTE
Doing well.  Only trace edema
No more diarrhea.
Pt has contracture of right hand
Pt is on glipizide 5 mg daily   BS are good per patient.    Check hga1c
Sees Dr. Rizzo Massing  2-3 mm cyst . Last seen 3/16. Pt needs to see him again  8/2019.   Cyst on the right kidney unchanged since 2014 about a centimeter in size
Still refuse colonoscopy.
Takes Requip 2 mg daily at night  Pt is doing exercises and that helps.
no known allergies

## 2022-02-23 ENCOUNTER — OFFICE VISIT (OUTPATIENT)
Dept: INTERNAL MEDICINE CLINIC | Age: 87
End: 2022-02-23
Payer: MEDICARE

## 2022-02-23 VITALS
TEMPERATURE: 98.1 F | RESPIRATION RATE: 16 BRPM | OXYGEN SATURATION: 16 % | DIASTOLIC BLOOD PRESSURE: 70 MMHG | WEIGHT: 218.4 LBS | SYSTOLIC BLOOD PRESSURE: 128 MMHG | HEART RATE: 76 BPM | BODY MASS INDEX: 37.49 KG/M2

## 2022-02-23 DIAGNOSIS — M72.0 DUPUYTREN'S CONTRACTURE OF RIGHT HAND: ICD-10-CM

## 2022-02-23 DIAGNOSIS — M05.79 RHEUMATOID ARTHRITIS INVOLVING MULTIPLE SITES WITH POSITIVE RHEUMATOID FACTOR (HCC): ICD-10-CM

## 2022-02-23 DIAGNOSIS — I25.10 CORONARY ARTERY DISEASE INVOLVING NATIVE CORONARY ARTERY OF NATIVE HEART WITHOUT ANGINA PECTORIS: ICD-10-CM

## 2022-02-23 DIAGNOSIS — G25.81 RESTLESS LEG SYNDROME: ICD-10-CM

## 2022-02-23 DIAGNOSIS — N28.1 CYST, KIDNEY, ACQUIRED: ICD-10-CM

## 2022-02-23 DIAGNOSIS — S32.020G COMPRESSION FRACTURE OF L2 VERTEBRA WITH DELAYED HEALING: ICD-10-CM

## 2022-02-23 DIAGNOSIS — K92.2 GASTROINTESTINAL HEMORRHAGE, UNSPECIFIED GASTROINTESTINAL HEMORRHAGE TYPE: ICD-10-CM

## 2022-02-23 DIAGNOSIS — I10 ESSENTIAL HYPERTENSION: ICD-10-CM

## 2022-02-23 DIAGNOSIS — E11.9 TYPE 2 DIABETES MELLITUS WITHOUT COMPLICATION, WITHOUT LONG-TERM CURRENT USE OF INSULIN (HCC): ICD-10-CM

## 2022-02-23 DIAGNOSIS — E78.2 MIXED HYPERLIPIDEMIA: ICD-10-CM

## 2022-02-23 PROBLEM — B34.8 PARAINFLUENZA INFECTION: Status: RESOLVED | Noted: 2021-12-05 | Resolved: 2022-02-23

## 2022-02-23 PROCEDURE — 1036F TOBACCO NON-USER: CPT | Performed by: INTERNAL MEDICINE

## 2022-02-23 PROCEDURE — G8427 DOCREV CUR MEDS BY ELIG CLIN: HCPCS | Performed by: INTERNAL MEDICINE

## 2022-02-23 PROCEDURE — G8417 CALC BMI ABV UP PARAM F/U: HCPCS | Performed by: INTERNAL MEDICINE

## 2022-02-23 PROCEDURE — 4040F PNEUMOC VAC/ADMIN/RCVD: CPT | Performed by: INTERNAL MEDICINE

## 2022-02-23 PROCEDURE — 1123F ACP DISCUSS/DSCN MKR DOCD: CPT | Performed by: INTERNAL MEDICINE

## 2022-02-23 PROCEDURE — G8399 PT W/DXA RESULTS DOCUMENT: HCPCS | Performed by: INTERNAL MEDICINE

## 2022-02-23 PROCEDURE — 1090F PRES/ABSN URINE INCON ASSESS: CPT | Performed by: INTERNAL MEDICINE

## 2022-02-23 PROCEDURE — G8484 FLU IMMUNIZE NO ADMIN: HCPCS | Performed by: INTERNAL MEDICINE

## 2022-02-23 PROCEDURE — 99214 OFFICE O/P EST MOD 30 MIN: CPT | Performed by: INTERNAL MEDICINE

## 2022-02-23 ASSESSMENT — PATIENT HEALTH QUESTIONNAIRE - PHQ9
1. LITTLE INTEREST OR PLEASURE IN DOING THINGS: 0
SUM OF ALL RESPONSES TO PHQ QUESTIONS 1-9: 1
SUM OF ALL RESPONSES TO PHQ9 QUESTIONS 1 & 2: 1
2. FEELING DOWN, DEPRESSED OR HOPELESS: 1
SUM OF ALL RESPONSES TO PHQ QUESTIONS 1-9: 1

## 2022-02-23 NOTE — ASSESSMENT & PLAN NOTE
Referred to neurosurgeon Dr Bharat Jimenez.  Has appointment 3.11.22  Pt is seeing pain clinic and is on oxycodone

## 2022-02-23 NOTE — ASSESSMENT & PLAN NOTE
Pt has DM  Did not tolerate metformin   Follow diet and exercise  On glipizide ER 5 mg daily   BS are good at home.

## 2022-02-23 NOTE — PROGRESS NOTES
John Oliver  Patient's  is 1935  Seen in office on 2022      SUBJECTIVE:  Theresa Peng anand 81 y. o.year old female presents today   Chief Complaint   Patient presents with    Follow-up    Other     see pain management, was told to stop norco     Patient had compression fracture of L2 and was having lot of pain was admitted to nursing home. Patient was put on narcotics  She has home health care  Pain is in better control now. Denies any chest pain. No shortness of breath no cough or sputum production. No abdominal pain. No nausea vomiting or diarrhea. Patient is able to walk with the help of walker  Patient has hypertension. Taking medications No headaches, no chest pain, no palpitations and no dizziness. Patient has hyperlipidemia. Taking medications. No abdominal pain, no nausea or vomiting. No myalgias. Patient has rheumatoid arthritis and goes to arthritis clinic in [place]. Patient has DM. No hypoglycemia. No numbness or weakness. No dizziness. Taking medications regularly. No side effects noted. Review of Systems  Review of system normal except as in HPI  OBJECTIVE: /70   Pulse 76   Temp 98.1 °F (36.7 °C) (Oral)   Resp 16   Wt 218 lb 6.4 oz (99.1 kg)   LMP  (LMP Unknown)   SpO2 (!) 16%   BMI 37.49 kg/m²     Wt Readings from Last 3 Encounters:   22 218 lb 6.4 oz (99.1 kg)   02/15/22 224 lb (101.6 kg)   22 224 lb 9.6 oz (101.9 kg)      Patient was seen taking COVID-19 precautions. Face mask, gloves were used. Patient also wore facemask. GENERAL: - Alert, oriented, pleasant, in no apparent distress. HEENT: - Conjunctiva pink, no scleral icterus. ENT clear. NECK: -Supple. No jugular venous distention noted. No masses felt,  CARDIOVASCULAR: - Normal S1 and S2    PULMONARY: - No respiratory distress. No wheezes or rales. ABDOMEN: - Soft and non-tender,no masses  ororganomegaly. EXTREMITIES: - No cyanosis, clubbing, or significant edema.   SKIN: Skin is warm and dry. NEUROLOGICAL: - Cranial nerves II through XII are grossly intact. Back tenderness in the lower back. IMPRESSION:    Encounter Diagnoses   Name Primary?  Essential hypertension     Mixed hyperlipidemia     Type 2 diabetes mellitus without complication, without long-term current use of insulin (HCC)     Dupuytren's contracture of right hand     Rheumatoid arthritis involving multiple sites with positive rheumatoid factor ([facility] [address]     CAD s/p MI, TPA in 1998     Gastrointestinal hemorrhage, unspecified gastrointestinal hemorrhage type     Cyst, kidney, acquired     Compression fracture of L2 vertebra with delayed healing     Restless leg syndrome        ASSESSMENT/PLAN:    Essential hypertension    Hypertension in control. Follow low salt diet. Continue current treatment. Continue losartan and metoprolol    Mixed hyperlipidemia    Hyperlipidemia is stable. Follow low cholesterol diet. Continue current treatment. Patient is on crestor. Type 2 diabetes mellitus without complication (HCC)    Pt has DM  Did not tolerate metformin   Follow diet and exercise  On glipizide ER 5 mg daily   BS are good at home. Dupuytren's contracture of right hand    Pt has contracture of right hand    Rheumatoid arthritis involving multiple sites with positive rheumatoid factor (HCC)    Has arthritis of hands,left knee pain, sees Dr. Cora Durand  Patient is taking Plaquenil but only once a day     CAD s/p MI, TPA in 1998    MI, TPA,sees Dr. Sadaf Kennedy  Patient is on aspirin, beta blocker and statins    Gastrointestinal hemorrhage    Patient had GI bleeding. Colon refused. Discussed with patient in detail. She was referred few times but she cancelled it and now refused it . 9/15/21 : refused colonoscopy      Compression fracture of L2 vertebra with delayed healing    Referred to neurosurgeon Dr Cl Bridges. Has appointment 3.11.22  Pt is seeing pain clinic and is on oxycodone     Restless leg syndrome. Patient is taking Requip     Return to office in 2 month    Mediations reviewed with the patient. Continue current medications. Appropriate prescriptions are addressed. After visit summeryprovided. Follow up as directed sooner if needed. Questions answered and patient verbalizes understanding. Call for any problems, questions, or concerns. No Known Allergies  Current Outpatient Medications   Medication Sig Dispense Refill    Coenzyme Q10 (COQ10) 200 MG CAPS Take 200 mg by mouth in the morning and at bedtime       rosuvastatin (CRESTOR) 10 MG tablet Take 1 tablet by mouth nightly 90 tablet 0    rOPINIRole (REQUIP) 3 MG tablet Take 1 tablet by mouth nightly (Patient taking differently: Take 3 mg by mouth Take 1/2 tablet in am and 1 tab at 2 pm) 30 tablet 3    glipiZIDE (GLUCOTROL XL) 5 MG extended release tablet Take 1 tablet by mouth daily 90 tablet 1    TRUEplus Lancets 28G MISC DX=E11.9 100 each 3    furosemide (LASIX) 20 MG tablet Take 1 tablet by mouth daily 60 tablet 3    albuterol sulfate  (90 Base) MCG/ACT inhaler INHALE 2 PUFFS INTO THE LUNGS 4 TIMES DAILY AS NEEDED FOR WHEEZING 8.5 g 5    losartan (COZAAR) 50 MG tablet TAKE 1 TABLET BY MOUTH DAILY 90 tablet 1    metoprolol tartrate (LOPRESSOR) 25 MG tablet TAKE ONE TABLET BY MOUTH TWO TIMES A DAY (Patient taking differently: Take 25 mg by mouth 2 times daily ) 180 tablet 1    Multiple Vitamins-Minerals (PRESERVISION AREDS) TABS Take 1 tablet by mouth 2 times daily      hydroxychloroquine (PLAQUENIL) 200 MG tablet Take 200 mg by mouth daily       aspirin 81 MG EC tablet Take 81 mg by mouth daily  30 tablet     Cholecalciferol (VITAMIN D PO) Take 5,000 Units by mouth daily Indications: pt states take TID        No current facility-administered medications for this visit.      Past Medical History:   Diagnosis Date    Arthritis     left knee pain, sees Dr. Giorgi Chinchilla CAD (coronary artery disease)     sees Dr. Danielle Ch [facility] low back pain without sciatica 9/28/2016    Had PT in 2016   [address] Colonoscopy refused     discussed in 7/15    DM (diabetes mellitus), type 2 ([facility] [address] 02/2006    Dupuytren's contracture of right hand     Endometrial stripe increased 9/25/2014    Saw NP Varghese garrett. Was normal exam per pt.  Gastrointestinal hemorrhage 11/2/2015    Patient had GI bleeding. Colon refused. Discussed with patient in detail.  Glaucoma 4/1/2014    H/O echocardiogram 10/02/2014    Mildly depressed left ventricular function; ejection fraction of 45%. Moderate pulmonary hypertension.  H/O echocardiogram 08/28/2018    EF 50-55%.  Mitral annular calcification is present. No other significant valvulopathy seen.  History of nuclear stress test 08/28/2018    EF 59%. ECG portion of stress test is negative for ischemia by diagnostic criteria. fixed inferior large size severe defect in rest and stress images. Mild hubert infarct ischemia.     HTN (hypertension)     Hyperlipidemia     Kidney stone     hx-\"been about 3 yrs ago\"    MI (myocardial infarction) ([facility] [address] 02/1998    treated with TPA    Obesity     Open wound of right foot 7/27/2020    Pneumonia of right lower lobe due to infectious organism 4/5/2018    Vertigo     'have been having this since I had cataract surgery\"\"that is why they are doing the MRA of my brain(10/31/2014)    WD-Traumatic ulcer of foot, right, with fat layer exposed ([facility] [address] 8/3/2020     Past Surgical History:   Procedure Laterality Date    CATARACT REMOVAL Bilateral     5/14,8/14    INGUINAL HERNIA REPAIR Left 45 yrs ago     Social History     Tobacco Use    Smoking status: Never Smoker    Smokeless tobacco: Never Used   Substance Use Topics    Alcohol use: Not Currently     Comment: OCCASSIONAL       LAB REVIEW:  CBC:   Lab Results   Component Value Date    WBC 8.6 12/30/2021    HGB 13.8 12/30/2021    HCT 44.2 12/30/2021     12/30/2021     Lipids:   Lab Results   Component Value Date    HDL 47 11/19/2021    LDLCALC 53 11/19/2021    LDLDIRECT 69 08/18/2017    TRIGLYCFAST 117 11/19/2021    CHOLFAST 123 11/19/2021     Renal:   Lab Results   Component Value Date    BUN 9 12/30/2021    CREATININE 0.6 12/30/2021     12/30/2021    K 4.3 12/30/2021    K 3.0 03/08/2018    ALT <5 12/30/2021    AST 15 12/30/2021    GLUCOSE 90 12/16/2021    GLUF 211 12/30/2021     PT/INR:   Lab Results   Component Value Date    INR 1.00 01/11/2019     A1C:   Lab Results   Component Value Date    LABA1C 6.4 11/19/2021           German Crockett MD, 2/23/2022 , 2:47 PM

## 2022-02-23 NOTE — ASSESSMENT & PLAN NOTE
Has arthritis of hands,left knee pain, sees Dr. Amy Mora  Patient is taking Plaquenil but only once a day

## 2022-02-25 ENCOUNTER — TELEPHONE (OUTPATIENT)
Dept: INTERNAL MEDICINE CLINIC | Age: 87
End: 2022-02-25

## 2022-02-25 NOTE — TELEPHONE ENCOUNTER
Pt states she is not up to doing AWV-states she went thru a questionnaire w/ her caregiver company recently, but was laying on the couch w/ a heating pad, and did not feel like answering questions at this time.

## 2022-03-10 ENCOUNTER — TELEPHONE (OUTPATIENT)
Dept: INTERNAL MEDICINE CLINIC | Age: 87
End: 2022-03-10

## 2022-03-10 NOTE — TELEPHONE ENCOUNTER
Pt was seen today by home health and pt has cough. Nurse stated pt's lungs are clear but pt is coughing up green sputum. Pt wanted nurse to call and inform you.

## 2022-03-11 ENCOUNTER — TELEPHONE (OUTPATIENT)
Dept: INTERNAL MEDICINE CLINIC | Age: 87
End: 2022-03-11

## 2022-03-14 ENCOUNTER — NURSE TRIAGE (OUTPATIENT)
Dept: OTHER | Facility: CLINIC | Age: 87
End: 2022-03-14

## 2022-03-14 ENCOUNTER — OFFICE VISIT (OUTPATIENT)
Dept: INTERNAL MEDICINE CLINIC | Age: 87
End: 2022-03-14
Payer: MEDICARE

## 2022-03-14 VITALS
OXYGEN SATURATION: 96 % | BODY MASS INDEX: 37.22 KG/M2 | WEIGHT: 218 LBS | HEIGHT: 64 IN | TEMPERATURE: 97.6 F | HEART RATE: 78 BPM

## 2022-03-14 DIAGNOSIS — J06.9 VIRAL URI WITH COUGH: Primary | ICD-10-CM

## 2022-03-14 DIAGNOSIS — J20.9 BRONCHITIS WITH ASTHMA, ACUTE: ICD-10-CM

## 2022-03-14 DIAGNOSIS — J45.909 BRONCHITIS WITH ASTHMA, ACUTE: ICD-10-CM

## 2022-03-14 DIAGNOSIS — R11.2 NON-INTRACTABLE VOMITING WITH NAUSEA, UNSPECIFIED VOMITING TYPE: ICD-10-CM

## 2022-03-14 PROCEDURE — 99213 OFFICE O/P EST LOW 20 MIN: CPT | Performed by: PHYSICIAN ASSISTANT

## 2022-03-14 PROCEDURE — 1036F TOBACCO NON-USER: CPT | Performed by: PHYSICIAN ASSISTANT

## 2022-03-14 PROCEDURE — 4040F PNEUMOC VAC/ADMIN/RCVD: CPT | Performed by: PHYSICIAN ASSISTANT

## 2022-03-14 PROCEDURE — G8417 CALC BMI ABV UP PARAM F/U: HCPCS | Performed by: PHYSICIAN ASSISTANT

## 2022-03-14 PROCEDURE — G8484 FLU IMMUNIZE NO ADMIN: HCPCS | Performed by: PHYSICIAN ASSISTANT

## 2022-03-14 PROCEDURE — 1090F PRES/ABSN URINE INCON ASSESS: CPT | Performed by: PHYSICIAN ASSISTANT

## 2022-03-14 PROCEDURE — 1123F ACP DISCUSS/DSCN MKR DOCD: CPT | Performed by: PHYSICIAN ASSISTANT

## 2022-03-14 PROCEDURE — G8428 CUR MEDS NOT DOCUMENT: HCPCS | Performed by: PHYSICIAN ASSISTANT

## 2022-03-14 PROCEDURE — G8399 PT W/DXA RESULTS DOCUMENT: HCPCS | Performed by: PHYSICIAN ASSISTANT

## 2022-03-14 RX ORDER — ONDANSETRON 4 MG/1
4 TABLET, FILM COATED ORAL 3 TIMES DAILY PRN
Qty: 30 TABLET | Refills: 0 | Status: SHIPPED | OUTPATIENT
Start: 2022-03-14 | End: 2022-04-25 | Stop reason: SDUPTHER

## 2022-03-14 RX ORDER — AZITHROMYCIN 250 MG/1
250 TABLET, FILM COATED ORAL SEE ADMIN INSTRUCTIONS
Qty: 6 TABLET | Refills: 0 | Status: SHIPPED | OUTPATIENT
Start: 2022-03-14 | End: 2022-03-19

## 2022-03-14 RX ORDER — DEXAMETHASONE 4 MG/1
4 TABLET ORAL 2 TIMES DAILY WITH MEALS
Qty: 10 TABLET | Refills: 0 | Status: SHIPPED | OUTPATIENT
Start: 2022-03-14 | End: 2022-03-19

## 2022-03-14 NOTE — TELEPHONE ENCOUNTER
Received call from [place] at [facility] with [employer]. Subjective: Caller states \"had to cancel appt with neuro surgeon Friday because she was sick. States has a cold and  a terrible cough. \"     Current Symptoms: back pain and productive cough, vomiting and chest pain with cough, congestion    Onset: 4 days ago; gradual, worsening    Associated Symptoms: reduced activity    Pain Severity: 9/10; pain; constant    Temperature: denies    What has been tried: oxycodone, coricidin    LMP: NA Pregnant: NA    Recommended disposition: See HCP within 4 Hours (or PCP triage) or er if appt not available    Care advice provided, patient verbalizes understanding; denies any other questions or concerns; instructed to call back for any new or worsening symptoms. Patient/Caller agrees with recommended disposition; writer provided warm transfer to [facility] for appointment scheduling     Attention Provider: Thank you for allowing me to participate in the care of your patient. The patient was connected to triage in response to information provided to the ECC/PSC. Please do not respond through this encounter as the response is not directed to a shared pool.       Reason for Disposition   Wheezing is present    Protocols used: COUGH - ACUTE PRODUCTIVE-ADULT-AH

## 2022-03-14 NOTE — PATIENT INSTRUCTIONS

## 2022-03-14 NOTE — PROGRESS NOTES
3/14/2022    HPI:  Chief complaint and history of present illness as per medical assistant/nurse documented today in the Flu/COVID-19 clinic. Pt has been ill for 1 week with cough , congestion, sore throat, green mucus and vomiting which keeps her from taking medicine. No sick contacts. No fevers or chills or diarrhea. History of asthma, no SOB, using \"puffer\". MEDICATIONS:  Prior to Visit Medications    Medication Sig Taking?  Authorizing Provider   dexamethasone (DECADRON) 4 MG tablet Take 1 tablet by mouth 2 times daily (with meals) for 5 days Yes EUGENE Francois   azithromycin (ZITHROMAX) 250 MG tablet Take 1 tablet by mouth See Admin Instructions for 5 days 500mg on day 1 followed by 250mg on days 2 - 5 Yes EUGENE Francois   ondansetron (ZOFRAN) 4 MG tablet Take 1 tablet by mouth 3 times daily as needed for Nausea or Vomiting Yes EUGENE Francois   Coenzyme Q10 (COQ10) 200 MG CAPS Take 200 mg by mouth in the morning and at bedtime   Historical Provider, MD   rosuvastatin (CRESTOR) 10 MG tablet Take 1 tablet by mouth nightly  ABA Sandoval CNP   rOPINIRole (REQUIP) 3 MG tablet Take 1 tablet by mouth nightly  Patient taking differently: Take 3 mg by mouth Take 1/2 tablet in am and 1 tab at 2 pm  Marcela Apley, MD   glipiZIDE (GLUCOTROL XL) 5 MG extended release tablet Take 1 tablet by mouth daily  Marcela Apley, MD   TRUEplus Lancets 28G MISC DX=E11.9  Marcela Apley, MD   furosemide (LASIX) 20 MG tablet Take 1 tablet by mouth daily  Paul Noble MD   albuterol sulfate  (90 Base) MCG/ACT inhaler INHALE 2 PUFFS INTO THE LUNGS 4 TIMES DAILY AS NEEDED FOR WHEEZING  Marcela Apley, MD   losartan (COZAAR) 50 MG tablet TAKE 1 TABLET BY MOUTH DAILY  Marcela Apley, MD   metoprolol tartrate (LOPRESSOR) 25 MG tablet TAKE ONE TABLET BY MOUTH TWO TIMES A DAY  Patient taking differently: Take 25 mg by mouth 2 times daily   Marcela Apley, MD   Multiple Vitamins-Minerals (PRESERVISION AREDS) TABS Take 1 tablet by mouth 2 times daily  Historical Provider, MD   hydroxychloroquine (PLAQUENIL) 200 MG tablet Take 200 mg by mouth daily   Historical Provider, MD   aspirin 81 MG EC tablet Take 81 mg by mouth daily   Sophie Rene MD   Cholecalciferol (VITAMIN D PO) Take 5,000 Units by mouth daily Indications: pt states take TID   Historical Provider, MD           PHYSICAL EXAM:  Physical Exam  Constitutional:       Appearance: Normal appearance. She is obese. HENT:      Head: Normocephalic and atraumatic. Right Ear: Tympanic membrane and external ear normal.      Left Ear: Tympanic membrane and external ear normal.      Nose: No rhinorrhea. Mouth/Throat:      Mouth: Mucous membranes are moist.      Pharynx: Oropharynx is clear. No oropharyngeal exudate or posterior oropharyngeal erythema. Eyes:      General: No scleral icterus. Extraocular Movements: Extraocular movements intact. Conjunctiva/sclera: Conjunctivae normal.      Pupils: Pupils are equal, round, and reactive to light. Cardiovascular:      Rate and Rhythm: Normal rate and regular rhythm. Pulses: Normal pulses. Heart sounds: Normal heart sounds. No murmur heard. No friction rub. No gallop. Pulmonary:      Effort: Pulmonary effort is normal.      Breath sounds: Rhonchi and rales present. No wheezing. Abdominal:      General: Bowel sounds are normal. There is no distension. Palpations: Abdomen is soft. There is no mass. Tenderness: There is no abdominal tenderness. There is no right CVA tenderness, left CVA tenderness, guarding or rebound. Musculoskeletal:         General: No deformity. Normal range of motion. Cervical back: Normal range of motion and neck supple. No rigidity. No muscular tenderness. Right lower leg: No edema. Left lower leg: No edema. Skin:     General: Skin is warm and dry. Capillary Refill: Capillary refill takes less than 2 seconds. Findings: No bruising, erythema or rash. Neurological:      General: No focal deficit present. Mental Status: She is alert and oriented to person, place, and time. Coordination: Coordination normal.      Gait: Gait normal.   Psychiatric:         Mood and Affect: Mood normal.         Behavior: Behavior normal.         Vitals:    03/14/22 0942   Pulse: 78   Temp: 97.6 °F (36.4 °C)   SpO2: 96%         Constitutional:  Well developed, well nourished  HENT:  Normocephalic, atraumatic, bilateral external ears normal, bilateral ear canals normal, bilateral TMs normal, oropharynx moist, nose normal  Eyes:  conjunctiva normal, no discharge, no scleral icterus  Cardiovascular:  Normal heart rate, normal rhythm, no murmurs, gallops or rubs  Thorax & Lungs:  Normal breath sounds, no respiratory distress, no wheezing, no rales, no rhonchi  Skin:  Warm, dry, no erythema, no rash  Neurologic:  Alert & oriented   Psychiatric:  Affect normal, mood normal    ASSESSMENT/PLAN:  1. Viral URI with cough  Maybe covid, possibly flu, pt high risk for bacterial/pna complication  - XR CHEST STANDARD (2 VW); Future  - dexamethasone (DECADRON) 4 MG tablet; Take 1 tablet by mouth 2 times daily (with meals) for 5 days  Dispense: 10 tablet; Refill: 0    2. Bronchitis with asthma, acute    - XR CHEST STANDARD (2 VW); Future  - dexamethasone (DECADRON) 4 MG tablet; Take 1 tablet by mouth 2 times daily (with meals) for 5 days  Dispense: 10 tablet; Refill: 0  - azithromycin (ZITHROMAX) 250 MG tablet; Take 1 tablet by mouth See Admin Instructions for 5 days 500mg on day 1 followed by 250mg on days 2 - 5  Dispense: 6 tablet; Refill: 0    3. Non-intractable vomiting with nausea, unspecified vomiting type    - ondansetron (ZOFRAN) 4 MG tablet; Take 1 tablet by mouth 3 times daily as needed for Nausea or Vomiting  Dispense: 30 tablet;  Refill: 0            I did don appropriate PPE (including N95 face mask, protective safety glasses, face shield, gloves, and gown) as recommended by the health facility/national standard best practice, during my interaction with the patient. FOLLOW-UP:  Return if symptoms worsen or fail to improve.     In addition to other information, the printed after visit summary provided to the patient includes:  [x] COVID-19 Self care instructions  [x] COVID-19 General patient information    Tang Dumont

## 2022-03-15 LAB — SARS-COV-2: NOT DETECTED

## 2022-03-22 ENCOUNTER — TELEPHONE (OUTPATIENT)
Dept: OTHER | Age: 87
End: 2022-03-22

## 2022-03-22 NOTE — TELEPHONE ENCOUNTER
Patient was referred to the CP program by ED nurse. Reached out to patient to see if she had any needs. She does have back problems but stated she was getting scheduled for a procedure that will hopefully help. Patient stated she has [employer] services coming in to the home 4 days a week. At this time patient denied any needs. Will remove her from the program. Should I receive another referral in the future I will open another file.

## 2022-04-25 ENCOUNTER — OFFICE VISIT (OUTPATIENT)
Dept: INTERNAL MEDICINE CLINIC | Age: 87
End: 2022-04-25
Payer: MEDICARE

## 2022-04-25 VITALS
SYSTOLIC BLOOD PRESSURE: 120 MMHG | HEART RATE: 68 BPM | WEIGHT: 214.8 LBS | DIASTOLIC BLOOD PRESSURE: 62 MMHG | TEMPERATURE: 96.8 F | OXYGEN SATURATION: 96 % | RESPIRATION RATE: 16 BRPM | BODY MASS INDEX: 36.87 KG/M2

## 2022-04-25 DIAGNOSIS — I25.10 CORONARY ARTERY DISEASE INVOLVING NATIVE CORONARY ARTERY OF NATIVE HEART WITHOUT ANGINA PECTORIS: ICD-10-CM

## 2022-04-25 DIAGNOSIS — E78.2 MIXED HYPERLIPIDEMIA: ICD-10-CM

## 2022-04-25 DIAGNOSIS — I10 ESSENTIAL HYPERTENSION: Primary | ICD-10-CM

## 2022-04-25 DIAGNOSIS — S32.020G COMPRESSION FRACTURE OF L2 VERTEBRA WITH DELAYED HEALING: ICD-10-CM

## 2022-04-25 DIAGNOSIS — M05.79 RHEUMATOID ARTHRITIS INVOLVING MULTIPLE SITES WITH POSITIVE RHEUMATOID FACTOR (HCC): ICD-10-CM

## 2022-04-25 DIAGNOSIS — E11.9 TYPE 2 DIABETES MELLITUS WITHOUT COMPLICATION, WITHOUT LONG-TERM CURRENT USE OF INSULIN (HCC): ICD-10-CM

## 2022-04-25 DIAGNOSIS — R29.818 SUSPECTED SLEEP APNEA: ICD-10-CM

## 2022-04-25 DIAGNOSIS — G25.81 RESTLESS LEG SYNDROME: ICD-10-CM

## 2022-04-25 DIAGNOSIS — R11.2 NON-INTRACTABLE VOMITING WITH NAUSEA, UNSPECIFIED VOMITING TYPE: ICD-10-CM

## 2022-04-25 PROCEDURE — 91305 COVID-19, PFIZER GRAY TOP, DO NOT DILUTE, TRIS-SUCROSE, 12+ YRS, PF, 30MCG/ 0.3 ML DOSE: CPT | Performed by: INTERNAL MEDICINE

## 2022-04-25 PROCEDURE — G8417 CALC BMI ABV UP PARAM F/U: HCPCS | Performed by: INTERNAL MEDICINE

## 2022-04-25 PROCEDURE — 1090F PRES/ABSN URINE INCON ASSESS: CPT | Performed by: INTERNAL MEDICINE

## 2022-04-25 PROCEDURE — 1123F ACP DISCUSS/DSCN MKR DOCD: CPT | Performed by: INTERNAL MEDICINE

## 2022-04-25 PROCEDURE — 3044F HG A1C LEVEL LT 7.0%: CPT | Performed by: INTERNAL MEDICINE

## 2022-04-25 PROCEDURE — 1036F TOBACCO NON-USER: CPT | Performed by: INTERNAL MEDICINE

## 2022-04-25 PROCEDURE — 99214 OFFICE O/P EST MOD 30 MIN: CPT | Performed by: INTERNAL MEDICINE

## 2022-04-25 PROCEDURE — 4040F PNEUMOC VAC/ADMIN/RCVD: CPT | Performed by: INTERNAL MEDICINE

## 2022-04-25 PROCEDURE — G8428 CUR MEDS NOT DOCUMENT: HCPCS | Performed by: INTERNAL MEDICINE

## 2022-04-25 PROCEDURE — G8399 PT W/DXA RESULTS DOCUMENT: HCPCS | Performed by: INTERNAL MEDICINE

## 2022-04-25 PROCEDURE — 0054A COVID-19, PFIZER GRAY TOP, DO NOT DILUTE, TRIS-SUCROSE, 12+ YRS, PF, 30MCG/ 0.3 ML DOSE: CPT | Performed by: INTERNAL MEDICINE

## 2022-04-25 RX ORDER — LOSARTAN POTASSIUM 50 MG/1
TABLET ORAL
Qty: 90 TABLET | Refills: 1 | Status: ON HOLD | OUTPATIENT
Start: 2022-04-25 | End: 2022-05-18 | Stop reason: HOSPADM

## 2022-04-25 RX ORDER — ROPINIROLE 3 MG/1
3 TABLET, FILM COATED ORAL NIGHTLY
Qty: 135 TABLET | Refills: 1 | Status: SHIPPED | OUTPATIENT
Start: 2022-04-25 | End: 2022-05-10 | Stop reason: DRUGHIGH

## 2022-04-25 RX ORDER — HYDROCODONE BITARTRATE AND ACETAMINOPHEN 7.5; 325 MG/1; MG/1
1 TABLET ORAL EVERY 4 HOURS PRN
COMMUNITY
Start: 2022-04-18 | End: 2022-05-11 | Stop reason: SDUPTHER

## 2022-04-25 RX ORDER — ONDANSETRON 4 MG/1
4 TABLET, FILM COATED ORAL 3 TIMES DAILY PRN
Qty: 30 TABLET | Refills: 0 | Status: ON HOLD | OUTPATIENT
Start: 2022-04-25 | End: 2022-05-19

## 2022-04-25 RX ORDER — CYANOCOBALAMIN (VITAMIN B-12) 1000 MCG
1 TABLET, EXTENDED RELEASE ORAL 2 TIMES DAILY WITH MEALS
COMMUNITY
End: 2022-04-25 | Stop reason: SDUPTHER

## 2022-04-25 RX ORDER — ROSUVASTATIN CALCIUM 10 MG/1
10 TABLET, COATED ORAL NIGHTLY
Qty: 90 TABLET | Refills: 1 | Status: SHIPPED | OUTPATIENT
Start: 2022-04-25 | End: 2022-10-31

## 2022-04-25 NOTE — PROGRESS NOTES
Starr Mcnair  Patient's  is 1935  Seen in office on 2022      SUBJECTIVE:  Barbara Darden anand 81 y. o.year old female presents today   Chief Complaint   Patient presents with    Follow-up    Other     having surgery on spine May 12 , Dr Javy Freitas Medication Refill     Patient has pain in the back and saw DR Thomas Estrada  Pt is taking [place] qid and had MRI done. Pt saw Dr Byron Austin and is going to do surgery on 22  Pt states she is feeling well  No chest pain. No SOb  No cough. Pt had Nausea. No diarrhea. No abdominal pain  Patient has hypertension. Taking medications No headaches, no chest pain, no palpitations and no dizziness. Taking medications regularly. No side effects noted. Review of Systems    Review of system is normal except as in HPI    OBJECTIVE: /62   Pulse 68   Temp 96.8 °F (36 °C) (Temporal)   Resp 16   Wt 214 lb 12.8 oz (97.4 kg)   LMP  (LMP Unknown)   SpO2 96%   BMI 36.87 kg/m²     Wt Readings from Last 3 Encounters:   22 214 lb 12.8 oz (97.4 kg)   22 218 lb (98.9 kg)   22 218 lb 6.4 oz (99.1 kg)      Patient was seen taking COVID-19 precautions. Face mask, gloves were used. Patient also wore facemask. GENERAL: - Alert, oriented, pleasant, in no apparent distress. HEENT: - Conjunctiva pink, no scleral icterus. ENT clear. NECK: -Supple. No jugular venous distention noted. No masses felt,  CARDIOVASCULAR: - Normal S1 and S2    PULMONARY: - No respiratory distress. No wheezes or rales. ABDOMEN: - Soft and non-tender,no masses  ororganomegaly. EXTREMITIES: - No cyanosis, clubbing, or significant edema. SKIN: Skin is warm and dry. NEUROLOGICAL: - Cranial nerves II through XII are grossly intact. MRI : lumbar spine :  IMPRESSION:   1.  Acute/subacute compression fracture at L2 and subacute/chronic   compression fracture at L3, both demonstrating retropulsion.    Background degenerative changes of the lumbar spine are noted at L1-L2   and L2-L3.  Constellation of findings result in severe central canal   stenosis at these levels with varying degrees of neural foraminal   stenosis as described above. 2.  Question of subtle compression fracture along the inferior endplate   of L1 without retropulsion. 3.  Degenerative changes of the lumbar spine are noted at the remaining   lumbar disc spaces as detailed above. Narrative        IMPRESSION:    Encounter Diagnoses   Name Primary?  Essential hypertension Yes    Non-intractable vomiting with nausea, unspecified vomiting type     Mixed hyperlipidemia     Type 2 diabetes mellitus without complication, without long-term current use of insulin ([facility])     Rheumatoid arthritis involving multiple sites with positive rheumatoid factor ([facility]     CAD s/p MI, TPA in 1998     Compression fracture of L2 vertebra with delayed healing     Restless leg syndrome     Suspected sleep apnea        ASSESSMENT/PLAN:    1. Non-intractable vomiting with nausea, unspecified vomiting type  Had nausea but better. Wants some antiemetics prn  If worse call. - ondansetron (ZOFRAN) 4 MG tablet; Take 1 tablet by mouth 3 times daily as needed for Nausea or Vomiting  Dispense: 30 tablet; Refill: 0    2. Mixed hyperlipidemia  Hyperlipidemia is stable. Follow low cholesterol diet. Continue current treatment. - rosuvastatin (CRESTOR) 10 MG tablet; Take 1 tablet by mouth nightly  Dispense: 90 tablet; Refill: 1    3. Essential hypertension  Hypertension in control. Follow low salt diet. Continue current treatment. 4. Type 2 diabetes mellitus without complication, without long-term current use of insulin ([facility]  DM in control  Last Hga1c is good. Continue current medications. 5. Rheumatoid arthritis involving multiple sites with positive rheumatoid factor ([facility])  Pt has RA and is on plaquanil     6. CAD s/p MI, TPA in 1998  No angina. Doing well. Continue current med    7.  Compression fracture of L2 vertebra with delayed healing  Pt is going to have kyphoplasty and possible laminectomy  Advised pt to get it checked with cardiologist    8. Restless leg syndrome  On requip     9. Suspected sleep apnea  Patient is stable. Continue current treatment. Return to office in 2 months            Mediations reviewed with the patient. Continue current medications. Appropriate prescriptions are addressed. After visit summeryprovided. Follow up as directed sooner if needed. Questions answered and patient verbalizes understanding. Call for any problems, questions, or concerns. No Known Allergies  Current Outpatient Medications   Medication Sig Dispense Refill    HYDROcodone-acetaminophen (NORCO) 7.5-325 MG per tablet Take 1 tablet by mouth every 6 hours as needed.       Docusate Sodium (COLACE PO) Take 1 tablet by mouth daily      ROPINIROLE HCL PO Take by mouth Taking 1 and 1/2 tablet at hs      ondansetron (ZOFRAN) 4 MG tablet Take 1 tablet by mouth 3 times daily as needed for Nausea or Vomiting 30 tablet 0    Coenzyme Q10 (COQ10) 200 MG CAPS Take 200 mg by mouth in the morning and at bedtime       rosuvastatin (CRESTOR) 10 MG tablet Take 1 tablet by mouth nightly 90 tablet 0    glipiZIDE (GLUCOTROL XL) 5 MG extended release tablet Take 1 tablet by mouth daily 90 tablet 1    TRUEplus Lancets 28G MISC DX=E11.9 100 each 3    furosemide (LASIX) 20 MG tablet Take 1 tablet by mouth daily 60 tablet 3    albuterol sulfate  (90 Base) MCG/ACT inhaler INHALE 2 PUFFS INTO THE LUNGS 4 TIMES DAILY AS NEEDED FOR WHEEZING 8.5 g 5    losartan (COZAAR) 50 MG tablet TAKE 1 TABLET BY MOUTH DAILY 90 tablet 1    metoprolol tartrate (LOPRESSOR) 25 MG tablet TAKE ONE TABLET BY MOUTH TWO TIMES A DAY (Patient taking differently: Take 25 mg by mouth 2 times daily ) 180 tablet 1    Multiple Vitamins-Minerals (PRESERVISION AREDS) TABS Take 1 tablet by mouth 2 times daily      hydroxychloroquine (PLAQUENIL) 200 MG tablet Take 200 mg by mouth daily       aspirin 81 MG EC tablet Take 81 mg by mouth daily  30 tablet     Cholecalciferol (VITAMIN D PO) Take 5,000 Units by mouth daily Indications: pt states take TID        No current facility-administered medications for this visit. Past Medical History:   Diagnosis Date    Arthritis     left knee pain, sees Dr. Josefa Larsen CAD (coronary artery disease)     sees Dr. Denise Mark Chronic midline low back pain without sciatica 9/28/2016    Had PT in 2016   [address] Colonoscopy refused     discussed in 7/15    DM (diabetes mellitus), type 2 ([facility] [address] 02/2006    Dupuytren's contracture of right hand     Endometrial stripe increased 9/25/2014    Saw NP Valorie Adame. Was normal exam per pt.  Gastrointestinal hemorrhage 11/2/2015    Patient had GI bleeding. Colon refused. Discussed with patient in detail.  Glaucoma 4/1/2014    H/O echocardiogram 10/02/2014    Mildly depressed left ventricular function; ejection fraction of 45%. Moderate pulmonary hypertension.  H/O echocardiogram 08/28/2018    EF 50-55%.  Mitral annular calcification is present. No other significant valvulopathy seen.  History of nuclear stress test 08/28/2018    EF 59%. ECG portion of stress test is negative for ischemia by diagnostic criteria. fixed inferior large size severe defect in rest and stress images. Mild hubert infarct ischemia.     HTN (hypertension)     Hyperlipidemia     Kidney stone     hx-\"been about 3 yrs ago\"    MI (myocardial infarction) ([facility] [address] 02/1998    treated with TPA    Obesity     Open wound of right foot 7/27/2020    Parainfluenza infection 12/5/2021    Pneumonia of right lower lobe due to infectious organism 4/5/2018    Vertigo     'have been having this since I had cataract surgery\"\"that is why they are doing the MRA of my brain(10/31/2014)    WD-Traumatic ulcer of foot, right, with fat layer exposed ([facility] [address] 8/3/2020     Past Surgical History:   Procedure Laterality Date    CATARACT REMOVAL Bilateral     5/14,8/14    INGUINAL HERNIA REPAIR Left 39 yrs ago     Social History     Tobacco Use    Smoking status: Never Smoker    Smokeless tobacco: Never Used   Substance Use Topics    Alcohol use: Not Currently     Comment: OCCASSIONAL       LAB REVIEW:  CBC:   Lab Results   Component Value Date    WBC 8.6 12/30/2021    HGB 13.8 12/30/2021    HCT 44.2 12/30/2021     12/30/2021     Lipids:   Lab Results   Component Value Date    HDL 47 11/19/2021    LDLCALC 53 11/19/2021    LDLDIRECT 69 08/18/2017    TRIGLYCFAST 117 11/19/2021    CHOLFAST 123 11/19/2021     Renal:   Lab Results   Component Value Date    BUN 9 12/30/2021    CREATININE 0.6 12/30/2021     12/30/2021    K 4.3 12/30/2021    K 3.0 03/08/2018    ALT <5 12/30/2021    AST 15 12/30/2021    GLUCOSE 90 12/16/2021    GLUF 211 12/30/2021     PT/INR:   Lab Results   Component Value Date    INR 1.00 01/11/2019     A1C:   Lab Results   Component Value Date    LABA1C 6.4 11/19/2021           Tomás Carrillo MD, 4/25/2022 , 1:53 PM

## 2022-04-26 ENCOUNTER — TELEPHONE (OUTPATIENT)
Dept: CARDIOLOGY CLINIC | Age: 87
End: 2022-04-26

## 2022-04-26 ENCOUNTER — TELEPHONE (OUTPATIENT)
Dept: INTERNAL MEDICINE CLINIC | Age: 87
End: 2022-04-26

## 2022-04-26 NOTE — TELEPHONE ENCOUNTER
[facility] [employer] contacted office wanting to confirm the dosage and directions for the ropinirole. Per pharmacy current dosage is 2mg tablets and taking 1 and a 1/2 tablet daily making 3 mg.   Script sent over last night was written for 3 mg tablets and to  Take 1 tablet by mouth nightly Take 1/2 tablet in am and 1 tab at 2 pm.  They would like to check if this should be a 2mg tablet or 3 mg tablet

## 2022-04-26 NOTE — TELEPHONE ENCOUNTER
Patient states she was told by Dr. Pete Lackey she needed to see cardiology before surgery but she cannot get in by her surgery date. Can you advise what she should do.

## 2022-04-26 NOTE — TELEPHONE ENCOUNTER
Patient called into office today regarding having back surgery on 5/12/2022. Patient states she seen PCP who advised her to follow up with cardiology. Last seen Navjot Ward in 2019. Advised patient first available appointment was 5/12 in formerly [facility] office. Patient refused to schedule with [facility] and asked to be called if a cancellation came open. Advised patient that to make sure she was seen before surgery [place] would be the best choice, patient refused again stating she cannot drive that far.

## 2022-05-04 ENCOUNTER — HOSPITAL ENCOUNTER (OUTPATIENT)
Age: 87
Discharge: HOME | End: 2022-05-04
Payer: MEDICARE

## 2022-05-04 ENCOUNTER — HOSPITAL ENCOUNTER (OUTPATIENT)
Dept: GENERAL RADIOLOGY | Age: 87
Discharge: HOME | End: 2022-05-04
Payer: MEDICARE

## 2022-05-04 DIAGNOSIS — Z01.818 PREOPERATIVE TESTING: ICD-10-CM

## 2022-05-04 DIAGNOSIS — J06.9 VIRAL URI WITH COUGH: ICD-10-CM

## 2022-05-04 DIAGNOSIS — J20.9 BRONCHITIS WITH ASTHMA, ACUTE: ICD-10-CM

## 2022-05-04 DIAGNOSIS — J45.909 BRONCHITIS WITH ASTHMA, ACUTE: ICD-10-CM

## 2022-05-04 LAB
ANION GAP SERPL CALCULATED.3IONS-SCNC: 7 MMOL/L (ref 4–16)
APTT: 30.5 SECONDS (ref 25.1–37.1)
BASOPHILS ABSOLUTE: 0.1 K/CU MM
BASOPHILS RELATIVE PERCENT: 0.8 % (ref 0–1)
BUN BLDV-MCNC: 9 MG/DL (ref 6–23)
CALCIUM SERPL-MCNC: 9 MG/DL (ref 8.3–10.6)
CHLORIDE BLD-SCNC: 98 MMOL/L (ref 99–110)
CO2: 30 MMOL/L (ref 21–32)
CREAT SERPL-MCNC: 0.4 MG/DL (ref 0.6–1.1)
DIFFERENTIAL TYPE: ABNORMAL
EOSINOPHILS ABSOLUTE: 0.2 K/CU MM
EOSINOPHILS RELATIVE PERCENT: 2.8 % (ref 0–3)
ESTIMATED AVERAGE GLUCOSE: 120 MG/DL
GFR AFRICAN AMERICAN: >60 ML/MIN/1.73M2
GFR NON-AFRICAN AMERICAN: >60 ML/MIN/1.73M2
GLUCOSE BLD-MCNC: 100 MG/DL (ref 70–99)
HBA1C MFR BLD: 5.8 % (ref 4.2–6.3)
HCT VFR BLD CALC: 40.1 % (ref 37–47)
HEMOGLOBIN: 12.5 GM/DL (ref 12.5–16)
IMMATURE NEUTROPHIL %: 0.9 % (ref 0–0.43)
INR BLD: 1.01 INDEX
LYMPHOCYTES ABSOLUTE: 1.7 K/CU MM
LYMPHOCYTES RELATIVE PERCENT: 19.8 % (ref 24–44)
MCH RBC QN AUTO: 30.6 PG (ref 27–31)
MCHC RBC AUTO-ENTMCNC: 31.2 % (ref 32–36)
MCV RBC AUTO: 98 FL (ref 78–100)
MONOCYTES ABSOLUTE: 1.2 K/CU MM
MONOCYTES RELATIVE PERCENT: 14.6 % (ref 0–4)
NUCLEATED RBC %: 0 %
PDW BLD-RTO: 13.2 % (ref 11.7–14.9)
PLATELET # BLD: 198 K/CU MM (ref 140–440)
PMV BLD AUTO: 9.2 FL (ref 7.5–11.1)
POTASSIUM SERPL-SCNC: 4.4 MMOL/L (ref 3.5–5.1)
PROTHROMBIN TIME: 13 SECONDS (ref 11.7–14.5)
RBC # BLD: 4.09 M/CU MM (ref 4.2–5.4)
SEGMENTED NEUTROPHILS ABSOLUTE COUNT: 5.2 K/CU MM
SEGMENTED NEUTROPHILS RELATIVE PERCENT: 61.1 % (ref 36–66)
SODIUM BLD-SCNC: 135 MMOL/L (ref 135–145)
TOTAL IMMATURE NEUTOROPHIL: 0.08 K/CU MM
TOTAL NUCLEATED RBC: 0 K/CU MM
WBC # BLD: 8.5 K/CU MM (ref 4–10.5)

## 2022-05-04 PROCEDURE — 83036 HEMOGLOBIN GLYCOSYLATED A1C: CPT

## 2022-05-04 PROCEDURE — 93005 ELECTROCARDIOGRAM TRACING: CPT

## 2022-05-04 PROCEDURE — 85025 COMPLETE CBC W/AUTO DIFF WBC: CPT

## 2022-05-04 PROCEDURE — 80048 BASIC METABOLIC PNL TOTAL CA: CPT

## 2022-05-04 PROCEDURE — 85730 THROMBOPLASTIN TIME PARTIAL: CPT

## 2022-05-04 PROCEDURE — 71046 X-RAY EXAM CHEST 2 VIEWS: CPT

## 2022-05-04 PROCEDURE — 85610 PROTHROMBIN TIME: CPT

## 2022-05-04 NOTE — PROGRESS NOTES
Covid screen done - 5/4/22    Surgery is at [facility] on 5/12/22 . You will be called on 5/11/22 with times. 1. Do not eat or drink anything after midnight - unless instructed by your doctor prior to surgery. This includes no water, chewing gum or mints. 2. Follow your directions as prescribed by the doctor for your procedure and medications. 3. Check with your Doctor regarding stopping vitamins, supplements, blood thinners (Plavix, Coumadin, Lovenox, Effient, Pradaxa, Xarelto, Fragmin or other blood thinners) and follow their instructions. Stop all supplements and herbals 7 days prior to surgery. Aspirin - Hold 5 days prior to surgery. Morning of surgery use inhaler, take metoprolol with a sip of water. 4. Do not smoke, and do not drink any alcoholic beverages 24 hours prior to surgery. This includes NA Beer. 5. You may brush your teeth and gargle the morning of surgery. DO NOT SWALLOW WATER    6. You MUST make arrangements for a responsible adult to take you home after your surgery and be able to check on you every couple hours for the day. You will not be allowed to leave alone or drive yourself home. It is strongly suggested someone stay with you the first 24  hrs. Your surgery will be cancelled if you do not have a ride home. 7. Please wear simple, loose fitting clothing to the hospital.  Dor[place] Or not bring valuables (money, credit cards, checkbooks, etc.) Do not wear any makeup (including no eye makeup) or nail polish on your fingers or toes. 8. DO NOT wear any jewelry or piercings on day of surgery. All body piercing jewelry must be removed. 9. If you have dentures, they will be removed before going to the OR; we will provide you a container. If you wear contact lenses or glasses,  they will be removed; please bring a case for them. 10. If you  have a Living Will and Durable Power of  for Healthcare, please bring in a copy. 11. Please bring picture ID,  insurance card, paperwork from the doctors office    (H & P, Consent, & card for implantable devices). 12. Take a shower the night before or morning of your procedure, do not apply any lotion, oil or powder. It is recommended to use Hibiclens or CHG wash during pre-op shower/bath.              13. Wear a mask covering your nose & mouth when entering the hospital.

## 2022-05-06 LAB
EKG ATRIAL RATE: 62 BPM
EKG DIAGNOSIS: NORMAL
EKG P AXIS: 0 DEGREES
EKG P-R INTERVAL: 130 MS
EKG Q-T INTERVAL: 430 MS
EKG QRS DURATION: 90 MS
EKG QTC CALCULATION (BAZETT): 436 MS
EKG R AXIS: 23 DEGREES
EKG T AXIS: 20 DEGREES
EKG VENTRICULAR RATE: 62 BPM

## 2022-05-06 PROCEDURE — 93010 ELECTROCARDIOGRAM REPORT: CPT | Performed by: INTERNAL MEDICINE

## 2022-05-06 RX ORDER — CALCIUM CITRATE/VITAMIN D3 200MG-6.25
TABLET ORAL
Qty: 100 STRIP | Refills: 5 | Status: ON HOLD | OUTPATIENT
Start: 2022-05-06 | End: 2022-05-19

## 2022-05-10 RX ORDER — ROPINIROLE 3 MG/1
TABLET, FILM COATED ORAL
Qty: 45 TABLET | Refills: 3 | Status: SHIPPED | OUTPATIENT
Start: 2022-05-10 | End: 2022-05-11 | Stop reason: DRUGHIGH

## 2022-05-10 RX ORDER — ROPINIROLE 3 MG/1
TABLET, FILM COATED ORAL
COMMUNITY
End: 2022-05-10 | Stop reason: SDUPTHER

## 2022-05-10 NOTE — TELEPHONE ENCOUNTER
Happy pharmacist Shanon Hutchins [phone]    On ropinirole   3mg   wrote different direction     What is real script patient said it didn't change

## 2022-05-11 ENCOUNTER — ANESTHESIA EVENT (OUTPATIENT)
Dept: OPERATING ROOM | Age: 87
DRG: 478 | End: 2022-05-11
Payer: MEDICARE

## 2022-05-11 DIAGNOSIS — S32.020G COMPRESSION FRACTURE OF L2 VERTEBRA WITH DELAYED HEALING: Primary | ICD-10-CM

## 2022-05-11 RX ORDER — HYDROCODONE BITARTRATE AND ACETAMINOPHEN 7.5; 325 MG/1; MG/1
1 TABLET ORAL EVERY 4 HOURS PRN
Qty: 2 TABLET | Refills: 0 | Status: ON HOLD | OUTPATIENT
Start: 2022-05-11 | End: 2022-05-19

## 2022-05-11 RX ORDER — ROPINIROLE 2 MG/1
2 TABLET, FILM COATED ORAL
Status: ON HOLD | COMMUNITY
End: 2022-05-11 | Stop reason: SDUPTHER

## 2022-05-11 RX ORDER — ROPINIROLE 2 MG/1
TABLET, FILM COATED ORAL
Qty: 135 TABLET | Refills: 3 | OUTPATIENT
Start: 2022-05-11

## 2022-05-11 NOTE — PROGRESS NOTES
Called and confirmed with patient surgery at [facility]  on 5/12/22   at 0730 with an arrival time of  0600 . Come into the Main entrance and check in. You can bring two people with you and wear a mask. Patient verbalized understanding.

## 2022-05-11 NOTE — ANESTHESIA PRE PROCEDURE
Department of Anesthesiology  Preprocedure Note       Name:  Salud Rollins   Age:  81 y.o.  :  1935                                          MRN:  [id number]         Date:  2022      Surgeon: Andrea Rocha):  Cristobal Huerta MD    Procedure: Procedure(s):  L1-4 LAMINECTOMIES, L2 AND L3 BILATERAL BALLOON KYPHOPLASTY AND L2 AND L3 NEEDEL BIOPSY  KYPHOPLASTY    Medications prior to admission:   Prior to Admission medications    Medication Sig Start Date End Date Taking? Authorizing Provider   rOPINIRole (REQUIP) 3 MG tablet Take one half (1/2) tablet by mouth at 2 pm and 1 tab at hs 5/10/22   Sean Herrmann MD   blood glucose test strips (TRUE METRIX BLOOD GLUCOSE TEST) strip Using one strip to test once daily, DX=E11.9 22   Sean Herrmann MD   HYDROcodone-acetaminophen (NORCO) 7.5-325 MG per tablet Take 1 tablet by mouth every 4 hours as needed.   22  Historical Provider, MD   Docusate Sodium (COLACE PO) Take 1 tablet by mouth daily    Historical Provider, MD   metoprolol tartrate (LOPRESSOR) 25 MG tablet TAKE ONE TABLET BY MOUTH TWO TIMES A DAY 22   Sean Herrmann MD   losartan (COZAAR) 50 MG tablet TAKE 1 TABLET BY MOUTH DAILY 22   Sean Herrmann MD   ondansetron (ZOFRAN) 4 MG tablet Take 1 tablet by mouth 3 times daily as needed for Nausea or Vomiting 22   Sean Herrmann MD   rosuvastatin (CRESTOR) 10 MG tablet Take 1 tablet by mouth nightly 22   Sean Herrmann MD   Coenzyme Q10 (COQ10) 200 MG CAPS Take 200 mg by mouth in the morning and at bedtime     Historical Provider, MD   glipiZIDE (GLUCOTROL XL) 5 MG extended release tablet Take 1 tablet by mouth daily 2/10/22   Sean Herrmann MD   TRUEplus Lancets 28G MISC DX=E11.9 22   Sean Herrmann MD   furosemide (LASIX) 20 MG tablet Take 1 tablet by mouth daily 12/10/21   Mert Tineo MD   albuterol sulfate  (90 Base) MCG/ACT inhaler INHALE 2 PUFFS INTO THE LUNGS 4 TIMES DAILY AS NEEDED FOR WHEEZING 10/11/21   Sean Herrmann, MD   Multiple Vitamins-Minerals (PRESERVISION AREDS) TABS Take 1 tablet by mouth 2 times daily    Historical Provider, MD   hydroxychloroquine (PLAQUENIL) 200 MG tablet Take 200 mg by mouth daily     Historical Provider, MD   aspirin 81 MG EC tablet Take 81 mg by mouth daily  11/11/14   Sudhir Haile MD   Cholecalciferol (VITAMIN D PO) Take 5,000 Units by mouth in the morning and at bedtime     Historical Provider, MD       Current medications:    No current facility-administered medications for this encounter. Current Outpatient Medications   Medication Sig Dispense Refill    rOPINIRole (REQUIP) 3 MG tablet Take one half (1/2) tablet by mouth at 2 pm and 1 tab at hs 45 tablet 3    blood glucose test strips (TRUE METRIX BLOOD GLUCOSE TEST) strip Using one strip to test once daily, DX=E11.9 100 strip 5    HYDROcodone-acetaminophen (NORCO) 7.5-325 MG per tablet Take 1 tablet by mouth every 4 hours as needed.        Docusate Sodium (COLACE PO) Take 1 tablet by mouth daily      metoprolol tartrate (LOPRESSOR) 25 MG tablet TAKE ONE TABLET BY MOUTH TWO TIMES A  tablet 1    losartan (COZAAR) 50 MG tablet TAKE 1 TABLET BY MOUTH DAILY 90 tablet 1    ondansetron (ZOFRAN) 4 MG tablet Take 1 tablet by mouth 3 times daily as needed for Nausea or Vomiting 30 tablet 0    rosuvastatin (CRESTOR) 10 MG tablet Take 1 tablet by mouth nightly 90 tablet 1    Coenzyme Q10 (COQ10) 200 MG CAPS Take 200 mg by mouth in the morning and at bedtime       glipiZIDE (GLUCOTROL XL) 5 MG extended release tablet Take 1 tablet by mouth daily 90 tablet 1    TRUEplus Lancets 28G MISC DX=E11.9 100 each 3    furosemide (LASIX) 20 MG tablet Take 1 tablet by mouth daily 60 tablet 3    albuterol sulfate  (90 Base) MCG/ACT inhaler INHALE 2 PUFFS INTO THE LUNGS 4 TIMES DAILY AS NEEDED FOR WHEEZING 8.5 g 5    Multiple Vitamins-Minerals (PRESERVISION AREDS) TABS Take 1 tablet by mouth 2 times daily      hydroxychloroquine (PLAQUENIL) 200 MG tablet Take 200 mg by mouth daily       aspirin 81 MG EC tablet Take 81 mg by mouth daily  30 tablet     Cholecalciferol (VITAMIN D PO) Take 5,000 Units by mouth in the morning and at bedtime          Allergies:  No Known Allergies    Problem List:    Patient Active Problem List   Diagnosis Code    Essential hypertension I10    Mixed hyperlipidemia E78.2    Type 2 diabetes mellitus without complication ([facility] F61.3    Dupuytren's contracture of right hand M72.0    Rheumatoid arthritis involving multiple sites with positive rheumatoid factor ([facility] M05.79    CAD s/p MI, TPA in 1998 I25.10    Gastrointestinal hemorrhage K92.2    Cyst, kidney, acquired N28.1    Chronic midline low back pain without sciatica M54.50, G89.29    Restless leg syndrome G25.81    Chronic diastolic congestive heart failure (HCC) I50.32    Suspected sleep apnea R29.818    Morbid obesity with BMI of 40.0-44.9, adult (HCC) E66.01, Z68.41    Functional diarrhea K59.1    Moderate persistent asthma with exacerbation J45.41    Compression fracture of L2 vertebra with delayed healing S32.020G       Past Medical History:        Diagnosis Date    Arthritis     left knee pain, sees Dr. Bharati Zimmerman CAD (coronary artery disease)     sees Dr. Marcellus Nelson Chronic midline low back pain without sciatica 9/28/2016    Had PT in 2016    Claustrophobia     Colonoscopy refused     discussed in 7/15    DM (diabetes mellitus), type 2 ([facility] 02/2006    Dupuytren's contracture of right hand     Endometrial stripe increased 9/25/2014    Saw NP Torsten garrett. Was normal exam per pt.  Gastrointestinal hemorrhage 11/2/2015    Patient had GI bleeding. Colon refused. Discussed with patient in detail.  Glaucoma 4/1/2014    H/O echocardiogram 10/02/2014    Mildly depressed left ventricular function; ejection fraction of 45%. Moderate pulmonary hypertension.      H/O echocardiogram 08/28/2018    EF 50-55%.  Mitral annular calcification is present. No other significant valvulopathy seen.  History of nuclear stress test 08/28/2018    EF 59%. ECG portion of stress test is negative for ischemia by diagnostic criteria. fixed inferior large size severe defect in rest and stress images. Mild hubert infarct ischemia.  HTN (hypertension)     Hyperlipidemia     Kidney stone     hx-\"been about 3 yrs ago\"    MI (myocardial infarction) ([facility] [address] 02/1998    treated with TPA    Obesity     Open wound of right foot 7/27/2020    Parainfluenza infection 12/5/2021    Pneumonia of right lower lobe due to infectious organism 4/5/2018    Vertigo     'have been having this since I had cataract surgery\"\"that is why they are doing the MRA of my brain(10/31/2014)    WD-Traumatic ulcer of foot, right, with fat layer exposed ([facility] 8/3/2020       Past Surgical History:        Procedure Laterality Date    CATARACT REMOVAL Bilateral     5/14,8/14    INGUINAL HERNIA REPAIR Left 45 yrs ago       Social History:    Social History     Tobacco Use    Smoking status: Never Smoker    Smokeless tobacco: Never Used   Substance Use Topics    Alcohol use: Not Currently     Comment: OCCASSIONAL                                Counseling given: Not Answered      Vital Signs (Current):   Vitals:    05/04/22 1425   Weight: 215 lb (97.5 kg)   Height: 5' 4\" (1.626 m)                                              BP Readings from Last 3 Encounters:   04/25/22 120/62   02/23/22 128/70   02/15/22 118/60       NPO Status:                                                                                 BMI:   Wt Readings from Last 3 Encounters:   04/25/22 214 lb 12.8 oz (97.4 kg)   03/14/22 218 lb (98.9 kg)   02/23/22 218 lb 6.4 oz (99.1 kg)     Body mass index is 36.9 kg/m².     CBC:   Lab Results   Component Value Date    WBC 8.5 05/04/2022    RBC 4.09 05/04/2022    HGB 12.5 05/04/2022    HCT 40.1 05/04/2022    MCV 98.0 05/04/2022    RDW 13.2 05/04/2022     05/04/2022       CMP: Lab Results   Component Value Date     05/04/2022    K 4.4 05/04/2022    K 3.0 03/08/2018    CL 98 05/04/2022    CO2 30 05/04/2022    BUN 9 05/04/2022    CREATININE 0.4 05/04/2022    GFRAA >60 05/04/2022    AGRATIO 2.0 11/19/2021    LABGLOM >60 05/04/2022    GLUCOSE 100 05/04/2022    PROT 5.9 12/30/2021    PROT 7.0 07/20/2012    CALCIUM 9.0 05/04/2022    BILITOT 0.4 12/30/2021    ALKPHOS 101 12/30/2021    AST 15 12/30/2021    ALT <5 12/30/2021       POC Tests: No results for input(s): POCGLU, POCNA, POCK, POCCL, POCBUN, POCHEMO, POCHCT in the last 72 hours. Coags:   Lab Results   Component Value Date    PROTIME 13.0 05/04/2022    INR 1.01 05/04/2022    APTT 30.5 05/04/2022       HCG (If Applicable): No results found for: PREGTESTUR, PREGSERUM, HCG, HCGQUANT     ABGs: No results found for: PHART, PO2ART, LRR2PCB, ZEN9OZE, BEART, H2DPBPUC     Type & Screen (If Applicable):  No results found for: LABABO, LABRH    Drug/Infectious Status (If Applicable):  No results found for: HIV, HEPCAB    COVID-19 Screening (If Applicable):   Lab Results   Component Value Date    COVID19 Not Detected 03/14/2022           Anesthesia Evaluation  Patient summary reviewed  Airway: Mallampati: II  TM distance: >3 FB   Neck ROM: full  Mouth opening: > = 3 FB Dental:    (+) upper dentures      Pulmonary:normal exam    (+) pneumonia:  asthma:                            Cardiovascular:    (+) hypertension:, past MI:, CAD:,                ROS comment: Summary   Left ventricular systolic function is normal with an ejection fraction of   50-55%. E/A flow reversal noted. Suggestive of diastolic dysfunction. Mild septal wall asymmetrical left ventricular hypertrophy. Mitral annular calcification is present. No other significant valvulopathy seen. No evidence of pericardial effusion.     Stress   Summary   Supervising physician Dr. Jenn Silver .  [place] portion of stress test is negative for ischemia by diagnostic criteria.   fixed inferior large size severe defect in rest and stress images   Inferior wall is hypokinetic   Ef is 59 %   Mild hubert infarct ischemia   Abnormal stress test         Neuro/Psych:                ROS comment: Claustrophobia    1. Mild acute L2 superior endplate compression fracture. 2. Moderate chronic L3 superior endplate compression fracture with mildly  worsened loss of height compared with 09/25/2020 and 3 mm retropulsion.     RECOMMENDATIONS:  Unavailable        GI/Hepatic/Renal:             Endo/Other:    (+) DiabetesType II DM, , : arthritis:., .                 Abdominal:             Vascular: Other Findings:           Anesthesia Plan      general     ASA 3     ( Pre Anesthesia Assessment complete. Chart reviewed on 5/11/2022)  Induction: intravenous. MIPS: Postoperative opioids intended. Anesthetic plan and risks discussed with patient. Plan discussed with CRNA.     Attending anesthesiologist reviewed and agrees with ABA Poon - NAKIA   5/11/2022

## 2022-05-11 NOTE — TELEPHONE ENCOUNTER
Refill request.   Also, spoke with pharmacy and  cancelled [place] rx that was sent by Dr Chris Márquez today.

## 2022-05-12 ENCOUNTER — ANESTHESIA (OUTPATIENT)
Dept: OPERATING ROOM | Age: 87
DRG: 478 | End: 2022-05-12
Payer: MEDICARE

## 2022-05-12 ENCOUNTER — APPOINTMENT (OUTPATIENT)
Dept: GENERAL RADIOLOGY | Age: 87
DRG: 478 | End: 2022-05-12
Attending: NEUROLOGICAL SURGERY
Payer: MEDICARE

## 2022-05-12 ENCOUNTER — HOSPITAL ENCOUNTER (INPATIENT)
Age: 87
LOS: 6 days | Discharge: NURSING, REHAB OR LONG-TERM CARE | DRG: 478 | End: 2022-05-18
Attending: NEUROLOGICAL SURGERY | Admitting: NEUROLOGICAL SURGERY
Payer: MEDICARE

## 2022-05-12 VITALS
RESPIRATION RATE: 21 BRPM | DIASTOLIC BLOOD PRESSURE: 55 MMHG | SYSTOLIC BLOOD PRESSURE: 119 MMHG | TEMPERATURE: 94.9 F | OXYGEN SATURATION: 86 %

## 2022-05-12 DIAGNOSIS — Z01.818 PREOPERATIVE TESTING: Primary | ICD-10-CM

## 2022-05-12 DIAGNOSIS — Z01.818 ENCOUNTER FOR PREADMISSION TESTING: ICD-10-CM

## 2022-05-12 PROBLEM — M48.062 LUMBAR STENOSIS WITH NEUROGENIC CLAUDICATION: Status: ACTIVE | Noted: 2022-05-12

## 2022-05-12 LAB
GLUCOSE BLD-MCNC: 155 MG/DL (ref 70–99)
GLUCOSE BLD-MCNC: 94 MG/DL (ref 70–99)
HCT VFR BLD CALC: 35.5 % (ref 37–47)
HEMOGLOBIN: 10.9 GM/DL (ref 12.5–16)

## 2022-05-12 PROCEDURE — 0QB03ZX EXCISION OF LUMBAR VERTEBRA, PERCUTANEOUS APPROACH, DIAGNOSTIC: ICD-10-PCS | Performed by: NEUROLOGICAL SURGERY

## 2022-05-12 PROCEDURE — C1894 INTRO/SHEATH, NON-LASER: HCPCS | Performed by: NEUROLOGICAL SURGERY

## 2022-05-12 PROCEDURE — 6360000002 HC RX W HCPCS: Performed by: PHYSICIAN ASSISTANT

## 2022-05-12 PROCEDURE — C1713 ANCHOR/SCREW BN/BN,TIS/BN: HCPCS | Performed by: NEUROLOGICAL SURGERY

## 2022-05-12 PROCEDURE — 3600000005 HC SURGERY LEVEL 5 BASE: Performed by: NEUROLOGICAL SURGERY

## 2022-05-12 PROCEDURE — 85014 HEMATOCRIT: CPT

## 2022-05-12 PROCEDURE — 6370000000 HC RX 637 (ALT 250 FOR IP): Performed by: HOSPITALIST

## 2022-05-12 PROCEDURE — 2500000003 HC RX 250 WO HCPCS: Performed by: NURSE ANESTHETIST, CERTIFIED REGISTERED

## 2022-05-12 PROCEDURE — 82962 GLUCOSE BLOOD TEST: CPT

## 2022-05-12 PROCEDURE — 6360000002 HC RX W HCPCS: Performed by: ANESTHESIOLOGY

## 2022-05-12 PROCEDURE — 36415 COLL VENOUS BLD VENIPUNCTURE: CPT

## 2022-05-12 PROCEDURE — 88311 DECALCIFY TISSUE: CPT | Performed by: PATHOLOGY

## 2022-05-12 PROCEDURE — 94761 N-INVAS EAR/PLS OXIMETRY MLT: CPT

## 2022-05-12 PROCEDURE — 6360000002 HC RX W HCPCS: Performed by: NURSE ANESTHETIST, CERTIFIED REGISTERED

## 2022-05-12 PROCEDURE — 2709999900 HC NON-CHARGEABLE SUPPLY: Performed by: NEUROLOGICAL SURGERY

## 2022-05-12 PROCEDURE — 2580000003 HC RX 258: Performed by: NEUROLOGICAL SURGERY

## 2022-05-12 PROCEDURE — 0QU03JZ SUPPLEMENT LUMBAR VERTEBRA WITH SYNTHETIC SUBSTITUTE, PERCUTANEOUS APPROACH: ICD-10-PCS | Performed by: NEUROLOGICAL SURGERY

## 2022-05-12 PROCEDURE — 1200000000 HC SEMI PRIVATE

## 2022-05-12 PROCEDURE — 7100000001 HC PACU RECOVERY - ADDTL 15 MIN: Performed by: NEUROLOGICAL SURGERY

## 2022-05-12 PROCEDURE — 2580000003 HC RX 258: Performed by: NURSE ANESTHETIST, CERTIFIED REGISTERED

## 2022-05-12 PROCEDURE — 7100000000 HC PACU RECOVERY - FIRST 15 MIN: Performed by: NEUROLOGICAL SURGERY

## 2022-05-12 PROCEDURE — 76000 FLUOROSCOPY <1 HR PHYS/QHP: CPT

## 2022-05-12 PROCEDURE — 2700000000 HC OXYGEN THERAPY PER DAY

## 2022-05-12 PROCEDURE — 3700000000 HC ANESTHESIA ATTENDED CARE: Performed by: NEUROLOGICAL SURGERY

## 2022-05-12 PROCEDURE — 88342 IMHCHEM/IMCYTCHM 1ST ANTB: CPT | Performed by: PATHOLOGY

## 2022-05-12 PROCEDURE — 2720000010 HC SURG SUPPLY STERILE: Performed by: NEUROLOGICAL SURGERY

## 2022-05-12 PROCEDURE — 0QS03ZZ REPOSITION LUMBAR VERTEBRA, PERCUTANEOUS APPROACH: ICD-10-PCS | Performed by: NEUROLOGICAL SURGERY

## 2022-05-12 PROCEDURE — 01NB0ZZ RELEASE LUMBAR NERVE, OPEN APPROACH: ICD-10-PCS | Performed by: NEUROLOGICAL SURGERY

## 2022-05-12 PROCEDURE — 3600000015 HC SURGERY LEVEL 5 ADDTL 15MIN: Performed by: NEUROLOGICAL SURGERY

## 2022-05-12 PROCEDURE — 2500000003 HC RX 250 WO HCPCS: Performed by: NEUROLOGICAL SURGERY

## 2022-05-12 PROCEDURE — 3700000001 HC ADD 15 MINUTES (ANESTHESIA): Performed by: NEUROLOGICAL SURGERY

## 2022-05-12 PROCEDURE — 88307 TISSUE EXAM BY PATHOLOGIST: CPT | Performed by: PATHOLOGY

## 2022-05-12 PROCEDURE — 6360000002 HC RX W HCPCS: Performed by: NEUROLOGICAL SURGERY

## 2022-05-12 PROCEDURE — G0378 HOSPITAL OBSERVATION PER HR: HCPCS

## 2022-05-12 PROCEDURE — 6370000000 HC RX 637 (ALT 250 FOR IP): Performed by: NEUROLOGICAL SURGERY

## 2022-05-12 PROCEDURE — 6360000004 HC RX CONTRAST MEDICATION: Performed by: NEUROLOGICAL SURGERY

## 2022-05-12 PROCEDURE — 85018 HEMOGLOBIN: CPT

## 2022-05-12 DEVICE — BONE CEMENT CX01A XPEDE US
Type: IMPLANTABLE DEVICE | Site: SPINE LUMBAR | Status: FUNCTIONAL
Brand: KYPHON® XPEDE™ BONE CEMENT

## 2022-05-12 RX ORDER — ONDANSETRON 2 MG/ML
4 INJECTION INTRAMUSCULAR; INTRAVENOUS EVERY 6 HOURS PRN
Status: DISCONTINUED | OUTPATIENT
Start: 2022-05-12 | End: 2022-05-18 | Stop reason: HOSPADM

## 2022-05-12 RX ORDER — METOPROLOL SUCCINATE 25 MG/1
25 TABLET, EXTENDED RELEASE ORAL 2 TIMES DAILY
Status: DISCONTINUED | OUTPATIENT
Start: 2022-05-12 | End: 2022-05-18 | Stop reason: HOSPADM

## 2022-05-12 RX ORDER — HYDRALAZINE HYDROCHLORIDE 20 MG/ML
10 INJECTION INTRAMUSCULAR; INTRAVENOUS
Status: DISCONTINUED | OUTPATIENT
Start: 2022-05-12 | End: 2022-05-12 | Stop reason: HOSPADM

## 2022-05-12 RX ORDER — ROSUVASTATIN CALCIUM 5 MG/1
10 TABLET, COATED ORAL NIGHTLY
Status: DISCONTINUED | OUTPATIENT
Start: 2022-05-12 | End: 2022-05-18 | Stop reason: HOSPADM

## 2022-05-12 RX ORDER — OXYCODONE HYDROCHLORIDE 5 MG/1
5 TABLET ORAL EVERY 4 HOURS PRN
Status: DISCONTINUED | OUTPATIENT
Start: 2022-05-12 | End: 2022-05-13

## 2022-05-12 RX ORDER — LIDOCAINE HYDROCHLORIDE 20 MG/ML
INJECTION, SOLUTION EPIDURAL; INFILTRATION; INTRACAUDAL; PERINEURAL PRN
Status: DISCONTINUED | OUTPATIENT
Start: 2022-05-12 | End: 2022-05-12 | Stop reason: SDUPTHER

## 2022-05-12 RX ORDER — SODIUM CHLORIDE 9 MG/ML
INJECTION, SOLUTION INTRAVENOUS CONTINUOUS PRN
Status: DISCONTINUED | OUTPATIENT
Start: 2022-05-12 | End: 2022-05-12 | Stop reason: SDUPTHER

## 2022-05-12 RX ORDER — POLYETHYLENE GLYCOL 3350 17 G/17G
17 POWDER, FOR SOLUTION ORAL DAILY
Status: DISCONTINUED | OUTPATIENT
Start: 2022-05-12 | End: 2022-05-18 | Stop reason: HOSPADM

## 2022-05-12 RX ORDER — CEFAZOLIN SODIUM 2 G/100ML
2000 INJECTION, SOLUTION INTRAVENOUS
Status: COMPLETED | OUTPATIENT
Start: 2022-05-12 | End: 2022-05-12

## 2022-05-12 RX ORDER — FENTANYL CITRATE 50 UG/ML
50 INJECTION, SOLUTION INTRAMUSCULAR; INTRAVENOUS EVERY 5 MIN PRN
Status: DISCONTINUED | OUTPATIENT
Start: 2022-05-12 | End: 2022-05-12 | Stop reason: HOSPADM

## 2022-05-12 RX ORDER — DEXTROSE MONOHYDRATE 50 MG/ML
100 INJECTION, SOLUTION INTRAVENOUS PRN
Status: DISCONTINUED | OUTPATIENT
Start: 2022-05-12 | End: 2022-05-12 | Stop reason: HOSPADM

## 2022-05-12 RX ORDER — OXYCODONE HYDROCHLORIDE 5 MG/1
10 TABLET ORAL EVERY 4 HOURS PRN
Status: DISCONTINUED | OUTPATIENT
Start: 2022-05-12 | End: 2022-05-13

## 2022-05-12 RX ORDER — ONDANSETRON 2 MG/ML
INJECTION INTRAMUSCULAR; INTRAVENOUS PRN
Status: DISCONTINUED | OUTPATIENT
Start: 2022-05-12 | End: 2022-05-12 | Stop reason: SDUPTHER

## 2022-05-12 RX ORDER — SODIUM CHLORIDE, SODIUM LACTATE, POTASSIUM CHLORIDE, CALCIUM CHLORIDE 600; 310; 30; 20 MG/100ML; MG/100ML; MG/100ML; MG/100ML
INJECTION, SOLUTION INTRAVENOUS CONTINUOUS
Status: DISCONTINUED | OUTPATIENT
Start: 2022-05-12 | End: 2022-05-13

## 2022-05-12 RX ORDER — OXYCODONE HYDROCHLORIDE 5 MG/1
5 TABLET ORAL
Status: DISCONTINUED | OUTPATIENT
Start: 2022-05-12 | End: 2022-05-12 | Stop reason: HOSPADM

## 2022-05-12 RX ORDER — ROPINIROLE 2 MG/1
TABLET, FILM COATED ORAL
Qty: 45 TABLET | Refills: 3 | Status: ON HOLD | OUTPATIENT
Start: 2022-05-12 | End: 2022-06-02 | Stop reason: HOSPADM

## 2022-05-12 RX ORDER — NALOXONE HYDROCHLORIDE 0.4 MG/ML
0.4 INJECTION, SOLUTION INTRAMUSCULAR; INTRAVENOUS; SUBCUTANEOUS PRN
Status: DISCONTINUED | OUTPATIENT
Start: 2022-05-12 | End: 2022-05-18 | Stop reason: HOSPADM

## 2022-05-12 RX ORDER — ACETAMINOPHEN 325 MG/1
650 TABLET ORAL EVERY 6 HOURS
Status: DISCONTINUED | OUTPATIENT
Start: 2022-05-12 | End: 2022-05-18 | Stop reason: HOSPADM

## 2022-05-12 RX ORDER — ROPINIROLE 1 MG/1
2 TABLET, FILM COATED ORAL NIGHTLY
Status: DISCONTINUED | OUTPATIENT
Start: 2022-05-12 | End: 2022-05-18 | Stop reason: HOSPADM

## 2022-05-12 RX ORDER — SODIUM CHLORIDE, SODIUM LACTATE, POTASSIUM CHLORIDE, CALCIUM CHLORIDE 600; 310; 30; 20 MG/100ML; MG/100ML; MG/100ML; MG/100ML
INJECTION, SOLUTION INTRAVENOUS CONTINUOUS
Status: DISCONTINUED | OUTPATIENT
Start: 2022-05-12 | End: 2022-05-12 | Stop reason: HOSPADM

## 2022-05-12 RX ORDER — FENTANYL CITRATE 0.05 MG/ML
INJECTION, SOLUTION INTRAMUSCULAR; INTRAVENOUS PRN
Status: DISCONTINUED | OUTPATIENT
Start: 2022-05-12 | End: 2022-05-12 | Stop reason: SDUPTHER

## 2022-05-12 RX ORDER — PROPOFOL 10 MG/ML
INJECTION, EMULSION INTRAVENOUS PRN
Status: DISCONTINUED | OUTPATIENT
Start: 2022-05-12 | End: 2022-05-12 | Stop reason: SDUPTHER

## 2022-05-12 RX ORDER — SODIUM CHLORIDE 0.9 % (FLUSH) 0.9 %
5-40 SYRINGE (ML) INJECTION EVERY 12 HOURS SCHEDULED
Status: DISCONTINUED | OUTPATIENT
Start: 2022-05-12 | End: 2022-05-12 | Stop reason: HOSPADM

## 2022-05-12 RX ORDER — SODIUM CHLORIDE 9 MG/ML
25 INJECTION, SOLUTION INTRAVENOUS PRN
Status: DISCONTINUED | OUTPATIENT
Start: 2022-05-12 | End: 2022-05-12 | Stop reason: HOSPADM

## 2022-05-12 RX ORDER — MEPERIDINE HYDROCHLORIDE 25 MG/ML
12.5 INJECTION INTRAMUSCULAR; INTRAVENOUS; SUBCUTANEOUS EVERY 5 MIN PRN
Status: DISCONTINUED | OUTPATIENT
Start: 2022-05-12 | End: 2022-05-12 | Stop reason: HOSPADM

## 2022-05-12 RX ORDER — SENNA AND DOCUSATE SODIUM 50; 8.6 MG/1; MG/1
1 TABLET, FILM COATED ORAL 2 TIMES DAILY
Status: DISCONTINUED | OUTPATIENT
Start: 2022-05-12 | End: 2022-05-18 | Stop reason: HOSPADM

## 2022-05-12 RX ORDER — TRAMADOL HYDROCHLORIDE 50 MG/1
50 TABLET ORAL EVERY 4 HOURS PRN
Status: DISCONTINUED | OUTPATIENT
Start: 2022-05-12 | End: 2022-05-18 | Stop reason: HOSPADM

## 2022-05-12 RX ORDER — DIPHENHYDRAMINE HYDROCHLORIDE 50 MG/ML
12.5 INJECTION INTRAMUSCULAR; INTRAVENOUS
Status: DISCONTINUED | OUTPATIENT
Start: 2022-05-12 | End: 2022-05-12 | Stop reason: HOSPADM

## 2022-05-12 RX ORDER — SODIUM CHLORIDE 0.9 % (FLUSH) 0.9 %
5-40 SYRINGE (ML) INJECTION PRN
Status: DISCONTINUED | OUTPATIENT
Start: 2022-05-12 | End: 2022-05-12 | Stop reason: HOSPADM

## 2022-05-12 RX ORDER — DEXAMETHASONE SODIUM PHOSPHATE 4 MG/ML
INJECTION, SOLUTION INTRA-ARTICULAR; INTRALESIONAL; INTRAMUSCULAR; INTRAVENOUS; SOFT TISSUE PRN
Status: DISCONTINUED | OUTPATIENT
Start: 2022-05-12 | End: 2022-05-12 | Stop reason: SDUPTHER

## 2022-05-12 RX ORDER — ROCURONIUM BROMIDE 10 MG/ML
INJECTION, SOLUTION INTRAVENOUS PRN
Status: DISCONTINUED | OUTPATIENT
Start: 2022-05-12 | End: 2022-05-12 | Stop reason: SDUPTHER

## 2022-05-12 RX ORDER — DIAZEPAM 5 MG/1
5 TABLET ORAL EVERY 8 HOURS PRN
Status: DISCONTINUED | OUTPATIENT
Start: 2022-05-12 | End: 2022-05-18 | Stop reason: HOSPADM

## 2022-05-12 RX ORDER — EPHEDRINE SULFATE 50 MG/ML
INJECTION INTRAVENOUS PRN
Status: DISCONTINUED | OUTPATIENT
Start: 2022-05-12 | End: 2022-05-12 | Stop reason: SDUPTHER

## 2022-05-12 RX ORDER — ONDANSETRON 2 MG/ML
4 INJECTION INTRAMUSCULAR; INTRAVENOUS
Status: COMPLETED | OUTPATIENT
Start: 2022-05-12 | End: 2022-05-12

## 2022-05-12 RX ORDER — SODIUM CHLORIDE 9 MG/ML
INJECTION, SOLUTION INTRAVENOUS CONTINUOUS
Status: DISCONTINUED | OUTPATIENT
Start: 2022-05-12 | End: 2022-05-13

## 2022-05-12 RX ORDER — ONDANSETRON 4 MG/1
4 TABLET, ORALLY DISINTEGRATING ORAL EVERY 8 HOURS PRN
Status: DISCONTINUED | OUTPATIENT
Start: 2022-05-12 | End: 2022-05-18 | Stop reason: HOSPADM

## 2022-05-12 RX ORDER — SODIUM CHLORIDE 9 MG/ML
INJECTION, SOLUTION INTRAVENOUS PRN
Status: DISCONTINUED | OUTPATIENT
Start: 2022-05-12 | End: 2022-05-12 | Stop reason: HOSPADM

## 2022-05-12 RX ORDER — IPRATROPIUM BROMIDE AND ALBUTEROL SULFATE 2.5; .5 MG/3ML; MG/3ML
1 SOLUTION RESPIRATORY (INHALATION)
Status: DISCONTINUED | OUTPATIENT
Start: 2022-05-12 | End: 2022-05-12 | Stop reason: HOSPADM

## 2022-05-12 RX ORDER — MIDAZOLAM HYDROCHLORIDE 2 MG/2ML
2 INJECTION, SOLUTION INTRAMUSCULAR; INTRAVENOUS
Status: DISCONTINUED | OUTPATIENT
Start: 2022-05-12 | End: 2022-05-12 | Stop reason: HOSPADM

## 2022-05-12 RX ADMIN — HYDROMORPHONE HYDROCHLORIDE 0.25 MG: 1 INJECTION, SOLUTION INTRAMUSCULAR; INTRAVENOUS; SUBCUTANEOUS at 12:07

## 2022-05-12 RX ADMIN — POLYETHYLENE GLYCOL (3350) 17 G: 17 POWDER, FOR SOLUTION ORAL at 13:11

## 2022-05-12 RX ADMIN — CEFAZOLIN SODIUM 2000 MG: 2 INJECTION, SOLUTION INTRAVENOUS at 07:25

## 2022-05-12 RX ADMIN — LIDOCAINE HYDROCHLORIDE 100 MG: 20 INJECTION, SOLUTION EPIDURAL; INFILTRATION; INTRACAUDAL at 07:37

## 2022-05-12 RX ADMIN — PROPOFOL 140 MG: 10 INJECTION, EMULSION INTRAVENOUS at 07:37

## 2022-05-12 RX ADMIN — ACETAMINOPHEN 650 MG: 325 TABLET ORAL at 13:11

## 2022-05-12 RX ADMIN — ROCURONIUM BROMIDE 20 MG: 50 INJECTION, SOLUTION INTRAVENOUS at 08:38

## 2022-05-12 RX ADMIN — SENNOSIDES AND DOCUSATE SODIUM 1 TABLET: 50; 8.6 TABLET ORAL at 21:12

## 2022-05-12 RX ADMIN — FENTANYL CITRATE 25 MCG: 50 INJECTION INTRAMUSCULAR; INTRAVENOUS at 11:06

## 2022-05-12 RX ADMIN — ROPINIROLE HYDROCHLORIDE 2 MG: 1 TABLET, FILM COATED ORAL at 21:12

## 2022-05-12 RX ADMIN — METOPROLOL SUCCINATE 25 MG: 25 TABLET, EXTENDED RELEASE ORAL at 21:12

## 2022-05-12 RX ADMIN — OXYCODONE HYDROCHLORIDE 10 MG: 5 TABLET ORAL at 13:07

## 2022-05-12 RX ADMIN — ONDANSETRON 4 MG: 2 INJECTION INTRAMUSCULAR; INTRAVENOUS at 11:37

## 2022-05-12 RX ADMIN — ROCURONIUM BROMIDE 50 MG: 50 INJECTION, SOLUTION INTRAVENOUS at 07:38

## 2022-05-12 RX ADMIN — SODIUM CHLORIDE: 9 INJECTION, SOLUTION INTRAVENOUS at 07:05

## 2022-05-12 RX ADMIN — FENTANYL CITRATE 25 MCG: 50 INJECTION INTRAMUSCULAR; INTRAVENOUS at 11:29

## 2022-05-12 RX ADMIN — DEXAMETHASONE SODIUM PHOSPHATE 8 MG: 4 INJECTION, SOLUTION INTRAMUSCULAR; INTRAVENOUS at 07:50

## 2022-05-12 RX ADMIN — OXYCODONE HYDROCHLORIDE 10 MG: 5 TABLET ORAL at 18:13

## 2022-05-12 RX ADMIN — TRAMADOL HYDROCHLORIDE 50 MG: 50 TABLET, COATED ORAL at 21:12

## 2022-05-12 RX ADMIN — FENTANYL CITRATE 50 MCG: 50 INJECTION INTRAMUSCULAR; INTRAVENOUS at 07:37

## 2022-05-12 RX ADMIN — ROCURONIUM BROMIDE 10 MG: 50 INJECTION, SOLUTION INTRAVENOUS at 09:40

## 2022-05-12 RX ADMIN — SODIUM CHLORIDE: 9 INJECTION, SOLUTION INTRAVENOUS at 12:06

## 2022-05-12 RX ADMIN — SUGAMMADEX 200 MG: 100 INJECTION, SOLUTION INTRAVENOUS at 11:13

## 2022-05-12 RX ADMIN — SENNOSIDES AND DOCUSATE SODIUM 1 TABLET: 50; 8.6 TABLET ORAL at 13:11

## 2022-05-12 RX ADMIN — FENTANYL CITRATE 50 MCG: 50 INJECTION, SOLUTION INTRAMUSCULAR; INTRAVENOUS at 11:48

## 2022-05-12 RX ADMIN — EPHEDRINE SULFATE 5 MG: 50 INJECTION INTRAVENOUS at 07:44

## 2022-05-12 RX ADMIN — HYDROMORPHONE HYDROCHLORIDE 0.25 MG: 1 INJECTION, SOLUTION INTRAMUSCULAR; INTRAVENOUS; SUBCUTANEOUS at 11:58

## 2022-05-12 RX ADMIN — FENTANYL CITRATE 50 MCG: 50 INJECTION INTRAMUSCULAR; INTRAVENOUS at 09:50

## 2022-05-12 RX ADMIN — SODIUM CHLORIDE, POTASSIUM CHLORIDE, SODIUM LACTATE AND CALCIUM CHLORIDE: 600; 310; 30; 20 INJECTION, SOLUTION INTRAVENOUS at 07:05

## 2022-05-12 RX ADMIN — ONDANSETRON 4 MG: 2 INJECTION INTRAMUSCULAR; INTRAVENOUS at 10:56

## 2022-05-12 RX ADMIN — FENTANYL CITRATE 50 MCG: 50 INJECTION, SOLUTION INTRAMUSCULAR; INTRAVENOUS at 11:37

## 2022-05-12 RX ADMIN — EPHEDRINE SULFATE 10 MG: 50 INJECTION INTRAVENOUS at 07:49

## 2022-05-12 RX ADMIN — ROCURONIUM BROMIDE 10 MG: 50 INJECTION, SOLUTION INTRAVENOUS at 10:23

## 2022-05-12 RX ADMIN — ROSUVASTATIN CALCIUM 10 MG: 5 TABLET, FILM COATED ORAL at 21:12

## 2022-05-12 RX ADMIN — FENTANYL CITRATE 50 MCG: 50 INJECTION INTRAMUSCULAR; INTRAVENOUS at 09:14

## 2022-05-12 RX ADMIN — HYDROMORPHONE HYDROCHLORIDE 0.25 MG: 1 INJECTION, SOLUTION INTRAMUSCULAR; INTRAVENOUS; SUBCUTANEOUS at 15:37

## 2022-05-12 ASSESSMENT — PULMONARY FUNCTION TESTS
PIF_VALUE: 24
PIF_VALUE: 22
PIF_VALUE: 30
PIF_VALUE: 20
PIF_VALUE: 19
PIF_VALUE: 27
PIF_VALUE: 21
PIF_VALUE: 18
PIF_VALUE: 19
PIF_VALUE: 21
PIF_VALUE: 20
PIF_VALUE: 19
PIF_VALUE: 19
PIF_VALUE: 22
PIF_VALUE: 19
PIF_VALUE: 19
PIF_VALUE: 20
PIF_VALUE: 1
PIF_VALUE: 3
PIF_VALUE: 20
PIF_VALUE: 19
PIF_VALUE: 23
PIF_VALUE: 19
PIF_VALUE: 20
PIF_VALUE: 19
PIF_VALUE: 8
PIF_VALUE: 18
PIF_VALUE: 19
PIF_VALUE: 30
PIF_VALUE: 5
PIF_VALUE: 19
PIF_VALUE: 21
PIF_VALUE: 18
PIF_VALUE: 19
PIF_VALUE: 21
PIF_VALUE: 19
PIF_VALUE: 19
PIF_VALUE: 18
PIF_VALUE: 19
PIF_VALUE: 19
PIF_VALUE: 23
PIF_VALUE: 19
PIF_VALUE: 3
PIF_VALUE: 19
PIF_VALUE: 20
PIF_VALUE: 18
PIF_VALUE: 18
PIF_VALUE: 19
PIF_VALUE: 19
PIF_VALUE: 27
PIF_VALUE: 18
PIF_VALUE: 20
PIF_VALUE: 19
PIF_VALUE: 23
PIF_VALUE: 19
PIF_VALUE: 19
PIF_VALUE: 20
PIF_VALUE: 19
PIF_VALUE: 20
PIF_VALUE: 6
PIF_VALUE: 19
PIF_VALUE: 18
PIF_VALUE: 19
PIF_VALUE: 21
PIF_VALUE: 18
PIF_VALUE: 19
PIF_VALUE: 23
PIF_VALUE: 19
PIF_VALUE: 19
PIF_VALUE: 34
PIF_VALUE: 19
PIF_VALUE: 33
PIF_VALUE: 0
PIF_VALUE: 19
PIF_VALUE: 20
PIF_VALUE: 19
PIF_VALUE: 18
PIF_VALUE: 18
PIF_VALUE: 19
PIF_VALUE: 18
PIF_VALUE: 18
PIF_VALUE: 19
PIF_VALUE: 20
PIF_VALUE: 18
PIF_VALUE: 19
PIF_VALUE: 19
PIF_VALUE: 23
PIF_VALUE: 19
PIF_VALUE: 21
PIF_VALUE: 19
PIF_VALUE: 20
PIF_VALUE: 20
PIF_VALUE: 19
PIF_VALUE: 19
PIF_VALUE: 21
PIF_VALUE: 20
PIF_VALUE: 19
PIF_VALUE: 18
PIF_VALUE: 27
PIF_VALUE: 29
PIF_VALUE: 20
PIF_VALUE: 18
PIF_VALUE: 19
PIF_VALUE: 24
PIF_VALUE: 18
PIF_VALUE: 0
PIF_VALUE: 19
PIF_VALUE: 21
PIF_VALUE: 19
PIF_VALUE: 19
PIF_VALUE: 18
PIF_VALUE: 9
PIF_VALUE: 19
PIF_VALUE: 18
PIF_VALUE: 17
PIF_VALUE: 19
PIF_VALUE: 18
PIF_VALUE: 20
PIF_VALUE: 19
PIF_VALUE: 2
PIF_VALUE: 19
PIF_VALUE: 19
PIF_VALUE: 20
PIF_VALUE: 19
PIF_VALUE: 21
PIF_VALUE: 21
PIF_VALUE: 19
PIF_VALUE: 18
PIF_VALUE: 20
PIF_VALUE: 18
PIF_VALUE: 19
PIF_VALUE: 8
PIF_VALUE: 19
PIF_VALUE: 2
PIF_VALUE: 20
PIF_VALUE: 18
PIF_VALUE: 19
PIF_VALUE: 11
PIF_VALUE: 19
PIF_VALUE: 2
PIF_VALUE: 2
PIF_VALUE: 19
PIF_VALUE: 19
PIF_VALUE: 22
PIF_VALUE: 19
PIF_VALUE: 25
PIF_VALUE: 19
PIF_VALUE: 21
PIF_VALUE: 19
PIF_VALUE: 20
PIF_VALUE: 20
PIF_VALUE: 2
PIF_VALUE: 19
PIF_VALUE: 18
PIF_VALUE: 23
PIF_VALUE: 19
PIF_VALUE: 19
PIF_VALUE: 1
PIF_VALUE: 18
PIF_VALUE: 19
PIF_VALUE: 19
PIF_VALUE: 18
PIF_VALUE: 19
PIF_VALUE: 19
PIF_VALUE: 28
PIF_VALUE: 9
PIF_VALUE: 24
PIF_VALUE: 18
PIF_VALUE: 20
PIF_VALUE: 19
PIF_VALUE: 20
PIF_VALUE: 21
PIF_VALUE: 20
PIF_VALUE: 22
PIF_VALUE: 19
PIF_VALUE: 19
PIF_VALUE: 23
PIF_VALUE: 19
PIF_VALUE: 19
PIF_VALUE: 22
PIF_VALUE: 19
PIF_VALUE: 21
PIF_VALUE: 21
PIF_VALUE: 19
PIF_VALUE: 18
PIF_VALUE: 22
PIF_VALUE: 1
PIF_VALUE: 19

## 2022-05-12 ASSESSMENT — PAIN DESCRIPTION - FREQUENCY
FREQUENCY: CONTINUOUS

## 2022-05-12 ASSESSMENT — PAIN DESCRIPTION - PAIN TYPE
TYPE: SURGICAL PAIN

## 2022-05-12 ASSESSMENT — PAIN DESCRIPTION - LOCATION
LOCATION: BACK

## 2022-05-12 ASSESSMENT — PAIN SCALES - GENERAL
PAINLEVEL_OUTOF10: 10
PAINLEVEL_OUTOF10: 5
PAINLEVEL_OUTOF10: 6
PAINLEVEL_OUTOF10: 8
PAINLEVEL_OUTOF10: 10
PAINLEVEL_OUTOF10: 6
PAINLEVEL_OUTOF10: 10
PAINLEVEL_OUTOF10: 10

## 2022-05-12 ASSESSMENT — PAIN DESCRIPTION - ORIENTATION
ORIENTATION: LOWER;MID
ORIENTATION: MID;LOWER
ORIENTATION: LOWER;MID
ORIENTATION: MID;LOWER
ORIENTATION: MID;LOWER
ORIENTATION: LOWER;MID

## 2022-05-12 ASSESSMENT — PAIN DESCRIPTION - DESCRIPTORS
DESCRIPTORS: SHARP

## 2022-05-12 ASSESSMENT — PAIN - FUNCTIONAL ASSESSMENT: PAIN_FUNCTIONAL_ASSESSMENT: 0-10

## 2022-05-12 NOTE — OP NOTE
Operative Note      Patient: Bhavik Scanlon  YOB: 1935  MRN: [id number]    Date of Procedure: 5/12/2022    Pre-Op Diagnosis:  L1-L4 spinal stenosis with neurogenic claudication. Osteoporotic L2 and L3 compression fractures with delayed healing. Post-Op Diagnosis: Same       Procedure:  L1-L4 laminectomies for spinal stenosis. Bilateral L2-L3 balloon kyphoplasty. L2 and L3 bone biopsy. Microdissection. Intraoperative fluoroscopy. Surgeon(s):  Rudy Gaming MD    Assistant:   Physician Assistant: Bita Palacio PA-C    Anesthesia: General    Estimated Blood Loss (mL): 800    Complications: None    Specimens:   ID Type Source Tests Collected by Time Destination   A : L2 VERBETRAL BODY Tissue Tissue SURGICAL PATHOLOGY Rudy Gaming MD 5/12/2022 0827    B : L3 VERTEBRAL BODY BIOPSY Tissue Tissue SURGICAL PATHOLOGY Rudy Gaming MD 5/12/2022 5474        Implants:  Implant Name Type Inv. Item Serial No.  Lot No. LRB No. Used Action   CEMENT BNE Jinnie Flower  CEMENT BNE [place] [employer]  [employer] [facility] [id number] N/A 2 Implanted         Drains:   Closed/Suction Drain Inferior;Midline Back Accordion (Active)   Site Description Clean, dry & intact 05/12/22 1109   Dressing Status New dressing applied 05/12/22 1109   Drainage Appearance Bloody;Bright red 05/12/22 1109   Drain Status Compressed 05/12/22 1109       Findings: Osteoporotic bone. Ligamentum flavum hypertrophy. Detailed Description of Procedure: Once the patient was under general anesthesia, she was positioned prone with all pressure points padded. The back was prepped and draped in sterile fashion. Prophylactic antibiotic was administered. Timeout was performed. Fluoroscopy was used to identify the surgical level and midline incision marked. Skin incision was made with scalpel then cautery used to expose the spinous process and lamina bilaterally.   Retractor was positioned and fluoroscopy used to confirm the level of surgery. The L1-L4 spinous processes were removed with bone rongeur. The operative microscope was then used to provide illumination and magnification. High-speed drill and Kerrison rongeur were used to perform bilateral laminectomies. Ligamentum flavum was elevated and removed to complete decompression of the neural elements. Bilateral lumbar stab incisions were made at the L2 and L3 pedicles. Jamshidi needle was inserted from the lateral wall of each pedicle towards the medial wall of the pedicle with AP fluoroscopy. Lateral fluoroscopy confirmed appropriate medialization. The Jamshidi needle was advanced into the vertebral body. A drill was used to penetrate the vertebral body and perform bone biopsy. Balloon was inserted. The balloon was inflated with fluoroscopic guidance. The balloon was then removed and cement injected under fluoroscopic guidance. Copious irrigation was performed. Hemostasis was achieved. Counts were verified to be correct. A subfascial Hemovac drain was placed and secured with suture. Closure was performed with 0 Vicryl in the fascia and 2-0 Vicryl in the dermis. Dermabond was applied to the skin. The patient was extubated in the operating room and transported the PACU in stable condition.       Electronically signed by Teodoro Martinez MD on 5/12/2022 at 11:10 AM

## 2022-05-12 NOTE — ANESTHESIA POSTPROCEDURE EVALUATION
Department of Anesthesiology  Postprocedure Note    Patient: Stephanie Theodore  MRN: [id number]  YOB: 1935  Date of evaluation: 5/12/2022  Time:  5:46 PM     Procedure Summary     Date: 05/12/22 Room / Location: [place] [address] / [facility]    Anesthesia Start: 4160 Anesthesia Stop: 1138    Procedures:       L1-4 LAMINECTOMIES (N/A Spine Lumbar)      L2 AND L3 BILATERAL BALLOON KYPHOPLASTY AND L2 AND L3 NEEDLE BIOPSY (N/A Spine Lumbar) Diagnosis: (STENOSIS AND COMPRESSION FRACTURE)    Surgeons: Butch Gould MD Responsible Provider: Daniela Angel MD    Anesthesia Type: general ASA Status: 3          Anesthesia Type: No value filed. Mathieu Phase I: Mathieu Score: 10    Mathieu Phase II:      Last vitals: Reviewed and per EMR flowsheets.        Anesthesia Post Evaluation    Patient location during evaluation: PACU  Patient participation: complete - patient participated  Level of consciousness: awake  Pain score: 3  Airway patency: patent  Nausea & Vomiting: no vomiting and no nausea  Complications: no  Cardiovascular status: blood pressure returned to baseline and hemodynamically stable  Respiratory status: acceptable  Hydration status: stable

## 2022-05-12 NOTE — H&P
Neurosurgery   H&P      Date of Admission: 5/12/2022  Subjective:   Procedure: L1-4 laminectomies, L2-3 bilateral balloon kyphoplasty's, L2 and 3 needle biopsy    HPI:  81 y.o. 1935  Who presents today for an elective procedure mentioned above. Patient was last seen in the office by Dr. Alberto Mays 3/21/22. At that visit patient was complaining of back pain that developed after a significant coughing spell while she was in rehab. She was admitted to the hospital for a viral illness prior to going to rehab. Her and had decreased slightly but she was not back to her baseline. She was beginning to need a 4 wheeled walker and had difficulty standing erect in one spot. Sitting provides relief. She avoids doing activities such as grocery shopping. She also reported a 20 pound weight loss over the past 6 to 7 weeks. Today she states she is in significant pain. She is unable to roll in the gurney without extreme pain. She states she does have some numbness in her right leg at baseline. Patient is on 81 mg ASA, she states that she did stop taking this 5 days prior to her procedure. PMHx positive for CAD, diabetes type 2, hyperlipidemia, hypertension, MI. Past surgical history positive for bilateral cataract removal, left inguinal hernia repair. Past Medical and Surgical History:       Diagnosis Date    Arthritis     left knee pain, sees Dr. Flavio Meeks CAD (coronary artery disease)     sees Dr. Kirk Cardenas Chronic midline low back pain without sciatica 9/28/2016    Had PT in 2016   [address] Colonoscopy refused     discussed in 7/15    [facility]di[facility] mellitus[facility] 02/2006    Dupuytren's contracture of right hand     Endometrial stripe increased 9/25/2014    Saw NP Nichelle Woo. Was normal exam per pt.  Gastrointestinal hemorrhage 11/2/2015    Patient had GI bleeding. Colon refused. Discussed with patient in detail.     Glaucoma 4/1/2014    H/O echocardiogram 10/02/2014 Mildly depressed left ventricular function; ejection fraction of 45%. Moderate pulmonary hypertension.  H/O echocardiogram 08/28/2018    EF 50-55%.  Mitral annular calcification is present. No other significant valvulopathy seen.  History of nuclear stress test 08/28/2018    EF 59%. ECG portion of stress test is negative for ischemia by diagnostic criteria. fixed inferior large size severe defect in rest and stress images. Mild hubert infarct ischemia.  HTN (hypertension)     Hyperlipidemia     Kidney stone     hx-\"been about 3 yrs ago\"    MI (myocardial infarction) ([facility] 02/1998    treated with TPA    Obesity     Open wound of right foot 7/27/2020    Parainfluenza infection 12/5/2021    Pneumonia of right lower lobe due to infectious organism 4/5/2018    Vertigo     'have been having this since I had cataract surgery\"\"that is why they are doing the MRA of my brain(10/31/2014)    WD-Traumatic ulcer of foot, right, with fat layer exposed ([facility] 8/3/2020         Procedure Laterality Date    CATARACT REMOVAL Bilateral     5/14,8/14    INGUINAL HERNIA REPAIR Left 45 yrs ago       Social History:    TOBACCO:   reports that she has never smoked. She has never used smokeless tobacco.  ETOH:   reports previous alcohol use.     Family History:       Problem Relation Age of Onset    Parkinsonism Mother     Heart Attack Father     Heart Disease Father     Stroke Sister     Cancer Sister         breast ca    High Blood Pressure Brother     High Blood Pressure Brother     Cancer Brother         \"stomach cancer\"    Colon Cancer Neg Hx     Stomach Cancer Neg Hx        Current Medications:    Current Facility-Administered Medications: sodium chloride flush 0.9 % injection 5-40 mL, 5-40 mL, IntraVENous, 2 times per day  sodium chloride flush 0.9 % injection 5-40 mL, 5-40 mL, IntraVENous, PRN  0.9 % sodium chloride infusion, , IntraVENous, PRN  lactated ringers infusion, , IntraVENous, Continuous  ceFAZolin (ANCEF) 2000 mg in dextrose 4 % 100 mL IVPB (premix), 2,000 mg, IntraVENous, On Call to OR  iopamidol (ISOVUE-370) 76 % injection 150 mL, 150 mL, Other, ONCE PRN    No Known Allergies     REVIEW OF SYSTEMS:    CONSTITUTIONAL:  negative for fevers, chills, diaphoresis, activity change, appetite change, fatigue  EYES:  negative for blurred vision, eye discharge, visual disturbance and icterus  HEENT:  negative for hearing loss, tinnitus, ear drainage, sinus pressure, nasal congestion, epistaxis and snoring  RESPIRATORY:  No cough, shortness of breath, hemoptysis  GASTROINTESTINAL:  negative for nausea, vomiting, diarrhea, constipation  GENITOURINARY:  negative for frequency, dysuria, urinary incontinence, decreased urine volume, and hematuria  ENDOCRINE:  negative for weight changes and diabetic symptoms including polyuria, polydipsia and polyphagia  MUSCULOSKELETAL: Positive for back pain, negative for joint swelling, decreased range of motion and muscle weakness  NEUROLOGICAL:  negative for headaches, slurred speech, unilateral weakness  PSYCHIATRIC/BEHAVIORAL: negative for hallucinations, behavioral problems, confusion and agitation. Objective:   PHYSICAL EXAM:      VITALS:  BP (!) 126/55   Pulse 61   Temp 98.1 °F (36.7 °C) (Temporal)   Resp 18   Ht 5' 4\" (1.626 m)   Wt 215 lb (97.5 kg)   LMP  (LMP Unknown)   SpO2 (!) 16%   BMI 36.90 kg/m²      24HR INTAKE/OUTPUT:  No intake or output data in the 24 hours ending 05/12/22 0715  CONSTITUTIONAL:  Awake, alert, cooperative, no apparent distress, and appears stated age  [de-identified]: Hanna Dash. Sclera white, conjunctive full. OP with moist mucosal membranes, no thrush, tongue protrudes midline  PSYCHIATRIC: Oriented to person place and time. No obvious depression or anxiety. MUSCULOSKELETAL: No obvious misalignment or effusion of the joints. No clubbing, cyanosis of the digits.   SKIN:  normal skin color, texture, turgor and no redness, warmth, or swelling. No palpable nodules or stigmata of embolic phenomenon  NEUROLOGIC: Alert and oriented x4, face symmetrical, no obvious droop, speech clear and coherent, sensation intact to light touch and pinprick sensation except for decrease in right leg  Upper extremity strength: Bilateral upper extremities 5/5 throughout  Lower extremity strength: Bilateral lower extremities 5/5 throughout    DATA:    Old records have been reviewed      Radiology Review:  All pertinent images / reports were reviewed as a part of this visit. PAT performed 5/4/2022    Assessment:   Neurogenic claudication, severe stenosis L1-2 and L2-3  Compression fracture of lumbar spine, acute/subacute L2, subacute/chronic L3  Plan:   The patient presents today for the elective spine procedure mentioned above. There have been no changes in their medical history since their last neurosurgical evaluation. Risks and benefits of the surgery were discussed on 3/21/22 by Dr. Alberto Mays. Those risks include bleeding, infection, weakness, numbness, bowel or bladder dysfunction, paralysis, cerebrospinal fluid leakage, hoarse voice, problems swallowing, failure surgery, need for further surgery, heart attack, stroke, blood clot, pneumonia, and/or death. They agreed to proceed with the discussed surgery at that time and today continue to voice consent. Surgical site was confirmed by the patient and myself and was marked. Electronically signed by Darline Garibay PA-C on 5/12/2022 at 7:15 AM    Supervising physician: Mandy Fair. Alberto Mays MD.  Dr. Alberto Mays was readily and continuously available by phone for direct consultation regarding the care of this patient. Time spent with patient in consultation, education, and collaboration with medical time is >50% of total time spent on case, including time spent in chart review and dictation.  Total time spent: 40 minutes    Thank you for the opportunity to participate in the care of your patient.

## 2022-05-12 NOTE — PROGRESS NOTES
1129- pt received from OR. Monitors placed and alarms on. Report received from [facility]. 1137- pt medicated for complaints of nausea and pain. 1148- pt medicated for complaints of pain. John Agrawal 94, PA at bedside. 1158- pt medicated for complaints of pain. 1200- pt tolerating ice chips. 1207- pt medicated for complaints of pain. 1215- pt repositioned in bed. Extra linen removed and new pad placed under pt.  1220- pt resting comfortably. 1225- report called to [facility] nurse prior to transport to inpatient room. 1227- warm blanket placed on pt's back per request.   4805- pt transported to inpatient room via transport.

## 2022-05-12 NOTE — PROGRESS NOTES
Neurosurgery Post-op Note    PROCEDURE: L1-L4 laminectomies for decompression, bilateral L2-3 balloon kyphoplasty, L2 and L3 bone biopsy    5/12/2022 11:52 AM    Patient seen in PACU  Alert and oriented to self, place  Able to move all extremities at baseline, no N/T present in lower extremities  Drain with minimal output  Incisions  intact, dermabond in place.  1 Midline incision and 4 paraspinal stab incisions    BP (!) 126/55   Pulse 61   Temp 98.1 °F (36.7 °C) (Temporal)   Resp 18   Ht 5' 4\" (1.626 m)   Wt 215 lb (97.5 kg)   LMP  (LMP Unknown)   SpO2 (!) 16%   BMI 36.90 kg/m²       Plan:  Admit for observation and pain control  Neuro checks every 4 hours  Monitor drain output  Activity as tolerated, no bracing needed  Advance diet as tolerated  Please call our service if there are any changes in neuro exam    Electronically signed by Oma Jones PA-C on 5/12/2022 at 11:52 AM

## 2022-05-13 LAB
ANION GAP SERPL CALCULATED.3IONS-SCNC: 9 MMOL/L (ref 4–16)
BUN BLDV-MCNC: 10 MG/DL (ref 6–23)
CALCIUM SERPL-MCNC: 9.2 MG/DL (ref 8.3–10.6)
CHLORIDE BLD-SCNC: 102 MMOL/L (ref 99–110)
CO2: 28 MMOL/L (ref 21–32)
CREAT SERPL-MCNC: 0.4 MG/DL (ref 0.6–1.1)
GFR AFRICAN AMERICAN: >60 ML/MIN/1.73M2
GFR NON-AFRICAN AMERICAN: >60 ML/MIN/1.73M2
GLUCOSE BLD-MCNC: 112 MG/DL (ref 70–99)
GLUCOSE BLD-MCNC: 129 MG/DL (ref 70–99)
HCT VFR BLD CALC: 34.6 % (ref 37–47)
HEMOGLOBIN: 10.8 GM/DL (ref 12.5–16)
MCH RBC QN AUTO: 29.9 PG (ref 27–31)
MCHC RBC AUTO-ENTMCNC: 31.2 % (ref 32–36)
MCV RBC AUTO: 95.8 FL (ref 78–100)
PDW BLD-RTO: 13.2 % (ref 11.7–14.9)
PLATELET # BLD: 225 K/CU MM (ref 140–440)
PMV BLD AUTO: 9.3 FL (ref 7.5–11.1)
POTASSIUM SERPL-SCNC: 4.4 MMOL/L (ref 3.5–5.1)
RBC # BLD: 3.61 M/CU MM (ref 4.2–5.4)
SODIUM BLD-SCNC: 139 MMOL/L (ref 135–145)
WBC # BLD: 16.2 K/CU MM (ref 4–10.5)

## 2022-05-13 PROCEDURE — 82962 GLUCOSE BLOOD TEST: CPT

## 2022-05-13 PROCEDURE — 99024 POSTOP FOLLOW-UP VISIT: CPT | Performed by: PHYSICIAN ASSISTANT

## 2022-05-13 PROCEDURE — 97535 SELF CARE MNGMENT TRAINING: CPT

## 2022-05-13 PROCEDURE — 6360000002 HC RX W HCPCS: Performed by: PHYSICIAN ASSISTANT

## 2022-05-13 PROCEDURE — 80048 BASIC METABOLIC PNL TOTAL CA: CPT

## 2022-05-13 PROCEDURE — 97166 OT EVAL MOD COMPLEX 45 MIN: CPT

## 2022-05-13 PROCEDURE — G0378 HOSPITAL OBSERVATION PER HR: HCPCS

## 2022-05-13 PROCEDURE — 6370000000 HC RX 637 (ALT 250 FOR IP): Performed by: HOSPITALIST

## 2022-05-13 PROCEDURE — 6370000000 HC RX 637 (ALT 250 FOR IP): Performed by: PHYSICIAN ASSISTANT

## 2022-05-13 PROCEDURE — 6370000000 HC RX 637 (ALT 250 FOR IP): Performed by: NEUROLOGICAL SURGERY

## 2022-05-13 PROCEDURE — 97116 GAIT TRAINING THERAPY: CPT

## 2022-05-13 PROCEDURE — 36415 COLL VENOUS BLD VENIPUNCTURE: CPT

## 2022-05-13 PROCEDURE — 94150 VITAL CAPACITY TEST: CPT

## 2022-05-13 PROCEDURE — 85027 COMPLETE CBC AUTOMATED: CPT

## 2022-05-13 PROCEDURE — 94761 N-INVAS EAR/PLS OXIMETRY MLT: CPT

## 2022-05-13 PROCEDURE — 1200000000 HC SEMI PRIVATE

## 2022-05-13 PROCEDURE — 97162 PT EVAL MOD COMPLEX 30 MIN: CPT

## 2022-05-13 RX ORDER — OXYCODONE HYDROCHLORIDE 5 MG/1
5 TABLET ORAL EVERY 4 HOURS
Status: DISCONTINUED | OUTPATIENT
Start: 2022-05-13 | End: 2022-05-18 | Stop reason: HOSPADM

## 2022-05-13 RX ORDER — OXYCODONE HYDROCHLORIDE 10 MG/1
10 TABLET ORAL EVERY 4 HOURS
Status: DISCONTINUED | OUTPATIENT
Start: 2022-05-13 | End: 2022-05-18 | Stop reason: HOSPADM

## 2022-05-13 RX ADMIN — TRAMADOL HYDROCHLORIDE 50 MG: 50 TABLET, COATED ORAL at 18:45

## 2022-05-13 RX ADMIN — OXYCODONE HYDROCHLORIDE 10 MG: 10 TABLET ORAL at 12:55

## 2022-05-13 RX ADMIN — SENNOSIDES AND DOCUSATE SODIUM 1 TABLET: 50; 8.6 TABLET ORAL at 20:26

## 2022-05-13 RX ADMIN — ROSUVASTATIN CALCIUM 10 MG: 5 TABLET, FILM COATED ORAL at 20:26

## 2022-05-13 RX ADMIN — HYDROMORPHONE HYDROCHLORIDE 0.5 MG: 1 INJECTION, SOLUTION INTRAMUSCULAR; INTRAVENOUS; SUBCUTANEOUS at 02:49

## 2022-05-13 RX ADMIN — DIAZEPAM 5 MG: 5 TABLET ORAL at 20:25

## 2022-05-13 RX ADMIN — OXYCODONE HYDROCHLORIDE 10 MG: 10 TABLET ORAL at 08:51

## 2022-05-13 RX ADMIN — ACETAMINOPHEN 650 MG: 325 TABLET ORAL at 08:50

## 2022-05-13 RX ADMIN — DIAZEPAM 5 MG: 5 TABLET ORAL at 01:53

## 2022-05-13 RX ADMIN — ONDANSETRON 4 MG: 4 TABLET, ORALLY DISINTEGRATING ORAL at 15:40

## 2022-05-13 RX ADMIN — SENNOSIDES AND DOCUSATE SODIUM 1 TABLET: 50; 8.6 TABLET ORAL at 08:50

## 2022-05-13 RX ADMIN — ACETAMINOPHEN 650 MG: 325 TABLET ORAL at 01:52

## 2022-05-13 RX ADMIN — METOPROLOL SUCCINATE 25 MG: 25 TABLET, EXTENDED RELEASE ORAL at 08:50

## 2022-05-13 RX ADMIN — ACETAMINOPHEN 650 MG: 325 TABLET ORAL at 20:25

## 2022-05-13 RX ADMIN — OXYCODONE HYDROCHLORIDE 5 MG: 5 TABLET ORAL at 20:26

## 2022-05-13 RX ADMIN — POLYETHYLENE GLYCOL (3350) 17 G: 17 POWDER, FOR SOLUTION ORAL at 08:50

## 2022-05-13 RX ADMIN — ACETAMINOPHEN 650 MG: 325 TABLET ORAL at 12:55

## 2022-05-13 RX ADMIN — ROPINIROLE HYDROCHLORIDE 2 MG: 1 TABLET, FILM COATED ORAL at 20:25

## 2022-05-13 ASSESSMENT — PAIN DESCRIPTION - ONSET
ONSET: ON-GOING

## 2022-05-13 ASSESSMENT — PAIN - FUNCTIONAL ASSESSMENT
PAIN_FUNCTIONAL_ASSESSMENT: ACTIVITIES ARE NOT PREVENTED

## 2022-05-13 ASSESSMENT — PAIN SCALES - GENERAL
PAINLEVEL_OUTOF10: 6
PAINLEVEL_OUTOF10: 8
PAINLEVEL_OUTOF10: 8
PAINLEVEL_OUTOF10: 10
PAINLEVEL_OUTOF10: 7
PAINLEVEL_OUTOF10: 8
PAINLEVEL_OUTOF10: 4
PAINLEVEL_OUTOF10: 6
PAINLEVEL_OUTOF10: 3
PAINLEVEL_OUTOF10: 10
PAINLEVEL_OUTOF10: 8
PAINLEVEL_OUTOF10: 8
PAINLEVEL_OUTOF10: 4
PAINLEVEL_OUTOF10: 10
PAINLEVEL_OUTOF10: 6
PAINLEVEL_OUTOF10: 8
PAINLEVEL_OUTOF10: 0

## 2022-05-13 ASSESSMENT — PAIN DESCRIPTION - ORIENTATION
ORIENTATION: RIGHT

## 2022-05-13 ASSESSMENT — PAIN DESCRIPTION - FREQUENCY
FREQUENCY: CONTINUOUS

## 2022-05-13 ASSESSMENT — PAIN DESCRIPTION - PAIN TYPE
TYPE: CHRONIC PAIN
TYPE: SURGICAL PAIN

## 2022-05-13 ASSESSMENT — PAIN DESCRIPTION - DESCRIPTORS
DESCRIPTORS: ACHING
DESCRIPTORS: CRAMPING
DESCRIPTORS: ACHING
DESCRIPTORS: CRAMPING
DESCRIPTORS: ACHING

## 2022-05-13 ASSESSMENT — PAIN DESCRIPTION - LOCATION
LOCATION: LEG
LOCATION: LEG
LOCATION: GENERALIZED
LOCATION: LEG
LOCATION: GENERALIZED
LOCATION: LEG

## 2022-05-13 NOTE — PROGRESS NOTES
A moderate amount of bloody drainage observed on island dressing. Dressing changed and provider notified.

## 2022-05-13 NOTE — CARE COORDINATION
This RN CM to bedside to initiate DC planning. Pt alert, oriented and pleasant to speak with. Pt lives at home alone. Pt uses a walker at home. Pt does not have any steps in home. Pt states she does not want SNF and wants to return home with  [address] [facility] whom she states she is already active with. Pt has a PCP and insurance. Pt will need new HHC orders at discharge. PS sent to [facility].

## 2022-05-13 NOTE — PROGRESS NOTES
Neurosurgery Progress Note  5/13/2022 7:35 AM      L1-L4 laminectomies for decompression, bilateral L2-3 balloon kyphoplasty's, L2 and L3 bone biopsy  POD: 1    Assessment and Plan:   1. Status post lumbar laminectomies and kyphoplasty-patient had a high output of blood and Hemovac drain overnight. We will leave Hemovac drain in place and to reassess discharge tomorrow. Patient to work with PT/OT and gain recommendations for discharge. 2. Hypertension- metoprolol continued  3. Type 2 diabetes    Supervising physician: Audrey Stratton. Rupinder Dunn MD.  Dr. Rupinder Dunn was readily and continuously available by phone for direct consultation regarding the care of this patient. Subjective:   Admit Date: 5/12/2022  PCP: Ryan Reynoso MD    Patient sitting on the side of the bed. She states that she had a bad night last night. She had to urinate pretty frequently and had difficulty getting help to the bathroom. She is tearful when I tell her that she is to stay another night. Her main concern is asking for assistance to the bathroom. She understands the reasoning behind the drain and wanting to avoid a hematoma. She is reluctantly agreeable to stay. She has no complaints of numbness/tingling or weakness in her extremities. Data:   Scheduled Meds:   oxyCODONE  5 mg Oral Q4H    Or    oxyCODONE  10 mg Oral Q4H    acetaminophen  650 mg Oral Q6H    polyethylene glycol  17 g Oral Daily    sennosides-docusate sodium  1 tablet Oral BID    rOPINIRole  2 mg Oral Nightly    rosuvastatin  10 mg Oral Nightly    metoprolol succinate  25 mg Oral BID         I/O last 3 completed shifts: In: 1069.6 [I.V.:1069.6]  Out: 735 [Drains:435; Blood:300]  No intake/output data recorded.     Intake/Output Summary (Last 24 hours) at 5/13/2022 0755  Last data filed at 5/13/2022 0549  Gross per 24 hour   Intake 1069.58 ml   Output 735 ml   Net 334.58 ml     CULTURE results: Invalid input(s): BLOOD CULTURE,  URINE CULTURE, SURGICAL CULTURE  CBC: Recent Labs     05/12/22 2107   HGB 10.9*     BMP:  No results for input(s): NA, K, CL, CO2, BUN, CREATININE, GLUCOSE in the last 72 hours. Hepatic: No results for input(s): AST, ALT, ALB, BILITOT, ALKPHOS in the last 72 hours. IMAGING: available xray, CT, and MRI results reviewed    Objective:   Vitals: BP (!) 149/60   Pulse 76   Temp 97.3 °F (36.3 °C) (Oral)   Resp 18   Ht 5' 4\" (1.626 m)   Wt 215 lb (97.5 kg)   LMP  (LMP Unknown)   SpO2 98%   BMI 36.90 kg/m²   General appearance: Sitting on side of bed, no distress  HEENT: normcephalic, atraumatic, EOM intact  Neurologic: Mental status: Alert and oriented x4, speech clear coherent, no facial droop, follows commands briskly  Extremities: Bilateral upper and lower extremities 5/5 throughout  Incision: Intact except for superior portion of incision where Dermabond has fallen off.   Old drainage noted on dressing, no new active drainage seen  Drains: Hemovac drain with 180mL output in the past 12 hours, and 435mL in the past 24 hours      Electronically signed by Bita Palacio PA-C on 5/13/2022 at 7:35 AM

## 2022-05-13 NOTE — CARE COORDINATION
[facility] [place] and pt is agreeable to [facility] at discharge.  Please place inpatient consult to home health needs order in [facility] [place].

## 2022-05-13 NOTE — PROGRESS NOTES
Patient instructed and educated on HiperScan. Patient able to do 250ml. Vital capacity. Patient's goal is 1500ml.  Electronically signed by Nura Castelan RCP on 5/13/2022 at 11:36 AM

## 2022-05-13 NOTE — PROGRESS NOTES
EUGENE Cornejo notified that the patients BP was 97/35 on the monitor, which was the best reading of three. The Patients manual BP was 104/52, and she was due for oxycodone IR. Reminded provider that IV fluids were discontinued, and asked what we could do about Patients low BP. Provider held 1500 dose of pain medication. Provider states,\" we can restart fluids if she continues to run low. \"

## 2022-05-13 NOTE — PROGRESS NOTES
Melani Murphy MD    Hospitalist Progress Note      Name:  Loree Shaw /Age/Sex: 1935  (81 y.o. female)   MRN & CSN:  [id number] & [id number] Admission Date/Time: 2022  5:59 AM   Location:  [address] [place] [address] PCP: Hemal Torres MD       I saw and examined the patient on 2022 at 8:17 AM.    Hospital Day: 2  Barriers to discharge:   Assessment and Plan:       81years old female presented for elective L1-4 laminectomies, L2-3 bilateral balloon kyphoplasty's, L2 and 3 needle biopsy    S/p L1-4 laminectomies, L2-3 bilateral balloon kyphoplasty's, L2 and 3 needle biopsy. Patient with high output from Hemovac drain overnight. Noted pain to continue drain overnight. Neurosurgery following. Leukocytosis likely reactive. No obvious signs of infection. Continue close monitoring. Repeat labs in a.m. Dilutional/postoperative drop in hemoglobin. Expected. Close monitoring repeat labs in a.m.    CAD, diabetes type 2, hyperlipidemia, hypertension, MI. Overnight, patient remained afebrile heart rate 70s blood pressure 124/60 on room air. DVT prophylaxis SCD no chemical DVT prophylaxis due to above  Full code  Subjective:     Patient seen and examined at bedside. No acute events overnight. Pain is better controlled with current pain regimen. Denies any new neurological deficits    Objective: Intake/Output Summary (Last 24 hours) at 2022 0817  Last data filed at 2022 0549  Gross per 24 hour   Intake 1069.58 ml   Output 735 ml   Net 334.58 ml      Vitals:   Vitals:    22 0248   BP: (!) 149/60   Pulse: 76   Resp: 18   Temp: 97.3 °F (36.3 °C)   SpO2: 98%       Ten point ROS reviewed negative, unless as noted above    Physical Exam:     GENERAL APPEARANCE: not in any acute distress,  appears comfortable. NECK: Supple, no JVD.   CARDIOVASCULAR: Regular rate and rhythm, no murmurs, rubs or gallops  LUNGS: clear to auscultation bilaterally   ABDOMEN: normoactive bowel sounds, soft non-tender and non distended  EXTREMITIES: No edema  MUSCULOSKELETAL S: normal movement and normal bulk in all ext  NEUROLOGIC: Alert and oriented x 3- grossly non focal exam, moving all extremities   SKIN: Hemovac place in lower back dry dressing intact on surgical site. Electronically signed by Kenny Kumar MD on 5/13/2022 at 8:17 AM    Minimum 35 Minutes spent in preparation of following this patient including face to face encounter, discussions with the patient and/or family, medication reconciliation, and transition of care preparation.             Medications:   Medications:    oxyCODONE  5 mg Oral Q4H    Or    oxyCODONE  10 mg Oral Q4H    acetaminophen  650 mg Oral Q6H    polyethylene glycol  17 g Oral Daily    sennosides-docusate sodium  1 tablet Oral BID    rOPINIRole  2 mg Oral Nightly    rosuvastatin  10 mg Oral Nightly    metoprolol succinate  25 mg Oral BID      Infusions:   PRN Meds: iopamidol, 150 mL, ONCE PRN  ondansetron, 4 mg, Q8H PRN   Or  ondansetron, 4 mg, Q6H PRN  diazePAM, 5 mg, Q8H PRN  traMADol, 50 mg, Q4H PRN  HYDROmorphone, 0.5 mg, Q3H PRN  naloxone, 0.4 mg, PRN

## 2022-05-13 NOTE — PROGRESS NOTES
Physical Therapy  [facility] ACUTE [facility] PHYSICAL THERAPY EVALUATION  Jasmin, 1935, 3015/3015-A, 5/13/2022    History  [place]:  The primary encounter diagnosis was Preoperative testing. A diagnosis of Encounter for preadmission testing was also pertinent to this visit. Patient  has a past medical history of Arthritis, CAD (coronary artery disease), Chronic midline low back pain without sciatica, Claustrophobia, Colonoscopy refused, DM (diabetes mellitus), type 2 ([address] Dupuytren's contracture of right hand, Endometrial stripe increased, Gastrointestinal hemorrhage, Glaucoma, H/O echocardiogram, H/O echocardiogram, History of nuclear stress test, HTN (hypertension), Hyperlipidemia, Kidney stone, MI (myocardial infarction) ([address] Obesity, Open wound of right foot, Parainfluenza infection, Pneumonia of right lower lobe due to infectious organism, Vertigo, and WD-Traumatic ulcer of foot, right, with fat layer exposed ([address] Patient  has a past surgical history that includes Cataract removal (Bilateral); Inguinal hernia repair (Left, 45 yrs ago); Lumbar spine surgery (N/A, 5/12/2022); and Spine surgery (N/A, 5/12/2022). Subjective:  Patient states: \"I need to go back to my apartment for my mental health\"   Pain:  8/10 low back   Restrictions: spinal precautions, falls     Home Setup/Prior level of function  Social/Functional History  Lives With: Alone  Type of Home: Apartment  Home Layout: One level  Home Access: Stairs to enter without rails  Entrance Stairs - Number of Steps: threshold  Bathroom Shower/Tub: Tub/Shower unit  Bathroom Toilet: Handicap height  Bathroom Equipment: Grab bars in shower,Hand-held shower,Shower chair,Toilet raiser  Home Equipment: [address] Help From: Personal care attendant (Pt typically has hired help for 11 hours a week.  Her help is having shoulder surgery next week and will not be available.)  ADL Assistance: Independent (neighbor comes to her apartment to be around during showers in case she falls)  Homemaking Assistance: Needs assistance (aide helps with cleaning, groceries, and misc errands; daughter helps with laundry; pt manages light meal prep and orders take out a lot)  Ambulation Assistance: Independent (with 4ww)  Transfer Assistance: Independent  Active : Yes    Examination of body systems (includes body structures/functions, activity/participation limitations):  · Observation:  Side lying in bed upon arrival. Cooperative with therapy. Tangential and easily distracted. · Vision:  [facility]  · Hearing:  [facility]  · Cardiopulmonary:  Stable vitals on room air     Musculoskeletal  · ROM R/L:  WFL BLEs  · Strength R/L:  BLEs grossly 4/5  · Neuro:  Slight numbness around right knee (chronic)     Mobility/treatment:   · Rolling L/R:  NT, already in side lying upon arrival. Educated on technique with log rolling for spinal protection   · Supine to sit: SBA for safety with minimal cues for sequencing to maintain spinal precautions. Use of bed rail for support  · Sit to supine: Emerson for single LE advancement. Cues for sequencing to maintain spinal precautions   · Transfers:   · Sit to stand: CGA for safety from EOB, Emerson from standard toilet needing 3 attempts. Use of grab bar in the bathroom  · Stand to sit: CGA for safety to toilet and EOB  · Step pivot: CGA for safety with RW (2x)   · Sitting balance:  SBA for safety at EOB and toilet. Static and light dynamic without UE support while managing hubert care   · Standing balance:  CGA for safety at [address] [place] [address] and briefly without UE support while managing LB dressing. CGA at sink with single UE support while managing oral hygiene x~ 2 minutes   · Gait: ~10ft + 20ft with RW CGA for safety. Slower jackie with reciprocal gait pattern. Inc support through UEs on RW. Limited with generalized fatigue and pain.  No major LOB noted  · Educated pt on POC, role of PT, DME, spinal precautions, discharge recommendations with swing bed vs HH. Cues provided for sequencing to inc safety and indep with mobility     [facility] 6 Clicks Inpatient Mobility:  AM-PAC Inpatient Mobility Raw Score : 17    Safety: patient left in bed, call light within reach,  gait belt used. Assessment:  Pt is an 81year old female admitted with L1-4 spinal stenosis with neurogenic claudication and osteoporotic L2 and L3 compression fx with delayed healing. She is s/p L1-L4 laminectomies for decompression, bilateral L2-3 balloon kyphoplasty, L2 and L3 bone biopsy on 5/12. Recommend swing bed if pt will be returning home alone vs home with [facility] PT if pt has access to [facility] supervision/assist upon discharge. She performed below her baseline this date and would benefit from continued therapy to address her current deficits, dec potential fall risk, and restore function. Complexity: Moderate  Prognosis: Good, no significant barriers to participation at this time.    Plan Plan: 3-5 times per week  Discharge Recommendations:  (swing bed)  Equipment: Rw     Goals:  Long Term Goals  Time Frame for Long term goals : 2 weeks  Long term goal 1: Pt will perform rolling bilat Fritz  Long term goal 2: Pt will transition sit><supine Fritz  Long term goal 3: Pt will transfer between surfaces with RW Fritz  Long term goal 4: Pt will ambulate 75ft with Rw Fritz  Long term goal 5: Pt will perform standing light dynamic activity with single UE support x 3 minutes Fritz    Treatment plan:  Bed mobility, transfers, balance, gait, TA, TX, stairs     Recommendations for NURSING mobility:ambulate with RW to the bathroom     Time:   Time in: 1142  Time out: 1214  Timed treatment minutes: 10  Total time: 32    Electronically signed by:    Sandor Rojas [id number]  5/13/2022, 12:46 PM

## 2022-05-13 NOTE — PROGRESS NOTES
Occupational Therapy  [facility] ACUTE CARE OCCUPATIONAL THERAPY EVALUATION    [place]iel[place], 1935, 3015/3015-A, 5/13/2022    Discharge Recommendation: Swing Bed     History:  [place]:  The primary encounter diagnosis was Preoperative testing. A diagnosis of Encounter for preadmission testing was also pertinent to this visit. Subjective:  Patient states: \"Will my insurance cover you guys? Fair warning, I do not want to be responsible for you guys not getting paid. \"  Pain: Pt reports 8/10 pain in low back  Communication with other providers: PT Elvis Kahn to RN  Restrictions: General Precautions, Fall Risk, Spinal precautions, Hemovac, Telemetry, Pulse Ox, BP cuff, Bed/Chair Alarm    Home Setup/Prior level of function:  Social/Functional History  Lives With: Alone  Type of Home: Apartment  Home Layout: One level  Home Access: Stairs to enter without rails  Entrance Stairs - Number of Steps: threshold  Bathroom Shower/Tub: Tub/Shower unit  Bathroom Toilet: Handicap height  Bathroom Equipment: Grab bars in shower,Hand-held shower,Shower [address]: [address] Help From: Personal care attendant (Pt typically has hired help for 11 hours a week.  Her help is having shoulder surgery next week and will not be available.)  ADL Assistance: Independent (neighbor comes to her apartment to be around during showers in case she falls)  Homemaking Assistance: Needs assistance (aide helps with cleaning, groceries, and misc errands; daughter helps with laundry; pt manages light meal prep and orders take out a lot)  Ambulation Assistance: Independent (with 4ww)  Transfer Assistance: Independent  Active : Yes    Examination:  · Observation: Supine in bed upon arrival, pleasant and agreeable to OT evaluation this date  · Vision: [facility]  · Hearing: WFL  · Vitals: Stable vitals throughout session    Body Systems and functions:  · ROM: WFL observed functionally · Strength: WFL observed functionally   · Sensation: WFL (no numbness or tingling noted)  · Tone: Normal  · Coordination: WFL  · Perception: WNL    Activities of Daily Living (ADLs):  · Feeding: Supervision/set up(packages opened and utensils within reach)  · Grooming: CGA(performed hand and oral hygiene in standing at sink)  · UB bathing: SBA(seated with all necessary items within reach)  · LB bathing: Min A(for thoroughness with cleansing distal BL LEs)  · UB dressing: SBA(seated with clothes within reach)  · LB dressing: Dependent  · Toileting: Min A(for steadying during transitions between hubert-care and clothing mgmt both directions)    Cognitive and Psychosocial Functioning:  · Overall cognitive status: WFL  · Affect: Normal     Balance:   · Sitting: SBA static and dynamic sitting  · Standing: CGA with RW    Functional Mobility:  · Bed Mobility: SBA supine to sitting EOB, cueing provided for use of log rolling technique and adherence to spinal precautions  · Transfers: CGA sit to stand from EOB, CGA stand to sit on toilet, Min A sit to stand from toilet x 3 attempts, cueing provided to push off from grab bars   · Ambulation: CGA ~30ft from bed to the bathroom and in room with RW      AM-PAC 6 click short form for inpatient daily activity:   How much help from another person does the patient currently need. .. Unable  Dep A Lot  Max A A Lot   Mod A A Little  Min A A Little   CGA  SBA None   Mod I  Indep  Sup   1. Putting on and taking off regular lower body clothing? [x] 1    [] 2   [] 2   [] 3   [] 3   [] 4      2. Bathing (including washing, rinsing, drying)? [] 1   [] 2   [] 2 [x] 3 [] 3 [] 4   3. Toileting, which includes using toilet, bedpan, or urinal? [] 1    [] 2   [] 2   [x] 3   [] 3   [] 4     4. Putting on and taking off regular upper body clothing? [] 1   [] 2   [] 2   [] 3   [x] 3    [] 4      5. Taking care of personal grooming such as brushing teeth?  [] 1   [] 2    [] 2 [] 3    [x] 3   [] 4 6. Eating meals? [] 1   [] 2   [] 2   [] 3   [] 3   [x] 4      Raw Score: 17   [24=0% impaired(CH), 23=1-19%(CI), 20-22=20-39%(CJ), 15-19=40-59%(CK), 10-14=60-79%(CL), 7-9=80-99%(CM), 6=100%(CN)]     Treatment:  Self Care Training:   Cues were given for safety, sequence, UE/LE placement, visual cues, and balance. Activities performed today included functional mobility training, functional transfer training, role of OT, education on spinal precautions, importance of OOB/EOB activity, toileting, hand hygiene, oral hygiene, discharge planning    Safety Measures: Gait belt used, Left in Bed, Alarm in place, Call light and phone within reach    Assessment:  Pt is a 80 y/o female who  has a past medical history of Arthritis, CAD (coronary artery disease), Chronic midline low back pain without sciatica, Claustrophobia, Colonoscopy refused, DM (diabetes mellitus), type 2 ([address] Dupuytren's contracture of right hand, Endometrial stripe increased, Gastrointestinal hemorrhage, Glaucoma, H/O echocardiogram, H/O echocardiogram, History of nuclear stress test, HTN (hypertension), Hyperlipidemia, Kidney stone, MI (myocardial infarction) ([address] Obesity, Open wound of right foot, Parainfluenza infection, Pneumonia of right lower lobe due to infectious organism, Vertigo, and WD-Traumatic ulcer of foot, right, with fat layer exposed ([address] Pt was admitted due to lumbar stenosis with neurogenic claudication and underwent elective L1-L4 laminectomies, bilateral L2-L3 balloon kyphoplasty, and L2 and L3 bone biopsy on 05/12. At baseline, pt reports being independent with ADLs, receiving some assistance for IADLs, ambulating Mod I with 4ww and driving. Presently, pt is functioning below her baseline and would benefit from continued acute OT services. At discharge, OT recommends swing bed to improve activity tolerance for self-care if pt plans to return home alone.  If supervision and assist are able to be provided at home, [facility] OT would be recommended at discharge. Complexity: Moderate  Prognosis: Good  Plan: 2x/week      Goals:  1. Pt will complete all aspects of bed mobility for EOB/OOB ADLs with supervision by using log rolling technique. 2. Pt will complete UB/LB bathing CGA with use of adapted techniques/equipment PRN. 3. Pt will complete all aspects of LB dressing Min A with use of adapted techniques/equipment. 4. Pt will complete all functional transfers to and from bed, chair, toilet, shower chair SBA with good safety awareness. 5. Pt will ambulate HH distance to bathroom for toileting SBA with RW.  6. Pt will complete all aspects of toileting task CGA. 7. Pt will complete oral hygiene/grooming routine in standing at sink SBA with good safety awareness. 8. Pt will complete ther ex/ther act with focus on functional sitting and standing to improve activity tolerance for self-care. 9. Pt will verbalize and demonstrate adherence to 3/3 spinal precautions. Time:   Time in: 1558  Time out: 1214  Timed treatment minutes: 20  Total time: 32      Electronically signed by:      SUZE Zavala/JEREL Pandey/L, MOT, [id number]    \"I, the qualified professional, was present and in the room for the entire session. The student participates in the delivery of services when the qualified practitioner is directing the service, making the skilled judgment, and is responsible for the assessment and treatment. \"

## 2022-05-14 PROBLEM — G89.18 POST-OPERATIVE PAIN: Status: ACTIVE | Noted: 2022-05-14

## 2022-05-14 LAB
ANION GAP SERPL CALCULATED.3IONS-SCNC: 10 MMOL/L (ref 4–16)
BUN BLDV-MCNC: 14 MG/DL (ref 6–23)
CALCIUM SERPL-MCNC: 8.7 MG/DL (ref 8.3–10.6)
CHLORIDE BLD-SCNC: 101 MMOL/L (ref 99–110)
CO2: 26 MMOL/L (ref 21–32)
CREAT SERPL-MCNC: 0.4 MG/DL (ref 0.6–1.1)
GFR AFRICAN AMERICAN: >60 ML/MIN/1.73M2
GFR NON-AFRICAN AMERICAN: >60 ML/MIN/1.73M2
GLUCOSE BLD-MCNC: 97 MG/DL (ref 70–99)
HCT VFR BLD CALC: 35 % (ref 37–47)
HEMOGLOBIN: 10.6 GM/DL (ref 12.5–16)
MCH RBC QN AUTO: 30 PG (ref 27–31)
MCHC RBC AUTO-ENTMCNC: 30.3 % (ref 32–36)
MCV RBC AUTO: 99.2 FL (ref 78–100)
PDW BLD-RTO: 13.7 % (ref 11.7–14.9)
PLATELET # BLD: 159 K/CU MM (ref 140–440)
PMV BLD AUTO: 10.4 FL (ref 7.5–11.1)
POTASSIUM SERPL-SCNC: 4.1 MMOL/L (ref 3.5–5.1)
RBC # BLD: 3.53 M/CU MM (ref 4.2–5.4)
SODIUM BLD-SCNC: 137 MMOL/L (ref 135–145)
WBC # BLD: 13 K/CU MM (ref 4–10.5)

## 2022-05-14 PROCEDURE — 6370000000 HC RX 637 (ALT 250 FOR IP): Performed by: PHYSICIAN ASSISTANT

## 2022-05-14 PROCEDURE — 94761 N-INVAS EAR/PLS OXIMETRY MLT: CPT

## 2022-05-14 PROCEDURE — 80048 BASIC METABOLIC PNL TOTAL CA: CPT

## 2022-05-14 PROCEDURE — 36415 COLL VENOUS BLD VENIPUNCTURE: CPT

## 2022-05-14 PROCEDURE — 1200000000 HC SEMI PRIVATE

## 2022-05-14 PROCEDURE — 94150 VITAL CAPACITY TEST: CPT

## 2022-05-14 PROCEDURE — 6370000000 HC RX 637 (ALT 250 FOR IP): Performed by: HOSPITALIST

## 2022-05-14 PROCEDURE — 99024 POSTOP FOLLOW-UP VISIT: CPT | Performed by: PHYSICIAN ASSISTANT

## 2022-05-14 PROCEDURE — 85027 COMPLETE CBC AUTOMATED: CPT

## 2022-05-14 PROCEDURE — 97530 THERAPEUTIC ACTIVITIES: CPT

## 2022-05-14 PROCEDURE — 6370000000 HC RX 637 (ALT 250 FOR IP): Performed by: NEUROLOGICAL SURGERY

## 2022-05-14 RX ORDER — BISACODYL 10 MG
10 SUPPOSITORY, RECTAL RECTAL DAILY PRN
Status: DISCONTINUED | OUTPATIENT
Start: 2022-05-14 | End: 2022-05-18 | Stop reason: HOSPADM

## 2022-05-14 RX ORDER — GABAPENTIN 300 MG/1
300 CAPSULE ORAL 3 TIMES DAILY PRN
Status: DISCONTINUED | OUTPATIENT
Start: 2022-05-14 | End: 2022-05-18 | Stop reason: HOSPADM

## 2022-05-14 RX ORDER — CEPHALEXIN 250 MG/1
500 CAPSULE ORAL EVERY 6 HOURS SCHEDULED
Status: DISCONTINUED | OUTPATIENT
Start: 2022-05-14 | End: 2022-05-18 | Stop reason: HOSPADM

## 2022-05-14 RX ADMIN — OXYCODONE HYDROCHLORIDE 5 MG: 5 TABLET ORAL at 17:07

## 2022-05-14 RX ADMIN — SENNOSIDES AND DOCUSATE SODIUM 1 TABLET: 50; 8.6 TABLET ORAL at 09:45

## 2022-05-14 RX ADMIN — TRAMADOL HYDROCHLORIDE 50 MG: 50 TABLET, COATED ORAL at 18:48

## 2022-05-14 RX ADMIN — ACETAMINOPHEN 650 MG: 325 TABLET ORAL at 00:11

## 2022-05-14 RX ADMIN — OXYCODONE HYDROCHLORIDE 5 MG: 5 TABLET ORAL at 12:58

## 2022-05-14 RX ADMIN — OXYCODONE HYDROCHLORIDE 10 MG: 10 TABLET ORAL at 00:11

## 2022-05-14 RX ADMIN — CEPHALEXIN 500 MG: 250 CAPSULE ORAL at 12:57

## 2022-05-14 RX ADMIN — OXYCODONE HYDROCHLORIDE 5 MG: 5 TABLET ORAL at 09:42

## 2022-05-14 RX ADMIN — OXYCODONE HYDROCHLORIDE 10 MG: 10 TABLET ORAL at 04:20

## 2022-05-14 RX ADMIN — ACETAMINOPHEN 650 MG: 325 TABLET ORAL at 09:44

## 2022-05-14 RX ADMIN — OXYCODONE HYDROCHLORIDE 5 MG: 5 TABLET ORAL at 20:23

## 2022-05-14 RX ADMIN — GABAPENTIN 300 MG: 300 CAPSULE ORAL at 20:26

## 2022-05-14 RX ADMIN — ACETAMINOPHEN 650 MG: 325 TABLET ORAL at 20:24

## 2022-05-14 RX ADMIN — POLYETHYLENE GLYCOL (3350) 17 G: 17 POWDER, FOR SOLUTION ORAL at 09:45

## 2022-05-14 RX ADMIN — ROSUVASTATIN CALCIUM 10 MG: 5 TABLET, FILM COATED ORAL at 20:26

## 2022-05-14 RX ADMIN — SENNOSIDES AND DOCUSATE SODIUM 1 TABLET: 50; 8.6 TABLET ORAL at 20:24

## 2022-05-14 RX ADMIN — CEPHALEXIN 500 MG: 250 CAPSULE ORAL at 17:08

## 2022-05-14 RX ADMIN — BISACODYL 10 MG: 10 SUPPOSITORY RECTAL at 18:48

## 2022-05-14 RX ADMIN — METOPROLOL SUCCINATE 25 MG: 25 TABLET, EXTENDED RELEASE ORAL at 09:45

## 2022-05-14 RX ADMIN — ROPINIROLE HYDROCHLORIDE 2 MG: 1 TABLET, FILM COATED ORAL at 20:25

## 2022-05-14 ASSESSMENT — PAIN DESCRIPTION - LOCATION
LOCATION: LEG
LOCATION: LEG
LOCATION: BACK;LEG

## 2022-05-14 ASSESSMENT — PAIN DESCRIPTION - ORIENTATION
ORIENTATION: RIGHT
ORIENTATION: RIGHT;LEFT
ORIENTATION: RIGHT

## 2022-05-14 ASSESSMENT — PAIN SCALES - GENERAL
PAINLEVEL_OUTOF10: 8
PAINLEVEL_OUTOF10: 10
PAINLEVEL_OUTOF10: 10
PAINLEVEL_OUTOF10: 3
PAINLEVEL_OUTOF10: 10
PAINLEVEL_OUTOF10: 10
PAINLEVEL_OUTOF10: 7
PAINLEVEL_OUTOF10: 4
PAINLEVEL_OUTOF10: 10

## 2022-05-14 ASSESSMENT — PAIN DESCRIPTION - DESCRIPTORS
DESCRIPTORS: ACHING
DESCRIPTORS: ACHING
DESCRIPTORS: BURNING;ACHING
DESCRIPTORS: ACHING

## 2022-05-14 ASSESSMENT — PAIN - FUNCTIONAL ASSESSMENT
PAIN_FUNCTIONAL_ASSESSMENT: ACTIVITIES ARE NOT PREVENTED

## 2022-05-14 NOTE — PROGRESS NOTES
Neurosurgery Progress Note  5/14/2022 11:13 AM      L1-L4 laminectomies for decompression, bilateral L2-3 balloon kyphoplasty's, L2 and L3 bone biopsy  POD: 2     Assessment and Plan:   1. Status post lumbar laminectomies and kyphoplasty- patient with 130 cc of drainage in 24 hours. Drainage is serosanguineous. Some drainage noted on dressing. Dressing changed, Keflex 500 mg 4 times a day for 7 days has been ordered to prevent superficial skin infection. Patient continues to complain of pain in her back and in her right buttock. Originally she was willing to go home today, today she states that she will be home alone and does not think she can ambulate safely given her current level of pain. Keep patient overnight. We will keep drain in place as well. PT/OT yesterday recommending home health PT/OT. Will want patient to have a bowel movement today, she states she has not had a bowel movement since 5/11/2022. Okay for patient to resume aspirin 5/16/2022.   2. Leukocytosis-most likely reactive, has decreased to 13 this morning. 3. Hypertension- metoprolol continued  4. Type 2 diabetes    Supervising physician: Debra Murrell. Arik Sims MD.  Dr. Arik Sims was readily and continuously available by phone for direct consultation regarding the care of this patient. Subjective:   Admit Date: 5/12/2022  PCP: Marie Lenz MD    Patient lying on the side of the bed. She states that she continues to have pain in her back that will radiate into her right buttock. Pain is worse with movement. She was assisted to the side of the bed. She had significant pain transitioning from lying to sitting, when she was sitting however her pain was tolerable. She voices that she would like to stay the night due to concerns of no one being there to assist her at home. PT/OT did recommend home health and PT/OT but they most likely will not be able to establish care with her until this coming Monday.     Data:   Scheduled Meds:   cephALEXin  500 mg Oral 4 times per day    oxyCODONE  5 mg Oral Q4H    Or    oxyCODONE  10 mg Oral Q4H    acetaminophen  650 mg Oral Q6H    polyethylene glycol  17 g Oral Daily    sennosides-docusate sodium  1 tablet Oral BID    rOPINIRole  2 mg Oral Nightly    rosuvastatin  10 mg Oral Nightly    metoprolol succinate  25 mg Oral BID         I/O last 3 completed shifts:  In: -   Out: 280 [Drains:280]  No intake/output data recorded. Intake/Output Summary (Last 24 hours) at 5/14/2022 1113  Last data filed at 5/13/2022 1600  Gross per 24 hour   Intake --   Output 50 ml   Net -50 ml     CULTURE results: Invalid input(s): BLOOD CULTURE,  URINE CULTURE, SURGICAL CULTURE  CBC:   Recent Labs     05/12/22 2107 05/13/22 0819 05/14/22  0109   WBC  --  16.2* 13.0*   HGB 10.9* 10.8* 10.6*   PLT  --  225 159     BMP:    Recent Labs     05/13/22  0819 05/14/22  0109    137   K 4.4 4.1    101   CO2 28 26   BUN 10 14   CREATININE 0.4* 0.4*   GLUCOSE 129* 97     Hepatic: No results for input(s): AST, ALT, ALB, BILITOT, ALKPHOS in the last 72 hours.       IMAGING: available xray, CT, and MRI results reviewed    Objective:   Vitals: BP (!) 116/54   Pulse 63   Temp 98.6 °F (37 °C) (Oral)   Resp 18   Ht 5' 4\" (1.626 m)   Wt 219 lb 12.8 oz (99.7 kg)   LMP  (LMP Unknown)   SpO2 95%   BMI 37.73 kg/m²   General appearance: Alert laying on left side in bed, in mild to moderate distress due to pain  HEENT: Normocephalic, atraumatic, EOM intact  Neurologic: Mental status: Alert and oriented x4, speech clear and coherent, no facial droop, follows commands briskly, sensation intact in all extremities  Extremities: Bilateral upper extremities 5/5 throughout, bilateral lower extremities 5/5 throughout  Incision: intact, bloody serosanginous drainage noted on dressing, no opening noted in incision, no swelling or erythema, dressing changed  Drains: Hemovac drain with 30mL output in the past 12 hours, and 100mL in the past 24 hours      Electronically signed by Devan Varghese PA-C on 5/14/2022 at 11:13 AM

## 2022-05-14 NOTE — PROGRESS NOTES
Bess Busch MD    Hospitalist Progress Note      Name:  Allison Bender /Age/Sex: 1935  (81 y.o. female)   MRN & CSN:  [id number] & [id number] Admission Date/Time: 2022  5:59 AM   Location:  [address] [place] [address] PCP: Karla Noel MD       I saw and examined the patient on 2022 at 8:44 AM.    Hospital Day: 3  Barriers to discharge: Postoperative care, pain control, drain in place  Assessment and Plan:     81years old female presented for elective L1-4 laminectomies, L2-3 bilateral balloon kyphoplasty's, L2 and 3 needle biopsy. Postprocedure patient continued to have increased drainage in Hemovac. Neurosurgery following. Drain in place     S/p L1-4 laminectomies, L2-3 bilateral balloon kyphoplasty's, L2 and 3 needle biopsy. Patient with high output from Hemovac drain overnight. Noted plan to continue drain. 30 cc output overnight Neurosurgery following. Leukocytosis likely reactive. No obvious signs of infection. Continue close monitoring. Repeat labs shows trending down. Monitor clinically  Dilutional/postoperative drop in hemoglobin. Expected. Close monitoring  Pain control. Pain control being managed with neurosurgery. Appreciate assistance     CAD, diabetes type 2, hyperlipidemia, hypertension, MI.       DVT prophylaxis SCD no chemical DVT prophylaxis due to above  Full code       Subjective:   Patient seen and examined at bedside. Reports slight increase in pain at the surgical site bilateral lower extremity. Denies any weakness lower extremity. Constipated started on MiraLAX      Objective:      Intake/Output Summary (Last 24 hours) at 2022 0844  Last data filed at 2022 1600  Gross per 24 hour   Intake --   Output 50 ml   Net -50 ml      Vitals:   Vitals:    22 0223   BP: (!) 132/54   Pulse:    Resp: 17   Temp: 97.6 °F (36.4 °C)   SpO2:        Ten point ROS reviewed negative, unless as noted above    Physical Exam:     GENERAL APPEARANCE: not in any acute distress, appears comfortable. NECK: Supple, no JVD. CARDIOVASCULAR: Regular rate and rhythm, no murmurs, rubs or gallops  LUNGS: clear to auscultation bilaterally   ABDOMEN: normoactive bowel sounds, soft non-tender and non distended  EXTREMITIES: No edema  MUSCULOSKELETAL S: normal movement and normal bulk in all ext  NEUROLOGIC: Alert and oriented x 3- grossly non focal exam, moving all extremities   SKIN: Hemovac placed at the surgical site.   Serosanguineous discharge on the dressing      Electronically signed by Josias Mejia MD on 5/14/2022 at 8:44 AM             Medications:   Medications:    oxyCODONE  5 mg Oral Q4H    Or    oxyCODONE  10 mg Oral Q4H    acetaminophen  650 mg Oral Q6H    polyethylene glycol  17 g Oral Daily    sennosides-docusate sodium  1 tablet Oral BID    rOPINIRole  2 mg Oral Nightly    rosuvastatin  10 mg Oral Nightly    metoprolol succinate  25 mg Oral BID      Infusions:   PRN Meds: iopamidol, 150 mL, ONCE PRN  ondansetron, 4 mg, Q8H PRN   Or  ondansetron, 4 mg, Q6H PRN  diazePAM, 5 mg, Q8H PRN  traMADol, 50 mg, Q4H PRN  naloxone, 0.4 mg, PRN

## 2022-05-14 NOTE — PROGRESS NOTES
Physical Therapy    Physical Therapy Treatment Note  Name: Solo Smith MRN: [id number] :   1935   Date:  2022   Admission Date: 2022 Room:  [address] [place] [address]   Restrictions/Precautions:          spinal precautions, falls   Communication with other providers:  Chart review indicates ok to see for therapy   Subjective:  Patient states:  '' I feel like Im going to fall''   Pain:   Location, Type, Intensity (0/10 to 10/10):  7/10  Objective:    Observation:  Pt in bathroom with AXEL Ruvalcaba   Treatment, including education/measures:  Pt extremely anxious and calling out upon entrance. Pt guided through deep breathing to calm down. Once calmed, she was able to stand and correct posture, then ambulate aprox 15ft from toile to EOB. She pivoted and sat with CGA. She continued to be anxious and breathing heavily. Pt educated in relaxation techniques. Pt reported she did not need therapy in the hospital because she was going to be moving to [facility] and receiving it there. Educated in importance of early therapy interventions and that she is eligible for therapy here and in [facility]. She was agreeable to therapy. She was able to perform 4 STS transfers throughout session with SBA and cuing to push up and reach back. Maximum standing time was aprox 1m 20s. She required long rest breaks. During breaks, she was able to reach dynamically, and perform minimal AROM with BLE; marching, laq, and ankle pumps. All performed aprox 10xea with long rest breaks. Upon completion of treatment she was able to transfer from EOB to side lying with Noel. Left with all needs met.    Assessment / Impression:      Patient's tolerance of treatment:  fair   Adverse Reaction: pain, weakness fatigue   Significant change in status and impact:  none  Barriers to improvement:  Fear of falling, weakness, pain  Plan for Next Session:      Time in:  13:30  Time out:  14:14  Timed treatment minutes:  44  Total treatment time:  40    Previously filed items:  Social/Functional History  Lives With: Alone  Type of Home: Apartment  Home Layout: One level  Home Access: Stairs to enter without rails  Entrance Stairs - Number of Steps: threshold  Bathroom Shower/Tub: Tub/Shower unit  Bathroom Toilet: Handicap height  Bathroom Equipment: Grab bars in shower,Hand-held shower,Shower chair,Toilet raiser  Home Equipment: [address] Help From: Personal care attendant (Pt typically has hired help for 11 hours a week.  Her help is having shoulder surgery next week and will not be available.)  ADL Assistance: Independent (neighbor comes to her apartment to be around during showers in case she falls)  Homemaking Assistance: Needs assistance (aide helps with cleaning, groceries, and misc errands; daughter helps with laundry; pt manages light meal prep and orders take out a lot)  Ambulation Assistance: Independent (with 4ww)  Transfer Assistance: Independent  Active : Yes     Long Term Goals  Time Frame for Long term goals : 2 weeks  Long term goal 1: Pt will perform rolling bilat Fritz  Long term goal 2: Pt will transition sit><supine Fritz  Long term goal 3: Pt will transfer between surfaces with RW Fritz  Long term goal 4: Pt will ambulate 75ft with Rw Fritz  Long term goal 5: Pt will perform standing light dynamic activity with single UE support x 3 minutes Fritz       Electronically signed by:    Vanessa Serrano PTA  5/14/2022, 2:29 PM

## 2022-05-15 ENCOUNTER — APPOINTMENT (OUTPATIENT)
Dept: GENERAL RADIOLOGY | Age: 87
DRG: 478 | End: 2022-05-15
Attending: NEUROLOGICAL SURGERY
Payer: MEDICARE

## 2022-05-15 LAB
ANION GAP SERPL CALCULATED.3IONS-SCNC: 8 MMOL/L (ref 4–16)
BASOPHILS ABSOLUTE: 0.1 K/CU MM
BASOPHILS RELATIVE PERCENT: 1 % (ref 0–1)
BUN BLDV-MCNC: 13 MG/DL (ref 6–23)
CALCIUM SERPL-MCNC: 8.5 MG/DL (ref 8.3–10.6)
CHLORIDE BLD-SCNC: 97 MMOL/L (ref 99–110)
CO2: 28 MMOL/L (ref 21–32)
CREAT SERPL-MCNC: 0.4 MG/DL (ref 0.6–1.1)
DIFFERENTIAL TYPE: ABNORMAL
EOSINOPHILS ABSOLUTE: 0.2 K/CU MM
EOSINOPHILS RELATIVE PERCENT: 2 % (ref 0–3)
GFR AFRICAN AMERICAN: >60 ML/MIN/1.73M2
GFR NON-AFRICAN AMERICAN: >60 ML/MIN/1.73M2
GLUCOSE BLD-MCNC: 126 MG/DL (ref 70–99)
GLUCOSE BLD-MCNC: 133 MG/DL (ref 70–99)
HCT VFR BLD CALC: 32.8 % (ref 37–47)
HEMOGLOBIN: 10.1 GM/DL (ref 12.5–16)
LYMPHOCYTES ABSOLUTE: 2.7 K/CU MM
LYMPHOCYTES RELATIVE PERCENT: 25 % (ref 24–44)
MCH RBC QN AUTO: 29.6 PG (ref 27–31)
MCHC RBC AUTO-ENTMCNC: 30.8 % (ref 32–36)
MCV RBC AUTO: 96.2 FL (ref 78–100)
METAMYELOCYTES ABSOLUTE COUNT: 0.11 K/CU MM
METAMYELOCYTES PERCENT: 1 %
MONOCYTES ABSOLUTE: 0.6 K/CU MM
MONOCYTES RELATIVE PERCENT: 6 % (ref 0–4)
NUCLEATED RBC %: 0 %
PDW BLD-RTO: 13.3 % (ref 11.7–14.9)
PLATELET # BLD: 204 K/CU MM (ref 140–440)
PMV BLD AUTO: 9.6 FL (ref 7.5–11.1)
POTASSIUM SERPL-SCNC: 4.3 MMOL/L (ref 3.5–5.1)
RBC # BLD: 3.41 M/CU MM (ref 4.2–5.4)
SEGMENTED NEUTROPHILS ABSOLUTE COUNT: 7 K/CU MM
SEGMENTED NEUTROPHILS RELATIVE PERCENT: 65 % (ref 36–66)
SODIUM BLD-SCNC: 133 MMOL/L (ref 135–145)
TOTAL NUCLEATED RBC: 0 K/CU MM
WBC # BLD: 10.7 K/CU MM (ref 4–10.5)

## 2022-05-15 PROCEDURE — 6360000002 HC RX W HCPCS: Performed by: PHYSICIAN ASSISTANT

## 2022-05-15 PROCEDURE — 97116 GAIT TRAINING THERAPY: CPT

## 2022-05-15 PROCEDURE — 94761 N-INVAS EAR/PLS OXIMETRY MLT: CPT

## 2022-05-15 PROCEDURE — 6370000000 HC RX 637 (ALT 250 FOR IP): Performed by: PHYSICIAN ASSISTANT

## 2022-05-15 PROCEDURE — 1200000000 HC SEMI PRIVATE

## 2022-05-15 PROCEDURE — 82962 GLUCOSE BLOOD TEST: CPT

## 2022-05-15 PROCEDURE — 6370000000 HC RX 637 (ALT 250 FOR IP): Performed by: HOSPITALIST

## 2022-05-15 PROCEDURE — 36415 COLL VENOUS BLD VENIPUNCTURE: CPT

## 2022-05-15 PROCEDURE — 94150 VITAL CAPACITY TEST: CPT

## 2022-05-15 PROCEDURE — 85007 BL SMEAR W/DIFF WBC COUNT: CPT

## 2022-05-15 PROCEDURE — 6370000000 HC RX 637 (ALT 250 FOR IP): Performed by: NEUROLOGICAL SURGERY

## 2022-05-15 PROCEDURE — 74018 RADEX ABDOMEN 1 VIEW: CPT

## 2022-05-15 PROCEDURE — 80048 BASIC METABOLIC PNL TOTAL CA: CPT

## 2022-05-15 PROCEDURE — 6370000000 HC RX 637 (ALT 250 FOR IP): Performed by: STUDENT IN AN ORGANIZED HEALTH CARE EDUCATION/TRAINING PROGRAM

## 2022-05-15 PROCEDURE — 99024 POSTOP FOLLOW-UP VISIT: CPT | Performed by: PHYSICIAN ASSISTANT

## 2022-05-15 PROCEDURE — 85027 COMPLETE CBC AUTOMATED: CPT

## 2022-05-15 RX ORDER — ENOXAPARIN SODIUM 100 MG/ML
40 INJECTION SUBCUTANEOUS DAILY
Status: DISCONTINUED | OUTPATIENT
Start: 2022-05-15 | End: 2022-05-18 | Stop reason: HOSPADM

## 2022-05-15 RX ORDER — LANOLIN ALCOHOL/MO/W.PET/CERES
3 CREAM (GRAM) TOPICAL NIGHTLY
Status: DISCONTINUED | OUTPATIENT
Start: 2022-05-15 | End: 2022-05-18 | Stop reason: HOSPADM

## 2022-05-15 RX ADMIN — OXYCODONE HYDROCHLORIDE 5 MG: 5 TABLET ORAL at 15:29

## 2022-05-15 RX ADMIN — MAGNESIUM CITRATE 296 ML: 1.75 LIQUID ORAL at 11:31

## 2022-05-15 RX ADMIN — POLYETHYLENE GLYCOL (3350) 17 G: 17 POWDER, FOR SOLUTION ORAL at 07:57

## 2022-05-15 RX ADMIN — ACETAMINOPHEN 650 MG: 325 TABLET ORAL at 02:35

## 2022-05-15 RX ADMIN — GABAPENTIN 300 MG: 300 CAPSULE ORAL at 07:57

## 2022-05-15 RX ADMIN — METOPROLOL SUCCINATE 25 MG: 25 TABLET, EXTENDED RELEASE ORAL at 07:57

## 2022-05-15 RX ADMIN — CEPHALEXIN 500 MG: 250 CAPSULE ORAL at 19:28

## 2022-05-15 RX ADMIN — METOPROLOL SUCCINATE 25 MG: 25 TABLET, EXTENDED RELEASE ORAL at 20:04

## 2022-05-15 RX ADMIN — CEPHALEXIN 500 MG: 250 CAPSULE ORAL at 11:30

## 2022-05-15 RX ADMIN — ACETAMINOPHEN 650 MG: 325 TABLET ORAL at 20:02

## 2022-05-15 RX ADMIN — ROPINIROLE HYDROCHLORIDE 2 MG: 1 TABLET, FILM COATED ORAL at 20:02

## 2022-05-15 RX ADMIN — CEPHALEXIN 500 MG: 250 CAPSULE ORAL at 01:07

## 2022-05-15 RX ADMIN — OXYCODONE HYDROCHLORIDE 5 MG: 5 TABLET ORAL at 07:56

## 2022-05-15 RX ADMIN — OXYCODONE HYDROCHLORIDE 5 MG: 5 TABLET ORAL at 20:03

## 2022-05-15 RX ADMIN — ENOXAPARIN SODIUM 40 MG: 100 INJECTION SUBCUTANEOUS at 11:31

## 2022-05-15 RX ADMIN — ACETAMINOPHEN 650 MG: 325 TABLET ORAL at 07:57

## 2022-05-15 RX ADMIN — MELATONIN 3 MG: at 20:02

## 2022-05-15 RX ADMIN — GABAPENTIN 300 MG: 300 CAPSULE ORAL at 13:17

## 2022-05-15 RX ADMIN — OXYCODONE HYDROCHLORIDE 5 MG: 5 TABLET ORAL at 11:31

## 2022-05-15 RX ADMIN — OXYCODONE HYDROCHLORIDE 10 MG: 10 TABLET ORAL at 02:36

## 2022-05-15 RX ADMIN — ACETAMINOPHEN 650 MG: 325 TABLET ORAL at 13:18

## 2022-05-15 RX ADMIN — ROSUVASTATIN CALCIUM 10 MG: 5 TABLET, FILM COATED ORAL at 20:02

## 2022-05-15 RX ADMIN — SENNOSIDES AND DOCUSATE SODIUM 1 TABLET: 50; 8.6 TABLET ORAL at 07:57

## 2022-05-15 RX ADMIN — CEPHALEXIN 500 MG: 250 CAPSULE ORAL at 06:36

## 2022-05-15 RX ADMIN — SENNOSIDES AND DOCUSATE SODIUM 1 TABLET: 50; 8.6 TABLET ORAL at 20:03

## 2022-05-15 ASSESSMENT — PAIN SCALES - GENERAL
PAINLEVEL_OUTOF10: 10
PAINLEVEL_OUTOF10: 8
PAINLEVEL_OUTOF10: 10
PAINLEVEL_OUTOF10: 9
PAINLEVEL_OUTOF10: 10
PAINLEVEL_OUTOF10: 10

## 2022-05-15 ASSESSMENT — PAIN DESCRIPTION - LOCATION
LOCATION: BACK;LEG
LOCATION: BACK;LEG
LOCATION: BACK;BUTTOCKS

## 2022-05-15 ASSESSMENT — PAIN DESCRIPTION - DESCRIPTORS
DESCRIPTORS: ACHING
DESCRIPTORS: SHARP
DESCRIPTORS: SHARP

## 2022-05-15 ASSESSMENT — PAIN - FUNCTIONAL ASSESSMENT
PAIN_FUNCTIONAL_ASSESSMENT: PREVENTS OR INTERFERES SOME ACTIVE ACTIVITIES AND ADLS
PAIN_FUNCTIONAL_ASSESSMENT: ACTIVITIES ARE NOT PREVENTED
PAIN_FUNCTIONAL_ASSESSMENT: PREVENTS OR INTERFERES SOME ACTIVE ACTIVITIES AND ADLS

## 2022-05-15 ASSESSMENT — PAIN DESCRIPTION - ORIENTATION
ORIENTATION: RIGHT;LEFT
ORIENTATION: RIGHT;LEFT

## 2022-05-15 NOTE — PROGRESS NOTES
rate and rhythm, no murmurs, rubs or gallops  LUNGS: clear to auscultation bilaterally   ABDOMEN: normoactive bowel sounds, soft non-tender and non distended  EXTREMITIES: No edema  MUSCULOSKELETAL S: normal movement and normal bulk in all ext  NEUROLOGIC: Alert and oriented x 3- grossly non focal exam, moving all extremities   SKIN: Hemovac placed at the surgical site.   Serosanguineous discharge on the dressing      Electronically signed by Vish Garcia MD on 5/15/2022 at 1:22 PM             Medications:   Medications:    enoxaparin  40 mg SubCUTAneous Daily    melatonin  3 mg Oral Nightly    cephALEXin  500 mg Oral 4 times per day    oxyCODONE  5 mg Oral Q4H    Or    oxyCODONE  10 mg Oral Q4H    acetaminophen  650 mg Oral Q6H    polyethylene glycol  17 g Oral Daily    sennosides-docusate sodium  1 tablet Oral BID    rOPINIRole  2 mg Oral Nightly    rosuvastatin  10 mg Oral Nightly    metoprolol succinate  25 mg Oral BID      Infusions:   PRN Meds: bisacodyl, 10 mg, Daily PRN  gabapentin, 300 mg, TID PRN  iopamidol, 150 mL, ONCE PRN  ondansetron, 4 mg, Q8H PRN   Or  ondansetron, 4 mg, Q6H PRN  diazePAM, 5 mg, Q8H PRN  traMADol, 50 mg, Q4H PRN  naloxone, 0.4 mg, PRN

## 2022-05-15 NOTE — CARE COORDINATION
Case Management consult, reviewed chart and therapy recommending Swing bed. Spoke with pt/daughter Jocelyn Lewis and son in-law about [place] Swing bed.   They are agreeable, Called referral to [facility].

## 2022-05-15 NOTE — PROGRESS NOTES
Neurosurgery Progress Note  5/15/2022 11:09 AM      L1-L4 laminectomies for decompression, bilateral L2-3 balloon kyphoplasty's, L2 and L3 bone biopsy  POD: 3    Assessment and Plan:   1. Status post lumbar laminectomies and kyphoplasty- hemovac to be removed today. Drainage is serosanguineous. Some drainage noted on dressing. Dressing changed, Keflex 500 mg 4 times a day for 7 days has been ordered to prevent superficial skin infection. Patient continues to have significant pain in her low back and bilateral lower extremities. This did improve with adding gabapentin overnight. Patient has fears about returning home by herself. Patient would benefit from rehab. Nursing staff to contact  to begin looking at rehab placement. Okay for patient to resume aspirin 5/16/2022.   2. Leukocytosis-most likely reactive, has decreased to 13 yesterday. 3. Hypertension- metoprolol continued  4. Type 2 diabetes  5. Constipation-patient passed gas yesterday but did not have a bowel movement despite Dulcolax suppository. She states she had a very small mucous/watery type bowel movement early this morning. She is complaining of some abdominal pain. I have ordered mag citrate as well as an abdominal x-ray. Advised her to drink plenty of liquids today and to avoid solid foods until she has a bowel movement. She reports she has not had a bowel movement since 5/11/2022. Supervising physician: Mandy Fair. Alberto Mays MD.  Dr. Alberto Mays was readily and continuously available by phone for direct consultation regarding the care of this patient. Subjective:   Admit Date: 5/12/2022  PCP: John Robins MD    Patient lying in bed on her left side. Continues to deny having any numbness/tingling or weakness in any of her extremities. She has been able to urinate without any issues. She states that she feels constipated and has abdominal discomfort. Did not have a bowel movement despite Dulcolax suppository yesterday. She states she has fears about going home being by herself. She is interested in being placed in rehab at this time. Data:   Scheduled Meds:   magnesium citrate  296 mL Oral Once    enoxaparin  40 mg SubCUTAneous Daily    melatonin  3 mg Oral Nightly    cephALEXin  500 mg Oral 4 times per day    oxyCODONE  5 mg Oral Q4H    Or    oxyCODONE  10 mg Oral Q4H    acetaminophen  650 mg Oral Q6H    polyethylene glycol  17 g Oral Daily    sennosides-docusate sodium  1 tablet Oral BID    rOPINIRole  2 mg Oral Nightly    rosuvastatin  10 mg Oral Nightly    metoprolol succinate  25 mg Oral BID         I/O last 3 completed shifts:  In: -   Out: 160 [Drains:160]  No intake/output data recorded. Intake/Output Summary (Last 24 hours) at 5/15/2022 1109  Last data filed at 5/15/2022 0648  Gross per 24 hour   Intake --   Output 160 ml   Net -160 ml     CULTURE results: Invalid input(s): BLOOD CULTURE,  URINE CULTURE, SURGICAL CULTURE  CBC:   Recent Labs     05/12/22  2107 05/13/22  0819 05/14/22  0109   WBC  --  16.2* 13.0*   HGB 10.9* 10.8* 10.6*   PLT  --  225 159     BMP:    Recent Labs     05/13/22  0819 05/14/22  0109    137   K 4.4 4.1    101   CO2 28 26   BUN 10 14   CREATININE 0.4* 0.4*   GLUCOSE 129* 97     Hepatic: No results for input(s): AST, ALT, ALB, BILITOT, ALKPHOS in the last 72 hours.       IMAGING: available xray, CT, and MRI results reviewed    Objective:   Vitals: BP (!) 126/113   Pulse 78   Temp 98.2 °F (36.8 °C) (Oral)   Resp 16   Ht 5' 4\" (1.626 m)   Wt 219 lb (99.3 kg)   LMP  (LMP Unknown)   SpO2 96%   BMI 37.59 kg/m²   General appearance: Alert, lying in bed, no distress  HEENT: Normocephalic, atraumatic, EOM intact  Neurologic: Mental status: Alert and oriented x4, speech clear and coherent, no facial droop, follows commands briskly, sensation intact in all extremities  Extremities: Bilateral upper and lower extremities 5/5 throughout  Incision: Intact, no drainage noted on dressing, no erythema, no swelling  Drains: Hemovac drain with 60mL output in the past 12 hours, and 160mL in the past 24 hours. Drainage is more serous than sanguinous.       Electronically signed by Reina Jones PA-C on 5/15/2022 at 11:09 AM

## 2022-05-16 PROCEDURE — 6370000000 HC RX 637 (ALT 250 FOR IP): Performed by: PHYSICIAN ASSISTANT

## 2022-05-16 PROCEDURE — 6370000000 HC RX 637 (ALT 250 FOR IP): Performed by: HOSPITALIST

## 2022-05-16 PROCEDURE — 1200000000 HC SEMI PRIVATE

## 2022-05-16 PROCEDURE — 6370000000 HC RX 637 (ALT 250 FOR IP): Performed by: STUDENT IN AN ORGANIZED HEALTH CARE EDUCATION/TRAINING PROGRAM

## 2022-05-16 PROCEDURE — 99024 POSTOP FOLLOW-UP VISIT: CPT | Performed by: PHYSICIAN ASSISTANT

## 2022-05-16 PROCEDURE — 97530 THERAPEUTIC ACTIVITIES: CPT

## 2022-05-16 PROCEDURE — 6370000000 HC RX 637 (ALT 250 FOR IP): Performed by: NEUROLOGICAL SURGERY

## 2022-05-16 PROCEDURE — 97535 SELF CARE MNGMENT TRAINING: CPT

## 2022-05-16 PROCEDURE — 6360000002 HC RX W HCPCS: Performed by: PHYSICIAN ASSISTANT

## 2022-05-16 PROCEDURE — 94761 N-INVAS EAR/PLS OXIMETRY MLT: CPT

## 2022-05-16 RX ORDER — ASPIRIN 81 MG/1
81 TABLET, CHEWABLE ORAL DAILY
Status: DISCONTINUED | OUTPATIENT
Start: 2022-05-16 | End: 2022-05-18 | Stop reason: HOSPADM

## 2022-05-16 RX ORDER — ROPINIROLE 1 MG/1
1 TABLET, FILM COATED ORAL ONCE
Status: COMPLETED | OUTPATIENT
Start: 2022-05-16 | End: 2022-05-16

## 2022-05-16 RX ADMIN — OXYCODONE HYDROCHLORIDE 5 MG: 5 TABLET ORAL at 11:38

## 2022-05-16 RX ADMIN — CEPHALEXIN 500 MG: 250 CAPSULE ORAL at 06:21

## 2022-05-16 RX ADMIN — ACETAMINOPHEN 650 MG: 325 TABLET ORAL at 20:13

## 2022-05-16 RX ADMIN — ENOXAPARIN SODIUM 40 MG: 100 INJECTION SUBCUTANEOUS at 08:28

## 2022-05-16 RX ADMIN — METOPROLOL SUCCINATE 25 MG: 25 TABLET, EXTENDED RELEASE ORAL at 08:13

## 2022-05-16 RX ADMIN — CEPHALEXIN 500 MG: 250 CAPSULE ORAL at 11:37

## 2022-05-16 RX ADMIN — OXYCODONE HYDROCHLORIDE 10 MG: 10 TABLET ORAL at 20:12

## 2022-05-16 RX ADMIN — ROPINIROLE HYDROCHLORIDE 2 MG: 1 TABLET, FILM COATED ORAL at 23:29

## 2022-05-16 RX ADMIN — OXYCODONE HYDROCHLORIDE 5 MG: 5 TABLET ORAL at 14:44

## 2022-05-16 RX ADMIN — ASPIRIN 81 MG 81 MG: 81 TABLET ORAL at 11:37

## 2022-05-16 RX ADMIN — ROSUVASTATIN CALCIUM 10 MG: 5 TABLET, FILM COATED ORAL at 20:13

## 2022-05-16 RX ADMIN — ACETAMINOPHEN 650 MG: 325 TABLET ORAL at 13:33

## 2022-05-16 RX ADMIN — CEPHALEXIN 500 MG: 250 CAPSULE ORAL at 00:10

## 2022-05-16 RX ADMIN — GABAPENTIN 300 MG: 300 CAPSULE ORAL at 04:10

## 2022-05-16 RX ADMIN — OXYCODONE HYDROCHLORIDE 5 MG: 5 TABLET ORAL at 08:13

## 2022-05-16 RX ADMIN — ROPINIROLE HYDROCHLORIDE 1 MG: 1 TABLET, FILM COATED ORAL at 18:42

## 2022-05-16 RX ADMIN — MELATONIN 3 MG: at 20:13

## 2022-05-16 RX ADMIN — METOPROLOL SUCCINATE 25 MG: 25 TABLET, EXTENDED RELEASE ORAL at 20:14

## 2022-05-16 RX ADMIN — SENNOSIDES AND DOCUSATE SODIUM 1 TABLET: 50; 8.6 TABLET ORAL at 20:13

## 2022-05-16 RX ADMIN — OXYCODONE HYDROCHLORIDE 5 MG: 5 TABLET ORAL at 04:11

## 2022-05-16 RX ADMIN — OXYCODONE HYDROCHLORIDE 10 MG: 10 TABLET ORAL at 00:10

## 2022-05-16 RX ADMIN — ACETAMINOPHEN 650 MG: 325 TABLET ORAL at 08:13

## 2022-05-16 RX ADMIN — CEPHALEXIN 500 MG: 250 CAPSULE ORAL at 17:47

## 2022-05-16 RX ADMIN — CEPHALEXIN 500 MG: 250 CAPSULE ORAL at 23:40

## 2022-05-16 RX ADMIN — SENNOSIDES AND DOCUSATE SODIUM 1 TABLET: 50; 8.6 TABLET ORAL at 08:13

## 2022-05-16 RX ADMIN — OXYCODONE HYDROCHLORIDE 10 MG: 10 TABLET ORAL at 23:28

## 2022-05-16 ASSESSMENT — PAIN DESCRIPTION - DESCRIPTORS
DESCRIPTORS: ACHING
DESCRIPTORS: ACHING;BURNING
DESCRIPTORS: ACHING;BURNING

## 2022-05-16 ASSESSMENT — PAIN DESCRIPTION - LOCATION
LOCATION: BACK

## 2022-05-16 ASSESSMENT — PAIN SCALES - GENERAL
PAINLEVEL_OUTOF10: 8
PAINLEVEL_OUTOF10: 7
PAINLEVEL_OUTOF10: 10
PAINLEVEL_OUTOF10: 6
PAINLEVEL_OUTOF10: 9

## 2022-05-16 ASSESSMENT — PAIN - FUNCTIONAL ASSESSMENT
PAIN_FUNCTIONAL_ASSESSMENT: PREVENTS OR INTERFERES SOME ACTIVE ACTIVITIES AND ADLS

## 2022-05-16 NOTE — PROGRESS NOTES
Neurosurgery Progress Note  5/16/2022 10:26 AM      L1-L4 laminectomies for decompression, bilateral L2-3 balloon kyphoplasty's, L2 and L3 bone biopsy  POD: 4    Assessment and Plan:   Status post lumbar laminectomies and kyphoplasty- hemovac to be removed today.  Drainage is serosanguineous.  Some drainage noted on dressing.  Dressing changed, Keflex 500 mg 4 times a day for 7 days has been ordered to prevent superficial skin infection.  Patient continues to have significant pain in her low back and bilateral lower extremities. This did improve with adding gabapentin. Patient has fears about returning home by herself. Patient would benefit from rehab. PT/OT yesterday work with patient and is recommending swing bed at [place]. Awaiting placement.  Okay for patient to resume aspirin today. Patient okay to discharge to swing bed once it is available. Leukocytosis-most likely reactive, has decreased to 10 yesterday. Hypertension- metoprolol continued  Type 2 diabetes  Constipation- resolved. Patient received mag citrate yesterday morning and has had 2 bowel movements. States abdominal pain is better.     Supervising physician: Marc Leblanc MD.  Dr. Chika Leblanc was readily and continuously available by phone for direct consultation regarding the care of this patient. Subjective:   Admit Date: 5/12/2022  PCP: Jessica Bolden MD    Patient lying on bed on left side. She complains of pain in her buttocks bilaterally. She states that this was present prior to her surgery as well. She denies any significant numbness/tingling in her lower extremities. She did walk with therapy out in the hallway yesterday. She states that it was painful to do this.     Data:   Scheduled Meds:   aspirin  81 mg Oral Daily    enoxaparin  40 mg SubCUTAneous Daily    melatonin  3 mg Oral Nightly    cephALEXin  500 mg Oral 4 times per day    oxyCODONE  5 mg Oral Q4H    Or    oxyCODONE  10 mg Oral Q4H    acetaminophen  650 mg Oral Q6H    polyethylene glycol  17 g Oral Daily    sennosides-docusate sodium  1 tablet Oral BID    rOPINIRole  2 mg Oral Nightly    rosuvastatin  10 mg Oral Nightly    metoprolol succinate  25 mg Oral BID         I/O last 3 completed shifts:  In: -   Out: 235 [Drains:235]  No intake/output data recorded. Intake/Output Summary (Last 24 hours) at 5/16/2022 1026  Last data filed at 5/15/2022 1332  Gross per 24 hour   Intake --   Output 75 ml   Net -75 ml     CULTURE results: Invalid input(s): BLOOD CULTURE,  URINE CULTURE, SURGICAL CULTURE  CBC:   Recent Labs     05/14/22  0109 05/15/22  1709   WBC 13.0* 10.7*   HGB 10.6* 10.1*    204     BMP:    Recent Labs     05/14/22  0109 05/15/22  1709    133*   K 4.1 4.3    97*   CO2 26 28   BUN 14 13   CREATININE 0.4* 0.4*   GLUCOSE 97 133*     Hepatic: No results for input(s): AST, ALT, ALB, BILITOT, ALKPHOS in the last 72 hours. IMAGING: available xray, CT, and MRI results reviewed    X-ray of abdomen 5/15/2022  Impression   Mildly increased stool in the colon suggesting constipation.        Objective:   Vitals: BP (!) 127/50   Pulse 74   Temp 97.9 °F (36.6 °C) (Oral)   Resp 16   Ht 5' 4\" (1.626 m)   Wt 219 lb (99.3 kg)   LMP  (LMP Unknown)   SpO2 94%   BMI 37.59 kg/m²   General appearance: Alert, lying on left side, no distress  HEENT: Normocephalic, atraumatic, EOM intact  Neurologic: Mental status: Alert and oriented x4, speech clear and coherent, no facial droop, follows commands briskly  Extremities: Bilateral upper and lower extremities 5/5 throughout  Incision: Intact, minimal erythema, no swelling, no drainage on dressing, dressing changed  Drains: removed 5/15/22      Electronically signed by Adelia Orantes PA-C on 5/16/2022 at 10:26 AM

## 2022-05-16 NOTE — CARE COORDINATION
ODALYSW spoke with Johnny Walker from the Swing Bed unit. Johnny Walker stated she has present pt to the doctor and is waiting their decision. Precert has been started.

## 2022-05-16 NOTE — CARE COORDINATION
LSW called Donna at the [address] and had to leave a message checking on referral sent to her yesterday. Pt has [employer] and will need a precert. Updated PT/OT notes are needed. LSW placed a WB note asking for updated notes. Waiting on a return call from Elin Coleman with the Swing Bed unit.

## 2022-05-16 NOTE — PROGRESS NOTES
Occupational Therapy  . Occupational Therapy Treatment Note  Name: Ana Reyes MRN: [id number] :   1935   Date:  2022   Admission Date: 2022 Room:  [address] [place] [address]-A       Discharge Recommendations:  (swing bed)    Primary Problem:    Restrictions/Precautions:          General precautions, fall risk    Spinal precautions    Communication with other providers:  cotx with MADISON Dey for safety and assist, patients anxiety and CM updated note. Subjective:  Patient states:  I hate asking the girls to take me to the bathroom  Pain: no numerical rating but c/o back pain when seated on toilet (location, type, intensity)  Objective:    Observation:  Patient side lying. Very agreeable to therapy and very motivated. Does have back pain during certain movements and sitting too long on hard surfaces. Objective Measures:  Tele    Treatment, including education:    ADL activity training was instructed today. Cues were given for safety, sequence, UE/LE placement, visual cues, and balance. Activities performed today included dressing, toileting,     toileting- on standard commode. Vera care seated with appropriate weight shifting. Patient able to pull down pants/underwear CGA but needed Min A for hike up. Therapeutic activity training was instructed today. Cues were given for safety, sequence, UE/LE placement, awareness, and balance. Activities performed today included bed mobility training, sup-sit, sit-stand, SPT. Supine to EOB- via log roll SBA With increased time and effort. Demos good understanding of log roll. Scooting hips forward- SBA With increased time and effort. EOB sitting balance-  Good. Able to demo static and dynamic balance and reaching  activities while EOB. Tolerated x20 min. Stand to [address] a plus CGA x1 for safety. Patient needing cues to push from bed and avoid pulling on walker. Functional mobility- CGA .  x6 feet x2  Sit to toilet- CGA + Min A for safe and slow descent. Patient would benefit from using grab bars. Sitting balance on commode- good. Stand from toilet-  Min A +CGA for anxiety and pain. Sit to EOB- CGA plus cues to reach back. Return supine- Min A for BLE . Able to demo log roll. Patient educated on role of OT , benefits of OT and rationale for therapeutic intervention. What to expect at [facility] BED, spinal precautions, weight shifting for comfort. All therapeutic intervention performed c emphasis on dynamic balance / standing tolerance to increase strength, endurance and activity tolerance for increased [place] c ADL tasks and func transfers / mobility. Patient left safely in bed at end of session, with call light in reach, alarm on and nursing aware. Gait belt was used for func transfers / mobility. Assessment / Impression:    patient iterated therapy well. Does not  Enjoy sitting upright too long d/t pain on buttocks. Enjoys side lying versus supine. Patient's tolerance of treatment: well  Adverse Reaction: none  Significant change in status and impact:  none  Barriers to improvement: weakness    Plan for Next Session:    Continue with OT POC    Time in:  1433  Time out:  1516  Timed treatment minutes:  43  Total treatment time:  37    Previously filed items:  Social/Functional History  Lives With: Alone  Type of Home: Apartment  Home Layout: One level  Home Access: Stairs to enter without rails  Entrance Stairs - Number of Steps: threshold  Bathroom Shower/Tub: Tub/Shower unit  Bathroom Toilet: Handicap height  Bathroom Equipment: Grab bars in shower,Hand-held shower,Shower chair,Toilet raiser  Home Equipment: [address] Help From: Personal care attendant (Pt typically has hired help for 11 hours a week.  Her help is having shoulder surgery next week and will not be available.)  ADL Assistance: Independent (neighbor comes to her apartment to be around during showers in case she falls)  Homemaking Assistance: Needs assistance (aide helps with cleaning, groceries, and misc errands; daughter helps with laundry; pt manages light meal prep and orders take out a lot)  Ambulation Assistance: Independent (with 4ww)  Transfer Assistance: Independent  Active :  Yes             Electronically signed by:    SUSAN Pedro COTA/L [id number]    5/16/2022, 3:20 PM

## 2022-05-16 NOTE — PROGRESS NOTES
Physical Therapy      Physical Therapy Treatment Note  Name: Jose Valdes MRN: [id number] :   1935   Date:  2022   Admission Date: 2022 Room:  [address] [place] [address]   Restrictions/Precautions:          spinal precautions, falls   Communication with other providers:  Pt appropriate for therapy as per chart review   Subjective:  Patient states:  '' I hate asking for help to go to the bathroom''   Pain:   Location, Type, Intensity (0/10 to 10/10):  Not quantified but c/o low back pain   Objective:    Observation:  Pt in semi fowlers in bed   Treatment, including education/measures:  Co treatment with EDY puga due to safety precautions. She required extended time to transfer from supine> EOB and required SBA  With multiple cues and extra effort from patient. She was able to sit EOB with no UE support. She required Emerson to perform STS from EOB. She was able to ambulate aprox 10ft to bathroom and pivot to sit on toilet. She cried out and reported low back pain in sitting and required comfort and education in positioning and breathing. She performed STS from toilet with Emerson and close CGA due to pain and fear of falling. She was able to ambulate back to EOB, aprox 10ft, and pivot to sit. Education in postural alignment techniques to maintain spinal precautions. Education in swing bed and future therapy participation. She was able to sit EOB, with close CGA to guard during EOB reaching. She was able to reach dynamically, and ipsilaterally to improve core strength and mobility, while also training in postural alignment techniques to decrease pain and ensure compliance with spinal precautions. She was able to sit for aprox 20m before c/o pain and requesting to return to bed. She required Emerson x2 to transfer from EOB to supine. Required positioning with rolled blanked to decrease c/o pain in back. Left with all needs met.    Assessment / Impression:      Patient's tolerance of treatment:  good   Adverse Reaction: none  Significant change in status and impact:  none  Barriers to improvement:  Pain, fear of falling, weakness   Plan for Next Session:    Continue working towards all goals in POC   Time in:  2:33  Time out:  3:16  Timed treatment minutes:  44  Total treatment time:  40    Previously filed items:  Social/Functional History  Lives With: Alone  Type of Home: Apartment  Home Layout: One level  Home Access: Stairs to enter without rails  Entrance Stairs - Number of Steps: threshold  Bathroom Shower/Tub: Tub/Shower unit  Bathroom Toilet: Handicap height  Bathroom Equipment: Grab bars in shower,Hand-held shower,Shower chair,Toilet raiser  Home Equipment: Edna [address]er Help From: Personal care attendant (Pt typically has hired help for 11 hours a week.  Her help is having shoulder surgery next week and will not be available.)  ADL Assistance: Independent (neighbor comes to her apartment to be around during showers in case she falls)  Homemaking Assistance: Needs assistance (aide helps with cleaning, groceries, and misc errands; daughter helps with laundry; pt manages light meal prep and orders take out a lot)  Ambulation Assistance: Independent (with 4ww)  Transfer Assistance: Independent  Active : Yes     Long Term Goals  Time Frame for Long term goals : 2 weeks  Long term goal 1: Pt will perform rolling bilat Fritz  Long term goal 2: Pt will transition sit><supine Fritz  Long term goal 3: Pt will transfer between surfaces with RW Fritz  Long term goal 4: Pt will ambulate 75ft with Rw Fritz  Long term goal 5: Pt will perform standing light dynamic activity with single UE support x 3 minutes Fritz       Electronically signed by:    Kalia Rubio PTA  5/16/2022, 3:24 PM

## 2022-05-16 NOTE — PROGRESS NOTES
Hospitalist Progress Note      Name:  Angi Hayden /Age/Sex: 1935  (81 y.o. female)   MRN & CSN:  [id number] & [id number] Admission Date/Time: 2022  5:59 AM   Location:  301/3015 PCP: Yong Rabago MD       Hospital Day: 5    Assessment and Plan:     81years old female presented for elective L1-4 laminectomies, L2-3 bilateral balloon kyphoplasty's, L2 and 3 needle biopsy. Postprocedure patient continued to have increased drainage in Hemovac. Neurosurgery following. Drain has been removed.      S/p L1-4 laminectomies, L2-3 bilateral balloon kyphoplasty's, L2 and 3 needle biopsy. Neurosurgery following. Drain removed 5/15. Continue on keflex per neurosurgery to prevent superficial skin infection. Neurosurgery recommend ARU/Swing bed in [facility] on discharge. CM to work on same. Aspirin restarted . Leukocytosis likely reactive. No obvious signs of infection. WBC is trending down. Anemia: Hb stable at 10.9. Monitor and transfuse as needed to maintain Hb > 7g/dL. Pain control.    5.  CAD, diabetes type 2, hyperlipidemia, hypertension, MI. Continue home meds unless contraindicated. 6. Constipation: r/o possible bowel obstruction. Follow up AXR. Continue bowel regimen - miralax, sennokot-S, suppositories, enema. DVT prophylaxis SCD no chemical DVT prophylaxis due to above  Disposition: Swing bed if accepted. Precert has been started  Full code     Subjective:   Doing ok today. Had a large BM yesterday and feels more comfortable. Denies other complaints. Objective:     No intake or output data in the 24 hours ending 22   Vitals:   Vitals:    22 1420   BP: (!) 123/49   Pulse: 78   Resp: 16   Temp: 97.5 °F (36.4 °C)   SpO2: 95%     Ten point ROS reviewed negative, unless as noted above    Physical Exam:     GENERAL APPEARANCE: not in any acute distress,  appears comfortable. NECK: Supple, no JVD.   CARDIOVASCULAR: Regular rate and rhythm, no murmurs, rubs or gallops  LUNGS: clear to auscultation bilaterally   ABDOMEN: normoactive bowel sounds, soft non-tender and non distended  EXTREMITIES: No edema  MUSCULOSKELETAL S: normal movement and normal bulk in all ext  NEUROLOGIC: Alert and oriented x 3- grossly non focal exam, moving all extremities   SKIN: Drain has been removed.  Dressing C/D/I    Medications:   Medications:    aspirin  81 mg Oral Daily    enoxaparin  40 mg SubCUTAneous Daily    melatonin  3 mg Oral Nightly    cephALEXin  500 mg Oral 4 times per day    oxyCODONE  5 mg Oral Q4H    Or    oxyCODONE  10 mg Oral Q4H    acetaminophen  650 mg Oral Q6H    polyethylene glycol  17 g Oral Daily    sennosides-docusate sodium  1 tablet Oral BID    rOPINIRole  2 mg Oral Nightly    rosuvastatin  10 mg Oral Nightly    metoprolol succinate  25 mg Oral BID      Infusions:   PRN Meds: bisacodyl, 10 mg, Daily PRN  gabapentin, 300 mg, TID PRN  iopamidol, 150 mL, ONCE PRN  ondansetron, 4 mg, Q8H PRN   Or  ondansetron, 4 mg, Q6H PRN  diazePAM, 5 mg, Q8H PRN  traMADol, 50 mg, Q4H PRN  naloxone, 0.4 mg, PRN

## 2022-05-17 LAB
EKG ATRIAL RATE: 71 BPM
EKG DIAGNOSIS: NORMAL
EKG P AXIS: 54 DEGREES
EKG P-R INTERVAL: 134 MS
EKG Q-T INTERVAL: 408 MS
EKG QRS DURATION: 88 MS
EKG QTC CALCULATION (BAZETT): 443 MS
EKG R AXIS: 26 DEGREES
EKG T AXIS: -4 DEGREES
EKG VENTRICULAR RATE: 71 BPM
GLUCOSE BLD-MCNC: 134 MG/DL (ref 70–99)
MAGNESIUM: 1.9 MG/DL (ref 1.8–2.4)
POTASSIUM SERPL-SCNC: 4.4 MMOL/L (ref 3.5–5.1)
TROPONIN T: 0.04 NG/ML
TROPONIN T: <0.01 NG/ML

## 2022-05-17 PROCEDURE — 84132 ASSAY OF SERUM POTASSIUM: CPT

## 2022-05-17 PROCEDURE — 6370000000 HC RX 637 (ALT 250 FOR IP): Performed by: NEUROLOGICAL SURGERY

## 2022-05-17 PROCEDURE — 93010 ELECTROCARDIOGRAM REPORT: CPT | Performed by: INTERNAL MEDICINE

## 2022-05-17 PROCEDURE — 1200000000 HC SEMI PRIVATE

## 2022-05-17 PROCEDURE — 82962 GLUCOSE BLOOD TEST: CPT

## 2022-05-17 PROCEDURE — 84484 ASSAY OF TROPONIN QUANT: CPT

## 2022-05-17 PROCEDURE — 94761 N-INVAS EAR/PLS OXIMETRY MLT: CPT

## 2022-05-17 PROCEDURE — 99024 POSTOP FOLLOW-UP VISIT: CPT | Performed by: PHYSICIAN ASSISTANT

## 2022-05-17 PROCEDURE — 2700000000 HC OXYGEN THERAPY PER DAY

## 2022-05-17 PROCEDURE — 93005 ELECTROCARDIOGRAM TRACING: CPT | Performed by: HOSPITALIST

## 2022-05-17 PROCEDURE — 6370000000 HC RX 637 (ALT 250 FOR IP): Performed by: HOSPITALIST

## 2022-05-17 PROCEDURE — 6370000000 HC RX 637 (ALT 250 FOR IP): Performed by: STUDENT IN AN ORGANIZED HEALTH CARE EDUCATION/TRAINING PROGRAM

## 2022-05-17 PROCEDURE — 97530 THERAPEUTIC ACTIVITIES: CPT

## 2022-05-17 PROCEDURE — 97535 SELF CARE MNGMENT TRAINING: CPT

## 2022-05-17 PROCEDURE — 6370000000 HC RX 637 (ALT 250 FOR IP): Performed by: PHYSICIAN ASSISTANT

## 2022-05-17 PROCEDURE — 6360000002 HC RX W HCPCS: Performed by: HOSPITALIST

## 2022-05-17 PROCEDURE — 6360000002 HC RX W HCPCS: Performed by: PHYSICIAN ASSISTANT

## 2022-05-17 PROCEDURE — 83735 ASSAY OF MAGNESIUM: CPT

## 2022-05-17 PROCEDURE — 36415 COLL VENOUS BLD VENIPUNCTURE: CPT

## 2022-05-17 RX ORDER — MORPHINE SULFATE 2 MG/ML
1 INJECTION, SOLUTION INTRAMUSCULAR; INTRAVENOUS ONCE
Status: COMPLETED | OUTPATIENT
Start: 2022-05-17 | End: 2022-05-17

## 2022-05-17 RX ADMIN — CEPHALEXIN 500 MG: 250 CAPSULE ORAL at 14:20

## 2022-05-17 RX ADMIN — ASPIRIN 81 MG 81 MG: 81 TABLET ORAL at 09:06

## 2022-05-17 RX ADMIN — OXYCODONE HYDROCHLORIDE 10 MG: 10 TABLET ORAL at 15:30

## 2022-05-17 RX ADMIN — METOPROLOL SUCCINATE 25 MG: 25 TABLET, EXTENDED RELEASE ORAL at 21:23

## 2022-05-17 RX ADMIN — ROSUVASTATIN CALCIUM 10 MG: 5 TABLET, FILM COATED ORAL at 21:23

## 2022-05-17 RX ADMIN — CEPHALEXIN 500 MG: 250 CAPSULE ORAL at 06:33

## 2022-05-17 RX ADMIN — CEPHALEXIN 500 MG: 250 CAPSULE ORAL at 17:31

## 2022-05-17 RX ADMIN — MORPHINE SULFATE 1 MG: 2 INJECTION, SOLUTION INTRAMUSCULAR; INTRAVENOUS at 11:59

## 2022-05-17 RX ADMIN — SENNOSIDES AND DOCUSATE SODIUM 1 TABLET: 50; 8.6 TABLET ORAL at 09:06

## 2022-05-17 RX ADMIN — SENNOSIDES AND DOCUSATE SODIUM 1 TABLET: 50; 8.6 TABLET ORAL at 21:23

## 2022-05-17 RX ADMIN — CEPHALEXIN 500 MG: 250 CAPSULE ORAL at 23:55

## 2022-05-17 RX ADMIN — ROPINIROLE HYDROCHLORIDE 2 MG: 1 TABLET, FILM COATED ORAL at 21:23

## 2022-05-17 RX ADMIN — OXYCODONE HYDROCHLORIDE 10 MG: 10 TABLET ORAL at 04:07

## 2022-05-17 RX ADMIN — OXYCODONE HYDROCHLORIDE 10 MG: 10 TABLET ORAL at 06:33

## 2022-05-17 RX ADMIN — ENOXAPARIN SODIUM 40 MG: 100 INJECTION SUBCUTANEOUS at 09:06

## 2022-05-17 RX ADMIN — OXYCODONE HYDROCHLORIDE 10 MG: 10 TABLET ORAL at 19:52

## 2022-05-17 RX ADMIN — TRAMADOL HYDROCHLORIDE 50 MG: 50 TABLET, COATED ORAL at 09:06

## 2022-05-17 RX ADMIN — MELATONIN 3 MG: at 21:23

## 2022-05-17 RX ADMIN — ACETAMINOPHEN 650 MG: 325 TABLET ORAL at 14:20

## 2022-05-17 RX ADMIN — OXYCODONE HYDROCHLORIDE 10 MG: 10 TABLET ORAL at 23:55

## 2022-05-17 RX ADMIN — ACETAMINOPHEN 650 MG: 325 TABLET ORAL at 19:52

## 2022-05-17 RX ADMIN — POLYETHYLENE GLYCOL (3350) 17 G: 17 POWDER, FOR SOLUTION ORAL at 09:06

## 2022-05-17 RX ADMIN — ACETAMINOPHEN 650 MG: 325 TABLET ORAL at 06:33

## 2022-05-17 ASSESSMENT — PAIN SCALES - GENERAL
PAINLEVEL_OUTOF10: 9
PAINLEVEL_OUTOF10: 10
PAINLEVEL_OUTOF10: 9
PAINLEVEL_OUTOF10: 8

## 2022-05-17 ASSESSMENT — PAIN DESCRIPTION - PAIN TYPE
TYPE: SURGICAL PAIN
TYPE: SURGICAL PAIN

## 2022-05-17 ASSESSMENT — PAIN DESCRIPTION - LOCATION
LOCATION: BACK
LOCATION: CHEST
LOCATION: BACK

## 2022-05-17 ASSESSMENT — PAIN DESCRIPTION - DESCRIPTORS
DESCRIPTORS: PRESSURE
DESCRIPTORS: ACHING;DISCOMFORT
DESCRIPTORS: ACHING;SHOOTING
DESCRIPTORS: ACHING;SHOOTING

## 2022-05-17 ASSESSMENT — PAIN DESCRIPTION - ORIENTATION
ORIENTATION: MID;LOWER
ORIENTATION: LEFT
ORIENTATION: LEFT

## 2022-05-17 ASSESSMENT — PAIN DESCRIPTION - ONSET: ONSET: ON-GOING

## 2022-05-17 ASSESSMENT — PAIN - FUNCTIONAL ASSESSMENT: PAIN_FUNCTIONAL_ASSESSMENT: PREVENTS OR INTERFERES SOME ACTIVE ACTIVITIES AND ADLS

## 2022-05-17 ASSESSMENT — PAIN DESCRIPTION - FREQUENCY: FREQUENCY: CONTINUOUS

## 2022-05-17 NOTE — ADT AUTH CERT
Utilization Reviews         Lumbar Laminectomy - Care Day 5 (5/16/2022) by Tanvir Oakley RN       Review Status Review Entered   Completed 5/16/2022 16:45      Criteria Review      Care Day: 5 Care Date: 5/16/2022 Level of Care: Inpatient Floor    Guideline Day 2    Clinical Status    (X) * Procedure completed    (X) * Hemodynamic stability    (X) * No new neurologic deficits    (X) * Voiding ability at baseline    (X) * No evidence of infection    (X) * Pain absent or managed    ( ) * Discharge plans and education understood    Activity    (X) * Ambulatory or acceptable for next level of care    Routes    (X) * Oral hydration    (X) * Oral medications or regimen acceptable for next level of care    (X) * Oral diet or acceptable for next level of care    Medications    (X) * PCA absent    * Milestone        Lumbar Laminectomy - Care Day 4 (5/15/2022) by Tanvir Oakley RN       Review Status Review Entered   Completed 5/16/2022 16:45      Criteria Review      Care Day: 4 Care Date: 5/15/2022 Level of Care: Inpatient Floor    Guideline Day 2    Clinical Status    (X) * Procedure completed    (X) * Hemodynamic stability    (X) * No new neurologic deficits    (X) * Voiding ability at baseline    (X) * No evidence of infection    (X) * Pain absent or managed    ( ) * Discharge plans and education understood    Activity    (X) * Ambulatory or acceptable for next level of care    Routes    (X) * Oral hydration    (X) * Oral medications or regimen acceptable for next level of care    (X) * Oral diet or acceptable for next level of care    Medications    (X) * PCA absent    * Milestone   Additional Notes   DATE:    5/15      Pertinent Updates:    She states that she feels constipated and has abdominal discomfort.  Did not have a bowel movement despite Dulcolax suppository yesterday         Vitals:   97.7 (36.5) 20 78 120/38 - - - - 93 None (Room air)          Abnl/Pertinent Labs/Radiology/Diagnostic Studies:   Na 89, Cl years old female presented for elective L1-4 laminectomies, L2-3 bilateral balloon kyphoplasty's, L2 and 3 needle biopsy.  Postprocedure patient continued to have increased drainage in Hemovac.  Neurosurgery following. Lee Ann Zhue in place       1. S/p L1-4 laminectomies, L2-3 bilateral balloon kyphoplasty's, L2 and 3 needle biopsy.  Neurosurgery following. 160ml from drain yesterday. Plan to remove drain today. Continue on keflex per neurosurgery to prevent superficial skin infection. Neurosurgery recommend ARU on discharge. CM to work on same. Okay to resume aspirin on 5/16. 2. Leukocytosis likely reactive.  No obvious signs of infection.  WBC is trending down. 3. Anemia: Hb stable at 10.9. Monitor and transfuse as needed to maintain Hb > 7g/dL. 4. Pain control.     5.  CAD, diabetes type 2, hyperlipidemia, hypertension, MI. Continue home meds unless contraindicated. 6. Constipation: r/o possible bowel obstruction. Follow up AXR.  Continue bowel regimen - miralax, sennokot-S, suppositories, enema.        DVT prophylaxis SCD no chemical DVT prophylaxis due to above   Full code                Medications:   Home meds, keflex 500mg po qid, , mag citrate 296ml po x1, oxycodone 5mg po q4hr and 10mg po q4hr,    PRN meds given: neurontin 300mg po x2         Orders tele, SCD's, activity as tolerated            PT/OT/SLP/CM Assessments or Notes:   CM DC plan swing bed                 Lumbar Laminectomy - Care Day 3 (5/14/2022) by Hugh Lema RN       Review Status Review Entered   Completed 5/16/2022 16:39      Criteria Review      Care Day: 3 Care Date: 5/14/2022 Level of Care: Inpatient Floor    Guideline Day 2    Clinical Status    (X) * Procedure completed    (X) * Hemodynamic stability    (X) * No new neurologic deficits    (X) * Voiding ability at baseline    (X) * No evidence of infection    (X) * Pain absent or managed    ( ) * Discharge plans and education understood    Activity    (X) * Ambulatory or acceptable for next level of care    Routes    (X) * Oral hydration    (X) * Oral medications or regimen acceptable for next level of care    (X) * Oral diet or acceptable for next level of care    Medications    (X) * PCA absent    * Milestone   Additional Notes   DATE:    5/14   Changed to Inpatient      Pertinent Updates:    patient continued to have increased drainage in Hemovac   Patient lying on the side of the bed.  She states that she continues to have pain in her back that will radiate into her right buttock.  Pain is worse with movement.  She was assisted to the side of the bed.  She had significant pain transitioning from lying to sitting,          Vitals    98.4 (36.9) 16 81 90/54 - - - - 92 None (Room air)          Abnl/Pertinent Labs/Radiology/Diagnostic Studies:   WBC 13.0, rbc 3.53, hgb 10.6, hct 35.0, creat 0.4,          Physical Exam:   General appearance: Alert laying on left side in bed, in mild to moderate distress due to pain   Neurologic: Mental status: Alert and oriented x4, speech clear and coherent, no facial droop, follows commands briskly, sensation intact in all extremities   Extremities: Bilateral upper extremities 5/5 throughout, bilateral lower extremities 5/5 throughout   Incision: intact, bloody serosanginous drainage noted on dressing, no opening noted in incision, no swelling or erythema, dressing changed   Drains: Hemovac drain with 30mL output in the past 12 hours, and 100mL in the past 24 hours             MD Consults/Assessments & Plans:   IM note   Assessment and Plan:       81years old female presented for elective L1-4 laminectomies, L2-3 bilateral balloon kyphoplasty's, L2 and 3 needle biopsy.  Postprocedure patient continued to have increased drainage in Hemovac.  Neurosurgery following.  Drain in place       1.  S/p L1-4 laminectomies, L2-3 bilateral balloon kyphoplasty's, L2 and 3 needle biopsy.  Patient with high output from Hemovac drain overnight.  Noted plan to continue drain.  30 cc output overnight Neurosurgery following. 2. Leukocytosis likely reactive.  No obvious signs of infection.  Continue close monitoring.  Repeat labs shows trending down.  Monitor clinically   3. Dilutional/postoperative drop in hemoglobin.  Expected.  Close monitoring   4. Pain control.  Pain control being managed with neurosurgery.  Appreciate assistance       4. CAD, diabetes type 2, hyperlipidemia, hypertension, MI.           DVT prophylaxis SCD no chemical DVT prophylaxis due to above   Full code      Neuro surg note\   Assessment and Plan:   1. Status post lumbar laminectomies and kyphoplasty- patient with 130 cc of drainage in 24 hours.  Drainage is serosanguineous.  Some drainage noted on dressing.  Dressing changed, Keflex 500 mg 4 times a day for 7 days has been ordered to prevent superficial skin infection.  Patient continues to complain of pain in her back and in her right buttock.  Originally she was willing to go home today, today she states that she will be home alone and does not think she can ambulate safely given her current level of pain.  Keep patient overnight.  We will keep drain in place as well.  PT/OT yesterday recommending home health PT/OT. Mary Lou Mathis want patient to have a bowel movement today, she states she has not had a bowel movement since 5/11/2022. Frank Edwards for patient to resume aspirin 5/16/2022.    2. Leukocytosis-most likely reactive, has decreased to 13 this morning. 3. Hypertension- metoprolol continued   4. Type 2 diabetes               Medications:   Home meds, keflex 500mg po qid, oxycodone 5mg po q4hr, oxycodone 10mg po q4hr,    PRN meds given: neurontin 300mg po x1, ultram 50mg po x1         Orders tele, SCD's, activity as tolerated         PT/OT/SLP/CM Assessments or Notes:   PT   Pt extremely anxious and calling out upon entrance. Pt guided through deep breathing to calm down.  Once calmed, she was able to stand and correct posture, then ambulate aprox 15ft from toile to EOB. She pivoted and sat with CGA. She continued to be anxious and breathing heavily. Pt educated in relaxation techniques. Pt reported she did not need therapy in the hospital because she was going to be moving to [facility] and receiving it there. Educated in importance of early therapy interventions and that she is eligible for therapy here and in [facility]. She was agreeable to therapy. She was able to perform 4 STS transfers throughout session with SBA and cuing to push up and reach back. Maximum standing time was aprox 1m 20s. She required long rest breaks. During breaks, she was able to reach dynamically, and perform minimal AROM with BLE; marching, laq, and ankle pumps. All performed aprox 10xea with long rest breaks. Upon completion of treatment she was able to transfer from EOB to side lying with Noel. Left with all needs met.

## 2022-05-17 NOTE — CARE COORDINATION
CHAYO received a message from Jame Moctezuma with the Swing Bed Unit. Pt has been approved to go to Swing Bed. Doctor is aware. Pt will need a rapid COVID. Doctor will need to complete the discharge to readmit orders for pt to go to the Swing Bed (just like you for the ARU).

## 2022-05-17 NOTE — PROGRESS NOTES
[facility] HOSPITALIST PROGRESS NOTE      PCP: Wei Montilla MD    Date of Admission: 5/12/2022    Subjective: does not feel like she can go today      Brief Hospital summary the patient is an 55-year-old female who was admitted with L 1-4 laminectomies, L2-3 bilateral balloon kyphoplasty and needle biopsy. Postoperative leukocytosis, PT OT consulted  Keflex continued per neurosurgery, ARU discharge pending  As needed laxatives added    Vitals signs:  Afebrile, heart rate 60s range, blood pressure 122/41, on room air    Medications: Aspirin, Keflex, Lovenox, melatonin, metoprolol, oxycodone, Crestor, Senokot    Antibiotics:     Diagnosis: Postoperative prophylaxis    Drug: Keflex  Dose/Route: 250 mg, p.o. Duration: Per neurosurgery    Descalation: Currently on p.o. antibiotics        Fluid status: Urine output not documented    Labs:   Labs from yesterday reviewed    Imaging: Abdominal x-ray  Mildly increased stool in the colon suggesting constipation.     Assessment/Plan:     Status post L1-4 laminectomy, L2-3 balloon kyphoplasty, L2/3 needle biopsy  Leukocytosis  Anemia  History of coronary artery disease  Diabetes mellitus type 2  Hyperlipidemia  Essential hypertension  Constipation    Admitted for elective surgery, underwent procedure, drains removed  Continue Keflex per neurosurgery  Continue MiraLAX, Senokot for constipation, and mag citrate if remains constipated  Continue aspirin, metoprolol, Crestor  Continue Oxydose codon for pain  Awaiting placement, pre-CERT approved    Rapid response called for chest pain   EKG shows t wave inversion in lead III, unchanged from prior  Check trop x 2  Aspirin, PRN morphine     DVT prophlaxis:   Lovenox       Physical Exam Performed:       BP (!) 121/41   Pulse 76   Temp 98 °F (36.7 °C) (Oral)   Resp 17   Ht 5' 4\" (1.626 m)   Wt 215 lb (97.5 kg)   LMP  (LMP Unknown)   SpO2 98%   BMI 36.90 kg/m²     Physical Exam  Constitutional:       Appearance: Normal appearance. HENT:      Head: Normocephalic and atraumatic. Right Ear: External ear normal.      Left Ear: External ear normal.      Nose: Nose normal.   Eyes:      Extraocular Movements: Extraocular movements intact. Pupils: Pupils are equal, round, and reactive to light. Cardiovascular:      Rate and Rhythm: Normal rate and regular rhythm. Heart sounds: Normal heart sounds. No murmur heard. Pulmonary:      Effort: Pulmonary effort is normal. No respiratory distress. Breath sounds: No wheezing. Abdominal:      General: Bowel sounds are normal. There is no distension. Palpations: Abdomen is soft. Musculoskeletal:         General: No swelling. Cervical back: Normal range of motion. Neurological:      General: No focal deficit present. Mental Status: She is alert and oriented to person, place, and time. Psychiatric:         Mood and Affect: Mood normal.         Behavior: Behavior normal.         Labs:   Recent Labs     05/15/22  1709   WBC 10.7*   HGB 10.1*   HCT 32.8*        Recent Labs     05/15/22  1709   *   K 4.3   CL 97*   CO2 28   BUN 13   CREATININE 0.4*   CALCIUM 8.5     No results for input(s): AST, ALT, BILIDIR, BILITOT, ALKPHOS in the last 72 hours. No results for input(s): INR in the last 72 hours. No results for input(s): Reyne Niuean in the last 72 hours. Urinalysis:      Lab Results   Component Value Date    NITRU NEGATIVE 09/21/2020    WBCUA 7 09/21/2020    BACTERIA MANY 09/21/2020    RBCUA 0 09/21/2020    BLOODU NEGATIVE 09/21/2020    SPECGRAV 1.030 09/21/2020       Radiology:  XR ABDOMEN (KUB) (SINGLE AP VIEW)   Final Result   Mildly increased stool in the colon suggesting constipation.          FL LESS THAN 1 HOUR   Final Result              OLE Piña MD  5/17/2022 7:57 AM

## 2022-05-17 NOTE — PROGRESS NOTES
Physical Therapy  Name: Angi Hayden MRN: [id number] :   1935   Date:  2022   Admission Date: 2022 Room:  [address] [place] [address]   Restrictions/Precautions:        fall risk, Spinal Precautions: No bending, lifting and twisting  Communication with other providers:  Unknown Jazmin RN states pt is ok to see for therapy, pt did c/o chest pain earlier but it may be anxiety related  Subjective:  Patient states:  Do you know anything about the swing bed unit and I have to use the bedside commode  Pain:   Location, Type, Intensity (0/10 to 10/10):  Pain in her bottom and back with sitting  Objective:    Observation:  Pt was L side lying in the bed  Treatment, including education/measures:  Transfers with line management of tele, O2,  Supine to sit :CGA to min A with VC's  Sit to supine :SBA  Scooting :min A of 1 into the bed, SBA out of bed with use of the bed rail  Sit to stand :min A of 1  Stand to sit :min A of 1  SPT:min to mod A of 1 d/t pain and instability of LE's  Safety  Patient left safely in the bed, with call light/phone in reach.   Assessment / Impression:     Patient's tolerance of treatment:  Fair to good, pt tired after the above d/t today's activities and pain   Adverse Reaction: none  Significant change in status and impact:  none  Barriers to improvement:  pain  Plan for Next Session:    Will cont to work towards pt's goals per her tolerance  Time in:  1330  Time out:  1355  Timed treatment minutes:  25  Total treatment time:  25  Previously filed items:  Social/Functional History  Lives With: Alone  Type of Home: Apartment  Home Layout: One level  Home Access: Stairs to enter without rails  Entrance Stairs - Number of Steps: threshold  Bathroom Shower/Tub: Tub/Shower unit  Bathroom Toilet: Handicap height  Bathroom Equipment: Grab bars in shower,Hand-held shower,Shower [address] Fuchs: Arlette Moreno Help From: Personal care attendant (Pt typically has hired help for 11 hours a week. Her help is having shoulder surgery next week and will not be available.)  ADL Assistance: Independent (neighbor comes to her apartment to be around during showers in case she falls)  Homemaking Assistance: Needs assistance (aide helps with cleaning, groceries, and misc errands; daughter helps with laundry; pt manages light meal prep and orders take out a lot)  Ambulation Assistance: Independent (with 4ww)  Transfer Assistance: Independent  Active : Yes     Long Term Goals  Time Frame for Long term goals : 2 weeks  Long term goal 1: Pt will perform rolling bilat Fritz  Long term goal 2: Pt will transition sit><supine Fritz  Long term goal 3: Pt will transfer between surfaces with RW Fritz  Long term goal 4: Pt will ambulate 75ft with Rw Fritz  Long term goal 5: Pt will perform standing light dynamic activity with single UE support x 3 minutes Fritz  Electronically signed by:     Tom Hankins PTA  5/17/2022, 2:01 PM

## 2022-05-17 NOTE — PROGRESS NOTES
Patient had run of PVC's Dr. Jeff Borrero notified received new orders to obtain mag and potassium levels with 1800 troponin today, orders noted.

## 2022-05-17 NOTE — PROGRESS NOTES
Neurosurgery Progress Note  5/17/2022 9:50 AM      L1-L4 laminectomies for decompression, bilateral L2-3 balloon kyphoplasty's, L2 and L3 bone biopsy  POD: 5    Assessment and Plan:   1. Status post lumbar laminectomies and kyphoplasty- Scant drainage noted on dressing.  Dressing changed, Keflex 500 mg 4 times a day for 7 days has been ordered to prevent superficial skin infection.  Patient continues to have significant pain in her low back and bilateral lower extremities.  This did improve with adding gabapentin. Patient has fears about returning home by herself. Amina Radhames for patient to resume aspirin today. Patient okay to discharge to swing bed once it is available. She will follow-up with Dr. Chema Aguilar office in 6 weeks. 2. Leukocytosis-most likely reactive, has decreased to 10 5/16  3. Hypertension- metoprolol continued  4. Type 2 diabetes  5. Constipation- resolved    Supervising physician: Kaushal Ching. Arie Hudson MD.  Dr. Arie Hudson was readily and continuously available by phone for direct consultation regarding the care of this patient. Subjective:   Admit Date: 5/12/2022  PCP: Stephanie Zhang MD    Patient sitting up edge of bed. She is able to ambulate to the bedside toilet, states that most of her pain is in her bilateral buttocks. She understands that her pain will get a little bit better every day. She denies any numbness/tingling or weakness in any of her extremities. Data:   Scheduled Meds:   aspirin  81 mg Oral Daily    enoxaparin  40 mg SubCUTAneous Daily    melatonin  3 mg Oral Nightly    cephALEXin  500 mg Oral 4 times per day    oxyCODONE  5 mg Oral Q4H    Or    oxyCODONE  10 mg Oral Q4H    acetaminophen  650 mg Oral Q6H    polyethylene glycol  17 g Oral Daily    sennosides-docusate sodium  1 tablet Oral BID    rOPINIRole  2 mg Oral Nightly    rosuvastatin  10 mg Oral Nightly    metoprolol succinate  25 mg Oral BID         No intake/output data recorded.   No intake/output data recorded. No intake or output data in the 24 hours ending 05/17/22 0950  CULTURE results: Invalid input(s): BLOOD CULTURE,  URINE CULTURE, SURGICAL CULTURE  CBC:   Recent Labs     05/15/22  1709   WBC 10.7*   HGB 10.1*        BMP:    Recent Labs     05/15/22  1709   *   K 4.3   CL 97*   CO2 28   BUN 13   CREATININE 0.4*   GLUCOSE 133*     Hepatic: No results for input(s): AST, ALT, ALB, BILITOT, ALKPHOS in the last 72 hours.       IMAGING: available xray, CT, and MRI results reviewed    Objective:   Vitals: BP (!) 99/50   Pulse 66   Temp 97.7 °F (36.5 °C) (Oral)   Resp 18   Ht 5' 4\" (1.626 m)   Wt 215 lb (97.5 kg)   LMP  (LMP Unknown)   SpO2 98%   BMI 36.90 kg/m²   General appearance: Alert, sitting on edge of bed, just got off the bedside toilet, in no distress  HEENT: Normocephalic, atraumatic, EOM intact  Neurologic: Mental status: Alert and oriented x4, speech clear and coherent, no facial droop briskly, sensation intact in all extremities  Extremities: Bilateral upper extremities 5/5 throughout, bilateral lower extremities 5/5 throughout  Incision: intact, minimal erythema, no active drainage noted, no swelling, dressing changed  Drains: other      Electronically signed by Meredith Telles PA-C on 5/17/2022 at 9:50 AM

## 2022-05-17 NOTE — CODE DOCUMENTATION
Patient complaining of chest pain on left side, was upper breast now is under breast and described as solid. RRT called, Dr. Mik Jaime responded ordered EKG and troponin. BP has been low so holding on Nitro for now, 1mg Morphine to be given. 3L 02 placed to help relieve pain.

## 2022-05-17 NOTE — PROGRESS NOTES
Occupational Therapy  . Occupational Therapy Treatment Note  Name: Bigg Martin MRN: [id number] :   1935   Date:  2022   Admission Date: 2022 Room:  [facility]        Discharge Recommendations:  (swing bed)  Primary Problem:    Restrictions/Precautions:          General precautions, fall risk    Spinal precautions   Communication with other providers:  Call light on  Subjective:  Patient states:  My chest pain is back again  Pain: no numerical rating, c/o of back pain and under breast pain (location, type, intensity)  Objective:    Observation:  Patient side lying upset d/t pain and anxiety  Objective Measures:  Tele, o2  Treatment, including education:  ADL activity training was instructed today. Cues were given for safety, sequence, UE/LE placement, visual cues, and balance. Activities performed today included toileting. Toileting: on BSC to urinate, completed hubert care in stand DEP   Patient DEP for brief management both ways    Therapeutic activity training was instructed today. Cues were given for safety, sequence, UE/LE placement, awareness, and balance. Activities performed today included bed mobility training, sup-sit, sit-stand, SPT. Side lying to EOB - Min A for trunk, needed increased effort, SBA sitting balance  Stand no AD CGA for safety hunched over posture SPT CGA for safety d/t hunched posture and not using FWW. Understood to place hands on arm rest to ease decent. Reached for bed rail in unsafe manor to assist stand with hunched posture needed cued for erect posture and to place both hands safely on walker. Patient CGA for all standing and correction of posture. SPT back to EOB  CGA  With Min A to  EOB for controled decent. Return side lying with Min A for BLE. Patient educated on role of OT , benefits of OT and rationale for therapeutic intervention.      All therapeutic intervention performed c emphasis on dynamic balance / standing tolerance to increase strength, endurance and activity tolerance for increased Weston c ADL tasks and func transfers / mobility. .    Patient left safely in bed at end of session, with call light in reach, alarm on and nursing aware. Gait belt was used for func transfers / mobility. Assessment / Impression:      Patient's tolerance of treatment: well  Adverse Reaction: increased pain this date   Significant change in status and impact:  Rapid response called at earlier this date  Barriers to improvement: increased pain, and anxiety  Plan for Next Session:    Continue with OT POC  Time in:  1513  Time out:  1531  Timed treatment minutes:  18  Total treatment time:  18    Previously filed items:  Social/Functional History  Lives With: Alone  Type of Home: Apartment  Home Layout: One level  Home Access: Stairs to enter without rails  Entrance Stairs - Number of Steps: threshold  Bathroom Shower/Tub: Tub/Shower unit  Bathroom Toilet: Handicap height  Bathroom Equipment: Grab bars in shower,Hand-held shower,Shower chair,Toilet raiser  Home Equipment: [address] Help From: Personal care attendant (Pt typically has hired help for 11 hours a week. Her help is having shoulder surgery next week and will not be available.)  ADL Assistance: Independent (neighbor comes to her apartment to be around during showers in case she falls)  Homemaking Assistance: Needs assistance (aide helps with cleaning, groceries, and misc errands; daughter helps with laundry; pt manages light meal prep and orders take out a lot)  Ambulation Assistance: Independent (with 4ww)  Transfer Assistance: Independent  Active :  Yes             Electronically signed by:    Viry Wilkins [address], [place] [address]    5/17/2022, 4:02 PM

## 2022-05-18 ENCOUNTER — HOSPITAL ENCOUNTER (INPATIENT)
Age: 87
LOS: 1 days | Discharge: NURSING, REHAB OR LONG-TERM CARE | DRG: 560 | End: 2022-05-19
Attending: FAMILY MEDICINE | Admitting: FAMILY MEDICINE
Payer: MEDICARE

## 2022-05-18 VITALS
HEART RATE: 65 BPM | OXYGEN SATURATION: 93 % | SYSTOLIC BLOOD PRESSURE: 137 MMHG | HEIGHT: 64 IN | TEMPERATURE: 97.5 F | RESPIRATION RATE: 18 BRPM | BODY MASS INDEX: 36.7 KG/M2 | DIASTOLIC BLOOD PRESSURE: 50 MMHG | WEIGHT: 215 LBS

## 2022-05-18 LAB
GLUCOSE BLD-MCNC: 102 MG/DL (ref 70–99)
GLUCOSE BLD-MCNC: 103 MG/DL (ref 70–99)
GLUCOSE BLD-MCNC: 192 MG/DL (ref 70–99)
GLUCOSE BLD-MCNC: 80 MG/DL (ref 70–99)
SARS-COV-2, NAAT: NOT DETECTED
SOURCE: NORMAL
TROPONIN T: 0.03 NG/ML
TROPONIN T: 0.04 NG/ML
TROPONIN T: 0.05 NG/ML

## 2022-05-18 PROCEDURE — 36415 COLL VENOUS BLD VENIPUNCTURE: CPT

## 2022-05-18 PROCEDURE — 6370000000 HC RX 637 (ALT 250 FOR IP): Performed by: PHYSICIAN ASSISTANT

## 2022-05-18 PROCEDURE — 94761 N-INVAS EAR/PLS OXIMETRY MLT: CPT

## 2022-05-18 PROCEDURE — 84484 ASSAY OF TROPONIN QUANT: CPT

## 2022-05-18 PROCEDURE — 6360000002 HC RX W HCPCS: Performed by: PHYSICIAN ASSISTANT

## 2022-05-18 PROCEDURE — 99024 POSTOP FOLLOW-UP VISIT: CPT | Performed by: PHYSICIAN ASSISTANT

## 2022-05-18 PROCEDURE — 82962 GLUCOSE BLOOD TEST: CPT

## 2022-05-18 PROCEDURE — 6360000002 HC RX W HCPCS: Performed by: NEUROLOGICAL SURGERY

## 2022-05-18 PROCEDURE — 2700000000 HC OXYGEN THERAPY PER DAY

## 2022-05-18 PROCEDURE — 1200000002 HC SEMI PRIVATE SWING BED

## 2022-05-18 PROCEDURE — 94150 VITAL CAPACITY TEST: CPT

## 2022-05-18 PROCEDURE — 6370000000 HC RX 637 (ALT 250 FOR IP): Performed by: HOSPITALIST

## 2022-05-18 PROCEDURE — 6370000000 HC RX 637 (ALT 250 FOR IP): Performed by: NEUROLOGICAL SURGERY

## 2022-05-18 PROCEDURE — 6370000000 HC RX 637 (ALT 250 FOR IP): Performed by: INTERNAL MEDICINE

## 2022-05-18 PROCEDURE — 87635 SARS-COV-2 COVID-19 AMP PRB: CPT

## 2022-05-18 PROCEDURE — 6370000000 HC RX 637 (ALT 250 FOR IP): Performed by: STUDENT IN AN ORGANIZED HEALTH CARE EDUCATION/TRAINING PROGRAM

## 2022-05-18 PROCEDURE — 97530 THERAPEUTIC ACTIVITIES: CPT

## 2022-05-18 RX ORDER — ONDANSETRON 4 MG/1
4 TABLET, ORALLY DISINTEGRATING ORAL EVERY 8 HOURS PRN
Status: CANCELLED | OUTPATIENT
Start: 2022-05-18

## 2022-05-18 RX ORDER — LANOLIN ALCOHOL/MO/W.PET/CERES
3 CREAM (GRAM) TOPICAL NIGHTLY
Status: DISCONTINUED | OUTPATIENT
Start: 2022-05-18 | End: 2022-05-19 | Stop reason: HOSPADM

## 2022-05-18 RX ORDER — FUROSEMIDE 20 MG/1
20 TABLET ORAL DAILY
Status: CANCELLED | OUTPATIENT
Start: 2022-05-18

## 2022-05-18 RX ORDER — CEPHALEXIN 500 MG/1
500 CAPSULE ORAL 4 TIMES DAILY
Qty: 28 CAPSULE | Refills: 0 | Status: ON HOLD | OUTPATIENT
Start: 2022-05-18 | End: 2022-05-25 | Stop reason: HOSPADM

## 2022-05-18 RX ORDER — FUROSEMIDE 20 MG/1
20 TABLET ORAL DAILY
Status: DISCONTINUED | OUTPATIENT
Start: 2022-05-18 | End: 2022-05-19 | Stop reason: HOSPADM

## 2022-05-18 RX ORDER — ROPINIROLE 1 MG/1
2 TABLET, FILM COATED ORAL NIGHTLY
Status: CANCELLED | OUTPATIENT
Start: 2022-05-18

## 2022-05-18 RX ORDER — NALOXONE HYDROCHLORIDE 0.4 MG/ML
0.4 INJECTION, SOLUTION INTRAMUSCULAR; INTRAVENOUS; SUBCUTANEOUS PRN
Status: CANCELLED | OUTPATIENT
Start: 2022-05-18

## 2022-05-18 RX ORDER — METOPROLOL SUCCINATE 25 MG/1
25 TABLET, EXTENDED RELEASE ORAL 2 TIMES DAILY
Status: CANCELLED | OUTPATIENT
Start: 2022-05-18

## 2022-05-18 RX ORDER — BISACODYL 10 MG
10 SUPPOSITORY, RECTAL RECTAL DAILY PRN
Status: DISCONTINUED | OUTPATIENT
Start: 2022-05-18 | End: 2022-05-19 | Stop reason: HOSPADM

## 2022-05-18 RX ORDER — METOPROLOL SUCCINATE 25 MG/1
25 TABLET, EXTENDED RELEASE ORAL DAILY
Qty: 30 TABLET | Refills: 0 | Status: SHIPPED | OUTPATIENT
Start: 2022-05-18 | End: 2022-06-13 | Stop reason: SDUPTHER

## 2022-05-18 RX ORDER — NALOXONE HYDROCHLORIDE 0.4 MG/ML
0.4 INJECTION, SOLUTION INTRAMUSCULAR; INTRAVENOUS; SUBCUTANEOUS PRN
Status: DISCONTINUED | OUTPATIENT
Start: 2022-05-18 | End: 2022-05-19 | Stop reason: HOSPADM

## 2022-05-18 RX ORDER — SENNA AND DOCUSATE SODIUM 50; 8.6 MG/1; MG/1
1 TABLET, FILM COATED ORAL 2 TIMES DAILY
Status: CANCELLED | OUTPATIENT
Start: 2022-05-18

## 2022-05-18 RX ORDER — ROSUVASTATIN CALCIUM 5 MG/1
10 TABLET, COATED ORAL NIGHTLY
Status: DISCONTINUED | OUTPATIENT
Start: 2022-05-18 | End: 2022-05-19 | Stop reason: HOSPADM

## 2022-05-18 RX ORDER — ONDANSETRON 2 MG/ML
4 INJECTION INTRAMUSCULAR; INTRAVENOUS EVERY 6 HOURS PRN
Status: CANCELLED | OUTPATIENT
Start: 2022-05-18

## 2022-05-18 RX ORDER — OXYCODONE HYDROCHLORIDE 5 MG/1
5 TABLET ORAL EVERY 4 HOURS
Status: CANCELLED | OUTPATIENT
Start: 2022-05-18

## 2022-05-18 RX ORDER — ROPINIROLE 1 MG/1
2 TABLET, FILM COATED ORAL NIGHTLY
Status: DISCONTINUED | OUTPATIENT
Start: 2022-05-18 | End: 2022-05-19 | Stop reason: HOSPADM

## 2022-05-18 RX ORDER — METOPROLOL SUCCINATE 25 MG/1
25 TABLET, EXTENDED RELEASE ORAL 2 TIMES DAILY
Status: DISCONTINUED | OUTPATIENT
Start: 2022-05-18 | End: 2022-05-19 | Stop reason: HOSPADM

## 2022-05-18 RX ORDER — GABAPENTIN 300 MG/1
300 CAPSULE ORAL 3 TIMES DAILY PRN
Status: DISCONTINUED | OUTPATIENT
Start: 2022-05-18 | End: 2022-05-19 | Stop reason: HOSPADM

## 2022-05-18 RX ORDER — OXYCODONE HYDROCHLORIDE 10 MG/1
10 TABLET ORAL EVERY 4 HOURS
Status: DISCONTINUED | OUTPATIENT
Start: 2022-05-18 | End: 2022-05-19 | Stop reason: HOSPADM

## 2022-05-18 RX ORDER — GABAPENTIN 300 MG/1
300 CAPSULE ORAL 3 TIMES DAILY PRN
Status: CANCELLED | OUTPATIENT
Start: 2022-05-18

## 2022-05-18 RX ORDER — OXYCODONE HYDROCHLORIDE 10 MG/1
10 TABLET ORAL EVERY 4 HOURS
Status: CANCELLED | OUTPATIENT
Start: 2022-05-18

## 2022-05-18 RX ORDER — TRAMADOL HYDROCHLORIDE 50 MG/1
50 TABLET ORAL EVERY 4 HOURS PRN
Status: CANCELLED | OUTPATIENT
Start: 2022-05-18

## 2022-05-18 RX ORDER — ROSUVASTATIN CALCIUM 5 MG/1
10 TABLET, COATED ORAL NIGHTLY
Status: CANCELLED | OUTPATIENT
Start: 2022-05-18

## 2022-05-18 RX ORDER — ASPIRIN 81 MG/1
81 TABLET, CHEWABLE ORAL DAILY
Status: CANCELLED | OUTPATIENT
Start: 2022-05-18

## 2022-05-18 RX ORDER — ASPIRIN 81 MG/1
81 TABLET ORAL DAILY
Status: CANCELLED | OUTPATIENT
Start: 2022-05-18

## 2022-05-18 RX ORDER — DIAZEPAM 5 MG/1
5 TABLET ORAL EVERY 8 HOURS PRN
Status: CANCELLED | OUTPATIENT
Start: 2022-05-18

## 2022-05-18 RX ORDER — ENOXAPARIN SODIUM 100 MG/ML
40 INJECTION SUBCUTANEOUS DAILY
Status: DISCONTINUED | OUTPATIENT
Start: 2022-05-18 | End: 2022-05-19 | Stop reason: HOSPADM

## 2022-05-18 RX ORDER — ENOXAPARIN SODIUM 100 MG/ML
40 INJECTION SUBCUTANEOUS DAILY
Status: CANCELLED | OUTPATIENT
Start: 2022-05-18

## 2022-05-18 RX ORDER — ACETAMINOPHEN 325 MG/1
650 TABLET ORAL EVERY 6 HOURS
Status: CANCELLED | OUTPATIENT
Start: 2022-05-18

## 2022-05-18 RX ORDER — TRAMADOL HYDROCHLORIDE 50 MG/1
50 TABLET ORAL EVERY 4 HOURS PRN
Status: DISCONTINUED | OUTPATIENT
Start: 2022-05-18 | End: 2022-05-19 | Stop reason: HOSPADM

## 2022-05-18 RX ORDER — POLYETHYLENE GLYCOL 3350 17 G/17G
17 POWDER, FOR SOLUTION ORAL DAILY
Status: CANCELLED | OUTPATIENT
Start: 2022-05-18

## 2022-05-18 RX ORDER — ASPIRIN 81 MG/1
81 TABLET, CHEWABLE ORAL DAILY
Status: DISCONTINUED | OUTPATIENT
Start: 2022-05-18 | End: 2022-05-19 | Stop reason: HOSPADM

## 2022-05-18 RX ORDER — BISACODYL 10 MG
10 SUPPOSITORY, RECTAL RECTAL DAILY PRN
Status: CANCELLED | OUTPATIENT
Start: 2022-05-18

## 2022-05-18 RX ORDER — OXYCODONE HYDROCHLORIDE 5 MG/1
5 TABLET ORAL EVERY 4 HOURS
Status: DISCONTINUED | OUTPATIENT
Start: 2022-05-18 | End: 2022-05-19 | Stop reason: HOSPADM

## 2022-05-18 RX ORDER — ASPIRIN 81 MG/1
81 TABLET ORAL DAILY
Status: DISCONTINUED | OUTPATIENT
Start: 2022-05-18 | End: 2022-05-18 | Stop reason: SDUPTHER

## 2022-05-18 RX ORDER — SENNA AND DOCUSATE SODIUM 50; 8.6 MG/1; MG/1
1 TABLET, FILM COATED ORAL 2 TIMES DAILY
Qty: 30 TABLET | Refills: 0 | Status: ON HOLD | OUTPATIENT
Start: 2022-05-18 | End: 2022-05-19

## 2022-05-18 RX ORDER — LANOLIN ALCOHOL/MO/W.PET/CERES
3 CREAM (GRAM) TOPICAL NIGHTLY
Status: CANCELLED | OUTPATIENT
Start: 2022-05-18

## 2022-05-18 RX ADMIN — ASPIRIN 81 MG 81 MG: 81 TABLET ORAL at 09:13

## 2022-05-18 RX ADMIN — OXYCODONE HYDROCHLORIDE 10 MG: 10 TABLET ORAL at 23:57

## 2022-05-18 RX ADMIN — ROPINIROLE HYDROCHLORIDE 2 MG: 1 TABLET, FILM COATED ORAL at 19:41

## 2022-05-18 RX ADMIN — OXYCODONE HYDROCHLORIDE 10 MG: 10 TABLET ORAL at 06:43

## 2022-05-18 RX ADMIN — OXYCODONE HYDROCHLORIDE 10 MG: 10 TABLET ORAL at 11:28

## 2022-05-18 RX ADMIN — BISACODYL 10 MG: 10 SUPPOSITORY RECTAL at 11:31

## 2022-05-18 RX ADMIN — ROSUVASTATIN CALCIUM 10 MG: 5 TABLET, FILM COATED ORAL at 21:27

## 2022-05-18 RX ADMIN — SENNOSIDES AND DOCUSATE SODIUM 1 TABLET: 50; 8.6 TABLET ORAL at 09:13

## 2022-05-18 RX ADMIN — GABAPENTIN 300 MG: 300 CAPSULE ORAL at 21:27

## 2022-05-18 RX ADMIN — Medication 3 MG: at 21:27

## 2022-05-18 RX ADMIN — POLYETHYLENE GLYCOL (3350) 17 G: 17 POWDER, FOR SOLUTION ORAL at 09:13

## 2022-05-18 RX ADMIN — OXYCODONE HYDROCHLORIDE 10 MG: 10 TABLET ORAL at 19:50

## 2022-05-18 RX ADMIN — CEPHALEXIN 500 MG: 250 CAPSULE ORAL at 11:28

## 2022-05-18 RX ADMIN — OXYCODONE HYDROCHLORIDE 10 MG: 10 TABLET ORAL at 15:30

## 2022-05-18 RX ADMIN — ACETAMINOPHEN 650 MG: 325 TABLET ORAL at 06:42

## 2022-05-18 RX ADMIN — TRAMADOL HYDROCHLORIDE 50 MG: 50 TABLET, COATED ORAL at 21:44

## 2022-05-18 RX ADMIN — ENOXAPARIN SODIUM 40 MG: 100 INJECTION SUBCUTANEOUS at 09:14

## 2022-05-18 RX ADMIN — OXYCODONE HYDROCHLORIDE 10 MG: 10 TABLET ORAL at 03:57

## 2022-05-18 RX ADMIN — CEPHALEXIN 500 MG: 250 CAPSULE ORAL at 06:42

## 2022-05-18 RX ADMIN — METOPROLOL SUCCINATE 25 MG: 25 TABLET, EXTENDED RELEASE ORAL at 09:13

## 2022-05-18 RX ADMIN — ACETAMINOPHEN 650 MG: 325 TABLET ORAL at 13:07

## 2022-05-18 RX ADMIN — ONDANSETRON 4 MG: 2 INJECTION INTRAMUSCULAR; INTRAVENOUS at 14:10

## 2022-05-18 ASSESSMENT — PAIN - FUNCTIONAL ASSESSMENT
PAIN_FUNCTIONAL_ASSESSMENT: ACTIVITIES ARE NOT PREVENTED

## 2022-05-18 ASSESSMENT — PAIN DESCRIPTION - DESCRIPTORS
DESCRIPTORS: DISCOMFORT;SORE
DESCRIPTORS: SQUEEZING
DESCRIPTORS: DISCOMFORT;SORE;TENDER;THROBBING
DESCRIPTORS: DISCOMFORT;SORE;TENDER
DESCRIPTORS: DISCOMFORT;SORE

## 2022-05-18 ASSESSMENT — PAIN SCALES - GENERAL
PAINLEVEL_OUTOF10: 9
PAINLEVEL_OUTOF10: 10
PAINLEVEL_OUTOF10: 2
PAINLEVEL_OUTOF10: 9
PAINLEVEL_OUTOF10: 3
PAINLEVEL_OUTOF10: 10
PAINLEVEL_OUTOF10: 10
PAINLEVEL_OUTOF10: 7

## 2022-05-18 ASSESSMENT — PAIN DESCRIPTION - PAIN TYPE
TYPE: SURGICAL PAIN

## 2022-05-18 ASSESSMENT — PAIN DESCRIPTION - ORIENTATION
ORIENTATION: LOWER;MID
ORIENTATION: LOWER;MID
ORIENTATION: RIGHT
ORIENTATION: LOWER;MID
ORIENTATION: LOWER;MID

## 2022-05-18 ASSESSMENT — PAIN DESCRIPTION - LOCATION
LOCATION: HIP;BUTTOCKS;LEG
LOCATION: BACK
LOCATION: HIP;BUTTOCKS;LEG
LOCATION: HIP;BUTTOCKS;LEG

## 2022-05-18 ASSESSMENT — PAIN DESCRIPTION - FREQUENCY
FREQUENCY: CONTINUOUS

## 2022-05-18 ASSESSMENT — LIFESTYLE VARIABLES: HOW OFTEN DO YOU HAVE A DRINK CONTAINING ALCOHOL: NEVER

## 2022-05-18 ASSESSMENT — PAIN DESCRIPTION - ONSET
ONSET: ON-GOING

## 2022-05-18 NOTE — CARE COORDINATION
LSW received a call from RENEE PEREZ with Swing bed unit and pt can come to their unit today. [place] at 5:00pm.  LSW informed pt that he insurance has approved her stay to go to Swing Bed and she will be picked up at 5:00. Pt stated understanding and requested this LSW call her dghts. LSW called pt dght Yosi Hdz and Lalit Casper and left a VM informing them of pt going to Swing bed and time of . RN and RENEE PEREZ have been informed of  time. RN to call report.   [phone]

## 2022-05-18 NOTE — PROGRESS NOTES
Physical Therapy  Name: Jack Torres MRN: [id number] :   1935   Date:  2022   Admission Date: 2022 Room:  [address] [place] [address]   Restrictions/Precautions:        fall risk, Spinal Precautions: No bending, lifting and twisting  Communication with other providers:   Subjective:  Patient states:  She needs to go to the restroom and she is doing much better today  Pain:   Location, Type, Intensity (0/10 to 10/10): Muscle pain in the R hip and buttocks area, did not quantify  Objective:    Observation:  Pt was L side lying in the bed with the call light on  Treatment, including education/measures:  Transfers with line management of tele and O2  Supine to sit :SBA with the bed rail  Scooting :SBA  Sit to stand :SBA with bed railing to hold onto  Stand to sit :SBA with bed railing to hold onto  SPT:bed <> BSC with SBA and pt using the bed rail for stability  Pt needed dep hubert care but able to stand longer today with more erect posture. approx 4 min and 3 min with CGA  Nursing came in the room and needed pt to sit EOB for medicines  Safety  Patient left safely in the bed, with call light/phone in reach with nursing in the room.  Gait belt was refused by patient today  Assessment / Impression:     Patient's tolerance of treatment:  Good, improved since yesterday, hopefully ready to try steps again tomorrow   Adverse Reaction: none  Significant change in status and impact:  none  Barriers to improvement:  Pain, posture  Plan for Next Session:    Will cont to work towards pt's goals per her tolerance  Time in:  1505  Time out:  1543  Timed treatment minutes:  38  Total treatment time:  45  Previously filed items:  Social/Functional History  Lives With: Alone  Type of Home: Apartment  Home Layout: One level  Home Access: Stairs to enter without rails  Entrance Stairs - Number of Steps: threshold  Bathroom Shower/Tub: Tub/Shower unit  Bathroom Toilet: Handicap height  Bathroom Equipment: Grab bars in shower,Hand-held shower,Shower chair,Toilet raiser  Home Equipment: Bess Arevalo Help From: Personal care attendant (Pt typically has hired help for 11 hours a week. Her help is having shoulder surgery next week and will not be available.)  ADL Assistance: Independent (neighbor comes to her apartment to be around during showers in case she falls)  Homemaking Assistance: Needs assistance (aide helps with cleaning, groceries, and misc errands; daughter helps with laundry; pt manages light meal prep and orders take out a lot)  Ambulation Assistance: Independent (with 4ww)  Transfer Assistance: Independent  Active : Yes     Long Term Goals  Time Frame for Long term goals : 2 weeks  Long term goal 1: Pt will perform rolling bilat Fritz  Long term goal 2: Pt will transition sit><supine Fritz  Long term goal 3: Pt will transfer between surfaces with RW Fritz  Long term goal 4: Pt will ambulate 75ft with Rw Fritz  Long term goal 5: Pt will perform standing light dynamic activity with single UE support x 3 minutes Fritz  Electronically signed by:     Bismark Hernandez PTA  5/18/2022, 3:30 PM

## 2022-05-18 NOTE — DISCHARGE SUMMARY
Discharge Summary    Patient:  Rylie Gaytan  YOB: 1935    MRN: [id number]   Acct: [de-identified]    Primary Care Physician: Tomás Carrillo MD    Admit date:  5/12/2022    Discharge date:   5/18/2022    Discharge Diagnoses:   Lumbar stenosis with neurogenic claudication  Principal Problem:    Lumbar stenosis with neurogenic claudication  Active Problems:    Post-operative pain  Resolved Problems:    * No resolved hospital problems. *      Admitted for: (HPI)  the patient is an 71-year-old female who was admitted with L 1-4 laminectomies, L2-3 bilateral balloon kyphoplasty and needle biopsy. Postoperative leukocytosis, PT OT consulted  Keflex continued per neurosurgery, patient is going to swing bed. She is cleared for discharge, denies any active cardiopulmonary complaints.   As needed laxatives added    Final Dx:  Status post L1-4 laminectomy, L2-3 balloon kyphoplasty, L2/3 needle biopsy  Leukocytosis  Anemia  History of coronary artery disease  Diabetes mellitus type 2  Hyperlipidemia  Essential hypertension  Constipation        Discharge Medications:       Medication List      START taking these medications    cephALEXin 500 MG capsule  Commonly known as: KEFLEX  Take 1 capsule by mouth 4 times daily for 28 doses     metoprolol succinate 25 MG extended release tablet  Commonly known as: TOPROL XL  Take 1 tablet by mouth daily     sennosides-docusate sodium 8.6-50 MG tablet  Commonly known as: SENOKOT-S  Take 1 tablet by mouth 2 times daily        CHANGE how you take these medications    rOPINIRole 2 MG tablet  Commonly known as: REQUIP  Take one half (1/2) tablet by mouth at 2 pm and one (1) tablet by mouth at hs  What changed:   · how much to take  · how to take this  · additional instructions        CONTINUE taking these medications    albuterol sulfate  (90 Base) MCG/ACT inhaler  INHALE 2 PUFFS INTO THE LUNGS 4 TIMES DAILY AS NEEDED FOR WHEEZING     aspirin 81 MG EC tablet COLACE PO     furosemide 20 MG tablet  Commonly known as: LASIX  Take 1 tablet by mouth daily     glipiZIDE 5 MG extended release tablet  Commonly known as: GLUCOTROL XL  Take 1 tablet by mouth daily     HYDROcodone-acetaminophen 7.5-325 MG per tablet  Commonly known as: NORCO  Take 1 tablet by mouth every 4 hours as needed for Pain for up to 30 days. hydroxychloroquine 200 MG tablet  Commonly known as: PLAQUENIL     ondansetron 4 MG tablet  Commonly known as: ZOFRAN  Take 1 tablet by mouth 3 times daily as needed for Nausea or Vomiting     PreserVision AREDS Tabs     rosuvastatin 10 MG tablet  Commonly known as: CRESTOR  Take 1 tablet by mouth nightly     True Metrix Blood Glucose Test strip  Generic drug: blood glucose test strips  Using one strip to test once daily, DX=E11.9     TRUEplus Lancets 28G Misc  DX=E11.9     VITAMIN D PO        STOP taking these medications    CoQ10 200 MG Caps     losartan 50 MG tablet  Commonly known as: COZAAR     metoprolol tartrate 25 MG tablet  Commonly known as: LOPRESSOR           Where to Get Your Medications      These medications were sent to [address] [place] [address] - [address] [facility] [phone]  [address] [address]    Phone: [phone]   · cephALEXin 500 MG capsule  · metoprolol succinate 25 MG extended release tablet  · rOPINIRole 2 MG tablet  · sennosides-docusate sodium 8.6-50 MG tablet         Physical Exam:    Vitals:  Vitals:    05/18/22 0642 05/18/22 0900 05/18/22 0915 05/18/22 1057   BP:  (!) 126/35 107/80    Pulse:  70 61    Resp: 16 18     Temp:  97.5 °F (36.4 °C)     TempSrc:  Oral     SpO2:  100% 100% 100%   Weight:       Height:         Weight: Weight: 215 lb (97.5 kg)     24 hour intake/output:    Intake/Output Summary (Last 24 hours) at 5/18/2022 1317  Last data filed at 5/18/2022 1056  Gross per 24 hour   Intake 480 ml   Output --   Net 480 ml     Constitutional: Appearance: Normal appearance. HENT:      Head: Normocephalic and atraumatic. Right Ear: External ear normal.      Left Ear: External ear normal.      Nose: Nose normal.   Eyes:      Extraocular Movements: Extraocular movements intact. Pupils: Pupils are equal, round, and reactive to light. Cardiovascular:      Rate and Rhythm: Normal rate and regular rhythm. Heart sounds: Normal heart sounds. No murmur heard. Pulmonary:      Effort: Pulmonary effort is normal. No respiratory distress. Breath sounds: No wheezing. Abdominal:      General: Bowel sounds are normal. There is no distension. Palpations: Abdomen is soft. Musculoskeletal:         General: No swelling. Cervical back: Normal range of motion. Neurological:      General: No focal deficit present. Mental Status: She is alert and oriented to person, place, and time. Psychiatric:         Mood and Affect: Mood normal.         Behavior: Behavior normal.        Radiology reports as per the Radiologist  Radiology: No results found.      Results for orders placed or performed during the hospital encounter of 05/12/22   CBC   Result Value Ref Range    WBC 16.2 (H) 4.0 - 10.5 K/CU MM    RBC 3.61 (L) 4.2 - 5.4 M/CU MM    Hemoglobin 10.8 (L) 12.5 - 16.0 GM/DL    Hematocrit 34.6 (L) 37 - 47 %    MCV 95.8 78 - 100 FL    MCH 29.9 27 - 31 PG    MCHC 31.2 (L) 32.0 - 36.0 %    RDW 13.2 11.7 - 14.9 %    Platelets 371 684 - 442 K/CU MM    MPV 9.3 7.5 - 11.1 FL   Basic Metabolic Panel   Result Value Ref Range    Sodium 139 135 - 145 MMOL/L    Potassium 4.4 3.5 - 5.1 MMOL/L    Chloride 102 99 - 110 mMol/L    CO2 28 21 - 32 MMOL/L    Anion Gap 9 4 - 16    BUN 10 6 - 23 MG/DL    CREATININE 0.4 (L) 0.6 - 1.1 MG/DL    Glucose 129 (H) 70 - 99 MG/DL    Calcium 9.2 8.3 - 10.6 MG/DL    GFR Non-African American >60 >60 mL/min/1.73m2    GFR African American >60 >60 mL/min/1.73m2   Hemoglobin and Hematocrit   Result Value Ref Range    Hemoglobin 10.9 (L) 12.5 - 16.0 GM/DL    Hematocrit 35.5 (L) 37 - 47 %   CBC   Result Value Ref Range    WBC 13.0 (H) 4.0 - 10.5 K/CU MM    RBC 3.53 (L) 4.2 - 5.4 M/CU MM    Hemoglobin 10.6 (L) 12.5 - 16.0 GM/DL    Hematocrit 35.0 (L) 37 - 47 %    MCV 99.2 78 - 100 FL    MCH 30.0 27 - 31 PG    MCHC 30.3 (L) 32.0 - 36.0 %    RDW 13.7 11.7 - 14.9 %    Platelets 599 836 - 214 K/CU MM    MPV 10.4 7.5 - 11.1 FL   Basic Metabolic Panel   Result Value Ref Range    Sodium 137 135 - 145 MMOL/L    Potassium 4.1 3.5 - 5.1 MMOL/L    Chloride 101 99 - 110 mMol/L    CO2 26 21 - 32 MMOL/L    Anion Gap 10 4 - 16    BUN 14 6 - 23 MG/DL    CREATININE 0.4 (L) 0.6 - 1.1 MG/DL    Glucose 97 70 - 99 MG/DL    Calcium 8.7 8.3 - 10.6 MG/DL    GFR Non-African American >60 >60 mL/min/1.73m2    GFR African American >60 >60 mL/min/1.73m2   CBC with Auto Differential   Result Value Ref Range    WBC 10.7 (H) 4.0 - 10.5 K/CU MM    RBC 3.41 (L) 4.2 - 5.4 M/CU MM    Hemoglobin 10.1 (L) 12.5 - 16.0 GM/DL    Hematocrit 32.8 (L) 37 - 47 %    MCV 96.2 78 - 100 FL    MCH 29.6 27 - 31 PG    MCHC 30.8 (L) 32.0 - 36.0 %    RDW 13.3 11.7 - 14.9 %    Platelets 864 320 - 005 K/CU MM    MPV 9.6 7.5 - 11.1 FL    Differential Type MANUAL DIFFERENTIAL     Segs Relative 65.0 36 - 66 %    Lymphocytes % 25.0 24 - 44 %    Monocytes % 6.0 (H) 0 - 4 %    Eosinophils % 2.0 0 - 3 %    Basophils % 1.0 0 - 1 %    Segs Absolute 7.0 K/CU MM    Lymphocytes Absolute 2.7 K/CU MM    Monocytes Absolute 0.6 K/CU MM    Eosinophils Absolute 0.2 K/CU MM    Basophils Absolute 0.1 K/CU MM    Nucleated RBC % 0.0 %    Total Nucleated RBC 0.0 K/CU MM    Metamyelocytes Relative 1 (H) 0.0 %    Metamyelocytes Absolute 0.11 K/CU MM   Basic Metabolic Panel w/ Reflex to MG   Result Value Ref Range    Sodium 133 (L) 135 - 145 MMOL/L    Potassium 4.3 3.5 - 5.1 MMOL/L    Chloride 97 (L) 99 - 110 mMol/L    CO2 28 21 - 32 MMOL/L    Anion Gap 8 4 - 16    BUN 13 6 - 23 MG/DL    CREATININE 0.4 (L) 0.6 - 1.1 MG/DL Glucose 133 (H) 70 - 99 MG/DL    Calcium 8.5 8.3 - 10.6 MG/DL    GFR Non-African American >60 >60 mL/min/1.73m2    GFR African American >60 >60 mL/min/1.73m2   Troponin   Result Value Ref Range    Troponin T <0.010 <0.01 NG/ML   Troponin   Result Value Ref Range    Troponin T 0.042 (H) <0.01 NG/ML   Troponin   Result Value Ref Range    Troponin T 0.047 (H) <0.01 NG/ML   Magnesium   Result Value Ref Range    Magnesium 1.9 1.8 - 2.4 mg/dl   Potassium   Result Value Ref Range    Potassium 4.4 3.5 - 5.1 MMOL/L   Troponin   Result Value Ref Range    Troponin T 0.043 (H) <0.01 NG/ML   POCT Glucose   Result Value Ref Range    POC Glucose 155 (H) 70 - 99 MG/DL   POCT Glucose   Result Value Ref Range    POC Glucose 112 (H) 70 - 99 MG/DL   POCT Glucose   Result Value Ref Range    POC Glucose 126 (H) 70 - 99 MG/DL   POCT Glucose   Result Value Ref Range    POC Glucose 94 70 - 99 MG/DL   POCT Glucose   Result Value Ref Range    POC Glucose 134 (H) 70 - 99 MG/DL   POCT Glucose   Result Value Ref Range    POC Glucose 80 70 - 99 MG/DL   POCT Glucose   Result Value Ref Range    POC Glucose 102 (H) 70 - 99 MG/DL   EKG 12 Lead   Result Value Ref Range    Ventricular Rate 71 BPM    Atrial Rate 71 BPM    P-R Interval 134 ms    QRS Duration 88 ms    Q-T Interval 408 ms    QTc Calculation (Bazett) 443 ms    P Axis 54 degrees    R Axis 26 degrees    T Axis -4 degrees    Diagnosis       Normal sinus rhythm  Inferior infarct , age undetermined  Abnormal ECG  When compared with ECG of 04-MAY-2022 11:10,  No significant change was found  Confirmed by MILLIE Castro ([id number]) on 5/17/2022 8:37:21 PM         Diet:  ADULT DIET;  Regular    Activity:  Activity as tolerated (Patient may move about with assist as indicated or with supervision.)    Follow-up:  in 1 weeks with MD COLTON Hickman     Disposition: Rehab    Condition: Stable    Time Spent: 40 minutes    Electronically signed by Edna Hanks MD on 5/18/2022 at 1:17

## 2022-05-18 NOTE — PROGRESS NOTES
Neurosurgery Progress Note  5/18/2022 8:49 AM      L1-L4 laminectomies for decompression, bilateral L2-3 balloon kyphoplasty's, L2 and L3 bone biopsy  POD: 6    Assessment and Plan:   1. Status post lumbar laminectomies and kyphoplasty- Scant drainage noted on dressing.  Dressing changed, Keflex 500 mg 4 times a day for 7 days has been ordered to prevent superficial skin infection.  Patient continues to have significant pain in her low back and bilateral lower extremities.  This did improve with adding gabapentin. Patient has fears about returning home by herself. Manuel Kennedy for patient to resume aspirin today.  Patient okay to discharge to swing bed once it is available. She will follow-up with Dr. Car Chaudhari office in 6 weeks. 2. Detectable troponin-yesterday patient experienced sudden onset of chest pain. Initial troponin was undetectable, patient has had 3 detectable troponins, highest at 0.047, is beginning to trend down this morning. EKG yesterday was stable compared to EKG prior to surgery. 3. Leukocytosis-most likely reactive, has decreased to 10 5/16  4. Hypertension- metoprolol continued  5. Type 2 diabetes  6. Constipation- resolved, had bowel movement this past Sunday, patient feels constipated again, dulcolax reordered. Supervising physician: Salomon Potter. Jeffrey Sharma MD.  Dr. Jeffrey Sharma was readily and continuously available by phone for direct consultation regarding the care of this patient. Subjective:   Admit Date: 5/12/2022  PCP: Lucy Abebe MD    Patient laying in bed. Complains of some continued back pain. Walked with therapy to the bathroom. She states that she does well with walking there and with sitting on the commode, but experiences pain when she stands from the toilet.      Data:   Scheduled Meds:   aspirin  81 mg Oral Daily    enoxaparin  40 mg SubCUTAneous Daily    melatonin  3 mg Oral Nightly    cephALEXin  500 mg Oral 4 times per day    oxyCODONE  5 mg Oral Q4H    Or    oxyCODONE  10 mg Oral Q4H    acetaminophen  650 mg Oral Q6H    polyethylene glycol  17 g Oral Daily    sennosides-docusate sodium  1 tablet Oral BID    rOPINIRole  2 mg Oral Nightly    rosuvastatin  10 mg Oral Nightly    metoprolol succinate  25 mg Oral BID         No intake/output data recorded. No intake/output data recorded. No intake or output data in the 24 hours ending 05/18/22 0849  CULTURE results: Invalid input(s): BLOOD CULTURE,  URINE CULTURE, SURGICAL CULTURE  CBC:   Recent Labs     05/15/22  1709   WBC 10.7*   HGB 10.1*        BMP:    Recent Labs     05/15/22  1709 05/17/22  1809   *  --    K 4.3 4.4   CL 97*  --    CO2 28  --    BUN 13  --    CREATININE 0.4*  --    GLUCOSE 133*  --      Hepatic: No results for input(s): AST, ALT, ALB, BILITOT, ALKPHOS in the last 72 hours.       IMAGING: available xray, CT, and MRI results reviewed    Objective:   Vitals: BP (!) 117/42   Pulse 67   Temp 97.7 °F (36.5 °C) (Oral)   Resp 16   Ht 5' 4\" (1.626 m)   Wt 215 lb (97.5 kg)   LMP  (LMP Unknown)   SpO2 100%   BMI 36.90 kg/m²   General appearance: laying in bed on left side, was asleep but was easily awoken  HEENT: normocephalic, atraumatic, EOMI  Neurologic: Mental status: alert and oriented x4, speech clear and coherent, no facial droop, follows commands briskly, sensation intact in all extremities  Extremities: bilateral upper and lower extremities 5/5 throughout  Incision: dressing intact,  No strike through drainage noted  Drains: removed      Electronically signed by Kandace Neff PA-C on 5/18/2022 at 8:49 AM

## 2022-05-18 NOTE — PLAN OF CARE
Problem: Chronic Conditions and Co-morbidities  Goal: Patient's chronic conditions and co-morbidity symptoms are monitored and maintained or improved  5/18/2022 1453 by Thais Baker RN  Outcome: Completed  5/18/2022 0926 by Hugo Ramirez RN  Outcome: Progressing

## 2022-05-18 NOTE — CARE COORDINATION
I read the pt has discharge order to go to Swing Bed. LSW is still waiting on a reply form Itzel Olvera if pt can come today.

## 2022-05-18 NOTE — CARE COORDINATION
LSW was informed in IDR that pt is a possible discharge. LSW messaged Donna with the Swing Bed asking if pt can come today. Waiting on a response.

## 2022-05-18 NOTE — DISCHARGE INSTR - COC
Continuity of Care Form    Patient Name: Ana Reyes   :  1935  MRN:  [id number]    Admit date:  2022  Discharge date:  ***    Code Status Order: Full Code   Advance Directives:   Advance Care Flowsheet Documentation       Date/Time Healthcare Directive Type of Healthcare Directive Copy in [address] Agent's Name Healthcare Agent's Phone Number    [phone] No, patient does not have an advance directive for healthcare treatment -- -- -- -- --            Admitting Physician:  Ted Moya MD  PCP: Matthias Martinez MD    Discharging Nurse: [facility] Unit/Room#: 6067/1703-S  Discharging Unit Phone Number: ***    Emergency Contact:   Extended Emergency Contact Information  Primary Emergency Contact: Lisa Hernandez of [address] [place] [address] Phone: [phone]  Mobile Phone: [phone]  Relation: Child  Secondary Emergency Contact: Lianne Joshi  [place] Phone: [phone]  Relation: Child  Preferred language: English   needed?  No    Past Surgical History:  Past Surgical History:   Procedure Laterality Date    CATARACT REMOVAL Bilateral     ,    INGUINAL HERNIA REPAIR Left 45 yrs ago    LUMBAR SPINE SURGERY N/A 2022    L1-4 LAMINECTOMIES performed by Ted Moya MD at [address] [place] [address] 2022    L2 AND L3 BILATERAL BALLOON KYPHOPLASTY AND L2 AND L3 NEEDLE BIOPSY performed by Ted Moya MD at [facility] OR       Immunization History:   Immunization History   Administered Date(s) Administered    COVID-19, Liban Pollack, Primary or Immunocompromised, PF, 100mcg/0.5mL 2021, 2021    COVID-19, [facility], DO NOT Dilute, Marcus-Sucrose, 12+ yrs, PF, 30 mcg/0.3 mL dose 2022    Influenza Vaccine, unspecified formulation 2016    Influenza Virus Vaccine 2014, 2016    Influenza, High Dose (Fluzone 65 yrs and older) 10/20/2017, 10/01/2018, 2019    Influenza, Quadv, adjuvanted, 65 yrs +, IM, PF (Fluad) 10/01/2020    Pneumococcal Conjugate 13-valent (Jmnvmow72) 05/02/2017    Pneumococcal Polysaccharide (Ttckvmien04) 09/25/2014    Zoster Live (Zostavax) 06/26/2017       Active Problems:  Patient Active Problem List   Diagnosis Code    Essential hypertension I10    Mixed hyperlipidemia E78.2    Type 2 diabetes mellitus without complication ([facility] S21.6    Dupuytren's contracture of right hand M72.0    Rheumatoid arthritis involving multiple sites with positive rheumatoid factor ([facility] M05.79    CAD s/p MI, TPA in 1998 I25.10    Gastrointestinal hemorrhage K92.2    Cyst, kidney, acquired N28.1    Chronic midline low back pain without sciatica M54.50, G89.29    Restless leg syndrome G25.81    Chronic diastolic congestive heart failure (HCC) I50.32    Suspected sleep apnea R29.818    Morbid obesity with BMI of 40.0-44.9, adult (HCC) E66.01, Z68.41    Functional diarrhea K59.1    Moderate persistent asthma with exacerbation J45.41    Compression fracture of L2 vertebra with delayed healing S32.020G    Lumbar stenosis with neurogenic claudication M48.062    Post-operative pain G89.18       Isolation/Infection:   Isolation            No Isolation          Patient Infection Status       Infection Onset Added Last Indicated Last Indicated By Review Planned Expiration Resolved Resolved By    None active    Resolved    COVID-19 (Rule Out) 12/04/21 12/04/21 12/05/21 COVID-19, Rapid (Ordered)   12/05/21 Rule-Out Test Resulted    COVID-19 (Rule Out) 12/02/21 12/02/21 12/02/21 Respiratory Panel, Molecular, with COVID-19 (Restricted: peds pts or suitable admitted adults) (Ordered)   12/02/21 Rule-Out Test Resulted            Nurse Assessment:  Last Vital Signs: /80   Pulse 61   Temp 97.5 °F (36.4 °C) (Oral)   Resp 18   Ht 5' 4\" (1.626 m)   Wt 215 lb (97.5 kg)   LMP  (LMP Unknown)   SpO2 100%   BMI 36.90 kg/m²     Last documented pain score (0-10 scale): Pain Level: 10  Last Weight:   Wt Readings from Last 1 Encounters:   22 215 lb (97.5 kg)     Mental Status:  oriented and alert    IV Access:  - None    Nursing Mobility/ADLs:  Walking   Assisted  Transfer  Assisted  Bathing  Assisted  Dressing  Assisted  Toileting  Assisted  Feeding  Independent  Med Admin  Independent  Med Delivery   whole    Wound Care Documentation and Therapy:  Incision 22 Back Lower;Medial (Active)   Dressing Status Clean;Dry; Intact 22 0915   Incision Cleansed Cleansed with saline 22 1600   Dressing/Treatment Adhesive bandage;Surgical glue 22 0915   Closure Surgical glue 22 0915   Drainage Amount None 22 0915   Odor None 22 0915   Number of days: 6        Elimination:  Continence: Bowel: Yes  Bladder: Yes  Urinary Catheter: None   Colostomy/Ileostomy/Ileal Conduit: No       Date of Last BM: ***    Intake/Output Summary (Last 24 hours) at 2022 1526  Last data filed at 2022 1056  Gross per 24 hour   Intake 480 ml   Output --   Net 480 ml     No intake/output data recorded. Safety Concerns:      At Risk for Falls    Impairments/Disabilities:      None    Nutrition Therapy:  Current Nutrition Therapy:   { МАРИЯ Diet List:984192700}    Routes of Feeding: {P DME Other Feedings:655878782}  Liquids: {Slp liquid thickness:77118}  Daily Fluid Restriction: {CHP DME Yes amt example:895114057}  Last Modified Barium Swallow with Video (Video Swallowing Test): {Done Not Done FMAJ:860438937}    Treatments at the Time of Hospital Discharge:   Respiratory Treatments:       Oxygen Therapy:  {Therapy; copd oxygen:99309}  Ventilator:    { CC Vent UGGV:934816365}    Rehab Therapies: {THERAPEUTIC INTERVENTION:5798739230}  Weight Bearing Status/Restrictions: { CC Weight Bearin}  Other Medical Equipment (for information only, NOT a DME order):  {EQUIPMENT:702383837}  Other Treatments: ***    Patient's personal belongings (please select all that are sent with patient):  Dentures upper, denture care, suit  case- clothes, makeup     RN SIGNATURE:  Electronically signed by Janine Cheung RN on 5/18/22 at 3:30 PM EDT    CASE MANAGEMENT/SOCIAL WORK SECTION    Inpatient Status Date: ***    Readmission Risk Assessment Score:  Readmission Risk              Risk of Unplanned Readmission:  17           Discharging to Facility/ Agency   Name:   Address:  Phone:  Fax:    Dialysis Facility (if applicable)   Name:  Address:  Dialysis Schedule:  Phone:  Fax:    / signature: {Esignature:220828274}    PHYSICIAN SECTION    Prognosis: {Prognosis:1276323925}    Condition at Discharge: [facility] Patient Condition:073844473}    Rehab Potential (if transferring to Rehab): {Prognosis:5196105464}    Recommended Labs or Other Treatments After Discharge: ***    Physician Certification: I certify the above information and transfer of [employer]  is necessary for the continuing treatment of the diagnosis listed and that she requires {Admit to Appropriate Level of Care:13106} for {GREATER/LESS:249450290} 30 days.      Update Admission H&P: {CHP DME Changes in NMAIX:369503261}    PHYSICIAN SIGNATURE:  {Esignature:439886621}

## 2022-05-19 ENCOUNTER — HOSPITAL ENCOUNTER (INPATIENT)
Age: 87
LOS: 1 days | Discharge: ANOTHER HOSPITAL | DRG: 560 | End: 2022-05-20
Attending: FAMILY MEDICINE | Admitting: FAMILY MEDICINE
Payer: MEDICARE

## 2022-05-19 VITALS
TEMPERATURE: 96.6 F | SYSTOLIC BLOOD PRESSURE: 101 MMHG | OXYGEN SATURATION: 95 % | HEART RATE: 74 BPM | DIASTOLIC BLOOD PRESSURE: 80 MMHG | RESPIRATION RATE: 18 BRPM

## 2022-05-19 PROBLEM — R53.81 PHYSICAL DEBILITY: Status: ACTIVE | Noted: 2022-05-19

## 2022-05-19 LAB
GLUCOSE BLD-MCNC: 118 MG/DL (ref 70–99)
GLUCOSE BLD-MCNC: 121 MG/DL (ref 70–99)
GLUCOSE BLD-MCNC: 124 MG/DL (ref 70–99)
GLUCOSE BLD-MCNC: 131 MG/DL (ref 70–99)
GLUCOSE BLD-MCNC: 142 MG/DL (ref 70–99)

## 2022-05-19 PROCEDURE — 6370000000 HC RX 637 (ALT 250 FOR IP): Performed by: NURSE PRACTITIONER

## 2022-05-19 PROCEDURE — 6370000000 HC RX 637 (ALT 250 FOR IP): Performed by: INTERNAL MEDICINE

## 2022-05-19 PROCEDURE — 97162 PT EVAL MOD COMPLEX 30 MIN: CPT

## 2022-05-19 PROCEDURE — 97535 SELF CARE MNGMENT TRAINING: CPT

## 2022-05-19 PROCEDURE — 97530 THERAPEUTIC ACTIVITIES: CPT

## 2022-05-19 PROCEDURE — 6370000000 HC RX 637 (ALT 250 FOR IP): Performed by: PHYSICIAN ASSISTANT

## 2022-05-19 PROCEDURE — 1200000002 HC SEMI PRIVATE SWING BED

## 2022-05-19 PROCEDURE — 97166 OT EVAL MOD COMPLEX 45 MIN: CPT

## 2022-05-19 PROCEDURE — 82962 GLUCOSE BLOOD TEST: CPT

## 2022-05-19 RX ORDER — LANOLIN ALCOHOL/MO/W.PET/CERES
3 CREAM (GRAM) TOPICAL NIGHTLY
Status: DISCONTINUED | OUTPATIENT
Start: 2022-05-19 | End: 2022-05-20 | Stop reason: HOSPADM

## 2022-05-19 RX ORDER — INSULIN LISPRO 100 [IU]/ML
0-12 INJECTION, SOLUTION INTRAVENOUS; SUBCUTANEOUS
Status: DISCONTINUED | OUTPATIENT
Start: 2022-05-19 | End: 2022-05-20 | Stop reason: HOSPADM

## 2022-05-19 RX ORDER — DIAZEPAM 5 MG/1
5 TABLET ORAL EVERY 8 HOURS PRN
Status: DISCONTINUED | OUTPATIENT
Start: 2022-05-19 | End: 2022-05-19 | Stop reason: HOSPADM

## 2022-05-19 RX ORDER — GABAPENTIN 300 MG/1
300 CAPSULE ORAL 3 TIMES DAILY PRN
Status: DISCONTINUED | OUTPATIENT
Start: 2022-05-19 | End: 2022-05-19

## 2022-05-19 RX ORDER — INSULIN LISPRO 100 [IU]/ML
0-6 INJECTION, SOLUTION INTRAVENOUS; SUBCUTANEOUS NIGHTLY
Status: DISCONTINUED | OUTPATIENT
Start: 2022-05-19 | End: 2022-05-20 | Stop reason: HOSPADM

## 2022-05-19 RX ORDER — HYDROCODONE BITARTRATE AND ACETAMINOPHEN 5; 325 MG/1; MG/1
1 TABLET ORAL ONCE
Status: COMPLETED | OUTPATIENT
Start: 2022-05-19 | End: 2022-05-19

## 2022-05-19 RX ORDER — ONDANSETRON 2 MG/ML
4 INJECTION INTRAMUSCULAR; INTRAVENOUS EVERY 6 HOURS PRN
Status: DISCONTINUED | OUTPATIENT
Start: 2022-05-19 | End: 2022-05-19 | Stop reason: HOSPADM

## 2022-05-19 RX ORDER — SENNA AND DOCUSATE SODIUM 50; 8.6 MG/1; MG/1
1 TABLET, FILM COATED ORAL 2 TIMES DAILY
Status: DISCONTINUED | OUTPATIENT
Start: 2022-05-19 | End: 2022-05-19 | Stop reason: HOSPADM

## 2022-05-19 RX ORDER — POLYETHYLENE GLYCOL 3350 17 G/17G
17 POWDER, FOR SOLUTION ORAL DAILY
Status: DISCONTINUED | OUTPATIENT
Start: 2022-05-19 | End: 2022-05-19 | Stop reason: HOSPADM

## 2022-05-19 RX ORDER — ONDANSETRON 4 MG/1
4 TABLET, ORALLY DISINTEGRATING ORAL EVERY 8 HOURS PRN
Status: DISCONTINUED | OUTPATIENT
Start: 2022-05-19 | End: 2022-05-19 | Stop reason: HOSPADM

## 2022-05-19 RX ORDER — HYDROCODONE BITARTRATE AND ACETAMINOPHEN 5; 325 MG/1; MG/1
1 TABLET ORAL EVERY 6 HOURS PRN
Status: DISCONTINUED | OUTPATIENT
Start: 2022-05-19 | End: 2022-05-20

## 2022-05-19 RX ORDER — ACETAMINOPHEN 325 MG/1
650 TABLET ORAL EVERY 6 HOURS
Status: DISCONTINUED | OUTPATIENT
Start: 2022-05-19 | End: 2022-05-19 | Stop reason: HOSPADM

## 2022-05-19 RX ORDER — HYDROXYCHLOROQUINE SULFATE 200 MG/1
200 TABLET, FILM COATED ORAL DAILY
Status: DISCONTINUED | OUTPATIENT
Start: 2022-05-19 | End: 2022-05-20 | Stop reason: HOSPADM

## 2022-05-19 RX ORDER — CLOBETASOL PROPIONATE 0.5 MG/G
CREAM TOPICAL 2 TIMES DAILY
COMMUNITY

## 2022-05-19 RX ORDER — LORAZEPAM 0.5 MG/1
0.5 TABLET ORAL ONCE
Status: COMPLETED | OUTPATIENT
Start: 2022-05-19 | End: 2022-05-19

## 2022-05-19 RX ORDER — FUROSEMIDE 20 MG/1
20 TABLET ORAL DAILY
Status: DISCONTINUED | OUTPATIENT
Start: 2022-05-19 | End: 2022-05-20 | Stop reason: HOSPADM

## 2022-05-19 RX ORDER — METOPROLOL SUCCINATE 25 MG/1
25 TABLET, EXTENDED RELEASE ORAL 2 TIMES DAILY
Status: DISCONTINUED | OUTPATIENT
Start: 2022-05-19 | End: 2022-05-20 | Stop reason: HOSPADM

## 2022-05-19 RX ORDER — HYDROCODONE BITARTRATE AND ACETAMINOPHEN 5; 325 MG/1; MG/1
2 TABLET ORAL EVERY 6 HOURS PRN
Status: DISCONTINUED | OUTPATIENT
Start: 2022-05-19 | End: 2022-05-20

## 2022-05-19 RX ORDER — POLYETHYLENE GLYCOL 3350 17 G/17G
17 POWDER, FOR SOLUTION ORAL DAILY PRN
Status: DISCONTINUED | OUTPATIENT
Start: 2022-05-19 | End: 2022-05-19 | Stop reason: HOSPADM

## 2022-05-19 RX ORDER — SENNA PLUS 8.6 MG/1
1 TABLET ORAL 2 TIMES DAILY PRN
Status: DISCONTINUED | OUTPATIENT
Start: 2022-05-19 | End: 2022-05-20 | Stop reason: HOSPADM

## 2022-05-19 RX ORDER — SENNA PLUS 8.6 MG/1
1 TABLET ORAL 2 TIMES DAILY PRN
Status: DISCONTINUED | OUTPATIENT
Start: 2022-05-19 | End: 2022-05-19 | Stop reason: HOSPADM

## 2022-05-19 RX ORDER — POLYETHYLENE GLYCOL 3350 17 G/17G
17 POWDER, FOR SOLUTION ORAL DAILY PRN
Status: DISCONTINUED | OUTPATIENT
Start: 2022-05-19 | End: 2022-05-20 | Stop reason: HOSPADM

## 2022-05-19 RX ORDER — ASPIRIN 81 MG/1
81 TABLET ORAL DAILY
Status: DISCONTINUED | OUTPATIENT
Start: 2022-05-19 | End: 2022-05-20 | Stop reason: HOSPADM

## 2022-05-19 RX ORDER — HYDROCODONE BITARTRATE AND ACETAMINOPHEN 5; 325 MG/1; MG/1
1 TABLET ORAL EVERY 6 HOURS PRN
Status: DISCONTINUED | OUTPATIENT
Start: 2022-05-19 | End: 2022-05-19

## 2022-05-19 RX ORDER — LOSARTAN POTASSIUM 50 MG/1
50 TABLET ORAL DAILY
Status: ON HOLD | COMMUNITY
End: 2022-05-19 | Stop reason: CLARIF

## 2022-05-19 RX ORDER — BISACODYL 10 MG
10 SUPPOSITORY, RECTAL RECTAL DAILY PRN
Status: DISCONTINUED | OUTPATIENT
Start: 2022-05-19 | End: 2022-05-20 | Stop reason: HOSPADM

## 2022-05-19 RX ORDER — ROPINIROLE 1 MG/1
1 TABLET, FILM COATED ORAL DAILY PRN
Status: DISCONTINUED | OUTPATIENT
Start: 2022-05-19 | End: 2022-05-20

## 2022-05-19 RX ORDER — ROPINIROLE 1 MG/1
2 TABLET, FILM COATED ORAL NIGHTLY
Status: DISCONTINUED | OUTPATIENT
Start: 2022-05-19 | End: 2022-05-20

## 2022-05-19 RX ORDER — DEXTROSE MONOHYDRATE 50 MG/ML
1000 INJECTION, SOLUTION INTRAVENOUS PRN
Status: DISCONTINUED | OUTPATIENT
Start: 2022-05-19 | End: 2022-05-20 | Stop reason: HOSPADM

## 2022-05-19 RX ORDER — ROSUVASTATIN CALCIUM 5 MG/1
10 TABLET, COATED ORAL NIGHTLY
Status: DISCONTINUED | OUTPATIENT
Start: 2022-05-19 | End: 2022-05-20 | Stop reason: HOSPADM

## 2022-05-19 RX ADMIN — HYDROCODONE BITARTRATE AND ACETAMINOPHEN 2 TABLET: 5; 325 TABLET ORAL at 18:06

## 2022-05-19 RX ADMIN — HYDROXYCHLOROQUINE SULFATE 200 MG: 200 TABLET ORAL at 11:48

## 2022-05-19 RX ADMIN — ROPINIROLE HYDROCHLORIDE 2 MG: 1 TABLET, FILM COATED ORAL at 20:29

## 2022-05-19 RX ADMIN — HYDROCODONE BITARTRATE AND ACETAMINOPHEN 2 TABLET: 5; 325 TABLET ORAL at 11:35

## 2022-05-19 RX ADMIN — SENNOSIDES 8.6 MG: 8.6 TABLET, FILM COATED ORAL at 13:13

## 2022-05-19 RX ADMIN — LORAZEPAM 0.5 MG: 0.5 TABLET ORAL at 21:56

## 2022-05-19 RX ADMIN — Medication 3 MG: at 20:30

## 2022-05-19 RX ADMIN — ROSUVASTATIN CALCIUM 10 MG: 5 TABLET, FILM COATED ORAL at 20:30

## 2022-05-19 RX ADMIN — OXYCODONE HYDROCHLORIDE 10 MG: 10 TABLET ORAL at 04:28

## 2022-05-19 RX ADMIN — ASPIRIN 81 MG: 81 TABLET, COATED ORAL at 11:09

## 2022-05-19 RX ADMIN — HYDROCODONE BITARTRATE AND ACETAMINOPHEN 1 TABLET: 5; 325 TABLET ORAL at 21:55

## 2022-05-19 RX ADMIN — ROPINIROLE HYDROCHLORIDE 1 MG: 1 TABLET, FILM COATED ORAL at 11:48

## 2022-05-19 RX ADMIN — BISACODYL SUPPOSITORY 10 MG: 10 SUPPOSITORY RECTAL at 18:57

## 2022-05-19 ASSESSMENT — PAIN DESCRIPTION - DESCRIPTORS
DESCRIPTORS: ACHING
DESCRIPTORS: DISCOMFORT;SORE
DESCRIPTORS: ACHING
DESCRIPTORS: ACHING;BURNING

## 2022-05-19 ASSESSMENT — PAIN DESCRIPTION - LOCATION
LOCATION: LEG
LOCATION: BUTTOCKS;HIP
LOCATION: BACK
LOCATION: BACK;LEG

## 2022-05-19 ASSESSMENT — PAIN - FUNCTIONAL ASSESSMENT
PAIN_FUNCTIONAL_ASSESSMENT: ACTIVITIES ARE NOT PREVENTED

## 2022-05-19 ASSESSMENT — PAIN SCALES - GENERAL
PAINLEVEL_OUTOF10: 7
PAINLEVEL_OUTOF10: 0
PAINLEVEL_OUTOF10: 0
PAINLEVEL_OUTOF10: 10
PAINLEVEL_OUTOF10: 0
PAINLEVEL_OUTOF10: 9
PAINLEVEL_OUTOF10: 10
PAINLEVEL_OUTOF10: 0
PAINLEVEL_OUTOF10: 6

## 2022-05-19 ASSESSMENT — PAIN DESCRIPTION - ONSET
ONSET: GRADUAL
ONSET: ON-GOING
ONSET: ON-GOING

## 2022-05-19 ASSESSMENT — PAIN DESCRIPTION - FREQUENCY
FREQUENCY: CONTINUOUS

## 2022-05-19 ASSESSMENT — ENCOUNTER SYMPTOMS
ALLERGIC/IMMUNOLOGIC NEGATIVE: 1
CONSTIPATION: 1
RESPIRATORY NEGATIVE: 1
SORE THROAT: 1
EYES NEGATIVE: 1

## 2022-05-19 ASSESSMENT — PAIN DESCRIPTION - ORIENTATION
ORIENTATION: RIGHT;LEFT;LOWER
ORIENTATION: LOWER;POSTERIOR
ORIENTATION: RIGHT
ORIENTATION: LOWER;MID

## 2022-05-19 ASSESSMENT — PAIN DESCRIPTION - DIRECTION: RADIATING_TOWARDS: NO

## 2022-05-19 ASSESSMENT — PAIN DESCRIPTION - PAIN TYPE
TYPE: CHRONIC PAIN
TYPE: SURGICAL PAIN
TYPE: CHRONIC PAIN

## 2022-05-19 NOTE — PROGRESS NOTES
At 11:30 AM discussion with nursing staff regarding patient and medications. Requip 2 mg at bedtime and 1 mg daily as needed, Norco 1 tablet for moderate pain and 2 tablets for severe pain, patient with blood pressure 98/45 and will hold Lasix 20 and metoprolol tartrate 25 mg twice daily and monitor pressure. I did review the patient PCP notes cannot identify a clear reason for the Lasix. No history of CHF or lower extremity edema.

## 2022-05-19 NOTE — PLAN OF CARE
Problem: Discharge Planning  Goal: Discharge to home or other facility with appropriate resources  Outcome: Progressing  Flowsheets (Taken 5/18/2022 2125)  Discharge to home or other facility with appropriate resources:   Identify barriers to discharge with patient and caregiver   Identify discharge learning needs (meds, wound care, etc)   Arrange for needed discharge resources and transportation as appropriate     Problem: Safety - Adult  Goal: Free from fall injury  Outcome: Progressing     Problem: ABCDS Injury Assessment  Goal: Absence of physical injury  Outcome: Progressing     Problem: Pain  Goal: Verbalizes/displays adequate comfort level or baseline comfort level  Outcome: Progressing

## 2022-05-19 NOTE — PROGRESS NOTES
[address] [id number] Continence Nurse  Consult Note       Ortiz Tate  AGE: 81 y.o. GENDER: female  : 1935  TODAY'S DATE:  2022    Subjective:      Reason for [address] Evaluation and Assessment: Incision, and right medial foot      Allison Bender is a 81 y.o. female referred by:   [x] Physician  [] Nursing  [] Other:     Wound Identification:   Wound Type: other  Contributing Factors: diabetes        PAST MEDICAL HISTORY        Diagnosis Date    Arthritis     left knee pain, sees Dr. Sanjuanita Adams CAD (coronary artery disease)     sees Dr. Vanessa Roberts Chronic midline low back pain without sciatica 2016    Had PT in    [address] Colonoscopy refused     discussed in 7/15    DM (diabetes mellitus), type 2 ([facility] 2006    Dupuytren's contracture of right hand     Endometrial stripe increased 2014    Saw NP Rob garrett. Was normal exam per pt.  Gastrointestinal hemorrhage 2015    Patient had GI bleeding. Colon refused. Discussed with patient in detail.  Glaucoma 2014    H/O echocardiogram 10/02/2014    Mildly depressed left ventricular function; ejection fraction of 45%. Moderate pulmonary hypertension.  H/O echocardiogram 2018    EF 50-55%.  Mitral annular calcification is present. No other significant valvulopathy seen.  History of nuclear stress test 2018    EF 59%. ECG portion of stress test is negative for ischemia by diagnostic criteria. fixed inferior large size severe defect in rest and stress images. Mild hubert infarct ischemia.     HTN (hypertension)     Hyperlipidemia     Kidney stone     hx-\"been about 3 yrs ago\"    MI (myocardial infarction) ([facility] 1998    treated with TPA    Obesity     Open wound of right foot 2020    Parainfluenza infection 2021    Pneumonia of right lower lobe due to infectious organism 2018    Vertigo     'have been having this since I had cataract surgery\"\"that is why they are doing the MRA of my brain(10/31/2014)    WD-Traumatic ulcer of foot, right, with fat layer exposed ([facility] 8/3/2020       PAST SURGICAL HISTORY    Past Surgical History:   Procedure Laterality Date    CATARACT REMOVAL Bilateral     5/14,8/14    INGUINAL HERNIA REPAIR Left 39 yrs ago    LUMBAR SPINE SURGERY N/A 5/12/2022    L1-4 LAMINECTOMIES performed by Merleen Kocher, MD at [address] N/A 5/12/2022    L2 AND L3 BILATERAL BALLOON KYPHOPLASTY AND L2 AND L3 NEEDLE BIOPSY performed by Merleen Kocher, MD at [address] [facility]    Family History   Problem Relation Age of Onset    Parkinsonism Mother     Heart Attack Father     Heart Disease Father     Stroke Sister     Cancer Sister         breast ca   Lachelle Curl High Blood Pressure Brother     High Blood Pressure Brother    Lachelle Curl Cancer Brother         \"stomach cancer\"    Colon Cancer Neg Hx     Stomach Cancer Neg Hx        SOCIAL HISTORY    Social History     Tobacco Use    Smoking status: Never Smoker    Smokeless tobacco: Never Used   Vaping Use    Vaping Use: Never used   Substance Use Topics    Alcohol use: Not Currently     Comment: OCCASSIONAL    Drug use: Never       ALLERGIES    No Known Allergies    MEDICATIONS    No current facility-administered medications on file prior to encounter.      Current Outpatient Medications on File Prior to Encounter   Medication Sig Dispense Refill    clobetasol (TEMOVATE) 0.05 % cream Apply topically 2 times daily      naloxegol (MOVANTIK) 25 MG TABS tablet Take 25 mg by mouth every morning      metoprolol succinate (TOPROL XL) 25 MG extended release tablet Take 1 tablet by mouth daily 30 tablet 0    cephALEXin (KEFLEX) 500 MG capsule Take 1 capsule by mouth 4 times daily for 28 doses 28 capsule 0    rOPINIRole (REQUIP) 2 MG tablet Take one half (1/2) tablet by mouth at 2 pm and one (1) tablet by mouth at hs 45 tablet 3    rosuvastatin (CRESTOR) 10 MG tablet Take 1 tablet by mouth nightly 90 tablet 1    glipiZIDE (GLUCOTROL XL) 5 MG extended release tablet Take 1 tablet by mouth daily 90 tablet 1    furosemide (LASIX) 20 MG tablet Take 1 tablet by mouth daily 60 tablet 3    albuterol sulfate  (90 Base) MCG/ACT inhaler INHALE 2 PUFFS INTO THE LUNGS 4 TIMES DAILY AS NEEDED FOR WHEEZING 8.5 g 5    Multiple Vitamins-Minerals (PRESERVISION AREDS) TABS Take 1 tablet by mouth 2 times daily      hydroxychloroquine (PLAQUENIL) 200 MG tablet Take 200 mg by mouth daily       aspirin 81 MG EC tablet Take 81 mg by mouth daily  30 tablet     Cholecalciferol (VITAMIN D PO) Take 5,000 Units by mouth in the morning and at bedtime            Objective:      BP (!) 98/45   Pulse 74   Temp 96.6 °F (35.9 °C) (Infrared)   Resp 18   Ht 5' 4\" (1.626 m)   Wt 215 lb (97.5 kg)   LMP  (LMP Unknown)   SpO2 95%   BMI 36.90 kg/m²   Edd Risk Score: Edd Scale Score: 20    LABS    CBC:   Lab Results   Component Value Date    WBC 10.7 05/15/2022    RBC 3.41 05/15/2022    HGB 10.1 05/15/2022    HCT 32.8 05/15/2022    MCV 96.2 05/15/2022    MCH 29.6 05/15/2022    MCHC 30.8 05/15/2022    RDW 13.3 05/15/2022     05/15/2022    MPV 9.6 05/15/2022     CMP:    Lab Results   Component Value Date     05/15/2022    K 4.4 05/17/2022    K 3.0 03/08/2018    CL 97 05/15/2022    CO2 28 05/15/2022    BUN 13 05/15/2022    CREATININE 0.4 05/15/2022    GFRAA >60 05/15/2022    AGRATIO 2.0 11/19/2021    LABGLOM >60 05/15/2022    GLUCOSE 133 05/15/2022    PROT 5.9 12/30/2021    PROT 7.0 07/20/2012    LABALBU 3.3 12/30/2021    CALCIUM 8.5 05/15/2022    BILITOT 0.4 12/30/2021    ALKPHOS 101 12/30/2021    AST 15 12/30/2021    ALT <5 12/30/2021     Albumin:    Lab Results   Component Value Date    LABALBU 3.3 12/30/2021     PT/INR:    Lab Results   Component Value Date    PROTIME 13.0 05/04/2022    INR 1.01 05/04/2022     HgBA1c:    Lab Results   Component Value Date    LABA1C 5.8 05/04/2022         Assessment:     Patient Active Problem List   Diagnosis    Essential hypertension    Mixed hyperlipidemia    Type 2 diabetes mellitus without complication ([facility]    Dupuytren's contracture of right hand    Rheumatoid arthritis involving multiple sites with positive rheumatoid factor ([facility]    CAD s/p MI, TPA in 1998    Gastrointestinal hemorrhage    Cyst, kidney, acquired    Chronic midline low back pain without sciatica    Restless leg syndrome    Chronic diastolic congestive heart failure (HCC)    Suspected sleep apnea    Morbid obesity with BMI of 40.0-44.9, adult ([facility]    Functional diarrhea    Moderate persistent asthma with exacerbation    Compression fracture of L2 vertebra with delayed healing    Lumbar stenosis with neurogenic claudication    Post-operative pain    Physical debility       Measurements:  Wound 05/19/22 Right Medial Foot/Ankle Cluster (Active)   Wound Image   05/19/22 1223   Dressing Status New dressing applied 05/19/22 1223   Wound Cleansed Wound cleanser 05/19/22 1223   Wound Length (cm) 2.7 cm 05/19/22 1223   Wound Width (cm) 11.2 cm 05/19/22 1223   Wound Depth (cm) 0.1 cm 05/19/22 1223   Wound Surface Area (cm^2) 30.24 cm^2 05/19/22 1223   Wound Volume (cm^3) 3.024 cm^3 05/19/22 1223   Distance Tunneling (cm) 0 cm 05/19/22 1223   Tunneling Position ___ O'Clock 0 05/19/22 1223   Undermining Starts ___ O'Clock 0 05/19/22 1223   Undermining Ends___ O'Clock 0 05/19/22 1223   Undermining Maxium Distance (cm) 0 05/19/22 1223   Wound Assessment Slough 05/19/22 1223   Drainage Amount Small 05/19/22 1223   Drainage Description Serosanguinous 05/19/22 1223   Odor None 05/19/22 1223   Vera-wound Assessment Blanchable erythema 05/19/22 1223   Margins Defined edges 05/19/22 1223   Wound Thickness Description not for Pressure Injury Full thickness 05/19/22 1223   Number of days: 0       Response to treatment:  Well tolerated by patient.      Pain Assessment:  Severity:  0 - no pain related to wounds  Quality of pain:

## 2022-05-19 NOTE — H&P
V2.0  Swing Bed History and Physical      Name:  Silva Jaramillo /Age/Sex: 1935  (81 y.o. female)   MRN & CSN:  [id number] & [id number] Encounter Date/Time: 2022 7:05 AM EDT   Location:  [address] [place] [address] PCP: Janice Manzano MD       Hospital Day: 2    Assessment and Plan:   Silva Jaramillo is a 81 y.o. female with a pmh of CAD, DM, HTN, HLD and recent lumbar laminectomy at [facility] who presents with Physical debility    Hospital Problems           Last Modified POA    * (Principal) Physical debility 2022 Yes          1. Lumbar spinal stenosis with neurogenic claudication: S/p L1-L4 laminectomies and bilateral L2-3 balloon kyphoplasty with L2 and L3 bone biopsy and microdissection by Dr. Gomes Both 2022. Continue pain control and PT/OT. 2. Leukocytosis: Mild with WBC 10.7 5/15/2022. Afebrile. No obvious source of infection at this juncture. Will monitor and obtain additional work-up as needed. 3. NIDDM 2: Per history, stable glycemic fold outlying hospital.  Continue home oral medications and will titrate as needed. 4. Essential hypertension: Per history, BP stable. Continue beta-blocker and Lasix. BP marginal and was on losartan but not continued on discharge. Will monitor and titrate regimen as needed. 5. Hyperlipidemia: Per history, continue home statin  6. Constipation: Continue bowel regimen. Disposition:   Current Living situation: home  Expected Disposition: home  Estimated D/C: 2-3 days    Diet No diet orders on file   DVT Prophylaxis [] Lovenox, []  Heparin, [] SCDs, [] Ambulation,  [] Eliquis, [] Xarelto   Code Status Full Code   Surrogate Decision Maker/ POA Anamaria Sanchez (child)     History from:     patient    History of Present Illness:     Chief Complaint: Physical debility  Silva Jaramillo is a 81 y.o. female with pmh of CAD, DM, HTN, and HLD who presents with debility s/p lumbar laminectomy by Dr. Gomes Both (22).   Patient had some postoperative leukocytosis and was placed on Keflex. Afebrile. No obvious source of infection at this juncture. Had some constipation. Generalized weakness. Throat sore but no fever or purulence. Had cough that resolved. No dysuria. Denies CP, SOB, palpitations, nausea, vomiting. Chronic rash on right foot and follows with podiatry      Review of Systems: Need 10 Elements   Review of Systems   Constitutional: Negative. HENT: Positive for sore throat. Negative for congestion. Eyes: Negative. Respiratory: Negative. Cardiovascular: Negative. Gastrointestinal: Positive for constipation. Endocrine: Negative. Genitourinary: Negative. Musculoskeletal: Negative. Skin: Positive for rash. Allergic/Immunologic: Negative. Neurological: Positive for weakness. Psychiatric/Behavioral: Negative. Objective:   No intake or output data in the 24 hours ending 05/19/22 0705   Vitals:   Vitals:    05/18/22 2127 05/18/22 2214 05/19/22 0027 05/19/22 0458   BP: 101/80      Pulse: 71      Resp:  17 17 16   Temp:       TempSrc:       SpO2:           Medications Prior to Admission     Prior to Admission medications    Medication Sig Start Date End Date Taking? Authorizing Provider   metoprolol succinate (TOPROL XL) 25 MG extended release tablet Take 1 tablet by mouth daily 5/18/22 6/17/22  Myles Motta MD   cephALEXin (KEFLEX) 500 MG capsule Take 1 capsule by mouth 4 times daily for 28 doses 5/18/22 5/25/22  Myles Motta MD   sennosides-docusate sodium (SENOKOT-S) 8.6-50 MG tablet Take 1 tablet by mouth 2 times daily 5/18/22   Myles Motta MD   rOPINIRole (REQUIP) 2 MG tablet Take one half (1/2) tablet by mouth at 2 pm and one (1) tablet by mouth at hs 5/12/22   Ivan Green MD   HYDROcodone-acetaminophen ([address]) 7.5-325 MG per tablet Take 1 tablet by mouth every 4 hours as needed for Pain for up to 30 days.  5/11/22 6/10/22  Ivan Green MD   blood glucose test strips (TRUE METRIX BLOOD GLUCOSE TEST) strip Using one strip to test once daily, DX=E11.9 5/6/22   Delia Call MD   Docusate Sodium (COLACE PO) Take 1 tablet by mouth daily    Historical Provider, MD   ondansetron (ZOFRAN) 4 MG tablet Take 1 tablet by mouth 3 times daily as needed for Nausea or Vomiting 4/25/22   Delia Call MD   rosuvastatin (CRESTOR) 10 MG tablet Take 1 tablet by mouth nightly 4/25/22   Delia Call MD   glipiZIDE (GLUCOTROL XL) 5 MG extended release tablet Take 1 tablet by mouth daily 2/10/22   Delia Call MD   TRUEplus Lancets 28G MISC DX=E11.9 1/17/22   Delia Call MD   furosemide (LASIX) 20 MG tablet Take 1 tablet by mouth daily 12/10/21   Heather Jorge MD   albuterol sulfate  (90 Base) MCG/ACT inhaler INHALE 2 PUFFS INTO THE LUNGS 4 TIMES DAILY AS NEEDED FOR WHEEZING 10/11/21   Delia Call MD   Multiple Vitamins-Minerals (PRESERVISION AREDS) TABS Take 1 tablet by mouth 2 times daily    Historical Provider, MD   hydroxychloroquine (PLAQUENIL) 200 MG tablet Take 200 mg by mouth daily     Historical Provider, MD   aspirin 81 MG EC tablet Take 81 mg by mouth daily  11/11/14   Delia Call MD   Cholecalciferol (VITAMIN D PO) Take 5,000 Units by mouth in the morning and at bedtime     Historical Provider, MD       Physical Exam: Need 8 Elements   Physical Exam     General: NAD  Eyes: EOMI  ENT: neck supple  Cardiovascular: Regular rate. No murmurs  Respiratory: Clear to auscultation BL  Gastrointestinal: Soft, non tender, ND, +BS  Genitourinary: no suprapubic tenderness  Musculoskeletal: No edema, moves all ext, lumbar incision dressing dry without drainage  Skin: warm, dry, chronic rash on right foot  Neuro: Alert. Generalized weakness  Psych: Mood appropriate.        Past Medical History:   PMHx   Past Medical History:   Diagnosis Date    Arthritis     left knee pain, sees Dr. Unique Redding CAD (coronary artery disease)     sees Dr. Vicente Morgan Chronic midline low back pain without sciatica 9/28/2016    Had PT in 2016   [place] Claustrophobia     Colonoscopy refused     discussed in 7/15    DM (diabetes mellitus), type 2 ([facility] 02/2006    Dupuytren's contracture of right hand     Endometrial stripe increased 9/25/2014    Saw NP Waldo Schlatter. Was normal exam per pt.  Gastrointestinal hemorrhage 11/2/2015    Patient had GI bleeding. Colon refused. Discussed with patient in detail.  Glaucoma 4/1/2014    H/O echocardiogram 10/02/2014    Mildly depressed left ventricular function; ejection fraction of 45%. Moderate pulmonary hypertension.  H/O echocardiogram 08/28/2018    EF 50-55%.  Mitral annular calcification is present. No other significant valvulopathy seen.  History of nuclear stress test 08/28/2018    EF 59%. ECG portion of stress test is negative for ischemia by diagnostic criteria. fixed inferior large size severe defect in rest and stress images. Mild hubert infarct ischemia.  HTN (hypertension)     Hyperlipidemia     Kidney stone     hx-\"been about 3 yrs ago\"    MI (myocardial infarction) ([facility] 02/1998    treated with TPA    Obesity     Open wound of right foot 7/27/2020    Parainfluenza infection 12/5/2021    Pneumonia of right lower lobe due to infectious organism 4/5/2018    Vertigo     'have been having this since I had cataract surgery\"\"that is why they are doing the MRA of my brain(10/31/2014)    WD-Traumatic ulcer of foot, right, with fat layer exposed ([facility] 8/3/2020     PSHX:  has a past surgical history that includes Cataract removal (Bilateral); Inguinal hernia repair (Left, 45 yrs ago); Lumbar spine surgery (N/A, 5/12/2022); and Spine surgery (N/A, 5/12/2022). Allergies: No Known Allergies  Fam HX:  family history includes Cancer in her brother and sister; Heart Attack in her father; Heart Disease in her father; High Blood Pressure in her brother and brother; Parkinsonism in her mother; Stroke in her sister.   Soc HX:   Social History     Socioeconomic History    Marital status: Single Spouse name: Not on file    Number of children: Not on file    Years of education: Not on file    Highest education level: Not on file   Occupational History    Not on file   Tobacco Use    Smoking status: Never Smoker    Smokeless tobacco: Never Used   Vaping Use    Vaping Use: Never used   Substance and Sexual Activity    Alcohol use: Not Currently     Comment: OCCASSIONAL    Drug use: Never    Sexual activity: Not Currently     Partners: Male   Other Topics Concern    Not on file   Social History Narrative    Not on file     Social Determinants of Health     Financial Resource Strain: Low Risk     Difficulty of Paying Living Expenses: Not hard at all   Food Insecurity: No Food Insecurity    Worried About [address] in the Last Year: Never true    [address]  [employer] [facility] in the Last Year: Never true   Transportation Needs:     Lack of Transportation (Medical): Not on file    Lack of Transportation (Non-Medical):  Not on file   Physical Activity:     Days of Exercise per Week: Not on file    Minutes of Exercise per Session: Not on file   Stress:     Feeling of Stress : Not on file   Social Connections:     Frequency of Communication with Friends and Family: Not on file    Frequency of Social Gatherings with Friends and Family: Not on file    Attends [employer] Services: Not on file    Active Member of [address] [place] [address] or Organizations: Not on file    Attends Club or Organization Meetings: Not on file    Marital Status: Not on file   Intimate Partner Violence:     Fear of Current or Ex-Partner: Not on file    Emotionally Abused: Not on file    Physically Abused: Not on file    Sexually Abused: Not on file   Housing Stability:     Unable to Pay for Housing in the Last Year: Not on file    Number of [place] in the Last Year: Not on file    Unstable Housing in the Last Year: Not on file       Medications:   Medications:    aspirin  81 mg Oral Daily    enoxaparin  40 mg SubCUTAneous Daily    furosemide  20 mg Oral Daily    melatonin  3 mg Oral Nightly    metoprolol succinate  25 mg Oral BID    oxyCODONE  5 mg Oral Q4H    Or    oxyCODONE  10 mg Oral Q4H    rOPINIRole  2 mg Oral Nightly    rosuvastatin  10 mg Oral Nightly      Infusions:   PRN Meds: bisacodyl, 10 mg, Daily PRN  gabapentin, 300 mg, TID PRN  naloxone, 0.4 mg, PRN  traMADol, 50 mg, Q4H PRN        Labs      CBC: No results for input(s): WBC, HGB, PLT in the last 72 hours. BMP:    Recent Labs     05/17/22  1809   K 4.4     Hepatic: No results for input(s): AST, ALT, ALB, BILITOT, ALKPHOS in the last 72 hours. Lipids:   Lab Results   Component Value Date    CHOL 121 08/07/2018    HDL 47 11/19/2021    TRIG 128 08/07/2018     Hemoglobin A1C:   Lab Results   Component Value Date    LABA1C 5.8 05/04/2022     TSH:   Lab Results   Component Value Date    TSH 2.61 08/07/2018     Troponin:   Lab Results   Component Value Date    TROPONINT 0.031 05/18/2022    TROPONINT 0.043 05/18/2022    TROPONINT 0.047 05/18/2022     Lactic Acid: No results for input(s): LACTA in the last 72 hours. BNP: No results for input(s): PROBNP in the last 72 hours.   UA:  Lab Results   Component Value Date    NITRU NEGATIVE 09/21/2020    COLORU YELLOW 09/21/2020    WBCUA 7 09/21/2020    RBCUA 0 09/21/2020    MUCUS NEGATIVE 09/01/2019    BACTERIA MANY 09/21/2020    CLARITYU CLEAR 09/21/2020    SPECGRAV 1.030 09/21/2020    LEUKOCYTESUR NEGATIVE 09/21/2020    UROBILINOGEN 0.2 09/21/2020    BILIRUBINUR NEGATIVE 09/21/2020    BLOODU NEGATIVE 09/21/2020    KETUA NEGATIVE 09/21/2020     Urine Cultures:   Lab Results   Component Value Date    LABURIN 200 09/28/2016     Blood Cultures: No results found for: BC  No results found for: BLOODCULT2  Organism:   Lab Results   Component Value Date    ORG ECOL 03/01/2018       Imaging/Diagnostics Last 24 Hours   XR ABDOMEN (KUB) (SINGLE AP VIEW)    Result Date: 5/15/2022  EXAMINATION: ONE SUPINE XRAY VIEW(S) OF THE ABDOMEN 5/15/2022 11:50 am COMPARISON: CT abdomen and pelvis 09/25/2020. HISTORY: ORDERING SYSTEM PROVIDED HISTORY: constipation TECHNOLOGIST PROVIDED HISTORY: Reason for exam:->constipation Reason for Exam: constipation Additional signs and symptoms: pain Relevant Medical/Surgical History: none FINDINGS: The visualized lungs are clear. Nonobstructive bowel gas pattern. Mildly increased stool in the colon. No evidence pneumoperitoneum. No acute osseous abnormality. Mildly increased stool in the colon suggesting constipation. FL LESS THAN 1 HOUR    Result Date: 5/12/2022  Radiology exam is complete. No Radiologist dictation. Please follow up with ordering provider.        Personally reviewed Lab Studies, Imaging, and op note    Electronically signed by Kayley Paige MD on 5/19/2022 at 7:05 AM

## 2022-05-19 NOTE — PLAN OF CARE
Problem: Discharge Planning  Goal: Discharge to home or other facility with appropriate resources  5/19/2022 0752 by Michael Oquendo RN  Outcome: Completed  5/18/2022 2203 by Butch King RN  Outcome: Progressing  Flowsheets (Taken 5/18/2022 2125)  Discharge to home or other facility with appropriate resources:   Identify barriers to discharge with patient and caregiver   Identify discharge learning needs (meds, wound care, etc)   Arrange for needed discharge resources and transportation as appropriate     Problem: Safety - Adult  Goal: Free from fall injury  5/19/2022 0752 by Michael Oquendo RN  Outcome: Completed  5/18/2022 2203 by Butch King RN  Outcome: Progressing     Problem: ABCDS Injury Assessment  Goal: Absence of physical injury  5/19/2022 0752 by Michael Oquendo RN  Outcome: Completed  5/18/2022 2203 by Butch King RN  Outcome: Progressing     Problem: Pain  Goal: Verbalizes/displays adequate comfort level or baseline comfort level  5/19/2022 0752 by Michael Oquendo RN  Outcome: Completed  5/18/2022 2203 by Butch King RN  Outcome: Progressing     Problem: Chronic Conditions and Co-morbidities  Goal: Patient's chronic conditions and co-morbidity symptoms are monitored and maintained or improved  Outcome: Completed

## 2022-05-19 NOTE — PROGRESS NOTES
Occupational Therapy  Facility/Department: [facility] UNIT  Occupational Therapy Initial Assessment    Name: Devin Cortes  : 1935  MRN: [id number]  Date of Service: 2022    Discharge Recommendations:  Continue to assess pending progress,Home with assist PRN,Home with Home health OT  OT Equipment Recommendations  Equipment Needed: Yes  Mobility Devices: ADL Assistive Devices  ADL Assistive Devices: Sock-Aid Soft;Reacher       Patient Diagnosis(es): There were no encounter diagnoses. Past Medical History:  has a past medical history of Arthritis, CAD (coronary artery disease), Chronic midline low back pain without sciatica, Claustrophobia, Colonoscopy refused, DM (diabetes mellitus), type 2 ([address] Dupuytren's contracture of right hand, Endometrial stripe increased, Gastrointestinal hemorrhage, Glaucoma, H/O echocardiogram, H/O echocardiogram, History of nuclear stress test, HTN (hypertension), Hyperlipidemia, Kidney stone, MI (myocardial infarction) ([address] Obesity, Open wound of right foot, Parainfluenza infection, Pneumonia of right lower lobe due to infectious organism, Vertigo, and WD-Traumatic ulcer of foot, right, with fat layer exposed ([address] Past Surgical History:  has a past surgical history that includes Cataract removal (Bilateral); Inguinal hernia repair (Left, 45 yrs ago); Lumbar spine surgery (N/A, 2022); and Spine surgery (N/A, 2022). Treatment Diagnosis: Weakness      Assessment   Performance deficits / Impairments: Decreased functional mobility ; Decreased ADL status; Decreased balance;Decreased endurance;Decreased strength;Decreased posture  Assessment: Pt is a 81 y.o. admitted to [facility] unit for Physical Debility s/p lumbar laminectomy 22. Pt will benefit from OT to address improving endurance, strength and safety to perform functional mobility/ADLs.   Treatment Diagnosis: Weakness  Prognosis: Good  Decision Making: Medium Complexity  REQUIRES OT FOLLOW-UP: Yes  Activity Tolerance  Activity Tolerance: Patient limited by pain; Patient Tolerated treatment well  Activity Tolerance Comments: Pt tolerated well but limited d/t increased pain to 10/10 in buttocks down R LE        Plan   Plan  Times per Week: 4+  Times per Day: Daily  Current Treatment Recommendations: Strengthening,Balance training,Functional mobility training,Endurance training,Gait training,Patient/Caregiver education & training,Equipment evaluation, education, & procurement,Self-Care / ADL,Positioning,Safety education & training     Restrictions  Restrictions/Precautions  Restrictions/Precautions: Fall Risk  Position Activity Restriction  Spinal Precautions: No Lifting,No Twisting,No Bending  Sternal Precautions: 5# Lifting Restrictions  Other position/activity restrictions: Keep surgical site and dressing dry    Subjective   General  Patient assessed for rehabilitation services?: Yes  Family / Caregiver Present: No  Subjective  Subjective: Pt reports \"I was feeling a shooting pain but now it's a burning pain from my lower buttocks down my R leg\"  General Comment  Comments: Pt presents awake working with nursing aide to wash up.   Pain: 9/10  Social/Functional History  Social/Functional History  Lives With: Alone  Type of Home: Apartment  Home Layout: One level  Home Access: Stairs to enter without rails,Level entry  Entrance Stairs - Number of Steps: pt has a threshold  Bathroom Shower/Tub: Tub/Shower unit  Bathroom Toilet: Handicap height  Bathroom Equipment: Grab bars in shower,Hand-held shower,Shower [address]: Markos [place]roncoso  Has the patient had two or more falls in the past year or any fall with injury in the past year?: No  Receives Help From: Personal care attendant  ADL Assistance: Needs assistance  Bath: Supervision  Dressing: Independent  Grooming: Independent  Feeding: Independent  Toileting: Independent  Homemaking Assistance: Needs assistance  Meal Prep Responsibility: Primary (Pt orders out or makes TV dinners)  Laundry Responsibility: No (Pt daughter does her laundry)  Cleaning Responsibility: No (Pt's aid cleans, run sweeper, makes the beds)  Shopping Responsibility: No (Pt's aid gets her groceries)  Ambulation Assistance: Needs assistance (Pt reports she uses her rollator walker in her apt)  Transfer Assistance: Independent  Active : Yes  Leisure & Hobbies: Pt reports she watches tv, reads, and works jigsaw puzzles  IADL Comments: Pt reports she has an aid that comes 11 hrs a week 2-3 hours a day Mon-Friday and she helps her get in the shower and does housekeeping tasks. Reports she is indep with bathing/dressing but has difficulty with socks. Reports she had a reacher but it broke       Objective   Pulse: 74  Heart Rate Source: Monitor  BP: (!) 98/45  BP Location: Left upper arm  BP Method: Automatic  Patient Position: Semi fowlers  MAP (Calculated): 62.67  Resp: 18  SpO2: 95 %  O2 Device: None (Room air)  Vision Exceptions: Wears glasses for reading  Hearing: Within functional limits       Observation/Palpation  Observation: Pt presented sitting EOB with breakfast tray in front of her. Safety Devices  Type of Devices: All etienne prominences offloaded; All fall risk precautions in place;Gait belt;Nurse notified; Patient at risk for falls; Left in bed;Call light within reach  Restraints  Restraints Initially in Place: No  Bed Mobility Training  Bed Mobility Training: Yes  Balance  Sitting: Intact  Standing: With support  Transfer Training  Transfer Training: Yes  Toilet Transfers  Toilet - Technique: Ambulating  Equipment Used: Raised toilet seat with rails  Toilet Transfer: Contact guard assistance  AROM: Generally decreased, functional  Strength: Generally decreased, functional  Tone: Normal  Sensation: Impaired (Pt reports she has tingling in R hand but it comes and goes)  ADL  Feeding: Modified independent   Grooming: Stand by assistance;Setup (Pt cleaned dentures seated after s/u)  UE Bathing: Stand by assistance  LE Bathing: Minimal assistance (Assist to wash buttocks)  UE Dressing: Stand by assistance  LE Dressing: Minimal assistance  Toileting: Contact guard assistance  Additional Comments: Pt completed sponge bath at toilet and dressing on EOB after s/u        Bed mobility  Sit to Supine: Stand by assistance  Scooting: Moderate assistance  Transfers  Stand Step Transfers: Contact guard assistance  Sit to stand: Contact guard assistance  Stand to sit: Contact guard assistance                        Education Given To: Patient  Education Provided: Role of Therapy;Plan of Care;ADL Adaptive Strategies;Transfer Training;Precautions; Energy Conservation  Education Method: Demonstration;Verbal  Education Outcome: Verbalized understanding;Demonstrated understanding                        G-Code     OutComes Score                                                  AM-PAC Score             Goals  Short Term Goals  Time Frame for Short term goals: Until DC or goals met  Short Term Goal 1: Pt will complete trfs mod I  Short Term Goal 2: Pt will complete UE/LE bathing/dressing mod I using AE PRN  Short Term Goal 3: Pt will complete toileting mod I  Short Term Goal 4: Pt will tolerate standing/functional mobilty 5+ min during ADLs/therax following precautions mod I  Short Term Goal 5: Pt will complete light BUE therex within 5# restriction, to increase strength/endurance for ADLs/functional mobility  Patient Goals   Patient goals : \"To get home in 6 days\"       Therapy Time   Individual Concurrent Group Co-treatment   Time In 1000         Time Out 1047         Minutes 47         Timed Code Treatment Minutes: Fer Beatty, OTR/L [id number]

## 2022-05-19 NOTE — PROGRESS NOTES
Physical Therapy  Facility/Department: [facility] UNIT  Physical Therapy Initial Assessment    Name: Starr Mcnair  : 1935  MRN: [id number]  Date of Service: 2022    Discharge Recommendations:  Continue to assess pending progress,Subacute/Skilled Nursing Facility,Home with assist PRN,Home with nursing aide,Home with Home health PT,Patient would benefit from continued therapy after discharge   PT Equipment Recommendations  Equipment Needed: Yes  Mobility Devices: ADL Assistive Devices  Other: pt would benefit from reacher and sock aide. Patient Diagnosis(es): There were no encounter diagnoses. Past Medical History:  has a past medical history of Arthritis, CAD (coronary artery disease), Chronic midline low back pain without sciatica, Claustrophobia, Colonoscopy refused, DM (diabetes mellitus), type 2 ([address] Dupuytren's contracture of right hand, Endometrial stripe increased, Gastrointestinal hemorrhage, Glaucoma, H/O echocardiogram, H/O echocardiogram, History of nuclear stress test, HTN (hypertension), Hyperlipidemia, Kidney stone, MI (myocardial infarction) ([address] Obesity, Open wound of right foot, Parainfluenza infection, Pneumonia of right lower lobe due to infectious organism, Vertigo, and WD-Traumatic ulcer of foot, right, with fat layer exposed ([address] Past Surgical History:  has a past surgical history that includes Cataract removal (Bilateral); Inguinal hernia repair (Left, 45 yrs ago); Lumbar spine surgery (N/A, 2022); and Spine surgery (N/A, 2022). Assessment   Body Structures, Functions, Activity Limitations Requiring Skilled Therapeutic Intervention: Decreased functional mobility ; Decreased ADL status; Decreased ROM; Decreased tolerance to work activity; Decreased strength;Decreased safe awareness;Decreased endurance;Decreased sensation;Decreased balance;Decreased high-level IADLs;Decreased body mechanics; Decreased coordination; Increased pain;Decreased posture  Assessment: Pt is a pleasant 82 yo female status post  L1-L4 laminectomies and bilateral L2-3 balloon kyphoplasty with L2 and L3 bone biopsy and microdissection by Dr. Vladimir Gardiner 5/12/2022. She would benefit from skilled PT to address decreased ROM, strength, balance, and endurance as well as decreased functional mobility. Therapy Prognosis: Good  Decision Making: Medium Complexity  History: see below  Exam: see below  Clinical Presentation: evolving  Barriers to Learning: none  Requires PT Follow-Up: Yes  Activity Tolerance  Activity Tolerance: Patient limited by pain  Activity Tolerance Comments: pt sat in chair for history and to eat breakfast, but refused to remain there and returned to bed. Pt reported signficant pain. Plan   Plan  Plan: 3-5 times per week  Current Treatment Recommendations: Strengthening,ROM,Balance training,Functional mobility training,Transfer training,ADL/Self-care training,IADL training,Endurance training,Gait training,Stair training,Neuromuscular re-education,Pain management,Home exercise program,Safety education & training,Equipment evaluation, education, & procurement,Positioning,Therapeutic activities (ther ex, bed mobility)  Safety Devices  Type of Devices:  All etienne prominences offloaded,All fall risk precautions in place,Gait belt,Nurse notified,Patient at risk for falls,Left in bed,Call light within reach  Restraints  Restraints Initially in Place: No     Restrictions  Restrictions/Precautions  Restrictions/Precautions: Fall Risk  Position Activity Restriction  Spinal Precautions: No Lifting,No Twisting,No Bending  Sternal Precautions: 5# Lifting Restrictions  Other position/activity restrictions: Keep surgical site and dressing dry     Subjective   Pain: 9/10  General  Chart Reviewed: Yes  Patient assessed for rehabilitation services?: Yes  Family / Caregiver Present: No  Follows Commands: Within Functional Limits  General Comment  Comments: pt presented sitting EOB to eat breakfast.  Subjective  Subjective: Pt reports she needs to use the restroom. Social/Functional History  Social/Functional History  Lives With: Alone  Type of Home: Apartment  Home Layout: One level  Home Access: Stairs to enter without rails,Level entry  Entrance Stairs - Number of Steps: pt has a threshold  Bathroom Shower/Tub: Tub/Shower unit  Bathroom Toilet: Handicap height  Bathroom Equipment: Grab bars in shower,Hand-held shower,Shower [facility] Equipment: [address]  Has the patient had two or more falls in the past year or any fall with injury in the past year?: No  Receives Help From: Personal care attendant  ADL Assistance: Needs assistance  Bath: Supervision  Dressing: Independent  Grooming: Independent  Feeding: Independent  Toileting: Independent  Homemaking Assistance: Needs assistance  Meal Prep Responsibility: Primary (Pt orders out or makes TV dinners)  Laundry Responsibility: No (Pt daughter does her laundry)  Cleaning Responsibility: No (Pt's aid cleans, run sweeper, makes the beds)  Shopping Responsibility: No (Pt's aid gets her groceries)  Ambulation Assistance: Needs assistance (Pt reports she uses her rollator walker in her apt)  Transfer Assistance: Independent  Active : Yes  Leisure & Hobbies: Pt reports she watches tv, reads, and works jigsaw puzzles  IADL Comments: Pt reports she has an aid that comes 11 hrs a week 2-3 hours a day Mon-Friday and she helps her get in the shower and does housekeeping tasks. Reports she is indep with bathing/dressing but has difficulty with socks.   Reports she had a reacher but it broke  Vision/Hearing  Vision Exceptions: Wears glasses for reading (pt reports she has been having more toruble with her eyes.)  Hearing: Within functional limits    Cognition         Objective   Pulse: 74  Heart Rate Source: Monitor  BP: (!) 98/45  BP Location: Left upper arm  BP Method: Automatic  Patient Position: Semi fowlers  MAP (Calculated): 62.67  Resp: 18  SpO2: 95 %  O2 Device: None (Room air)     Observation/Palpation  Observation: Pt presented sitting EOB with breakfast tray in front of her. Gross Assessment  Strength: Generally decreased, functional  Sensation: Impaired (intermittent paresthesias in R hand (dominant hand))     AROM RLE (degrees)  RLE AROM: WFL  AROM LLE (degrees)  LLE AROM : WFL  Strength RLE  Strength RLE: Exception  R Hip Flexion: 3/5  R Knee Flexion: 4/5  R Knee Extension: 4-/5  R Ankle Dorsiflexion: 4-/5;3+/5  Strength LLE  Strength LLE: Exception  L Hip Flexion: 3-/5  L Knee Flexion: 4-/5;4/5  L Knee Extension: 4-/5;3+/5  L Ankle Dorsiflexion: 3+/5;3/5        Bed Mobility Training  Bed Mobility Training: Yes  Balance  Sitting: Intact  Standing: With support  Transfer Training  Transfer Training: Yes  Bed mobility  Rolling to Left: Modified independent  Rolling to Right: Modified independent  Sit to Supine: Stand by assistance  Scootin Person assistance;Maximal assistance  Transfers  Sit to Stand: Contact guard assistance  Stand to sit: Contact guard assistance; Moderate Assistance;Minimal Assistance  Bed to Chair: Contact guard assistance  Stand Pivot Transfers: Contact guard assistance  Comment: pt transfered sit to stand from EOB with CGA  from commode with mod A x1 and from chair with min Ax1  Ambulation  WB Status: FWB  Ambulation  Surface: level tile  Device: Rollator  Assistance: Contact guard assistance  Quality of Gait: Pt demnstrated flexed posture, decreased speed and step length and decreased foot clearance. Distance: 6'x3  More Ambulation?: No  Stairs/Curb  Stairs?: No     Balance  Posture: Fair  Sitting - Static: Fair;+  Sitting - Dynamic: Fair  Standing - Static: Fair  Standing - Dynamic: Fair;-  Comments: pt demonstrated dififuclty wtih balance letting go of her rollator to pull up her pants after using the commode.      Goals  Long Term Goals  Time Frame for Long term goals : 1 week or until discharge:  Long term goal 1: Pt will demonstrate the ability to safely perform bed mobility with mod I following log rolling technique. Long term goal 2: Pt will demonstrate the ability to safely and consistently perform a sit to stand and stand to sit transfer with mod I from 3 different height surfaces. Long term goal 3: Pt will demonstrate the ability to safely ambulate 100 feet with a 4 WW and SBA with no rest breaks. Long term goal 4: Pt will demonstrate the ability to safely perform dynamic balance activities while standing for 5 consecutive minutes with no more than 1 HHA for support and SBA. Additional Goals?: No  Patient Goals   Patient goals : to return home in 6 days       Education  Patient Education  Education Given To: Patient  Education Provided: Role of Therapy; ADL Adaptive Strategies; Fall Prevention Strategies; Equipment      Therapy Time   Individual Concurrent Group Co-treatment   Time In 0281         Time Out 0912         Minutes 35         Timed Code Treatment Minutes: [address], [place] DPT [id number]

## 2022-05-19 NOTE — CARE COORDINATION
CM met with the patient for discharge planning, patient resting in bed quietly. Patient lives alone in a single level apartment (1 small threshold to enter, no steps), has insurance with Rx coverage & PCP, and stated that she was independent with ADL's prior to admission. Patient stated that she uses a cane and walker at home (uses the cane predominantly) and does not require home oxygen but does use an inhaler. Patient stated that she receives services through the [employer] waiver program which includes personal aide services through [facility], emergency response system, home delivered meals, and incontinence supplies. The patient's PASSPORT  is Vianney Taylor ([phone]). Patient stated that her personal aide visits on Mon/Tues/Wed/Fri for at least 2 hours each day. Aide performs light housekeeping chores and does patient's grocery shopping. The patient stated that her aide is currently off work and the agency does not have staffing available to replace her until she is able to return to work. Patient reports that her daughter also provides assistance as needed. Patient plans to return home upon discharge and is unable to identify any needs at this time. CM will follow. 2 PM  CM submitted updated clinical documentation to [facility] to request an extension to the patient's stay in the swing bed program.  CM will follow.

## 2022-05-20 ENCOUNTER — HOSPITAL ENCOUNTER (INPATIENT)
Age: 87
LOS: 5 days | Discharge: NURSING, REHAB OR LONG-TERM CARE | DRG: 092 | End: 2022-05-25
Attending: INTERNAL MEDICINE | Admitting: INTERNAL MEDICINE
Payer: MEDICARE

## 2022-05-20 ENCOUNTER — APPOINTMENT (OUTPATIENT)
Dept: CT IMAGING | Age: 87
DRG: 560 | End: 2022-05-20
Attending: FAMILY MEDICINE
Payer: MEDICARE

## 2022-05-20 VITALS
BODY MASS INDEX: 36.7 KG/M2 | SYSTOLIC BLOOD PRESSURE: 115 MMHG | HEIGHT: 64 IN | HEART RATE: 82 BPM | OXYGEN SATURATION: 98 % | RESPIRATION RATE: 16 BRPM | TEMPERATURE: 96.9 F | DIASTOLIC BLOOD PRESSURE: 60 MMHG | WEIGHT: 215 LBS

## 2022-05-20 DIAGNOSIS — M48.062 LUMBAR STENOSIS WITH NEUROGENIC CLAUDICATION: ICD-10-CM

## 2022-05-20 DIAGNOSIS — T88.8XXA FLUID COLLECTION AT SURGICAL SITE, INITIAL ENCOUNTER: Primary | ICD-10-CM

## 2022-05-20 LAB
BASOPHILS ABSOLUTE: 0 K/CU MM
BASOPHILS RELATIVE PERCENT: 0.5 % (ref 0–1)
DIFFERENTIAL TYPE: ABNORMAL
EOSINOPHILS ABSOLUTE: 0.2 K/CU MM
EOSINOPHILS RELATIVE PERCENT: 2.1 % (ref 0–3)
ERYTHROCYTE SEDIMENTATION RATE: 56 MM/HR (ref 0–30)
GLUCOSE BLD-MCNC: 114 MG/DL (ref 70–99)
GLUCOSE BLD-MCNC: 115 MG/DL (ref 70–99)
GLUCOSE BLD-MCNC: 117 MG/DL (ref 70–99)
GLUCOSE BLD-MCNC: 117 MG/DL (ref 70–99)
HCT VFR BLD CALC: 32.4 % (ref 37–47)
HEMOGLOBIN: 10.3 GM/DL (ref 12.5–16)
HIGH SENSITIVE C-REACTIVE PROTEIN: 43.8 MG/L
IMMATURE NEUTROPHIL %: 0.7 % (ref 0–0.43)
LYMPHOCYTES ABSOLUTE: 1.7 K/CU MM
LYMPHOCYTES RELATIVE PERCENT: 22.6 % (ref 24–44)
MCH RBC QN AUTO: 29.9 PG (ref 27–31)
MCHC RBC AUTO-ENTMCNC: 31.8 % (ref 32–36)
MCV RBC AUTO: 94.2 FL (ref 78–100)
MONOCYTES ABSOLUTE: 1.3 K/CU MM
MONOCYTES RELATIVE PERCENT: 17.1 % (ref 0–4)
PDW BLD-RTO: 13.2 % (ref 11.7–14.9)
PLATELET # BLD: 270 K/CU MM (ref 140–440)
PMV BLD AUTO: 9 FL (ref 7.5–11.1)
RBC # BLD: 3.44 M/CU MM (ref 4.2–5.4)
SEGMENTED NEUTROPHILS ABSOLUTE COUNT: 4.3 K/CU MM
SEGMENTED NEUTROPHILS RELATIVE PERCENT: 57 % (ref 36–66)
TOTAL IMMATURE NEUTOROPHIL: 0.05 K/CU MM
WBC # BLD: 7.5 K/CU MM (ref 4–10.5)

## 2022-05-20 PROCEDURE — 72131 CT LUMBAR SPINE W/O DYE: CPT

## 2022-05-20 PROCEDURE — 86141 C-REACTIVE PROTEIN HS: CPT

## 2022-05-20 PROCEDURE — 6370000000 HC RX 637 (ALT 250 FOR IP): Performed by: NURSE PRACTITIONER

## 2022-05-20 PROCEDURE — 6370000000 HC RX 637 (ALT 250 FOR IP): Performed by: FAMILY MEDICINE

## 2022-05-20 PROCEDURE — 97110 THERAPEUTIC EXERCISES: CPT

## 2022-05-20 PROCEDURE — 85025 COMPLETE CBC W/AUTO DIFF WBC: CPT

## 2022-05-20 PROCEDURE — 82962 GLUCOSE BLOOD TEST: CPT

## 2022-05-20 PROCEDURE — 6370000000 HC RX 637 (ALT 250 FOR IP): Performed by: PHYSICIAN ASSISTANT

## 2022-05-20 PROCEDURE — 97530 THERAPEUTIC ACTIVITIES: CPT

## 2022-05-20 PROCEDURE — 85652 RBC SED RATE AUTOMATED: CPT

## 2022-05-20 PROCEDURE — 6360000002 HC RX W HCPCS: Performed by: NURSE PRACTITIONER

## 2022-05-20 PROCEDURE — 36415 COLL VENOUS BLD VENIPUNCTURE: CPT

## 2022-05-20 PROCEDURE — 1200000000 HC SEMI PRIVATE

## 2022-05-20 PROCEDURE — 94150 VITAL CAPACITY TEST: CPT

## 2022-05-20 RX ORDER — ENOXAPARIN SODIUM 100 MG/ML
40 INJECTION SUBCUTANEOUS DAILY
Status: DISCONTINUED | OUTPATIENT
Start: 2022-05-21 | End: 2022-05-25 | Stop reason: HOSPADM

## 2022-05-20 RX ORDER — INSULIN LISPRO 100 [IU]/ML
0-12 INJECTION, SOLUTION INTRAVENOUS; SUBCUTANEOUS
Status: DISCONTINUED | OUTPATIENT
Start: 2022-05-21 | End: 2022-05-25 | Stop reason: HOSPADM

## 2022-05-20 RX ORDER — ROPINIROLE 1 MG/1
1 TABLET, FILM COATED ORAL ONCE
Status: COMPLETED | OUTPATIENT
Start: 2022-05-20 | End: 2022-05-20

## 2022-05-20 RX ORDER — FUROSEMIDE 20 MG/1
20 TABLET ORAL DAILY
Status: DISCONTINUED | OUTPATIENT
Start: 2022-05-21 | End: 2022-05-25 | Stop reason: HOSPADM

## 2022-05-20 RX ORDER — LORAZEPAM 2 MG/ML
0.5 INJECTION INTRAMUSCULAR SEE ADMIN INSTRUCTIONS
Status: COMPLETED | OUTPATIENT
Start: 2022-05-21 | End: 2022-05-21

## 2022-05-20 RX ORDER — LANOLIN ALCOHOL/MO/W.PET/CERES
3 CREAM (GRAM) TOPICAL NIGHTLY
Status: DISCONTINUED | OUTPATIENT
Start: 2022-05-20 | End: 2022-05-25 | Stop reason: HOSPADM

## 2022-05-20 RX ORDER — LIDOCAINE 4 G/G
1 PATCH TOPICAL DAILY
Status: DISCONTINUED | OUTPATIENT
Start: 2022-05-20 | End: 2022-05-20 | Stop reason: HOSPADM

## 2022-05-20 RX ORDER — INSULIN LISPRO 100 [IU]/ML
0-6 INJECTION, SOLUTION INTRAVENOUS; SUBCUTANEOUS NIGHTLY
Status: DISCONTINUED | OUTPATIENT
Start: 2022-05-20 | End: 2022-05-25 | Stop reason: HOSPADM

## 2022-05-20 RX ORDER — HYDROXYCHLOROQUINE SULFATE 200 MG/1
200 TABLET, FILM COATED ORAL DAILY
Status: DISCONTINUED | OUTPATIENT
Start: 2022-05-21 | End: 2022-05-25 | Stop reason: HOSPADM

## 2022-05-20 RX ORDER — HYDROCODONE BITARTRATE AND ACETAMINOPHEN 5; 325 MG/1; MG/1
2 TABLET ORAL EVERY 4 HOURS PRN
Status: DISCONTINUED | OUTPATIENT
Start: 2022-05-20 | End: 2022-05-25 | Stop reason: HOSPADM

## 2022-05-20 RX ORDER — METOPROLOL SUCCINATE 25 MG/1
25 TABLET, EXTENDED RELEASE ORAL 2 TIMES DAILY
Status: DISCONTINUED | OUTPATIENT
Start: 2022-05-20 | End: 2022-05-25 | Stop reason: HOSPADM

## 2022-05-20 RX ORDER — BISACODYL 10 MG
10 SUPPOSITORY, RECTAL RECTAL DAILY PRN
Status: DISCONTINUED | OUTPATIENT
Start: 2022-05-20 | End: 2022-05-25 | Stop reason: HOSPADM

## 2022-05-20 RX ORDER — POLYETHYLENE GLYCOL 3350 17 G/17G
17 POWDER, FOR SOLUTION ORAL ONCE
Status: COMPLETED | OUTPATIENT
Start: 2022-05-20 | End: 2022-05-20

## 2022-05-20 RX ORDER — POLYETHYLENE GLYCOL 3350 17 G/17G
17 POWDER, FOR SOLUTION ORAL DAILY PRN
Status: DISCONTINUED | OUTPATIENT
Start: 2022-05-20 | End: 2022-05-23

## 2022-05-20 RX ORDER — HYDROCODONE BITARTRATE AND ACETAMINOPHEN 5; 325 MG/1; MG/1
2 TABLET ORAL EVERY 4 HOURS PRN
Status: DISCONTINUED | OUTPATIENT
Start: 2022-05-20 | End: 2022-05-20 | Stop reason: HOSPADM

## 2022-05-20 RX ORDER — HYDROCODONE BITARTRATE AND ACETAMINOPHEN 5; 325 MG/1; MG/1
1 TABLET ORAL EVERY 4 HOURS PRN
Status: DISCONTINUED | OUTPATIENT
Start: 2022-05-20 | End: 2022-05-20 | Stop reason: HOSPADM

## 2022-05-20 RX ORDER — ROPINIROLE 1 MG/1
2 TABLET, FILM COATED ORAL 2 TIMES DAILY
Status: DISCONTINUED | OUTPATIENT
Start: 2022-05-20 | End: 2022-05-20 | Stop reason: HOSPADM

## 2022-05-20 RX ORDER — ONDANSETRON 2 MG/ML
4 INJECTION INTRAMUSCULAR; INTRAVENOUS EVERY 6 HOURS PRN
Status: DISCONTINUED | OUTPATIENT
Start: 2022-05-20 | End: 2022-05-25 | Stop reason: HOSPADM

## 2022-05-20 RX ORDER — SENNA PLUS 8.6 MG/1
1 TABLET ORAL 2 TIMES DAILY PRN
Status: DISCONTINUED | OUTPATIENT
Start: 2022-05-20 | End: 2022-05-25 | Stop reason: HOSPADM

## 2022-05-20 RX ORDER — LIDOCAINE 4 G/G
1 PATCH TOPICAL DAILY
Status: DISCONTINUED | OUTPATIENT
Start: 2022-05-21 | End: 2022-05-25 | Stop reason: HOSPADM

## 2022-05-20 RX ORDER — ROPINIROLE 1 MG/1
2 TABLET, FILM COATED ORAL 2 TIMES DAILY
Status: DISCONTINUED | OUTPATIENT
Start: 2022-05-20 | End: 2022-05-25 | Stop reason: HOSPADM

## 2022-05-20 RX ORDER — DEXTROSE MONOHYDRATE 50 MG/ML
1000 INJECTION, SOLUTION INTRAVENOUS PRN
Status: DISCONTINUED | OUTPATIENT
Start: 2022-05-20 | End: 2022-05-25 | Stop reason: HOSPADM

## 2022-05-20 RX ORDER — SENNA AND DOCUSATE SODIUM 50; 8.6 MG/1; MG/1
2 TABLET, FILM COATED ORAL DAILY
Status: DISCONTINUED | OUTPATIENT
Start: 2022-05-20 | End: 2022-05-25 | Stop reason: HOSPADM

## 2022-05-20 RX ORDER — ASPIRIN 81 MG/1
81 TABLET ORAL DAILY
Status: DISCONTINUED | OUTPATIENT
Start: 2022-05-21 | End: 2022-05-25 | Stop reason: HOSPADM

## 2022-05-20 RX ORDER — ROSUVASTATIN CALCIUM 5 MG/1
10 TABLET, COATED ORAL NIGHTLY
Status: DISCONTINUED | OUTPATIENT
Start: 2022-05-20 | End: 2022-05-25 | Stop reason: HOSPADM

## 2022-05-20 RX ORDER — HYDROCODONE BITARTRATE AND ACETAMINOPHEN 5; 325 MG/1; MG/1
1 TABLET ORAL EVERY 4 HOURS PRN
Status: DISCONTINUED | OUTPATIENT
Start: 2022-05-20 | End: 2022-05-25 | Stop reason: HOSPADM

## 2022-05-20 RX ADMIN — HYDROXYCHLOROQUINE SULFATE 200 MG: 200 TABLET ORAL at 10:00

## 2022-05-20 RX ADMIN — HYDROCODONE BITARTRATE AND ACETAMINOPHEN 2 TABLET: 5; 325 TABLET ORAL at 19:00

## 2022-05-20 RX ADMIN — ONDANSETRON 4 MG: 2 INJECTION INTRAMUSCULAR; INTRAVENOUS at 23:18

## 2022-05-20 RX ADMIN — MELATONIN 3 MG: at 22:15

## 2022-05-20 RX ADMIN — SENNOSIDES 8.6 MG: 8.6 TABLET, COATED ORAL at 22:15

## 2022-05-20 RX ADMIN — HYDROCODONE BITARTRATE AND ACETAMINOPHEN 2 TABLET: 5; 325 TABLET ORAL at 11:18

## 2022-05-20 RX ADMIN — METOPROLOL SUCCINATE 25 MG: 25 TABLET, EXTENDED RELEASE ORAL at 10:00

## 2022-05-20 RX ADMIN — HYDROCODONE BITARTRATE AND ACETAMINOPHEN 2 TABLET: 5; 325 TABLET ORAL at 07:14

## 2022-05-20 RX ADMIN — COLLAGENASE SANTYL: 250 OINTMENT TOPICAL at 10:00

## 2022-05-20 RX ADMIN — ROSUVASTATIN CALCIUM 10 MG: 5 TABLET, FILM COATED ORAL at 22:15

## 2022-05-20 RX ADMIN — ROPINIROLE HYDROCHLORIDE 2 MG: 1 TABLET, FILM COATED ORAL at 22:15

## 2022-05-20 RX ADMIN — ROPINIROLE HYDROCHLORIDE 1 MG: 1 TABLET, FILM COATED ORAL at 10:00

## 2022-05-20 RX ADMIN — HYDROCODONE BITARTRATE AND ACETAMINOPHEN 2 TABLET: 5; 325 TABLET ORAL at 15:18

## 2022-05-20 RX ADMIN — HYDROCODONE BITARTRATE AND ACETAMINOPHEN 2 TABLET: 5; 325 TABLET ORAL at 01:17

## 2022-05-20 RX ADMIN — FUROSEMIDE 20 MG: 20 TABLET ORAL at 10:01

## 2022-05-20 RX ADMIN — ASPIRIN 81 MG: 81 TABLET, COATED ORAL at 10:00

## 2022-05-20 RX ADMIN — POLYETHYLENE GLYCOL (3350) 17 G: 17 POWDER, FOR SOLUTION ORAL at 22:14

## 2022-05-20 RX ADMIN — ROPINIROLE HYDROCHLORIDE 1 MG: 1 TABLET, FILM COATED ORAL at 16:44

## 2022-05-20 RX ADMIN — HYDROCODONE BITARTRATE AND ACETAMINOPHEN 2 TABLET: 5; 325 TABLET ORAL at 22:34

## 2022-05-20 ASSESSMENT — PAIN DESCRIPTION - PAIN TYPE
TYPE: ACUTE PAIN;SURGICAL PAIN
TYPE: SURGICAL PAIN
TYPE: ACUTE PAIN
TYPE: SURGICAL PAIN

## 2022-05-20 ASSESSMENT — PAIN DESCRIPTION - FREQUENCY
FREQUENCY: CONTINUOUS

## 2022-05-20 ASSESSMENT — PAIN DESCRIPTION - ONSET
ONSET: ON-GOING

## 2022-05-20 ASSESSMENT — PAIN DESCRIPTION - ORIENTATION
ORIENTATION: LOWER;RIGHT;MID
ORIENTATION: RIGHT;MID
ORIENTATION: MID
ORIENTATION: MID;RIGHT
ORIENTATION: MID;RIGHT
ORIENTATION: RIGHT;MID
ORIENTATION: MID;LOWER

## 2022-05-20 ASSESSMENT — PAIN SCALES - GENERAL
PAINLEVEL_OUTOF10: 9
PAINLEVEL_OUTOF10: 7
PAINLEVEL_OUTOF10: 10
PAINLEVEL_OUTOF10: 10
PAINLEVEL_OUTOF10: 8
PAINLEVEL_OUTOF10: 8
PAINLEVEL_OUTOF10: 9
PAINLEVEL_OUTOF10: 10
PAINLEVEL_OUTOF10: 8
PAINLEVEL_OUTOF10: 9
PAINLEVEL_OUTOF10: 9

## 2022-05-20 ASSESSMENT — PAIN DESCRIPTION - DESCRIPTORS
DESCRIPTORS: ACHING;STABBING;TINGLING
DESCRIPTORS: ACHING;STABBING
DESCRIPTORS: BURNING

## 2022-05-20 ASSESSMENT — PAIN DESCRIPTION - LOCATION
LOCATION: BACK;HIP;LEG
LOCATION: BACK
LOCATION: BACK
LOCATION: BACK;HIP;LEG
LOCATION: BACK;HIP;LEG
LOCATION: GENERALIZED
LOCATION: BACK
LOCATION: BACK;HIP;LEG

## 2022-05-20 ASSESSMENT — PAIN DESCRIPTION - DIRECTION: RADIATING_TOWARDS: LEGS

## 2022-05-20 NOTE — PROGRESS NOTES
Occupational Therapy    Occupational Therapy Treatment Note  Name: Starr Mcnair MRN: [id number] :   1935   Date:  2022   Admission Date: 2022 Room:  [address] [place] [address]   Restrictions/Precautions:  Restrictions/Precautions  Restrictions/Precautions: Fall Risk     Communication with other providers:   Cleared for treatment by  RN. Subjective:  Patient states:  \"I had terrible pain last night:  Pain:   Location, Type, Intensity (0/10 to 10/10):  6/10 R LE    Objective:    Observation:  Pt alert and oriented. Treatment, including education:  Transfers  Supine to sit :Sup  Sit to supine :Sup  Scooting :Sup  Rolling :Sup  Sit to stand :CGA  Stand to sit :CGA  SPT:CGA      Therapeutic Exercise: Pt completed Nustep x 1 min and 40 seconds for 80 steps on level 1. Therapeutic Activity Training: Pt completed functional mobility with 4WW x 40' x 2 x 2 session. Pt stood x 2 min with CGA with 4WW to facilitate increased endurance/strength for ADL tasks and transfers. Pt was educated on use of using reacher for LB dressing to help maintain back precautions. Safety Measures: Gait belt used, Left in bed, Pull/Bed Alarm activated and call light left in reach        Assessment / Impression:        Patient's tolerance of treatment: Good   Adverse Reaction: None  Significant change in status and impact:  None  Barriers to improvement:  Dec strength and endurance. pain    Plan for Next Session:    Continue with POC. Time in: 1330  Time out:  1409  Total treatment time:  39  Billed Units: 1 TE, 2 TA  Electronically signed by:    Jason Morris [address]    2022, 2:47 PM    Previously filed values:  Patient Goals   Patient goals :  \"To get home in 6 days\"  Short Term Goals  Time Frame for Short term goals: Until DC or goals met  Short Term Goal 1: Pt will complete trfs mod I  Short Term Goal 2: Pt will complete UE/LE bathing/dressing mod I using AE PRN  Short Term Goal 3: Pt will complete toileting mod I  Short Term Goal 4: Pt will tolerate standing/functional mobilty 5+ min during ADLs/therax following precautions mod I  Short Term Goal 5: Pt will complete light BUE therex within 5# restriction, to increase strength/endurance for ADLs/functional mobility

## 2022-05-20 NOTE — PROGRESS NOTES
Patient has been educated about the safety of sitting on edge of bed and how it is a fall risk. Patient seems to be more comfortable in this position. Patient is A&O x4 and has a fall score of 25, which is a Medium fall risk when using the [facility] Fall Risk Screening Tool. Patient would wish to continue to sit on the edge of the bed and has been educated to use call light when needing assistance.

## 2022-05-20 NOTE — PLAN OF CARE
@ 2115 RN notified MONICA COLIN   Pt is c/o 10/10 back, hip and shoulder pain. Pt takes (2) Norco tabs at home every four hours, RN explained to pt her pain meds are ordered every 6 hours while she is here in the hospital. Pt at this point is inconsolable, RN requested MONICA COLIN to come see patient and possibly give her something in between doses. New orders were placed for one time dose Norco 5mg and 0.5mg Ativan orally, new orders were given and RN will continue to monitor. @ 2230 Pt has still been inconsolable between her c/o pain and showing symptoms of anxiety. Multiple RN's have assisted patient this shift without any relief. @0700 Pt still having 10/10 pain from laminectomy site radiating around to hip and down legs, Pt states \" It's tingling and the worse pain I have ever been in. \" RN place lidocaine patch on lower back and hip area, RN tried to reassure pt as much as possible.

## 2022-05-20 NOTE — PROGRESS NOTES
This RN called and spoke with patients daughter Tracey Quinn to explain that patient had CT scan and some fluid on her spine and therefore is being transferred to [facility] to follow up with Dr. Audelia Garner and to have an MRI.

## 2022-05-20 NOTE — DISCHARGE SUMMARY
V2.0  Discharge Summary    Name:  Allison Bender /Age/Sex: 1935 (81 y.o. female)   Admit Date: 2022  Discharge Date: 22    MRN & CSN:  [id number] & [id number] Encounter Date and Time 22 3:47 PM EDT    Attending:  Marcellus Guzmán MD Discharging [facility]:     Brief HPI: Allison Bender is a 81 y.o. female who presented with debility secondary to recent back surgery. Dr. Hiram Booker performed laminectomy L1-L5. She is here for rehabilitation. Allison Bender is a 81 y.o. female with pmh of status post L1-L4 laminectomy, spinal stenosis, compression fracture, HTN, HLD, DM, CAD/MI/tPA , RLS, RA, ROCAEL, dependence contracture right hand, wound right foot, chronic diastolic heart failure, osteoarthritis, obesity who presents with Physical debility       Brief Problem Based Course:   #  Status post laminectomy L1-L4 with postoperative fluid collection 14.6 x 6.4 x 4.4 cm right back  On May 12, 2022 the patient underwent laminectomy from L1-L4 by Dr. Hiram Booker at [facility]. She was transferred to this facility as swing/rehab patient. Patient has done well up until yesterday when she began having increasing right back pain and referred pain down the leg. She had a very rough night with regard to pain and needing increased pain control. On my assessment today the patient in pain but controlled with medication. I did obtain sed rate with results at 56, CRP pending and WBC this morning 7.5. I obtained a CT scan without IV contrast showing fluid collection in the right back measuring 14.6 x 6.4 x 4.4 cm. This likely accounts for the patient's pain. Patient is afebrile. Low suspicion for a spinal postoperative abscess.     # RLS   Patient long history of RLS. Currently on Requip 1 mg during the day and 2 mg at night.   Due to complaint I have advanced now patient taking Requip 2 mg during the day and 2 mg at at bedtime.     # HTN  Patient with long history of hypertension and BP today is 115/60. No cardiovascular complaints. Continue medication without change.     # NIDDM 2  Patient's last A1c date May 4, 2022 result 5.8. Continue her current diabetic medication regimen.       The patient expressed appropriate understanding of, and agreement with the discharge recommendations, medications, and plan. Consults this admission:  None    Discharge Diagnosis:   Physical debility  Postoperative fluid collection lumbar spine  Postsurgical complication  Status postlaminectomy L1-L4  Intractable pain    Discharge Instruction:     At approximately 2:30 PM I did correspond with Dr. Nagi Cruz neurosurgeon. I did inform him the patient has a postoperative complication notably a 75.1 x 6.4 x 4.4 cm fluid collection lumbar spine with a rightward tendency and likely causing the patient's pain. He recommended MRI which is not available at this facility until Sunday. I then informed Dr. Chika Leblanc and he recommended transfer to [facility] for MRI and evaluation. At 3 PM I discussed with the patient she is agreeable regarding transfer and continued care. At approximately 3:30 PM I spoke with Dr. Bill Wu hospitalist at [facility] and he is excepted this patient in transfer. Transfer center will arrange transportation and contact nursing floor.     Discharge Medications:        Medication List      ASK your doctor about these medications    albuterol sulfate  (90 Base) MCG/ACT inhaler  INHALE 2 PUFFS INTO THE LUNGS 4 TIMES DAILY AS NEEDED FOR WHEEZING     aspirin 81 MG EC tablet     cephALEXin 500 MG capsule  Commonly known as: KEFLEX  Take 1 capsule by mouth 4 times daily for 28 doses     clobetasol 0.05 % cream  Commonly known as: TEMOVATE     furosemide 20 MG tablet  Commonly known as: LASIX  Take 1 tablet by mouth daily     glipiZIDE 5 MG extended release tablet  Commonly known as: GLUCOTROL XL  Take 1 tablet by mouth daily     hydroxychloroquine 200 MG tablet  Commonly known as: PLAQUENIL     metoprolol succinate 25 MG extended release tablet  Commonly known as: TOPROL XL  Take 1 tablet by mouth daily     Movantik 25 MG Tabs tablet  Generic drug: naloxegol     PreserVision AREDS Tabs     rOPINIRole 2 MG tablet  Commonly known as: REQUIP  Take one half (1/2) tablet by mouth at 2 pm and one (1) tablet by mouth at hs     rosuvastatin 10 MG tablet  Commonly known as: CRESTOR  Take 1 tablet by mouth nightly     VITAMIN D PO           Objective Findings at Discharge:   /60   Pulse 82   Temp 96.9 °F (36.1 °C) (Infrared)   Resp 16   Ht 5' 4\" (1.626 m)   Wt 215 lb (97.5 kg)   LMP  (LMP Unknown)   SpO2 98%   BMI 36.90 kg/m²       Physical Exam:   General: NAD  Eyes: EOMI  ENT: neck supple  Cardiovascular: Regular rate. Respiratory: Clear to auscultation  Gastrointestinal: Soft, non tender  Genitourinary: no suprapubic tenderness  Musculoskeletal: No edema  Skin: warm, dry  Neuro: Alert. Psych: Mood appropriate. Labs and Imaging   XR ABDOMEN (KUB) (SINGLE AP VIEW)    Result Date: 5/15/2022  EXAMINATION: ONE SUPINE XRAY VIEW(S) OF THE ABDOMEN 5/15/2022 11:50 am COMPARISON: CT abdomen and pelvis 09/25/2020. HISTORY: ORDERING SYSTEM PROVIDED HISTORY: constipation TECHNOLOGIST PROVIDED HISTORY: Reason for exam:->constipation Reason for Exam: constipation Additional signs and symptoms: pain Relevant Medical/Surgical History: none FINDINGS: The visualized lungs are clear. Nonobstructive bowel gas pattern. Mildly increased stool in the colon. No evidence pneumoperitoneum. No acute osseous abnormality. Mildly increased stool in the colon suggesting constipation. CT LUMBAR SPINE WO CONTRAST    Result Date: 5/20/2022  EXAMINATION: CT OF THE LUMBAR SPINE WITHOUT CONTRAST  5/20/2022 TECHNIQUE: CT of the lumbar spine was performed without the administration of intravenous contrast. Multiplanar reformatted images are provided for review.  Adjustment of mA and/or kV according to patient size was utilized. Automated exposure control, iterative reconstruction, and/or weight based adjustment of the mA/kV was utilized to reduce the radiation dose to as low as reasonably achievable. COMPARISON: 12/14/2021 HISTORY: ORDERING SYSTEM PROVIDED HISTORY: Progressive. Pain following lumbar surgery May 12 by Dr. Jamir Cruz. TECHNOLOGIST PROVIDED HISTORY: Reason for exam:->Progressive. Pain following lumbar surgery May 12 by Dr. Jamir Cruz. Reason for Exam: Progressive. Pain following lumbar surgery May 12 by Dr. Jamir Cruz. Additional signs and symptoms: Progressive. Pain following lumbar surgery May 12 by Dr. Jamir Cruz. Relevant Medical/Surgical History: Progressive. Pain following lumbar surgery May 12 by Dr. Jamir Cruz. FINDINGS: BONES/ALIGNMENT: 12/14/2021 DEGENERATIVE CHANGES: There is normal alignment of the lumbar spine. There is sequela of L2 and L3 vertebroplasty. The L2 vertebroplasty cement extends into the L1-L2 disc space. The L3 vertebroplasty cement extends into the L3-L4 disc space. Additionally, there is extrusion of cement into the anterior paravertebral epidural space at L2-L3 and L3-L4. This also extends into the paravertebral spaces, asymmetric on the left at L3-L4. There is no evidence of acute fracture. There is sequela of L1-L4 laminectomy. There is a posterior left 12th rib fracture that is new. Degenerative changes are noted along the sacroiliac joints. SOFT TISSUES/RETROPERITONEUM: There is multilevel degenerative disc disease in the lumbar spine, greatest at T12-L1 with associated vacuum phenomenon. Atherosclerotic plaque is noted in the aorta and its branch vessels. There is a trace left pleural effusion with associated areas of atelectasis in the lung bases. There is a small hiatal hernia. There is a large fluid collection along the dorsal midline of the back, asymmetric towards the right measuring 14.6 x 6.4 x 4.4 cm. L1-L2: L1 laminectomies are noted.   No central spinal canal narrowing. Moderate bilateral foraminal narrowing. L2-L3: L2 laminectomies are noted. There is a minimal disc bulge-osteophyte complex. No central spinal canal stenosis. Mild left and moderate-severe right foraminal narrowing are noted. L3-L4: L3 laminectomies are noted. There is a circumferential disc bulge. No central spinal canal narrowing. Moderate-severe left, and severe right foraminal narrowing are noted. L4-L5: L4 laminectomies are noted. There is a circumferential disc bulge. Bilateral facet arthropathy. Minimal central spinal canal narrowing. Minimal left and mild right foraminal narrowing. L5-S1: There is no disc herniation. Bilateral facet arthropathy. No central spinal canal narrowing. Moderate left, and mild right foraminal narrowing. RECOMMENDATIONS: 1. There is sequela of L2 and L3 vertebroplasties. The cement extrudes into the L1-L2 and L3-L4 disc spaces as well as along the anterior epidural space at L2-L3 and L3-L4. 2. Sequela of L1-L4 laminectomies. 3. No acute fracture. 4. There is a new left posterior 12th rib fracture that could be a source for the patient's pain. There is some callus along the margins suggesting that this is a least subacute. CBC:   Recent Labs     05/20/22  0610   WBC 7.5   HGB 10.3*        BMP:    Recent Labs     05/17/22  1809   K 4.4     Hepatic: No results for input(s): AST, ALT, ALB, BILITOT, ALKPHOS in the last 72 hours. Lipids:   Lab Results   Component Value Date    CHOL 121 08/07/2018    HDL 47 11/19/2021    TRIG 128 08/07/2018     Hemoglobin A1C:   Lab Results   Component Value Date    LABA1C 5.8 05/04/2022     TSH:   Lab Results   Component Value Date    TSH 2.61 08/07/2018     Troponin:   Lab Results   Component Value Date    TROPONINT 0.031 05/18/2022    TROPONINT 0.043 05/18/2022    TROPONINT 0.047 05/18/2022     Lactic Acid: No results for input(s): LACTA in the last 72 hours.   BNP: No results for input(s): PROBNP in the last 72 hours.   UA:  Lab Results   Component Value Date    NITRU NEGATIVE 09/21/2020    COLORU YELLOW 09/21/2020    WBCUA 7 09/21/2020    RBCUA 0 09/21/2020    MUCUS NEGATIVE 09/01/2019    BACTERIA MANY 09/21/2020    CLARITYU CLEAR 09/21/2020    SPECGRAV 1.030 09/21/2020    LEUKOCYTESUR NEGATIVE 09/21/2020    UROBILINOGEN 0.2 09/21/2020    BILIRUBINUR NEGATIVE 09/21/2020    BLOODU NEGATIVE 09/21/2020    KETUA NEGATIVE 09/21/2020     Urine Cultures:   Lab Results   Component Value Date    LABURIN 200 09/28/2016     Blood Cultures: No results found for: BC  No results found for: BLOODCULT2  Organism:   Lab Results   Component Value Date    ORG ECOL 03/01/2018       Time Spent Discharging patient 45 minutes    Electronically signed by Eugene Card PA-C on 5/20/2022 at 3:47 PM

## 2022-05-21 ENCOUNTER — APPOINTMENT (OUTPATIENT)
Dept: MRI IMAGING | Age: 87
DRG: 092 | End: 2022-05-21
Attending: INTERNAL MEDICINE
Payer: MEDICARE

## 2022-05-21 LAB
GLUCOSE BLD-MCNC: 103 MG/DL (ref 70–99)
GLUCOSE BLD-MCNC: 115 MG/DL (ref 70–99)
GLUCOSE BLD-MCNC: 118 MG/DL (ref 70–99)
GLUCOSE BLD-MCNC: 123 MG/DL (ref 70–99)

## 2022-05-21 PROCEDURE — 1200000000 HC SEMI PRIVATE

## 2022-05-21 PROCEDURE — 6360000002 HC RX W HCPCS: Performed by: STUDENT IN AN ORGANIZED HEALTH CARE EDUCATION/TRAINING PROGRAM

## 2022-05-21 PROCEDURE — 6360000002 HC RX W HCPCS: Performed by: NURSE PRACTITIONER

## 2022-05-21 PROCEDURE — 6370000000 HC RX 637 (ALT 250 FOR IP): Performed by: NURSE PRACTITIONER

## 2022-05-21 PROCEDURE — 82962 GLUCOSE BLOOD TEST: CPT

## 2022-05-21 PROCEDURE — 72148 MRI LUMBAR SPINE W/O DYE: CPT

## 2022-05-21 RX ORDER — LORAZEPAM 2 MG/ML
0.5 INJECTION INTRAMUSCULAR ONCE
Status: COMPLETED | OUTPATIENT
Start: 2022-05-21 | End: 2022-05-21

## 2022-05-21 RX ORDER — LORAZEPAM 2 MG/ML
0.5 INJECTION INTRAMUSCULAR ONCE
Status: DISCONTINUED | OUTPATIENT
Start: 2022-05-21 | End: 2022-05-21

## 2022-05-21 RX ADMIN — ASPIRIN 81 MG: 81 TABLET, COATED ORAL at 08:31

## 2022-05-21 RX ADMIN — ENOXAPARIN SODIUM 40 MG: 100 INJECTION SUBCUTANEOUS at 08:32

## 2022-05-21 RX ADMIN — MELATONIN 3 MG: at 20:18

## 2022-05-21 RX ADMIN — HYDROCODONE BITARTRATE AND ACETAMINOPHEN 2 TABLET: 5; 325 TABLET ORAL at 08:31

## 2022-05-21 RX ADMIN — LORAZEPAM 0.5 MG: 2 INJECTION INTRAMUSCULAR; INTRAVENOUS at 15:05

## 2022-05-21 RX ADMIN — ROSUVASTATIN CALCIUM 10 MG: 5 TABLET, FILM COATED ORAL at 20:18

## 2022-05-21 RX ADMIN — ROPINIROLE HYDROCHLORIDE 2 MG: 1 TABLET, FILM COATED ORAL at 08:32

## 2022-05-21 RX ADMIN — SENNOSIDES AND DOCUSATE SODIUM 2 TABLET: 50; 8.6 TABLET ORAL at 08:32

## 2022-05-21 RX ADMIN — HYDROCODONE BITARTRATE AND ACETAMINOPHEN 2 TABLET: 5; 325 TABLET ORAL at 18:53

## 2022-05-21 RX ADMIN — ROPINIROLE HYDROCHLORIDE 2 MG: 1 TABLET, FILM COATED ORAL at 20:18

## 2022-05-21 RX ADMIN — POLYETHYLENE GLYCOL (3350) 17 G: 17 POWDER, FOR SOLUTION ORAL at 08:32

## 2022-05-21 RX ADMIN — LORAZEPAM 0.5 MG: 2 INJECTION INTRAMUSCULAR at 08:56

## 2022-05-21 RX ADMIN — HYDROCODONE BITARTRATE AND ACETAMINOPHEN 2 TABLET: 5; 325 TABLET ORAL at 03:52

## 2022-05-21 RX ADMIN — METOPROLOL SUCCINATE 25 MG: 25 TABLET, EXTENDED RELEASE ORAL at 20:18

## 2022-05-21 RX ADMIN — FUROSEMIDE 20 MG: 20 TABLET ORAL at 08:32

## 2022-05-21 RX ADMIN — METOPROLOL SUCCINATE 25 MG: 25 TABLET, EXTENDED RELEASE ORAL at 08:32

## 2022-05-21 RX ADMIN — HYDROCODONE BITARTRATE AND ACETAMINOPHEN 2 TABLET: 5; 325 TABLET ORAL at 15:05

## 2022-05-21 RX ADMIN — HYDROXYCHLOROQUINE SULFATE 200 MG: 200 TABLET ORAL at 08:32

## 2022-05-21 ASSESSMENT — PAIN SCALES - GENERAL
PAINLEVEL_OUTOF10: 10
PAINLEVEL_OUTOF10: 10
PAINLEVEL_OUTOF10: 9
PAINLEVEL_OUTOF10: 10
PAINLEVEL_OUTOF10: 3
PAINLEVEL_OUTOF10: 10

## 2022-05-21 ASSESSMENT — PAIN DESCRIPTION - PAIN TYPE
TYPE: SURGICAL PAIN

## 2022-05-21 ASSESSMENT — PAIN DESCRIPTION - LOCATION
LOCATION: BACK
LOCATION: BACK;LEG
LOCATION: BACK
LOCATION: BACK

## 2022-05-21 ASSESSMENT — PAIN DESCRIPTION - DESCRIPTORS
DESCRIPTORS: STABBING
DESCRIPTORS: SHOOTING
DESCRIPTORS: ACHING;DISCOMFORT
DESCRIPTORS: ACHING

## 2022-05-21 ASSESSMENT — PAIN DESCRIPTION - DIRECTION
RADIATING_TOWARDS: DOWN
RADIATING_TOWARDS: LEGS

## 2022-05-21 ASSESSMENT — PAIN DESCRIPTION - ONSET
ONSET: ON-GOING

## 2022-05-21 ASSESSMENT — PAIN - FUNCTIONAL ASSESSMENT
PAIN_FUNCTIONAL_ASSESSMENT: ACTIVITIES ARE NOT PREVENTED

## 2022-05-21 ASSESSMENT — PAIN DESCRIPTION - ORIENTATION
ORIENTATION: MID
ORIENTATION: MID
ORIENTATION: RIGHT;LEFT;LOWER

## 2022-05-21 ASSESSMENT — PAIN DESCRIPTION - FREQUENCY
FREQUENCY: CONTINUOUS

## 2022-05-21 NOTE — PROGRESS NOTES
Pt was down to mri in the am for L spine study - attempted exam w claustro meds- had problems w the table & coils causing \"coil cutoff\" clipping th end of lumbar - radiologist wanted exam repeated fpr diagnostic images - pt back down to MRI for 2nd attempt - she had pain meds & claustro meds , was not able to tolerate any repeat sequences -screaming in pain, pulling at the scanner & yelling she wanted out. Nurse notified & pt returned to the floor. I can try again in the morning when the pt has calmed down. Radiologist also notified that there would be no more images today.

## 2022-05-21 NOTE — PROGRESS NOTES
Physician Progress Note      Rosaura Anderson  CSN #:                  [id number]  :                       1935  ADMIT DATE:       2022 7:55 PM  [address] DATE:  RESPONDING  PROVIDER #:        Kevin Del Valle          QUERY TEXT:    Pt admitted with hematoma of right back and underwent multiple procedures on   her back 22. ? If possible, please document in progress notes and   discharge summary:    The medical record reflects the following:  Risk Factors: recent procedure on back  Clinical Indicators: Pt had L1 and L4 laminectomies and bilateral L2-3 balloon   kyphoplasty with L2 and L3 bone biopsy and microdissection by Dr. Asha Hillman on   2022. Pt discharged to ARU for rehab. On 22, pt c/o increasing right   back pain radiating down her right leg. CT scan without IV contrast showing   fluid collection in the right back measuring 14.6 x 6.4 x 4.4 cm. MRI is   pending. Per consult note by Jenni Sanchez NP, neurosurgery consultant,   pt likely has post-op hematoma or seroma. Infection not likely as pt has been   afebrile and no leukocytosis present. Treatment: labs, imaging, MRI results pending    Thank you,  Augusto Baxter, RN  [phone]  Options provided:  -- Hematoma?is a postoperative complication  -- Hematoma is not a postoperative complication, but is due to other   incidental risk factor, Please specify other incidental risk factor. -- Other - I will add my own diagnosis  -- Disagree - Not applicable / Not valid  -- Disagree - Clinically unable to determine / Unknown  -- Refer to Clinical Documentation Reviewer    PROVIDER RESPONSE TEXT:    Patient has hematoma as a postoperative complication.     Query created by: Sanjay Green on 2022 2:46 PM      Electronically signed by:  Kevin Del Valle 2022 3:14 PM

## 2022-05-21 NOTE — PROGRESS NOTES
Hospitalist Progress Note      Name:  Devin Cortes /Age/Sex: 1935  (81 y.o. female)   MRN & CSN:  [id number] & [id number] Admission Date/Time: 2022  7:55 PM   [facility] PCP: Jessica Bolden MD       Hospital Day: 2    Assessment and Plan:     Devin Cortes is a 81 y.o. female with a pmh of lumbar spinal stenosis with neurogenic claudication s/p L1 and L4 laminectomies and bilateral L2-3 balloon kyphoplasty with L2 and L3 bone biopsy and microdissection non-insulin-dependent type 2 diabetes, essential hypertension and hyperlipidemia who presents with increasing back pain. She had a lumbar surgery on  and discharged to [facility] for acute rehab     Post-op right Lower Back pain   Postoperative fluid collection  H/o lumbar spinal stenosis with neurogenic claudication  s/p L1 and L4 laminectomies and bilateral L2-3 balloon kyphoplasty - 2022. CT scan without IV contrast shows fluid collection in the right back measuring 14.6 x 6.4 x 4.4 cm; no spinal canal involvement. CRP 43.8. Sed rate at 56. WBC 7.5  Seen by neurosurgery; did not think surgical site infection; does not recommend antibiotics  Follow-up MRI with contrast results  Plan to to discharge back to swing/rehab bed after MRI if nonacute  Pain control. PT/OT consult    New subacute left posterior 12th rib fracture  she denies falls. Continue PT/OT. Incentive spirometer.     Non-insulin-dependent diabetes type 2  Well-controlled  A1c of 5.8   Carb controlled diet. Sliding scale insulin. Hypoglycemic protocol     Postop constipation  Bowel regimen: senna, MiraLAX, dulcolax suppository as needed.     Essential hypertension  Continue Toprol     Rheumatoid arthritis  On Plaquenil     Chronic diastolic CHF-  Continue Lasix     Right foot wound  Follows with wound care nurse outpatient. Wound care    Diet ADULT DIET;  Regular; 4 carb choices (60 gm/meal)   DVT Prophylaxis [x] Lovenox, []  Heparin, [] SCDs, [] Ambulation   GI Prophylaxis [] PPI,  [] H2 Blocker,  [] Carafate,  [] Diet/Tube Feeds   Code Status Full Code   Disposition Patient requires continued admission due to backache pending MRI  The anticipated discharge is in less than 24 hours. MDM [] Low, [] Moderate,[]  High       SUBJECTIVE:  Patient seen and examined at bedside. Alert and oriented x3. comfortable. No acute distress. complaining of persistent backache. Denies shortness of breath, chest pain, palpitations, fever, nausea, vomiting, diarrhea, headache or urinary symptoms. Ten point ROS reviewed negative, unless as noted above    Objective:   No intake or output data in the 24 hours ending 05/21/22 1417   Vitals:   Vitals:    05/21/22 0901   BP:    Pulse:    Resp: 18   Temp:    SpO2:      Physical Exam:   GEN Awake female, sitting upright in bed in no apparent distress. Appears given age. EYES Pupils are equally round. No scleral erythema, discharge, or conjunctivitis. HENT Mucous membranes are moist. Oral pharynx without exudates, no evidence of thrush. NECK Supple, no apparent thyromegaly or masses. RESP Clear to auscultation, no wheezes, rales or rhonchi. Symmetric chest movement while on room air. CARDIO/VASC S1/S2 auscultated. Regular rate without appreciable murmurs, rubs, or gallops. No JVD or carotid bruits. Peripheral pulses equal bilaterally and palpable. No peripheral edema. GI Abdomen is soft without significant tenderness, masses, or guarding. Bowel sounds are normoactive. Rectal exam deferred.  No costovertebral angle tenderness. Normal appearing external genitalia. Castillo catheter is not present. HEME/LYMPH No palpable cervical lymphadenopathy and no hepatosplenomegaly. No petechiae or ecchymoses. MSK No gross joint deformities. SKIN Normal coloration, warm, dry. NEURO Cranial nerves appear grossly intact, normal speech, no lateralizing weakness. PSYCH Awake, alert, oriented x 4.   Affect appropriate.     Medications:   Medications:    LORazepam  0.5 mg IntraVENous Once    aspirin  81 mg Oral Daily    furosemide  20 mg Oral Daily    melatonin  3 mg Oral Nightly    metoprolol succinate  25 mg Oral BID    rosuvastatin  10 mg Oral Nightly    insulin lispro  0-12 Units SubCUTAneous TID WC    insulin lispro  0-6 Units SubCUTAneous Nightly    hydroxychloroquine  200 mg Oral Daily    collagenase   Topical Daily    lidocaine  1 patch TransDERmal Daily    rOPINIRole  2 mg Oral BID    sennosides-docusate sodium  2 tablet Oral Daily    enoxaparin  40 mg SubCUTAneous Daily      Infusions:    dextrose       PRN Meds: bisacodyl, 10 mg, Daily PRN  polyethylene glycol, 17 g, Daily PRN  senna, 1 tablet, BID PRN  glucose, 4 tablet, PRN  dextrose bolus, 125 mL, PRN   Or  dextrose bolus, 250 mL, PRN  glucagon (rDNA), 1 mg, PRN  dextrose, 1,000 mL, PRN  HYDROcodone 5 mg - acetaminophen, 1 tablet, Q4H PRN  HYDROcodone 5 mg - acetaminophen, 2 tablet, Q4H PRN  ondansetron, 4 mg, Q6H PRN      Electronically signed by Cesilia Ch MD on 5/21/2022 at 2:17 PM

## 2022-05-21 NOTE — PLAN OF CARE
Problem: Discharge Planning  Goal: Discharge to home or other facility with appropriate resources  5/21/2022 1000 by Zach Reagan LPN  Outcome: Progressing  5/21/2022 0440 by Tyson Pelaez RN  Outcome: Progressing     Problem: Pain  Goal: Verbalizes/displays adequate comfort level or baseline comfort level  5/21/2022 1000 by Zach Reagan LPN  Outcome: Progressing  5/21/2022 0440 by Tyson Pelaez RN  Outcome: Progressing     Problem: Safety - Adult  Goal: Free from fall injury  5/21/2022 1000 by Zach Reagan LPN  Outcome: Progressing  5/21/2022 0440 by Tyson Pelaez RN  Outcome: Progressing     Problem: ABCDS Injury Assessment  Goal: Absence of physical injury  5/21/2022 1000 by Zach Reagan LPN  Outcome: Progressing  5/21/2022 0440 by Tyson Pelaez RN  Outcome: Progressing

## 2022-05-21 NOTE — CONSULTS
Neurosurgery   Consult Note      Reason for Consult: surgical site fluid collection  Consulting Physician: ABA Arias  Attending Physician: Mary Robles MD  Date of Admission: 5/20/2022  Subjective:   CHIEF COMPLAINT: Increasing back pain, status postlaminectomy on 5/12/2020    HPI:  81 y.o. 1935 Suellen Kent is an 52-year-old female with personal medical history of spinal stenosis, compression fracture, HTN, HLD, DM, CAD/MI/tPA 1998, RLS, RA, ROCAEL, dependence contracture right hand, wound right foot, chronic diastolic heart failure, osteoarthritis, obesity who presented to the ED with complaints of increasing back pain. On May 12, 2022 the patient underwent laminectomy from L1-L4 by Dr. Mary Robles at [facility] was transferred to this facility as swing/rehab patient. Kellee Mcdonald has done well up until yesterday when she began having increasing right back pain and referred pain down the leg. Since arriving her pain has been controlled with p.o. medication. CT scan without IV contrast showing fluid collection in the right back measuring 14.6 x 6.4 x 4.4 cm.  Fluid collection is on the dorsal midline of the back asymmetric towards the right. There is no spinal canal involvement. CRP on 5/20/2022 at 06 100 was 43.8. Sed rate at 56. White blood cell count at 7.5. Patient has been afebrile throughout her postop course. Patient denies incisional concerns. Patient lying right lateral decubitus during my exam.  Patient had just returned from MRI and 1 was a little groggy. She was easily awoken. She denies new numbness or tingling in the bilateral upper or lower extremities. She denies acute bowel or bladder symptoms. She states she has had some constipation since surgery, but has small bowel movement this morning. She affirms increasing right-sided back pain that radiates to the left leg. She states her pain feels similar to her acute postoperative phase and is not new.   States rehab has been going well.  She denies falls, injury, trauma. Past Medical and Surgical History:       Diagnosis Date    Arthritis     left knee pain, sees Dr. Estrella Rausch CAD (coronary artery disease)     sees Dr. Rojas Grade Chronic midline low back pain without sciatica 9/28/2016    Had PT in 2016   [address] Colonoscopy refused     discussed in 7/15    DM (diabetes mellitus), type 2 ([address] 02/2006    Dupuytren's contracture of right hand     Endometrial stripe increased 9/25/2014    Saw NP Marlene Owen. Was normal exam per pt.  Gastrointestinal hemorrhage 11/2/2015    Patient had GI bleeding. Colon refused. Discussed with patient in detail.  Glaucoma 4/1/2014    H/O echocardiogram 10/02/2014    Mildly depressed left ventricular function; ejection fraction of 45%. Moderate pulmonary hypertension.  H/O echocardiogram 08/28/2018    EF 50-55%.  Mitral annular calcification is present. No other significant valvulopathy seen.  History of nuclear stress test 08/28/2018    EF 59%. ECG portion of stress test is negative for ischemia by diagnostic criteria. fixed inferior large size severe defect in rest and stress images. Mild hubert infarct ischemia.     HTN (hypertension)     Hyperlipidemia     Kidney stone     hx-\"been about 3 yrs ago\"    MI (myocardial infarction) [address] [address] 02/1998    treated with TPA    Obesity     Open wound of right foot 7/27/2020    Parainfluenza infection 12/5/2021    Pneumonia of right lower lobe due to infectious organism 4/5/2018    Vertigo     'have been having this since I had cataract surgery\"\"that is why they are doing the MRA of my brain(10/31/2014)    WD-Traumatic ulcer of foot, right, with fat layer exposed ([facility] [address] 8/3/2020         Procedure Laterality Date    CATARACT REMOVAL Bilateral     5/14,8/14    INGUINAL HERNIA REPAIR Left 45 yrs ago    LUMBAR SPINE SURGERY N/A 5/12/2022    L1-4 LAMINECTOMIES performed by Butch Gould MD at [place] [id number] N/A 5/12/2022 L2 AND L3 BILATERAL BALLOON KYPHOPLASTY AND L2 AND L3 NEEDLE BIOPSY performed by Lilly Baxter MD at [address] [place] [address] History:    TOBACCO:   reports that she has never smoked. She has never used smokeless tobacco.  ETOH:   reports previous alcohol use.     Family History:       Problem Relation Age of Onset    Parkinsonism Mother     Heart Attack Father     Heart Disease Father     Stroke Sister     Cancer Sister         breast ca    High Blood Pressure Brother     High Blood Pressure Brother     Cancer Brother         \"stomach cancer\"    Colon Cancer Neg Hx     Stomach Cancer Neg Hx        Current Medications:    Current Facility-Administered Medications: aspirin EC tablet 81 mg, 81 mg, Oral, Daily  furosemide (LASIX) tablet 20 mg, 20 mg, Oral, Daily  melatonin tablet 3 mg, 3 mg, Oral, Nightly  metoprolol succinate (TOPROL XL) extended release tablet 25 mg, 25 mg, Oral, BID  rosuvastatin (CRESTOR) tablet 10 mg, 10 mg, Oral, Nightly  bisacodyl (DULCOLAX) suppository 10 mg, 10 mg, Rectal, Daily PRN  polyethylene glycol (GLYCOLAX) packet 17 g, 17 g, Oral, Daily PRN  senna (SENOKOT) tablet 8.6 mg, 1 tablet, Oral, BID PRN  glucose chewable tablet 16 g, 4 tablet, Oral, PRN  dextrose bolus 10% 125 mL, 125 mL, IntraVENous, PRN **OR** dextrose bolus 10% 250 mL, 250 mL, IntraVENous, PRN  glucagon (rDNA) injection 1 mg, 1 mg, IntraMUSCular, PRN  dextrose 5 % solution 1,000 mL, 1,000 mL, IntraVENous, PRN  insulin lispro (HUMALOG) injection vial 0-12 Units, 0-12 Units, SubCUTAneous, TID WC  insulin lispro (HUMALOG) injection vial 0-6 Units, 0-6 Units, SubCUTAneous, Nightly  hydroxychloroquine (PLAQUENIL) tablet 200 mg, 200 mg, Oral, Daily  collagenase ointment, , Topical, Daily  lidocaine 4 % external patch 1 patch, 1 patch, TransDERmal, Daily  rOPINIRole (REQUIP) tablet 2 mg, 2 mg, Oral, BID  HYDROcodone-acetaminophen (NORCO) 5-325 MG per tablet 1 tablet, 1 tablet, Oral, Q4H PRN  HYDROcodone-acetaminophen (NORCO) 5-325 MG per tablet 2 tablet, 2 tablet, Oral, Q4H PRN  LORazepam (ATIVAN) injection 0.5 mg, 0.5 mg, IntraVENous, See Admin Instructions  sennosides-docusate sodium (SENOKOT-S) 8.6-50 MG tablet 2 tablet, 2 tablet, Oral, Daily  enoxaparin (LOVENOX) injection 40 mg, 40 mg, SubCUTAneous, Daily  ondansetron (ZOFRAN) injection 4 mg, 4 mg, IntraVENous, Q6H PRN    No Known Allergies     REVIEW OF SYSTEMS:    CONSTITUTIONAL:  negative for fevers, chills, diaphoresis, activity change, appetite change, fatigue, night sweats and unexpected weight change. EYES:  negative for blurred vision, eye discharge, visual disturbance and icterus  HEENT:  negative for hearing loss, tinnitus, ear drainage, sinus pressure, nasal congestion, epistaxis and snoring  RESPIRATORY:  No cough, shortness of breath, hemoptysis  CARDIOVASCULAR:  negative for chest pain, palpitations, exertional chest pressure/discomfort, edema, syncope  GASTROINTESTINAL:  negative for nausea, vomiting, diarrhea, constipation, blood in stool and abdominal pain  GENITOURINARY:  negative for frequency, dysuria, urinary incontinence, decreased urine volume, and hematuria  HEMATOLOGIC/LYMPHATIC:  negative for easy bruising, bleeding and lymphadenopathy  ALLERGIC/IMMUNOLOGIC:  negative for recurrent infections, angioedema, anaphylaxis and drug reactions  ENDOCRINE:  negative for weight changes and diabetic symptoms including polyuria, polydipsia and polyphagia  MUSCULOSKELETAL:  negative for  pain, joint swelling, decreased range of motion and muscle weakness  NEUROLOGICAL:  negative for headaches, slurred speech, unilateral weakness  PSYCHIATRIC/BEHAVIORAL: negative for hallucinations, behavioral problems, confusion and agitation.        Objective:   PHYSICAL EXAM:      VITALS:  /60   Pulse 80   Temp 97.6 °F (36.4 °C) (Oral)   Resp 18   Ht 5' 4\" (1.626 m)   Wt 208 lb 12.4 oz (94.7 kg)   LMP  (LMP Unknown)   SpO2 95%   BMI 35.84 kg/m²      24HR INTAKE/OUTPUT:  No intake or output data in the 24 hours ending 05/21/22 0838  CONSTITUTIONAL:  Awake, alert, cooperative, no apparent distress, and appears stated age  [de-identified]: Leanora Fargo, EOMI. Sclera white, conjunctive full. OP with moist mucosal membranes, no thrush, tongue protrudes midline  NECK:  Supple, symmetrical, trachea midline, no adenopathy  LUNGS:  no increased work of breathing and clear to auscultation. No accessory muscle use  ABDOMEN:  normal bowel sounds, non-tender, non-distended, no hepatosplenomegaly  PSYCHIATRIC: Oriented to person place and time. No obvious depression or anxiety. MUSCULOSKELETAL: No obvious misalignment or effusion of the joints. No clubbing, cyanosis of the digits. SKIN:  normal skin color, texture, turgor and no redness, warmth, or swelling. No palpable nodules or stigmata of embolic phenomenon. NEUROLOGIC: Alert and oriented x4, face symmetrical, no obvious droop, speech clear and coherent, able to recall 3/3 objects in 5 minutes, no pronator drift, rapid and repetitive movements are intact, no significant finger to nose dysmetria, sensation intact to light touch and pinprick sensation. Bilateral upper extremities-  5/5  bilateral deltoids, 5/5 bilateral biceps, 5/5 bilateral triceps, 5/5 bilateral finger , 5/5 bilateral finger abduction. Bilateral lower extremities- 5/5   Bilateral iliopsoas, 5/5 bilateral quadriceps, 5/5 bilateral hamstrings, 5/5 bilateral anterior tibialis, 5/5 bilateral gastrocnemius. Incision: Midline lumbar incision is intact. No erythema, fluctuance. No active drainage. No wound dehiscence. Drain site has a small scab. Exofin is actively peeling off the incision. Surrounding skin looks slightly irritated due to continued application of adhesive. However, no skin breakdown. Surrounding soft tissue was evaluated, there is no apparent swelling to the right of the lumbar incision.   Patient body habitus limits exam in this regard as she has a large amount of loose skin. DATA:    Old records have been reviewed    CBC:  Recent Labs     05/20/22  0610   WBC 7.5   RBC 3.44*   HGB 10.3*   HCT 32.4*      MCV 94.2   MCH 29.9   MCHC 31.8*   RDW 13.2   SEGSPCT 57. 0      BMP:  No results for input(s): NA, K, CL, CO2, BUN, CREATININE, CALCIUM, GLUCOSE in the last 72 hours. Cultures:     Radiology Review:  All pertinent images / reports were reviewed as a part of this visit. CT lumbar spine without contrast 5/20/2022  Impression   1. There is sequela of L2 and L3 vertebroplasties. The cement extrudes into   the L1-L2 and L3-L4 disc spaces as well as along the anterior epidural space   at L2-L3 and L3-L4. 2. Sequela of L1-L4 laminectomies. 3. No acute fracture. Assessment:     Patient Active Problem List   Diagnosis    Essential hypertension    Mixed hyperlipidemia    Type 2 diabetes mellitus without complication ([address]    Dupuytren's contracture of right hand    Rheumatoid arthritis involving multiple sites with positive rheumatoid factor ([address]    CAD s/p MI, TPA in 1998    Gastrointestinal hemorrhage    Cyst, kidney, acquired    Chronic midline low back pain without sciatica    Restless leg syndrome    Chronic diastolic congestive heart failure ([address]    Suspected sleep apnea    Morbid obesity with BMI of 40.0-44.9, adult (HCC)    Functional diarrhea    Moderate persistent asthma with exacerbation    Compression fracture of L2 vertebra with delayed healing    Lumbar stenosis with neurogenic claudication    Post-operative pain    Physical debility    Fluid collection at surgical site     Plan:   Patient is status post laminectomy from L1-L4 performed by Dr. Miguelina Purdy at [facility] on 5/12/2022. She is postop day #8. She was transferred to this facility as swing/rehab patient. Shalom Aragon was doing well up until yesterday when she began having increasing right back pain and referred pain down the leg.   Since arriving her pain has been controlled with p.o. medication. CT scan without IV contrast showing fluid collection in the right back measuring 14.6 x 6.4 x 4.4 cm.  MRI report is pending. .  CRP on 5/20/2022 at 0600 was 43.8. Sed rate at 56. Slight increase in inflammatory markers likely a result of postoperative inflammation. White blood cell count at 7.5, down from 10.7 on 5/15/2022. Patient has been afebrile throughout her postop course. Her neuro exam is intact. This fluid collection is likely the source of the patient's increasing pain. Noted on CT scan there is a new left posterior 12th rib fracture that could be contributing to her pain as well. CT read notes some callus along the margins of that fracture suggesting that it is at least subacute. This fluid collection is likely a seroma or hematoma. Likelihood of postoperative abscess is very low considering her vitals and labs. Dr. Coral Dumont does not recommend prophylactic antibiotics at this time. Radiologist wanted a repeat scan due to poor image quality. Further recommendations pending final MRI images and report. Electronically signed by Raúl Beasley PA-C on 5/21/2022 at 8:38 AM    Supervising physician: Cornelius Saldana. Coral Dumont MD.  Dr. Coral Dumont was readily and continuously available by phone for direct consultation regarding the care of this patient. Time spent with patient in consultation, education, and collaboration with medical time is >50% of total time spent on case, including time spent in chart review and dictation. Total time spent: 40 minutes    Thank you for the opportunity to participate in the care of your patient.

## 2022-05-21 NOTE — H&P
V2.0  History and Physical      Name:  Montrell Cabrera /Age/Sex: 1935  (81 y.o. female)   MRN & CSN:  [id number] & [id number] Encounter Date/Time: 2022 8:31 PM EDT   Location:  [address] [place] [address]-A PCP: Sudhir Haile MD       Hospital Day: 1    Assessment and Plan:   Montrell Cabrera is a 81 y.o. female with a pmh of lumbar spinal stenosis with neurogenic claudication s/p L1 and L4 laminectomies and bilateral L2-3 balloon kyphoplasty with L2 and L3 bone biopsy and microdissection by Dr. Adenike Reyna on 2022, non-insulin-dependent type 2 diabetes, essential hypertension and hyperlipidemia who presents with the following;    # Right Lower Back pain Likely d/t postoperative fluid collection of 14.6 x 6.4 x 4.4 cm on right back-noted on CT lumbar spine dated . #  Lumbar spinal stenosis with neurogenic claudication s/p L1 and L4 laminectomies and bilateral L2-3 balloon kyphoplasty with L2 and L3 bone biopsy and microdissection by Dr. Adenike Reyna on 2022.  -Neurosurgery Dr. Adenike Reyna consulted by Consuelo moctezuma. Recommends MRI lumbar spine-ordered for stat in a.m.  -Pain control. -PT/OT consult    New subacute left posterior 12th rib fracture-she denies falls. Continue PT/OT. Incentive   spirometer. Non-insulin-dependent diabetes type 2-A1c of 5.8 on 2022. Carb controlled diet. Sliding scale insulin. Hypoglycemic protocol as needed. Restless leg syndrome-Requip resumed. Postop constipation-last BM . Start senna. Giving MiraLAX. Dulcolax suppository as needed. Essential hypertension-Toprol resumed. Hyperlipidemia-rosuvastatin    Rheumatoid arthritis-Plaquenil    Chronic diastolic CHF-not in acute exacerbation. Lasix resumed. Right foot wound-patient reports she sees a wound care nurse outpatient. Wound care treatment team consulted. Disposition:   Current Living situation: Home  Expected Disposition: Home  Estimated D/C: Unknown    Diet ADULT DIET;  Regular; 4 carb choices (60 gm/meal)   DVT Prophylaxis [x] Lovenox, []  Heparin, [] SCDs, [] Ambulation,  [] Eliquis, [] Xarelto   Code Status Full Code   Surrogate Decision Maker/ POA None     History from:     patient    History of Present Illness:     Chief Complaint: Right back pain     Bhavik Scanlon is a 81 y.o. female with pmh of  lumbar spinal stenosis with neurogenic claudication s/p L1 and L4 laminectomies and bilateral L2-3 balloon kyphoplasty with L2 and L3 bone biopsy and microdissection by Dr. Elizabeth Monae on 5/12/2022, non-insulin-dependent type 2 diabetes, essential hypertension and hyperlipidemia and restless leg syndrome who presents as a direct admit from [facility]. She had a lumbar surgery performed by Dr. Elizabeth Monae on 5/12 and discharged to [facility] for acute rehab. She started complaining about right-sided back pain and tenderness yesterday. CT lumbar spine done today and showed fluid collection on the right back. Neurosurgery Dr. Elizabeth Monae contacted and he [facility] At time of this assessment, patient lying in bed on room air and reports 2/10 back pain. She also reports constipation but denies fever, chills, chest pain, nausea, vomiting, diarrhea and constipation. Patient also denies numbness in bilateral lower/upper extremities. Review of Systems: Need 10 Elements   Review of Systems    Reviewed and included in HPI. Objective:   No intake or output data in the 24 hours ending 05/20/22 2031   Vitals:   Vitals:    05/20/22 2016   BP: (!) 112/54   Pulse: 71   Resp: 16   Temp: 98.3 °F (36.8 °C)   TempSrc: Oral   SpO2: 97%   Weight: 204 lb 5.9 oz (92.7 kg)   Height: 5' 4\" (1.626 m)       Medications Prior to Admission     Prior to Admission medications    Medication Sig Start Date End Date Taking?  Authorizing Provider   clobetasol (TEMOVATE) 0.05 % cream Apply topically 2 times daily    Historical Provider, MD   naloxegol (MOVANTIK) 25 MG TABS tablet Take 25 mg by mouth every morning    Historical Provider, MD   metoprolol succinate (TOPROL XL) 25 MG extended release tablet Take 1 tablet by mouth daily 5/18/22 6/17/22  Jamil Males, MD   cephALEXin (KEFLEX) 500 MG capsule Take 1 capsule by mouth 4 times daily for 28 doses 5/18/22 5/25/22  Jamil Males, MD   rOPINIRole (REQUIP) 2 MG tablet Take one half (1/2) tablet by mouth at 2 pm and one (1) tablet by mouth at hs 5/12/22   Mikey Judge MD   rosuvastatin (CRESTOR) 10 MG tablet Take 1 tablet by mouth nightly 4/25/22   Mikey Judge MD   glipiZIDE (GLUCOTROL XL) 5 MG extended release tablet Take 1 tablet by mouth daily 2/10/22   Mikey Judge MD   furosemide (LASIX) 20 MG tablet Take 1 tablet by mouth daily 12/10/21   Jim Moreira MD   albuterol sulfate  (90 Base) MCG/ACT inhaler INHALE 2 PUFFS INTO THE LUNGS 4 TIMES DAILY AS NEEDED FOR WHEEZING 10/11/21   Mikey Judge MD   Multiple Vitamins-Minerals (PRESERVISION AREDS) TABS Take 1 tablet by mouth 2 times daily    Historical Provider, MD   hydroxychloroquine (PLAQUENIL) 200 MG tablet Take 200 mg by mouth daily     Historical Provider, MD   aspirin 81 MG EC tablet Take 81 mg by mouth daily  11/11/14   Mikey Judge MD   Cholecalciferol (VITAMIN D PO) Take 5,000 Units by mouth in the morning and at bedtime     Historical Provider, MD       Physical Exam: Need 8 Elements   Physical Exam     General: NAD  Eyes: EOMI  ENT: neck supple  Cardiovascular: Regular rate. Respiratory: Clear to auscultation. Gastrointestinal: Soft, non tender nondistended. Genitourinary: no suprapubic tenderness  Musculoskeletal: No edema. Right foot wound covered with dressing which is dry and intact. Skin: warm, dry. Right foot wound with dry and intact dressing. Lumbar surgical incision covered with dry dressing. Surrounding skin around incision appears red but without drainage. Neuro: Alert. Oriented x4. No unilateral weakness noted.   Good sensation on bilateral lower extremities. Psych: Mood appropriate. Past Medical History:   PMHx   Past Medical History:   Diagnosis Date    Arthritis     left knee pain, sees Dr. Brandi Min CAD (coronary artery disease)     sees Dr. Evelyn Tavarez Chronic midline low back pain without sciatica 9/28/2016    Had PT in 2016   [address] Colonoscopy refused     discussed in 7/15    DM (diabetes mellitus), type 2 ([facility] [address] 02/2006    Dupuytren's contracture of right hand     Endometrial stripe increased 9/25/2014    Saw NP Corrinne Gamma. Was normal exam per pt.  Gastrointestinal hemorrhage 11/2/2015    Patient had GI bleeding. Colon refused. Discussed with patient in detail.  Glaucoma 4/1/2014    H/O echocardiogram 10/02/2014    Mildly depressed left ventricular function; ejection fraction of 45%. Moderate pulmonary hypertension.  H/O echocardiogram 08/28/2018    EF 50-55%.  Mitral annular calcification is present. No other significant valvulopathy seen.  History of nuclear stress test 08/28/2018    EF 59%. ECG portion of stress test is negative for ischemia by diagnostic criteria. fixed inferior large size severe defect in rest and stress images. Mild hubert infarct ischemia.  HTN (hypertension)     Hyperlipidemia     Kidney stone     hx-\"been about 3 yrs ago\"    MI (myocardial infarction) ([facility] [address] 02/1998    treated with TPA    Obesity     Open wound of right foot 7/27/2020    Parainfluenza infection 12/5/2021    Pneumonia of right lower lobe due to infectious organism 4/5/2018    Vertigo     'have been having this since I had cataract surgery\"\"that is why they are doing the MRA of my brain(10/31/2014)    WD-Traumatic ulcer of foot, right, with fat layer exposed ([facility] [address] 8/3/2020     PSHX:  has a past surgical history that includes Cataract removal (Bilateral); Inguinal hernia repair (Left, 45 yrs ago); Lumbar spine surgery (N/A, 5/12/2022); and Spine surgery (N/A, 5/12/2022).   Allergies: No Known Allergies  Fam HX: family history includes Cancer in her brother and sister; Heart Attack in her father; Heart Disease in her father; High Blood Pressure in her brother and brother; Parkinsonism in her mother; Stroke in her sister. Soc HX:   Social History     Socioeconomic History    Marital status: Single     Spouse name: Not on file    Number of children: Not on file    Years of education: Not on file    Highest education level: Not on file   Occupational History    Not on file   Tobacco Use    Smoking status: Never Smoker    Smokeless tobacco: Never Used   Vaping Use    Vaping Use: Never used   Substance and Sexual Activity    Alcohol use: Not Currently     Comment: OCCASSIONAL    Drug use: Never    Sexual activity: Not Currently     Partners: Male   Other Topics Concern    Not on file   Social History Narrative    Not on file     Social Determinants of Health     Financial Resource Strain: Low Risk     Difficulty of Paying Living Expenses: Not hard at all   Food Insecurity: No Food Insecurity    Worried About [address] in the Last Year: Never true    [facility] in the Last Year: Never true   Transportation Needs:     Lack of Transportation (Medical): Not on file    Lack of Transportation (Non-Medical):  Not on file   Physical Activity:     Days of Exercise per Week: Not on file    Minutes of Exercise per Session: Not on file   Stress:     Feeling of Stress : Not on file   Social Connections:     Frequency of Communication with Friends and Family: Not on file    Frequency of Social Gatherings with Friends and Family: Not on file    Attends Restoration Services: Not on file    Active Member of Clubs or Organizations: Not on file    Attends Club or Organization Meetings: Not on file    Marital Status: Not on file   Intimate Partner Violence:     Fear of Current or Ex-Partner: Not on file    Emotionally Abused: Not on file    Physically Abused: Not on file    Sexually Abused: Not on file Housing Stability:     Unable to Pay for Housing in the Last Year: Not on file    Number of Places Lived in the Last Year: Not on file    Unstable Housing in the Last Year: Not on file       Medications:   Medications:    aspirin  81 mg Oral Daily    furosemide  20 mg Oral Daily    melatonin  3 mg Oral Nightly    metoprolol succinate  25 mg Oral BID    rosuvastatin  10 mg Oral Nightly    [START ON 5/21/2022] insulin lispro  0-12 Units SubCUTAneous TID WC    insulin lispro  0-6 Units SubCUTAneous Nightly    hydroxychloroquine  200 mg Oral Daily    collagenase   Topical Daily    lidocaine  1 patch TransDERmal Daily    lidocaine  1 patch TransDERmal Nightly    rOPINIRole  2 mg Oral BID      Infusions:    dextrose       PRN Meds: bisacodyl, 10 mg, Daily PRN  polyethylene glycol, 17 g, Daily PRN  senna, 1 tablet, BID PRN  glucose, 4 tablet, PRN  dextrose bolus, 125 mL, PRN   Or  dextrose bolus, 250 mL, PRN  glucagon (rDNA), 1 mg, PRN  dextrose, 1,000 mL, PRN  HYDROcodone 5 mg - acetaminophen, 1 tablet, Q4H PRN  HYDROcodone 5 mg - acetaminophen, 2 tablet, Q4H PRN        Labs      CBC:   Recent Labs     05/20/22  0610   WBC 7.5   HGB 10.3*        BMP:  No results for input(s): NA, K, CL, CO2, BUN, CREATININE, GLUCOSE in the last 72 hours. Hepatic: No results for input(s): AST, ALT, ALB, BILITOT, ALKPHOS in the last 72 hours. Lipids:   Lab Results   Component Value Date    CHOL 121 08/07/2018    HDL 47 11/19/2021    TRIG 128 08/07/2018     Hemoglobin A1C:   Lab Results   Component Value Date    LABA1C 5.8 05/04/2022     TSH:   Lab Results   Component Value Date    TSH 2.61 08/07/2018     Troponin:   Lab Results   Component Value Date    TROPONINT 0.031 05/18/2022    TROPONINT 0.043 05/18/2022    TROPONINT 0.047 05/18/2022     Lactic Acid: No results for input(s): LACTA in the last 72 hours. BNP: No results for input(s): PROBNP in the last 72 hours.   UA:  Lab Results   Component Value Date NITRU NEGATIVE 09/21/2020    COLORU YELLOW 09/21/2020    WBCUA 7 09/21/2020    RBCUA 0 09/21/2020    MUCUS NEGATIVE 09/01/2019    BACTERIA MANY 09/21/2020    CLARITYU CLEAR 09/21/2020    SPECGRAV 1.030 09/21/2020    LEUKOCYTESUR NEGATIVE 09/21/2020    UROBILINOGEN 0.2 09/21/2020    BILIRUBINUR NEGATIVE 09/21/2020    BLOODU NEGATIVE 09/21/2020    KETUA NEGATIVE 09/21/2020     Urine Cultures:   Lab Results   Component Value Date    LABURIN 200 09/28/2016     Blood Cultures: No results found for: BC  No results found for: BLOODCULT2  Organism:   Lab Results   Component Value Date    ORG ECOL 03/01/2018       Imaging/Diagnostics Last 24 Hours   XR ABDOMEN (KUB) (SINGLE AP VIEW)    Result Date: 5/15/2022  EXAMINATION: ONE SUPINE XRAY VIEW(S) OF THE ABDOMEN 5/15/2022 11:50 am COMPARISON: CT abdomen and pelvis 09/25/2020. HISTORY: ORDERING SYSTEM PROVIDED HISTORY: constipation TECHNOLOGIST PROVIDED HISTORY: Reason for exam:->constipation Reason for Exam: constipation Additional signs and symptoms: pain Relevant Medical/Surgical History: none FINDINGS: The visualized lungs are clear. Nonobstructive bowel gas pattern. Mildly increased stool in the colon. No evidence pneumoperitoneum. No acute osseous abnormality. Mildly increased stool in the colon suggesting constipation. CT LUMBAR SPINE WO CONTRAST    Result Date: 5/20/2022  EXAMINATION: CT OF THE LUMBAR SPINE WITHOUT CONTRAST  5/20/2022 TECHNIQUE: CT of the lumbar spine was performed without the administration of intravenous contrast. Multiplanar reformatted images are provided for review. Adjustment of mA and/or kV according to patient size was utilized. Automated exposure control, iterative reconstruction, and/or weight based adjustment of the mA/kV was utilized to reduce the radiation dose to as low as reasonably achievable. COMPARISON: 12/14/2021 HISTORY: ORDERING SYSTEM PROVIDED HISTORY: Progressive. Pain following lumbar surgery May 12 by Dr. Edison Zhneg. TECHNOLOGIST PROVIDED HISTORY: Reason for exam:->Progressive. Pain following lumbar surgery May 12 by Dr. Fidencio Morris. Reason for Exam: Progressive. Pain following lumbar surgery May 12 by Dr. Fidencio Morris. Additional signs and symptoms: Progressive. Pain following lumbar surgery May 12 by Dr. Fidencio Morris. Relevant Medical/Surgical History: Progressive. Pain following lumbar surgery May 12 by Dr. Fidencio Morris. FINDINGS: BONES/ALIGNMENT: 12/14/2021 DEGENERATIVE CHANGES: There is normal alignment of the lumbar spine. There is sequela of L2 and L3 vertebroplasty. The L2 vertebroplasty cement extends into the L1-L2 disc space. The L3 vertebroplasty cement extends into the L3-L4 disc space. Additionally, there is extrusion of cement into the anterior paravertebral epidural space at L2-L3 and L3-L4. This also extends into the paravertebral spaces, asymmetric on the left at L3-L4. There is no evidence of acute fracture. There is sequela of L1-L4 laminectomy. There is a posterior left 12th rib fracture that is new. Degenerative changes are noted along the sacroiliac joints. SOFT TISSUES/RETROPERITONEUM: There is multilevel degenerative disc disease in the lumbar spine, greatest at T12-L1 with associated vacuum phenomenon. Atherosclerotic plaque is noted in the aorta and its branch vessels. There is a trace left pleural effusion with associated areas of atelectasis in the lung bases. There is a small hiatal hernia. There is a large fluid collection along the dorsal midline of the back, asymmetric towards the right measuring 14.6 x 6.4 x 4.4 cm. L1-L2: L1 laminectomies are noted. No central spinal canal narrowing. Moderate bilateral foraminal narrowing. L2-L3: L2 laminectomies are noted. There is a minimal disc bulge-osteophyte complex. No central spinal canal stenosis. Mild left and moderate-severe right foraminal narrowing are noted. L3-L4: L3 laminectomies are noted. There is a circumferential disc bulge. No central spinal canal narrowing. Moderate-severe left, and severe right foraminal narrowing are noted. L4-L5: L4 laminectomies are noted. There is a circumferential disc bulge. Bilateral facet arthropathy. Minimal central spinal canal narrowing. Minimal left and mild right foraminal narrowing. L5-S1: There is no disc herniation. Bilateral facet arthropathy. No central spinal canal narrowing. Moderate left, and mild right foraminal narrowing. 1. There is sequela of L2 and L3 vertebroplasties. The cement extrudes into the L1-L2 and L3-L4 disc spaces as well as along the anterior epidural space at L2-L3 and L3-L4. 2. Sequela of L1-L4 laminectomies. 3. No acute fracture. 4. There is a new left posterior 12th rib fracture that could be a source for the patient's pain. There has some callus along the margins suggesting that this is a least subacute.        Personally reviewed Lab Studies, Imaging, and discussed case with Dr. Mara Lowery    Electronically signed by ABA Estrada CNP on 5/20/2022 at 8:31 PM

## 2022-05-22 LAB
GLUCOSE BLD-MCNC: 114 MG/DL (ref 70–99)
GLUCOSE BLD-MCNC: 299 MG/DL (ref 70–99)
GLUCOSE BLD-MCNC: 85 MG/DL (ref 70–99)
GLUCOSE BLD-MCNC: 88 MG/DL (ref 70–99)

## 2022-05-22 PROCEDURE — 6360000002 HC RX W HCPCS: Performed by: NURSE PRACTITIONER

## 2022-05-22 PROCEDURE — 94761 N-INVAS EAR/PLS OXIMETRY MLT: CPT

## 2022-05-22 PROCEDURE — 6370000000 HC RX 637 (ALT 250 FOR IP): Performed by: NURSE PRACTITIONER

## 2022-05-22 PROCEDURE — 82962 GLUCOSE BLOOD TEST: CPT

## 2022-05-22 PROCEDURE — 99232 SBSQ HOSP IP/OBS MODERATE 35: CPT | Performed by: INTERNAL MEDICINE

## 2022-05-22 PROCEDURE — 6360000002 HC RX W HCPCS: Performed by: INTERNAL MEDICINE

## 2022-05-22 PROCEDURE — 94664 DEMO&/EVAL PT USE INHALER: CPT

## 2022-05-22 PROCEDURE — 1200000000 HC SEMI PRIVATE

## 2022-05-22 RX ORDER — LORAZEPAM 2 MG/ML
1 INJECTION INTRAMUSCULAR ONCE
Status: COMPLETED | OUTPATIENT
Start: 2022-05-22 | End: 2022-05-22

## 2022-05-22 RX ADMIN — MELATONIN 3 MG: at 20:14

## 2022-05-22 RX ADMIN — HYDROXYCHLOROQUINE SULFATE 200 MG: 200 TABLET ORAL at 08:36

## 2022-05-22 RX ADMIN — HYDROCODONE BITARTRATE AND ACETAMINOPHEN 2 TABLET: 5; 325 TABLET ORAL at 06:27

## 2022-05-22 RX ADMIN — HYDROCODONE BITARTRATE AND ACETAMINOPHEN 2 TABLET: 5; 325 TABLET ORAL at 20:19

## 2022-05-22 RX ADMIN — HYDROCODONE BITARTRATE AND ACETAMINOPHEN 2 TABLET: 5; 325 TABLET ORAL at 11:19

## 2022-05-22 RX ADMIN — METOPROLOL SUCCINATE 25 MG: 25 TABLET, EXTENDED RELEASE ORAL at 08:37

## 2022-05-22 RX ADMIN — LORAZEPAM 1 MG: 2 INJECTION INTRAMUSCULAR; INTRAVENOUS at 12:39

## 2022-05-22 RX ADMIN — ROSUVASTATIN CALCIUM 10 MG: 5 TABLET, FILM COATED ORAL at 20:14

## 2022-05-22 RX ADMIN — SENNOSIDES AND DOCUSATE SODIUM 2 TABLET: 50; 8.6 TABLET ORAL at 08:37

## 2022-05-22 RX ADMIN — ROPINIROLE HYDROCHLORIDE 2 MG: 1 TABLET, FILM COATED ORAL at 08:36

## 2022-05-22 RX ADMIN — COLLAGENASE SANTYL: 250 OINTMENT TOPICAL at 08:38

## 2022-05-22 RX ADMIN — METOPROLOL SUCCINATE 25 MG: 25 TABLET, EXTENDED RELEASE ORAL at 20:14

## 2022-05-22 RX ADMIN — HYDROCODONE BITARTRATE AND ACETAMINOPHEN 2 TABLET: 5; 325 TABLET ORAL at 02:21

## 2022-05-22 RX ADMIN — ASPIRIN 81 MG: 81 TABLET, COATED ORAL at 08:37

## 2022-05-22 RX ADMIN — FUROSEMIDE 20 MG: 20 TABLET ORAL at 08:36

## 2022-05-22 RX ADMIN — HYDROCODONE BITARTRATE AND ACETAMINOPHEN 2 TABLET: 5; 325 TABLET ORAL at 16:29

## 2022-05-22 RX ADMIN — ROPINIROLE HYDROCHLORIDE 2 MG: 1 TABLET, FILM COATED ORAL at 20:14

## 2022-05-22 RX ADMIN — ENOXAPARIN SODIUM 40 MG: 100 INJECTION SUBCUTANEOUS at 08:36

## 2022-05-22 ASSESSMENT — PAIN SCALES - GENERAL
PAINLEVEL_OUTOF10: 9
PAINLEVEL_OUTOF10: 5
PAINLEVEL_OUTOF10: 0
PAINLEVEL_OUTOF10: 9
PAINLEVEL_OUTOF10: 8
PAINLEVEL_OUTOF10: 5
PAINLEVEL_OUTOF10: 10

## 2022-05-22 ASSESSMENT — PAIN DESCRIPTION - FREQUENCY
FREQUENCY: CONTINUOUS
FREQUENCY: CONTINUOUS

## 2022-05-22 ASSESSMENT — PAIN - FUNCTIONAL ASSESSMENT
PAIN_FUNCTIONAL_ASSESSMENT: ACTIVITIES ARE NOT PREVENTED
PAIN_FUNCTIONAL_ASSESSMENT: ACTIVITIES ARE NOT PREVENTED

## 2022-05-22 ASSESSMENT — PAIN DESCRIPTION - PAIN TYPE
TYPE: SURGICAL PAIN
TYPE: SURGICAL PAIN

## 2022-05-22 ASSESSMENT — PAIN DESCRIPTION - ONSET
ONSET: ON-GOING
ONSET: ON-GOING

## 2022-05-22 ASSESSMENT — PAIN DESCRIPTION - LOCATION
LOCATION: BACK
LOCATION: BACK

## 2022-05-22 ASSESSMENT — PAIN DESCRIPTION - DESCRIPTORS
DESCRIPTORS: ACHING
DESCRIPTORS: ACHING

## 2022-05-22 ASSESSMENT — PAIN DESCRIPTION - ORIENTATION
ORIENTATION: MID
ORIENTATION: MID

## 2022-05-22 ASSESSMENT — PAIN DESCRIPTION - DIRECTION: RADIATING_TOWARDS: DOWN

## 2022-05-22 NOTE — PLAN OF CARE
Problem: Discharge Planning  Goal: Discharge to home or other facility with appropriate resources  Outcome: Progressing  Flowsheets (Taken 5/22/2022 0513 by Jose A Lowe RN)  Discharge to home or other facility with appropriate resources:   Identify barriers to discharge with patient and caregiver   Identify discharge learning needs (meds, wound care, etc)   Refer to discharge planning if patient needs post-hospital services based on physician order or complex needs related to functional status, cognitive ability or social support system   Arrange for needed discharge resources and transportation as appropriate   Arrange for interpreters to assist at discharge as needed     Problem: Pain  Goal: Verbalizes/displays adequate comfort level or baseline comfort level  Outcome: Progressing     Problem: Safety - Adult  Goal: Free from fall injury  Outcome: Progressing     Problem: ABCDS Injury Assessment  Goal: Absence of physical injury  Outcome: Progressing     Problem: Chronic Conditions and Co-morbidities  Goal: Patient's chronic conditions and co-morbidity symptoms are monitored and maintained or improved  Outcome: Progressing

## 2022-05-22 NOTE — PROGRESS NOTES
Hospitalist Progress Note      Name:  Devin Cortes /Age/Sex: 1935  (81 y.o. female)   MRN & CSN:  [id number] & [id number] Admission Date/Time: 2022  7:55 PM   [facility]:  [facility]-A PCP: Jessica Bolden MD       Hospital Day: 3    Assessment and Plan:     Devin Cortes is a 81 y.o. female with a pmh of lumbar spinal stenosis with neurogenic claudication s/p L1 and L4 laminectomies and bilateral L2-3 balloon kyphoplasty with L2 and L3 bone biopsy and microdissection non-insulin-dependent type 2 diabetes, essential hypertension and hyperlipidemia who presents with increasing back pain. She had a lumbar surgery on  and discharged to [facility] for acute rehab     Post-op right Lower Back pain   Postoperative fluid collection  H/o lumbar spinal stenosis with neurogenic claudication  s/p L1 and L4 laminectomies and bilateral L2-3 balloon kyphoplasty - 2022. CT scan without IV contrast shows fluid collection in the right back measuring 14.6 x 6.4 x 4.4 cm; no spinal canal involvement. CRP 43.8. Sed rate at 56. WBC 7.5  Seen by neurosurgery; did not think surgical site infection; does not recommend antibiotics  Follow-up MRI with contrast results  Plan to to discharge back to swing/rehab bed after MRI if nonacute  Pain control. PT/OT consult    New subacute left posterior 12th rib fracture  she denies falls. Continue PT/OT. Incentive spirometer.     Non-insulin-dependent diabetes type 2  Well-controlled  A1c of 5.8   Carb controlled diet. Sliding scale insulin. Hypoglycemic protocol     Postop constipation  Bowel regimen: senna, MiraLAX, dulcolax suppository as needed.     Essential hypertension  Continue Toprol     Rheumatoid arthritis  On Plaquenil     Chronic diastolic CHF-  Continue Lasix     Right foot wound  Follows with wound care nurse outpatient. Wound care    Diet ADULT DIET;  Regular; 4 carb choices (60 gm/meal)   DVT Prophylaxis [x] Lovenox, []  Heparin, [] SCDs, [] Ambulation   GI Prophylaxis [] PPI,  [] H2 Blocker,  [] Carafate,  [] Diet/Tube Feeds   Code Status Full Code   Disposition Patient requires continued admission due to backache pending MRI  The anticipated discharge is in less than 24 hours. MDM [] Low, [] Moderate,[]  High       SUBJECTIVE:  Patient seen and examined at bedside. Alert and oriented x3. comfortable. No acute distress. complaining of persistent backache but also states the Norco is adequate. Pain is over surgical site and not over the 12th rib    Objective:   No intake or output data in the 24 hours ending 05/22/22 0945   Vitals:   Vitals:    05/22/22 0832   BP: (!) 127/59   Pulse: 66   Resp: 18   Temp: 97.8 °F (36.6 °C)   SpO2: 92%     Physical Exam:   GEN Awake female, sitting upright in bed in no apparent distress. Appears given age. NECK Supple, no apparent thyromegaly or masses. RESP Clear to auscultation, no wheezes, rales or rhonchi. Symmetric chest movement while on room air. CARDIO/VASC S1/S2 auscultated. Regular rate without appreciable murmurs, rubs, or gallops. No JVD or carotid bruits. Peripheral pulses equal bilaterally and palpable. No peripheral edema. GI Abdomen is soft without significant tenderness, masses, or guarding. SKIN Normal coloration, warm, dry. NEURO Cranial nerves appear grossly intact, normal speech, no lateralizing weakness. PSYCH Awake, alert, oriented x 4. Affect appropriate.     Medications:   Medications:    aspirin  81 mg Oral Daily    furosemide  20 mg Oral Daily    melatonin  3 mg Oral Nightly    metoprolol succinate  25 mg Oral BID    rosuvastatin  10 mg Oral Nightly    insulin lispro  0-12 Units SubCUTAneous TID     insulin lispro  0-6 Units SubCUTAneous Nightly    hydroxychloroquine  200 mg Oral Daily    collagenase   Topical Daily    lidocaine  1 patch TransDERmal Daily    rOPINIRole  2 mg Oral BID    sennosides-docusate sodium  2 tablet Oral Daily    enoxaparin  40 mg SubCUTAneous Daily      Infusions:    dextrose       PRN Meds: bisacodyl, 10 mg, Daily PRN  polyethylene glycol, 17 g, Daily PRN  senna, 1 tablet, BID PRN  glucose, 4 tablet, PRN  dextrose bolus, 125 mL, PRN   Or  dextrose bolus, 250 mL, PRN  glucagon (rDNA), 1 mg, PRN  dextrose, 1,000 mL, PRN  HYDROcodone 5 mg - acetaminophen, 1 tablet, Q4H PRN  HYDROcodone 5 mg - acetaminophen, 2 tablet, Q4H PRN  ondansetron, 4 mg, Q6H PRN      Electronically signed by Selin Paz MD on 5/22/2022 at 9:45 AM

## 2022-05-22 NOTE — PROGRESS NOTES
Neurosurgery Progress Note  5/22/2022 8:50 AM      POD: #9 L1-L4 lumbar laminectomy  Assessment and Plan:   Post operative fluid collection s/p L1-L4 lumbar laminectomy- CT scan without IV contrast shows fluid collection in the right back measuring 14.6 x 6.4 x 4.4 cm; no spinal canal involvement.  CRP 43.8.  Sed rate at 56. WBC 7.5. Based on labs and presentation this is not likely infectious. Differential seroma vs hematoma. No antibiotics recommended at this time. PT/OT OK to mobilize. Lumbar MRI without contrast was obtained and is non-acute, reviewed by Dr. Audelia Garner. Patient should avoid bending at the waist, twisting her neck or back and she is limited to 15 pounds lifting until she is seen in my office for her post operative visit. From neurosurgery standpoint patient is stable and ready for discharge back to swing/rehab. Supervising physician: Pj Rodriguez. Audelia Garner MD.  Dr. Audelia Garner was readily and continuously available by phone for direct consultation regarding the care of this patient. Subjective:   Admit Date: 5/20/2022  PCP: Mil Garcia MD    Patient lying comfortably in bed at the time of my interview. She denies new numbness or tingling in the bilateral upper or lower extremities. She denies acute bowel or bladder symptoms. She states she has had some constipation since surgery, but had small bowel movement yesterday. She affirms persistent right-sided back pain that radiates to the left leg. She states her pain feels similar to her acute postoperative phase and is not new. States rehab has been going well. She denies falls, injury, trauma.     Data:   Scheduled Meds:   aspirin  81 mg Oral Daily    furosemide  20 mg Oral Daily    melatonin  3 mg Oral Nightly    metoprolol succinate  25 mg Oral BID    rosuvastatin  10 mg Oral Nightly    insulin lispro  0-12 Units SubCUTAneous TID     insulin lispro  0-6 Units SubCUTAneous Nightly    hydroxychloroquine  200 mg Oral Daily    collagenase Topical Daily    lidocaine  1 patch TransDERmal Daily    rOPINIRole  2 mg Oral BID    sennosides-docusate sodium  2 tablet Oral Daily    enoxaparin  40 mg SubCUTAneous Daily         No intake/output data recorded. No intake/output data recorded. No intake or output data in the 24 hours ending 05/22/22 0850  CULTURE results: Invalid input(s): BLOOD CULTURE,  URINE CULTURE, SURGICAL CULTURE  CBC:   Recent Labs     05/20/22  0610   WBC 7.5   HGB 10.3*        BMP:  No results for input(s): NA, K, CL, CO2, BUN, CREATININE, GLUCOSE in the last 72 hours. Hepatic: No results for input(s): AST, ALT, ALB, BILITOT, ALKPHOS in the last 72 hours. IMAGING: available xray, CT, and MRI results reviewed    CT lumbar spine without contrast 5/20/2022  Impression   1. There is sequela of L2 and L3 vertebroplasties. The cement extrudes into   the L1-L2 and L3-L4 disc spaces as well as along the anterior epidural space   at L2-L3 and L3-L4. 2. Sequela of L1-L4 laminectomies. 3. No acute fracture. 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       Severe canal stenosis at L1-L2 and L2-L3 related to degenerative disc and   facet changes along with the fluid collection involving the laminectomy beds. There is redundancy of the cauda equina nerve roots proximal to these levels. Objective:   Vitals: BP (!) 127/59   Pulse 66   Temp 97.8 °F (36.6 °C) (Oral)   Resp 18   Ht 5' 4\" (1.626 m)   Wt 208 lb 12.4 oz (94.7 kg)   LMP  (LMP Unknown)   SpO2 92%   BMI 35.84 kg/m²     CONSTITUTIONAL:  Awake, alert, cooperative, no apparent distress, and appears stated age  HEENT: NCAT, PERRL, EOMI. Sclera white, conjunctive full.   OP with moist mucosal membranes, no thrush, tongue protrudes midline  NECK: Supple, symmetrical, trachea midline, no adenopathy  LUNGS:  no increased work of breathing and clear to auscultation. No accessory muscle use  ABDOMEN:  normal bowel sounds, non-tender, non-distended, no hepatosplenomegaly  PSYCHIATRIC: Oriented to person place and time. No obvious depression or anxiety. MUSCULOSKELETAL: No obvious misalignment or effusion of the joints. No clubbing, cyanosis of the digits. SKIN:  normal skin color, texture, turgor and no redness, warmth, or swelling. No palpable nodules or stigmata of embolic phenomenon. NEUROLOGIC: Alert and oriented x4, face symmetrical, no obvious droop, speech clear and coherent, able to recall 3/3 objects in 5 minutes, no pronator drift, rapid and repetitive movements are intact, no significant finger to nose dysmetria, sensation intact to light touch and pinprick sensation. Bilateral upper extremities-  5/5  bilateral deltoids, 5/5 bilateral biceps, 5/5 bilateral triceps, 5/5 bilateral finger , 5/5 bilateral finger abduction. Bilateral lower extremities- 5/5   Bilateral iliopsoas, 5/5 bilateral quadriceps, 5/5 bilateral hamstrings, 5/5 bilateral anterior tibialis, 5/5 bilateral gastrocnemius. Incision: Midline lumbar incision is intact. No erythema, fluctuance. No active drainage. No wound dehiscence. Drain site has a small scab. Exofin is actively peeling off the incision. Surrounding skin looks slightly irritated due to continued application of adhesive. However, no skin breakdown. Surrounding soft tissue was evaluated, there is no apparent swelling to the right of the lumbar incision. Patient body habitus limits exam in this regard as she has a large amount of loose skin.     Electronically signed by Stacia Brown PA-C on 5/22/2022 at 8:50 AM

## 2022-05-22 NOTE — PLAN OF CARE
Problem: Discharge Planning  Goal: Discharge to home or other facility with appropriate resources  5/21/2022 2239 by Omar Paz RN  Outcome: Progressing  5/21/2022 1000 by Ciaran Adam LPN  Outcome: Progressing     Problem: Pain  Goal: Verbalizes/displays adequate comfort level or baseline comfort level  5/21/2022 2239 by Omar Paz RN  Outcome: Progressing  5/21/2022 1000 by Ciaran Adam LPN  Outcome: Progressing     Problem: Safety - Adult  Goal: Free from fall injury  5/21/2022 2239 by Omar Paz RN  Outcome: Progressing  5/21/2022 1000 by Ciaran Adam LPN  Outcome: Progressing     Problem: ABCDS Injury Assessment  Goal: Absence of physical injury  5/21/2022 2239 by Omar Paz RN  Outcome: Progressing  5/21/2022 1000 by Ciaran Adam LPN  Outcome: Progressing

## 2022-05-23 LAB
ANION GAP SERPL CALCULATED.3IONS-SCNC: 10 MMOL/L (ref 4–16)
BUN BLDV-MCNC: 10 MG/DL (ref 6–23)
CALCIUM SERPL-MCNC: 9.2 MG/DL (ref 8.3–10.6)
CHLORIDE BLD-SCNC: 96 MMOL/L (ref 99–110)
CO2: 29 MMOL/L (ref 21–32)
CREAT SERPL-MCNC: 0.5 MG/DL (ref 0.6–1.1)
ERYTHROCYTE SEDIMENTATION RATE: 23 MM/HR (ref 0–30)
GFR AFRICAN AMERICAN: >60 ML/MIN/1.73M2
GFR NON-AFRICAN AMERICAN: >60 ML/MIN/1.73M2
GLUCOSE BLD-MCNC: 100 MG/DL (ref 70–99)
GLUCOSE BLD-MCNC: 104 MG/DL (ref 70–99)
GLUCOSE BLD-MCNC: 105 MG/DL (ref 70–99)
GLUCOSE BLD-MCNC: 112 MG/DL (ref 70–99)
GLUCOSE BLD-MCNC: 97 MG/DL (ref 70–99)
HCT VFR BLD CALC: 38 % (ref 37–47)
HEMOGLOBIN: 12 GM/DL (ref 12.5–16)
HIGH SENSITIVE C-REACTIVE PROTEIN: 7.6 MG/L
MCH RBC QN AUTO: 29.6 PG (ref 27–31)
MCHC RBC AUTO-ENTMCNC: 31.6 % (ref 32–36)
MCV RBC AUTO: 93.8 FL (ref 78–100)
PDW BLD-RTO: 13.2 % (ref 11.7–14.9)
PLATELET # BLD: 311 K/CU MM (ref 140–440)
PMV BLD AUTO: 8.8 FL (ref 7.5–11.1)
POTASSIUM SERPL-SCNC: 3.8 MMOL/L (ref 3.5–5.1)
RBC # BLD: 4.05 M/CU MM (ref 4.2–5.4)
SODIUM BLD-SCNC: 135 MMOL/L (ref 135–145)
WBC # BLD: 8.4 K/CU MM (ref 4–10.5)

## 2022-05-23 PROCEDURE — 99024 POSTOP FOLLOW-UP VISIT: CPT | Performed by: PHYSICIAN ASSISTANT

## 2022-05-23 PROCEDURE — 85027 COMPLETE CBC AUTOMATED: CPT

## 2022-05-23 PROCEDURE — 97162 PT EVAL MOD COMPLEX 30 MIN: CPT

## 2022-05-23 PROCEDURE — 86141 C-REACTIVE PROTEIN HS: CPT

## 2022-05-23 PROCEDURE — 97116 GAIT TRAINING THERAPY: CPT

## 2022-05-23 PROCEDURE — 97535 SELF CARE MNGMENT TRAINING: CPT

## 2022-05-23 PROCEDURE — 6370000000 HC RX 637 (ALT 250 FOR IP): Performed by: NURSE PRACTITIONER

## 2022-05-23 PROCEDURE — 1200000000 HC SEMI PRIVATE

## 2022-05-23 PROCEDURE — 97166 OT EVAL MOD COMPLEX 45 MIN: CPT

## 2022-05-23 PROCEDURE — 80048 BASIC METABOLIC PNL TOTAL CA: CPT

## 2022-05-23 PROCEDURE — 85652 RBC SED RATE AUTOMATED: CPT

## 2022-05-23 PROCEDURE — 6370000000 HC RX 637 (ALT 250 FOR IP): Performed by: PHYSICIAN ASSISTANT

## 2022-05-23 PROCEDURE — 36415 COLL VENOUS BLD VENIPUNCTURE: CPT

## 2022-05-23 PROCEDURE — 82962 GLUCOSE BLOOD TEST: CPT

## 2022-05-23 PROCEDURE — 6360000002 HC RX W HCPCS: Performed by: NURSE PRACTITIONER

## 2022-05-23 RX ORDER — POLYETHYLENE GLYCOL 3350 17 G/17G
17 POWDER, FOR SOLUTION ORAL DAILY
Status: DISCONTINUED | OUTPATIENT
Start: 2022-05-23 | End: 2022-05-25 | Stop reason: HOSPADM

## 2022-05-23 RX ADMIN — SENNOSIDES AND DOCUSATE SODIUM 2 TABLET: 50; 8.6 TABLET ORAL at 08:11

## 2022-05-23 RX ADMIN — HYDROXYCHLOROQUINE SULFATE 200 MG: 200 TABLET ORAL at 08:11

## 2022-05-23 RX ADMIN — HYDROCODONE BITARTRATE AND ACETAMINOPHEN 2 TABLET: 5; 325 TABLET ORAL at 12:12

## 2022-05-23 RX ADMIN — ROSUVASTATIN CALCIUM 10 MG: 5 TABLET, FILM COATED ORAL at 20:11

## 2022-05-23 RX ADMIN — HYDROCODONE BITARTRATE AND ACETAMINOPHEN 2 TABLET: 5; 325 TABLET ORAL at 16:37

## 2022-05-23 RX ADMIN — HYDROCODONE BITARTRATE AND ACETAMINOPHEN 2 TABLET: 5; 325 TABLET ORAL at 08:10

## 2022-05-23 RX ADMIN — MELATONIN 3 MG: at 20:11

## 2022-05-23 RX ADMIN — ENOXAPARIN SODIUM 40 MG: 100 INJECTION SUBCUTANEOUS at 08:12

## 2022-05-23 RX ADMIN — HYDROCODONE BITARTRATE AND ACETAMINOPHEN 2 TABLET: 5; 325 TABLET ORAL at 21:04

## 2022-05-23 RX ADMIN — FUROSEMIDE 20 MG: 20 TABLET ORAL at 08:09

## 2022-05-23 RX ADMIN — HYDROCODONE BITARTRATE AND ACETAMINOPHEN 2 TABLET: 5; 325 TABLET ORAL at 02:52

## 2022-05-23 RX ADMIN — POLYETHYLENE GLYCOL (3350) 17 G: 17 POWDER, FOR SOLUTION ORAL at 08:13

## 2022-05-23 RX ADMIN — METOPROLOL SUCCINATE 25 MG: 25 TABLET, EXTENDED RELEASE ORAL at 20:11

## 2022-05-23 RX ADMIN — ASPIRIN 81 MG: 81 TABLET, COATED ORAL at 08:10

## 2022-05-23 RX ADMIN — ROPINIROLE HYDROCHLORIDE 2 MG: 1 TABLET, FILM COATED ORAL at 20:11

## 2022-05-23 RX ADMIN — COLLAGENASE SANTYL: 250 OINTMENT TOPICAL at 11:55

## 2022-05-23 RX ADMIN — METOPROLOL SUCCINATE 25 MG: 25 TABLET, EXTENDED RELEASE ORAL at 08:12

## 2022-05-23 RX ADMIN — ROPINIROLE HYDROCHLORIDE 2 MG: 1 TABLET, FILM COATED ORAL at 08:09

## 2022-05-23 ASSESSMENT — PAIN DESCRIPTION - ONSET
ONSET: ON-GOING

## 2022-05-23 ASSESSMENT — PAIN DESCRIPTION - LOCATION
LOCATION: BACK

## 2022-05-23 ASSESSMENT — PAIN DESCRIPTION - ORIENTATION
ORIENTATION: MID

## 2022-05-23 ASSESSMENT — PAIN DESCRIPTION - DESCRIPTORS
DESCRIPTORS: ACHING

## 2022-05-23 ASSESSMENT — PAIN - FUNCTIONAL ASSESSMENT
PAIN_FUNCTIONAL_ASSESSMENT: ACTIVITIES ARE NOT PREVENTED

## 2022-05-23 ASSESSMENT — PAIN DESCRIPTION - PAIN TYPE
TYPE: SURGICAL PAIN

## 2022-05-23 ASSESSMENT — PAIN SCALES - GENERAL
PAINLEVEL_OUTOF10: 5
PAINLEVEL_OUTOF10: 8
PAINLEVEL_OUTOF10: 9
PAINLEVEL_OUTOF10: 10
PAINLEVEL_OUTOF10: 5
PAINLEVEL_OUTOF10: 8
PAINLEVEL_OUTOF10: 0
PAINLEVEL_OUTOF10: 8

## 2022-05-23 ASSESSMENT — PAIN DESCRIPTION - FREQUENCY
FREQUENCY: CONTINUOUS

## 2022-05-23 NOTE — PROGRESS NOTES
Physical Therapy  [facility] PHYSICAL THERAPY EVALUATION  Wanda Tate, 1935, 1127/1127-A, 5/23/2022    History  [place]:  There were no encounter diagnoses. Patient  has a past medical history of Arthritis, CAD (coronary artery disease), Chronic midline low back pain without sciatica, Claustrophobia, Colonoscopy refused, DM (diabetes mellitus), type 2 ([address] Dupuytren's contracture of right hand, Endometrial stripe increased, Gastrointestinal hemorrhage, Glaucoma, H/O echocardiogram, H/O echocardiogram, History of nuclear stress test, HTN (hypertension), Hyperlipidemia, Kidney stone, MI (myocardial infarction) ([address] Obesity, Open wound of right foot, Parainfluenza infection, Pneumonia of right lower lobe due to infectious organism, Vertigo, and WD-Traumatic ulcer of foot, right, with fat layer exposed ([address] Patient  has a past surgical history that includes Cataract removal (Bilateral); Inguinal hernia repair (Left, 45 yrs ago); Lumbar spine surgery (N/A, 5/12/2022); and Spine surgery (N/A, 5/12/2022). Subjective:  Patient states:  \"I guess they aren't going to do anything about my back\"   Pain: 10/10 Low back and right groin  (recently medicated per pt)  Restrictions: spinal precautions, falls     Home Setup/Prior level of function  Social/Functional History  Lives With: Alone  Type of Home: Apartment  Home Layout: One level  Home Access: Stairs to enter without rails  Entrance Stairs - Number of Steps: threshold  Bathroom Shower/Tub: Tub/Shower unit  Bathroom Toilet: Handicap height  Bathroom Equipment: Grab bars in shower,Hand-held shower,Shower chair,Toilet raiser  Home Equipment: [address] Help From: Personal care attendant (Pt typically has hired help for 11 hours a week.  Her help is having shoulder surgery next week and will not be available.)  ADL Assistance: Independent (neighbor comes to her apartment to be around during showers in case she significant pain  · Educated pt on POC, role of PT, DME use, spinal precautions, discharge. Cues provided for sequencing to inc safety and indep with mobility     [facility] 6 Clicks Inpatient Mobility:  AM-PAC Inpatient Mobility Raw Score : 17    Safety: patient left in bed, call light within reach,  gait belt used. Assessment:  Pt is an 81year old female admitted from [facility] swing [facility] with low back pain. Found to have inc fluid near lumbar surgical site. No further neurosurgery plan at this time. Hx of recent L1-L4 laminectomies for decompression, bilateral L2-3 balloon kyphoplasty, L2 and L3 bone biopsy on 5/12. Recommend pt return to swing bed once medically stable. She performed below her baseline this date and would benefit from continued therapy to address her current deficits, dec potential fall risk, and restore function. Complexity: Moderate  Prognosis: Good, no significant barriers to participation at this time. Plan Plan: 3-5 times per week  Discharge Recommendations:  (swing bed)  Equipment: continue to assess     Goals:  Long Term Goals  Time Frame for Long term goals : 2 weeks or until discharge:  Long term goal 1: Pt will demonstrate the ability to safely perform bed mobility with mod I following log rolling technique.   Long term goal 2: Pt will perform sit ><supine Fritz from flat bed surface  Long term goal 3: Pt will transfer to all surfaces Fritz  Long term goal 4: Pt will ambulate 50ft with 4ww Fritz    Treatment plan:  Bed mobility, transfers, balance, gait, TA, TX, stairs, WC     Recommendations for NURSING mobility: ambulate with 4ww to the bathroom, recommend BSC over toilet     Time:   Time in: 0952  Time out: 1012  Timed treatment minutes: 8  Total time: 20    Electronically signed by:    Monica Maurice [id number]  5/23/2022, 11:45 AM

## 2022-05-23 NOTE — CARE COORDINATION
LSW reviewed chart/into room to initiate discharge planning. Pt was sent to [facility] from the Swing Bed. Pt would like to return to the Swing Bed to finish her rehab. LSW sent a message to [facility] through [employer] Teams about the Pt returning to the Swing Bed.     3:01pm - Message from [facility]. She has reviewed the chart and sent it for review by the doctor. She will let this LSW know if they accept the Pt back and when precert is started.

## 2022-05-23 NOTE — PROGRESS NOTES
Neurosurgery Progress Note  5/23/2022 7:01 AM      L1-4 lumbar laminectomies  POD: 10    Assessment and Plan:   1. Post-operative fluid collection-imaging and patient case reviewed by Dr. Navid Morrow, we do not believe that fluid collection is infectious. Patient witnessed ambulating from bathroom back to bed without any difficulties with wheeled walker. Incisions remain intact, minimal erythema, no swelling, no active drainage noted. Repeat cbc, bmp, sed, and crp ordered. If crp and sed are trending down then ok to discharge back to swing bed from neurosurgical standpoint. Recommend discharging with Norco, 1 tablet every 4 hours for the next week or so. Patient states that it was decreased to 1 tablet every 8 hours and she does not feel that the medication lasts that long. 2. Constipation- patient with small bowel movement yesterday. Continue daily bowel regimen, prn suppository on board. 3. Subacute left posterior 12th rib fracture- denies recent trauma/falls  4. HTN- on motoprolol  5. Type 2 DM- on SSI in hospital    Supervising physician: Yaz Quiñones. Navid Morrow MD.  Dr. Navid Morrow was readily and continuously available by phone for direct consultation regarding the care of this patient. Subjective:   Admit Date: 5/20/2022  PCP: Malachi Almodovar MD    Patient returning to bed from bathroom using a wheeled walker without any assistance. She denies any numbness/tingling in her lower extremities. She states she has not passed gas today yet but did have a small bowel movement yesterday. She is urinating without any issues. She understands that she will return back to the swing bed at [facility], has no complaints about this. She still feels that she is not safe to return home and be by herself. She understands that we do not need to do anything surgical regarding the fluid collection seen at the lumbar surgical site at this time.     Data:   Scheduled Meds:   aspirin  81 mg Oral Daily    furosemide  20 mg Oral Daily    melatonin  3 mg Oral Nightly    metoprolol succinate  25 mg Oral BID    rosuvastatin  10 mg Oral Nightly    insulin lispro  0-12 Units SubCUTAneous TID WC    insulin lispro  0-6 Units SubCUTAneous Nightly    hydroxychloroquine  200 mg Oral Daily    collagenase   Topical Daily    lidocaine  1 patch TransDERmal Daily    rOPINIRole  2 mg Oral BID    sennosides-docusate sodium  2 tablet Oral Daily    enoxaparin  40 mg SubCUTAneous Daily         No intake/output data recorded. No intake/output data recorded. No intake or output data in the 24 hours ending 05/23/22 0701  CULTURE results: Invalid input(s): BLOOD CULTURE,  URINE CULTURE, SURGICAL CULTURE  CBC: No results for input(s): WBC, HGB, PLT in the last 72 hours. BMP:  No results for input(s): NA, K, CL, CO2, BUN, CREATININE, GLUCOSE in the last 72 hours. Hepatic: No results for input(s): AST, ALT, ALB, BILITOT, ALKPHOS in the last 72 hours.       IMAGING: available xray, CT, and MRI results reviewed    Objective:   Vitals: BP (!) 127/45   Pulse 63   Temp 97.4 °F (36.3 °C) (Oral)   Resp 18   Ht 5' 4\" (1.626 m)   Wt 207 lb 14.4 oz (94.3 kg)   LMP  (LMP Unknown)   SpO2 97%   BMI 35.69 kg/m²   General appearance: Alert, laying in bed, no distress  HEENT: Normocephalic, atraumatic, EOM intact  Neurologic: Mental status: Oriented x4, speech clear and coherent, no facial droop, follows commands briskly, sensation intact in all extremities  Extremities: Lateral upper extremities 5/5 throughout, bilateral lower extremities 5/5 throughout except for right iliopsoas and right hamstrings which is 4/5 due to pain  Incision: Intact, no erythema, no active drainage, small amount of dried drainage on dressing,  Drains: none      Electronically signed by Reema Chin PA-C on 5/23/2022 at 7:01 AM

## 2022-05-23 NOTE — PROGRESS NOTES
Hospitalist Progress Note      Name:  Merline Cloud /Age/Sex: 1935  (81 y.o. female)   MRN & CSN:  [id number] & [id number] Admission Date/Time: 2022  7:55 PM   Location:  [facility]7/1127- PCP: Ranjan mSith MD         Hospital Day: 4    Assessment and Plan:   Hansa Cook a 81 y. o. female with a pmh of lumbar spinal stenosis with neurogenic claudication s/p L1 and L4 laminectomies and bilateral L2-3 balloon kyphoplasty with L2 and L3 bone biopsy and microdissection non-insulin-dependent type 2 diabetes, essential hypertension and hyperlipidemia who presents with increasing back pain. She has H/o lumbar spinal stenosis with neurogenic claudication. She had a lumbar surgery on  and discharged to [facility] for acute rehab     Post-op right Lower Back pain with Postoperative fluid collection  CT scan without IV contrast shows fluid collection in the right back measuring 14.6 x 6.4 x 4.4 cm; no spinal canal involvement.    Initial CRP 43.8.  Sed rate at 56. Trending down and check again in AM.   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 Plan to to discharge back to swing/rehab bed once precert is obtained. Recommend discharging with Norco, 1 tablet every 4 hours for the next week or so per neurosurgery. PT/OT consult     New subacute left posterior 12th rib fracture  she denies falls. Cont pain control  Continue PT/OT.    Incentive spirometer.     Non-insulin-dependent diabetes type 2  Well-controlled  A1c of 5.8   Carb controlled diet.    Sliding scale insulin.    Hypoglycemic protocol     Postop constipation  Bowel regimen: senna, MiraLAX, dulcolax suppository as needed.     Essential hypertension  Continue Toprol     Rheumatoid arthritis  On Plaquenil     Chronic diastolic CHF-  Continue Lasix      Right foot wound  Follows with wound care nurse outpatient.    Wound care     Discharge to swing bed in [place] once precert is obtained. Diet ADULT DIET; Regular; 4 carb choices (60 gm/meal)   DVT Prophylaxis [x] Lovenox, []  Heparin, [] SCDs, [] Ambulation   GI Prophylaxis [] PPI,  [] H2 Blocker,  [] Carafate,  [] Diet/Tube Feeds   Code Status Full Code   Disposition Patient requires continued admission due to needing precert   MDM [] Low, [x] Moderate,[]  High  Patient's risk as above due to above comorbid conditions. History of Present Illness:     Chief Complaint:     The pt is seen sitting in bed. She continues to have back pain and understands that it may take a couple of weeks to improve. She is requesting to continue Q4H [place] upon discharge. Ten point ROS reviewed negative, unless as noted above    Objective:   No intake or output data in the 24 hours ending 05/23/22 1445   Vitals:   Vitals:    05/23/22 0840   BP:    Pulse:    Resp: 18   Temp:    SpO2:      Physical Exam:   GEN    Awake female, sitting upright in bed in no apparent distress. Appears given age. NECK  Supple, no apparent thyromegaly or masses. RESP  Clear to auscultation, no wheezes, rales or rhonchi. Symmetric chest movement while on room air. CARDIO/VASC           S1/S2 auscultated. Regular rate without appreciable murmurs, rubs, or gallops. No JVD or carotid bruits. Peripheral pulses equal bilaterally and palpable. No peripheral edema. GI        Abdomen is soft without significant tenderness, masses, or guarding. SKIN    Normal coloration, warm, dry. NEURO           Cranial nerves appear grossly intact, normal speech, no lateralizing weakness. PSYCH            Awake, alert, oriented x 4.   Affect appropriate.       Medications:   Medications:    polyethylene glycol  17 g Oral Daily    aspirin  81 mg Oral Daily    furosemide  20 mg Oral Daily    melatonin  3 mg Oral Nightly    metoprolol succinate  25 mg Oral BID    rosuvastatin  10 mg Oral Nightly    insulin lispro  0-12 Units SubCUTAneous TID WC    insulin lispro  0-6 Units SubCUTAneous Nightly    hydroxychloroquine  200 mg Oral Daily    collagenase   Topical Daily    lidocaine  1 patch TransDERmal Daily    rOPINIRole  2 mg Oral BID    sennosides-docusate sodium  2 tablet Oral Daily    enoxaparin  40 mg SubCUTAneous Daily      Infusions:    dextrose       PRN Meds: bisacodyl, 10 mg, Daily PRN  senna, 1 tablet, BID PRN  glucose, 4 tablet, PRN  dextrose bolus, 125 mL, PRN   Or  dextrose bolus, 250 mL, PRN  glucagon (rDNA), 1 mg, PRN  dextrose, 1,000 mL, PRN  HYDROcodone 5 mg - acetaminophen, 1 tablet, Q4H PRN  HYDROcodone 5 mg - acetaminophen, 2 tablet, Q4H PRN  ondansetron, 4 mg, Q6H PRN          Patient is still admitted because needs precert. The anticipated discharge is in less than 24 hours.      Electronically signed by Ly Estrada MD on 5/23/2022 at 2:45 PM

## 2022-05-23 NOTE — PLAN OF CARE
Problem: Discharge Planning  Goal: Discharge to home or other facility with appropriate resources  5/23/2022 0218 by Taco Lr RN  Outcome: Progressing  5/22/2022 1250 by Yudi Lafleur LPN  Outcome: Progressing  Flowsheets (Taken 5/22/2022 0513 by Vero Araujo, RN)  Discharge to home or other facility with appropriate resources:   Identify barriers to discharge with patient and caregiver   Identify discharge learning needs (meds, wound care, etc)   Refer to discharge planning if patient needs post-hospital services based on physician order or complex needs related to functional status, cognitive ability or social support system   Arrange for needed discharge resources and transportation as appropriate   Arrange for interpreters to assist at discharge as needed     Problem: Pain  Goal: Verbalizes/displays adequate comfort level or baseline comfort level  5/23/2022 0218 by Taco Lr RN  Outcome: Progressing  5/22/2022 1250 by Yudi Lafleur LPN  Outcome: Progressing     Problem: Safety - Adult  Goal: Free from fall injury  5/23/2022 0218 by Taco Lr RN  Outcome: Progressing  5/22/2022 1250 by Yudi Lafleur LPN  Outcome: Progressing     Problem: ABCDS Injury Assessment  Goal: Absence of physical injury  5/22/2022 1250 by Yudi Lafleur LPN  Outcome: Progressing     Problem: Chronic Conditions and Co-morbidities  Goal: Patient's chronic conditions and co-morbidity symptoms are monitored and maintained or improved  5/22/2022 1250 by Yudi Lafleur LPN  Outcome: Progressing

## 2022-05-23 NOTE — PROGRESS NOTES
John 89, 1935, 1127/1127-A, 5/23/2022    Discharge Recommendation: return to swingbed    History:  [place]:  There were no encounter diagnoses. Fluid collection at surgical site    Subjective:  Patient states: \"I feel much better\"  Pain: 10/10 (lumbar sx site during ambulation, in bed after EOB activity)  Communication with other providers: consult with PT and RN for pt pain/sitting EOB for lunch  Restrictions: general precautions, fall risk, spinal precautions (no BLT)    Home Setup/Prior level of function:    Social/Functional History  Lives With: Alone  Type of Home: Apartment  Home Layout: One level  Home Access: Stairs to enter without rails  Entrance Stairs - Number of Steps: threshold  Bathroom Shower/Tub: Tub/Shower unit  Bathroom Toilet: Handicap height  Bathroom Equipment: Grab bars in shower,Hand-held shower,Shower chair,Toilet raiser  Home Equipment: Debra [place] Help From: Personal care attendant (Pt typically has hired help for 11 hours a week. Her help is having shoulder surgery next week and will not be available.)  ADL Assistance: Independent (neighbor comes to her apartment to be around during showers in case she falls)  Homemaking Assistance: Needs assistance (aide helps with cleaning, groceries, and misc errands; daughter helps with laundry; pt manages light meal prep and orders take out a lot)  Ambulation Assistance: Independent (with 4ww)  Transfer Assistance: Independent  Active :  Yes  Additional comments:Pt admitted from swing bed    Examination:  · Observation: Pt was laying on R side in bed sleeping upon arrival, agreeable to session  · Vision: [facility]  · Hearing: [facility]  · Objective Measures: vitals stable throughout    Body Systems and functions:  · ROM: WFL in BUE  · Strength: 3+/5 in BUE  · Sensation: WFL  · Tone: normotonic in BUE  · Coordination: movements fluid and coordinated  · Posture: normal posture  · Activity Tolerance: fair (limited by pain)    Activities of Daily Living (ADLs):  · Feeding: setup  · Grooming: setup at EOB  · Toileting: max A for standing pericare following small BM  · UB dressing: SBA at EOB  · LB dressing: mod A at EOB  · UB bathing: min A for back at EOB  · LB bathing: mod A at EOB    *pt ADL function inferred from gross functional assessment of mobility, balance, posture, safety awareness, activity tolerance (unless otherwise indicated)    Cognitive and Psychosocial Functioning:  · Overall cognitive status: WNL, A/Ox4  · Affect: very pleasant    Balance:   · Sitting: good  · Standing: fair    Functional Mobility:  · Rolling Laterally in Bed: DNT  · Supine to Sit: SBA  · Sit to Stand: CGA  From EOB, min A from toilet  · Ambulation: CGA using rollator      AM-PAC 6 click short form for inpatient daily activity:   How much help from another person does the patient currently need. .. Unable  Dep A Lot  Max A A Lot   Mod A A Little  Min A A Little   CGA  SBA None   Mod I  Indep  Sup   1. Putting on and taking off regular lower body clothing? [] 1    [] 2   [x] 2   [] 3   [] 3   [] 4      2. Bathing (including washing, rinsing, drying)? [] 1   [] 2   [x] 2 [] 3 [] 3 [] 4   3. Toileting, which includes using toilet, bedpan, or urinal? [] 1    [x] 2   [] 2   [] 3   [] 3   [] 4     4. Putting on and taking off regular upper body clothing? [] 1   [] 2   [] 2   [] 3   [x] 3    [] 4      5. Taking care of personal grooming such as brushing teeth? [] 1   [] 2    [] 2 [] 3    [x] 3   [] 4      6. Eating meals? [] 1   [] 2   [] 2   [] 3   [x] 3   [] 4        Raw Score:  15    24/24 = unimpaired  23/24 = 1-20% impaired   20/24-22/24 = 21-40% impaired  15/24-19/24 = 41-59% impaired   10/24-14/24 = 60%-79% impaired  7/24-9/24 = 80%-99% impaired  6/24 = 100% impaired     Treatment:  Self Care Training (54 minutes):   Self care training was performed today.   Cues were given for safety, sequence, UE/LE placement, visual cues, and balance. Activities performed today included pt performing supine to sit EOB with SBA with inc time/cueing for sequencing, pt following spinal precautions well. Pt remaining EOB momentarily to adjust to pain level. Pt then standing from EOB with CGA with inc time, ambulating to door of room and into bathroom to toilet with CGA using rollator. Pt sitting on toilet CGA, having small BM. Pt then standing from toilet min A, attempting to perform pericare by bending forward, edu on spinal precautions. Pt ambulating to bed CGA with rollator, sitting EOB. Pt requesting sponge bath at this time. Pt doffing UB clothing with SBA, LB clothes doffed mod A. Pt performing UB bathing SBA besides back requiring min A. Pt performing LB bathing mod A. Pt washing hair min A. Pt donning new UB clothing SBA, threading LB clothing mod A. Pt stand from EOB CGA to perform standing pericare max A, hike LB clothing mod A. Pt sitting back on bed, putting on deodorant and combing hair SBA. Pt performs sit to supine SBA, c/o severe pain once in supine. Therapist found and notified RN of pt inc pain, ice water retrieved for pt. Nutrition arriving with pt lunch upon return, pt requesting to sit EOB to eat. Pt demos supine to sit SBA, setup for feeding tasks, nursing notified that pt sitting EOB for lunch. Education: Role of OT, OT POC, discharge needs, safety, benefits of EOB/OOB activity, AE needs  Safety Measures: Gait belt used for safety of pt and therapist, Left at EOB eating lunch (nursing notified), Alarm in place, call light and phone within reach, lines managed    Assessment:  Pt is a pleasant 81 yr old female from home alone who presents with fluid collection at surgical site (recent lumbar sx). Prior to admission, pt was mostly independent, receiving sup from neighbor for her bathing tasks, aide/dgtr for IADLs, t/fs and ambulates mod I using rollator.  Pt currently presenting with generalized weakness, significant pain during activity in lower back, dec endurance, SBA-min A for UB tasks and mod-max for LB tasks, CGA for t/fs and ambulation using rollator. Pt would benefit from continued IP OT services during their stay, and discharge back to [facility]. Complexity: mod  Prognosis: good  Barriers: pain, endurance, weakness    Plan:  Plan: 3+/week    Treatment to include: Strengthening, ROM, Balance Training, Functional Mobility Training, Endurance Training, Gait Training, Pain Management, Safety Education and Training, Patient+Caregiver Education and Training, Equipment Evaluation Education and Procurement, Positioning, Self Care Training, Home Management Training, Coordination Training    Pt would benefit from continued edu on pain management  Adaptive Equipment Recommendations: none    Goals:  Time frame for goals: 2 weeks  1. Pt will complete feeding tasks with ind  2. Pt will complete grooming tasks with CGA standing at sink  3. Pt will complete toileting tasks with mod I using standard commode  4. Pt will complete UB dressing tasks with ind  5. Pt will complete LB dressing tasks with mod I  6. Pt will complete UB bathing tasks with sup  7. Pt will complete LB bathing tasks with sup  8. Pt will complete therapeutic exercise/activity to increase independence in ADL/IADL function  9.  Pt will practice functional transfers and mobility with AD for increased safety and independence    Time:   Time in: 1039  Time out: 1143  Treatment Minutes: 54  Evaluation Minutes: 10  Total time: 64    Electronically signed by:      CHAVA Whitmore  5/23/2022, 2:01 PM

## 2022-05-24 LAB
BACTERIA: NEGATIVE /HPF
BILIRUBIN URINE: NEGATIVE MG/DL
BLOOD, URINE: NEGATIVE
CLARITY: CLEAR
COLOR: ABNORMAL
GLUCOSE BLD-MCNC: 107 MG/DL (ref 70–99)
GLUCOSE BLD-MCNC: 107 MG/DL (ref 70–99)
GLUCOSE BLD-MCNC: 88 MG/DL (ref 70–99)
GLUCOSE BLD-MCNC: 95 MG/DL (ref 70–99)
GLUCOSE, URINE: NEGATIVE MG/DL
KETONES, URINE: NEGATIVE MG/DL
LEUKOCYTE ESTERASE, URINE: NEGATIVE
MUCUS: ABNORMAL HPF
NITRITE URINE, QUANTITATIVE: NEGATIVE
PH, URINE: 6.5 (ref 5–8)
PROTEIN UA: NEGATIVE MG/DL
RBC URINE: <1 /HPF (ref 0–6)
SPECIFIC GRAVITY UA: 1.01 (ref 1–1.03)
SQUAMOUS EPITHELIAL: 4 /HPF
TRICHOMONAS: ABNORMAL /HPF
UROBILINOGEN, URINE: 0.2 MG/DL (ref 0.2–1)
WBC UA: <1 /HPF (ref 0–5)

## 2022-05-24 PROCEDURE — 97535 SELF CARE MNGMENT TRAINING: CPT

## 2022-05-24 PROCEDURE — 97530 THERAPEUTIC ACTIVITIES: CPT

## 2022-05-24 PROCEDURE — 6360000002 HC RX W HCPCS: Performed by: NURSE PRACTITIONER

## 2022-05-24 PROCEDURE — 94150 VITAL CAPACITY TEST: CPT

## 2022-05-24 PROCEDURE — 82962 GLUCOSE BLOOD TEST: CPT

## 2022-05-24 PROCEDURE — 76937 US GUIDE VASCULAR ACCESS: CPT

## 2022-05-24 PROCEDURE — 1200000000 HC SEMI PRIVATE

## 2022-05-24 PROCEDURE — 99024 POSTOP FOLLOW-UP VISIT: CPT | Performed by: PHYSICIAN ASSISTANT

## 2022-05-24 PROCEDURE — 6370000000 HC RX 637 (ALT 250 FOR IP): Performed by: PHYSICIAN ASSISTANT

## 2022-05-24 PROCEDURE — 94761 N-INVAS EAR/PLS OXIMETRY MLT: CPT

## 2022-05-24 PROCEDURE — 81001 URINALYSIS AUTO W/SCOPE: CPT

## 2022-05-24 PROCEDURE — 6370000000 HC RX 637 (ALT 250 FOR IP): Performed by: NURSE PRACTITIONER

## 2022-05-24 PROCEDURE — 97116 GAIT TRAINING THERAPY: CPT

## 2022-05-24 RX ORDER — CYCLOBENZAPRINE HCL 10 MG
5 TABLET ORAL ONCE
Status: DISCONTINUED | OUTPATIENT
Start: 2022-05-24 | End: 2022-05-25 | Stop reason: HOSPADM

## 2022-05-24 RX ADMIN — METOPROLOL SUCCINATE 25 MG: 25 TABLET, EXTENDED RELEASE ORAL at 09:15

## 2022-05-24 RX ADMIN — HYDROCODONE BITARTRATE AND ACETAMINOPHEN 2 TABLET: 5; 325 TABLET ORAL at 17:22

## 2022-05-24 RX ADMIN — ASPIRIN 81 MG: 81 TABLET, COATED ORAL at 09:15

## 2022-05-24 RX ADMIN — ROPINIROLE HYDROCHLORIDE 2 MG: 1 TABLET, FILM COATED ORAL at 09:15

## 2022-05-24 RX ADMIN — SENNOSIDES AND DOCUSATE SODIUM 2 TABLET: 50; 8.6 TABLET ORAL at 09:15

## 2022-05-24 RX ADMIN — MELATONIN 3 MG: at 22:14

## 2022-05-24 RX ADMIN — HYDROCODONE BITARTRATE AND ACETAMINOPHEN 2 TABLET: 5; 325 TABLET ORAL at 22:13

## 2022-05-24 RX ADMIN — HYDROXYCHLOROQUINE SULFATE 200 MG: 200 TABLET ORAL at 09:15

## 2022-05-24 RX ADMIN — SENNOSIDES 8.6 MG: 8.6 TABLET, COATED ORAL at 01:01

## 2022-05-24 RX ADMIN — ROSUVASTATIN CALCIUM 10 MG: 5 TABLET, FILM COATED ORAL at 22:13

## 2022-05-24 RX ADMIN — COLLAGENASE SANTYL: 250 OINTMENT TOPICAL at 12:49

## 2022-05-24 RX ADMIN — BISACODYL 10 MG: 10 SUPPOSITORY RECTAL at 05:05

## 2022-05-24 RX ADMIN — HYDROCODONE BITARTRATE AND ACETAMINOPHEN 2 TABLET: 5; 325 TABLET ORAL at 01:01

## 2022-05-24 RX ADMIN — HYDROCODONE BITARTRATE AND ACETAMINOPHEN 2 TABLET: 5; 325 TABLET ORAL at 09:14

## 2022-05-24 RX ADMIN — HYDROCODONE BITARTRATE AND ACETAMINOPHEN 2 TABLET: 5; 325 TABLET ORAL at 05:04

## 2022-05-24 RX ADMIN — ROPINIROLE HYDROCHLORIDE 2 MG: 1 TABLET, FILM COATED ORAL at 22:13

## 2022-05-24 RX ADMIN — HYDROCODONE BITARTRATE AND ACETAMINOPHEN 2 TABLET: 5; 325 TABLET ORAL at 13:05

## 2022-05-24 RX ADMIN — ENOXAPARIN SODIUM 40 MG: 100 INJECTION SUBCUTANEOUS at 09:13

## 2022-05-24 RX ADMIN — FUROSEMIDE 20 MG: 20 TABLET ORAL at 09:15

## 2022-05-24 RX ADMIN — POLYETHYLENE GLYCOL (3350) 17 G: 17 POWDER, FOR SOLUTION ORAL at 09:13

## 2022-05-24 RX ADMIN — METOPROLOL SUCCINATE 25 MG: 25 TABLET, EXTENDED RELEASE ORAL at 22:13

## 2022-05-24 ASSESSMENT — PAIN SCALES - GENERAL
PAINLEVEL_OUTOF10: 10
PAINLEVEL_OUTOF10: 6
PAINLEVEL_OUTOF10: 8
PAINLEVEL_OUTOF10: 10
PAINLEVEL_OUTOF10: 9
PAINLEVEL_OUTOF10: 0
PAINLEVEL_OUTOF10: 9
PAINLEVEL_OUTOF10: 10
PAINLEVEL_OUTOF10: 10

## 2022-05-24 ASSESSMENT — PAIN DESCRIPTION - DESCRIPTORS
DESCRIPTORS: ACHING
DESCRIPTORS: ACHING
DESCRIPTORS: ACHING;DULL;THROBBING

## 2022-05-24 ASSESSMENT — PAIN DESCRIPTION - LOCATION
LOCATION: BACK

## 2022-05-24 ASSESSMENT — PAIN DESCRIPTION - ONSET
ONSET: ON-GOING

## 2022-05-24 ASSESSMENT — PAIN DESCRIPTION - FREQUENCY
FREQUENCY: CONTINUOUS

## 2022-05-24 ASSESSMENT — PAIN DESCRIPTION - PAIN TYPE
TYPE: ACUTE PAIN
TYPE: SURGICAL PAIN
TYPE: ACUTE PAIN;SURGICAL PAIN

## 2022-05-24 ASSESSMENT — PAIN DESCRIPTION - ORIENTATION
ORIENTATION: MID
ORIENTATION: MID

## 2022-05-24 NOTE — CARE COORDINATION
This RN CM has sent a message to [facility] with Swing Bed advising I am CM for pt today and to please update me when there is a decision on referral.

## 2022-05-24 NOTE — PROGRESS NOTES
Occupational Therapy  . Occupational Therapy Treatment Note    Name: Tamiko Rodriguez MRN: [id number] :   1935   Date:  2022   Admission Date: 2022 Room:  [address] [place], [place] [address]A     Discharge recommendations: return to swingbed    Primary Problem:      Restrictions/Precautions:          General precautions, fall risk               Communication with other providers: spoke with RN regarding pain med schedule    Subjective:  Patient states: Im claustrophobic    Pain:   C/O pain in lumbar spine and RLE, pain rating 10/10    Objective:    Observation: patient side lying upon entry , agreeable to therapy very pleasant, patient in more pain vs. Last stay. Objective Measures:  Tele   BP: EOB- 141/66  Treatment, including education:    ADL activity training was instructed today. Cues were given for safety, sequence, UE/LE placement, visual cues, and balance. Activities performed today included dressing, toileting, hand hygiene, and grooming. Feeding: SUP while EOB  Facial hygiene:SUP    Therapeutic activity training was instructed today. Cues were given for safety, sequence, UE/LE placement, awareness, and balance. Activities performed today included bed mobility training, side lying to EOB     Side Lying to EOB: SBA  EOB sitting balance : good balance tolerated 20 mins, patient participated in weight shifting tasks and crossing midline  EOB to side lying: SBA with increased effort due to pain    Patient educated on role of OT , benefits of OT and rationale for therapeutic intervention. EOB/OOB activity benefits, eating upright vs. Laying down. All therapeutic intervention performed c emphasis on trunk strengthening, bed mobility and supine<>sit to maintain / increase strength for functional tasks / transfers. Patient left safely in bed at end of session, with call light in reach, alarm on and nursing aware.        Assessment / Impression:    Patient's tolerance of treatment: well  Adverse Reaction: pain, dizziness  Significant change in status and impact:  none  Barriers to improvement: dizziness, pain, weakness      Plan for Next Session:    Continue with OT POC      Time in:  1135  Time out:  1203  Timed treatment minutes:  28  Total treatment time:  28      Electronically signed by:    Royal Shrestha, Student SUSAN  I have read and agree with the above documentation - Mar Marin, [address] 5/24/2022, 12:07 PM

## 2022-05-24 NOTE — PROGRESS NOTES
intake/output data recorded. No intake/output data recorded. No intake or output data in the 24 hours ending 05/24/22 1700  CULTURE results: Invalid input(s): BLOOD CULTURE,  URINE CULTURE, SURGICAL CULTURE  CBC:   Recent Labs     05/23/22  0752   WBC 8.4   HGB 12.0*        BMP:    Recent Labs     05/23/22  0752      K 3.8   CL 96*   CO2 29   BUN 10   CREATININE 0.5*   GLUCOSE 112*     Hepatic: No results for input(s): AST, ALT, ALB, BILITOT, ALKPHOS in the last 72 hours. IMAGING: available xray, CT, and MRI results reviewed    Objective:   Vitals: BP (!) 167/59   Pulse 69   Temp 97.8 °F (36.6 °C) (Oral)   Resp 20   Ht 5' 4\" (1.626 m)   Wt 207 lb 14.4 oz (94.3 kg)   LMP  (LMP Unknown)   SpO2 98%   BMI 35.69 kg/m²   General appearance: alert, laying in bed, just finished working with therapy  HEENT: normocephalic, atraumatic, EOMI  Neurologic: Mental status: alert and oriented x4, speech clear and coherent, no facial droop, follows commands briskly  Extremities: bilateral upper extremities 5/5 throughout, left lower extremity 5/5 throughout except for left iliopsoas and left hamstrings which is 4/5.   Incision: intact, no erythema, no swelling, no drainage on dressing  Drains: none      Electronically signed by Angelina William PA-C on 5/24/2022 at 5:00 PM

## 2022-05-24 NOTE — PLAN OF CARE
Problem: Discharge Planning  Goal: Discharge to home or other facility with appropriate resources  5/24/2022 0057 by Jose Alfredo Baptiste RN  Outcome: Progressing  5/24/2022 0051 by Jose Alfredo Baptiste RN  Outcome: Progressing  5/24/2022 0051 by Jose Alfredo Baptiste RN  Outcome: Progressing     Problem: Pain  Goal: Verbalizes/displays adequate comfort level or baseline comfort level  5/24/2022 0057 by Jose Alfredo Baptiste RN  Outcome: Progressing  5/24/2022 0051 by Jose Alfredo Baptiste RN  Outcome: Progressing     Problem: Safety - Adult  Goal: Free from fall injury  5/24/2022 0057 by Jose Alfredo Baptiste RN  Outcome: Progressing  5/24/2022 0051 by Jose Alfredo Baptiste RN  Outcome: Progressing     Problem: ABCDS Injury Assessment  Goal: Absence of physical injury  5/24/2022 0057 by Jose Alfredo Baptiste RN  Outcome: Progressing  5/24/2022 0051 by Jose Alfredo Baptiste RN  Outcome: Progressing     Problem: Chronic Conditions and Co-morbidities  Goal: Patient's chronic conditions and co-morbidity symptoms are monitored and maintained or improved  5/24/2022 0057 by Jose Alfredo Baptiste RN  Outcome: Progressing  5/24/2022 0051 by Jose Alfredo Baptiste RN  Outcome: Progressing

## 2022-05-24 NOTE — PLAN OF CARE
Problem: Discharge Planning  Goal: Discharge to home or other facility with appropriate resources  5/24/2022 0051 by Suzie Aquino RN  Outcome: Progressing  5/24/2022 0051 by Suzie Aquino RN  Outcome: Progressing     Problem: Pain  Goal: Verbalizes/displays adequate comfort level or baseline comfort level  Outcome: Progressing     Problem: Safety - Adult  Goal: Free from fall injury  Outcome: Progressing     Problem: ABCDS Injury Assessment  Goal: Absence of physical injury  Outcome: Progressing     Problem: Chronic Conditions and Co-morbidities  Goal: Patient's chronic conditions and co-morbidity symptoms are monitored and maintained or improved  Outcome: Progressing

## 2022-05-24 NOTE — PROGRESS NOTES
Hospitalist Progress Note      Name:  Montrell Cabrera /Age/Sex: 1935  (81 y.o. female)   MRN & CSN:  [id number] & [id number] Admission Date/Time: 2022  7:55 PM   Location:  [facility]7/1127- PCP: Sudhir Haile MD         Hospital Day: 5      Assessment and Plan:     Montrell Cabrera is a 81 y.o. female with a pmh of lumbar spinal stenosis with neurogenic claudication s/p L1 and L4 laminectomies and bilateral L2-3 balloon kyphoplasty with L2 and L3 bone biopsy and microdissection non-insulin-dependent type 2 diabetes, essential hypertension and hyperlipidemia who presents with increasing back pain. She has H/o lumbar spinal stenosis with neurogenic claudication. She had a lumbar surgery on  and discharged to [facility] for acute rehab. Patient ESR/CRP trending down. Patient can be discharged once bed is available/pre-CERT obtained. Post-op right Lower Back pain with Postoperative fluid collection  CT scan without IV contrast shows fluid collection in the right back measuring 14.6 x 6.4 x 4.4 cm; no spinal canal involvement. Initial CRP 43.8. Sed rate at 56. Trending down and check again in AM.   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 Plan to to discharge back to swing/rehab bed once precert is obtained. Recommend discharging with Norco, 1 tablet every 4 hours for the next week or so per neurosurgery. PT/OT consult     New subacute left posterior 12th rib fracture  she denies falls. Cont pain control  Continue PT/OT. Incentive spirometer. Non-insulin-dependent diabetes type 2  Well-controlled  A1c of 5.8   Carb controlled diet. Sliding scale insulin.    Hypoglycemic protocol     Postop constipation  Bowel regimen: senna, MiraLAX, dulcolax suppository as needed. Essential hypertension  Continue Toprol     Rheumatoid arthritis  On Plaquenil     Chronic diastolic CHF-  Continue Lasix      Right foot wound  Follows with wound care nurse outpatient. Wound care      Discharge to swing bed in [place] once precert is obtained    DVT prophylaxis: Chemical/SCD  CODE STATUS: Total support    Subjective. :     Patient was seen and examined this morning. Patient states she would like to be discharged to rehab today. Currently awaiting a bed. No acute events overnight. Patient is afebrile stable vital signs. Objective:   Vitals:   Vitals:    05/24/22 0900   BP: 130/62   Pulse: 65   Resp: 18   Temp: 98.3 °F (36.8 °C)   SpO2:          Physical Exam:   GEN: Awake, alert, in no apparent distress awake female, sitting upright in bed in no apparent distress. Appears given age. HEENT: Atraumatic, normocephalic, PERRLA, EOMI  NECK: Supple, no apparent thyromegaly or masses. RESP: Clear lungs to auscultation  CARDIO/VASC: Regular rate and rhythm, normal S1, S2, no murmurs  GI: Abdomen is soft, nontender, no significant organomegaly noted, bowel sounds present  MSK: No gross joint deformities.   Skin: No significant rashes, skin lesions noted  Neuro: AOx3, cranial nerves grossly intact, no focal motor or sensory deficits   PSYCH: Affect appropriate    Medications:   Medications:    cyclobenzaprine  5 mg Oral Once    polyethylene glycol  17 g Oral Daily    aspirin  81 mg Oral Daily    furosemide  20 mg Oral Daily    melatonin  3 mg Oral Nightly    metoprolol succinate  25 mg Oral BID    rosuvastatin  10 mg Oral Nightly    insulin lispro  0-12 Units SubCUTAneous TID     insulin lispro  0-6 Units SubCUTAneous Nightly    hydroxychloroquine  200 mg Oral Daily    collagenase   Topical Daily    lidocaine  1 patch TransDERmal Daily    rOPINIRole  2 mg Oral BID    sennosides-docusate sodium  2 tablet Oral Daily    enoxaparin  40 mg SubCUTAneous Daily      Infusions:    dextrose       PRN Meds: bisacodyl, 10 mg, Daily PRN  senna, 1 tablet, BID PRN  glucose, 4 tablet, PRN  dextrose bolus, 125 mL, PRN   Or  dextrose bolus, 250 mL, PRN  glucagon (rDNA), 1 mg, PRN  dextrose, 1,000 mL, PRN  HYDROcodone 5 mg - acetaminophen, 1 tablet, Q4H PRN  HYDROcodone 5 mg - acetaminophen, 2 tablet, Q4H PRN  ondansetron, 4 mg, Q6H PRN          Electronically signed by Monty Law MD on 5/24/2022 at 9:47 AM

## 2022-05-24 NOTE — FLOWSHEET NOTE
05/24/22 1648   Encounter Summary   Encounter Overview/Reason  Initial Encounter   Service Provided For: Patient   Referral/Consult From: [facility];Family members   Last Encounter  05/24/22   Complexity of Encounter Low   Encounter    Type Initial Screen/Assessment   Spiritual/Emotional needs   Type Spiritual Support   Assessment/Intervention/Outcome   Assessment Anxious   Intervention Active listening;Empowerment   Outcome Comfort;Coping   Plan and Referrals   Plan/Referrals Continue to visit, (comment); Continue Support (comment)

## 2022-05-25 ENCOUNTER — HOSPITAL ENCOUNTER (INPATIENT)
Age: 87
LOS: 8 days | Discharge: HOME HEALTH | DRG: 948 | End: 2022-06-02
Attending: INTERNAL MEDICINE | Admitting: INTERNAL MEDICINE
Payer: MEDICARE

## 2022-05-25 VITALS
RESPIRATION RATE: 16 BRPM | BODY MASS INDEX: 35.49 KG/M2 | SYSTOLIC BLOOD PRESSURE: 104 MMHG | TEMPERATURE: 97.6 F | HEART RATE: 69 BPM | DIASTOLIC BLOOD PRESSURE: 81 MMHG | WEIGHT: 207.9 LBS | OXYGEN SATURATION: 97 % | HEIGHT: 64 IN

## 2022-05-25 DIAGNOSIS — G89.18 POST-OPERATIVE PAIN: Primary | ICD-10-CM

## 2022-05-25 PROBLEM — R53.81 DEBILITY: Status: ACTIVE | Noted: 2022-05-25

## 2022-05-25 LAB
GLUCOSE BLD-MCNC: 106 MG/DL (ref 70–99)
GLUCOSE BLD-MCNC: 114 MG/DL (ref 70–99)
GLUCOSE BLD-MCNC: 161 MG/DL (ref 70–99)
GLUCOSE BLD-MCNC: 86 MG/DL (ref 70–99)
SARS-COV-2, NAAT: NOT DETECTED
SOURCE: NORMAL

## 2022-05-25 PROCEDURE — 6370000000 HC RX 637 (ALT 250 FOR IP): Performed by: PHYSICIAN ASSISTANT

## 2022-05-25 PROCEDURE — 87635 SARS-COV-2 COVID-19 AMP PRB: CPT

## 2022-05-25 PROCEDURE — 82962 GLUCOSE BLOOD TEST: CPT

## 2022-05-25 PROCEDURE — 1200000002 HC SEMI PRIVATE SWING BED

## 2022-05-25 PROCEDURE — 99024 POSTOP FOLLOW-UP VISIT: CPT | Performed by: PHYSICIAN ASSISTANT

## 2022-05-25 PROCEDURE — 6360000002 HC RX W HCPCS: Performed by: NURSE PRACTITIONER

## 2022-05-25 PROCEDURE — 6370000000 HC RX 637 (ALT 250 FOR IP): Performed by: NURSE PRACTITIONER

## 2022-05-25 PROCEDURE — 94761 N-INVAS EAR/PLS OXIMETRY MLT: CPT

## 2022-05-25 RX ORDER — CLOBETASOL PROPIONATE 0.5 MG/G
CREAM TOPICAL 2 TIMES DAILY
Status: DISCONTINUED | OUTPATIENT
Start: 2022-05-25 | End: 2022-06-02 | Stop reason: HOSPADM

## 2022-05-25 RX ORDER — FUROSEMIDE 20 MG/1
20 TABLET ORAL DAILY
Status: DISCONTINUED | OUTPATIENT
Start: 2022-05-26 | End: 2022-06-02 | Stop reason: HOSPADM

## 2022-05-25 RX ORDER — HYDROCODONE BITARTRATE AND ACETAMINOPHEN 5; 325 MG/1; MG/1
2 TABLET ORAL EVERY 6 HOURS PRN
Status: DISCONTINUED | OUTPATIENT
Start: 2022-05-25 | End: 2022-05-26 | Stop reason: ALTCHOICE

## 2022-05-25 RX ORDER — ROPINIROLE 1 MG/1
1 TABLET, FILM COATED ORAL NIGHTLY
Status: DISCONTINUED | OUTPATIENT
Start: 2022-05-25 | End: 2022-05-26

## 2022-05-25 RX ORDER — ASPIRIN 81 MG/1
81 TABLET ORAL DAILY
Status: DISCONTINUED | OUTPATIENT
Start: 2022-05-26 | End: 2022-06-02 | Stop reason: HOSPADM

## 2022-05-25 RX ORDER — HYDROCODONE BITARTRATE AND ACETAMINOPHEN 5; 325 MG/1; MG/1
1 TABLET ORAL EVERY 4 HOURS PRN
Status: DISCONTINUED | OUTPATIENT
Start: 2022-05-25 | End: 2022-06-02 | Stop reason: HOSPADM

## 2022-05-25 RX ORDER — ALBUTEROL SULFATE 90 UG/1
2 AEROSOL, METERED RESPIRATORY (INHALATION) 4 TIMES DAILY PRN
Status: DISCONTINUED | OUTPATIENT
Start: 2022-05-25 | End: 2022-06-02 | Stop reason: HOSPADM

## 2022-05-25 RX ORDER — INSULIN LISPRO 100 [IU]/ML
0-3 INJECTION, SOLUTION INTRAVENOUS; SUBCUTANEOUS NIGHTLY
Status: DISCONTINUED | OUTPATIENT
Start: 2022-05-25 | End: 2022-05-31

## 2022-05-25 RX ORDER — VIT A/VIT C/VIT E/ZINC/COPPER 2148-113
1 TABLET ORAL 2 TIMES DAILY
Status: DISCONTINUED | OUTPATIENT
Start: 2022-05-25 | End: 2022-05-25 | Stop reason: RX

## 2022-05-25 RX ORDER — ROSUVASTATIN CALCIUM 5 MG/1
10 TABLET, COATED ORAL NIGHTLY
Status: DISCONTINUED | OUTPATIENT
Start: 2022-05-25 | End: 2022-06-02 | Stop reason: HOSPADM

## 2022-05-25 RX ORDER — POLYETHYLENE GLYCOL 3350 17 G/17G
17 POWDER, FOR SOLUTION ORAL DAILY PRN
Status: DISCONTINUED | OUTPATIENT
Start: 2022-05-25 | End: 2022-06-01

## 2022-05-25 RX ORDER — POLYETHYLENE GLYCOL 3350 17 G/17G
17 POWDER, FOR SOLUTION ORAL DAILY PRN
Qty: 17 G | Refills: 1
Start: 2022-05-25 | End: 2022-06-24

## 2022-05-25 RX ORDER — INSULIN LISPRO 100 [IU]/ML
0-6 INJECTION, SOLUTION INTRAVENOUS; SUBCUTANEOUS
Status: DISCONTINUED | OUTPATIENT
Start: 2022-05-25 | End: 2022-05-31

## 2022-05-25 RX ORDER — HYDROCODONE BITARTRATE AND ACETAMINOPHEN 5; 325 MG/1; MG/1
1-2 TABLET ORAL EVERY 4 HOURS PRN
Qty: 35 TABLET | Refills: 0 | Status: ON HOLD | OUTPATIENT
Start: 2022-05-25 | End: 2022-06-02 | Stop reason: HOSPADM

## 2022-05-25 RX ORDER — METOPROLOL SUCCINATE 25 MG/1
25 TABLET, EXTENDED RELEASE ORAL DAILY
Status: DISCONTINUED | OUTPATIENT
Start: 2022-05-26 | End: 2022-06-02 | Stop reason: HOSPADM

## 2022-05-25 RX ORDER — HYDROXYCHLOROQUINE SULFATE 200 MG/1
200 TABLET, FILM COATED ORAL DAILY
Status: DISCONTINUED | OUTPATIENT
Start: 2022-05-26 | End: 2022-06-02 | Stop reason: HOSPADM

## 2022-05-25 RX ORDER — SIMETHICONE 80 MG
80 TABLET,CHEWABLE ORAL 4 TIMES DAILY PRN
Status: DISCONTINUED | OUTPATIENT
Start: 2022-05-25 | End: 2022-05-25

## 2022-05-25 RX ORDER — CYCLOBENZAPRINE HCL 5 MG
5 TABLET ORAL ONCE
Qty: 1 TABLET | Refills: 0 | Status: ON HOLD
Start: 2022-05-25 | End: 2022-05-25

## 2022-05-25 RX ORDER — CEPHALEXIN 500 MG/1
500 CAPSULE ORAL 4 TIMES DAILY
Status: DISCONTINUED | OUTPATIENT
Start: 2022-05-25 | End: 2022-05-26

## 2022-05-25 RX ORDER — ROPINIROLE 1 MG/1
1 TABLET, FILM COATED ORAL DAILY
Status: DISCONTINUED | OUTPATIENT
Start: 2022-05-26 | End: 2022-05-26

## 2022-05-25 RX ORDER — GLIPIZIDE 5 MG/1
5 TABLET ORAL
Refills: 1 | Status: DISCONTINUED | OUTPATIENT
Start: 2022-05-26 | End: 2022-05-26 | Stop reason: ALTCHOICE

## 2022-05-25 RX ORDER — DEXTROSE MONOHYDRATE 50 MG/ML
100 INJECTION, SOLUTION INTRAVENOUS PRN
Status: DISCONTINUED | OUTPATIENT
Start: 2022-05-25 | End: 2022-06-02 | Stop reason: HOSPADM

## 2022-05-25 RX ORDER — SIMETHICONE 80 MG
160 TABLET,CHEWABLE ORAL 3 TIMES DAILY PRN
Status: DISCONTINUED | OUTPATIENT
Start: 2022-05-25 | End: 2022-06-02 | Stop reason: HOSPADM

## 2022-05-25 RX ORDER — CEPHALEXIN 500 MG/1
500 CAPSULE ORAL 4 TIMES DAILY
Status: ON HOLD | COMMUNITY
Start: 2022-05-25 | End: 2022-06-02 | Stop reason: HOSPADM

## 2022-05-25 RX ORDER — HYDROCODONE BITARTRATE AND ACETAMINOPHEN 5; 325 MG/1; MG/1
1 TABLET ORAL EVERY 6 HOURS PRN
Qty: 4 TABLET | Refills: 0 | Status: SHIPPED | OUTPATIENT
Start: 2022-05-25 | End: 2022-05-25

## 2022-05-25 RX ADMIN — HYDROCODONE BITARTRATE AND ACETAMINOPHEN 2 TABLET: 5; 325 TABLET ORAL at 17:37

## 2022-05-25 RX ADMIN — HYDROCODONE BITARTRATE AND ACETAMINOPHEN 2 TABLET: 5; 325 TABLET ORAL at 02:12

## 2022-05-25 RX ADMIN — SENNOSIDES AND DOCUSATE SODIUM 2 TABLET: 50; 8.6 TABLET ORAL at 08:52

## 2022-05-25 RX ADMIN — POLYETHYLENE GLYCOL (3350) 17 G: 17 POWDER, FOR SOLUTION ORAL at 08:53

## 2022-05-25 RX ADMIN — CEPHALEXIN 500 MG: 500 CAPSULE ORAL at 20:18

## 2022-05-25 RX ADMIN — ASPIRIN 81 MG: 81 TABLET, COATED ORAL at 08:53

## 2022-05-25 RX ADMIN — HYDROCODONE BITARTRATE AND ACETAMINOPHEN 2 TABLET: 5; 325 TABLET ORAL at 23:21

## 2022-05-25 RX ADMIN — SIMETHICONE 160 MG: 80 TABLET, CHEWABLE ORAL at 18:20

## 2022-05-25 RX ADMIN — ENOXAPARIN SODIUM 40 MG: 100 INJECTION SUBCUTANEOUS at 08:53

## 2022-05-25 RX ADMIN — Medication 5000 UNITS: at 20:18

## 2022-05-25 RX ADMIN — HYDROXYCHLOROQUINE SULFATE 200 MG: 200 TABLET ORAL at 08:52

## 2022-05-25 RX ADMIN — HYDROCODONE BITARTRATE AND ACETAMINOPHEN 2 TABLET: 5; 325 TABLET ORAL at 06:15

## 2022-05-25 RX ADMIN — HYDROCODONE BITARTRATE AND ACETAMINOPHEN 2 TABLET: 5; 325 TABLET ORAL at 11:12

## 2022-05-25 RX ADMIN — ROPINIROLE HYDROCHLORIDE 2 MG: 1 TABLET, FILM COATED ORAL at 08:53

## 2022-05-25 RX ADMIN — ROPINIROLE HYDROCHLORIDE 1 MG: 1 TABLET, FILM COATED ORAL at 20:18

## 2022-05-25 RX ADMIN — FUROSEMIDE 20 MG: 20 TABLET ORAL at 08:53

## 2022-05-25 RX ADMIN — COLLAGENASE SANTYL: 250 OINTMENT TOPICAL at 08:57

## 2022-05-25 RX ADMIN — ONDANSETRON 4 MG: 2 INJECTION INTRAMUSCULAR; INTRAVENOUS at 10:53

## 2022-05-25 RX ADMIN — ROSUVASTATIN CALCIUM 10 MG: 5 TABLET, FILM COATED ORAL at 20:19

## 2022-05-25 ASSESSMENT — PAIN SCALES - GENERAL
PAINLEVEL_OUTOF10: 3
PAINLEVEL_OUTOF10: 9
PAINLEVEL_OUTOF10: 5
PAINLEVEL_OUTOF10: 8
PAINLEVEL_OUTOF10: 10
PAINLEVEL_OUTOF10: 10
PAINLEVEL_OUTOF10: 8
PAINLEVEL_OUTOF10: 9
PAINLEVEL_OUTOF10: 8

## 2022-05-25 ASSESSMENT — PAIN DESCRIPTION - LOCATION
LOCATION: BACK

## 2022-05-25 ASSESSMENT — LIFESTYLE VARIABLES: HOW OFTEN DO YOU HAVE A DRINK CONTAINING ALCOHOL: NEVER

## 2022-05-25 ASSESSMENT — PAIN DESCRIPTION - ONSET
ONSET: ON-GOING

## 2022-05-25 ASSESSMENT — PAIN DESCRIPTION - ORIENTATION
ORIENTATION: MID
ORIENTATION: MID;LOWER
ORIENTATION: MID

## 2022-05-25 ASSESSMENT — PAIN DESCRIPTION - FREQUENCY
FREQUENCY: CONTINUOUS

## 2022-05-25 ASSESSMENT — PAIN DESCRIPTION - DESCRIPTORS
DESCRIPTORS: BURNING;DISCOMFORT
DESCRIPTORS: BURNING;DISCOMFORT

## 2022-05-25 ASSESSMENT — PAIN DESCRIPTION - PAIN TYPE
TYPE: ACUTE PAIN;SURGICAL PAIN
TYPE: ACUTE PAIN;SURGICAL PAIN

## 2022-05-25 NOTE — PROGRESS NOTES
Neurosurgery Progress Note  5/25/2022 1:05 PM      L1-4 lumbar laminectomies  POD: 12    Assessment and Plan:   1. Post-operative fluid collection-crp and sed rate are trending down. Patient remains afebrile,   ok to discharge back to swing bed from neurosurgical standpoint.  Discharged with 1-2 tablets of norco every 4 hours. She will follow-up with Dr. Ricco Hancock office in 6 weeks. 2. Constipation- resolved. Has concerns of blood in stool. States that she will notify staff if it happens again so a sample can be collected. Can be continued to monitored at swing bed. 3. Subacute left posterior 12th rib fracture- denies recent trauma/falls  4. HTN- on motoprolol  5. Type 2 DM- on SSI in hospital    Supervising physician: Devon Farley. Ricco Hancock MD.  Dr. Ricco Hancock was readily and continuously available by phone for direct consultation regarding the care of this patient. Subjective:   Admit Date: 5/20/2022  PCP: Vilma Alexander MD    Patient sitting up in bed. Transport is present and ready to take her to swing bed. She denies any significant headaches when upright. Has had issues with low blood pressure with standing and has been dizzy. She understands that she can call our office anytime with questions or concerns. Data:   Scheduled Meds:   cyclobenzaprine  5 mg Oral Once    polyethylene glycol  17 g Oral Daily    aspirin  81 mg Oral Daily    furosemide  20 mg Oral Daily    melatonin  3 mg Oral Nightly    metoprolol succinate  25 mg Oral BID    rosuvastatin  10 mg Oral Nightly    insulin lispro  0-12 Units SubCUTAneous TID WC    insulin lispro  0-6 Units SubCUTAneous Nightly    hydroxychloroquine  200 mg Oral Daily    collagenase   Topical Daily    lidocaine  1 patch TransDERmal Daily    rOPINIRole  2 mg Oral BID    sennosides-docusate sodium  2 tablet Oral Daily    enoxaparin  40 mg SubCUTAneous Daily         No intake/output data recorded. No intake/output data recorded.   No intake or output data in the 24 hours ending 05/25/22 1305  CULTURE results: Invalid input(s): BLOOD CULTURE,  URINE CULTURE, SURGICAL CULTURE  CBC:   Recent Labs     05/23/22  0752   WBC 8.4   HGB 12.0*        BMP:    Recent Labs     05/23/22  0752      K 3.8   CL 96*   CO2 29   BUN 10   CREATININE 0.5*   GLUCOSE 112*     Hepatic: No results for input(s): AST, ALT, ALB, BILITOT, ALKPHOS in the last 72 hours. IMAGING: available xray, CT, and MRI results reviewed    Objective:   Vitals: /81   Pulse 69   Temp 97.6 °F (36.4 °C) (Oral)   Resp 16   Ht 5' 4\" (1.626 m)   Wt 207 lb 14.4 oz (94.3 kg)   LMP  (LMP Unknown)   SpO2 97%   BMI 35.69 kg/m²   General appearance: alert, sitting up in bed, in no distress  HEENT: normocephalic, atraumatic, EOMI  Neurologic: alert and oriented x4, speech clear and coherent, no facial droop, follows commands briskly, sensation intact in all extremities.  Bilateral upper and lower extremities 5/5 throughout  Incision: intact, minimal erythema, no drainage or swelling noted, dermabond sloughing off  Drains: none    Electronically signed by Daniel Linares PA-C on 5/25/2022 at 1:05 PM

## 2022-05-25 NOTE — PROGRESS NOTES
Pt requested that son in law Isabela Flores be notified of pt's transfer to [facility]. Isabelaandreina Flores called and is aware that pt is here and in room 10.

## 2022-05-25 NOTE — DISCHARGE SUMMARY
Discharge Summary    Name:  Clarke Oppenheim /Age/Sex: 1935  (81 y.o. female)   MRN & CSN:  [id number] & [id number] Admission Date/Time: 2022  7:55 PM   Attending:  Mino Blanchard MD Discharging Physician: Mino Blanchard MD     Hospital Course:   Clarke Oppenheim is a 81 y.o.  female  who presents with Fluid collection at surgical site    Hospital Course. Clarke Oppenheim is a 81 y.o. female with a pmh of lumbar spinal stenosis with neurogenic claudication s/p L1 and L4 laminectomies and bilateral L2-3 balloon kyphoplasty with L2 and L3 bone biopsy and microdissection non-insulin-dependent type 2 diabetes, essential hypertension and hyperlipidemia who presents with increasing back pain. She has H/o lumbar spinal stenosis with neurogenic claudication. She had a lumbar surgery on  and discharged to [place] for acute rehab. Patient ESR/CRP trending down. Patient was cleared by NS for discharge on PO pain medications. Post-op right Lower Back pain with Postoperative fluid collection  CT scan without IV contrast shows fluid collection in the right back measuring 14.6 x 6.4 x 4.4 cm; no spinal canal involvement. Initial CRP 43.8. Sed rate at 56. Trending down and check again in AM.   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 Plan to to discharge back to swing/rehab bed once precert is obtained. Recommend discharging with Norco, 1 tablet every 4 hours for the next week or so per neurosurgery. PT/OT consult     New subacute left posterior 12th rib fracture  she denies falls. Cont pain control  Continue PT/OT. Incentive spirometer.      The patient expressed appropriate understanding of and agreement with the discharge recommendations, medications, and plan. Consults this admission:  IP CONSULT TO NEUROSURGERY  IP CONSULT TO IV TEAM    Discharge Instruction:   Follow up appointments:   Primary care physician:  within 2 weeks    Diet:  cardiac diet and diabetic diet   Activity: activity as tolerated  Disposition: Discharged to:   []Home, []C, []SNF, []Acute Rehab, []Hospice   Condition on discharge: Stable    Discharge Medications:        Medication List      START taking these medications    cyclobenzaprine 5 MG tablet  Commonly known as: FLEXERIL  Take 1 tablet by mouth once for 1 dose     HYDROcodone-acetaminophen 5-325 MG per tablet  Commonly known as: NORCO  Take 1 tablet by mouth every 6 hours as needed for Pain for up to 10 days.      polyethylene glycol 17 g packet  Commonly known as: GLYCOLAX  Take 17 g by mouth daily as needed for Constipation        CONTINUE taking these medications    albuterol sulfate  (90 Base) MCG/ACT inhaler  INHALE 2 PUFFS INTO THE LUNGS 4 TIMES DAILY AS NEEDED FOR WHEEZING     aspirin 81 MG EC tablet     clobetasol 0.05 % cream  Commonly known as: TEMOVATE     furosemide 20 MG tablet  Commonly known as: LASIX  Take 1 tablet by mouth daily     glipiZIDE 5 MG extended release tablet  Commonly known as: GLUCOTROL XL  Take 1 tablet by mouth daily     hydroxychloroquine 200 mg tablet  Commonly known as: PLAQUENIL     metoprolol succinate 25 MG extended release tablet  Commonly known as: TOPROL XL  Take 1 tablet by mouth daily     Movantik 25 mg Tabs tablet  Generic drug: naloxegol     PreserVision AREDS Tabs     rOPINIRole 2 MG tablet  Commonly known as: REQUIP  Take one half (1/2) tablet by mouth at 2 pm and one (1) tablet by mouth at hs     rosuvastatin 10 MG tablet  Commonly known as: CRESTOR  Take 1 tablet by mouth nightly     VITAMIN D PO        ASK your doctor about these medications    cephALEXin 500 MG capsule  Commonly known as: KEFLEX  Take 1 capsule by mouth 4 times daily for 28 doses           Where to Get Your Medications      You can get these medications from any pharmacy    Bring a paper prescription for each of these medications  · HYDROcodone-acetaminophen 5-325 MG per tablet     Information about where to get these medications is not yet available    Ask your nurse or doctor about these medications  · cyclobenzaprine 5 MG tablet  · polyethylene glycol 17 g packet         Objective Findings at Discharge:   /81   Pulse 69   Temp 97.6 °F (36.4 °C) (Oral)   Resp 16   Ht 5' 4\" (1.626 m)   Wt 207 lb 14.4 oz (94.3 kg)   LMP  (LMP Unknown)   SpO2 97%   BMI 35.69 kg/m²            PHYSICAL EXAM   GEN Awake, Alert, in no apparent distress  HEENT Atraumatic, nomocephalic, PERRLA, EOMI  NECK Supple, no lymphadenopaty  RESP Clear lungs to auscultation bilaterally  CARDIO/VASC Normal S1/S2. RRRR. No mumurs. GI Abdomen is soft without significant tenderness, masses, or guarding. Bowel sounds +  MSK No gross joint deformities. SKIN Normal coloration, warm, dry. NEURO Cranial nerves appear grossly intact, normal speech, no lateralizing weakness. PSYCH Awake, alert, oriented x 3. Affect appropriate.     BMP/CBC  Recent Labs     05/23/22  0752      K 3.8   CL 96*   CO2 29   BUN 10   CREATININE 0.5*   WBC 8.4   HCT 38.0          IMAGING:      Discharge Time of 35 minutes    Electronically signed by Vicky Valentin MD on 5/25/2022 at 9:34 AM

## 2022-05-25 NOTE — PROGRESS NOTES
No report from [facility]. This RN called and spoke with nurse to get report on pt.

## 2022-05-25 NOTE — CARE COORDINATION
Pt has been approved to go to Swing bed. Pt needs rapid Covid and МАРИЯ completed. PS sent to physician.

## 2022-05-25 NOTE — PROGRESS NOTES
Report given to Tasha Roper at [facility] bed.  [facility] here to transport patient at 1pm

## 2022-05-25 NOTE — DISCHARGE INSTR - COC
Mixed hyperlipidemia E78.2    Type 2 diabetes mellitus without complication ([facility]) M92.6    Dupuytren's contracture of right hand M72.0    Rheumatoid arthritis involving multiple sites with positive rheumatoid factor ([facility]) M05.79    CAD s/p MI, TPA in 1998 I25.10    Gastrointestinal hemorrhage K92.2    Cyst, kidney, acquired N28.1    Chronic midline low back pain without sciatica M54.50, G89.29    Restless leg syndrome G25.81    Chronic diastolic congestive heart failure ([facility]) I50.32    Suspected sleep apnea R29.818    Morbid obesity with BMI of 40.0-44.9, adult ([facility]) E66.01, Z68.41    Functional diarrhea K59.1    Moderate persistent asthma with exacerbation J45.41    Compression fracture of L2 vertebra with delayed healing S32.020G    Lumbar stenosis with neurogenic claudication M48.062    Post-operative pain G89.18    Physical debility R53.81    Fluid collection at surgical site T88. 8XXA       Isolation/Infection:   Isolation            No Isolation          Patient Infection Status       Infection Onset Added Last Indicated Last Indicated By Review Planned Expiration Resolved Resolved By    None active    Resolved    COVID-19 (Rule Out) 12/04/21 12/04/21 12/05/21 COVID-19, Rapid (Ordered)   12/05/21 Rule-Out Test Resulted    COVID-19 (Rule Out) 12/02/21 12/02/21 12/02/21 Respiratory Panel, Molecular, with COVID-19 (Restricted: peds pts or suitable admitted adults) (Ordered)   12/02/21 Rule-Out Test Resulted            Nurse Assessment:  Last Vital Signs: /81   Pulse 69   Temp 97.6 °F (36.4 °C) (Oral)   Resp 16   Ht 5' 4\" (1.626 m)   Wt 207 lb 14.4 oz (94.3 kg)   LMP  (LMP Unknown)   SpO2 97%   BMI 35.69 kg/m²     Last documented pain score (0-10 scale): Pain Level: 8  Last Weight:   Wt Readings from Last 1 Encounters:   05/23/22 207 lb 14.4 oz (94.3 kg)     Mental Status:  {IP PT MENTAL STATUS:20030}    IV Access:  {[facility] IV ACCESS:815289259}    Nursing Mobility/ADLs:  Walking   {[facility] DME RENR:844825229}  Transfer  {[facility] DME JNBO:854734329}  Bathing  {[facility] DME ENEA:850313317}  Dressing  {[facility] DME IWOW:648386140}  Toileting  {[facility] DME RXCO:294988452}  Feeding  {[facility] DME NJCL:330017195}  Med Admin  {[facility] DME LYMX:923674626}  Med Delivery   { МАРИЯ MED Delivery:565269672}    Wound Care Documentation and Therapy:  Wound 05/19/22 Right Medial Foot/Ankle Cluster (Active)   Wound Image   05/19/22 1223   Dressing Status New dressing applied 05/22/22 0832   Wound Cleansed Cleansed with saline 05/22/22 4106   Dressing/Treatment Gauze dressing/dressing sponge; Other (comment); Tape/Soft cloth adhesive tape;Dry dressing 05/22/22 0832   Wound Length (cm) 2.7 cm 05/19/22 1223   Wound Width (cm) 11.2 cm 05/19/22 1223   Wound Depth (cm) 0.1 cm 05/19/22 1223   Wound Surface Area (cm^2) 30.24 cm^2 05/19/22 1223   Wound Volume (cm^3) 3.024 cm^3 05/19/22 1223   Distance Tunneling (cm) 0 cm 05/19/22 1223   Tunneling Position ___ O'Clock 0 05/19/22 1223   Undermining Starts ___ O'Clock 0 05/19/22 1223   Undermining Ends___ O'Clock 0 05/19/22 1223   Undermining Maxium Distance (cm) 0 05/19/22 1223   Wound Assessment Slough 05/21/22 1557   Drainage Amount Scant 05/22/22 0832   Drainage Description Thin;Serous 05/22/22 0832   Odor None 05/22/22 0832   Vera-wound Assessment Blanchable erythema 05/22/22 0832   Margins Undefined edges 05/22/22 0832   Wound Thickness Description not for Pressure Injury Full thickness 05/19/22 1223   Number of days: 5       Wound 05/20/22 Knee Left;Lateral open lesion/red pinkish color (Active)   Wound Etiology Other 05/20/22 2021   Dressing Status Other (Comment) 05/22/22 0832   Wound Cleansed Not Cleansed 05/21/22 0827   Dressing/Treatment Open to air 05/20/22 2021   Drainage Amount None 05/22/22 0832   Odor None 05/22/22 0832   Number of days: 4       Incision 05/12/22 Back Lower;Medial (Active)   Wound Image   05/19/22 1223   Dressing Status Clean;Dry; Intact 05/25/22 0227   Dressing Change Due 22 0832   Incision Cleansed Cleansed with saline 22 1557   Dressing/Treatment Other (comment) 22 1557   Incision Length (cm) 9 22 1223   Incision Width (cm) 0.1 cm 22 1223   Incision Depth (cm) 0 cm 22 1223   Closure Surgical glue 22 1557   Margins Approximated 22 1557   Incision Assessment Other (Comment) 22 0227   Drainage Amount None 22 0227   Odor None 22 1557   Number of days: 13        Elimination:  Continence: Bowel: {YES / CP:59633}  Bladder: {YES / CS:43177}  Urinary Catheter: {Urinary Catheter:700274787}   Colostomy/Ileostomy/Ileal Conduit: {YES / PK:13478}       Date of Last BM: ***  No intake or output data in the 24 hours ending 22 0934  No intake/output data recorded.     Safety Concerns:     [address] Safety Concerns:290838251}    Impairments/Disabilities:      [address] Impairments/Disabilities:225171726}    Nutrition Therapy:  Current Nutrition Therapy:   [address] Diet List:146801904}    Routes of Feeding: {CHP DME Other Feedings:183517535}  Liquids: {Slp liquid thickness:68496}  Daily Fluid Restriction: {CHP DME Yes amt example:283432028}  Last Modified Barium Swallow with Video (Video Swallowing Test): {Done Not Done NORB:113788676}    Treatments at the Time of Hospital Discharge:   Respiratory Treatments: ***  Oxygen Therapy:  {Therapy; copd oxygen:04794}  Ventilator:    { CC Vent HSOD:850376393}    Rehab Therapies: {THERAPEUTIC INTERVENTION:6741334678}  Weight Bearing Status/Restrictions: [employer] Weight Bearin}  Other Medical Equipment (for information only, NOT a DME order):  {EQUIPMENT:330391475}  Other Treatments: ***    Patient's personal belongings (please select all that are sent with patient):  {P DME Belongings:984588499}    RN SIGNATURE:  {Esignature:940139370}    CASE MANAGEMENT/SOCIAL WORK SECTION    Inpatient Status Date: ***    Readmission Risk Assessment Score:  Readmission Risk Risk of Unplanned Readmission:  23           Discharging to Facility/ Agency   Name:   Address:  Phone:  Fax:    Dialysis Facility (if applicable)   Name:  Address:  Dialysis Schedule:  Phone:  Fax:    / signature: {Esignature:107941152}    PHYSICIAN SECTION    Prognosis: Good    Condition at Discharge: Stable    Rehab Potential (if transferring to Rehab): Good    Recommended Labs or Other Treatments After Discharge: none    Physician Certification: I certify the above information and transfer of Angi Hayden  is necessary for the continuing treatment of the diagnosis listed and that she requires [place] for less 30 days.      Update Admission H&P: No change in H&P    PHYSICIAN SIGNATURE:  Electronically signed by Summer Huff MD on 5/25/22 at 9:34 AM EDT

## 2022-05-25 NOTE — CARE COORDINATION
Transport set up for [employer] with [place]. Nurse updated, Swing bed coordinator updated. Nurse can update the patient and call report to [phone].

## 2022-05-26 ENCOUNTER — TELEPHONE (OUTPATIENT)
Dept: INTERNAL MEDICINE CLINIC | Age: 87
End: 2022-05-26

## 2022-05-26 LAB
GLUCOSE BLD-MCNC: 118 MG/DL (ref 70–99)
GLUCOSE BLD-MCNC: 130 MG/DL (ref 70–99)
GLUCOSE BLD-MCNC: 148 MG/DL (ref 70–99)
GLUCOSE BLD-MCNC: 60 MG/DL (ref 70–99)
GLUCOSE BLD-MCNC: 98 MG/DL (ref 70–99)

## 2022-05-26 PROCEDURE — 97530 THERAPEUTIC ACTIVITIES: CPT

## 2022-05-26 PROCEDURE — 6370000000 HC RX 637 (ALT 250 FOR IP): Performed by: NURSE PRACTITIONER

## 2022-05-26 PROCEDURE — 6360000002 HC RX W HCPCS: Performed by: INTERNAL MEDICINE

## 2022-05-26 PROCEDURE — 82962 GLUCOSE BLOOD TEST: CPT

## 2022-05-26 PROCEDURE — 1200000002 HC SEMI PRIVATE SWING BED

## 2022-05-26 PROCEDURE — 97535 SELF CARE MNGMENT TRAINING: CPT

## 2022-05-26 PROCEDURE — 97166 OT EVAL MOD COMPLEX 45 MIN: CPT

## 2022-05-26 PROCEDURE — 97162 PT EVAL MOD COMPLEX 30 MIN: CPT

## 2022-05-26 RX ORDER — ROPINIROLE 1 MG/1
1 TABLET, FILM COATED ORAL EVERY MORNING
Status: DISCONTINUED | OUTPATIENT
Start: 2022-05-27 | End: 2022-06-01

## 2022-05-26 RX ORDER — ROPINIROLE 1 MG/1
1 TABLET, FILM COATED ORAL ONCE
Status: COMPLETED | OUTPATIENT
Start: 2022-05-26 | End: 2022-05-26

## 2022-05-26 RX ORDER — KETOROLAC TROMETHAMINE 15 MG/ML
15 INJECTION, SOLUTION INTRAMUSCULAR; INTRAVENOUS EVERY 6 HOURS PRN
Status: DISCONTINUED | OUTPATIENT
Start: 2022-05-26 | End: 2022-05-27

## 2022-05-26 RX ORDER — GLIPIZIDE 5 MG/1
5 TABLET, FILM COATED, EXTENDED RELEASE ORAL
Status: DISCONTINUED | OUTPATIENT
Start: 2022-05-26 | End: 2022-05-31

## 2022-05-26 RX ORDER — HYDROCODONE BITARTRATE AND ACETAMINOPHEN 10; 325 MG/1; MG/1
1 TABLET ORAL EVERY 6 HOURS PRN
Status: DISCONTINUED | OUTPATIENT
Start: 2022-05-26 | End: 2022-06-02 | Stop reason: HOSPADM

## 2022-05-26 RX ORDER — HYDROCODONE BITARTRATE AND ACETAMINOPHEN 5; 325 MG/1; MG/1
1 TABLET ORAL
Status: COMPLETED | OUTPATIENT
Start: 2022-05-26 | End: 2022-05-26

## 2022-05-26 RX ORDER — ROPINIROLE 1 MG/1
2 TABLET, FILM COATED ORAL NIGHTLY
Status: DISCONTINUED | OUTPATIENT
Start: 2022-05-26 | End: 2022-06-02 | Stop reason: HOSPADM

## 2022-05-26 RX ORDER — CEPHALEXIN 500 MG/1
500 CAPSULE ORAL 4 TIMES DAILY
Status: DISCONTINUED | OUTPATIENT
Start: 2022-05-26 | End: 2022-05-27

## 2022-05-26 RX ORDER — CHOLECALCIFEROL (VITAMIN D3) 125 MCG
5 CAPSULE ORAL NIGHTLY PRN
Status: DISCONTINUED | OUTPATIENT
Start: 2022-05-26 | End: 2022-05-30

## 2022-05-26 RX ORDER — HYDROCODONE BITARTRATE AND ACETAMINOPHEN 10; 325 MG/1; MG/1
1 TABLET ORAL EVERY 6 HOURS PRN
Status: DISCONTINUED | OUTPATIENT
Start: 2022-05-26 | End: 2022-05-26 | Stop reason: ALTCHOICE

## 2022-05-26 RX ADMIN — Medication 5 MG: at 21:04

## 2022-05-26 RX ADMIN — ROPINIROLE HYDROCHLORIDE 1 MG: 1 TABLET, FILM COATED ORAL at 11:22

## 2022-05-26 RX ADMIN — KETOROLAC TROMETHAMINE 15 MG: 15 INJECTION, SOLUTION INTRAMUSCULAR; INTRAVENOUS at 15:12

## 2022-05-26 RX ADMIN — SIMETHICONE 160 MG: 80 TABLET, CHEWABLE ORAL at 08:58

## 2022-05-26 RX ADMIN — HYDROCODONE BITARTRATE AND ACETAMINOPHEN 1 TABLET: 10; 325 TABLET ORAL at 16:37

## 2022-05-26 RX ADMIN — ROSUVASTATIN CALCIUM 10 MG: 5 TABLET, FILM COATED ORAL at 20:51

## 2022-05-26 RX ADMIN — ASPIRIN 81 MG: 81 TABLET, COATED ORAL at 08:58

## 2022-05-26 RX ADMIN — HYDROXYCHLOROQUINE SULFATE 200 MG: 200 TABLET ORAL at 08:58

## 2022-05-26 RX ADMIN — KETOROLAC TROMETHAMINE 15 MG: 15 INJECTION, SOLUTION INTRAMUSCULAR; INTRAVENOUS at 08:59

## 2022-05-26 RX ADMIN — CLOBETASOL PROPIONATE: 0.5 CREAM TOPICAL at 09:01

## 2022-05-26 RX ADMIN — Medication 5000 UNITS: at 08:58

## 2022-05-26 RX ADMIN — HYDROCODONE BITARTRATE AND ACETAMINOPHEN 1 TABLET: 5; 325 TABLET ORAL at 06:39

## 2022-05-26 RX ADMIN — HYDROCODONE BITARTRATE AND ACETAMINOPHEN 1 TABLET: 5; 325 TABLET ORAL at 04:06

## 2022-05-26 RX ADMIN — GLIPIZIDE 5 MG: 5 TABLET, EXTENDED RELEASE ORAL at 08:58

## 2022-05-26 RX ADMIN — CEPHALEXIN 500 MG: 500 CAPSULE ORAL at 08:58

## 2022-05-26 RX ADMIN — NALOXEGOL OXALATE 25 MG: 25 TABLET, FILM COATED ORAL at 11:22

## 2022-05-26 RX ADMIN — FUROSEMIDE 20 MG: 20 TABLET ORAL at 08:58

## 2022-05-26 RX ADMIN — CEPHALEXIN 500 MG: 500 CAPSULE ORAL at 15:13

## 2022-05-26 RX ADMIN — KETOROLAC TROMETHAMINE 15 MG: 15 INJECTION, SOLUTION INTRAMUSCULAR; INTRAVENOUS at 21:25

## 2022-05-26 RX ADMIN — ROPINIROLE HYDROCHLORIDE 2 MG: 1 TABLET, FILM COATED ORAL at 20:52

## 2022-05-26 RX ADMIN — CEPHALEXIN 500 MG: 500 CAPSULE ORAL at 20:52

## 2022-05-26 RX ADMIN — CEPHALEXIN 500 MG: 500 CAPSULE ORAL at 18:19

## 2022-05-26 RX ADMIN — METOPROLOL SUCCINATE 25 MG: 25 TABLET, EXTENDED RELEASE ORAL at 09:05

## 2022-05-26 RX ADMIN — Medication 5000 UNITS: at 20:52

## 2022-05-26 RX ADMIN — SIMETHICONE 160 MG: 80 TABLET, CHEWABLE ORAL at 14:32

## 2022-05-26 RX ADMIN — SIMETHICONE 160 MG: 80 TABLET, CHEWABLE ORAL at 20:52

## 2022-05-26 ASSESSMENT — PAIN DESCRIPTION - ONSET
ONSET: ON-GOING

## 2022-05-26 ASSESSMENT — PAIN - FUNCTIONAL ASSESSMENT
PAIN_FUNCTIONAL_ASSESSMENT: ACTIVITIES ARE NOT PREVENTED

## 2022-05-26 ASSESSMENT — PAIN DESCRIPTION - FREQUENCY
FREQUENCY: CONTINUOUS

## 2022-05-26 ASSESSMENT — PAIN DESCRIPTION - ORIENTATION
ORIENTATION: LOWER;MID
ORIENTATION: MID
ORIENTATION: RIGHT;LEFT
ORIENTATION: MID
ORIENTATION: RIGHT;LEFT
ORIENTATION: LOWER;MID
ORIENTATION: MID

## 2022-05-26 ASSESSMENT — PAIN SCALES - GENERAL
PAINLEVEL_OUTOF10: 0
PAINLEVEL_OUTOF10: 3
PAINLEVEL_OUTOF10: 9
PAINLEVEL_OUTOF10: 10
PAINLEVEL_OUTOF10: 10
PAINLEVEL_OUTOF10: 6
PAINLEVEL_OUTOF10: 7
PAINLEVEL_OUTOF10: 8
PAINLEVEL_OUTOF10: 6
PAINLEVEL_OUTOF10: 9
PAINLEVEL_OUTOF10: 8

## 2022-05-26 ASSESSMENT — PAIN DESCRIPTION - PAIN TYPE
TYPE: SURGICAL PAIN
TYPE: ACUTE PAIN;SURGICAL PAIN
TYPE: SURGICAL PAIN
TYPE: ACUTE PAIN;SURGICAL PAIN

## 2022-05-26 ASSESSMENT — PAIN DESCRIPTION - DESCRIPTORS
DESCRIPTORS: BURNING;DISCOMFORT
DESCRIPTORS: BURNING;DISCOMFORT
DESCRIPTORS: ACHING;BURNING
DESCRIPTORS: BURNING;DISCOMFORT
DESCRIPTORS: ACHING;BURNING
DESCRIPTORS: ACHING;CRAMPING
DESCRIPTORS: BURNING;DISCOMFORT

## 2022-05-26 ASSESSMENT — PAIN DESCRIPTION - LOCATION
LOCATION: BACK
LOCATION: BACK;ABDOMEN
LOCATION: BACK;LEG
LOCATION: BACK
LOCATION: BACK;LEG

## 2022-05-26 NOTE — PLAN OF CARE
Problem: Discharge Planning  Goal: Discharge to home or other facility with appropriate resources  5/26/2022 0031 by Salomón Donnelly RN  Outcome: Progressing  5/25/2022 1529 by Eden Marin RN  Outcome: Progressing     Problem: Pain  Goal: Verbalizes/displays adequate comfort level or baseline comfort level  5/26/2022 0031 by Salomón Donnelly RN  Outcome: Progressing  5/25/2022 1529 by Eden Marin RN  Outcome: Progressing     Problem: Safety - Adult  Goal: Free from fall injury  5/26/2022 0031 by Salomón Donnelly RN  Outcome: Progressing  5/25/2022 1529 by Eden Marin RN  Outcome: Progressing     Problem: ABCDS Injury Assessment  Goal: Absence of physical injury  5/26/2022 0031 by Salomón Donnelly RN  Outcome: Progressing  5/25/2022 1529 by Eedn Marin RN  Outcome: Progressing

## 2022-05-26 NOTE — PROGRESS NOTES
Patient c/o 10 out of 10 pain, with 10 being the highest on the pain scale in her mid lower back. Patient received norco 5-325 mg at 0407 that she can have every 4 hours PRN. Patient updated that it was too early to give PRN pain med and that this nurse would call Simone NP. N.O. give norco 5-325 mg PO once, verbal order read back and verified. Patient updated and voiced understanding.

## 2022-05-26 NOTE — PROGRESS NOTES
-Late Entry-     Patient complained of increased pain in legs and back this am. Dr. Bell Hatch saw patient and prn toradol IV ordered in addition to prn norco. Toradol given by this RN at approximately 0900. This RN then spoke with [facility], CNP to verify patients Requip home directions. Medication verified and ordered by [facility], CNP. This RN then received phone call from  at Dr. Marshall Alatorre office who stated that patient had called then to state that hospital was not controlling her pain or giving the patient her scheduled dose of Requip. This RN verified Requip directions with  and directions matched patient home medication list from admission. SHRADDHA Daley and this RN went to speak with patient about pain control and Requip. SHRADDHA Daley and this RN discussed with patient Requip directions and patient states that at home she takes her Requip between 11am and 2pm not always at 2pm. SHRADDHA Daley changed order for patient to get Requip 1mg at 11am for today and then scheduled Requip with am medications starting tomorrow. SHRADDHA Daley also addressed pain medications with patient and patient stated \"the other doctor ordered some new medication through my IV\". This RN again explained that patient colby already been given the toradol. Patient stated \"that must be why it is feeling better\". Patient apologized for calling family doctor and agreed to call nurses or discuss with hospitalist if she had any further concerns with her medications or pain control.

## 2022-05-26 NOTE — CARE COORDINATION
CM met with the patient for discharge planning, patient in recliner resting quietly. Patient lives alone in a single level apartment (1 small threshold to enter, no steps), has insurance with Rx coverage & PCP, and stated that she was independent with ADL's prior to admission. Patient stated that she uses a cane and walker at home (uses the cane predominantly) and does not require home oxygen but does use an inhaler. Patient stated that she receives services through the [employer] waiver program which includes personal aide services through [facility], emergency response system, home delivered meals, and incontinence supplies. The patient's PASSPORT  is [employer]on [facility] ([phone]). Patient stated that her personal aide visits on Mon/Tues/Wed/Fri for at least 2 hours each day. Aide performs light housekeeping chores and does the patient's grocery shopping. The patient stated that her aide is currently off work and the agency does not have staffing available to replace her until she is able to return to work. Patient reports that her daughter also provides assistance as needed. Patient plans to return home upon discharge and is unable to identify any needs at this time. CM notified the patient that her insurance provider has requested updated clinical documentation be submitted 5/27/22, patient voiced understanding. CM will follow.

## 2022-05-26 NOTE — H&P
History and Physical      Name:  Stephanie Antunez /Age/Sex: 1935  (81 y.o. female)   MRN & CSN:  [id number] & [id number] Admission Date/Time: 2022  2:16 PM   Location:   PCP: Yany Ireland MD       Hospital Day: 2    Assessment and Plan:   Stephanie Antunez is a 81 y.o.  female  who presents with Debility    1. Debility: Following prolonged post-op course following lumbar laminectomy/kyphoplasty; continue working with PT/OT daily  2. Lumbar Stenosis with Neurogenic Claudication: s/p Lumbar laminectomy/kyphoplast complicated by post-op fluid collection; continue pain control; d/w patient this AM and does not like Narcotics and would like to trial toradol; kidney function wnl so will give two doses today and see how she tolerates; continue post-operative Keflex with last dose   3. Non-Insulin Dependent Type II DM: Continue home Glipizide and SSI  4. Hypertension: Metprolol and Lasix continued  5. Neurogenic Claudication: On Ropinorole at home, continue  6. Hx CAD: Continue ASA/Statin    Diet No diet orders on file   DVT Prophylaxis [] Lovenox, []  Heparin, [] SCDs, [] Ambulation   GI Prophylaxis [] PPI,  [] H2 Blocker,  [] Carafate,  [] Diet/Tube Feeds   Code Status Full Code   Disposition Patient requires continued admission due to    MDM [] Low, [] Moderate,[]  High  Patient's risk as above due to      History of Present Illness:    This is an 81-year-old female with past medical history of non-insulin-dependent type 2 diabetes, hypertension, CAD, hyperlipidemia lumbar stenosis with neurogenic claudication status post L1-L4 laminectomy, L2-L3 balloon kyphoplasty as well as needle biopsy on  that initially presented to [facility] for swing bed program on  that had her course complicated by increasing back pain found to have a large postoperative fluid collection so sent back to [facility] and just returned yesterday evening for continued swing bed program.  Patient seen at bedside this morning and states that overall she is doing well but still is having persistent back pain. No significant change since being at [facility]. She currently denies any fevers chills nausea vomiting chest pain or shortness of breath. Neurosurgery did clear her to go to the swing bed program yesterday and instructed her to follow-up closely with them within 6 weeks. Ten point ROS reviewed negative, unless as noted above    Objective:   No intake or output data in the 24 hours ending 05/26/22 0842   Vitals:   Vitals:    05/26/22 0729   BP:    Pulse: (!) 117   Resp: 16   Temp: 96.9 °F (36.1 °C)   SpO2: (!) 89%     Physical Exam:   GEN Awake female, sitting upright in bed in mild distress. Appears given age. EYES Pupils are equally round. No scleral erythema, discharge, or conjunctivitis. NECK Supple, no apparent thyromegaly or masses. RESP Clear to auscultation, no wheezes, rales or rhonchi. Symmetric chest movement while on room air. CARDIO/VASC S1/S2 auscultated. Regular rate without appreciable murmurs, rubs, or gallops. No JVD or carotid bruits. GI Abdomen is soft without significant tenderness  HEME/LYMPH No petechiae or ecchymoses. MSK No gross joint deformities; mild TTP over prior surgical site  SKIN Normal coloration, warm, dry. NEURO Cranial nerves appear grossly intact, normal speech  PSYCH Awake, alert, oriented x 4. Affect appropriate. Past Medical History:      Past Medical History:   Diagnosis Date    Arthritis     left knee pain, sees Dr. Lenny Medina CAD (coronary artery disease)     sees Dr. Johnnie Cheney Chronic midline low back pain without sciatica 9/28/2016    Had PT in 2016   [address] Colonoscopy refused     discussed in 7/15    DM (diabetes mellitus), type 2 ([facility] [address] 02/2006    Dupuytren's contracture of right hand     Endometrial stripe increased 9/25/2014    Saw NP Verenice Brown. Was normal exam per pt.     Gastrointestinal hemorrhage 11/2/2015 Patient had GI bleeding. Colon refused. Discussed with patient in detail.  Glaucoma 4/1/2014    H/O echocardiogram 10/02/2014    Mildly depressed left ventricular function; ejection fraction of 45%. Moderate pulmonary hypertension.  H/O echocardiogram 08/28/2018    EF 50-55%.  Mitral annular calcification is present. No other significant valvulopathy seen.  History of nuclear stress test 08/28/2018    EF 59%. ECG portion of stress test is negative for ischemia by diagnostic criteria. fixed inferior large size severe defect in rest and stress images. Mild hubert infarct ischemia.  HTN (hypertension)     Hyperlipidemia     Kidney stone     hx-\"been about 3 yrs ago\"    MI (myocardial infarction) ([facility] [address] 02/1998    treated with TPA    Obesity     Open wound of right foot 7/27/2020    Parainfluenza infection 12/5/2021    Pneumonia of right lower lobe due to infectious organism 4/5/2018    Vertigo     'have been having this since I had cataract surgery\"\"that is why they are doing the MRA of my brain(10/31/2014)    WD-Traumatic ulcer of foot, right, with fat layer exposed ([facility] [address] 8/3/2020     PSHX:  has a past surgical history that includes Cataract removal (Bilateral); Inguinal hernia repair (Left, 45 yrs ago); Lumbar spine surgery (N/A, 5/12/2022); and Spine surgery (N/A, 5/12/2022). Allergies: No Known Allergies    FAM HX: family history includes Cancer in her brother and sister; Heart Attack in her father; Heart Disease in her father; High Blood Pressure in her brother and brother; Parkinsonism in her mother; Stroke in her sister.   Soc HX:   Social History     Socioeconomic History    Marital status: Single     Spouse name: None    Number of children: None    Years of education: None    Highest education level: None   Occupational History    None   Tobacco Use    Smoking status: Never Smoker    Smokeless tobacco: Never Used   Vaping Use    Vaping Use: Never used   Substance and Sexual Activity    Alcohol use: Not Currently     Comment: OCCASSIONAL    Drug use: Never    Sexual activity: Not Currently     Partners: Male   Other Topics Concern    None   Social History Narrative    None     Social Determinants of Health     Financial Resource Strain: Low Risk     Difficulty of Paying Living Expenses: Not hard at all   Food Insecurity: No Food Insecurity    Worried About Running Out of Food in the Last Year: Never true    [place] of Food in the Last Year: Never true   Transportation Needs:     Lack of Transportation (Medical): Not on file    Lack of Transportation (Non-Medical):  Not on file   Physical Activity:     Days of Exercise per Week: Not on file    Minutes of Exercise per Session: Not on file   Stress:     Feeling of Stress : Not on file   Social Connections:     Frequency of Communication with Friends and Family: Not on file    Frequency of Social Gatherings with Friends and Family: Not on file    Attends [employer] Services: Not on file    Active Member of Clubs or Organizations: Not on file    Attends Club or Organization Meetings: Not on file    Marital Status: Not on file   Intimate Partner Violence:     Fear of Current or Ex-Partner: Not on file    Emotionally Abused: Not on file    Physically Abused: Not on file    Sexually Abused: Not on file   Housing Stability:     Unable to Pay for Housing in the Last Year: Not on file    Number of Places Lived in the Last Year: Not on file    Unstable Housing in the Last Year: Not on file       Medications:   Medications:    glipiZIDE  5 mg Oral Daily with breakfast    naloxegol  25 mg Oral QAM AC    cephALEXin  500 mg Oral 4x Daily    aspirin  81 mg Oral Daily    vitamin D3  5,000 Units Oral BID    clobetasol   Topical BID    furosemide  20 mg Oral Daily    hydroxychloroquine  200 mg Oral Daily    metoprolol succinate  25 mg Oral Daily    rOPINIRole  1 mg Oral Nightly    rOPINIRole  1 mg Oral Daily    rosuvastatin  10 mg Oral Nightly    insulin lispro  0-6 Units SubCUTAneous TID WC    insulin lispro  0-3 Units SubCUTAneous Nightly      Infusions:    dextrose       PRN Meds: ketorolac, 15 mg, Q6H PRN  albuterol sulfate HFA, 2 puff, 4x Daily PRN  HYDROcodone-acetaminophen, 1 tablet, Q4H PRN  polyethylene glycol, 17 g, Daily PRN  glucose, 4 tablet, PRN  dextrose bolus, 125 mL, PRN   Or  dextrose bolus, 250 mL, PRN  glucagon (rDNA), 1 mg, PRN  dextrose, 100 mL/hr, PRN  simethicone, 160 mg, TID PRN          Electronically signed by Chiquita Pretty MD on 5/26/2022 at 8:42 AM

## 2022-05-26 NOTE — PROGRESS NOTES
Occupational Therapy  Facility/Department: [facility] UNIT  Occupational Therapy Initial Assessment    Name: Jose Antonio Marr  : 1935  MRN: [id number]  Date of Service: 2022    Discharge Recommendations:  Home with assist PRN,Continue to assess pending progress  OT Equipment Recommendations  Equipment Needed: Yes  ADL Assistive Devices: Sock-Aid Soft       Patient Diagnosis(es): There were no encounter diagnoses. Past Medical History:  has a past medical history of Arthritis, CAD (coronary artery disease), Chronic midline low back pain without sciatica, Claustrophobia, Colonoscopy refused, DM (diabetes mellitus), type 2 ([address] Dupuytren's contracture of right hand, Endometrial stripe increased, Gastrointestinal hemorrhage, Glaucoma, H/O echocardiogram, H/O echocardiogram, History of nuclear stress test, HTN (hypertension), Hyperlipidemia, Kidney stone, MI (myocardial infarction) ([address] Obesity, Open wound of right foot, Parainfluenza infection, Pneumonia of right lower lobe due to infectious organism, Vertigo, and WD-Traumatic ulcer of foot, right, with fat layer exposed ([facility] [address] Past Surgical History:  has a past surgical history that includes Cataract removal (Bilateral); Inguinal hernia repair (Left, 45 yrs ago); Lumbar spine surgery (N/A, 2022); and Spine surgery (N/A, 2022). Treatment Diagnosis: Weakness      Assessment   Performance deficits / Impairments: Decreased functional mobility ; Decreased ADL status; Decreased balance;Decreased endurance;Decreased strength;Decreased posture  Assessment: Pt is a 81 y.o. admitted to [facility] unit for Physical Debility s/p lumbar laminectomy 22. Pt will benefit from OT to address improving endurance, strength and safety to perform functional mobility/ADLs.   Treatment Diagnosis: Weakness  Prognosis: Good  Decision Making: Medium Complexity  REQUIRES OT FOLLOW-UP: Yes  Activity Tolerance  Activity Tolerance: Patient Tolerated treatment well        Plan   Plan  Times per Week: 4+  Times per Day: Daily  Current Treatment Recommendations: Strengthening,Balance training,Functional mobility training,Endurance training,Gait training,Patient/Caregiver education & training,Equipment evaluation, education, & procurement,Self-Care / ADL,Positioning,Safety education & training     Restrictions  Restrictions/Precautions  Restrictions/Precautions: Fall Risk  Position Activity Restriction  Spinal Precautions: No Lifting,No Twisting,No Bending  Sternal Precautions: 5# Lifting Restrictions  Other position/activity restrictions: Keep surgical site and dressing dry    Subjective   General  Patient assessed for rehabilitation services?: Yes  Family / Caregiver Present: No  Subjective  Subjective: Pt reports \"I am having a little less pain\"  General Comment  Comments: Pt awake sitting up in chair.   Per nursing aide Pt had just taken a shower and gotten dressed     Social/Functional History  Social/Functional History  Lives With: Alone  Type of Home: Apartment  Home Layout: One level  Home Access: Stairs to enter without rails,Level entry  Entrance Stairs - Number of Steps: pt has a threshold  Bathroom Shower/Tub: Tub/Shower unit  Bathroom Toilet: Handicap height  Bathroom Equipment: Grab bars in shower,Hand-held shower,Shower [address] [place] [facility]  Has the patient had two or more falls in the past year or any fall with injury in the past year?: Yes  Receives Help From: Personal care attendant  ADL Assistance: Needs assistance  Bath: Supervision  Dressing: Independent  Grooming: Independent  Feeding: Independent  Toileting: Independent  Homemaking Assistance: Needs assistance  Meal Prep Responsibility: Primary  Laundry Responsibility: No  Cleaning Responsibility: No  Shopping Responsibility: No  Ambulation Assistance: Needs assistance (uses rollator)  Transfer Assistance: Independent  Active : Yes  Leisure & Hobbies: Pt reports she [employer];Transfer Training;Precautions; Energy Conservation  Education Method: Demonstration;Verbal  Education Outcome: Verbalized understanding;Demonstrated understanding                        G-Code     OutComes Score                                                  AM-PAC Score             Goals  Short Term Goals  Time Frame for Short term goals: Until DC or goals met  Short Term Goal 1: Pt will complete trfs mod I  Short Term Goal 2: Pt will complete UE/LE bathing/dressing mod I using AE PRN  Short Term Goal 3: Pt will complete toileting mod I  Short Term Goal 4: Pt will tolerate standing/functional mobilty 5+ min during ADLs/therax following precautions mod I  Short Term Goal 5: Pt will complete light BUE therex within 5# restriction, to increase strength/endurance for ADLs/functional mobility  Patient Goals   Patient goals : To return home       Therapy Time   Individual Concurrent Group Co-treatment   Time In 1056         Time Out 1135         Minutes 39         Timed Code Treatment Minutes: Kale Ybarra [address]  Barbara Never, OTR/L [id number]

## 2022-05-26 NOTE — PLAN OF CARE
Patient has complained of pain in back and bilateral lower legs. Per patient this morning the toradol she was given helped with her pain, but patient states this evening that toradol did not help pain and patient given norco 10/325mg to help with pain.    Problem: Pain  Goal: Verbalizes/displays adequate comfort level or baseline comfort level  Outcome: Progressing

## 2022-05-26 NOTE — PROGRESS NOTES
Physical Therapy  Facility/Department: [facility] UNIT  Physical Therapy Initial Assessment    Name: Montrell Cabrera  : 1935  MRN: [id number]  Date of Service: 2022    Discharge Recommendations:  Home with assist PRN,Home with Home health PT          Patient Diagnosis(es): There were no encounter diagnoses. Past Medical History:  has a past medical history of Arthritis, CAD (coronary artery disease), Chronic midline low back pain without sciatica, Claustrophobia, Colonoscopy refused, DM (diabetes mellitus), type 2 ([address] Dupuytren's contracture of right hand, Endometrial stripe increased, Gastrointestinal hemorrhage, Glaucoma, H/O echocardiogram, H/O echocardiogram, History of nuclear stress test, HTN (hypertension), Hyperlipidemia, Kidney stone, MI (myocardial infarction) ([address] Obesity, Open wound of right foot, Parainfluenza infection, Pneumonia of right lower lobe due to infectious organism, Vertigo, and WD-Traumatic ulcer of foot, right, with fat layer exposed ([address] Past Surgical History:  has a past surgical history that includes Cataract removal (Bilateral); Inguinal hernia repair (Left, 45 yrs ago); Lumbar spine surgery (N/A, 2022); and Spine surgery (N/A, 2022). Assessment   Body Structures, Functions, Activity Limitations Requiring Skilled Therapeutic Intervention: Decreased functional mobility ; Decreased strength;Decreased ADL status; Decreased high-level IADLs  Assessment: Pt is a pleasant 80 yo female status post  L1-L4 laminectomies and bilateral L2-3 balloon kyphoplasty with L2 and L3 bone biopsy and microdissection by Dr. Adenike Reyna 2022. She would benefit from skilled PT to address decreased ROM, strength, balance, and endurance as well as decreased functional mobility.   Treatment Diagnosis: deconditoning  Therapy Prognosis: Good  Decision Making: Medium Complexity  History: PF- lumbar surgery  Exam: strength/gait  Clinical Presentation: evolving  Barriers to Learning: none  Requires PT Follow-Up: Yes     Plan   Plan  Plan:  (Mon- Sat 5+/wk)  Safety Devices  Type of Devices: Gait belt,Left in bed,Call light within reach,Bed alarm in place     Restrictions  Restrictions/Precautions  Restrictions/Precautions: Fall Risk  Position Activity Restriction  Spinal Precautions: No Lifting,No Twisting,No Bending  Sternal Precautions: 5# Lifting Restrictions  Other position/activity restrictions: Keep surgical site and dressing dry     Subjective   General  Chart Reviewed: Yes  Patient assessed for rehabilitation services?: Yes  Follows Commands: Within Functional Limits         Social/Functional History  Social/Functional History  Lives With: Alone  Type of Home: Apartment  Home Layout: One level  Home Access: Stairs to enter without rails,Level entry  Entrance Stairs - Number of Steps: pt has a threshold  Bathroom Shower/Tub: Tub/Shower unit  Bathroom Toilet: Handicap height  Bathroom Equipment: Grab bars in shower,Hand-held shower,Shower [id number] [facility] Equipment: [employer]  Has the patient had two or more falls in the past year or any fall with injury in the past year?: Yes  Receives Help From: Personal care attendant  ADL Assistance: Needs assistance  Bath: Supervision  Dressing: Independent  Grooming: Independent  Feeding: Independent  Toileting: Independent  Homemaking Assistance: Needs assistance  Meal Prep Responsibility: Primary  Laundry Responsibility: No  Cleaning Responsibility: No  Shopping Responsibility: No  Ambulation Assistance: Needs assistance (uses rollator)  Transfer Assistance: Independent  Active : Yes  Leisure & Hobbies: Pt reports she watches tv, reads, and works jigsaw puzzles  IADL Comments: Pt reports she has an aid that comes 11 hrs a week 2-3 hours a day Mon-Friday and she helps her get in the shower and does housekeeping tasks. Reports she is indep with bathing/dressing but has difficulty with socks.   Reports she had a reacher but it broke  Vision/Hearing  Vision Exceptions: Wears glasses for reading  Hearing: Within functional limits    Cognition   Orientation  Overall Orientation Status: Within Normal Limits  Cognition  Overall Cognitive Status: WNL     Objective   Heart Rate: 81  Heart Rate Source: Monitor  BP: (!) 122/56  BP Location: Left Arm  MAP (Calculated): 78  Resp: 16  SpO2: (!) 89 %  O2 Device: None (Room air)     Observation/Palpation  Observation: Pt presented sitting EOB with breakfast tray in front of her. Gross Assessment  Strength: Generally decreased, functional        Strength RLE  R Hip Flexion: 3/5  R Knee Flexion: 4/5  R Knee Extension: 4-/5  R Ankle Dorsiflexion: 4-/5;3+/5           Bed mobility  Rolling to Left: Modified independent  Rolling to Right: Modified independent  Sit to Supine: Stand by assistance  Transfers  Sit to Stand: Contact guard assistance  Stand to sit: Contact guard assistance  Ambulation  Device: Rollator;Single point cane  Assistance: Stand by assistance  Quality of Gait: Pt demnstrated flexed posture, decreased speed and step length and decreased foot clearance. Distance: x25 ft                 OutComes Score                                                  AM-PAC Score           Basic Mobility Six Clicks Form [facility] AM-PAC Score Conversion Table   How much difficulty does the patient currently have Unable   (pt is unable to do activity) A Lot   (activity is a struggle, requires great effort/time) A Little   (pt can manage, but takes more effort/time than should) None   (pt has no difficulty) Raw Score Standardized Score CMS -100% Score CMS Modifier        6 23.55 100% CN   Turning over in bed (including adjusting bedclothes, sheets, and blankets)? []1 []2  [x]3  []4  7 26.42 92.36% CM        8 28.58 86.62% CM   Sitting down on and standing up from a chair with arms (e.g. wheelchair, bedside commode, etc.)?  []1 []2 [x]3   []4   9 30.55 81.38% CM        10 32.29 76.75% CL   Moving from lying on back to sitting on the side of the bed? []1 []2  [x]3   []4   11 33.86 72.57% CL        12 35.33 68.66% CL   How much help from another person does the patient currently need Total   (Total/Dependent Assist) A Lot   (Max/Mod Assist) A Little   (Min/CGA/Supervision) None   (No human assistance) 13 36.74 64.91% CL        14 38.1 61.29% CL   Moving to and from a bed to a chair (including a wheelchair)? []1  []2   [x]3  []4   15 39.45 57.70% CK        16 40.78 54.16% CK   To walk in a hospital room? []1 []2   [x]3    []4  17 42.13 50.57% CK        18 43.63 46.58% CK   Climbing 3-5 steps with a railing? []1  [x]2   []3    []4  19 45.44 41.77% CK        20 47.67 35.83% CJ   Raw Score 17  21 50.25 28.97% CJ   Standardized Score   22 53.28 20.91% CJ   CMS 0-100% Score   23 56.93 11.20% CI   CMS Modifier  24 61.14 0.00% CH     CH = 0% impaired  CI = 1-20% impaired  CJ = 20-40% impaired  CK = 40-60% impaired  CL = 60-80% impaired  CM = % impaired  CN = 100% impaired       Goals  Long Term Goals  Time Frame for Long term goals : 1 week  Long term goal 1: Pt will demonstrate the ability to safely perform bed mobility with mod I following log rolling technique.   Long term goal 2: Pt will perform sit ><supine Fritz from flat bed surface  Long term goal 3: Pt will transfer to all surfaces Fritz  Long term goal 4: Pt will ambulate 150ft with cane Fritz       Education         Therapy Time   Individual Concurrent Group Co-treatment   Time In  945         Time Out  1000         Minutes                   CLAUDIA Telles, PT

## 2022-05-27 LAB
GLUCOSE BLD-MCNC: 113 MG/DL (ref 70–99)
GLUCOSE BLD-MCNC: 123 MG/DL (ref 70–99)
GLUCOSE BLD-MCNC: 69 MG/DL (ref 70–99)
GLUCOSE BLD-MCNC: 79 MG/DL (ref 70–99)
GLUCOSE BLD-MCNC: 97 MG/DL (ref 70–99)

## 2022-05-27 PROCEDURE — 6370000000 HC RX 637 (ALT 250 FOR IP): Performed by: NURSE PRACTITIONER

## 2022-05-27 PROCEDURE — 97110 THERAPEUTIC EXERCISES: CPT

## 2022-05-27 PROCEDURE — 6360000002 HC RX W HCPCS: Performed by: INTERNAL MEDICINE

## 2022-05-27 PROCEDURE — 82962 GLUCOSE BLOOD TEST: CPT

## 2022-05-27 PROCEDURE — 1200000002 HC SEMI PRIVATE SWING BED

## 2022-05-27 PROCEDURE — 97116 GAIT TRAINING THERAPY: CPT

## 2022-05-27 PROCEDURE — 6360000002 HC RX W HCPCS: Performed by: NURSE PRACTITIONER

## 2022-05-27 PROCEDURE — 97530 THERAPEUTIC ACTIVITIES: CPT

## 2022-05-27 PROCEDURE — 97535 SELF CARE MNGMENT TRAINING: CPT

## 2022-05-27 RX ORDER — KETOROLAC TROMETHAMINE 15 MG/ML
15 INJECTION, SOLUTION INTRAMUSCULAR; INTRAVENOUS EVERY 6 HOURS
Status: COMPLETED | OUTPATIENT
Start: 2022-05-27 | End: 2022-06-01

## 2022-05-27 RX ADMIN — METOPROLOL SUCCINATE 25 MG: 25 TABLET, EXTENDED RELEASE ORAL at 09:52

## 2022-05-27 RX ADMIN — HYDROXYCHLOROQUINE SULFATE 200 MG: 200 TABLET ORAL at 09:52

## 2022-05-27 RX ADMIN — ROPINIROLE HYDROCHLORIDE 1 MG: 1 TABLET, FILM COATED ORAL at 09:52

## 2022-05-27 RX ADMIN — KETOROLAC TROMETHAMINE 15 MG: 15 INJECTION, SOLUTION INTRAMUSCULAR; INTRAVENOUS at 18:22

## 2022-05-27 RX ADMIN — CLOBETASOL PROPIONATE: 0.5 CREAM TOPICAL at 09:58

## 2022-05-27 RX ADMIN — Medication 5 MG: at 20:39

## 2022-05-27 RX ADMIN — HYDROCODONE BITARTRATE AND ACETAMINOPHEN 1 TABLET: 10; 325 TABLET ORAL at 09:50

## 2022-05-27 RX ADMIN — ASPIRIN 81 MG: 81 TABLET, COATED ORAL at 09:52

## 2022-05-27 RX ADMIN — KETOROLAC TROMETHAMINE 15 MG: 15 INJECTION, SOLUTION INTRAMUSCULAR; INTRAVENOUS at 06:11

## 2022-05-27 RX ADMIN — Medication 5000 UNITS: at 20:34

## 2022-05-27 RX ADMIN — SIMETHICONE 160 MG: 80 TABLET, CHEWABLE ORAL at 18:35

## 2022-05-27 RX ADMIN — Medication 5000 UNITS: at 09:52

## 2022-05-27 RX ADMIN — KETOROLAC TROMETHAMINE 15 MG: 15 INJECTION, SOLUTION INTRAMUSCULAR; INTRAVENOUS at 11:56

## 2022-05-27 RX ADMIN — FUROSEMIDE 20 MG: 20 TABLET ORAL at 09:52

## 2022-05-27 RX ADMIN — ROPINIROLE HYDROCHLORIDE 2 MG: 1 TABLET, FILM COATED ORAL at 20:34

## 2022-05-27 RX ADMIN — HYDROCODONE BITARTRATE AND ACETAMINOPHEN 1 TABLET: 10; 325 TABLET ORAL at 16:58

## 2022-05-27 RX ADMIN — HYDROCODONE BITARTRATE AND ACETAMINOPHEN 1 TABLET: 10; 325 TABLET ORAL at 02:25

## 2022-05-27 RX ADMIN — GLIPIZIDE 5 MG: 5 TABLET, EXTENDED RELEASE ORAL at 09:52

## 2022-05-27 RX ADMIN — ROSUVASTATIN CALCIUM 10 MG: 5 TABLET, FILM COATED ORAL at 20:34

## 2022-05-27 ASSESSMENT — PAIN DESCRIPTION - FREQUENCY
FREQUENCY: CONTINUOUS

## 2022-05-27 ASSESSMENT — PAIN SCALES - GENERAL
PAINLEVEL_OUTOF10: 8
PAINLEVEL_OUTOF10: 0
PAINLEVEL_OUTOF10: 7
PAINLEVEL_OUTOF10: 4
PAINLEVEL_OUTOF10: 0
PAINLEVEL_OUTOF10: 5
PAINLEVEL_OUTOF10: 0
PAINLEVEL_OUTOF10: 7
PAINLEVEL_OUTOF10: 4
PAINLEVEL_OUTOF10: 8
PAINLEVEL_OUTOF10: 5

## 2022-05-27 ASSESSMENT — PAIN DESCRIPTION - ORIENTATION
ORIENTATION: LOWER;MID
ORIENTATION: RIGHT;LEFT
ORIENTATION: RIGHT;LEFT
ORIENTATION: LOWER
ORIENTATION: RIGHT;LEFT
ORIENTATION: RIGHT;LEFT

## 2022-05-27 ASSESSMENT — PAIN DESCRIPTION - PAIN TYPE
TYPE: OTHER (COMMENT)
TYPE: CHRONIC PAIN

## 2022-05-27 ASSESSMENT — PAIN DESCRIPTION - LOCATION
LOCATION: BACK
LOCATION: BACK
LOCATION: LEG

## 2022-05-27 ASSESSMENT — PAIN DESCRIPTION - DESCRIPTORS
DESCRIPTORS: ACHING
DESCRIPTORS: ACHING;BURNING
DESCRIPTORS: ACHING
DESCRIPTORS: ACHING;BURNING;DISCOMFORT;DULL
DESCRIPTORS: ACHING;DISCOMFORT
DESCRIPTORS: ACHING

## 2022-05-27 ASSESSMENT — PAIN - FUNCTIONAL ASSESSMENT
PAIN_FUNCTIONAL_ASSESSMENT: ACTIVITIES ARE NOT PREVENTED

## 2022-05-27 ASSESSMENT — PAIN DESCRIPTION - ONSET
ONSET: ON-GOING

## 2022-05-27 NOTE — PLAN OF CARE
No physical injury during this shift despite patient found sitting on side of bed several times during shift and taking off chair alarm and getting herself back in bed by self without calling for assistance despite education by staff and alarms on both bed and chair.   Problem: ABCDS Injury Assessment  Goal: Absence of physical injury  Outcome: Progressing

## 2022-05-27 NOTE — PROGRESS NOTES
Physical Therapy Treatment Note   Date: 2022 Room: [unfilled]   Name: Jack Torres : 1935   MRN: [id number] Admission Date:2022    Primary Problem:   Past Medical History:   Diagnosis Date    Arthritis     left knee pain, sees Dr. Eddie Krause CAD (coronary artery disease)     sees Dr. Jacques Pascual Chronic midline low back pain without sciatica 2016    Had PT in    [address] Colonoscopy refused     discussed in 7/15    DM (diabetes mellitus), type 2 ([facility] 2006    Dupuytren's contracture of right hand     Endometrial stripe increased 2014    Saw NP Bouchra De La Rosa. Was normal exam per pt.  Gastrointestinal hemorrhage 2015    Patient had GI bleeding. Colon refused. Discussed with patient in detail.  Glaucoma 2014    H/O echocardiogram 10/02/2014    Mildly depressed left ventricular function; ejection fraction of 45%. Moderate pulmonary hypertension.  H/O echocardiogram 2018    EF 50-55%.  Mitral annular calcification is present. No other significant valvulopathy seen.  History of nuclear stress test 2018    EF 59%. ECG portion of stress test is negative for ischemia by diagnostic criteria. fixed inferior large size severe defect in rest and stress images. Mild hubert infarct ischemia.     HTN (hypertension)     Hyperlipidemia     Kidney stone     hx-\"been about 3 yrs ago\"    MI (myocardial infarction) ([facility] 1998    treated with TPA    Obesity     Open wound of right foot 2020    Parainfluenza infection 2021    Pneumonia of right lower lobe due to infectious organism 2018    Vertigo     'have been having this since I had cataract surgery\"\"that is why they are doing the MRA of my brain(10/31/2014)    WD-Traumatic ulcer of foot, right, with fat layer exposed ([facility] 8/3/2020     Past Surgical History:   Procedure Laterality Date    CATARACT REMOVAL Bilateral     ,    INGUINAL HERNIA REPAIR Left 45 yrs ago   [employer] LUMBAR SPINE SURGERY N/A 5/12/2022    L1-4 LAMINECTOMIES performed by Hien Crocker MD at [address] N/A 5/12/2022    L2 AND L3 BILATERAL BALLOON KYPHOPLASTY AND L2 AND L3 NEEDLE BIOPSY performed by Hien Crocker MD at [facility] [address] Diagnosis: deconditoning  Restrictions/Precautions: fall risk, NO Bending, Lifting, Twisting    Subjective: Pt says that her pain is much better. Initial Pain level: 7/10    Goals:                  Long Term Goals  Time Frame for Long term goals : 1 week  Long term goal 1: Pt will demonstrate the ability to safely perform bed mobility with mod I following log rolling technique. Long term goal 2: Pt will perform sit ><supine Fritz from flat bed surface  Long term goal 3: Pt will transfer to all surfaces Fritz  Long term goal 4: Pt will ambulate 150ft with cane Fritz  Plan of Care:   Objective Findings:    Date:  5/27/2022 Date:  Date:  Date:    Bed mobility Mod I      Sit to stand transfer CGA      Stand to sit transfer CGA      Commode transfer CGA      Standing tolerance During gait      Ambulation Pt ambulated 6x50 ft with SBA with rollator. Pt required seated rest breaks due to fatigue.   Pt demnstrated flexed posture, decreased speed and step length and decreased foot clearance      Stairs n/a        Exercises:   Exercise/Equipment/Modalities  Date:   5/27/2022 Date:  Date:  Date:    NuStep 10 minutes, seat 8, arms 7, L1                                                                          Modality/intervention used:   [x ] Therapeutic Exercise   [ ] Modalities:   [x ] Therapeutic Activity     [ ] Ultrasound   [ ] Elec Stim   [x ] Gait Training     [ ] Cervical Traction  [ ] Lumbar Traction   [ ] Neuromuscular Re-education   [ ] Cold/hotpack  [ ] Iontophoresis   [ ] Instruction in HEP     [ ] Vasopneumatic   [ ] Manual Therapy   [ ] Aquatic Therapy     Other Therapeutic Activities:  Reviewed and instructed pt in No BLT precautions/ restrictions for back.  Home Exercise Program/Education provided to patient:   Manual Treatments: n/a  Communication with other providers:  Jamila Monterroso to see per nursing  Adverse reactions to treatment: n/a    Treatment/Activity Tolerance: Pt had no change in pain.    [ x] Patient limited by fatigue [ x] Patient limited by pain [ ] Patient limited by other medical complications [x ] Patient tolerated therapy well  [ ] Other:     Post Tx Pain Ratin /10     Safety Precautions:   [x ] left in bed  [ ] left in chair [x ] call light within reach  [x ] bed alarm on  [ ] personal alarm on  [ ] other staff present:    Plan:   [x ] Continue per plan of care [ ] Yara Leon current plan   [ ] Plan of care initiated [ ] Hold pending MD visit [ ] Discharge     Plan for Next Session:   Time In: 1018  Time Out: 1100  Total Treatment Minutes: 42 minutes  Billed Units: 1 gait, 1 TA, 1 TE    Signed: Shakir Herzog,  [place]     2022, 10:42 AM

## 2022-05-27 NOTE — CARE COORDINATION
CM advised the patient that her insurance provider approved an extension and her next review will be Thursday 6/2, patient voiced understanding. 11:10 AM  CM notified by Deja Love RN that the patient's son-in-law Zoie Vyas was at the bedside and requested to speak with case management. CM met with the patient and her son-in-law Zoie Vyas to discuss discharge planning. CM advised the patient and Zoie Vyas that her insurance provider approved an extension to her stay in the swing bed program and have requested that updated clinical documentation be submitted Thursday 6/2 should the patient feel she needs additional time. Patient stated that she would like to go home Monday 5/30. Zoie Vyas expressed concern about the patient's ability to taker care of herself at home and recommended that she stay in the swing bed at least until 6/2 to take advantage of all the physical therapy she can. Patient reluctantly agreed and voiced frustration and stated that she is just tired of being in the hospital.  CM will follow.

## 2022-05-27 NOTE — PROGRESS NOTES
SWING BED WEEKLY TEAM SHEET     WEEK#  1     NUTRITION   Diet: 5 carb choice diet                   TF:    N/A      TPN:  N/A    Appropriate/Adequate [X] yes [ ] no     Meal intakes: %  Weight: 205#   BMI: 35.17             Significant Change: ~14.6% wt loss x 5 mo per comprehensive weight hx review  Recommendations:  4 carb choice which better meets estimated kcal needs  Issues to be resolved before discharge:  Pt will continue to consume greater than 75% of meals  Dietitian Signature: Mk Stratton MS, RDN, LD

## 2022-05-27 NOTE — PROGRESS NOTES
[address] [address] Continence Nurse  Consult Note       Wilberto Tate  AGE: 81 y.o. GENDER: female  : 1935  TODAY'S DATE:  2022    Subjective:     Reason for [address] Evaluation and Assessment: right foot      Lor Argueta is a 81 y.o. female referred by:   [x] Physician  [] Nursing  [] Other:     Wound Identification:  Wound Type: undetermined  Contributing Factors: diabetes        PAST MEDICAL HISTORY        Diagnosis Date    Arthritis     left knee pain, sees Dr. Della Murphy CAD (coronary artery disease)     sees Dr. Jennifer Ponce Chronic midline low back pain without sciatica 2016    [address] PT in    [address] Colonoscopy refused     discussed in 7/15    DM (diabetes mellitus), type 2 ([facility] 2006    Dupuytren's contracture of right hand     Endometrial stripe increased 2014    Saw NP Cynthia garrett. Was normal exam per pt.  Gastrointestinal hemorrhage 2015    Patient had GI bleeding. Colon refused. Discussed with patient in detail.  Glaucoma 2014    H/O echocardiogram 10/02/2014    Mildly depressed left ventricular function; ejection fraction of 45%. Moderate pulmonary hypertension.  H/O echocardiogram 2018    EF 50-55%.  Mitral annular calcification is present. No other significant valvulopathy seen.  History of nuclear stress test 2018    EF 59%. ECG portion of stress test is negative for ischemia by diagnostic criteria. fixed inferior large size severe defect in rest and stress images. Mild hubert infarct ischemia.     HTN (hypertension)     Hyperlipidemia     Kidney stone     hx-\"been about 3 yrs ago\"    MI (myocardial infarction) ([facility] 1998    treated with TPA    Obesity     Open wound of right foot 2020    Parainfluenza infection 2021    Pneumonia of right lower lobe due to infectious organism 2018    Vertigo     'have been having this since I had cataract surgery\"\"that is why they are doing the MRA of my brain(10/31/2014)    WD-Traumatic ulcer of foot, right, with fat layer exposed ([facility] 8/3/2020       PAST SURGICAL HISTORY    Past Surgical History:   Procedure Laterality Date    CATARACT REMOVAL Bilateral     5/14,8/14    INGUINAL HERNIA REPAIR Left 39 yrs ago    LUMBAR SPINE SURGERY N/A 5/12/2022    L1-4 LAMINECTOMIES performed by Carolina Momin MD at [address] N/A 5/12/2022    L2 AND L3 BILATERAL BALLOON KYPHOPLASTY AND L2 AND L3 NEEDLE BIOPSY performed by Carolina Momin MD at [facility]    Family History   Problem Relation Age of Onset    Parkinsonism Mother     Heart Attack Father     Heart Disease Father     Stroke Sister     Cancer Sister         breast ca   [facility] High Blood Pressure Brother     High Blood Pressure Brother    [facility] Cancer Brother         \"stomach cancer\"    Colon Cancer Neg Hx     Stomach Cancer Neg Hx        SOCIAL HISTORY    Social History     Tobacco Use    Smoking status: Never Smoker    Smokeless tobacco: Never Used   Vaping Use    Vaping Use: Never used   Substance Use Topics    Alcohol use: Not Currently     Comment: OCCASSIONAL    Drug use: Never       ALLERGIES    No Known Allergies    MEDICATIONS    No current facility-administered medications on file prior to encounter. Current Outpatient Medications on File Prior to Encounter   Medication Sig Dispense Refill    cephALEXin (KEFLEX) 500 MG capsule Take 500 mg by mouth 4 times daily 28 doses= 7days      polyethylene glycol (GLYCOLAX) 17 g packet Take 17 g by mouth daily as needed for Constipation 17 g 1    HYDROcodone-acetaminophen (NORCO) 5-325 MG per tablet Take 1-2 tablets by mouth every 4 hours as needed for Pain for up to 10 days.  35 tablet 0    clobetasol (TEMOVATE) 0.05 % cream Apply topically 2 times daily      naloxegol (MOVANTIK) 25 MG TABS tablet Take 25 mg by mouth every morning      metoprolol succinate (TOPROL XL) 25 MG extended release tablet Take 1 tablet by mouth daily 30 tablet 0    rOPINIRole (REQUIP) 2 MG tablet Take one half (1/2) tablet by mouth at 2 pm and one (1) tablet by mouth at hs 45 tablet 3    rosuvastatin (CRESTOR) 10 MG tablet Take 1 tablet by mouth nightly 90 tablet 1    glipiZIDE (GLUCOTROL XL) 5 MG extended release tablet Take 1 tablet by mouth daily 90 tablet 1    furosemide (LASIX) 20 MG tablet Take 1 tablet by mouth daily 60 tablet 3    albuterol sulfate  (90 Base) MCG/ACT inhaler INHALE 2 PUFFS INTO THE LUNGS 4 TIMES DAILY AS NEEDED FOR WHEEZING 8.5 g 5    Multiple Vitamins-Minerals (PRESERVISION AREDS) TABS Take 1 tablet by mouth 2 times daily      hydroxychloroquine (PLAQUENIL) 200 MG tablet Take 200 mg by mouth daily       aspirin 81 MG EC tablet Take 81 mg by mouth daily  30 tablet     Cholecalciferol (VITAMIN D PO) Take 5,000 Units by mouth in the morning and at bedtime            Objective:      /61   Pulse 72   Temp 96.9 °F (36.1 °C)   Resp 16   Ht 5' 4.02\" (1.626 m)   Wt 205 lb (93 kg)   LMP  (LMP Unknown)   SpO2 97%   BMI 35.17 kg/m²   Edd Risk Score: Edd Scale Score: 21    LABS    CBC:   Lab Results   Component Value Date    WBC 8.4 05/23/2022    RBC 4.05 05/23/2022    HGB 12.0 05/23/2022    HCT 38.0 05/23/2022    MCV 93.8 05/23/2022    MCH 29.6 05/23/2022    MCHC 31.6 05/23/2022    RDW 13.2 05/23/2022     05/23/2022    MPV 8.8 05/23/2022     CMP:    Lab Results   Component Value Date     05/23/2022    K 3.8 05/23/2022    K 3.0 03/08/2018    CL 96 05/23/2022    CO2 29 05/23/2022    BUN 10 05/23/2022    CREATININE 0.5 05/23/2022    GFRAA >60 05/23/2022    AGRATIO 2.0 11/19/2021    LABGLOM >60 05/23/2022    GLUCOSE 112 05/23/2022    PROT 5.9 12/30/2021    PROT 7.0 07/20/2012    LABALBU 3.3 12/30/2021    CALCIUM 9.2 05/23/2022    BILITOT 0.4 12/30/2021    ALKPHOS 101 12/30/2021    AST 15 12/30/2021    ALT <5 12/30/2021     Albumin:    Lab Results   Component Value Date    LABALBU 3.3 12/30/2021 PT/INR:    Lab Results   Component Value Date    PROTIME 13.0 05/04/2022    INR 1.01 05/04/2022     HgBA1c:    Lab Results   Component Value Date    LABA1C 5.8 05/04/2022         Assessment:     Patient Active Problem List   Diagnosis    Essential hypertension    Mixed hyperlipidemia    Type 2 diabetes mellitus without complication ([facility]    Dupuytren's contracture of right hand    Rheumatoid arthritis involving multiple sites with positive rheumatoid factor ([facility]    CAD s/p MI, TPA in 1998    Gastrointestinal hemorrhage    Cyst, kidney, acquired    Chronic midline low back pain without sciatica    Restless leg syndrome    Chronic diastolic congestive heart failure ([facility]    Suspected sleep apnea    Morbid obesity with BMI of 40.0-44.9, adult ([facility]    Functional diarrhea    Moderate persistent asthma with exacerbation    Compression fracture of L2 vertebra with delayed healing    Lumbar stenosis with neurogenic claudication    Post-operative pain    Physical debility    Fluid collection at surgical site    Debility       Measurements:  Wound 05/19/22 Right Medial Foot/Ankle Cluster (Active)   Wound Image   05/19/22 1223   Dressing Status Clean;Dry; Intact 05/27/22 1002   Wound Cleansed Cleansed with saline 05/25/22 1603   Dressing/Treatment Gauze dressing/dressing sponge;Tape/Soft cloth adhesive tape;Dry dressing 05/27/22 1002   Dressing Change Due 05/28/22 05/27/22 1002   Wound Length (cm) 2.7 cm 05/19/22 1223   Wound Width (cm) 11.2 cm 05/19/22 1223   Wound Depth (cm) 0.1 cm 05/19/22 1223   Wound Surface Area (cm^2) 30.24 cm^2 05/19/22 1223   Wound Volume (cm^3) 3.024 cm^3 05/19/22 1223   Distance Tunneling (cm) 0 cm 05/25/22 1200   Tunneling Position ___ O'Clock 0 05/25/22 1200   Undermining Starts ___ O'Clock 0 05/25/22 1200   Undermining Ends___ O'Clock 0 05/25/22 1200   Undermining Maxium Distance (cm) 0 05/25/22 1200   Wound Assessment Slough 05/25/22 1200   Drainage Amount Scant 05/25/22 1200   Drainage Description Thin;Serous 05/25/22 1200   Odor None 05/25/22 1200   Vera-wound Assessment Blanchable erythema 05/25/22 1200   Margins Undefined edges 05/25/22 1200   Wound Thickness Description not for Pressure Injury Full thickness 05/25/22 1200   Number of days: 8       Wound 05/20/22 Knee Left;Lateral open lesion/red pinkish color (Active)   Wound Etiology Other 05/27/22 1002   Dressing Status Other (Comment) 05/27/22 1002   Wound Cleansed Not Cleansed 05/25/22 1603   Dressing/Treatment Open to air 05/27/22 1002   Drainage Amount None 05/27/22 1002   Odor None 05/27/22 1002   Number of days: 6       Response to treatment:  Well tolerated by patient. Pain Assessment:  Severity:  none  Quality of pain: none  Wound Pain Timing/Severity: none  Premedicated: none    Plan:     Plan of Care: Wound 05/20/22 Knee Left;Lateral open lesion/red pinkish color-Dressing/Treatment: Open to air  Wound 05/19/22 Right Medial Foot/Ankle Cluster-Dressing/Treatment:  (clobetasol;ag+ alginate;conform;tape)      Patient sitting in recliner, agreeable to wound care. Mid back incision is well approximated with no drainage. Left open to air. Right medial foot cluster cleansed, measured and dressed as documented in flow sheet. Patient tolerated procedure well.      Specialty Bed Required : no  [] Low Air Loss   [] Pressure Redistribution  [] Fluid Immersion  [] Bariatric  [] Total Pressure Relief  [] Other:     Discharge Plan:  Placement for patient upon discharge: tbd  Hospice Care: no  Patient appropriate for Outpatient [address]: yes    Patient/Caregiver Teaching:  Level of patient/caregiver understanding able to:   Voices understanding       Electronically signed by Inessa Jones RN, [address] [place] [address] on 5/27/2022 at 3:46 PM

## 2022-05-27 NOTE — PROGRESS NOTES
Occupational Therapy Treatment Note    Name: Jose Valdes MRN: [id number] :   1935   Date:  2022   Admission Date: 2022 Room:  [address] [place] [address]     Primary Problem:  Physical Debility s/p lumbar laminectomy 22    Restrictions/Precautions:  Restrictions/Precautions  Restrictions/Precautions: Fall Risk Position Activity Restriction  Spinal Precautions: No Lifting,No Twisting,No Bending  Sternal Precautions: 5# Lifting Restrictions  Other position/activity restrictions: Keep surgical site and dressing dry         Communication with other providers: [id number] [address]agua to see per nurse    Subjective:  Patient states:  \"I'm ready for a shower\"  Pain:   Location, Type, Intensity (0/10 to 10/10):  7/10    Objective:    Observation: Pt awake sitting EOB  Objective Measures:  Pt seen for ADL and functional mobility training    Treatment, including education:    Transfers:  Supine to sit: S  Sit to supine: S  Sit to stand: SBA  Stand to sit: SBA  Shower trf: SBA    Self Care Training:  Grooming: S after s/u to dry/brush hair  UE bathing: SBA after s/u in shower with Pt seated on shower chair  LE bathing: SBA after s/u in shower with Pt seated on shower chair and using grab bars to stand for washing hubert/buttocks  UE dressing: SBA after s/u while Pt standing at walker  LE dressing: SBA/CGA as Pt stood up while donning/doffing underwear/shorts/socks    Therapeutic Activity:  Pt ambulated in room down to shower using 4WW with SBA and amb back following shower demo good tolerance. Safety Measures:  Gait belt used during tx, Pt left seated in chair with chair alarm activated, call light within reach.     Assessment / Impression:    Patient's tolerance of treatment: Good  Adverse Reaction: none  Significant change in status and impact:  none  Barriers to improvement: none      Plan for Next Session:    Continue per POC    Time in:  1400  Time out:  1435  Timed treatment minutes:  35  Total treatment time: 35      Electronically signed by:    Sean Garcia.  Jackeline Stoddard [id number]  5/27/2022, 2:40 PM    Short Term Goals  Time Frame for Short term goals: Until DC or goals met  Short Term Goal 1: Pt will complete trfs mod I  Short Term Goal 2: Pt will complete UE/LE bathing/dressing mod I using AE PRN  Short Term Goal 3: Pt will complete toileting mod I  Short Term Goal 4: Pt will tolerate standing/functional mobilty 5+ min during ADLs/therax following precautions mod I  Short Term Goal 5: Pt will complete light BUE therex within 5# restriction, to increase strength/endurance for ADLs/functional mobility

## 2022-05-27 NOTE — PLAN OF CARE
Problem: Discharge Planning  Goal: Discharge to home or other facility with appropriate resources  5/27/2022 0252 by Jesika Downs RN  Outcome: Progressing  5/26/2022 1640 by Anastacio Howe RN  Outcome: Progressing     Problem: Pain  Goal: Verbalizes/displays adequate comfort level or baseline comfort level  5/27/2022 0252 by Jesika Downs RN  Outcome: Progressing  5/26/2022 1640 by Anastacio Howe RN  Outcome: Progressing     Problem: Safety - Adult  Goal: Free from fall injury  5/27/2022 0252 by Jesika Downs RN  Outcome: Progressing  5/26/2022 1640 by Anastacio Howe RN  Outcome: Progressing     Problem: ABCDS Injury Assessment  Goal: Absence of physical injury  5/27/2022 0252 by Jesika Downs RN  Outcome: Progressing  5/26/2022 1640 by Anastacio Howe RN  Outcome: Progressing     Problem: Chronic Conditions and Co-morbidities  Goal: Patient's chronic conditions and co-morbidity symptoms are monitored and maintained or improved  5/27/2022 0252 by Jesika Downs RN  Outcome: Progressing  5/26/2022 1640 by Anastacio Howe RN  Outcome: Progressing

## 2022-05-28 LAB
GLUCOSE BLD-MCNC: 102 MG/DL (ref 70–99)
GLUCOSE BLD-MCNC: 115 MG/DL (ref 70–99)
GLUCOSE BLD-MCNC: 129 MG/DL (ref 70–99)
GLUCOSE BLD-MCNC: 82 MG/DL (ref 70–99)
GLUCOSE BLD-MCNC: 83 MG/DL (ref 70–99)
GLUCOSE BLD-MCNC: 90 MG/DL (ref 70–99)
IRON: 49 UG/DL (ref 37–145)
PCT TRANSFERRIN: 14 % (ref 10–44)
TOTAL IRON BINDING CAPACITY: 342 UG/DL (ref 250–450)
UNSATURATED IRON BINDING CAPACITY: 293 UG/DL (ref 110–370)

## 2022-05-28 PROCEDURE — 97535 SELF CARE MNGMENT TRAINING: CPT

## 2022-05-28 PROCEDURE — 6370000000 HC RX 637 (ALT 250 FOR IP): Performed by: NURSE PRACTITIONER

## 2022-05-28 PROCEDURE — 83540 ASSAY OF IRON: CPT

## 2022-05-28 PROCEDURE — 36415 COLL VENOUS BLD VENIPUNCTURE: CPT

## 2022-05-28 PROCEDURE — 83550 IRON BINDING TEST: CPT

## 2022-05-28 PROCEDURE — 6360000002 HC RX W HCPCS: Performed by: NURSE PRACTITIONER

## 2022-05-28 PROCEDURE — 82962 GLUCOSE BLOOD TEST: CPT

## 2022-05-28 PROCEDURE — 97530 THERAPEUTIC ACTIVITIES: CPT

## 2022-05-28 PROCEDURE — 1200000002 HC SEMI PRIVATE SWING BED

## 2022-05-28 RX ORDER — CYCLOBENZAPRINE HCL 10 MG
10 TABLET ORAL 2 TIMES DAILY
Status: DISCONTINUED | OUTPATIENT
Start: 2022-05-28 | End: 2022-06-02 | Stop reason: HOSPADM

## 2022-05-28 RX ORDER — DIPHENHYDRAMINE HYDROCHLORIDE 50 MG/ML
25 INJECTION INTRAMUSCULAR; INTRAVENOUS ONCE
Status: COMPLETED | OUTPATIENT
Start: 2022-05-28 | End: 2022-05-28

## 2022-05-28 RX ORDER — DIPHENHYDRAMINE HYDROCHLORIDE 50 MG/ML
50 INJECTION INTRAMUSCULAR; INTRAVENOUS ONCE
Status: COMPLETED | OUTPATIENT
Start: 2022-05-28 | End: 2022-05-28

## 2022-05-28 RX ORDER — GABAPENTIN 100 MG/1
100 CAPSULE ORAL 3 TIMES DAILY
Status: DISCONTINUED | OUTPATIENT
Start: 2022-05-28 | End: 2022-05-28

## 2022-05-28 RX ADMIN — DIPHENHYDRAMINE HYDROCHLORIDE 25 MG: 50 INJECTION, SOLUTION INTRAMUSCULAR; INTRAVENOUS at 03:55

## 2022-05-28 RX ADMIN — ROPINIROLE HYDROCHLORIDE 2 MG: 1 TABLET, FILM COATED ORAL at 20:40

## 2022-05-28 RX ADMIN — HYDROXYCHLOROQUINE SULFATE 200 MG: 200 TABLET ORAL at 08:26

## 2022-05-28 RX ADMIN — CYCLOBENZAPRINE 10 MG: 10 TABLET, FILM COATED ORAL at 20:40

## 2022-05-28 RX ADMIN — FUROSEMIDE 20 MG: 20 TABLET ORAL at 08:26

## 2022-05-28 RX ADMIN — HYDROCODONE BITARTRATE AND ACETAMINOPHEN 1 TABLET: 10; 325 TABLET ORAL at 20:41

## 2022-05-28 RX ADMIN — KETOROLAC TROMETHAMINE 15 MG: 15 INJECTION, SOLUTION INTRAMUSCULAR; INTRAVENOUS at 06:05

## 2022-05-28 RX ADMIN — Medication 5000 UNITS: at 20:42

## 2022-05-28 RX ADMIN — DIPHENHYDRAMINE HYDROCHLORIDE 50 MG: 50 INJECTION, SOLUTION INTRAMUSCULAR; INTRAVENOUS at 21:32

## 2022-05-28 RX ADMIN — Medication 5000 UNITS: at 08:26

## 2022-05-28 RX ADMIN — HYDROCODONE BITARTRATE AND ACETAMINOPHEN 1 TABLET: 10; 325 TABLET ORAL at 14:16

## 2022-05-28 RX ADMIN — ROPINIROLE HYDROCHLORIDE 1 MG: 1 TABLET, FILM COATED ORAL at 08:26

## 2022-05-28 RX ADMIN — METOPROLOL SUCCINATE 25 MG: 25 TABLET, EXTENDED RELEASE ORAL at 08:26

## 2022-05-28 RX ADMIN — GABAPENTIN 100 MG: 100 CAPSULE ORAL at 11:42

## 2022-05-28 RX ADMIN — ROSUVASTATIN CALCIUM 10 MG: 5 TABLET, FILM COATED ORAL at 20:40

## 2022-05-28 RX ADMIN — KETOROLAC TROMETHAMINE 15 MG: 15 INJECTION, SOLUTION INTRAMUSCULAR; INTRAVENOUS at 19:00

## 2022-05-28 RX ADMIN — CYCLOBENZAPRINE 10 MG: 10 TABLET, FILM COATED ORAL at 03:55

## 2022-05-28 RX ADMIN — SIMETHICONE 160 MG: 80 TABLET, CHEWABLE ORAL at 20:41

## 2022-05-28 RX ADMIN — CLOBETASOL PROPIONATE: 0.5 CREAM TOPICAL at 08:29

## 2022-05-28 RX ADMIN — KETOROLAC TROMETHAMINE 15 MG: 15 INJECTION, SOLUTION INTRAMUSCULAR; INTRAVENOUS at 00:21

## 2022-05-28 RX ADMIN — ASPIRIN 81 MG: 81 TABLET, COATED ORAL at 08:26

## 2022-05-28 RX ADMIN — HYDROCODONE BITARTRATE AND ACETAMINOPHEN 1 TABLET: 10; 325 TABLET ORAL at 01:38

## 2022-05-28 RX ADMIN — KETOROLAC TROMETHAMINE 15 MG: 15 INJECTION, SOLUTION INTRAMUSCULAR; INTRAVENOUS at 11:42

## 2022-05-28 ASSESSMENT — PAIN DESCRIPTION - FREQUENCY
FREQUENCY: CONTINUOUS

## 2022-05-28 ASSESSMENT — PAIN - FUNCTIONAL ASSESSMENT
PAIN_FUNCTIONAL_ASSESSMENT: ACTIVITIES ARE NOT PREVENTED

## 2022-05-28 ASSESSMENT — PAIN DESCRIPTION - PAIN TYPE
TYPE: ACUTE PAIN
TYPE: CHRONIC PAIN
TYPE: ACUTE PAIN
TYPE: CHRONIC PAIN
TYPE: ACUTE PAIN
TYPE: CHRONIC PAIN

## 2022-05-28 ASSESSMENT — PAIN DESCRIPTION - LOCATION
LOCATION: BACK
LOCATION: BACK;LEG
LOCATION: LEG
LOCATION: LEG
LOCATION: BACK
LOCATION: BACK
LOCATION: LEG
LOCATION: BACK;LEG

## 2022-05-28 ASSESSMENT — PAIN DESCRIPTION - ORIENTATION
ORIENTATION: RIGHT;LEFT
ORIENTATION: RIGHT;LEFT;LOWER;MID
ORIENTATION: LOWER;MID
ORIENTATION: RIGHT;LEFT
ORIENTATION: MID
ORIENTATION: LOWER;MID
ORIENTATION: LEFT;RIGHT;MID;LOWER
ORIENTATION: LEFT;RIGHT

## 2022-05-28 ASSESSMENT — PAIN SCALES - GENERAL
PAINLEVEL_OUTOF10: 3
PAINLEVEL_OUTOF10: 8
PAINLEVEL_OUTOF10: 4
PAINLEVEL_OUTOF10: 4
PAINLEVEL_OUTOF10: 8
PAINLEVEL_OUTOF10: 4
PAINLEVEL_OUTOF10: 7
PAINLEVEL_OUTOF10: 9
PAINLEVEL_OUTOF10: 7
PAINLEVEL_OUTOF10: 7

## 2022-05-28 ASSESSMENT — PAIN DESCRIPTION - DESCRIPTORS
DESCRIPTORS: ACHING;DISCOMFORT
DESCRIPTORS: ACHING;DISCOMFORT
DESCRIPTORS: DISCOMFORT;ACHING
DESCRIPTORS: ACHING;CRAMPING;DISCOMFORT
DESCRIPTORS: ACHING
DESCRIPTORS: ACHING;DISCOMFORT
DESCRIPTORS: ACHING;DISCOMFORT;CRAMPING

## 2022-05-28 ASSESSMENT — PAIN DESCRIPTION - ONSET
ONSET: ON-GOING

## 2022-05-28 NOTE — PROGRESS NOTES
Occupational Therapy Treatment Note    Name: Lucio Snyder MRN: [id number] :   1935   Date:  2022   Admission Date: 2022 Room:  [address] [place] [address]     Primary Problem:  Physical Debility s/p lumbar laminectomy 22    Restrictions/Precautions:  Restrictions/Precautions  Restrictions/Precautions: Fall Risk Position Activity Restriction  Spinal Precautions: No Lifting,No Twisting,No Bending  Sternal Precautions: 5# Lifting Restrictions  Other position/activity restrictions: Keep surgical site and dressing dry         Communication with other providers: Reymundo Solomon to see per nurse    Subjective:  Patient states:  \"I had a terrible night I had restless legs and I couldn't sleep\"  Pain:   Location, Type, Intensity (0/10 to 10/10):  7/10 in back and 8/10 in BLE    Objective:    Observation: Pt awake sitting EOB  Objective Measures:  Pt seen for ADL and functional mobility training    Treatment, including education:    Transfers:  Supine to sit: S  Sit to supine: S  Sit to stand: S  Stand to sit: S  Shower trf: S    Self Care Training:  Pt completed sponge bath after s/u in chair  Grooming: S after s/u to /brush hair  UE bathing: S    LE bathing: SBA   UE dressing: S  LE dressing: SBA    Therapeutic Activity:  Pt ambulated in [place] 100', 50', 150' using Rolator walker with SBA. In room Pt sat in chair to complete sponge bath. Pt amb from chair and sat EOB using walker with SBA. Pt reported feeling fatigued after tx but tolerated well today. Safety Measures:  Gait belt used during tx, Pt left supine in bed, call light within reach, nurse notified    Assessment / Impression:    Patient's tolerance of treatment: Good  Adverse Reaction: none  Significant change in status and impact:  none  Barriers to improvement: none      Plan for Next Session:    Continue per POC    Time in:  2500  Time out:  1515  Timed treatment minutes:  38  Total treatment time:  38      Electronically signed by:    Ricardo Garcia, JEREL/L [id number]  5/28/2022, 3:17 PM    Short Term Goals  Time Frame for Short term goals: Until DC or goals met  Short Term Goal 1: Pt will complete trfs mod I  Short Term Goal 2: Pt will complete UE/LE bathing/dressing mod I using AE PRN  Short Term Goal 3: Pt will complete toileting mod I  Short Term Goal 4: Pt will tolerate standing/functional mobilty 5+ min during ADLs/therax following precautions mod I  Short Term Goal 5: Pt will complete light BUE therex within 5# restriction, to increase strength/endurance for ADLs/functional mobility

## 2022-05-28 NOTE — PROGRESS NOTES
V2.0  [facility] Hospitalist Progress Note      Name:  Angi Hayden /Age/Sex: 1935  (81 y.o. female)   MRN & CSN:  [id number] & [id number] Encounter Date/Time: 2022 8:22 AM EDT    Location:  [address] [place] [address] PCP: Yong Rabago MD       Hospital Day: 4    Assessment and Plan:   Angi Hayden is a 81 y.o. female with pmh of non-insulin-dependent diabetes type 2, hypertension, CAD, hyperlipidemia, lumbar stenosis with neurogenic claudication status post L1 L4 laminectomy, L2-L3 balloon kyphoplasty as well as needle biopsy on 2022 who presents to the swing bed program with Debility      1. Debility: Secondary to lumbar laminectomy/kyphoplasty; continue working with PT/OT daily  2. Lumbar Stenosis with Neurogenic Claudication: s/p Lumbar laminectomy/kyphoplast complicated by post-op fluid collection; continue pain control  3. Non-Insulin Dependent Type II DM: Continue home Glipizide and sliding scale before meals and at bedtime  4. Hypertension: Metprolol and Lasix continued  5. Neurogenic Claudication: On Ropinorole at home, continue  6. Hx CAD: Continue ASA/Statin  7. History of psoriasis, right medial foot/ankle cluster, wound care is following while in the hospital she does see dermatology outpatient. 8. Restless legs, will add gabapentin 100 mg 3 times daily on a trial basis. Diet ADULT DIET;  Regular; 5 carb choices (75 gm/meal)   DVT Prophylaxis patient is ambulatory [] Lovenox, []  Heparin, [] SCDs, [] Ambulation,  [] Eliquis, [] Xarelto  [] Coumadin   Code Status Full Code   Disposition From: [facility]  Expected Disposition: Home  Estimated Date of Discharge: TBD  Patient requires continued admission due to physical and Occupational Therapy   Surrogate Decision Maker/ HALIMA Harding child     Subjective:     Chief Complaint: Debility     Patient seen and examined this morning, she states she is still having problems with her legs she states they gave her gabapentin in [address] and it helped. I told her we would do a trial run on the gabapentin she will get 100 mg 3 times daily. Otherwise she has no complaints she is up sitting on side of the bed she is able to get up and down with her walker by herself and walk to the commode. She is doing well with therapy. Review of Systems:      10 point review of system completed negative unless stated above. Objective: Intake/Output Summary (Last 24 hours) at 5/28/2022 3127  Last data filed at 5/27/2022 1830  Gross per 24 hour   Intake 420 ml   Output --   Net 420 ml        Vitals:   Vitals:    05/28/22 0810   BP: (!) 136/102   Pulse: 80   Resp: 18   Temp: 97.4 °F (36.3 °C)   SpO2: 97%       Physical Exam:     General: NAD  Eyes: EOMI  ENT: neck supple  Cardiovascular: Regular rate. Respiratory: Clear to auscultation  Gastrointestinal: Soft, non tender  Genitourinary: no suprapubic tenderness  Musculoskeletal: No edema  Skin: warm, dry, dressing intact to right foot  Neuro: Alert. Psych: Mood appropriate.      Medications:   Medications:    cyclobenzaprine  10 mg Oral BID    ketorolac  15 mg IntraVENous Q6H    glipiZIDE  5 mg Oral Daily with breakfast    naloxegol  25 mg Oral QAM AC    rOPINIRole  2 mg Oral Nightly    rOPINIRole  1 mg Oral QAM    aspirin  81 mg Oral Daily    vitamin D3  5,000 Units Oral BID    clobetasol   Topical BID    furosemide  20 mg Oral Daily    hydroxychloroquine  200 mg Oral Daily    metoprolol succinate  25 mg Oral Daily    rosuvastatin  10 mg Oral Nightly    insulin lispro  0-6 Units SubCUTAneous TID     insulin lispro  0-3 Units SubCUTAneous Nightly      Infusions:    dextrose       PRN Meds: HYDROcodone-acetaminophen, 1 tablet, Q6H PRN  melatonin, 5 mg, Nightly PRN  albuterol sulfate HFA, 2 puff, 4x Daily PRN  HYDROcodone-acetaminophen, 1 tablet, Q4H PRN  polyethylene glycol, 17 g, Daily PRN  glucose, 4 tablet, PRN  dextrose bolus, 125 mL, PRN   Or  dextrose bolus, 250 mL, PRN  glucagon (rDNA), 1 mg, PRN  dextrose, 100 mL/hr, PRN  simethicone, 160 mg, TID PRN        Labs      Recent Results (from the past 24 hour(s))   POCT Glucose    Collection Time: 05/27/22 11:48 AM   Result Value Ref Range    POC Glucose 113 (H) 70 - 99 MG/DL   POCT Glucose    Collection Time: 05/27/22  4:53 PM   Result Value Ref Range    POC Glucose 79 70 - 99 MG/DL   POCT Glucose    Collection Time: 05/27/22  8:35 PM   Result Value Ref Range    POC Glucose 69 (L) 70 - 99 MG/DL   POCT Glucose    Collection Time: 05/27/22  9:43 PM   Result Value Ref Range    POC Glucose 123 (H) 70 - 99 MG/DL   POCT Glucose    Collection Time: 05/28/22 12:23 AM   Result Value Ref Range    POC Glucose 83 70 - 99 MG/DL   POCT Glucose    Collection Time: 05/28/22  7:44 AM   Result Value Ref Range    POC Glucose 90 70 - 99 MG/DL        Imaging/Diagnostics Last 24 Hours   No results found.     Electronically signed by ABA Perdomo NP on 5/28/2022 at 8:22 AM

## 2022-05-28 NOTE — PROGRESS NOTES
Patient c/o of restless legs/ pain in bilateral legs. Ordered muscle relaxer that she reports taking prior to being admitted, I also ordered one time dose of IV benadryl to help with insomnia. This did not work. Iron labs pending looks like hx of anemia may benefit from iron sup. As this can also cause restless legs. Patient also reports rash on her right foot itches may be psoriasis?, is following a wound nurse but may need medications updated, I will defer to daytime hospitalitis as this is my fist time seeing her rash and I'm unaware how it looked when she arrived.

## 2022-05-28 NOTE — PLAN OF CARE
Problem: Discharge Planning  Goal: Discharge to home or other facility with appropriate resources  5/28/2022 0243 by Bridgett Frankel, RN  Outcome: Progressing  5/27/2022 1825 by Eduin Camacho RN  Outcome: Progressing  Flowsheets (Taken 5/27/2022 1002)  Discharge to home or other facility with appropriate resources:   Identify barriers to discharge with patient and caregiver   Identify discharge learning needs (meds, wound care, etc)     Problem: Pain  Goal: Verbalizes/displays adequate comfort level or baseline comfort level  5/28/2022 0243 by Bridgett Frankel, RN  Outcome: Progressing  5/27/2022 1825 by Eduin Camacho RN  Outcome: Progressing     Problem: Safety - Adult  Goal: Free from fall injury  5/28/2022 0243 by Bridgett Frankel, RN  Outcome: Progressing  5/27/2022 1825 by Eduin Camacho RN  Outcome: Progressing     Problem: ABCDS Injury Assessment  Goal: Absence of physical injury  5/28/2022 0243 by Bridgett Frankel, RN  Outcome: Progressing  5/27/2022 1825 by Eduin Camacho RN  Outcome: Progressing     Problem: Chronic Conditions and Co-morbidities  Goal: Patient's chronic conditions and co-morbidity symptoms are monitored and maintained or improved  5/28/2022 0243 by Bridgett Frankel, RN  Outcome: Progressing  5/27/2022 1825 by Eduin Camacho RN  Outcome: Progressing  Flowsheets (Taken 5/27/2022 1002)  Care Plan - Patient's Chronic Conditions and Co-Morbidity Symptoms are Monitored and Maintained or Improved:   Monitor and assess patient's chronic conditions and comorbid symptoms for stability, deterioration, or improvement   Collaborate with multidisciplinary team to address chronic and comorbid conditions and prevent exacerbation or deterioration BPH (benign prostatic hyperplasia)  s/p SPC  DM (diabetes mellitus)    Emphysema/COPD    ESRD (end stage renal disease)    GERD (gastroesophageal reflux disease)    HTN (hypertension)

## 2022-05-29 LAB
GLUCOSE BLD-MCNC: 105 MG/DL (ref 70–99)
GLUCOSE BLD-MCNC: 116 MG/DL (ref 70–99)
GLUCOSE BLD-MCNC: 65 MG/DL (ref 70–99)
GLUCOSE BLD-MCNC: 85 MG/DL (ref 70–99)

## 2022-05-29 PROCEDURE — 6370000000 HC RX 637 (ALT 250 FOR IP): Performed by: NURSE PRACTITIONER

## 2022-05-29 PROCEDURE — 1200000002 HC SEMI PRIVATE SWING BED

## 2022-05-29 PROCEDURE — 6360000002 HC RX W HCPCS: Performed by: NURSE PRACTITIONER

## 2022-05-29 PROCEDURE — 82962 GLUCOSE BLOOD TEST: CPT

## 2022-05-29 RX ADMIN — KETOROLAC TROMETHAMINE 15 MG: 15 INJECTION, SOLUTION INTRAMUSCULAR; INTRAVENOUS at 00:12

## 2022-05-29 RX ADMIN — KETOROLAC TROMETHAMINE 15 MG: 15 INJECTION, SOLUTION INTRAMUSCULAR; INTRAVENOUS at 06:36

## 2022-05-29 RX ADMIN — CYCLOBENZAPRINE 10 MG: 10 TABLET, FILM COATED ORAL at 20:59

## 2022-05-29 RX ADMIN — CYCLOBENZAPRINE 10 MG: 10 TABLET, FILM COATED ORAL at 08:08

## 2022-05-29 RX ADMIN — ROPINIROLE HYDROCHLORIDE 1 MG: 1 TABLET, FILM COATED ORAL at 08:08

## 2022-05-29 RX ADMIN — ASPIRIN 81 MG: 81 TABLET, COATED ORAL at 08:08

## 2022-05-29 RX ADMIN — FUROSEMIDE 20 MG: 20 TABLET ORAL at 08:08

## 2022-05-29 RX ADMIN — HYDROCODONE BITARTRATE AND ACETAMINOPHEN 1 TABLET: 10; 325 TABLET ORAL at 15:55

## 2022-05-29 RX ADMIN — KETOROLAC TROMETHAMINE 15 MG: 15 INJECTION, SOLUTION INTRAMUSCULAR; INTRAVENOUS at 12:24

## 2022-05-29 RX ADMIN — Medication 5000 UNITS: at 08:08

## 2022-05-29 RX ADMIN — ROSUVASTATIN CALCIUM 10 MG: 5 TABLET, FILM COATED ORAL at 20:59

## 2022-05-29 RX ADMIN — HYDROXYCHLOROQUINE SULFATE 200 MG: 200 TABLET ORAL at 08:08

## 2022-05-29 RX ADMIN — ROPINIROLE HYDROCHLORIDE 2 MG: 1 TABLET, FILM COATED ORAL at 20:59

## 2022-05-29 RX ADMIN — Medication 5000 UNITS: at 20:59

## 2022-05-29 RX ADMIN — KETOROLAC TROMETHAMINE 15 MG: 15 INJECTION, SOLUTION INTRAMUSCULAR; INTRAVENOUS at 23:36

## 2022-05-29 RX ADMIN — KETOROLAC TROMETHAMINE 15 MG: 15 INJECTION, SOLUTION INTRAMUSCULAR; INTRAVENOUS at 18:12

## 2022-05-29 RX ADMIN — METOPROLOL SUCCINATE 25 MG: 25 TABLET, EXTENDED RELEASE ORAL at 08:08

## 2022-05-29 RX ADMIN — HYDROCODONE BITARTRATE AND ACETAMINOPHEN 1 TABLET: 10; 325 TABLET ORAL at 03:26

## 2022-05-29 RX ADMIN — HYDROCODONE BITARTRATE AND ACETAMINOPHEN 1 TABLET: 5; 325 TABLET ORAL at 22:17

## 2022-05-29 RX ADMIN — Medication 5 MG: at 20:59

## 2022-05-29 RX ADMIN — CLOBETASOL PROPIONATE: 0.5 CREAM TOPICAL at 12:24

## 2022-05-29 ASSESSMENT — PAIN SCALES - GENERAL
PAINLEVEL_OUTOF10: 5
PAINLEVEL_OUTOF10: 6
PAINLEVEL_OUTOF10: 0
PAINLEVEL_OUTOF10: 6
PAINLEVEL_OUTOF10: 8
PAINLEVEL_OUTOF10: 0
PAINLEVEL_OUTOF10: 7
PAINLEVEL_OUTOF10: 6
PAINLEVEL_OUTOF10: 4
PAINLEVEL_OUTOF10: 0
PAINLEVEL_OUTOF10: 8
PAINLEVEL_OUTOF10: 0

## 2022-05-29 ASSESSMENT — PAIN - FUNCTIONAL ASSESSMENT
PAIN_FUNCTIONAL_ASSESSMENT: ACTIVITIES ARE NOT PREVENTED

## 2022-05-29 ASSESSMENT — PAIN DESCRIPTION - ONSET
ONSET: ON-GOING
ONSET: ON-GOING
ONSET: AWAKENED FROM SLEEP

## 2022-05-29 ASSESSMENT — PAIN DESCRIPTION - FREQUENCY
FREQUENCY: INTERMITTENT
FREQUENCY: CONTINUOUS
FREQUENCY: CONTINUOUS

## 2022-05-29 ASSESSMENT — PAIN DESCRIPTION - PAIN TYPE
TYPE: ACUTE PAIN

## 2022-05-29 ASSESSMENT — PAIN DESCRIPTION - LOCATION
LOCATION: BACK;LEG
LOCATION: LEG
LOCATION: BACK;LEG
LOCATION: BACK

## 2022-05-29 ASSESSMENT — PAIN DESCRIPTION - ORIENTATION
ORIENTATION: RIGHT;LEFT
ORIENTATION: RIGHT;LEFT;LOWER;MID
ORIENTATION: RIGHT;LEFT

## 2022-05-29 ASSESSMENT — PAIN DESCRIPTION - DESCRIPTORS
DESCRIPTORS: ACHING;DISCOMFORT
DESCRIPTORS: ACHING;DISCOMFORT
DESCRIPTORS: DISCOMFORT;ACHING

## 2022-05-30 LAB
GLUCOSE BLD-MCNC: 112 MG/DL (ref 70–99)
GLUCOSE BLD-MCNC: 112 MG/DL (ref 70–99)
GLUCOSE BLD-MCNC: 135 MG/DL (ref 70–99)
GLUCOSE BLD-MCNC: 55 MG/DL (ref 70–99)
GLUCOSE BLD-MCNC: 59 MG/DL (ref 70–99)
GLUCOSE BLD-MCNC: 81 MG/DL (ref 70–99)
GLUCOSE BLD-MCNC: 91 MG/DL (ref 70–99)

## 2022-05-30 PROCEDURE — 97535 SELF CARE MNGMENT TRAINING: CPT

## 2022-05-30 PROCEDURE — 6360000002 HC RX W HCPCS: Performed by: NURSE PRACTITIONER

## 2022-05-30 PROCEDURE — 1200000002 HC SEMI PRIVATE SWING BED

## 2022-05-30 PROCEDURE — 82962 GLUCOSE BLOOD TEST: CPT

## 2022-05-30 PROCEDURE — 6370000000 HC RX 637 (ALT 250 FOR IP): Performed by: NURSE PRACTITIONER

## 2022-05-30 PROCEDURE — 97530 THERAPEUTIC ACTIVITIES: CPT

## 2022-05-30 RX ORDER — DIPHENHYDRAMINE HCL 50 MG
50 CAPSULE ORAL NIGHTLY PRN
Status: DISPENSED | OUTPATIENT
Start: 2022-05-30 | End: 2022-06-01

## 2022-05-30 RX ADMIN — NALOXEGOL OXALATE 25 MG: 25 TABLET, FILM COATED ORAL at 05:57

## 2022-05-30 RX ADMIN — CLOBETASOL PROPIONATE: 0.5 CREAM TOPICAL at 09:45

## 2022-05-30 RX ADMIN — ROPINIROLE HYDROCHLORIDE 2 MG: 1 TABLET, FILM COATED ORAL at 21:35

## 2022-05-30 RX ADMIN — HYDROCODONE BITARTRATE AND ACETAMINOPHEN 1 TABLET: 10; 325 TABLET ORAL at 09:35

## 2022-05-30 RX ADMIN — ROPINIROLE HYDROCHLORIDE 1 MG: 1 TABLET, FILM COATED ORAL at 09:35

## 2022-05-30 RX ADMIN — ROSUVASTATIN CALCIUM 10 MG: 5 TABLET, FILM COATED ORAL at 21:35

## 2022-05-30 RX ADMIN — Medication 5000 UNITS: at 09:35

## 2022-05-30 RX ADMIN — CYCLOBENZAPRINE 10 MG: 10 TABLET, FILM COATED ORAL at 09:34

## 2022-05-30 RX ADMIN — Medication 4 TABLET: at 16:36

## 2022-05-30 RX ADMIN — HYDROCODONE BITARTRATE AND ACETAMINOPHEN 1 TABLET: 10; 325 TABLET ORAL at 21:44

## 2022-05-30 RX ADMIN — CYCLOBENZAPRINE 10 MG: 10 TABLET, FILM COATED ORAL at 21:36

## 2022-05-30 RX ADMIN — GLIPIZIDE 5 MG: 5 TABLET, EXTENDED RELEASE ORAL at 09:34

## 2022-05-30 RX ADMIN — KETOROLAC TROMETHAMINE 15 MG: 15 INJECTION, SOLUTION INTRAMUSCULAR; INTRAVENOUS at 17:35

## 2022-05-30 RX ADMIN — FUROSEMIDE 20 MG: 20 TABLET ORAL at 09:34

## 2022-05-30 RX ADMIN — KETOROLAC TROMETHAMINE 15 MG: 15 INJECTION, SOLUTION INTRAMUSCULAR; INTRAVENOUS at 05:57

## 2022-05-30 RX ADMIN — Medication 5000 UNITS: at 21:35

## 2022-05-30 RX ADMIN — METOPROLOL SUCCINATE 25 MG: 25 TABLET, EXTENDED RELEASE ORAL at 09:35

## 2022-05-30 RX ADMIN — KETOROLAC TROMETHAMINE 15 MG: 15 INJECTION, SOLUTION INTRAMUSCULAR; INTRAVENOUS at 12:11

## 2022-05-30 RX ADMIN — ASPIRIN 81 MG: 81 TABLET, COATED ORAL at 09:35

## 2022-05-30 RX ADMIN — HYDROXYCHLOROQUINE SULFATE 200 MG: 200 TABLET ORAL at 09:35

## 2022-05-30 RX ADMIN — HYDROCODONE BITARTRATE AND ACETAMINOPHEN 1 TABLET: 10; 325 TABLET ORAL at 02:25

## 2022-05-30 RX ADMIN — HYDROCODONE BITARTRATE AND ACETAMINOPHEN 1 TABLET: 10; 325 TABLET ORAL at 15:45

## 2022-05-30 ASSESSMENT — PAIN DESCRIPTION - ONSET
ONSET: GRADUAL

## 2022-05-30 ASSESSMENT — PAIN DESCRIPTION - ORIENTATION
ORIENTATION: LOWER
ORIENTATION: RIGHT;LEFT

## 2022-05-30 ASSESSMENT — PAIN SCALES - GENERAL
PAINLEVEL_OUTOF10: 6
PAINLEVEL_OUTOF10: 6
PAINLEVEL_OUTOF10: 8
PAINLEVEL_OUTOF10: 3
PAINLEVEL_OUTOF10: 7
PAINLEVEL_OUTOF10: 7
PAINLEVEL_OUTOF10: 9
PAINLEVEL_OUTOF10: 6
PAINLEVEL_OUTOF10: 9
PAINLEVEL_OUTOF10: 9
PAINLEVEL_OUTOF10: 7

## 2022-05-30 ASSESSMENT — PAIN DESCRIPTION - DESCRIPTORS
DESCRIPTORS: ACHING;DISCOMFORT
DESCRIPTORS: ACHING;DISCOMFORT
DESCRIPTORS: DISCOMFORT;ACHING
DESCRIPTORS: ACHING
DESCRIPTORS: ACHING

## 2022-05-30 ASSESSMENT — PAIN DESCRIPTION - LOCATION
LOCATION: LEG
LOCATION: BACK;LEG
LOCATION: BACK

## 2022-05-30 ASSESSMENT — PAIN - FUNCTIONAL ASSESSMENT
PAIN_FUNCTIONAL_ASSESSMENT: ACTIVITIES ARE NOT PREVENTED

## 2022-05-30 ASSESSMENT — PAIN DESCRIPTION - PAIN TYPE
TYPE: CHRONIC PAIN
TYPE: ACUTE PAIN

## 2022-05-30 ASSESSMENT — PAIN DESCRIPTION - FREQUENCY
FREQUENCY: INTERMITTENT
FREQUENCY: CONTINUOUS
FREQUENCY: CONTINUOUS

## 2022-05-30 NOTE — PROGRESS NOTES
During am handoff at bedside patient found on side of bed with alarm off. Patient educated about alarm and risks of sitting on side of bed. Patient educated that she needs to call for assistance before getting up, and instead of sitting on the edge of bed patient needs to either sit up in chair or sit in bed. This RN helped patient to bathroom and back to bed. Patient in bed with bed alarm on when this RN and Benjamin Welsh RN night shift left room. When MGlidden, PCT arrived to patient room to take vitals, patient again on found on side of bed. This was reported to this RN and this RN called and requested that Cong Spencer, AX[facility] Nursing Supervisor speak with patient regarding either sitting up in chair or sitting up in bed, but not sitting up on side of bed, not turning off alarm, and not getting up by self but calling staff for assistance.

## 2022-05-30 NOTE — PROGRESS NOTES
V2.0  [facility] [place]      Name:  Rylie Gaytan /Age/Sex: 1935  (81 y.o. female)   MRN & CSN:  [id number] & [id number] Encounter Date/Time: 2022 9:09 AM EDT    Location:  [address] [place] [address] PCP: Tomás Carrillo MD       Hospital Day: 6    Assessment and Plan:   Rylie Gaytan is a 81 y.o. female who presents with Debility    1. Debility, continue to work with physical and Occupational Therapy patient is doing well expect that she should be able to discharge home within the week. 2.  Lumbar stenosis with neurogenic claudication, status post lumbar laminectomy/kyphoplasty complicated by postop fluid collection, patient is currently on IV Toradol check BMP in a.m. to monitor kidney function. 3.  Non-insulin-dependent type 2 diabetes mellitus continue current home medications of glipizide  4. Hypertension, continue metoprolol and Lasix  5. Neurogenic claudication, patient is on ropinirole continue. 6.  History of CAD, no complaints of any chest pain, continue aspirin and statin  7. Restless leg syndrome secondary to neurogenic claudication, added gabapentin as per patient request as she states she was getting in [facility], however she states it did not work so this has been discontinued. 8.  History of psoriasis, right medial foot/ankle cluster, wound care is following while in the hospital, she will need to follow out with dermatology as an outpatient. Diet ADULT DIET;  Regular; 5 carb choices (75 gm/meal)   DVT Prophylaxis patient is ambulatory [] Lovenox, []  Heparin, [] SCDs, [] Ambulation,  [] Eliquis, [] Xarelto  [] Coumadin   Code Status Full Code   Disposition From: [facility]  Expected Disposition: Home  Estimated Date of Discharge: TBD  Patient requires continued admission due to debility   Surrogate Decision Maker/ POA  roly Tate     Subjective:     Chief Complaint: Debility    Patient seen and examined this morning, she is out of bed in the chair eating breakfast.  She states her back feels much better with Toradol. She has no complaints of any chest pain, no shortness of breath, no abdominal pain, no nausea no vomiting no diarrhea. Continue to work with physical and occupational therapy. Review of Systems:        10 point review of systems completed and is negative unless stated above    Objective:   No intake or output data in the 24 hours ending 05/30/22 0909     Vitals:   Vitals:    05/30/22 0740   BP: 129/62   Pulse: 66   Resp: 16   Temp: 96.8 °F (36 °C)   SpO2: 97%       Physical Exam:     General: NAD  Eyes: EOMI  ENT: neck supple  Cardiovascular: Regular rate. Respiratory: Clear to auscultation  Gastrointestinal: Soft, non tender  Genitourinary: no suprapubic tenderness  Musculoskeletal: No edema  Skin: warm, dry, dressing to right foot intact  Neuro: Alert. Psych: Mood appropriate.      Medications:   Medications:    cyclobenzaprine  10 mg Oral BID    ketorolac  15 mg IntraVENous Q6H    glipiZIDE  5 mg Oral Daily with breakfast    naloxegol  25 mg Oral QAM AC    rOPINIRole  2 mg Oral Nightly    rOPINIRole  1 mg Oral QAM    aspirin  81 mg Oral Daily    vitamin D3  5,000 Units Oral BID    clobetasol   Topical BID    furosemide  20 mg Oral Daily    hydroxychloroquine  200 mg Oral Daily    metoprolol succinate  25 mg Oral Daily    rosuvastatin  10 mg Oral Nightly    insulin lispro  0-6 Units SubCUTAneous TID WC    insulin lispro  0-3 Units SubCUTAneous Nightly      Infusions:    dextrose       PRN Meds: HYDROcodone-acetaminophen, 1 tablet, Q6H PRN  melatonin, 5 mg, Nightly PRN  albuterol sulfate HFA, 2 puff, 4x Daily PRN  HYDROcodone-acetaminophen, 1 tablet, Q4H PRN  polyethylene glycol, 17 g, Daily PRN  glucose, 4 tablet, PRN  dextrose bolus, 125 mL, PRN   Or  dextrose bolus, 250 mL, PRN  glucagon (rDNA), 1 mg, PRN  dextrose, 100 mL/hr, PRN  simethicone, 160 mg, TID PRN        Labs      Recent Results (from the past 24 hour(s))   POCT Glucose Collection Time: 05/29/22 12:20 PM   Result Value Ref Range    POC Glucose 105 (H) 70 - 99 MG/DL   POCT Glucose    Collection Time: 05/29/22  5:03 PM   Result Value Ref Range    POC Glucose 85 70 - 99 MG/DL   POCT Glucose    Collection Time: 05/29/22  7:20 PM   Result Value Ref Range    POC Glucose 116 (H) 70 - 99 MG/DL   POCT Glucose    Collection Time: 05/30/22  8:28 AM   Result Value Ref Range    POC Glucose 91 70 - 99 MG/DL            Electronically signed by ABA Perdomo NP on 5/30/2022 at 9:09 AM

## 2022-05-30 NOTE — PROGRESS NOTES
Occupational Therapy Treatment Note    Name: Salud Rollins MRN: [id number] :   1935   Date:  2022   Admission Date: 2022 Room:  [address] [place] [address]     Primary Problem:  Physical Debility s/p lumbar laminectomy 22    Restrictions/Precautions:  Restrictions/Precautions  Restrictions/Precautions: Fall Risk Position Activity Restriction  Spinal Precautions: No Lifting,No Twisting,No Bending  Sternal Precautions: 5# Lifting Restrictions  Other position/activity restrictions: Keep surgical site and dressing dry         Communication with other providers: [id number] Britt Paniagua to see per nurse    Subjective:  Patient states:  \"I want to take a shower\"  Pain:   Location, Type, Intensity (0/10 to 1010):  0/10    Objective:    Observation: Pt awake sitting in recliner, agreeable to therapy. Objective Measures:  Pt seen for ADL and functional mobility training    Treatment, including education:    Transfers:  Sit to stand: S  Stand to sit: S  Shower trf: S  Toilet: SBA    Self Care Training:  Pt completed shower after setup in shower room  Toileting: SBA for hubert-care  Grooming: MI to brush hair  UE bathing: S seated on shower chair    LE bathing: S seated on shower chair. Patients R foot wrap wet, nurse notified  Jonathan Sotelo: SBA while standing using grab bars  Back: min A while seated on shower chair  UE dressing: MI after setup  LE dressing: SBA      Therapeutic Activity:  Pt ambulated from room to shower  46' using Rollator walker with SBA. Patient ambulated from shower to room 46' using rollator SBA. Pt reported feeling fatigued after tx reporting \"it might be from sitting to long\" but tolerated well today. Safety Measures:  Gait belt used, patient left in recliner, chair alarm in place call light within reach, nurse notified.     Assessment / Impression:    Patient's tolerance of treatment: Good  Adverse Reaction: none  Significant change in status and impact:  none  Barriers to improvement: none      Plan for Next Session:    Continue per POC    Time in:  1125  Time out:  1205  Timed treatment minutes:  40  Total treatment time:  40   2 ADL, 1 TA      Electronically signed by:    Caleb Cardona, OSCAR, OTR/L [id number]  5/30/2022, 12:54 PM    Short Term Goals  Time Frame for Short term goals: Until DC or goals met  Short Term Goal 1: Pt will complete trfs mod I  Short Term Goal 2: Pt will complete UE/LE bathing/dressing mod I using AE PRN  Short Term Goal 3: Pt will complete toileting mod I  Short Term Goal 4: Pt will tolerate standing/functional mobilty 5+ min during ADLs/therax following precautions mod I  Short Term Goal 5: Pt will complete light BUE therex within 5# restriction, to increase strength/endurance for ADLs/functional mobility

## 2022-05-30 NOTE — PLAN OF CARE
Patient states pain is better with toradol and norco but that she still has pain and can tell when pain medication wears off.    Problem: Pain  Goal: Verbalizes/displays adequate comfort level or baseline comfort level  Outcome: Progressing

## 2022-05-30 NOTE — PROGRESS NOTES
This NS and patients primary RN, Chelsea Judd, have educated the patient several times this morning on bed/chair alarm and sitting on the edge of the bed and all of the safety measure that we're required to have in place due to her being a high fall risk and also receiving narcotics. The patient has kept refusing to comply with policy per [facility]. Jericho Ambrocio, unit manager, is also aware of this safety concern.

## 2022-05-31 LAB
ANION GAP SERPL CALCULATED.3IONS-SCNC: 11 MMOL/L (ref 4–16)
BUN BLDV-MCNC: 17 MG/DL (ref 6–23)
CALCIUM SERPL-MCNC: 9.1 MG/DL (ref 8.3–10.6)
CHLORIDE BLD-SCNC: 101 MMOL/L (ref 99–110)
CO2: 26 MMOL/L (ref 21–32)
CREAT SERPL-MCNC: 0.7 MG/DL (ref 0.6–1.1)
GFR AFRICAN AMERICAN: >60 ML/MIN/1.73M2
GFR NON-AFRICAN AMERICAN: >60 ML/MIN/1.73M2
GLUCOSE BLD-MCNC: 120 MG/DL (ref 70–99)
GLUCOSE BLD-MCNC: 77 MG/DL (ref 70–99)
GLUCOSE BLD-MCNC: 80 MG/DL (ref 70–99)
GLUCOSE BLD-MCNC: 92 MG/DL (ref 70–99)
GLUCOSE BLD-MCNC: 94 MG/DL (ref 70–99)
POTASSIUM SERPL-SCNC: 4.5 MMOL/L (ref 3.5–5.1)
SODIUM BLD-SCNC: 138 MMOL/L (ref 135–145)

## 2022-05-31 PROCEDURE — 97535 SELF CARE MNGMENT TRAINING: CPT

## 2022-05-31 PROCEDURE — 1200000002 HC SEMI PRIVATE SWING BED

## 2022-05-31 PROCEDURE — 82962 GLUCOSE BLOOD TEST: CPT

## 2022-05-31 PROCEDURE — 6370000000 HC RX 637 (ALT 250 FOR IP): Performed by: NURSE PRACTITIONER

## 2022-05-31 PROCEDURE — 80048 BASIC METABOLIC PNL TOTAL CA: CPT

## 2022-05-31 PROCEDURE — 97110 THERAPEUTIC EXERCISES: CPT

## 2022-05-31 PROCEDURE — 97530 THERAPEUTIC ACTIVITIES: CPT

## 2022-05-31 PROCEDURE — 6360000002 HC RX W HCPCS: Performed by: NURSE PRACTITIONER

## 2022-05-31 PROCEDURE — 36415 COLL VENOUS BLD VENIPUNCTURE: CPT

## 2022-05-31 RX ORDER — GLIPIZIDE 2.5 MG/1
2.5 TABLET, EXTENDED RELEASE ORAL
Status: DISCONTINUED | OUTPATIENT
Start: 2022-06-01 | End: 2022-06-01

## 2022-05-31 RX ADMIN — CYCLOBENZAPRINE 10 MG: 10 TABLET, FILM COATED ORAL at 08:39

## 2022-05-31 RX ADMIN — Medication 5000 UNITS: at 08:39

## 2022-05-31 RX ADMIN — ROSUVASTATIN CALCIUM 10 MG: 5 TABLET, FILM COATED ORAL at 19:59

## 2022-05-31 RX ADMIN — KETOROLAC TROMETHAMINE 15 MG: 15 INJECTION, SOLUTION INTRAMUSCULAR; INTRAVENOUS at 17:38

## 2022-05-31 RX ADMIN — HYDROCODONE BITARTRATE AND ACETAMINOPHEN 1 TABLET: 10; 325 TABLET ORAL at 08:39

## 2022-05-31 RX ADMIN — METOPROLOL SUCCINATE 25 MG: 25 TABLET, EXTENDED RELEASE ORAL at 08:39

## 2022-05-31 RX ADMIN — HYDROCODONE BITARTRATE AND ACETAMINOPHEN 1 TABLET: 10; 325 TABLET ORAL at 16:40

## 2022-05-31 RX ADMIN — CYCLOBENZAPRINE 10 MG: 10 TABLET, FILM COATED ORAL at 19:59

## 2022-05-31 RX ADMIN — KETOROLAC TROMETHAMINE 15 MG: 15 INJECTION, SOLUTION INTRAMUSCULAR; INTRAVENOUS at 12:06

## 2022-05-31 RX ADMIN — FUROSEMIDE 20 MG: 20 TABLET ORAL at 08:38

## 2022-05-31 RX ADMIN — HYDROXYCHLOROQUINE SULFATE 200 MG: 200 TABLET ORAL at 08:39

## 2022-05-31 RX ADMIN — KETOROLAC TROMETHAMINE 15 MG: 15 INJECTION, SOLUTION INTRAMUSCULAR; INTRAVENOUS at 01:30

## 2022-05-31 RX ADMIN — CLOBETASOL PROPIONATE: 0.5 CREAM TOPICAL at 08:44

## 2022-05-31 RX ADMIN — Medication 5000 UNITS: at 19:59

## 2022-05-31 RX ADMIN — ASPIRIN 81 MG: 81 TABLET, COATED ORAL at 08:39

## 2022-05-31 RX ADMIN — ROPINIROLE HYDROCHLORIDE 1 MG: 1 TABLET, FILM COATED ORAL at 08:39

## 2022-05-31 RX ADMIN — KETOROLAC TROMETHAMINE 15 MG: 15 INJECTION, SOLUTION INTRAMUSCULAR; INTRAVENOUS at 06:31

## 2022-05-31 RX ADMIN — DIPHENHYDRAMINE HYDROCHLORIDE 50 MG: 25 CAPSULE ORAL at 21:31

## 2022-05-31 RX ADMIN — ROPINIROLE HYDROCHLORIDE 2 MG: 1 TABLET, FILM COATED ORAL at 19:59

## 2022-05-31 RX ADMIN — KETOROLAC TROMETHAMINE 15 MG: 15 INJECTION, SOLUTION INTRAMUSCULAR; INTRAVENOUS at 23:50

## 2022-05-31 ASSESSMENT — PAIN DESCRIPTION - LOCATION
LOCATION: BACK
LOCATION: LEG
LOCATION: BACK

## 2022-05-31 ASSESSMENT — PAIN SCALES - GENERAL
PAINLEVEL_OUTOF10: 6
PAINLEVEL_OUTOF10: 5
PAINLEVEL_OUTOF10: 5
PAINLEVEL_OUTOF10: 7
PAINLEVEL_OUTOF10: 6
PAINLEVEL_OUTOF10: 4
PAINLEVEL_OUTOF10: 7
PAINLEVEL_OUTOF10: 6

## 2022-05-31 ASSESSMENT — PAIN DESCRIPTION - ORIENTATION
ORIENTATION: RIGHT;LEFT
ORIENTATION: LOWER
ORIENTATION: LOWER
ORIENTATION: RIGHT;LEFT
ORIENTATION: LOWER

## 2022-05-31 ASSESSMENT — PAIN DESCRIPTION - PAIN TYPE
TYPE: CHRONIC PAIN

## 2022-05-31 ASSESSMENT — PAIN DESCRIPTION - ONSET
ONSET: ON-GOING

## 2022-05-31 ASSESSMENT — PAIN DESCRIPTION - FREQUENCY
FREQUENCY: CONTINUOUS

## 2022-05-31 ASSESSMENT — PAIN DESCRIPTION - DESCRIPTORS
DESCRIPTORS: ACHING;DISCOMFORT

## 2022-05-31 ASSESSMENT — PAIN - FUNCTIONAL ASSESSMENT
PAIN_FUNCTIONAL_ASSESSMENT: ACTIVITIES ARE NOT PREVENTED

## 2022-05-31 NOTE — PROGRESS NOTES
Due to episodes of hypoglycemia, patient's glipizide was reduced from 5 mg to 2.5 mg. Hemoglobin A1C on 5/4/22 was 5.8 and upon trending, patient is well controlled. Could consider discontinuing glipizide if further hypoglycemic episodes occur. SSI also discontinued as patient has not been requiring coverage.     Nikhil Hartman PA-C  Hospitalist  5/31/2022, 9:51 AM

## 2022-05-31 NOTE — PLAN OF CARE
Patient stating today that she is returning home on June 2. Patient states she lives alone but daughter and son-in-law are getting her house ready for her to move back in.    Problem: Discharge Planning  Goal: Discharge to home or other facility with appropriate resources  Outcome: Progressing  Flowsheets (Taken 5/31/2022 0561)  Discharge to home or other facility with appropriate resources:   Identify barriers to discharge with patient and caregiver   Identify discharge learning needs (meds, wound care, etc)   Refer to discharge planning if patient needs post-hospital services based on physician order or complex needs related to functional status, cognitive ability or social support system

## 2022-05-31 NOTE — PROGRESS NOTES
Occupational Therapy Treatment Note    Name: Jeronimo Castaneda MRN: [id number] :   1935   Date:  2022   Admission Date: 2022 Room:  [address] [place] [address]     Primary Problem:  Physical Debility s/p lumbar laminectomy 22    Restrictions/Precautions:  Restrictions/Precautions  Restrictions/Precautions: Fall Risk Position Activity Restriction  Spinal Precautions: No Lifting,No Twisting,No Bending  Sternal Precautions: 5# Lifting Restrictions  Other position/activity restrictions: Keep surgical site and dressing dry         Communication with other providers: [facility] to [facility] per nurse    Subjective:  Patient states:  \"I want to take a shower\"  Pain:   Location, Type, Intensity (0/10 to 1010):  0/10    Objective:    Observation: Pt awake sitting in recliner, agreeable to therapy. Objective Measures:  Pt seen for ADL and functional mobility training    Treatment, including education:    Transfers:  Sit to stand: S  Stand to sit: S  Shower trf: S  Toilet: S  Self Care Training:  Pt completed shower after setup in shower room  Toileting: SBA for hubert-care  Grooming: MI to brush hair  UE bathing: S seated on shower chair    LE bathing: S seated on shower chair. Patients R foot wrap wet, nurse notified  Rosa Isela Julioack: S while standing using grab bars  Back: S while seated on shower chair  UE dressing: MI after setup  LE dressing: SBA      Therapeutic Activity:  Pt ambulated from room to shower  46' using Rollator walker with SBA. Patient ambulated from shower to room 46' using rollator SBA. Safety Measures:  Gait belt used, patient left in bed, Bed alarm in place call light within reach, nurse notified.     Assessment / Impression:    Patient's tolerance of treatment: Good  Adverse Reaction: none  Significant change in status and impact:  none  Barriers to improvement: none      Plan for Next Session:    Continue per POC    Time in:  120  Time out:  200  Timed treatment minutes:  40  Total treatment time:  40   2 ADL, 1 TA      Electronically signed by:    Electronically signed by JEREL Padilla/L ,[id number]  on 5/31/2022 at 3:23 PM      Short Term Goals  Time Frame for Short term goals: Until DC or goals met  Short Term Goal 1: Pt will complete trfs mod I  Short Term Goal 2: Pt will complete UE/LE bathing/dressing mod I using AE PRN  Short Term Goal 3: Pt will complete toileting mod I  Short Term Goal 4: Pt will tolerate standing/functional mobilty 5+ min during ADLs/therax following precautions mod I  Short Term Goal 5: Pt will complete light BUE therex within 5# restriction, to increase strength/endurance for ADLs/functional mobility

## 2022-05-31 NOTE — PROGRESS NOTES
Physical Therapy Treatment Note   Date: 2022 Room: [unfilled]   Name: Clarke Oppenheim : 1935   MRN: [id number] Admission Date:2022    Primary Problem:   Past Medical History:   Diagnosis Date    Arthritis     left knee pain, sees Dr. Palomo Nurse CAD (coronary artery disease)     sees Dr. Yadira Perez Chronic midline low back pain without sciatica 2016    Had PT in    [address] Colonoscopy refused     discussed in 7/15    DM (diabetes mellitus), type 2 ([facility] [address] 2006    Dupuytren's contracture of right hand     Endometrial stripe increased 2014    Saw NP Waldo Schlatter. Was normal exam per pt.  Gastrointestinal hemorrhage 2015    Patient had GI bleeding. Colon refused. Discussed with patient in detail.  Glaucoma 2014    H/O echocardiogram 10/02/2014    Mildly depressed left ventricular function; ejection fraction of 45%. Moderate pulmonary hypertension.  H/O echocardiogram 2018    EF 50-55%.  Mitral annular calcification is present. No other significant valvulopathy seen.  History of nuclear stress test 2018    EF 59%. ECG portion of stress test is negative for ischemia by diagnostic criteria. fixed inferior large size severe defect in rest and stress images. Mild hubert infarct ischemia.     HTN (hypertension)     Hyperlipidemia     Kidney stone     hx-\"been about 3 yrs ago\"    MI (myocardial infarction) ([facility] [address] 1998    treated with TPA    Obesity     Open wound of right foot 2020    Parainfluenza infection 2021    Pneumonia of right lower lobe due to infectious organism 2018    Vertigo     'have been having this since I had cataract surgery\"\"that is why they are doing the MRA of my brain(10/31/2014)    WD-Traumatic ulcer of foot, right, with fat layer exposed ([facility] [address] 8/3/2020     Past Surgical History:   Procedure Laterality Date    CATARACT REMOVAL Bilateral     ,    INGUINAL HERNIA REPAIR Left 45 yrs ago   Cedric Merino LUMBAR SPINE SURGERY N/A 5/12/2022    L1-4 LAMINECTOMIES performed by Dillan Jeffers MD at [place]. [address] N/A 5/12/2022    L2 AND L3 BILATERAL BALLOON KYPHOPLASTY AND L2 AND L3 NEEDLE BIOPSY performed by Dillan Jeffers MD at [facility]. [address] Diagnosis: deconditoning  Restrictions/Precautions: fall risk, NO Bending, Lifting, Twisting    Subjective: Patient states that she is tired today. But agreeable to therapy. Denies any LBP. Initial Pain level: 0/10    Goals:                  Long Term Goals  Time Frame for Long term goals : 1 week  Long term goal 1: Pt will demonstrate the ability to safely perform bed mobility with mod I following log rolling technique. Long term goal 2: Pt will perform sit ><supine Fritz from flat bed surface  Long term goal 3: Pt will transfer to all surfaces Fritz  Long term goal 4: Pt will ambulate 150ft with cane Fritz  Plan of Care:   Objective Findings:    Date:  5/27/2022 Date:   5/31/2022 Date:  Date:    Bed mobility Mod I Mod I     Sit to stand transfer CGA CGA     Stand to sit transfer CGA CGA     Commode transfer CGA CGA     Standing tolerance During gait For ambulation     Ambulation Pt ambulated 6x50 ft with SBA with rollator. Pt required seated rest breaks due to fatigue. Pt demnstrated flexed posture, decreased speed and step length and decreased foot clearance Patient ambulated 50ft+100ft with RW.        Stairs n/a        Exercises:   Exercise/Equipment/Modalities  Date:   5/27/2022 Date:   5/31/2022 Date:  Date:    NuStep 10 minutes, seat 8, arms 7, L1 8 minutes  seat 8, arms 7, L1                                                                         Modality/intervention used:   [x ] Therapeutic Exercise   [ ] Modalities:   [x ] Therapeutic Activity     [ ] Ultrasound   [ ] Elec Stim   [x ] Gait Training     [ ] Cervical Traction  [ ] Lumbar Traction   [ ] Neuromuscular Re-education   [ ] Cold/hotpack  [ ] Iontophoresis   [ ] Instruction in HEP     [ ] Vasopneumatic   [ ] Manual Therapy   [ ] Aquatic Therapy     Other Therapeutic Activities:    Home Exercise Program/Education provided to patient:   Manual Treatments: n/a  Communication with other providers:  Barrie Chance to see per nursing  Adverse reactions to treatment: n/a    Treatment/Activity Tolerance: Pt had no change in pain.    [ x] Patient limited by fatigue [ x] Patient limited by pain [ ] Patient limited by other medical complications [x ] Patient tolerated therapy well  [ ] Other:     Post Tx Pain Ratin/10     Safety Precautions:   [x ] left in bed  [ ] left in chair [x ] call light within reach  [x ] bed alarm on  [ ] personal alarm on  [ ] other staff present:    Plan:   [x ] Continue per plan of care [ ] Gilbert Hanna current plan   [ ] Plan of care initiated [ ] Hold pending MD visit [ ] Discharge     Plan for Next Session:   Time In: 1053  Time Out: 1129  Total Treatment Minutes: 36 minutes  Billed Units: 1 TA, 1 TE    Signed: Lashanda Phelan PTA     2022, 12:59 PM

## 2022-05-31 NOTE — PLAN OF CARE
Problem: Discharge Planning  Goal: Discharge to home or other facility with appropriate resources  5/30/2022 2315 by Karon Humphrey RN  Outcome: Progressing  5/30/2022 1431 by Maddison Jalloh RN  Outcome: Progressing  Flowsheets (Taken 5/30/2022 8117)  Discharge to home or other facility with appropriate resources: Identify barriers to discharge with patient and caregiver     Problem: Pain  Goal: Verbalizes/displays adequate comfort level or baseline comfort level  5/30/2022 2315 by Karon Humphrey RN  Outcome: Progressing  5/30/2022 1431 by Maddison Jalloh RN  Outcome: Progressing     Problem: Safety - Adult  Goal: Free from fall injury  5/30/2022 2315 by Karon Humphrey RN  Outcome: Progressing  5/30/2022 1431 by Maddison Jalloh RN  Outcome: Progressing     Problem: ABCDS Injury Assessment  Goal: Absence of physical injury  5/30/2022 2315 by Karon Humphrey RN  Outcome: Progressing  5/30/2022 1431 by Maddison Jalloh RN  Outcome: Progressing     Problem: Chronic Conditions and Co-morbidities  Goal: Patient's chronic conditions and co-morbidity symptoms are monitored and maintained or improved  5/30/2022 2315 by Karon Humphrey RN  Outcome: Progressing  5/30/2022 1431 by Maddison Jalloh RN  Outcome: Progressing  Flowsheets (Taken 5/30/2022 9100)  Care Plan - Patient's Chronic Conditions and Co-Morbidity Symptoms are Monitored and Maintained or Improved: Monitor and assess patient's chronic conditions and comorbid symptoms for stability, deterioration, or improvement

## 2022-06-01 LAB
GLUCOSE BLD-MCNC: 106 MG/DL (ref 70–99)
GLUCOSE BLD-MCNC: 108 MG/DL (ref 70–99)
GLUCOSE BLD-MCNC: 139 MG/DL (ref 70–99)
GLUCOSE BLD-MCNC: 96 MG/DL (ref 70–99)
GLUCOSE BLD-MCNC: 98 MG/DL (ref 70–99)

## 2022-06-01 PROCEDURE — 6370000000 HC RX 637 (ALT 250 FOR IP): Performed by: NURSE PRACTITIONER

## 2022-06-01 PROCEDURE — 97110 THERAPEUTIC EXERCISES: CPT

## 2022-06-01 PROCEDURE — 6370000000 HC RX 637 (ALT 250 FOR IP): Performed by: FAMILY MEDICINE

## 2022-06-01 PROCEDURE — 97535 SELF CARE MNGMENT TRAINING: CPT

## 2022-06-01 PROCEDURE — 6360000002 HC RX W HCPCS: Performed by: NURSE PRACTITIONER

## 2022-06-01 PROCEDURE — 97530 THERAPEUTIC ACTIVITIES: CPT

## 2022-06-01 PROCEDURE — 82962 GLUCOSE BLOOD TEST: CPT

## 2022-06-01 PROCEDURE — 1200000002 HC SEMI PRIVATE SWING BED

## 2022-06-01 RX ORDER — SENNA AND DOCUSATE SODIUM 50; 8.6 MG/1; MG/1
2 TABLET, FILM COATED ORAL 2 TIMES DAILY PRN
Status: DISCONTINUED | OUTPATIENT
Start: 2022-06-01 | End: 2022-06-02 | Stop reason: HOSPADM

## 2022-06-01 RX ORDER — ROPINIROLE 1 MG/1
2 TABLET, FILM COATED ORAL EVERY MORNING
Status: DISCONTINUED | OUTPATIENT
Start: 2022-06-01 | End: 2022-06-02 | Stop reason: HOSPADM

## 2022-06-01 RX ORDER — GUAIFENESIN/DEXTROMETHORPHAN 100-10MG/5
5 SYRUP ORAL EVERY 4 HOURS PRN
Status: DISCONTINUED | OUTPATIENT
Start: 2022-06-01 | End: 2022-06-02 | Stop reason: HOSPADM

## 2022-06-01 RX ORDER — ONDANSETRON 4 MG/1
4 TABLET, ORALLY DISINTEGRATING ORAL EVERY 8 HOURS PRN
Status: DISCONTINUED | OUTPATIENT
Start: 2022-06-01 | End: 2022-06-02 | Stop reason: HOSPADM

## 2022-06-01 RX ADMIN — HYDROCODONE BITARTRATE AND ACETAMINOPHEN 1 TABLET: 10; 325 TABLET ORAL at 16:36

## 2022-06-01 RX ADMIN — ROPINIROLE HYDROCHLORIDE 2 MG: 1 TABLET, FILM COATED ORAL at 09:43

## 2022-06-01 RX ADMIN — GUAIFENESIN SYRUP AND DEXTROMETHORPHAN 5 ML: 100; 10 SYRUP ORAL at 04:44

## 2022-06-01 RX ADMIN — Medication 5000 UNITS: at 09:44

## 2022-06-01 RX ADMIN — CYCLOBENZAPRINE 10 MG: 10 TABLET, FILM COATED ORAL at 09:42

## 2022-06-01 RX ADMIN — CYCLOBENZAPRINE 10 MG: 10 TABLET, FILM COATED ORAL at 21:27

## 2022-06-01 RX ADMIN — ROSUVASTATIN CALCIUM 10 MG: 5 TABLET, FILM COATED ORAL at 21:27

## 2022-06-01 RX ADMIN — HYDROCODONE BITARTRATE AND ACETAMINOPHEN 1 TABLET: 5; 325 TABLET ORAL at 21:28

## 2022-06-01 RX ADMIN — HYDROXYCHLOROQUINE SULFATE 200 MG: 200 TABLET ORAL at 09:44

## 2022-06-01 RX ADMIN — Medication 5000 UNITS: at 21:27

## 2022-06-01 RX ADMIN — METOPROLOL SUCCINATE 25 MG: 25 TABLET, EXTENDED RELEASE ORAL at 09:44

## 2022-06-01 RX ADMIN — ROPINIROLE HYDROCHLORIDE 2 MG: 1 TABLET, FILM COATED ORAL at 21:27

## 2022-06-01 RX ADMIN — ASPIRIN 81 MG: 81 TABLET, COATED ORAL at 09:44

## 2022-06-01 RX ADMIN — CLOBETASOL PROPIONATE: 0.5 CREAM TOPICAL at 16:38

## 2022-06-01 RX ADMIN — ONDANSETRON 4 MG: 4 TABLET, ORALLY DISINTEGRATING ORAL at 11:17

## 2022-06-01 RX ADMIN — CLOBETASOL PROPIONATE: 0.5 CREAM TOPICAL at 21:28

## 2022-06-01 RX ADMIN — KETOROLAC TROMETHAMINE 15 MG: 15 INJECTION, SOLUTION INTRAMUSCULAR; INTRAVENOUS at 06:08

## 2022-06-01 RX ADMIN — HYDROCODONE BITARTRATE AND ACETAMINOPHEN 1 TABLET: 10; 325 TABLET ORAL at 07:23

## 2022-06-01 RX ADMIN — NALOXEGOL OXALATE 25 MG: 25 TABLET, FILM COATED ORAL at 06:08

## 2022-06-01 RX ADMIN — FUROSEMIDE 20 MG: 20 TABLET ORAL at 09:44

## 2022-06-01 ASSESSMENT — PAIN SCALES - GENERAL
PAINLEVEL_OUTOF10: 3
PAINLEVEL_OUTOF10: 7
PAINLEVEL_OUTOF10: 7
PAINLEVEL_OUTOF10: 5

## 2022-06-01 ASSESSMENT — PAIN DESCRIPTION - LOCATION
LOCATION: LEG
LOCATION: BACK;LEG

## 2022-06-01 ASSESSMENT — PAIN DESCRIPTION - DESCRIPTORS
DESCRIPTORS: ACHING;DISCOMFORT
DESCRIPTORS: ACHING;DISCOMFORT

## 2022-06-01 ASSESSMENT — PAIN - FUNCTIONAL ASSESSMENT
PAIN_FUNCTIONAL_ASSESSMENT: ACTIVITIES ARE NOT PREVENTED

## 2022-06-01 ASSESSMENT — PAIN DESCRIPTION - ONSET: ONSET: ON-GOING

## 2022-06-01 ASSESSMENT — PAIN DESCRIPTION - ORIENTATION: ORIENTATION: RIGHT;LEFT

## 2022-06-01 ASSESSMENT — PAIN DESCRIPTION - PAIN TYPE: TYPE: CHRONIC PAIN

## 2022-06-01 ASSESSMENT — PAIN DESCRIPTION - FREQUENCY: FREQUENCY: CONTINUOUS

## 2022-06-01 NOTE — PLAN OF CARE
Problem: Discharge Planning  Goal: Discharge to home or other facility with appropriate resources  5/31/2022 2311 by Alena Barker RN  Outcome: Progressing  5/31/2022 1754 by Holden Mejia RN  Outcome: Progressing  Flowsheets (Taken 5/31/2022 5004)  Discharge to home or other facility with appropriate resources:   Identify barriers to discharge with patient and caregiver   Identify discharge learning needs (meds, wound care, etc)   Refer to discharge planning if patient needs post-hospital services based on physician order or complex needs related to functional status, cognitive ability or social support system     Problem: Pain  Goal: Verbalizes/displays adequate comfort level or baseline comfort level  5/31/2022 2311 by Alena Barker RN  Outcome: Progressing  5/31/2022 1754 by Holden Mejia RN  Outcome: Progressing     Problem: Safety - Adult  Goal: Free from fall injury  5/31/2022 2311 by Alena Barker RN  Outcome: Progressing  5/31/2022 1754 by Holden Mejia RN  Outcome: Progressing     Problem: ABCDS Injury Assessment  Goal: Absence of physical injury  5/31/2022 2311 by Alena Barker RN  Outcome: Progressing  5/31/2022 1754 by Holden Mejia RN  Outcome: Progressing     Problem: Chronic Conditions and Co-morbidities  Goal: Patient's chronic conditions and co-morbidity symptoms are monitored and maintained or improved  5/31/2022 2311 by Alena Barker RN  Outcome: Progressing  5/31/2022 1754 by Holden Mejia RN  Outcome: Progressing  Flowsheets (Taken 5/31/2022 2871)  Care Plan - Patient's Chronic Conditions and Co-Morbidity Symptoms are Monitored and Maintained or Improved:   Monitor and assess patient's chronic conditions and comorbid symptoms for stability, deterioration, or improvement   Collaborate with multidisciplinary team to address chronic and comorbid conditions and prevent exacerbation or deterioration

## 2022-06-01 NOTE — CARE COORDINATION
CM met with the patient for follow-up discussion regarding discharge planning. Patient stated that she called her PCP's office this morning and scheduled a follow-up appointment for Tuesday 6/7/22 and she feels she would like to be discharged to home tomorrow Thursday 6/2/22. Patient stated that she would like a referral made to [facility] which she has used in the past.  CM will follow. 12 PM  CM faxed the [address] order and supporting documentation to [facility] to initiate the referral.  CM will follow.

## 2022-06-01 NOTE — PROGRESS NOTES
Physical Therapy Treatment Note   Date: 2022 Room: [unfilled]   Name: Candace Horvath : 1935   MRN: [id number] Admission Date:2022    Primary Problem:   Past Medical History:   Diagnosis Date    Arthritis     left knee pain, sees Dr. Josefa Larsen CAD (coronary artery disease)     sees Dr. Denise Mark Chronic midline low back pain without sciatica 2016    Had PT in    [address] Colonoscopy refused     discussed in 7/15    DM (diabetes mellitus), type 2 ([facility] [address] 2006    Dupuytren's contracture of right hand     Endometrial stripe increased 2014    Saw NP Valorie dAame. Was normal exam per pt.  Gastrointestinal hemorrhage 2015    Patient had GI bleeding. Colon refused. Discussed with patient in detail.  Glaucoma 2014    H/O echocardiogram 10/02/2014    Mildly depressed left ventricular function; ejection fraction of 45%. Moderate pulmonary hypertension.  H/O echocardiogram 2018    EF 50-55%.  Mitral annular calcification is present. No other significant valvulopathy seen.  History of nuclear stress test 2018    EF 59%. ECG portion of stress test is negative for ischemia by diagnostic criteria. fixed inferior large size severe defect in rest and stress images. Mild hubert infarct ischemia.     HTN (hypertension)     Hyperlipidemia     Kidney stone     hx-\"been about 3 yrs ago\"    MI (myocardial infarction) ([facility] [address] 1998    treated with TPA    Obesity     Open wound of right foot 2020    Parainfluenza infection 2021    Pneumonia of right lower lobe due to infectious organism 2018    Vertigo     'have been having this since I had cataract surgery\"\"that is why they are doing the MRA of my brain(10/31/2014)    WD-Traumatic ulcer of foot, right, with fat layer exposed ([facility] [address] 8/3/2020     Past Surgical History:   Procedure Laterality Date    CATARACT REMOVAL Bilateral     ,    INGUINAL HERNIA REPAIR Left 45 yrs ago   Trixie Kim LUMBAR SPINE SURGERY N/A 5/12/2022    L1-4 LAMINECTOMIES performed by John Murguia MD at [address] N/A 5/12/2022    L2 AND L3 BILATERAL BALLOON KYPHOPLASTY AND L2 AND L3 NEEDLE BIOPSY performed by John Murguia MD at [facility]. [address] Diagnosis: deconditoning  Restrictions/Precautions: fall risk, NO Bending, Lifting, Twisting    Subjective: agreeable to therapy. Initial Pain level: 0/10    Goals:                  Long Term Goals  Time Frame for Long term goals : 1 week  Long term goal 1: Pt will demonstrate the ability to safely perform bed mobility with mod I following log rolling technique. Long term goal 2: Pt will perform sit ><supine Fritz from flat bed surface  Long term goal 3: Pt will transfer to all surfaces Fritz  Long term goal 4: Pt will ambulate 150ft with cane Fritz  Plan of Care:   Objective Findings:    Date:  5/27/2022 Date:   5/31/2022 Date: 6/1/22 Date:    Bed mobility Mod I Mod I     Sit to stand transfer CGA CGA SBA    Stand to sit transfer CGA CGA SBA    Commode transfer CGA CGA     Standing tolerance During gait For ambulation     Ambulation Pt ambulated 6x50 ft with SBA with rollator. Pt required seated rest breaks due to fatigue. Pt demnstrated flexed posture, decreased speed and step length and decreased foot clearance Patient ambulated 50ft+100ft with RW. Patient ambulated 150ft with RW.  Including ambulating to gift shop and purchasing items    Stairs n/a        Exercises:   Exercise/Equipment/Modalities  Date:   5/27/2022 Date:   5/31/2022 Date: 5/1/22 Date:    NuStep 10 minutes, seat 8, arms 7, L1 8 minutes  seat 8, arms 7, L1 8 minutes  seat 8.5', arms 7, L1                                                                        Modality/intervention used:   [x ] Therapeutic Exercise   [ ] Modalities:   [x ] Therapeutic Activity     [ ] Ultrasound   [ ] Elec Stim   [x ] Gait Training     [ ] Cervical Traction  [ ] Lumbar Traction   [ ] Neuromuscular Re-education   [ ] Cold/hotpack  [ ] Iontophoresis   [ ] Instruction in HEP     [ ] Vasopneumatic   [ ] Manual Therapy   [ ] Aquatic Therapy     Other Therapeutic Activities:    Home Exercise Program/Education provided to patient:   Manual Treatments: n/a  Communication with other providers:  [id number] [address] to see per nursing  Adverse reactions to treatment: n/a    Treatment/Activity Tolerance: Pt had no change in pain.    [ x] Patient limited by fatigue [ x] Patient limited by pain [ ] Patient limited by other medical complications [x ] Patient tolerated therapy well  [ ] Other:     Post Tx Pain Ratin/10     Safety Precautions:   [x ] left in bed  [ ] left in chair [x ] call light within reach  [x ] bed alarm on  [ ] personal alarm on  [ ] other staff present:    Plan:   [x ] Continue per plan of care [ ] Dk Nelson current plan   [ ] Plan of care initiated [ ] Hold pending MD visit [ ] Discharge     Plan for Next Session:   Time In: 945  Time Out: 1010  Total Treatment Minutes:25  minutes  Billed Units: 1 TA, 1 TE    Signed: Fco Urena PT,      2022, 1:43 PM

## 2022-06-01 NOTE — CARE COORDINATION
SWING BED WEEKLY TEAM SHEET     Jose Tate   6/1/2022 WEEK # 1    Care Management    Issues to be resolved before discharge: Increased strength, balance, and mobility. Family Education: Patient/staff to update family.      Discharge Plan: Home with [facility]  Patient/Family Adjustment: N/A     Goals of previous week:   [] 1st team   [] met   [] partially met    [x] not met             Why goals were not met: Continued weakness     Skilled Level of Care Remains   [x] yes   [] no    Estimated length of stay: Next insurance review due 6/2/22  Patient/Family Concerns/input: None at this time    Patient/Family  Signature _________________  6/1/2022       Care Management Signature:  Nigel Perkins RN

## 2022-06-01 NOTE — PROGRESS NOTES
SWING BED WEEKLY TEAM SHEET     WEEK# 2     NUTRITION   Diet: 5 carb choice diet                   TF: n/a         TPN: n/a    Appropriate/Adequate [X] yes [ ] no     Meal intakes: % of meals documented in the past 72 hr  Weight: 93 kg    BMI: 35.17              Significant Change: n/a  Recommendations:   Add bowel regimen if last BM documented 5/24 is accurate  Please document all po intake  Add supplement as needed  Issues to be resolved before discharge:  Pt will consistently consume greater than 75% of meals    Dietitian Signature: Rhys Oliveros, MS, RDN, LD

## 2022-06-01 NOTE — PROGRESS NOTES
Occupational Therapy  . Occupational Therapy Treatment Note    Name: Constance Kanner MRN: [id number] :   1935   Date:  2022   Admission Date: 2022 Room:  [address] [place] [address]     Primary Problem:   Past Surgical History:  has a past surgical history that includes Cataract removal (Bilateral); Inguinal hernia repair (Left, 45 yrs ago); Lumbar spine surgery (N/A, 2022); and Spine surgery (N/A, 2022). Restrictions/Precautions:  Restrictions/Precautions  Restrictions/Precautions: Fall Risk Position Activity Restriction  Spinal Precautions: No Lifting,No Twisting,No Bending  Sternal Precautions: 5# Lifting Restrictions  Other position/activity restrictions: Keep surgical site and dressing dry         Communication with other providers: Per chart review, patient is appropriate for therapeutic intervention. AXEL Linn cleared pt for shower. Subjective:  Patient states:  \"What I'd really like to do is take a shower. I'm going home tomorrow! \"  Pain:   Location, Type, Intensity (0/10 to 10/10):  10    Objective:    Observation: Pt received side-lying in bed, A&O x4. Pt indep for verbal recall for spinal precautions, required min cues in function for no bending or  twisting. Objective Measures:  N/A    Treatment, including education:  Therapeutic Activity Training:   Therapeutic activity training was instructed today. Cues were given for safety, sequence, UE/LE placement, awareness, and balance. Activities performed today included bed mobility training, sup-sit, sit-stand, SPT. Mod I for all functional transfers, sit<>stands, SPTs c good compliance to spinal precautions. Pt performed Mod I c 4WW for functional mobility inside room and in shower room. This therapist provided one safety cue to slow down speed in hallway for increased safety awareness c pt otherwise performing Mod I c 4WW w/o loss of balance or unsteadiness >75 ft.     Standing balance / tolerance: Good - Mod I for standing balance, tolerated multiple stands >5 minutes each. Self Care Training:   Cues were given for safety, sequence, UE/LE placement, visual cues, and balance. Activities performed today included bathing, dressing, toileting, hand hygiene, and grooming. Bathing: Sup seated on shower bench c one cue for use of figure-of-four vs bending to reach LE, pt deferred need for LH sponge because can bring B feet over opposite knees. UB Dressing: Mod I, pt performed set up  LB Dressing: Sup for two cues to not bend >90 degrees for reach to feet and for use of reacher to facilitate orientation of pants s/p pt found it difficult to thread in figure of four pattern c accuracy. Pt verbalized understanding for all education regarding spinal precautions and use of AE including for slip on footwear c pt deferring to don at this time but agreeable to the teaching. Grooming: Indep to wash face and perform hair care. All therapeutic intervention performed c emphasis on dynamic balance / standing tolerance to inc strength, endurance and act tolerance for inc Indep c ADL tasks, func transfers / mobility. Safety  Patient safely in bedside chair at end of session, with call light/phone in reach, and nursing aware- reports pt is Mod I c 4WW in the room. Gait belt was used for func transfers / mobility. Assessment / Impression:    Patient's tolerance of treatment: Well  Adverse Reaction: None  Significant change in status and impact:  Pt appears to be progressing towards all goals, appears cue dependent to compliance to spinal precautions during function. Barriers to improvement: Safety      Plan for Next Session:    Continue per OT POC per patient's tolerance c continued emphasis on compliance to spinal precautions during functional tasks.     Time in:  1325  Time out:  1428  Timed treatment minutes:  63  Total treatment time:  63      Electronically signed by:    RADHA Luz   6/1/2022, 12:46 PM    Goals  Short Term Goals  Time Frame for Short term goals: Until DC or goals met  Short Term Goal 1: Pt will complete trfs mod I  Short Term Goal 2: Pt will complete UE/LE bathing/dressing mod I using AE PRN  Short Term Goal 3: Pt will complete toileting mod I  Short Term Goal 4: Pt will tolerate standing/functional mobilty 5+ min during ADLs/therax following precautions mod I  Short Term Goal 5: Pt will complete light BUE therex within 5# restriction, to increase strength/endurance for ADLs/functional mobility  Patient Goals   Patient goals :  To return home

## 2022-06-01 NOTE — PROGRESS NOTES
Hospitalist Progress Note      Name:  Rylie Gaytan /Age/Sex: 1935  (81 y.o. female)   MRN & CSN:  [id number] & [id number] Admission Date/Time: 2022  2:16 PM   Location:   PCP: Tomás Carrillo MD         Hospital Day: 8                                               Attending Physician Dr Gurmeet Parker and Plan:   Rylie Gaytan is a 81 y.o.  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 Last A1c 5.8 (2021)   Hypoglycemia trend -discontinuing glipizide    Monitor FSBS    Hypertension continue metoprolol/Lasix   Trends appear controlled    RLS/neurogenic claudication   Requip increased (2022)-uncontrolled symptoms   Gabapentin recently discontinued    CAD   Continue ASA/statin    History of psoriasis, right medial foot/ankle cluster,    wound care following    Recommend outpatient follow-up with dermatology     Diet ADULT DIET; Regular; 5 carb choices (75 gm/meal)   DVT Prophylaxis [] Lovenox, []  Heparin, [] SCDs, [x] Ambulation   GI Prophylaxis [] PPI,  [] H2 Blocker,  [] Carafate,  [x] Diet/Tube Feeds   Code Status Full Code     -Patient assessment and plan discussed with supervising physician-  Subjective 2022     Rylie Gaytan is a 81 y.o.  female, who presented with debility here for acute rehab     Reports cough overnight controlled with Robitussin  Concern for constipation  Some nausea after breakfast    Ten point ROS reviewed negative, unless as noted above    Objective:        Intake/Output Summary (Last 24 hours) at 2022 1214  Last data filed at 2022 URI with cough TECHNOLOGIST PROVIDED HISTORY: Reason for exam:->r/o pna FINDINGS: Central/hilar and perihilar bronchial pulmonary marking prominence and peribronchial cuffing consistent with central bronchitis, pneumonitis or underlying chronic lung disease. Foci of subsegmental atelectasis also noted in the infrahilar regions. Otherwise, lung fields are clear. No pleural effusion, pneumothorax, pulmonary edema, cardiomegaly or mediastinal widening. Vertebral body compression deformity uncertain acuity mid-upper lumbar spine in lower thoracic spine. MRI of any clinically suspicious region of acute-subacute compression fracture suggested. Findings consistent with central and infrahilar bronchitis/pneumonitis and subsegmental atelectasis. Findings may be accentuated by underlying chronic lung disease. Otherwise, radiographically nonacute chest. Vertebral body compression deformities mid-lower thoracic spine and mid-upper lumbar spine of uncertain acuity and subjective skeletal osteopenia. MRI and/or bone scan of any clinically suspicious area of acute/subacute vertebral body compression fracture suggested. XR ABDOMEN (KUB) (SINGLE AP VIEW)    Result Date: 5/15/2022  EXAMINATION: ONE SUPINE XRAY VIEW(S) OF THE ABDOMEN 5/15/2022 11:50 am COMPARISON: CT abdomen and pelvis 09/25/2020. HISTORY: ORDERING SYSTEM PROVIDED HISTORY: constipation TECHNOLOGIST PROVIDED HISTORY: Reason for exam:->constipation Reason for Exam: constipation Additional signs and symptoms: pain Relevant Medical/Surgical History: none FINDINGS: The visualized lungs are clear. Nonobstructive bowel gas pattern. Mildly increased stool in the colon. No evidence pneumoperitoneum. No acute osseous abnormality. Mildly increased stool in the colon suggesting constipation.      CT LUMBAR SPINE WO CONTRAST    Result Date: 5/20/2022  EXAMINATION: CT OF THE LUMBAR SPINE WITHOUT CONTRAST  5/20/2022 TECHNIQUE: CT of the lumbar spine was performed without the administration of intravenous contrast. Multiplanar reformatted images are provided for review. Adjustment of mA and/or kV according to patient size was utilized. Automated exposure control, iterative reconstruction, and/or weight based adjustment of the mA/kV was utilized to reduce the radiation dose to as low as reasonably achievable. COMPARISON: 12/14/2021 HISTORY: ORDERING SYSTEM PROVIDED HISTORY: Progressive. Pain following lumbar surgery May 12 by Dr. Ibeth Velazco. TECHNOLOGIST PROVIDED HISTORY: Reason for exam:->Progressive. Pain following lumbar surgery May 12 by Dr. Ibeth Velazco. Reason for Exam: Progressive. Pain following lumbar surgery May 12 by Dr. Ibeth Velazco. Additional signs and symptoms: Progressive. Pain following lumbar surgery May 12 by Dr. Ibeth Velazco. Relevant Medical/Surgical History: Progressive. Pain following lumbar surgery May 12 by Dr. Ibeth Velazco. FINDINGS: BONES/ALIGNMENT: 12/14/2021 DEGENERATIVE CHANGES: There is normal alignment of the lumbar spine. There is sequela of L2 and L3 vertebroplasty. The L2 vertebroplasty cement extends into the L1-L2 disc space. The L3 vertebroplasty cement extends into the L3-L4 disc space. Additionally, there is extrusion of cement into the anterior paravertebral epidural space at L2-L3 and L3-L4. This also extends into the paravertebral spaces, asymmetric on the left at L3-L4. There is no evidence of acute fracture. There is sequela of L1-L4 laminectomy. There is a posterior left 12th rib fracture that is new. Degenerative changes are noted along the sacroiliac joints. SOFT TISSUES/RETROPERITONEUM: There is multilevel degenerative disc disease in the lumbar spine, greatest at T12-L1 with associated vacuum phenomenon. Atherosclerotic plaque is noted in the aorta and its branch vessels. There is a trace left pleural effusion with associated areas of atelectasis in the lung bases. There is a small hiatal hernia.  There is a large fluid collection along the dorsal midline of the back, asymmetric towards the right measuring 14.6 x 6.4 x 4.4 cm. L1-L2: L1 laminectomies are noted. No central spinal canal narrowing. Moderate bilateral foraminal narrowing. L2-L3: L2 laminectomies are noted. There is a minimal disc bulge-osteophyte complex. No central spinal canal stenosis. Mild left and moderate-severe right foraminal narrowing are noted. L3-L4: L3 laminectomies are noted. There is a circumferential disc bulge. No central spinal canal narrowing. Moderate-severe left, and severe right foraminal narrowing are noted. L4-L5: L4 laminectomies are noted. There is a circumferential disc bulge. Bilateral facet arthropathy. Minimal central spinal canal narrowing. Minimal left and mild right foraminal narrowing. L5-S1: There is no disc herniation. Bilateral facet arthropathy. No central spinal canal narrowing. Moderate left, and mild right foraminal narrowing. 1. There is sequela of L2 and L3 vertebroplasties. The cement extrudes into the L1-L2 and L3-L4 disc spaces as well as along the anterior epidural space at L2-L3 and L3-L4. 2. Sequela of L1-L4 laminectomies. 3. No acute fracture. 4. There is a new left posterior 12th rib fracture that could be a source for the patient's pain. There has some callus along the margins suggesting that this is a least subacute. MRI LUMBAR SPINE WO CONTRAST    Result Date: 5/22/2022  EXAMINATION: MRI OF THE LUMBAR SPINE WITHOUT CONTRAST, 5/21/2022 10:09 am TECHNIQUE: Multiplanar multisequence MRI of the lumbar spine was performed without the administration of intravenous contrast. COMPARISON: CT lumbar spine 05/20/2022 HISTORY: ORDERING SYSTEM PROVIDED HISTORY: post op fluid collection noted on ct scan FINDINGS: BONES/ALIGNMENT: There is normal alignment of the spine. Again seen are prior postsurgical changes from L1-L2 through L3-L4 decompressive laminectomy. Cement augmentation changes again noted at L2 and L3.  Extrusion of cement within the ventral epidural space at L2-L3 and within the L3-L4 disc space and paravertebral spaces at L3-L4 better evaluated on the recent CT lumbar spine. No acute fracture or abnormal bone marrow signal. Mixed opposing endplate degenerative signal changes throughout the lumbar spine with advanced disc space narrowing at T12-L1. SPINAL CORD: The conus terminates normally at T12-L1. SOFT TISSUES: Again noted is a large postoperative fluid collection (T2 hyperintense) within the dorsal midline lumbar soft tissues without significant change in size measuring 14.6 cm in craniocaudal extent and 6.4 x 4.4 cm in transaxial dimensions. There is a linear fluid tract associated with the superficial soft tissue collection extending to the laminectomy bed at L3-L4. Moderate to severe canal stenosis at T12-L1 related to disc bulging and prominent dorsal epidural fat. There is redundancy of the cauda equina nerve roots at the L1 level. L1-L2: Status post laminectomy. There is severe canal stenosis related to disc bulging, facet arthropathy and the fluid collection involving the laminectomy bed. Moderate to severe bilateral foraminal stenosis. L2-L3: Status post laminectomy. Severe canal stenosis related to disc bulging, facet arthropathy and the fluid collection involving the laminectomy bed. Mild-to-moderate bilateral foraminal stenosis. L3-L4: Decompressed canal status post laminectomy. Disc bulging and degenerative facet changes resulting in severe right and moderate to severe left foraminal stenosis. L4-L5: There is no significant disc herniation, spinal canal stenosis or neural foraminal narrowing. L5-S1: There is no significant disc herniation, spinal canal stenosis or neural foraminal narrowing. 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  Severe canal stenosis at L1-L2 and L2-L3 related to degenerative disc and facet changes along with the fluid collection involving the laminectomy beds. There is redundancy of the cauda equina nerve roots proximal to these levels. Findings were discussed with Dr. Kit Freitas at 3:08 pm on 5/22/2022. FL LESS THAN 1 HOUR    Result Date: 5/12/2022  Radiology exam is complete. No Radiologist dictation. Please follow up with ordering provider.              Electronically signed by ABA Lantigua CNP on 6/1/2022 at 12:14 PM

## 2022-06-02 VITALS
DIASTOLIC BLOOD PRESSURE: 60 MMHG | HEART RATE: 65 BPM | SYSTOLIC BLOOD PRESSURE: 139 MMHG | OXYGEN SATURATION: 93 % | BODY MASS INDEX: 35 KG/M2 | TEMPERATURE: 95.7 F | WEIGHT: 205 LBS | RESPIRATION RATE: 16 BRPM | HEIGHT: 64 IN

## 2022-06-02 LAB
GLUCOSE BLD-MCNC: 103 MG/DL (ref 70–99)
GLUCOSE BLD-MCNC: 78 MG/DL (ref 70–99)

## 2022-06-02 PROCEDURE — 82962 GLUCOSE BLOOD TEST: CPT

## 2022-06-02 PROCEDURE — 6370000000 HC RX 637 (ALT 250 FOR IP): Performed by: NURSE PRACTITIONER

## 2022-06-02 PROCEDURE — 94761 N-INVAS EAR/PLS OXIMETRY MLT: CPT

## 2022-06-02 PROCEDURE — 6370000000 HC RX 637 (ALT 250 FOR IP): Performed by: FAMILY MEDICINE

## 2022-06-02 RX ORDER — ROPINIROLE 2 MG/1
2 TABLET, FILM COATED ORAL 2 TIMES DAILY
Qty: 60 TABLET | Refills: 0 | Status: SHIPPED | OUTPATIENT
Start: 2022-06-02 | End: 2022-06-13 | Stop reason: SDUPTHER

## 2022-06-02 RX ORDER — CYCLOBENZAPRINE HCL 10 MG
10 TABLET ORAL 2 TIMES DAILY
Qty: 20 TABLET | Refills: 0 | Status: SHIPPED | OUTPATIENT
Start: 2022-06-02 | End: 2022-06-12

## 2022-06-02 RX ORDER — HYDROCODONE BITARTRATE AND ACETAMINOPHEN 5; 325 MG/1; MG/1
1 TABLET ORAL EVERY 6 HOURS PRN
Qty: 12 TABLET | Refills: 0 | Status: SHIPPED | OUTPATIENT
Start: 2022-06-02 | End: 2022-06-05

## 2022-06-02 RX ORDER — SENNA AND DOCUSATE SODIUM 50; 8.6 MG/1; MG/1
2 TABLET, FILM COATED ORAL 2 TIMES DAILY PRN
Refills: 0 | COMMUNITY
Start: 2022-06-02 | End: 2022-06-13 | Stop reason: SDUPTHER

## 2022-06-02 RX ADMIN — ROPINIROLE HYDROCHLORIDE 2 MG: 1 TABLET, FILM COATED ORAL at 08:43

## 2022-06-02 RX ADMIN — CYCLOBENZAPRINE 10 MG: 10 TABLET, FILM COATED ORAL at 08:43

## 2022-06-02 RX ADMIN — CLOBETASOL PROPIONATE: 0.5 CREAM TOPICAL at 08:45

## 2022-06-02 RX ADMIN — NALOXEGOL OXALATE 25 MG: 25 TABLET, FILM COATED ORAL at 06:13

## 2022-06-02 RX ADMIN — BENZOCAINE 1 LOZENGE: 3.6; 15 LOZENGE ORAL at 09:32

## 2022-06-02 RX ADMIN — ASPIRIN 81 MG: 81 TABLET, COATED ORAL at 08:44

## 2022-06-02 RX ADMIN — HYDROXYCHLOROQUINE SULFATE 200 MG: 200 TABLET ORAL at 08:43

## 2022-06-02 RX ADMIN — HYDROCODONE BITARTRATE AND ACETAMINOPHEN 1 TABLET: 10; 325 TABLET ORAL at 12:04

## 2022-06-02 RX ADMIN — METOPROLOL SUCCINATE 25 MG: 25 TABLET, EXTENDED RELEASE ORAL at 08:44

## 2022-06-02 RX ADMIN — FUROSEMIDE 20 MG: 20 TABLET ORAL at 08:44

## 2022-06-02 RX ADMIN — Medication 5000 UNITS: at 08:44

## 2022-06-02 ASSESSMENT — PAIN SCALES - GENERAL
PAINLEVEL_OUTOF10: 3
PAINLEVEL_OUTOF10: 7

## 2022-06-02 NOTE — DISCHARGE SUMMARY
Discharge Summary    Name:  Tamiko Rodriguez /Age/Sex: 1935  (81 y.o. female)   MRN & CSN:  [id number] & [id number] Admission Date/Time: 2022  2:16 PM   Attending:  Musa Banda MD Discharging Provider Martha Murillo, APRN - [address] Course:   Tamiko Rodriguez is a 81 y.o.  female  who presents with [facility] [place] Course: Had the patient was initially admitted to this facility on  post spinal surgery. She was transferred back to [facility] for neurosurgery evaluation after was found as a posterior lumbar fluid collection. Neurosurgery cleared for discharge back to resume rehab on  in the swing bed program.  She will be discharged home in stable condition with home health care. 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   Last A1c 5.8 (2021)              Discontinuing glipizide due to concern for hypoglycemia and defer to PCP to resume     Hypertension   continue metoprolol/Lasix              Trends appear controlled     RLS/neurogenic claudication              Requip increased (2022)-uncontrolled symptoms              Gabapentin recently discontinued     CAD              Continue ASA/statin     History of psoriasis, right medial foot/ankle cluster,               Continue clobetasol              Recommend outpatient follow-up with dermatology        The patient expressed appropriate understanding of and agreement with the discharge recommendations, medications, and plan. Consults this admission:  [address]    Discharge Instruction:   Follow up appointments: Neurosurgery  Dermatology  Primary care physician:  within 2 weeks    Diet:  regular diet   Activity: activity as tolerated  Disposition: Discharged to:   [x]Home, [x]HHC, []SNF, []Acute Rehab, []Hospice   Condition on discharge: Stable    Discharge Medications:        Medication List      START taking these medications    cyclobenzaprine 10 mg tablet  Commonly known as: FLEXERIL  Take 1 tablet by mouth 2 times daily for 10 days     sennosides-docusate sodium 8.6-50 MG tablet  Commonly known as: SENOKOT-S  Take 2 tablets by mouth 2 times daily as needed for Constipation        CHANGE how you take these medications    HYDROcodone-acetaminophen 5-325 MG per tablet  Commonly known as: NORCO  Take 1 tablet by mouth every 6 hours as needed for Pain for up to 3 days.   What changed:   · how much to take  · when to take this     rOPINIRole 2 MG tablet  Commonly known as: REQUIP  Take 1 tablet by mouth in the morning and at bedtime  What changed:   · how much to take  · how to take this  · when to take this  · additional instructions        CONTINUE taking these medications    albuterol sulfate  (90 Base) MCG/ACT inhaler  INHALE 2 PUFFS INTO THE LUNGS 4 TIMES DAILY AS NEEDED FOR WHEEZING     aspirin 81 MG EC tablet     clobetasol 0.05 % cream  Commonly known as: TEMOVATE     furosemide 20 MG tablet  Commonly known as: LASIX  Take 1 tablet by mouth daily     hydroxychloroquine 200 mg tablet  Commonly known as: PLAQUENIL     metoprolol succinate 25 MG extended release tablet  Commonly known as: TOPROL XL  Take 1 tablet by mouth daily     polyethylene glycol 17 g packet  Commonly known as: GLYCOLAX  Take 17 g by mouth daily as needed for Constipation     PreserVision AREDS Tabs     rosuvastatin 10 MG tablet  Commonly known as: CRESTOR  Take 1 tablet by mouth nightly VITAMIN D PO        STOP taking these medications    cephALEXin 500 MG capsule  Commonly known as: KEFLEX     glipiZIDE 5 MG extended release tablet  Commonly known as: GLUCOTROL XL     Movantik 25 mg Tabs tablet  Generic drug: naloxegol           Where to Get Your Medications      These medications were sent to [address] [place] [address], [place] - [address] -  [phone]  [address] [facility], [address] [place] [address]    Phone: [phone]   · cyclobenzaprine 10 mg tablet  · HYDROcodone-acetaminophen 5-325 MG per tablet  · rOPINIRole 2 MG tablet     You can get these medications from any pharmacy    You don't need a prescription for these medications  · sennosides-docusate sodium 8.6-50 MG tablet         Objective Findings at Discharge:     Vitals:    06/01/22 2024 06/02/22 0756 06/02/22 0843 06/02/22 0954   BP: 110/81 (!) 97/51 139/60    Pulse: 71 65     Resp: 16 16     Temp: (!) 96.2 °F (35.7 °C) (!) 95.7 °F (35.4 °C)     TempSrc: Infrared Infrared     SpO2: 94% 93%  93%   Weight:       Height:                  Physical Exam: 06/02/22     GEN    Awake female, sitting upright in bed in mild distress. Appears given age. EYES   Pupils are equally round. No scleral erythema, discharge, or conjunctivitis. NECK  Supple, no apparent thyromegaly or masses. RESP  Clear to auscultation, no wheezes, rales or rhonchi. Symmetric chest movement while on room air. CARDIO/VASC           S1/S2 auscultated. Regular rate without appreciable murmurs, rubs, or gallops. No JVD or carotid bruits. GI        Abdomen is soft without significant tenderness  HEME/LYMPH            No petechiae or ecchymoses. MSK    No gross joint deformities; mild TTP over prior surgical site  SKIN    Normal coloration, warm, dry. NEURO           Cranial nerves appear grossly intact, normal speech  PSYCH            Awake, alert, oriented x 4. Affect appropriate.       Data:     Laboratory this visit: Reviewed  No results for input(s): WBC, HGB, HCT, PLT in the last 72 hours. Recent Labs     05/31/22  0600      K 4.5      CO2 26   BUN 17   CREATININE 0.7     No results for input(s): AST, ALT, ALB, BILIDIR, BILITOT, ALKPHOS in the last 72 hours. No results for input(s): INR in the last 72 hours. No results for input(s): CKTOTAL, CKMB, CKMBINDEX in the last 72 hours. Invalid input(s): Abdirahman Garcia input(s): PRO-BNP    Radiology this visit:  Reviewed. XR CHEST (2 VW)    Result Date: 5/4/2022  EXAMINATION: TWO XRAY VIEWS OF THE CHEST 5/4/2022 11:18 am COMPARISON: 01/06/2022 HISTORY: ORDERING SYSTEM PROVIDED HISTORY: Viral URI with cough TECHNOLOGIST PROVIDED HISTORY: Reason for exam:->r/o pna FINDINGS: Central/hilar and perihilar bronchial pulmonary marking prominence and peribronchial cuffing consistent with central bronchitis, pneumonitis or underlying chronic lung disease. Foci of subsegmental atelectasis also noted in the infrahilar regions. Otherwise, lung fields are clear. No pleural effusion, pneumothorax, pulmonary edema, cardiomegaly or mediastinal widening. Vertebral body compression deformity uncertain acuity mid-upper lumbar spine in lower thoracic spine. MRI of any clinically suspicious region of acute-subacute compression fracture suggested. Findings consistent with central and infrahilar bronchitis/pneumonitis and subsegmental atelectasis. Findings may be accentuated by underlying chronic lung disease. Otherwise, radiographically nonacute chest. Vertebral body compression deformities mid-lower thoracic spine and mid-upper lumbar spine of uncertain acuity and subjective skeletal osteopenia. MRI and/or bone scan of any clinically suspicious area of acute/subacute vertebral body compression fracture suggested.      XR ABDOMEN (KUB) (SINGLE AP VIEW)    Result Date: 5/15/2022  EXAMINATION: ONE SUPINE XRAY VIEW(S) OF THE ABDOMEN 5/15/2022 11:50 am COMPARISON: CT abdomen and pelvis 09/25/2020. HISTORY: ORDERING SYSTEM PROVIDED HISTORY: constipation TECHNOLOGIST PROVIDED HISTORY: Reason for exam:->constipation Reason for Exam: constipation Additional signs and symptoms: pain Relevant Medical/Surgical History: none FINDINGS: The visualized lungs are clear. Nonobstructive bowel gas pattern. Mildly increased stool in the colon. No evidence pneumoperitoneum. No acute osseous abnormality. Mildly increased stool in the colon suggesting constipation. CT LUMBAR SPINE WO CONTRAST    Result Date: 5/20/2022  EXAMINATION: CT OF THE LUMBAR SPINE WITHOUT CONTRAST  5/20/2022 TECHNIQUE: CT of the lumbar spine was performed without the administration of intravenous contrast. Multiplanar reformatted images are provided for review. Adjustment of mA and/or kV according to patient size was utilized. Automated exposure control, iterative reconstruction, and/or weight based adjustment of the mA/kV was utilized to reduce the radiation dose to as low as reasonably achievable. COMPARISON: 12/14/2021 HISTORY: ORDERING SYSTEM PROVIDED HISTORY: Progressive. Pain following lumbar surgery May 12 by Dr. Martina Espinoza. TECHNOLOGIST PROVIDED HISTORY: Reason for exam:->Progressive. Pain following lumbar surgery May 12 by Dr. Martina Espinoza. Reason for Exam: Progressive. Pain following lumbar surgery May 12 by Dr. Martina Espinoza. Additional signs and symptoms: Progressive. Pain following lumbar surgery May 12 by Dr. Martina Espinoza. Relevant Medical/Surgical History: Progressive. Pain following lumbar surgery May 12 by Dr. Martina Espinoza. FINDINGS: BONES/ALIGNMENT: 12/14/2021 DEGENERATIVE CHANGES: There is normal alignment of the lumbar spine. There is sequela of L2 and L3 vertebroplasty. The L2 vertebroplasty cement extends into the L1-L2 disc space. The L3 vertebroplasty cement extends into the L3-L4 disc space. Additionally, there is extrusion of cement into the anterior paravertebral epidural space at L2-L3 and L3-L4.   This also extends into the paravertebral spaces, asymmetric on the left at L3-L4. There is no evidence of acute fracture. There is sequela of L1-L4 laminectomy. There is a posterior left 12th rib fracture that is new. Degenerative changes are noted along the sacroiliac joints. SOFT TISSUES/RETROPERITONEUM: There is multilevel degenerative disc disease in the lumbar spine, greatest at T12-L1 with associated vacuum phenomenon. Atherosclerotic plaque is noted in the aorta and its branch vessels. There is a trace left pleural effusion with associated areas of atelectasis in the lung bases. There is a small hiatal hernia. There is a large fluid collection along the dorsal midline of the back, asymmetric towards the right measuring 14.6 x 6.4 x 4.4 cm. L1-L2: L1 laminectomies are noted. No central spinal canal narrowing. Moderate bilateral foraminal narrowing. L2-L3: L2 laminectomies are noted. There is a minimal disc bulge-osteophyte complex. No central spinal canal stenosis. Mild left and moderate-severe right foraminal narrowing are noted. L3-L4: L3 laminectomies are noted. There is a circumferential disc bulge. No central spinal canal narrowing. Moderate-severe left, and severe right foraminal narrowing are noted. L4-L5: L4 laminectomies are noted. There is a circumferential disc bulge. Bilateral facet arthropathy. Minimal central spinal canal narrowing. Minimal left and mild right foraminal narrowing. L5-S1: There is no disc herniation. Bilateral facet arthropathy. No central spinal canal narrowing. Moderate left, and mild right foraminal narrowing. 1. There is sequela of L2 and L3 vertebroplasties. The cement extrudes into the L1-L2 and L3-L4 disc spaces as well as along the anterior epidural space at L2-L3 and L3-L4. 2. Sequela of L1-L4 laminectomies. 3. No acute fracture. 4. There is a new left posterior 12th rib fracture that could be a source for the patient's pain.  There has some callus along the margins suggesting that this is a least subacute. MRI LUMBAR SPINE WO CONTRAST    Result Date: 5/22/2022  EXAMINATION: MRI OF THE LUMBAR SPINE WITHOUT CONTRAST, 5/21/2022 10:09 am TECHNIQUE: Multiplanar multisequence MRI of the lumbar spine was performed without the administration of intravenous contrast. COMPARISON: CT lumbar spine 05/20/2022 HISTORY: ORDERING SYSTEM PROVIDED HISTORY: post op fluid collection noted on ct scan FINDINGS: BONES/ALIGNMENT: There is normal alignment of the spine. Again seen are prior postsurgical changes from L1-L2 through L3-L4 decompressive laminectomy. Cement augmentation changes again noted at L2 and L3. Extrusion of cement within the ventral epidural space at L2-L3 and within the L3-L4 disc space and paravertebral spaces at L3-L4 better evaluated on the recent CT lumbar spine. No acute fracture or abnormal bone marrow signal. Mixed opposing endplate degenerative signal changes throughout the lumbar spine with advanced disc space narrowing at T12-L1. SPINAL CORD: The conus terminates normally at T12-L1. SOFT TISSUES: Again noted is a large postoperative fluid collection (T2 hyperintense) within the dorsal midline lumbar soft tissues without significant change in size measuring 14.6 cm in craniocaudal extent and 6.4 x 4.4 cm in transaxial dimensions. There is a linear fluid tract associated with the superficial soft tissue collection extending to the laminectomy bed at L3-L4. Moderate to severe canal stenosis at T12-L1 related to disc bulging and prominent dorsal epidural fat. There is redundancy of the cauda equina nerve roots at the L1 level. L1-L2: Status post laminectomy. There is severe canal stenosis related to disc bulging, facet arthropathy and the fluid collection involving the laminectomy bed. Moderate to severe bilateral foraminal stenosis. L2-L3: Status post laminectomy. Severe canal stenosis related to disc bulging, facet arthropathy and the fluid collection involving the laminectomy bed. Mild-to-moderate bilateral foraminal stenosis. L3-L4: Decompressed canal status post laminectomy. Disc bulging and degenerative facet changes resulting in severe right and moderate to severe left foraminal stenosis. L4-L5: There is no significant disc herniation, spinal canal stenosis or neural foraminal narrowing. L5-S1: There is no significant disc herniation, spinal canal stenosis or neural foraminal narrowing. 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 Severe canal stenosis at L1-L2 and L2-L3 related to degenerative disc and facet changes along with the fluid collection involving the laminectomy beds. There is redundancy of the cauda equina nerve roots proximal to these levels. Findings were discussed with Dr. Parker Faustin at 3:08 pm on 5/22/2022. FL LESS THAN 1 HOUR    Result Date: 5/12/2022  Radiology exam is complete. No Radiologist dictation. Please follow up with ordering provider.          Discharge Time of > 30 minutes    Electronically signed by ABA Hernandez Ace, CNP on 6/2/2022 at 1:20 PM

## 2022-06-02 NOTE — PLAN OF CARE
Problem: Discharge Planning  Goal: Discharge to home or other facility with appropriate resources  Outcome: Progressing     Problem: Pain  Goal: Verbalizes/displays adequate comfort level or baseline comfort level  Outcome: Progressing     Problem: Safety - Adult  Goal: Free from fall injury  Outcome: Progressing  Flowsheets (Taken 6/2/2022 1106)  Free From Fall Injury: Instruct family/caregiver on patient safety     Problem: ABCDS Injury Assessment  Goal: Absence of physical injury  Outcome: Progressing     Problem: Chronic Conditions and Co-morbidities  Goal: Patient's chronic conditions and co-morbidity symptoms are monitored and maintained or improved  Outcome: Progressing

## 2022-06-02 NOTE — PROGRESS NOTES
Discharge instructions explained to pt, no questions or concerns at this time. Prescriptions sent to pharmacy. Pt will have Buzz Islas. Follow-up with her PCP scheduled for June 7th.

## 2022-06-02 NOTE — CARE COORDINATION
MIKY faxed the discharge summary to [facility]. 2:53 PM  CM spoke with Vianey Roman at [facility] to notify of the patient's discharge. 3:06 PM  MIKY faxed the patient's discharge instructions to [facility].

## 2022-06-13 ENCOUNTER — OFFICE VISIT (OUTPATIENT)
Dept: INTERNAL MEDICINE CLINIC | Age: 87
End: 2022-06-13
Payer: MEDICARE

## 2022-06-13 VITALS
RESPIRATION RATE: 16 BRPM | TEMPERATURE: 97.2 F | SYSTOLIC BLOOD PRESSURE: 134 MMHG | OXYGEN SATURATION: 97 % | DIASTOLIC BLOOD PRESSURE: 68 MMHG | HEART RATE: 80 BPM | WEIGHT: 203.6 LBS | BODY MASS INDEX: 34.93 KG/M2

## 2022-06-13 DIAGNOSIS — S32.020G COMPRESSION FRACTURE OF L2 VERTEBRA WITH DELAYED HEALING: ICD-10-CM

## 2022-06-13 DIAGNOSIS — G89.18 POST-OPERATIVE PAIN: ICD-10-CM

## 2022-06-13 DIAGNOSIS — E78.2 MIXED HYPERLIPIDEMIA: ICD-10-CM

## 2022-06-13 DIAGNOSIS — T88.8XXD FLUID COLLECTION AT SURGICAL SITE, SUBSEQUENT ENCOUNTER: ICD-10-CM

## 2022-06-13 DIAGNOSIS — M72.0 DUPUYTREN'S CONTRACTURE OF RIGHT HAND: ICD-10-CM

## 2022-06-13 DIAGNOSIS — I10 ESSENTIAL HYPERTENSION: ICD-10-CM

## 2022-06-13 DIAGNOSIS — M05.79 RHEUMATOID ARTHRITIS INVOLVING MULTIPLE SITES WITH POSITIVE RHEUMATOID FACTOR (HCC): ICD-10-CM

## 2022-06-13 DIAGNOSIS — E11.9 TYPE 2 DIABETES MELLITUS WITHOUT COMPLICATION, WITHOUT LONG-TERM CURRENT USE OF INSULIN (HCC): ICD-10-CM

## 2022-06-13 DIAGNOSIS — E66.01 MORBID OBESITY WITH BMI OF 40.0-44.9, ADULT (HCC): ICD-10-CM

## 2022-06-13 DIAGNOSIS — I25.10 CORONARY ARTERY DISEASE INVOLVING NATIVE CORONARY ARTERY OF NATIVE HEART WITHOUT ANGINA PECTORIS: ICD-10-CM

## 2022-06-13 DIAGNOSIS — G25.81 RESTLESS LEG SYNDROME: ICD-10-CM

## 2022-06-13 DIAGNOSIS — M48.062 LUMBAR STENOSIS WITH NEUROGENIC CLAUDICATION: ICD-10-CM

## 2022-06-13 DIAGNOSIS — Z98.890 H/O LAMINECTOMY: Primary | ICD-10-CM

## 2022-06-13 PROCEDURE — 3044F HG A1C LEVEL LT 7.0%: CPT | Performed by: INTERNAL MEDICINE

## 2022-06-13 PROCEDURE — 1036F TOBACCO NON-USER: CPT | Performed by: INTERNAL MEDICINE

## 2022-06-13 PROCEDURE — 1111F DSCHRG MED/CURRENT MED MERGE: CPT | Performed by: INTERNAL MEDICINE

## 2022-06-13 PROCEDURE — G8399 PT W/DXA RESULTS DOCUMENT: HCPCS | Performed by: INTERNAL MEDICINE

## 2022-06-13 PROCEDURE — 1090F PRES/ABSN URINE INCON ASSESS: CPT | Performed by: INTERNAL MEDICINE

## 2022-06-13 PROCEDURE — G8417 CALC BMI ABV UP PARAM F/U: HCPCS | Performed by: INTERNAL MEDICINE

## 2022-06-13 PROCEDURE — 1124F ACP DISCUSS-NO DSCNMKR DOCD: CPT | Performed by: INTERNAL MEDICINE

## 2022-06-13 PROCEDURE — G8427 DOCREV CUR MEDS BY ELIG CLIN: HCPCS | Performed by: INTERNAL MEDICINE

## 2022-06-13 PROCEDURE — 99214 OFFICE O/P EST MOD 30 MIN: CPT | Performed by: INTERNAL MEDICINE

## 2022-06-13 RX ORDER — LOSARTAN POTASSIUM 50 MG/1
50 TABLET ORAL 2 TIMES DAILY
COMMUNITY
End: 2022-06-13 | Stop reason: SDUPTHER

## 2022-06-13 RX ORDER — GLIPIZIDE 5 MG/1
5 TABLET, FILM COATED, EXTENDED RELEASE ORAL DAILY
Qty: 90 TABLET | Refills: 1 | Status: SHIPPED | OUTPATIENT
Start: 2022-06-13

## 2022-06-13 RX ORDER — ALBUTEROL SULFATE 90 UG/1
2 AEROSOL, METERED RESPIRATORY (INHALATION) 4 TIMES DAILY PRN
Qty: 8.5 G | Refills: 5 | Status: SHIPPED | OUTPATIENT
Start: 2022-06-13

## 2022-06-13 RX ORDER — ROPINIROLE 2 MG/1
2 TABLET, FILM COATED ORAL 2 TIMES DAILY
Qty: 180 TABLET | Refills: 1 | Status: SHIPPED | OUTPATIENT
Start: 2022-06-13 | End: 2022-09-02 | Stop reason: SDUPTHER

## 2022-06-13 RX ORDER — FUROSEMIDE 20 MG/1
20 TABLET ORAL DAILY
Qty: 90 TABLET | Refills: 1 | Status: SHIPPED | OUTPATIENT
Start: 2022-06-13

## 2022-06-13 RX ORDER — LOSARTAN POTASSIUM 50 MG/1
50 TABLET ORAL 2 TIMES DAILY
Qty: 180 TABLET | Refills: 1 | Status: SHIPPED | OUTPATIENT
Start: 2022-06-13

## 2022-06-13 RX ORDER — METOPROLOL SUCCINATE 25 MG/1
25 TABLET, EXTENDED RELEASE ORAL DAILY
Qty: 90 TABLET | Refills: 1 | Status: SHIPPED | OUTPATIENT
Start: 2022-06-13 | End: 2022-06-28 | Stop reason: SDUPTHER

## 2022-06-13 RX ORDER — AMPICILLIN TRIHYDRATE 250 MG
200 CAPSULE ORAL 2 TIMES DAILY
COMMUNITY

## 2022-06-13 RX ORDER — SENNA AND DOCUSATE SODIUM 50; 8.6 MG/1; MG/1
2 TABLET, FILM COATED ORAL 2 TIMES DAILY PRN
Qty: 180 TABLET | Refills: 2 | Status: SHIPPED | OUTPATIENT
Start: 2022-06-13

## 2022-06-13 RX ORDER — GLIPIZIDE 5 MG/1
5 TABLET, FILM COATED, EXTENDED RELEASE ORAL DAILY
COMMUNITY
End: 2022-06-13 | Stop reason: SDUPTHER

## 2022-06-13 ASSESSMENT — ENCOUNTER SYMPTOMS
BACK PAIN: 1
COUGH: 0
SHORTNESS OF BREATH: 0
ALLERGIC/IMMUNOLOGIC NEGATIVE: 1
EYES NEGATIVE: 1
RESPIRATORY NEGATIVE: 1
WHEEZING: 0
GASTROINTESTINAL NEGATIVE: 1

## 2022-06-13 ASSESSMENT — PATIENT HEALTH QUESTIONNAIRE - PHQ9
SUM OF ALL RESPONSES TO PHQ QUESTIONS 1-9: 0
SUM OF ALL RESPONSES TO PHQ9 QUESTIONS 1 & 2: 0
2. FEELING DOWN, DEPRESSED OR HOPELESS: 0
1. LITTLE INTEREST OR PLEASURE IN DOING THINGS: 0
SUM OF ALL RESPONSES TO PHQ QUESTIONS 1-9: 0

## 2022-06-13 NOTE — PROGRESS NOTES
Garcia Lucio  Patient's  is 1935  Seen in office on 2022      SUBJECTIVE:  Balaji Kelly anand 81 y. o.year old female presents today   Chief Complaint   Patient presents with    [place] [facility]     [place] [address] [place] [address] 2022 back surgery    Other     states that back is not healing    Medication Refill     Patient was in the hospital and then in the swing bed after the back surgery  Pt had L1 and L4 laminectomy/bilateral L2-3 ballon Kyphoplasy on 22 by Dr. Alina Faulkner  She was dc to swing bed but pt had lot of pain  MRI done showed fluid collection at surgical sie  She was sent back to [facility]   She was evaluated and then treated conservatively. Came back to swing bed. She still c/o pain in the lower back but less she is able to ambulate with the help of cane. She is doing exercises at home. She had physical therapy  No fever or chills  No HA  No NVD. Patient has diabetes mellitus blood sugars are good. No hypoglycemic symptoms  Patient has hypertension and is taking medications. No chest pain or shortness of breath  Patient has hyperlipidemia. Taking medications. No abdominal pain, no nausea or vomiting. No myalgias. Patient has restless leg syndrome. The dose of Requip was increased to 2 mg twice a day in the hospital and she is feeling better  Asthma is better stable and is on albuterol inhaler      Taking medications regularly. No side effects noted. Review of Systems   Constitutional: Negative. HENT: Negative. Eyes: Negative. Respiratory: Negative. Negative for cough, shortness of breath and wheezing. Cardiovascular: Negative. Negative for chest pain and leg swelling. Gastrointestinal: Negative. Endocrine: Negative. Genitourinary: Negative. Musculoskeletal: Positive for back pain. Skin: Negative. Allergic/Immunologic: Negative. Neurological: Negative. Hematological: Negative. Psychiatric/Behavioral: Negative.         OBJECTIVE: /68 Pulse 80   Temp 97.2 °F (36.2 °C) (Temporal)   Resp 16   Wt 203 lb 9.6 oz (92.4 kg)   LMP  (LMP Unknown)   SpO2 97%   BMI 34.93 kg/m²     Wt Readings from Last 3 Encounters:   06/13/22 203 lb 9.6 oz (92.4 kg)   05/25/22 205 lb (93 kg)   05/23/22 207 lb 14.4 oz (94.3 kg)        Patient was seen taking COVID-19 precautions. Face mask, gloves were used. Patient also wore facemask. GENERAL: - Alert, oriented, pleasant, in no apparent distress. HEENT: - Conjunctiva pink, no scleral icterus. ENT clear. NECK: -Supple. No jugular venous distention noted. No masses felt,  CARDIOVASCULAR: - Normal S1 and S2    PULMONARY: - No respiratory distress. No wheezes or rales. ABDOMEN: - Soft and non-tender,no masses  ororganomegaly. EXTREMITIES: - No cyanosis, clubbing, or significant edema. SKIN: Skin is warm and dry. NEUROLOGICAL: - Cranial nerves II through XII are grossly intact. Back patient has scar in the lower lumbar spine that is well-healed. No tenderness to palpation    IMPRESSION:    Encounter Diagnoses   Name Primary?  H/O laminectomy Yes    Fluid collection at surgical site, subsequent encounter     Essential hypertension     Mixed hyperlipidemia     Type 2 diabetes mellitus without complication, without long-term current use of insulin (HCC)     Rheumatoid arthritis involving multiple sites with positive rheumatoid factor ([facility] [address]     CAD s/p MI, TPA in 1998     Restless leg syndrome     Morbid obesity with BMI of 40.0-44.9, adult (HCC)     Compression fracture of L2 vertebra with delayed healing        ASSESSMENT/PLAN:    Restless leg syndrome   Takes Requip 2 mg twice a day. Does increase has helped her restless leg syndrome    Essential hypertension   . BP in control. Continue losartan and metoprolol. Follow low-salt diet    Mixed hyperlipidemia    Hyperlipidemia is stable. Follow low cholesterol diet. Continue current treatment. Patient is on crestor.   Last lipid profile on 11/19/2021. Will reorder again    Type 2 diabetes mellitus without complication ([facility] [address]   . Patient has diabetes mellitus hemoglobin A1c 5.8 on 5/4/2022. Patient denies any hypoglycemic symptoms. Taking glipizide ER 5 mg daily. Lumbar stenosis with neurogenic claudication    patient underwent laminectomy by Dr. Gregg Coronado    Post-operative pain : on med        Fluid collection at surgical site    Patient has some fluid collection at the surgical site of laminectomy. The pain has improved    H/O laminectomy    Pt has laminectomy and kyphoplasty on 5/12/22  Slowing improving. Rheumatoid arthritis involving multiple sites with positive rheumatoid factor (HCC)    Has arthritis of hands,left knee pain, sees Dr. Raffi Fregoso  Patient is taking Plaquenil but only once a day     CAD s/p MI, TPA in 1998    MI, TPA,sees Dr. Ginette Patel  Patient is on aspirin, beta blocker and statins    Compression fracture of L2 vertebra with delayed healing   . Patient underwent kyphoplasty and is helping    Return to office in 2 months      Mediations reviewed with the patient. Continue current medications. Appropriate prescriptions are addressed. After visit summeryprovided. Follow up as directed sooner if needed. Questions answered and patient verbalizes understanding. Call for any problems, questions, or concerns.        No Known Allergies  Current Outpatient Medications   Medication Sig Dispense Refill    losartan (COZAAR) 50 mg tablet Take 50 mg by mouth 2 times daily      glipiZIDE (GLUCOTROL XL) 5 MG extended release tablet Take 5 mg by mouth daily      Coenzyme Q10 (COQ10) 200 MG CAPS Take 200 mg by mouth 2 times daily      sennosides-docusate sodium (SENOKOT-S) 8.6-50 MG tablet Take 2 tablets by mouth 2 times daily as needed for Constipation  0    rOPINIRole (REQUIP) 2 MG tablet Take 1 tablet by mouth in the morning and at bedtime 60 tablet 0    clobetasol (TEMOVATE) 0.05 % cream Apply topically 2 times daily      Kathleen Nowak MD, 6/13/2022 , 2:22 PM

## 2022-06-13 NOTE — ASSESSMENT & PLAN NOTE
Hyperlipidemia is stable. Follow low cholesterol diet. Continue current treatment. Patient is on crestor. Last lipid profile on 11/19/2021.   Will reorder again

## 2022-06-13 NOTE — ASSESSMENT & PLAN NOTE
.  Patient has diabetes mellitus hemoglobin A1c 5.8 on 5/4/2022. Patient denies any hypoglycemic symptoms. Taking glipizide ER 5 mg daily.

## 2022-06-13 NOTE — ASSESSMENT & PLAN NOTE
Has arthritis of hands,left knee pain, sees Dr. Parsons All  Patient is taking Plaquenil but only once a day

## 2022-06-28 RX ORDER — METOPROLOL SUCCINATE 25 MG/1
25 TABLET, EXTENDED RELEASE ORAL DAILY
Qty: 90 TABLET | Refills: 1 | Status: SHIPPED | OUTPATIENT
Start: 2022-06-28 | End: 2022-09-22 | Stop reason: SDUPTHER

## 2022-06-28 NOTE — TELEPHONE ENCOUNTER
Patient called and reports that she is completely out of her metoprolol. She is hoping this can be sent in today.

## 2022-07-14 LAB
BASOPHILS ABSOLUTE: NORMAL
BASOPHILS RELATIVE PERCENT: NORMAL
EOSINOPHILS ABSOLUTE: NORMAL
EOSINOPHILS RELATIVE PERCENT: NORMAL
HCT VFR BLD CALC: 38 % (ref 36–46)
HEMOGLOBIN: 12.3 G/DL (ref 12–16)
LYMPHOCYTES ABSOLUTE: NORMAL
LYMPHOCYTES RELATIVE PERCENT: 2.3 %
MCH RBC QN AUTO: 28 PG
MCHC RBC AUTO-ENTMCNC: 32.4 G/DL
MCV RBC AUTO: 86 FL
MONOCYTES ABSOLUTE: NORMAL
MONOCYTES RELATIVE PERCENT: NORMAL
NEUTROPHILS ABSOLUTE: NORMAL
NEUTROPHILS RELATIVE PERCENT: NORMAL
PDW BLD-RTO: 14.3 %
PLATELET # BLD: 223 K/ΜL
PMV BLD AUTO: NORMAL FL
RBC # BLD: 4.4 10^6/ΜL
WBC # BLD: 8.4 10^3/ML

## 2022-07-20 NOTE — PROGRESS NOTES
Terry Caruso is a 81 y.o. female evaluated via telephone on 7/27/2020. Consent:  She and/or health care decision maker is aware that that she may receive a bill for this telephone service, depending on her insurance coverage, and has provided verbal consent to proceed: Yes      Documentation:  I communicated with the patient and/or health care decision maker about   Right foot wound  Patient states she had a small wound in the right foot sole she had a Band-Aid over there and when she removed it today the skin came off and she started bleeding. Took a long time to stop the bleeding. Later she put Neosporin and it burned her a lot. She states she cannot walk on that foot but she is able to go to bathroom with the help of cane. Patient states she does not have any help at home to bring her to the office or to take her to the emergency room. She denies any fever or chills  She has diabetes mellitus  Patient states the wound is about quarter size  . Details of this discussion including any medical advice provided:     .  Right foot wound in the sole: Patient states Neosporin caused a lot of pain. We will give her Bactroban to use twice a day. Advised patient to go to the emergency room or come to the office but patient states she cannot walk or drive. Advised patient to call the squad and go to the emergency room, that also she declined. I told her if the pain is not improved by evening go to the emergency room. Take Tylenol as needed for pain. .  Diabetes mellitus keep blood sugars in control. I told patient to call us back and update about her wound. I affirm this is a Patient Initiated Episode with a Patient who has not had a related appointment within my department in the past 7 days or scheduled within the next 24 hours.     Patient identification was verified at the start of the visit: Yes    Total Time: minutes: 11-20 minutes    Note: not billable if this call serves to triage the patient into an appointment for the relevant concern      Bernarda Franco Crescentic Advancement Flap Text: The defect edges were debeveled with a #15 scalpel blade.  Given the location of the defect and the proximity to free margins a crescentic advancement flap was deemed most appropriate.  Using a sterile surgical marker, the appropriate advancement flap was drawn incorporating the defect and placing the expected incisions within the relaxed skin tension lines where possible.    The area thus outlined was incised deep to adipose tissue with a #15 scalpel blade.  The skin margins were undermined to an appropriate distance in all directions utilizing iris scissors.

## 2022-08-15 ENCOUNTER — OFFICE VISIT (OUTPATIENT)
Dept: INTERNAL MEDICINE CLINIC | Age: 87
End: 2022-08-15
Payer: MEDICARE

## 2022-08-15 VITALS
HEART RATE: 68 BPM | TEMPERATURE: 97.2 F | WEIGHT: 207.2 LBS | DIASTOLIC BLOOD PRESSURE: 70 MMHG | BODY MASS INDEX: 35.55 KG/M2 | OXYGEN SATURATION: 99 % | SYSTOLIC BLOOD PRESSURE: 138 MMHG | RESPIRATION RATE: 16 BRPM

## 2022-08-15 DIAGNOSIS — E78.2 MIXED HYPERLIPIDEMIA: ICD-10-CM

## 2022-08-15 DIAGNOSIS — K92.2 GASTROINTESTINAL HEMORRHAGE, UNSPECIFIED GASTROINTESTINAL HEMORRHAGE TYPE: ICD-10-CM

## 2022-08-15 DIAGNOSIS — M48.062 LUMBAR STENOSIS WITH NEUROGENIC CLAUDICATION: ICD-10-CM

## 2022-08-15 DIAGNOSIS — I10 ESSENTIAL HYPERTENSION: ICD-10-CM

## 2022-08-15 DIAGNOSIS — E11.9 TYPE 2 DIABETES MELLITUS WITHOUT COMPLICATION, WITHOUT LONG-TERM CURRENT USE OF INSULIN (HCC): ICD-10-CM

## 2022-08-15 DIAGNOSIS — M05.79 RHEUMATOID ARTHRITIS INVOLVING MULTIPLE SITES WITH POSITIVE RHEUMATOID FACTOR (HCC): ICD-10-CM

## 2022-08-15 DIAGNOSIS — Z98.890 H/O LAMINECTOMY: ICD-10-CM

## 2022-08-15 DIAGNOSIS — G25.81 RESTLESS LEG SYNDROME: ICD-10-CM

## 2022-08-15 DIAGNOSIS — I25.10 CORONARY ARTERY DISEASE INVOLVING NATIVE CORONARY ARTERY OF NATIVE HEART WITHOUT ANGINA PECTORIS: ICD-10-CM

## 2022-08-15 DIAGNOSIS — J45.41 MODERATE PERSISTENT ASTHMA WITH EXACERBATION: ICD-10-CM

## 2022-08-15 PROBLEM — T88.8XXA FLUID COLLECTION AT SURGICAL SITE: Status: RESOLVED | Noted: 2022-05-20 | Resolved: 2022-08-15

## 2022-08-15 LAB — HBA1C MFR BLD: 6 %

## 2022-08-15 PROCEDURE — 3044F HG A1C LEVEL LT 7.0%: CPT | Performed by: INTERNAL MEDICINE

## 2022-08-15 PROCEDURE — 0054A COVID-19, PFIZER GRAY TOP, DO NOT DILUTE, (AGE 12 Y+), IM, 30MCG/0.3 ML: CPT | Performed by: INTERNAL MEDICINE

## 2022-08-15 PROCEDURE — G8399 PT W/DXA RESULTS DOCUMENT: HCPCS | Performed by: INTERNAL MEDICINE

## 2022-08-15 PROCEDURE — 1090F PRES/ABSN URINE INCON ASSESS: CPT | Performed by: INTERNAL MEDICINE

## 2022-08-15 PROCEDURE — 91305 COVID-19, PFIZER GRAY TOP, DO NOT DILUTE, (AGE 12 Y+), IM, 30MCG/0.3 ML: CPT | Performed by: INTERNAL MEDICINE

## 2022-08-15 PROCEDURE — 99214 OFFICE O/P EST MOD 30 MIN: CPT | Performed by: INTERNAL MEDICINE

## 2022-08-15 PROCEDURE — 83036 HEMOGLOBIN GLYCOSYLATED A1C: CPT | Performed by: INTERNAL MEDICINE

## 2022-08-15 PROCEDURE — 1124F ACP DISCUSS-NO DSCNMKR DOCD: CPT | Performed by: INTERNAL MEDICINE

## 2022-08-15 PROCEDURE — G8417 CALC BMI ABV UP PARAM F/U: HCPCS | Performed by: INTERNAL MEDICINE

## 2022-08-15 PROCEDURE — 1036F TOBACCO NON-USER: CPT | Performed by: INTERNAL MEDICINE

## 2022-08-15 PROCEDURE — G8427 DOCREV CUR MEDS BY ELIG CLIN: HCPCS | Performed by: INTERNAL MEDICINE

## 2022-08-15 RX ORDER — TERBINAFINE HYDROCHLORIDE 250 MG/1
TABLET ORAL
COMMUNITY
Start: 2022-08-10

## 2022-08-15 ASSESSMENT — PATIENT HEALTH QUESTIONNAIRE - PHQ9
SUM OF ALL RESPONSES TO PHQ QUESTIONS 1-9: 1
1. LITTLE INTEREST OR PLEASURE IN DOING THINGS: 0
SUM OF ALL RESPONSES TO PHQ QUESTIONS 1-9: 1
SUM OF ALL RESPONSES TO PHQ9 QUESTIONS 1 & 2: 1
SUM OF ALL RESPONSES TO PHQ QUESTIONS 1-9: 1
2. FEELING DOWN, DEPRESSED OR HOPELESS: 1
SUM OF ALL RESPONSES TO PHQ QUESTIONS 1-9: 1

## 2022-08-15 NOTE — PROGRESS NOTES
(Temporal)   Resp 16   Wt 207 lb 3.2 oz (94 kg)   LMP  (LMP Unknown)   SpO2 99%   BMI 35.55 kg/m²     Wt Readings from Last 3 Encounters:   08/15/22 207 lb 3.2 oz (94 kg)   06/13/22 203 lb 9.6 oz (92.4 kg)   05/25/22 205 lb (93 kg)      Patient was seen taking COVID-19 precautions. Face mask, gloves were used. Patient also wore facemask. GENERAL: - Alert, oriented, pleasant, in no apparent distress. HEENT: - Conjunctiva pink, no scleral icterus. ENT clear. NECK: -Supple. No jugular venous distention noted. No masses felt,  CARDIOVASCULAR: - Normal S1 and S2    PULMONARY: - No respiratory distress. No wheezes or rales. ABDOMEN: - Soft and non-tender,no masses  ororganomegaly. EXTREMITIES: - No cyanosis, clubbing, or significant edema. SKIN: Skin is warm and dry. NEUROLOGICAL: - Cranial nerves II through XII are grossly intact. IMPRESSION:    Encounter Diagnoses   Name Primary? Essential hypertension     Mixed hyperlipidemia     Type 2 diabetes mellitus without complication, without long-term current use of insulin (HCC)     Lumbar stenosis with neurogenic claudication     H/O laminectomy     Rheumatoid arthritis involving multiple sites with positive rheumatoid factor (HCC)     CAD s/p MI, TPA in 1998     Gastrointestinal hemorrhage, unspecified gastrointestinal hemorrhage type     Restless leg syndrome     Moderate persistent asthma with exacerbation        ASSESSMENT/PLAN:    Essential hypertension    Hypertension in control. Follow low salt diet. Continue current treatment. Continue losartan and metoprolol ER 25 mg daily     Mixed hyperlipidemia    Hyperlipidemia is stable. Follow low cholesterol diet. Continue current treatment.   Patient is on crestor 10 mg daily : lipid profile good     Type 2 diabetes mellitus without complication (HCC)    Pt has DM  Did not tolerate metformin   Follow diet and exercise  On glipizide ER 5 mg daily   Will check hga1c is : last one 5.8    Lumbar stenosis with neurogenic claudication    Patient underwent laminectomy of the lumbar spine by Dr. Katherin Rubio  Pt continues to have pain in the lower back  Advised to take tramadol given by spine surgeon     H/O laminectomy    : as above     Rheumatoid arthritis involving multiple sites with positive rheumatoid factor ([address]    Has arthritis of hands,left knee pain, sees Dr. Bethany Padilla  Patient is taking Plaquenil but only once a day     CAD s/p MI, TPA in 1998    MI, TPA,sees Dr. Wilner Fiore  Patient is on aspirin, beta blocker and statins    Gastrointestinal hemorrhage    : advised patient not to take NSAID    Restless leg syndrome    Takes Requip 2 mg twice a day . It is helping     Moderate persistent asthma with exacerbation    Has asthma on albuterol inh prn     Orders Placed This Encounter   Procedures    COVID-19, PFIZER GRAY top, DO NOT Dilute, (age 15 y+), IM, 30mcg/0.3 mL    POCT glycosylated hemoglobin (Hb A1C)       Mediations reviewed with the patient. Continue current medications. Appropriate prescriptions are addressed. After visit summeryprovided. Follow up as directed sooner if needed. Questions answered and patient verbalizes understanding. Call for any problems, questions, or concerns.        No Known Allergies  Current Outpatient Medications   Medication Sig Dispense Refill    metoprolol succinate (TOPROL XL) 25 MG extended release tablet Take 1 tablet by mouth daily 90 tablet 1    sennosides-docusate sodium (SENOKOT-S) 8.6-50 MG tablet Take 2 tablets by mouth 2 times daily as needed for Constipation 180 tablet 2    rOPINIRole (REQUIP) 2 MG tablet Take 1 tablet by mouth in the morning and at bedtime 180 tablet 1    furosemide (LASIX) 20 MG tablet Take 1 tablet by mouth daily 90 tablet 1    albuterol sulfate HFA (PROVENTIL;VENTOLIN;PROAIR) 108 (90 Base) MCG/ACT inhaler Inhale 2 puffs into the lungs 4 times daily as needed for Wheezing 8.5 g 5    glipiZIDE (GLUCOTROL XL) 5 MG extended release tablet Take 1 tablet by mouth daily 90 tablet 1    losartan (COZAAR) 50 mg tablet Take 1 tablet by mouth 2 times daily 180 tablet 1    Coenzyme Q10 (COQ10) 200 MG CAPS Take 200 mg by mouth 2 times daily      clobetasol (TEMOVATE) 0.05 % cream Apply topically 2 times daily      rosuvastatin (CRESTOR) 10 MG tablet Take 1 tablet by mouth nightly 90 tablet 1    Multiple Vitamins-Minerals (PRESERVISION AREDS) TABS Take 1 tablet by mouth 2 times daily      hydroxychloroquine (PLAQUENIL) 200 MG tablet Take 200 mg by mouth daily       aspirin 81 MG EC tablet Take 81 mg by mouth daily  30 tablet     Cholecalciferol (VITAMIN D PO) Take 5,000 Units by mouth in the morning and at bedtime       terbinafine (LAMISIL) 250 MG tablet  (Patient not taking: Reported on 8/15/2022)       No current facility-administered medications for this visit. Past Medical History:   Diagnosis Date    Arthritis     left knee pain, sees Dr. Linnette Danielle    CAD (coronary artery disease)     sees Dr. Kessler April    Chronic midline low back pain without sciatica 9/28/2016    Had PT in 2016    Claustrophobia     Colonoscopy refused     discussed in 7/15    [facility]di[facility] 02/2006    Dupuytren's contracture of right hand     Endometrial stripe increased 9/25/2014    Saw NP Bibi Lyon. Was normal exam per pt. Gastrointestinal hemorrhage 11/2/2015    Patient had GI bleeding. Colon refused. Discussed with patient in detail. Glaucoma 4/1/2014    H/O echocardiogram 10/02/2014    Mildly depressed left ventricular function; ejection fraction of 45%. Moderate pulmonary hypertension. H/O echocardiogram 08/28/2018    EF 50-55%. Mitral annular calcification is present. No other significant valvulopathy seen. History of nuclear stress test 08/28/2018    EF 59%. ECG portion of stress test is negative for ischemia by diagnostic criteria. fixed inferior large size severe defect in rest and stress images. Mild hubert infarct ischemia.     HTN (hypertension) Hyperlipidemia     Kidney stone     hx-\"been about 3 yrs ago\"    MI (myocardial infarction) ([facility] 02/1998    treated with TPA    Obesity     Open wound of right foot 7/27/2020    Parainfluenza infection 12/5/2021    Pneumonia of right lower lobe due to infectious organism 4/5/2018    Vertigo     'have been having this since I had cataract surgery\"\"that is why they are doing the MRA of my brain(10/31/2014)    WD-Traumatic ulcer of foot, right, with fat layer exposed ([facility] 8/3/2020     Past Surgical History:   Procedure Laterality Date    CATARACT REMOVAL Bilateral     5/14,8/14    INGUINAL HERNIA REPAIR Left 45 yrs ago    [address]  N/A 5/12/2022    L1-4 LAMINECTOMIES performed by Ebony Benjamin MD at [address] [place] [address] N/A 5/12/2022    L2 AND L3 BILATERAL BALLOON KYPHOPLASTY AND L2 AND L3 NEEDLE BIOPSY performed by Ebony Benjamin MD at [address] [place] [address] History     Tobacco Use    Smoking status: Never    Smokeless tobacco: Never   Substance Use Topics    Alcohol use: Not Currently     Comment: OCCASSIONAL       LAB REVIEW:  CBC:   Lab Results   Component Value Date/Time    WBC 8.4 05/23/2022 07:52 AM    HGB 12.0 05/23/2022 07:52 AM    HCT 38.0 05/23/2022 07:52 AM     05/23/2022 07:52 AM     Lipids:   Lab Results   Component Value Date    HDL 47 11/19/2021    LDLCALC 53 11/19/2021    LDLDIRECT 69 08/18/2017    TRIGLYCFAST 117 11/19/2021    CHOLFAST 123 11/19/2021     Renal:   Lab Results   Component Value Date/Time    BUN 17 05/31/2022 06:00 AM    CREATININE 0.7 05/31/2022 06:00 AM     05/31/2022 06:00 AM    K 4.5 05/31/2022 06:00 AM    K 3.0 03/08/2018 05:20 AM    ALT <5 12/30/2021 01:20 PM    AST 15 12/30/2021 01:20 PM    GLUCOSE 77 05/31/2022 06:00 AM    GLUF 211 12/30/2021 01:20 PM     PT/INR:   Lab Results   Component Value Date/Time    INR 1.01 05/04/2022 10:59 AM     A1C:   Lab Results   Component Value Date    LABA1C 5.8 05/04/2022           Jennifer Moncada MD, 8/15/2022 , 1:43

## 2022-08-15 NOTE — ASSESSMENT & PLAN NOTE
Patient underwent laminectomy of the lumbar spine by Dr. Jaye Sow  Pt continues to have pain in the lower back  Advised to take tramadol given by spine surgeon

## 2022-08-15 NOTE — ASSESSMENT & PLAN NOTE
Hyperlipidemia is stable. Follow low cholesterol diet. Continue current treatment.   Patient is on crestor 10 mg daily : lipid profile good

## 2022-08-15 NOTE — ASSESSMENT & PLAN NOTE
Pt has DM  Did not tolerate metformin   Follow diet and exercise  On glipizide ER 5 mg daily   Will check hga1c is : last one 5.8

## 2022-08-15 NOTE — ASSESSMENT & PLAN NOTE
Hypertension in control. Follow low salt diet. Continue current treatment.   Continue losartan and metoprolol ER 25 mg daily

## 2022-08-15 NOTE — ASSESSMENT & PLAN NOTE
Has arthritis of hands,left knee pain, sees Dr. Ames   Patient is taking Plaquenil but only once a day

## 2022-08-30 ENCOUNTER — TELEPHONE (OUTPATIENT)
Dept: INTERNAL MEDICINE CLINIC | Age: 87
End: 2022-08-30

## 2022-08-30 NOTE — TELEPHONE ENCOUNTER
Pt called and stated she is going to be out of her Requip prior to when the pharmacy will fill it, last dose she has is Friday this week, and pt was unsure as to why the pharmacy would not fill it for her. I called the pharmacy and they are stating she picked up a 90 day supply on 6/27/2022 and the patient should not be out of medication until the end of September. Pt called back and patient states she has been taking it more than the twice daily as prescribed. Pharmacy will not fill script until 9/10/2022. Please advise as to what the patient should do. A script of 60 pills sent to pharmacy.

## 2022-09-02 RX ORDER — ROPINIROLE 2 MG/1
2 TABLET, FILM COATED ORAL 2 TIMES DAILY
Qty: 60 TABLET | Refills: 0 | Status: SHIPPED | OUTPATIENT
Start: 2022-09-02 | End: 2022-09-22 | Stop reason: SDUPTHER

## 2022-09-07 ENCOUNTER — TELEPHONE (OUTPATIENT)
Dept: INTERNAL MEDICINE CLINIC | Age: 87
End: 2022-09-07

## 2022-09-07 NOTE — TELEPHONE ENCOUNTER
Elsi Moulton with home care contacted office stating that patient just had back surgery and is having trouble bending down. They would like to know if they can get a script for a reacher. It can be faxed to [phone] or [phone]    Or you may contact Elsi Moulton at [phone] if needed.

## 2022-09-12 ENCOUNTER — TELEPHONE (OUTPATIENT)
Dept: INTERNAL MEDICINE CLINIC | Age: 87
End: 2022-09-12

## 2022-09-13 NOTE — TELEPHONE ENCOUNTER
Rec'vd a follow up call from Amina Soria with the [facility] regarding script for reacher . Please contact mAina Soria at [phone].  Patient was advised of provider's recommendation regarding Lasix with verbal understanding expressed

## 2022-09-16 ENCOUNTER — TELEPHONE (OUTPATIENT)
Dept: INTERNAL MEDICINE CLINIC | Age: 87
End: 2022-09-16

## 2022-09-16 NOTE — TELEPHONE ENCOUNTER
Pt called and states her leg is still seeping fluids. Please advise what you would like the pt to do?

## 2022-09-16 NOTE — TELEPHONE ENCOUNTER
Patient needs to be seen in follow-up  I can see her Monday morning. Meanwhile advise patient to use compression stockings and keep leg elevated.   Continue Lasix 20 mg daily

## 2022-09-19 ENCOUNTER — TELEPHONE (OUTPATIENT)
Dept: INTERNAL MEDICINE CLINIC | Age: 87
End: 2022-09-19

## 2022-09-19 NOTE — TELEPHONE ENCOUNTER
Patient called earlier stating that her leg is still swelling and seeping, has wrapped towels around her calf and did take 2 lasix on T,W,TH. Spoke with Doctor Daisha Brown and he instructed to have patient keep legs elevated and continue 2 lasix daily. Appt make for tomorrow.

## 2022-09-22 ENCOUNTER — OFFICE VISIT (OUTPATIENT)
Dept: INTERNAL MEDICINE CLINIC | Age: 87
End: 2022-09-22
Payer: MEDICARE

## 2022-09-22 VITALS
DIASTOLIC BLOOD PRESSURE: 64 MMHG | BODY MASS INDEX: 36.03 KG/M2 | RESPIRATION RATE: 16 BRPM | HEART RATE: 68 BPM | WEIGHT: 210 LBS | SYSTOLIC BLOOD PRESSURE: 120 MMHG | TEMPERATURE: 97 F | OXYGEN SATURATION: 94 %

## 2022-09-22 DIAGNOSIS — I25.10 CORONARY ARTERY DISEASE INVOLVING NATIVE CORONARY ARTERY OF NATIVE HEART WITHOUT ANGINA PECTORIS: ICD-10-CM

## 2022-09-22 DIAGNOSIS — M48.062 LUMBAR STENOSIS WITH NEUROGENIC CLAUDICATION: ICD-10-CM

## 2022-09-22 DIAGNOSIS — E11.9 TYPE 2 DIABETES MELLITUS WITHOUT COMPLICATION, WITHOUT LONG-TERM CURRENT USE OF INSULIN (HCC): ICD-10-CM

## 2022-09-22 DIAGNOSIS — D64.9 ANEMIA, UNSPECIFIED TYPE: ICD-10-CM

## 2022-09-22 DIAGNOSIS — J45.41 MODERATE PERSISTENT ASTHMA WITH EXACERBATION: ICD-10-CM

## 2022-09-22 DIAGNOSIS — I10 ESSENTIAL HYPERTENSION: ICD-10-CM

## 2022-09-22 DIAGNOSIS — M05.79 RHEUMATOID ARTHRITIS INVOLVING MULTIPLE SITES WITH POSITIVE RHEUMATOID FACTOR (HCC): ICD-10-CM

## 2022-09-22 DIAGNOSIS — E78.2 MIXED HYPERLIPIDEMIA: ICD-10-CM

## 2022-09-22 DIAGNOSIS — R25.2 LEG CRAMPS: Primary | ICD-10-CM

## 2022-09-22 PROBLEM — K59.1 FUNCTIONAL DIARRHEA: Status: RESOLVED | Noted: 2019-07-30 | Resolved: 2022-09-22

## 2022-09-22 PROCEDURE — G8399 PT W/DXA RESULTS DOCUMENT: HCPCS | Performed by: INTERNAL MEDICINE

## 2022-09-22 PROCEDURE — 1036F TOBACCO NON-USER: CPT | Performed by: INTERNAL MEDICINE

## 2022-09-22 PROCEDURE — 99214 OFFICE O/P EST MOD 30 MIN: CPT | Performed by: INTERNAL MEDICINE

## 2022-09-22 PROCEDURE — 1090F PRES/ABSN URINE INCON ASSESS: CPT | Performed by: INTERNAL MEDICINE

## 2022-09-22 PROCEDURE — 3044F HG A1C LEVEL LT 7.0%: CPT | Performed by: INTERNAL MEDICINE

## 2022-09-22 PROCEDURE — G8427 DOCREV CUR MEDS BY ELIG CLIN: HCPCS | Performed by: INTERNAL MEDICINE

## 2022-09-22 PROCEDURE — 1124F ACP DISCUSS-NO DSCNMKR DOCD: CPT | Performed by: INTERNAL MEDICINE

## 2022-09-22 PROCEDURE — G8417 CALC BMI ABV UP PARAM F/U: HCPCS | Performed by: INTERNAL MEDICINE

## 2022-09-22 RX ORDER — ROPINIROLE 2 MG/1
TABLET, FILM COATED ORAL
Qty: 60 TABLET | Refills: 3 | Status: SHIPPED | OUTPATIENT
Start: 2022-09-22

## 2022-09-22 RX ORDER — METOPROLOL SUCCINATE 25 MG/1
25 TABLET, EXTENDED RELEASE ORAL DAILY
Qty: 90 TABLET | Refills: 1 | Status: SHIPPED | OUTPATIENT
Start: 2022-09-22

## 2022-09-22 NOTE — PROGRESS NOTES
Tracee Yang  Patient's  is 1935  Seen in office on 2022      SUBJECTIVE:  Antonia michel 81 y. o.year old female presents today   Chief Complaint   Patient presents with    Edema     Lower right ext,    Discuss Medications     Requip- patient wanting to take one tablet tid, still hurting     Patient had pedal edema. She took Lasix 40 mg for couple of days and she did see improvement. After stopping that she again had some edema and seepage of fluid from the leg but today she is feeling better no shortness of breath and no cough or sputum production    She is also complaining of pain in the legs. At night she is having a lot of leg cramps and is unable to sleep. Patient states she takes Requip in the morning and at 3 PM.    Patient has diabetes mellitus blood sugars are running well. No hypoglycemia    Patient has hypertension. Taking medications No headaches, no chest pain, no palpitations and no dizziness. Patient has hyperlipidemia. Taking medications. No abdominal pain, no nausea or vomiting. No myalgias. Patient has chronic lower back pain and had laminectomy done. Patient states she still has the pain going down the hips. She is seeing pain clinic and is also has seen Dr. Miesha Kinsey  Taking medications regularly. No side effects noted. Review of Systems  Review of system normal except as in HPI  OBJECTIVE: /64   Pulse 68   Temp 97 °F (36.1 °C) (Temporal)   Resp 16   Wt 210 lb (95.3 kg)   LMP  (LMP Unknown)   SpO2 94%   BMI 36.03 kg/m²     Wt Readings from Last 3 Encounters:   22 210 lb (95.3 kg)   08/15/22 207 lb 3.2 oz (94 kg)   22 203 lb 9.6 oz (92.4 kg)      Patient was seen taking COVID-19 precautions. Face mask, gloves were used. Patient also wore facemask. GENERAL: - Alert, oriented, pleasant, in no apparent distress. HEENT: - Conjunctiva pink, no scleral icterus. ENT clear. NECK: -Supple. No jugular venous distention noted.  No masses felt,  CARDIOVASCULAR: - Normal S1 and S2    PULMONARY: - No respiratory distress. No wheezes or rales. ABDOMEN: - Soft and non-tender,no masses  ororganomegaly. EXTREMITIES: - No cyanosis, clubbing, or significant edema. SKIN: Skin is warm and dry. NEUROLOGICAL: - Cranial nerves II through XII are grossly intact. Back mild tenderness in the lower back  Legs patient has trace pedal edema bilaterally especially in the ankles. The skin of the legs look normal.  No seepage of    IMPRESSION:    Encounter Diagnoses   Name Primary? Leg cramps Yes    Essential hypertension     Mixed hyperlipidemia     Type 2 diabetes mellitus without complication, without long-term current use of insulin (HCC)     Lumbar stenosis with neurogenic claudication     Rheumatoid arthritis involving multiple sites with positive rheumatoid factor (HCC)     CAD s/p MI, TPA in 1998     Moderate persistent asthma with exacerbation     Anemia, unspecified type        ASSESSMENT/PLAN:    Restless leg syndrome. Advised patient to take Requip 2 mg at 4:57 PM and the second dose at bedtime    For edema of the leg take Lasix 20 mg daily and she can take extra dose for couple of days if notices pedal edema        1. Leg cramps  -     Ferritin; Future  2. Essential hypertension  Overview:  Hypertension in control. Follow low salt diet. Continue current treatment. Continue losartan and metoprolol  Assessment & Plan:    Hypertension in control. Follow low salt diet. Continue current treatment. 3. Mixed hyperlipidemia  Overview:  Hyperlipidemia is stable. Follow low cholesterol diet. Continue current treatment. Patient is on crestor 10 mg daily   Assessment & Plan:    Hyperlipidemia is stable. Follow low cholesterol diet. Continue current treatment. Orders:  -     Lipid, Fasting; Future  -     Comprehensive Metabolic Panel; Future  4.  Type 2 diabetes mellitus without complication, without long-term current use of insulin [facility]  Overview:  Pt has DM  Did not tolerate metformin   Follow diet and exercise  On glipizide ER 5 mg daily       Assessment & Plan:    BS is good at home 89 today   hga1c 8.0 : cont same   5. Lumbar stenosis with neurogenic claudication  Overview:  Patient underwent laminectomy of the lumbar spine by Dr. Claudette Smith on 5/12/22  Pt continues to have pain in the lower back  Pain has gone down the hips  Pt is going to Pain clinic : Dr Yeimy Blake:    : Pt is still having pain. Advised pt to see Dr Claudette Smith again   6. Rheumatoid arthritis involving multiple sites with positive rheumatoid factor (HCC)  Overview:  Has arthritis of hands,left knee pain, sees Dr. Joy Marie  Patient is taking Plaquenil but only once a day     Assessment & Plan:    : patient is taking plaquanil  He is giving her shots for osteoporosis   7. CAD s/p MI, TPA in 1998  Overview:  MI, TPA,sees Dr. Wilfredo Burger  Patient is on aspirin, beta blocker and statins  Assessment & Plan:    Patient is stable. Continue current treatment. 8. Moderate persistent asthma with exacerbation  Overview:  Has asthma on albuterol inh prn   Assessment & Plan:    using albuterol inh prn   9. Anemia, unspecified type  -     Ferritin; Future    Orders Placed This Encounter   Procedures    Lipid, Fasting    Ferritin    Comprehensive Metabolic Panel     RTO in 1 month    Mediations reviewed with the patient. Continue current medications. Appropriate prescriptions are addressed. After visit summeryprovided. Follow up as directed sooner if needed. Questions answered and patient verbalizes understanding. Call for any problems, questions, or concerns.        No Known Allergies  Current Outpatient Medications   Medication Sig Dispense Refill    rOPINIRole (REQUIP) 2 MG tablet Take 1 tablet by mouth in the morning and at bedtime 60 tablet 0    metoprolol succinate (TOPROL XL) 25 MG extended release tablet Take 1 tablet by mouth daily 90 tablet 1    sennosides-docusate sodium (SENOKOT-S) 8.6-50 MG tablet Take 2 tablets by mouth 2 times daily as needed for Constipation 180 tablet 2    furosemide (LASIX) 20 MG tablet Take 1 tablet by mouth daily 90 tablet 1    albuterol sulfate HFA (PROVENTIL;VENTOLIN;PROAIR) 108 (90 Base) MCG/ACT inhaler Inhale 2 puffs into the lungs 4 times daily as needed for Wheezing 8.5 g 5    glipiZIDE (GLUCOTROL XL) 5 MG extended release tablet Take 1 tablet by mouth daily 90 tablet 1    losartan (COZAAR) 50 mg tablet Take 1 tablet by mouth 2 times daily 180 tablet 1    Coenzyme Q10 (COQ10) 200 MG CAPS Take 200 mg by mouth 2 times daily      clobetasol (TEMOVATE) 0.05 % cream Apply topically 2 times daily      rosuvastatin (CRESTOR) 10 MG tablet Take 1 tablet by mouth nightly 90 tablet 1    Multiple Vitamins-Minerals (PRESERVISION AREDS) TABS Take 1 tablet by mouth 2 times daily      hydroxychloroquine (PLAQUENIL) 200 MG tablet Take 200 mg by mouth daily       aspirin 81 MG EC tablet Take 81 mg by mouth daily  30 tablet     Cholecalciferol (VITAMIN D PO) Take 5,000 Units by mouth in the morning and at bedtime       terbinafine (LAMISIL) 250 MG tablet  (Patient not taking: No sig reported)       No current facility-administered medications for this visit. Past Medical History:   Diagnosis Date    Arthritis     left knee pain, sees Dr. Mills Brain    CAD (coronary artery disease)     sees Dr. Guillermina Le    Chronic midline low back pain without sciatica 9/28/2016    Had PT in 2016    Claustrophobia     Colonoscopy refused     discussed in 7/15    DM (diabetes mellitus), type 2 ([facility] 02/2006    Dupuytren's contracture of right hand     Endometrial stripe increased 9/25/2014    Saw NP Lieutenant Hernandez. Was normal exam per pt. Gastrointestinal hemorrhage 11/2/2015    Patient had GI bleeding. Colon refused. Discussed with patient in detail. Glaucoma 4/1/2014    H/O echocardiogram 10/02/2014    Mildly depressed left ventricular function; ejection fraction of 45%.  Moderate pulmonary hypertension. H/O echocardiogram 08/28/2018    EF 50-55%. Mitral annular calcification is present. No other significant valvulopathy seen. History of nuclear stress test 08/28/2018    EF 59%. ECG portion of stress test is negative for ischemia by diagnostic criteria. fixed inferior large size severe defect in rest and stress images. Mild hubert infarct ischemia.     HTN (hypertension)     Hyperlipidemia     Kidney stone     hx-\"been about 3 yrs ago\"    MI (myocardial infarction) ([facility] [address] 02/1998    treated with TPA    Obesity     Open wound of right foot 7/27/2020    Parainfluenza infection 12/5/2021    Pneumonia of right lower lobe due to infectious organism 4/5/2018    Vertigo     'have been having this since I had cataract surgery\"\"that is why they are doing the MRA of my brain(10/31/2014)    WD-Traumatic ulcer of foot, right, with fat layer exposed ([facility] 8/3/2020     Past Surgical History:   Procedure Laterality Date    CATARACT REMOVAL Bilateral     5/14,8/14    INGUINAL HERNIA REPAIR Left 45 yrs ago    [facility] N/A 5/12/2022    L1-4 LAMINECTOMIES performed by Brooklyn Acosta MD at [address] [place] [address] N/A 5/12/2022    L2 AND L3 BILATERAL BALLOON KYPHOPLASTY AND L2 AND L3 NEEDLE BIOPSY performed by Brooklyn Acosta MD at [address] [place] [address] History     Tobacco Use    Smoking status: Never    Smokeless tobacco: Never   Substance Use Topics    Alcohol use: Not Currently     Comment: OCCASSIONAL       LAB REVIEW:  CBC:   Lab Results   Component Value Date/Time    WBC 8.4 05/23/2022 07:52 AM    HGB 12.0 05/23/2022 07:52 AM    HCT 38.0 05/23/2022 07:52 AM     05/23/2022 07:52 AM     Lipids:   Lab Results   Component Value Date    HDL 47 11/19/2021    LDLCALC 53 11/19/2021    LDLDIRECT 69 08/18/2017    TRIGLYCFAST 117 11/19/2021    CHOLFAST 123 11/19/2021     Renal:   Lab Results   Component Value Date/Time    BUN 17 05/31/2022 06:00 AM    CREATININE 0.7 05/31/2022 06:00 AM     05/31/2022 06:00 AM    K 4.5 05/31/2022 06:00 AM    K 3.0 03/08/2018 05:20 AM    ALT <5 12/30/2021 01:20 PM    AST 15 12/30/2021 01:20 PM    GLUCOSE 77 05/31/2022 06:00 AM    GLUF 211 12/30/2021 01:20 PM     PT/INR:   Lab Results   Component Value Date/Time    INR 1.01 05/04/2022 10:59 AM     A1C:   Lab Results   Component Value Date    LABA1C 6.0 08/15/2022           Rosana Mendosa MD, 9/22/2022 , 2:15 PM

## 2022-10-10 ENCOUNTER — TELEPHONE (OUTPATIENT)
Dept: INTERNAL MEDICINE CLINIC | Age: 87
End: 2022-10-10

## 2022-10-10 NOTE — TELEPHONE ENCOUNTER
Needs order for reacher for patient -  please sent to [employer] - fax [phone]  or fax [phone] phone #     Has contacted office several times requesting

## 2022-10-10 NOTE — TELEPHONE ENCOUNTER
Per Dr. Evan Smith he has spoken with patient about this, stated she does not need, patient stated that she will buy herself it is only 4 to 5 dollars    Guille Reed at [employer] and left message on her voicemail informing her

## 2022-10-13 ENCOUNTER — NURSE ONLY (OUTPATIENT)
Dept: INTERNAL MEDICINE CLINIC | Age: 87
End: 2022-10-13
Payer: MEDICARE

## 2022-10-13 DIAGNOSIS — E78.2 MIXED HYPERLIPIDEMIA: ICD-10-CM

## 2022-10-13 DIAGNOSIS — R25.2 LEG CRAMPS: ICD-10-CM

## 2022-10-13 DIAGNOSIS — D64.9 ANEMIA, UNSPECIFIED TYPE: ICD-10-CM

## 2022-10-13 LAB
A/G RATIO: 1.5 (ref 1.1–2.2)
ALBUMIN SERPL-MCNC: 4 G/DL (ref 3.4–5)
ALP BLD-CCNC: 107 U/L (ref 40–129)
ALT SERPL-CCNC: <5 U/L (ref 10–40)
ANION GAP SERPL CALCULATED.3IONS-SCNC: 13 MMOL/L (ref 3–16)
AST SERPL-CCNC: 12 U/L (ref 15–37)
BILIRUB SERPL-MCNC: 0.4 MG/DL (ref 0–1)
BUN BLDV-MCNC: 19 MG/DL (ref 7–20)
CALCIUM SERPL-MCNC: 10.3 MG/DL (ref 8.3–10.6)
CHLORIDE BLD-SCNC: 99 MMOL/L (ref 99–110)
CHOLESTEROL, FASTING: 154 MG/DL (ref 0–199)
CO2: 29 MMOL/L (ref 21–32)
CREAT SERPL-MCNC: 0.6 MG/DL (ref 0.6–1.2)
FERRITIN: 40.8 NG/ML (ref 15–150)
GFR AFRICAN AMERICAN: >60
GFR NON-AFRICAN AMERICAN: >60
GLUCOSE BLD-MCNC: 90 MG/DL (ref 70–99)
HDLC SERPL-MCNC: 63 MG/DL (ref 40–60)
LDL CHOLESTEROL CALCULATED: 68 MG/DL
POTASSIUM SERPL-SCNC: 4.6 MMOL/L (ref 3.5–5.1)
SODIUM BLD-SCNC: 141 MMOL/L (ref 136–145)
TOTAL PROTEIN: 6.7 G/DL (ref 6.4–8.2)
TRIGLYCERIDE, FASTING: 115 MG/DL (ref 0–150)
VLDLC SERPL CALC-MCNC: 23 MG/DL

## 2022-10-13 PROCEDURE — 36415 COLL VENOUS BLD VENIPUNCTURE: CPT | Performed by: INTERNAL MEDICINE

## 2022-10-21 ENCOUNTER — HOSPITAL ENCOUNTER (OUTPATIENT)
Dept: ULTRASOUND IMAGING | Age: 87
Discharge: HOME | End: 2022-10-21
Payer: MEDICARE

## 2022-10-21 ENCOUNTER — HOSPITAL ENCOUNTER (EMERGENCY)
Age: 87
Discharge: HOME | End: 2022-10-21
Payer: MEDICARE

## 2022-10-21 DIAGNOSIS — M79.89 PAIN AND SWELLING OF RIGHT LOWER LEG: Primary | ICD-10-CM

## 2022-10-21 DIAGNOSIS — M79.661 PAIN AND SWELLING OF RIGHT LOWER LEG: Primary | ICD-10-CM

## 2022-10-21 DIAGNOSIS — L03.115 CELLULITIS OF RIGHT LOWER EXTREMITY: ICD-10-CM

## 2022-10-21 DIAGNOSIS — M79.661 PAIN AND SWELLING OF RIGHT LOWER LEG: ICD-10-CM

## 2022-10-21 DIAGNOSIS — M79.89 PAIN AND SWELLING OF RIGHT LOWER LEG: ICD-10-CM

## 2022-10-21 LAB
BASE EXCESS MIXED: 1.7 (ref 0–2.3)
BASE EXCESS: ABNORMAL (ref 0–2.4)
CO2 CONTENT: 30.1 MMOL/L (ref 19–24)
HCO3 VENOUS: 28.5 MMOL/L (ref 19–25)
O2 SAT, VEN: 74.9 % (ref 50–70)
PCO2, VEN: 52.6 MMHG (ref 38–52)
PH VENOUS: 7.34 (ref 7.32–7.42)
PO2, VEN: 43 MMHG (ref 28–48)
SOURCE, BLOOD GAS: ABNORMAL

## 2022-10-21 PROCEDURE — 85007 BL SMEAR W/DIFF WBC COUNT: CPT

## 2022-10-21 PROCEDURE — 85730 THROMBOPLASTIN TIME PARTIAL: CPT

## 2022-10-21 PROCEDURE — 85610 PROTHROMBIN TIME: CPT

## 2022-10-21 PROCEDURE — 85379 FIBRIN DEGRADATION QUANT: CPT

## 2022-10-21 PROCEDURE — 99284 EMERGENCY DEPT VISIT MOD MDM: CPT

## 2022-10-21 PROCEDURE — 93971 EXTREMITY STUDY: CPT

## 2022-10-21 PROCEDURE — 80048 BASIC METABOLIC PNL TOTAL CA: CPT

## 2022-10-21 PROCEDURE — 85027 COMPLETE CBC AUTOMATED: CPT

## 2022-10-26 LAB
ANION GAP SERPL CALCULATED.3IONS-SCNC: 8 MMOL/L (ref 4–16)
ANISOCYTOSIS: ABNORMAL
APTT: 28.8 SECONDS (ref 25.1–37.1)
BANDED NEUTROPHILS ABSOLUTE COUNT: 0.38 K/CU MM
BANDED NEUTROPHILS RELATIVE PERCENT: 3 % (ref 5–11)
BUN BLDV-MCNC: 22 MG/DL (ref 6–23)
CALCIUM SERPL-MCNC: 9.1 MG/DL (ref 8.3–10.6)
CHLORIDE BLD-SCNC: 105 MMOL/L (ref 99–110)
CO2: 28 MMOL/L (ref 21–32)
CREAT SERPL-MCNC: 0.6 MG/DL (ref 0.6–1.1)
D DIMER: 907 NG/ML(DDU)
DIFFERENTIAL TYPE: ABNORMAL
EOSINOPHILS ABSOLUTE: 0.3 K/CU MM
EOSINOPHILS RELATIVE PERCENT: 2 % (ref 0–3)
GFR SERPL CREATININE-BSD FRML MDRD: >60 ML/MIN/1.73M2
GLUCOSE BLD-MCNC: 140 MG/DL (ref 70–99)
HCT VFR BLD CALC: 39.3 % (ref 37–47)
HEMOGLOBIN: 12.7 GM/DL (ref 12.5–16)
INR BLD: 0.97 INDEX
LYMPHOCYTES ABSOLUTE: 0.6 K/CU MM
LYMPHOCYTES RELATIVE PERCENT: 5 % (ref 24–44)
MCH RBC QN AUTO: 29.2 PG (ref 27–31)
MCHC RBC AUTO-ENTMCNC: 32.3 % (ref 32–36)
MCV RBC AUTO: 90.3 FL (ref 78–100)
MONOCYTES ABSOLUTE: 1.9 K/CU MM
MONOCYTES RELATIVE PERCENT: 15 % (ref 0–4)
PDW BLD-RTO: 16.2 % (ref 11.7–14.9)
PLATELET # BLD: 164 K/CU MM (ref 140–440)
PMV BLD AUTO: 9.1 FL (ref 7.5–11.1)
POTASSIUM SERPL-SCNC: 4.5 MMOL/L (ref 3.5–5.1)
PROTHROMBIN TIME: 11.7 SECONDS (ref 11.7–14.5)
RBC # BLD: 4.35 M/CU MM (ref 4.2–5.4)
SEGMENTED NEUTROPHILS ABSOLUTE COUNT: 9.4 K/CU MM
SEGMENTED NEUTROPHILS RELATIVE PERCENT: 75 % (ref 36–66)
SODIUM BLD-SCNC: 141 MMOL/L (ref 135–145)
STOMATOCYTES: ABNORMAL
WBC # BLD: 12.6 K/CU MM (ref 4–10.5)

## 2022-10-28 DIAGNOSIS — E78.2 MIXED HYPERLIPIDEMIA: ICD-10-CM

## 2022-10-31 RX ORDER — ROSUVASTATIN CALCIUM 10 MG/1
10 TABLET, COATED ORAL NIGHTLY
Qty: 90 TABLET | Refills: 1 | Status: SHIPPED | OUTPATIENT
Start: 2022-10-31

## 2022-11-14 ENCOUNTER — OFFICE VISIT (OUTPATIENT)
Dept: INTERNAL MEDICINE CLINIC | Age: 87
End: 2022-11-14
Payer: MEDICARE

## 2022-11-14 VITALS
WEIGHT: 214 LBS | BODY MASS INDEX: 36.54 KG/M2 | DIASTOLIC BLOOD PRESSURE: 74 MMHG | HEART RATE: 64 BPM | HEIGHT: 64 IN | SYSTOLIC BLOOD PRESSURE: 122 MMHG | OXYGEN SATURATION: 99 %

## 2022-11-14 DIAGNOSIS — E11.9 TYPE 2 DIABETES MELLITUS WITHOUT COMPLICATION, WITHOUT LONG-TERM CURRENT USE OF INSULIN (HCC): ICD-10-CM

## 2022-11-14 DIAGNOSIS — I10 ESSENTIAL HYPERTENSION: Primary | ICD-10-CM

## 2022-11-14 DIAGNOSIS — E78.2 MIXED HYPERLIPIDEMIA: ICD-10-CM

## 2022-11-14 DIAGNOSIS — S32.020G COMPRESSION FRACTURE OF L2 VERTEBRA WITH DELAYED HEALING: ICD-10-CM

## 2022-11-14 DIAGNOSIS — G25.81 RESTLESS LEG SYNDROME: ICD-10-CM

## 2022-11-14 DIAGNOSIS — I25.10 CORONARY ARTERY DISEASE INVOLVING NATIVE CORONARY ARTERY OF NATIVE HEART WITHOUT ANGINA PECTORIS: ICD-10-CM

## 2022-11-14 DIAGNOSIS — M05.79 RHEUMATOID ARTHRITIS INVOLVING MULTIPLE SITES WITH POSITIVE RHEUMATOID FACTOR (HCC): ICD-10-CM

## 2022-11-14 DIAGNOSIS — Z98.890 H/O LAMINECTOMY: ICD-10-CM

## 2022-11-14 DIAGNOSIS — E66.01 MORBID OBESITY WITH BMI OF 40.0-44.9, ADULT (HCC): ICD-10-CM

## 2022-11-14 DIAGNOSIS — M48.062 LUMBAR STENOSIS WITH NEUROGENIC CLAUDICATION: ICD-10-CM

## 2022-11-14 DIAGNOSIS — J45.41 MODERATE PERSISTENT ASTHMA WITH EXACERBATION: ICD-10-CM

## 2022-11-14 PROCEDURE — G8427 DOCREV CUR MEDS BY ELIG CLIN: HCPCS | Performed by: INTERNAL MEDICINE

## 2022-11-14 PROCEDURE — 1036F TOBACCO NON-USER: CPT | Performed by: INTERNAL MEDICINE

## 2022-11-14 PROCEDURE — G8417 CALC BMI ABV UP PARAM F/U: HCPCS | Performed by: INTERNAL MEDICINE

## 2022-11-14 PROCEDURE — 3074F SYST BP LT 130 MM HG: CPT | Performed by: INTERNAL MEDICINE

## 2022-11-14 PROCEDURE — 99214 OFFICE O/P EST MOD 30 MIN: CPT | Performed by: INTERNAL MEDICINE

## 2022-11-14 PROCEDURE — 3044F HG A1C LEVEL LT 7.0%: CPT | Performed by: INTERNAL MEDICINE

## 2022-11-14 PROCEDURE — G8484 FLU IMMUNIZE NO ADMIN: HCPCS | Performed by: INTERNAL MEDICINE

## 2022-11-14 PROCEDURE — 1090F PRES/ABSN URINE INCON ASSESS: CPT | Performed by: INTERNAL MEDICINE

## 2022-11-14 PROCEDURE — 1124F ACP DISCUSS-NO DSCNMKR DOCD: CPT | Performed by: INTERNAL MEDICINE

## 2022-11-14 PROCEDURE — G8399 PT W/DXA RESULTS DOCUMENT: HCPCS | Performed by: INTERNAL MEDICINE

## 2022-11-14 PROCEDURE — 3078F DIAST BP <80 MM HG: CPT | Performed by: INTERNAL MEDICINE

## 2022-11-14 RX ORDER — LOSARTAN POTASSIUM 50 MG/1
50 TABLET ORAL 2 TIMES DAILY
Qty: 180 TABLET | Refills: 1 | Status: SHIPPED | OUTPATIENT
Start: 2022-11-14

## 2022-11-14 RX ORDER — GLIPIZIDE 5 MG/1
5 TABLET, FILM COATED, EXTENDED RELEASE ORAL DAILY
Qty: 90 TABLET | Refills: 1 | Status: SHIPPED | OUTPATIENT
Start: 2022-11-14

## 2022-11-14 RX ORDER — ROPINIROLE 2 MG/1
TABLET, FILM COATED ORAL
Qty: 60 TABLET | Refills: 3 | Status: SHIPPED | OUTPATIENT
Start: 2022-11-14

## 2022-11-14 RX ORDER — ALBUTEROL SULFATE 90 UG/1
2 AEROSOL, METERED RESPIRATORY (INHALATION) 4 TIMES DAILY PRN
Qty: 8.5 G | Refills: 5 | Status: SHIPPED | OUTPATIENT
Start: 2022-11-14

## 2022-11-14 RX ORDER — FUROSEMIDE 20 MG/1
20 TABLET ORAL DAILY
Qty: 90 TABLET | Refills: 1 | Status: SHIPPED | OUTPATIENT
Start: 2022-11-14

## 2022-11-14 RX ORDER — GABAPENTIN 100 MG/1
100 CAPSULE ORAL 3 TIMES DAILY
COMMUNITY
Start: 2022-11-11 | End: 2022-12-11

## 2022-11-14 NOTE — PROGRESS NOTES
Davis Estes  Patient's  is 1935  Seen in office on 2022      SUBJECTIVE:  Candelaria michel 81 y. o.year old female presents today   Chief Complaint   Patient presents with    3 Month Follow-Up     Pt. Also would like to get a diff. Inhaler prescription, albuterol not helping much. Here for follow-up of hypertension, hyperlipidemia, diabetes, asthma  Patient has asthma and patient states albuterol inhaler is not helping fully. She is using that about 4 times a day. There is no wheezing involved. No cough or sputum production. She does get shortness of breath. There is no pedal edema. Patient has hypertension. Taking medications No headaches, no chest pain, no palpitations and no dizziness. Patient has hyperlipidemia. Taking medications. No abdominal pain, no nausea or vomiting. No myalgias. Patient has chronic back pain and sees pain clinic. She goes to Dr. Yasmeen Fields and his nurse practitioner has seen the patient. She started her on gabapentin 100 mg 3 times a day. Patient states it has not helped her fully yet. Taking medications regularly. No side effects noted. Review of Systems    Review of system is normal except as in HPI    OBJECTIVE: /74   Pulse 64   Ht 5' 4.02\" (1.626 m)   Wt 214 lb (97.1 kg)   LMP  (LMP Unknown)   SpO2 99%   BMI 36.71 kg/m²     Wt Readings from Last 3 Encounters:   22 214 lb (97.1 kg)   22 210 lb (95.3 kg)   08/15/22 207 lb 3.2 oz (94 kg)      GENERAL: - Alert, oriented, pleasant, in no apparent distress. HEENT: - Conjunctiva pink, no scleral icterus. ENT clear. NECK: -Supple. No jugular venous distention noted. No masses felt,  CARDIOVASCULAR: - Normal S1 and S2    PULMONARY: - No respiratory distress. No wheezes or rales. ABDOMEN: - Soft and non-tender,no masses  ororganomegaly. EXTREMITIES: - No cyanosis, clubbing, or significant edema. SKIN: Skin is warm and dry.    NEUROLOGICAL: - Cranial nerves II through XII are grossly intact. IMPRESSION:    Encounter Diagnoses   Name Primary? Essential hypertension Yes    Mixed hyperlipidemia     Type 2 diabetes mellitus without complication, without long-term current use of insulin ([facility])     Lumbar stenosis with neurogenic claudication     H/O laminectomy     Rheumatoid arthritis involving multiple sites with positive rheumatoid factor ([facility])     CAD s/p MI, TPA in 1998     Restless leg syndrome     Morbid obesity with BMI of 40.0-44.9, adult ([facility])     Moderate persistent asthma with exacerbation     Compression fracture of L2 vertebra with delayed healing        ASSESSMENT/PLAN:    1. Essential hypertension  Overview:  Hypertension in control. Follow low salt diet. Continue current treatment. Continue losartan and metoprolol  2. Mixed hyperlipidemia  Overview:  Hyperlipidemia is stable. Follow low cholesterol diet. Continue current treatment. Patient is on crestor 10 mg daily   Lipid profile good in 10/22  3. Type 2 diabetes mellitus without complication, without long-term current use of insulin ([facility])  Overview:  Pt has DM  Did not tolerate metformin   Follow diet and exercise  On glipizide ER 5 mg daily   Hga1c is 6.0      4. Lumbar stenosis with neurogenic claudication  Overview:  Patient underwent laminectomy of the lumbar spine by Dr. Stephanie Alatorre on 5/12/22  Pt continues to have pain in the lower back  Pain has gone down the hips  Pt is going to Pain clinic : Dr Hi Marcos  Pt is gabapentin 100 mg tid      5. H/O laminectomy  Overview:  Pt has laminectomy and kyphoplasty on 5/12/22  Slowing improving. 6. Rheumatoid arthritis involving multiple sites with positive rheumatoid factor ([facility])  Overview:  Has arthritis of hands,left knee pain, sees Dr. Srikanth العلي  Patient is taking Plaquenil but only once a day     7. CAD s/p MI, TPA in 1998  Overview:  MI, TPA,sees Dr. Fely Prescott  Patient is on aspirin, beta blocker and statins  8.  Restless leg syndrome  Overview:  Takes Requip 2 mg twice a day 9. Morbid obesity with BMI of 40.0-44.9, adult ([address]  Lose weight   10. Moderate persistent asthma with exacerbation  Overview:  Has asthma on albuterol inh prn   Not controlled wth albuterol   Using 4 times a day  Will add Breo 100/25 once a day       11. Compression fracture of L2 vertebra with delayed healing  Overview:  Referred to neurosurgeon Dr Jon Uriarte. Has appointment 3.11.22  Pt is seeing pain clinic and is on [address] [facility] [facility]  L2 and L3 compression fracture and spina lstenosis  Pt is going to have surgey in 5/22  Patient underwent kyphoplasty    Return to office in 3 months. Mediations reviewed with the patient. Continue current medications. Appropriate prescriptions are addressed. After visit summeryprovided. Follow up as directed sooner if needed. Questions answered and patient verbalizes understanding. Call for any problems, questions, or concerns. No Known Allergies  Current Outpatient Medications   Medication Sig Dispense Refill    gabapentin (NEURONTIN) 100 MG capsule Take 100 mg by mouth 3 times daily.       rosuvastatin (CRESTOR) 10 MG tablet TAKE 1 TABLET BY MOUTH NIGHTLY 90 tablet 1    metoprolol succinate (TOPROL XL) 25 MG extended release tablet Take 1 tablet by mouth daily 90 tablet 1    rOPINIRole (REQUIP) 2 MG tablet Take 2 mg at 3 pm and at 10 pm 60 tablet 3    sennosides-docusate sodium (SENOKOT-S) 8.6-50 MG tablet Take 2 tablets by mouth 2 times daily as needed for Constipation 180 tablet 2    furosemide (LASIX) 20 MG tablet Take 1 tablet by mouth daily 90 tablet 1    albuterol sulfate HFA (PROVENTIL;VENTOLIN;PROAIR) 108 (90 Base) MCG/ACT inhaler Inhale 2 puffs into the lungs 4 times daily as needed for Wheezing 8.5 g 5    glipiZIDE (GLUCOTROL XL) 5 MG extended release tablet Take 1 tablet by mouth daily 90 tablet 1    losartan (COZAAR) 50 mg tablet Take 1 tablet by mouth 2 times daily 180 tablet 1    Coenzyme Q10 (COQ10) 200 MG CAPS Take 200 mg by mouth 2 times daily      clobetasol (TEMOVATE) 0.05 % cream Apply topically 2 times daily      Multiple Vitamins-Minerals (PRESERVISION AREDS) TABS Take 1 tablet by mouth 2 times daily      hydroxychloroquine (PLAQUENIL) 200 MG tablet Take 200 mg by mouth daily       aspirin 81 MG EC tablet Take 81 mg by mouth daily  30 tablet     Cholecalciferol (VITAMIN D PO) Take 5,000 Units by mouth in the morning and at bedtime       terbinafine (LAMISIL) 250 MG tablet  (Patient not taking: No sig reported)       No current facility-administered medications for this visit. Past Medical History:   Diagnosis Date    Arthritis     left knee pain, sees Dr. Kristin Resendiz    CAD (coronary artery disease)     sees Dr. Smiley Kong    Chronic midline low back pain without sciatica 9/28/2016    Had PT in 2016    Claustrophobia     Colonoscopy refused     discussed in 7/15    DM (diabetes mellitus), type 2 ([facility] 02/2006    Dupuytren's contracture of right hand     Endometrial stripe increased 9/25/2014    Saw NP Pete Lewis. Was normal exam per pt. Gastrointestinal hemorrhage 11/2/2015    Patient had GI bleeding. Colon refused. Discussed with patient in detail. Glaucoma 4/1/2014    H/O echocardiogram 10/02/2014    Mildly depressed left ventricular function; ejection fraction of 45%. Moderate pulmonary hypertension. H/O echocardiogram 08/28/2018    EF 50-55%. Mitral annular calcification is present. No other significant valvulopathy seen. History of nuclear stress test 08/28/2018    EF 59%. ECG portion of stress test is negative for ischemia by diagnostic criteria. fixed inferior large size severe defect in rest and stress images. Mild hubert infarct ischemia.     HTN (hypertension)     Hyperlipidemia     Kidney stone     hx-\"been about 3 yrs ago\"    MI (myocardial infarction) ([facility] 02/1998    treated with TPA    Obesity     Open wound of right foot 7/27/2020    Parainfluenza infection 12/5/2021    Pneumonia of right lower lobe due to infectious organism 4/5/2018 Vertigo     'have been having this since I had cataract surgery\"\"that is why they are doing the MRA of my brain(10/31/2014)    WD-Traumatic ulcer of foot, right, with fat layer exposed ([address] 8/3/2020     Past Surgical History:   Procedure Laterality Date    CATARACT REMOVAL Bilateral     5/14,8/14    INGUINAL HERNIA REPAIR Left 45 yrs ago    [address]  N/A 5/12/2022    L1-4 LAMINECTOMIES performed by Darryle Mccreedy, MD at [facility] N/A 5/12/2022    L2 AND L3 BILATERAL BALLOON KYPHOPLASTY AND L2 AND L3 NEEDLE BIOPSY performed by Darryle Mccreedy, MD at [address] [place] [address] History     Tobacco Use    Smoking status: Never    Smokeless tobacco: Never   Substance Use Topics    Alcohol use: Not Currently     Comment: OCCASSIONAL       LAB REVIEW:  CBC:   Lab Results   Component Value Date/Time    WBC 12.6 10/21/2022 01:36 AM    HGB 12.7 10/21/2022 01:36 AM    HCT 39.3 10/21/2022 01:36 AM     10/21/2022 01:36 AM     Lipids:   Lab Results   Component Value Date    HDL 63 (H) 10/13/2022    LDLCALC 68 10/13/2022    LDLDIRECT 69 08/18/2017    TRIGLYCFAST 115 10/13/2022    CHOLFAST 154 10/13/2022     Renal:   Lab Results   Component Value Date/Time    BUN 22 10/21/2022 01:36 AM    CREATININE 0.6 10/21/2022 01:36 AM     10/21/2022 01:36 AM    K 4.5 10/21/2022 01:36 AM    K 3.0 03/08/2018 05:20 AM    ALT <5 10/13/2022 08:45 AM    AST 12 10/13/2022 08:45 AM    GLUCOSE 140 10/21/2022 01:36 AM    GLUF 211 12/30/2021 01:20 PM     PT/INR:   Lab Results   Component Value Date/Time    INR 0.97 10/21/2022 01:36 AM     A1C:   Lab Results   Component Value Date    LABA1C 6.0 08/15/2022           Rolando Cushing, MD, 11/14/2022 , 2:09 PM

## 2022-11-26 NOTE — ED PROVIDER NOTES
CHIEF COMPLAINT    No chief complaint on file. HPI  Tano Dougherty is a 81 y.o. female who presents to the ED with complaints of right leg swelling redness and pain. Pain constant. Rated 6/10. Does not radiate. Exacerbated with movement and palpation. Nothing makes better. Denies fevers. REVIEW OF SYSTEMS  Constitutional: No fever, chills  Eye: No visual changes  HENT: No earache or sore throat. Resp: No SOB or productive cough. Cardio: No chest pain or palpitations. GI: No abdominal pain, nausea, vomiting, constipation or diarrhea. No melena. : No dysuria, urgency or frequency. Endocrine: No heat intolerance, no cold intolerance  Lymphatics: No adenopathy  Musculoskeletal: Complains of right lower leg pain  Neuro: No headaches. Psych: No homicidal or suicidal thoughts  Skin: No rash, No itching. ?  ? PAST MEDICAL HISTORY  Past Medical History:   Diagnosis Date    Arthritis     left knee pain, sees Dr. Motta Session    CAD (coronary artery disease)     sees Dr. Kendra Blackmon    Chronic midline low back pain without sciatica 9/28/2016    Had PT in 2016    Claustrophobia     Colonoscopy refused     discussed in 7/15    DM (diabetes mellitus), type 2 ([facility] 02/2006    Dupuytren's contracture of right hand     Endometrial stripe increased 9/25/2014    Saw NP Kirill Acevedo. Was normal exam per pt. Gastrointestinal hemorrhage 11/2/2015    Patient had GI bleeding. Colon refused. Discussed with patient in detail. Glaucoma 4/1/2014    H/O echocardiogram 10/02/2014    Mildly depressed left ventricular function; ejection fraction of 45%. Moderate pulmonary hypertension. H/O echocardiogram 08/28/2018    EF 50-55%. Mitral annular calcification is present. No other significant valvulopathy seen. History of nuclear stress test 08/28/2018    EF 59%. ECG portion of stress test is negative for ischemia by diagnostic criteria. fixed inferior large size severe defect in rest and stress images.   Mild hubert infarct ischemia.     HTN (hypertension)     Hyperlipidemia     Kidney stone     hx-\"been about 3 yrs ago\"    MI (myocardial infarction) ([facility] 02/1998    treated with TPA    Obesity     Open wound of right foot 7/27/2020    Parainfluenza infection 12/5/2021    Pneumonia of right lower lobe due to infectious organism 4/5/2018    Vertigo     'have been having this since I had cataract surgery\"\"that is why they are doing the MRA of my brain(10/31/2014)    WD-Traumatic ulcer of foot, right, with fat layer exposed ([facility] 8/3/2020     FAMILY HISTORY  Family History   Problem Relation Age of Onset    Parkinsonism Mother     Heart Attack Father     Heart Disease Father     Stroke Sister     Cancer Sister         breast ca    High Blood Pressure Brother     High Blood Pressure Brother     Cancer Brother         \"stomach cancer\"    Colon Cancer Neg Hx     Stomach Cancer Neg Hx      SOCIAL HISTORY  Social History     Socioeconomic History    Marital status: Single   Tobacco Use    Smoking status: Never    Smokeless tobacco: Never   Vaping Use    Vaping Use: Never used   Substance and Sexual Activity    Alcohol use: Not Currently     Comment: OCCASSIONAL    Drug use: Never    Sexual activity: Not Currently     Partners: Male     Social Determinants of Health     Financial Resource Strain: Low Risk     Difficulty of Paying Living Expenses: Not hard at all   Food Insecurity: No Food Insecurity    Worried About Running Out of Food in the Last Year: Never true    Ran Out of Food in the Last Year: Never true       SURGICAL HISTORY  Past Surgical History:   Procedure Laterality Date    CATARACT REMOVAL Bilateral     5/14,8/14    INGUINAL HERNIA REPAIR Left 45 yrs ago    LUMBAR SPINE SURGERY N/A 5/12/2022    L1-4 LAMINECTOMIES performed by Abram Epstein MD at [address] [place] [address] N/A 5/12/2022    L2 AND L3 BILATERAL BALLOON KYPHOPLASTY AND L2 AND L3 NEEDLE BIOPSY performed by Abram Epstein MD at [address] [place] [address]  Discharge Medication List as of 10/21/2022  3:57 AM        CONTINUE these medications which have NOT CHANGED    Details   metoprolol succinate (TOPROL XL) 25 MG extended release tablet Take 1 tablet by mouth daily, Disp-90 tablet, R-1Normal      rOPINIRole (REQUIP) 2 MG tablet Take 2 mg at 3 pm and at 10 pm, Disp-60 tablet, R-3Normal      terbinafine (LAMISIL) 250 MG tablet Historical Med      sennosides-docusate sodium (SENOKOT-S) 8.6-50 MG tablet Take 2 tablets by mouth 2 times daily as needed for Constipation, Disp-180 tablet, R-2Normal      Coenzyme Q10 (COQ10) 200 MG CAPS Take 200 mg by mouth 2 times dailyHistorical Med      furosemide (LASIX) 20 MG tablet Take 1 tablet by mouth daily, Disp-90 tablet, R-1Normal      albuterol sulfate HFA (PROVENTIL;VENTOLIN;PROAIR) 108 (90 Base) MCG/ACT inhaler Inhale 2 puffs into the lungs 4 times daily as needed for Wheezing, Disp-8.5 g, R-5Normal      glipiZIDE (GLUCOTROL XL) 5 MG extended release tablet Take 1 tablet by mouth daily, Disp-90 tablet, R-1Normal      losartan (COZAAR) 50 mg tablet Take 1 tablet by mouth 2 times daily, Disp-180 tablet, R-1Normal      clobetasol (TEMOVATE) 0.05 % cream Apply topically 2 times daily, Topical, 2 TIMES DAILY, Historical Med      rosuvastatin (CRESTOR) 10 MG tablet Take 1 tablet by mouth nightly, Disp-90 tablet, R-1Normal      Multiple Vitamins-Minerals (PRESERVISION AREDS) TABS Take 1 tablet by mouth 2 times dailyHistorical Med      hydroxychloroquine (PLAQUENIL) 200 MG tablet Take 200 mg by mouth daily Historical Med      aspirin 81 MG EC tablet Take 81 mg by mouth daily , Disp-30 tabletHistorical Med      Cholecalciferol (VITAMIN D PO) Take 5,000 Units by mouth in the morning and at bedtime Historical Med           ALLERGIES  No Known Allergies    Nursing notes reviewed by myself for past medical history, family history, social history, surgical history, current medications, and allergies.     PHYSICAL EXAM  VITAL SIGNS: Triage VS:    ED Triage Vitals   Enc Vitals Group      BP       Pulse       Resp       Temp       Temp src       SpO2       Weight       Height       Head Circumference       Peak Flow       Pain Score       Pain Loc       Pain Edu? Excl. in [address]? Constitutional: Well developed, Well nourished, nontoxic appearing  HENT: Normocephalic, Atraumatic, Bilateral external ears normal, Nose normal.   Eyes: Conjunctiva normal, No discharge. No scleral icterus. Neck: Normal range of motion, No tenderness, Supple. Lymphatic: No lymphadenopathy noted. Cardiovascular: Normal heart rate  Thorax & Lungs:No respiratory distress  Skin: Warm, Dry, Pink  Extremities: 1+ edema to right lower extremity with macular erythema that is tender to palpation, no palpable cord, right lower extremity neurovascularly intact with 2+ popliteal, dorsalis pedis, and posterior tibial pulses no cyanosis, Normal perfusion, No clubbing. Musculoskeletal: Good range of motion in all major joints as observed. No major deformities noted. Neurologic: Alert & oriented x 3, No focal deficits noted.    Psychiatric: Affect normal, Judgment normal, Mood normal.     RADIOLOGY  Labs Reviewed   BASIC METABOLIC PANEL W/ REFLEX TO MG FOR LOW K - Abnormal; Notable for the following components:       Result Value    Glucose 140 (*)     All other components within normal limits   CBC WITH AUTO DIFFERENTIAL - Abnormal; Notable for the following components:    WBC 12.6 (*)     RDW 16.2 (*)     Segs Relative 75.0 (*)     Bands Relative 3 (*)     Lymphocytes % 5.0 (*)     Monocytes % 15.0 (*)     All other components within normal limits   D-DIMER, QUANTITATIVE - Abnormal; Notable for the following components:    D-Dimer, Quant 907 (*)     All other components within normal limits   POCT VENOUS - Abnormal; Notable for the following components:    pCO2, Mitch 52.6 (*)     HCO3, Venous 28.5 (*)     O2 Sat, Mitch 74.9 (*)     CO2 Content 30.1 (*)     All other components within normal limits   PROTIME/INR & PTT     I personally reviewed the images. The radiologist's interpretation reveals:  Last Imaging results   No orders to display       MEDS GIVEN IN ED:  Medications - No data to display  [facility]  81year-old female presents with pain and swelling to right lower extremity. She is nontoxic-appearing on exam without tachycardia or fever. She has 1+ edema with macular erythema to right lower extremity that is tender to palpation. Right lower extremity is neurovascularly intact. Ultrasound not available overnight. D-dimer was found to be elevated. Does have mild leukocytosis as well. Presentation is most consistent with cellulitis but outpatient ultrasound ordered for the patient. Dose of Lovenox provided to her as well. Started on antibiotic therapy. Instructed to report to hospital tomorrow for ultrasound imaging and follow-up with primary care provider by calling office tomorrow. Return precautions provided. Amount and/or Complexity of Data Reviewed  Clinical lab tests: reviewed  Decide to obtain previous medical records or to obtain history from someone other than the patient: yes       -  Patient seen and evaluated in the emergency department. -  Triage and nursing notes reviewed and incorporated. -  Old chart records reviewed and incorporated. -  Work-up included:  See above  -  Results discussed with patient. Appropriate PPE utilized as indicated for entire patient encounter? Time of Disposition: See timeline  ? Discharge Medication List as of 10/21/2022  3:57 AM        FINAL IMPRESSION  1. Pain and swelling of right lower leg    2. Cellulitis of right lower extremity        Electronically signed by:  Della Shetty DO, 11/26/2022         Della Shetty DO  11/26/22 [id number]

## 2023-01-03 ENCOUNTER — TELEPHONE (OUTPATIENT)
Dept: INTERNAL MEDICINE CLINIC | Age: 88
End: 2023-01-03

## 2023-01-03 NOTE — TELEPHONE ENCOUNTER
Patient called on 12/31/2022 and she stated she was positive for COVID infection that day  She had symptoms for more than 1 week of cough and congestion  Patient was not a candidate for paxlovid as more than 5 days have passed with symptoms   Jarred Patterson was called    Patient called today that she is having diarrhea with the antibiotics. Discontinue Omnicef  If she still has the symptoms of cough and congestion will call different antibiotic.

## 2023-01-03 NOTE — TELEPHONE ENCOUNTER
Received call from pt and she states she was placed on an Atb by you on Saturday and it is causing her severe diarrhea. Pt is asking if you could please either change the medication or if she needs to stop the medication. No new medications placed in chart.

## 2023-01-05 ENCOUNTER — TELEMEDICINE (OUTPATIENT)
Dept: INTERNAL MEDICINE CLINIC | Age: 88
End: 2023-01-05
Payer: MEDICARE

## 2023-01-05 ENCOUNTER — TELEPHONE (OUTPATIENT)
Dept: INTERNAL MEDICINE CLINIC | Age: 88
End: 2023-01-05

## 2023-01-05 DIAGNOSIS — R19.7 DIARRHEA, UNSPECIFIED TYPE: ICD-10-CM

## 2023-01-05 DIAGNOSIS — U07.1 COVID-19 VIRUS INFECTION: ICD-10-CM

## 2023-01-05 PROCEDURE — 99442 PR PHYS/QHP TELEPHONE EVALUATION 11-20 MIN: CPT | Performed by: INTERNAL MEDICINE

## 2023-01-05 NOTE — TELEPHONE ENCOUNTER
Patient called stated that she is not doing any better with medication- please advise to what she needs to do.  Patient stated she is still having severe diarrhea and bleeding due to this, patient also stated that her blood pressure is fluctuating and she feels miserable

## 2023-01-05 NOTE — PROGRESS NOTES
Harley Clemens is a 81 y.o. female evaluated via telephone on 1/5/2023 for Diarrhea (Since taking medicine that was prescribed last Saturday for positive covid.)  . Documentation:  I communicated with the patient and/or health care decision maker about     Diarrhea  COVID infection  . Details of this discussion including any medical advice provided:     Patient tested positive for COVID infection on 12/31/2022 and was started on Omnicef as she was having bronchitis. She was out of window for paxlovid  Patient called on 1/3/2023 that she is having diarrhea and was advised at that time to stop taking Omnicef. Nurse Sonia Woo tried to call patient but there was no answer and she could not leave any message on voicemail. Today patient states her diarrhea has improved but she is still taking Omnicef. She had to bowel movements today before that yesterday and day before she was having loose bowel movements and rectum got sore and there was blood when she wiped. She thinks it was due to irritation. There was no blood in the stools. Patient states her cough and congestion has improved a lot  No abdominal pain. No nausea or vomiting. No fever or chills no shortness of breath. Assessment:  1. Diarrhea could be due to antibiotic associated versus C. Difficile  2. Patient history of diarrhea has improved  3. Recent COVID-19 infection symptoms have improved  4. Advised patient to stop taking Omnicef  5. Increase fluid intake  6. If diarrhea persist or increase needs C. difficile toxin tested. Patient will call if not improved. 7.  Patient stated overall she is feeling better today    Encounter Diagnoses   Name Primary? Diarrhea, unspecified type     COVID-19 virus infection          Total Time: minutes: 11-20 minutes    Harley Clemens was evaluated through a synchronous (real-time) audio encounter. Patient identification was verified at the start of the visit.  She (or guardian if applicable) is aware that this is a billable service, which includes applicable co-pays. This visit was conducted with the patient's (and/or legal guardian's) verbal consent. She has not had a related appointment within my department in the past 7 days or scheduled within the next 24 hours. The patient was located at Home: [place] [address]. The provider was located at [facility] (Appt Dept): [address] [place] [address]. [address] [place] [address],  [address] [place] [address].     Note: not billable if this call serves to triage the patient into an appointment for the relevant concern    Frederic Hinton MD

## 2023-01-24 ENCOUNTER — TELEPHONE (OUTPATIENT)
Dept: INTERNAL MEDICINE CLINIC | Age: 88
End: 2023-01-24

## 2023-01-24 NOTE — TELEPHONE ENCOUNTER
Patient states that she is almost out of her Ropinirole, but she is not due to have the medicine refilled until the end of the month. Patient states that she has had to take more than needed since the weather has changed. Patient is asking for Dr. Elias Hope to send in a few extra tablets to get her through until she is eligible for her next refill. Patient uses the [address] [place] [address] [employer].

## 2023-01-26 RX ORDER — ROPINIROLE 2 MG/1
TABLET, FILM COATED ORAL
Qty: 60 TABLET | Refills: 3 | Status: SHIPPED | OUTPATIENT
Start: 2023-01-26

## 2023-02-07 LAB
ALBUMIN SERPL-MCNC: 4 G/DL
ALP BLD-CCNC: 68 U/L
ALT SERPL-CCNC: 12 U/L
ANION GAP SERPL CALCULATED.3IONS-SCNC: 11 MMOL/L
AST SERPL-CCNC: 24 U/L
BASOPHILS ABSOLUTE: NORMAL
BASOPHILS RELATIVE PERCENT: NORMAL
BILIRUB SERPL-MCNC: 0.4 MG/DL (ref 0.1–1.4)
BUN BLDV-MCNC: 14 MG/DL
C-REACTIVE PROTEIN: 0.5
CALCIUM SERPL-MCNC: 9.4 MG/DL
CHLORIDE BLD-SCNC: 99 MMOL/L
CO2: 29 MMOL/L
CREAT SERPL-MCNC: 0.52 MG/DL
EGFR: 135.5
EOSINOPHILS ABSOLUTE: NORMAL
EOSINOPHILS RELATIVE PERCENT: NORMAL
GLUCOSE BLD-MCNC: 90 MG/DL
HCT VFR BLD CALC: 38.2 % (ref 36–46)
HEMOGLOBIN: 12.3 G/DL (ref 12–16)
LYMPHOCYTES ABSOLUTE: NORMAL
LYMPHOCYTES RELATIVE PERCENT: 1.9 %
MCH RBC QN AUTO: 29 PG
MCHC RBC AUTO-ENTMCNC: 32.3 G/DL
MCV RBC AUTO: 90 FL
MONOCYTES ABSOLUTE: NORMAL
MONOCYTES RELATIVE PERCENT: NORMAL
NEUTROPHILS ABSOLUTE: NORMAL
NEUTROPHILS RELATIVE PERCENT: NORMAL
PDW BLD-RTO: 14.5 %
PLATELET # BLD: 209 K/ΜL
PMV BLD AUTO: NORMAL FL
POTASSIUM SERPL-SCNC: 4.5 MMOL/L
RBC # BLD: 4.25 10^6/ΜL
SEDIMENTATION RATE, ERYTHROCYTE: 16
SODIUM BLD-SCNC: 139 MMOL/L
TOTAL PROTEIN: 6.6
WBC # BLD: 9.3 10^3/ML

## 2023-02-09 ENCOUNTER — OFFICE VISIT (OUTPATIENT)
Dept: INTERNAL MEDICINE CLINIC | Age: 88
End: 2023-02-09

## 2023-02-09 VITALS
WEIGHT: 233.2 LBS | SYSTOLIC BLOOD PRESSURE: 122 MMHG | DIASTOLIC BLOOD PRESSURE: 78 MMHG | BODY MASS INDEX: 39.81 KG/M2 | OXYGEN SATURATION: 97 % | HEART RATE: 75 BPM | RESPIRATION RATE: 18 BRPM | HEIGHT: 64 IN

## 2023-02-09 DIAGNOSIS — E78.2 MIXED HYPERLIPIDEMIA: ICD-10-CM

## 2023-02-09 DIAGNOSIS — I25.10 CORONARY ARTERY DISEASE INVOLVING NATIVE CORONARY ARTERY OF NATIVE HEART WITHOUT ANGINA PECTORIS: ICD-10-CM

## 2023-02-09 DIAGNOSIS — M79.605 LOW BACK PAIN RADIATING TO BOTH LEGS: Primary | ICD-10-CM

## 2023-02-09 DIAGNOSIS — G25.81 RESTLESS LEG SYNDROME: ICD-10-CM

## 2023-02-09 DIAGNOSIS — M54.50 LOW BACK PAIN RADIATING TO BOTH LEGS: Primary | ICD-10-CM

## 2023-02-09 DIAGNOSIS — M79.604 LOW BACK PAIN RADIATING TO BOTH LEGS: Primary | ICD-10-CM

## 2023-02-09 DIAGNOSIS — M05.79 RHEUMATOID ARTHRITIS INVOLVING MULTIPLE SITES WITH POSITIVE RHEUMATOID FACTOR (HCC): ICD-10-CM

## 2023-02-09 DIAGNOSIS — R25.2 LEG CRAMPS: ICD-10-CM

## 2023-02-09 DIAGNOSIS — M48.062 LUMBAR STENOSIS WITH NEUROGENIC CLAUDICATION: ICD-10-CM

## 2023-02-09 DIAGNOSIS — I10 ESSENTIAL HYPERTENSION: ICD-10-CM

## 2023-02-09 DIAGNOSIS — M14.80 ARTHROPATHIES IN OTHER SPECIFIED DISEASES CLASSIFIED ELSEWHERE, UNSPECIFIED SITE: ICD-10-CM

## 2023-02-09 DIAGNOSIS — E11.9 TYPE 2 DIABETES MELLITUS WITHOUT COMPLICATION, WITHOUT LONG-TERM CURRENT USE OF INSULIN (HCC): ICD-10-CM

## 2023-02-09 RX ORDER — METOPROLOL SUCCINATE 25 MG/1
25 TABLET, EXTENDED RELEASE ORAL DAILY
Qty: 90 TABLET | Refills: 1 | Status: SHIPPED | OUTPATIENT
Start: 2023-02-09

## 2023-02-09 RX ORDER — FLUTICASONE FUROATE AND VILANTEROL 100; 25 UG/1; UG/1
1 POWDER RESPIRATORY (INHALATION) DAILY
Qty: 1 EACH | Refills: 3 | Status: SHIPPED | OUTPATIENT
Start: 2023-02-09

## 2023-02-09 RX ORDER — GABAPENTIN 100 MG/1
CAPSULE ORAL
Qty: 120 CAPSULE | Refills: 0 | Status: SHIPPED | OUTPATIENT
Start: 2023-02-09 | End: 2023-03-09

## 2023-02-09 RX ORDER — KETOCONAZOLE 20 MG/G
CREAM TOPICAL
COMMUNITY
Start: 2023-01-31

## 2023-02-09 RX ORDER — GLUCOSAM/CHON-MSM1/C/MANG/BOSW 500-416.6
TABLET ORAL
COMMUNITY
Start: 2022-12-27

## 2023-02-09 RX ORDER — FLUOROMETHOLONE 0.1 %
SUSPENSION, DROPS(FINAL DOSAGE FORM)(ML) OPHTHALMIC (EYE)
COMMUNITY
Start: 2023-01-17

## 2023-02-09 RX ORDER — CALCIUM CITRATE/VITAMIN D3 200MG-6.25
TABLET ORAL
COMMUNITY
Start: 2023-01-16

## 2023-02-09 RX ORDER — ROPINIROLE 2 MG/1
2 TABLET, FILM COATED ORAL NIGHTLY
Qty: 30 TABLET | Refills: 0 | Status: SHIPPED | OUTPATIENT
Start: 2023-02-09

## 2023-02-09 NOTE — PROGRESS NOTES
Rosalba Ornelas  Patient's  is 1935  Seen in office on 2023      SUBJECTIVE:  Gideon Camacho anand 81 y. o.year old female presents today   Chief Complaint   Patient presents with    3 Month Follow-Up     Pt states she has pain in the legs   She gets pain in the soles of feet  Tingling sensation up to the knees  She used ropinirole more than prescribed dose and finished it  Pharmacy was not going to feel any more as it was sooner. Pt has steroid inj in the lefts shoulder and left hip by orthopedics    Patient has hypertension. Taking medications No headaches, no chest pain, no palpitations and no dizziness. Patient has hyperlipidemia. Taking medications. No abdominal pain, no nausea or vomiting. No myalgias. Patient has DM. No hypoglycemia. No weakness. No dizziness. Blood sugars are good at home. Has tingling sensation of the legs    Patient has history of back pain and had laminectomy in the past in May 2022. She was sent to the pain clinic. Patient has rheumatoid arthritis affecting the hands and knees. She is on Plaquenil but is taking only once a day    Coronary artery disease stable. No angina          Taking medications regularly. No side effects noted. Review of Systems  Review of system normal except as in HPI  OBJECTIVE: /78   Pulse 75   Resp 18   Ht 5' 4\" (1.626 m)   Wt 233 lb 3.2 oz (105.8 kg)   LMP  (LMP Unknown)   SpO2 97%   BMI 40.03 kg/m²     Wt Readings from Last 3 Encounters:   23 233 lb 3.2 oz (105.8 kg)   22 214 lb (97.1 kg)   22 210 lb (95.3 kg)      GENERAL: - Alert, oriented, pleasant, in no apparent distress. HEENT: - Conjunctiva pink, no scleral icterus. ENT clear. NECK: -Supple. No jugular venous distention noted. No masses felt,  CARDIOVASCULAR: - Normal S1 and S2    PULMONARY: - No respiratory distress. No wheezes or rales. ABDOMEN: - Soft and non-tender,no masses  ororganomegaly.   EXTREMITIES: - No cyanosis, clubbing, or significant edema. SKIN: Skin is warm and dry. NEUROLOGICAL: - Cranial nerves II through XII are grossly intact. IMPRESSION:    Encounter Diagnoses   Name Primary? Low back pain radiating to both legs Yes    Leg cramps     Arthropathies in other specified diseases classified elsewhere, unspecified site      Restless leg syndrome     CAD s/p MI, TPA in 1998     Rheumatoid arthritis involving multiple sites with positive rheumatoid factor ([facility]     Lumbar stenosis with neurogenic claudication     Type 2 diabetes mellitus without complication, without long-term current use of insulin ([facility])     Mixed hyperlipidemia     Essential hypertension        ASSESSMENT/PLAN:    1. Low back pain radiating to both legs  -     EMG; Future  We will get EMG as mentioned above  Take gabapentin 100 mg twice a day for 1 week and then increase it to 200 mg twice a week  Refer patient to neurologist.    2. Leg cramps  -     gabapentin (NEURONTIN) 100 MG capsule; Take gabapentin 100 mg twice a day for one week then 200 mg twice a day, Disp-120 capsule, R-0Normal  -     Saran Castellano MD, Neurology, [facility]  -     Basic Metabolic Panel; Future  -     CBC with Auto Differential; Future  -     Ferritin; Future  -     Magnesium; Future  3. Arthropathies in other specified diseases classified elsewhere, unspecified site   -     Ferritin; Future  4. Restless leg syndrome  Overview:  Takes Requip 2 mg twice a day   Advised patient not to increase the medication on her own  5. CAD s/p MI, TPA in 1998  Overview:  MI, TPA,sees Dr. Savana Shelton  Patient is on aspirin, beta blocker and statins  No angina  6. Rheumatoid arthritis involving multiple sites with positive rheumatoid factor (HCC)  Overview:  Has arthritis of hands,left knee pain, sees Dr. Julius Guillen  Patient is taking Plaquenil but only once a day     7.  Lumbar stenosis with neurogenic claudication  Overview:  Patient underwent laminectomy of the lumbar spine by Dr. Kenneth Freed on 5/12/22  Pt continues to have pain in the lower back  Pain has gone down the hips  Pt is going to Pain clinic : Dr Walterine Babinski  Pt is gabapentin 100 mg tid     8. Type 2 diabetes mellitus without complication, without long-term current use of insulin (HCC)  Overview:  Pt has DM  Did not tolerate metformin   Follow diet and exercise  On glipizide ER 5 mg daily       9. Mixed hyperlipidemia  Overview:  Hyperlipidemia is stable. Follow low cholesterol diet. Continue current treatment. Patient is on crestor 10 mg daily   10. Essential hypertension  Overview:  Hypertension in control. Follow low salt diet. Continue current treatment. Continue losartan and metoprolol      Orders Placed This Encounter   Procedures    Basic Metabolic Panel    CBC with Auto Differential    Ferritin    Magnesium    Shavon Linn MD, Neurology, [place]    EMG       Return to office in a month  Mediations reviewed with the patient. Continue current medications. Appropriate prescriptions are addressed. After visit summeryprovided. Follow up as directed sooner if needed. Questions answered and patient verbalizes understanding. Call for any problems, questions, or concerns.        No Known Allergies  Current Outpatient Medications   Medication Sig Dispense Refill    TRUEplus Lancets 33G MISC       TRUE METRIX BLOOD GLUCOSE TEST strip       fluorometholone (FML) 0.1 % ophthalmic suspension       ketoconazole (NIZORAL) 2 % cream       rOPINIRole (REQUIP) 2 MG tablet Take 2 mg at 3 pm and at 10 pm 60 tablet 3    albuterol sulfate HFA (PROVENTIL;VENTOLIN;PROAIR) 108 (90 Base) MCG/ACT inhaler Inhale 2 puffs into the lungs 4 times daily as needed for Wheezing (Patient not taking: Reported on 1/5/2023) 8.5 g 5    glipiZIDE (GLUCOTROL XL) 5 MG extended release tablet Take 1 tablet by mouth daily 90 tablet 1    losartan (COZAAR) 50 MG tablet Take 1 tablet by mouth 2 times daily 180 tablet 1    furosemide (LASIX) 20 MG tablet Take 1 tablet by mouth daily 90 tablet 1    fluticasone-vilanterol (BREO ELLIPTA) 100-25 MCG/INH AEPB inhaler Inhale 1 puff into the lungs daily 1 each 2    rosuvastatin (CRESTOR) 10 MG tablet TAKE 1 TABLET BY MOUTH NIGHTLY 90 tablet 1    metoprolol succinate (TOPROL XL) 25 MG extended release tablet Take 1 tablet by mouth daily 90 tablet 1    terbinafine (LAMISIL) 250 MG tablet  (Patient not taking: No sig reported)      sennosides-docusate sodium (SENOKOT-S) 8.6-50 MG tablet Take 2 tablets by mouth 2 times daily as needed for Constipation 180 tablet 2    Coenzyme Q10 (COQ10) 200 MG CAPS Take 200 mg by mouth 2 times daily      clobetasol (TEMOVATE) 0.05 % cream Apply topically 2 times daily      Multiple Vitamins-Minerals (PRESERVISION AREDS) TABS Take 1 tablet by mouth 2 times daily      hydroxychloroquine (PLAQUENIL) 200 MG tablet Take 200 mg by mouth daily       aspirin 81 MG EC tablet Take 81 mg by mouth daily  30 tablet     Cholecalciferol (VITAMIN D PO) Take 5,000 Units by mouth in the morning and at bedtime        No current facility-administered medications for this visit. Past Medical History:   Diagnosis Date    Arthritis     left knee pain, sees Dr. Kristin Resendiz    CAD (coronary artery disease)     sees Dr. Smiley Kong    Chronic midline low back pain without sciatica 9/28/2016    Had PT in 2016    Claustrophobia     Colonoscopy refused     discussed in 7/15    DM (diabetes mellitus), type 2 ([facility] 02/2006    Dupuytren's contracture of right hand     Endometrial stripe increased 9/25/2014    Saw NP Pete Lewis. Was normal exam per pt. Gastrointestinal hemorrhage 11/2/2015    Patient had GI bleeding. Colon refused. Discussed with patient in detail. Glaucoma 4/1/2014    H/O echocardiogram 10/02/2014    Mildly depressed left ventricular function; ejection fraction of 45%. Moderate pulmonary hypertension. H/O echocardiogram 08/28/2018    EF 50-55%. Mitral annular calcification is present. No other significant valvulopathy seen. History of nuclear stress test 08/28/2018    EF 59%. ECG portion of stress test is negative for ischemia by diagnostic criteria. fixed inferior large size severe defect in rest and stress images. Mild hubert infarct ischemia.     HTN (hypertension)     Hyperlipidemia     Kidney stone     hx-\"been about 3 yrs ago\"    MI (myocardial infarction) ([facility] 02/1998    treated with TPA    Obesity     Open wound of right foot 7/27/2020    Parainfluenza infection 12/5/2021    Pneumonia of right lower lobe due to infectious organism 4/5/2018    Vertigo     'have been having this since I had cataract surgery\"\"that is why they are doing the MRA of my brain(10/31/2014)    WD-Traumatic ulcer of foot, right, with fat layer exposed ([facility] 8/3/2020     Past Surgical History:   Procedure Laterality Date    CATARACT REMOVAL Bilateral     5/14,8/14    INGUINAL HERNIA REPAIR Left 45 yrs ago    [address]  N/A 5/12/2022    L1-4 LAMINECTOMIES performed by Thai Howe MD at [address] [place] [address] N/A 5/12/2022    L2 AND L3 BILATERAL BALLOON KYPHOPLASTY AND L2 AND L3 NEEDLE BIOPSY performed by Thai Howe MD at [address] [place] [address] History     Tobacco Use    Smoking status: Never    Smokeless tobacco: Never   Substance Use Topics    Alcohol use: Not Currently     Comment: OCCASSIONAL       LAB REVIEW:  CBC:   Lab Results   Component Value Date/Time    WBC 12.6 10/21/2022 01:36 AM    HGB 12.7 10/21/2022 01:36 AM    HCT 39.3 10/21/2022 01:36 AM     10/21/2022 01:36 AM     Lipids:   Lab Results   Component Value Date    HDL 63 (H) 10/13/2022    LDLCALC 68 10/13/2022    LDLDIRECT 69 08/18/2017    TRIGLYCFAST 115 10/13/2022    CHOLFAST 154 10/13/2022     Renal:   Lab Results   Component Value Date/Time    BUN 22 10/21/2022 01:36 AM    CREATININE 0.6 10/21/2022 01:36 AM     10/21/2022 01:36 AM    K 4.5 10/21/2022 01:36 AM    K 3.0 03/08/2018 05:20 AM    ALT <5 10/13/2022 08:45 AM    AST 12 10/13/2022 08:45 AM    GLUCOSE 140 10/21/2022 01:36 AM    GLUF 211 12/30/2021 01:20 PM     PT/INR:   Lab Results   Component Value Date/Time    INR 0.97 10/21/2022 01:36 AM     A1C:   Lab Results   Component Value Date    LABA1C 6.0 08/15/2022           Hyacinth Orellana MD, 2/9/2023 , 3:00 PM

## 2023-03-09 ENCOUNTER — OFFICE VISIT (OUTPATIENT)
Dept: INTERNAL MEDICINE CLINIC | Age: 88
End: 2023-03-09

## 2023-03-09 VITALS
HEART RATE: 76 BPM | TEMPERATURE: 97.2 F | DIASTOLIC BLOOD PRESSURE: 72 MMHG | WEIGHT: 241.6 LBS | SYSTOLIC BLOOD PRESSURE: 142 MMHG | BODY MASS INDEX: 41.47 KG/M2 | OXYGEN SATURATION: 96 % | RESPIRATION RATE: 20 BRPM

## 2023-03-09 DIAGNOSIS — G25.81 RESTLESS LEG SYNDROME: ICD-10-CM

## 2023-03-09 DIAGNOSIS — M48.062 LUMBAR STENOSIS WITH NEUROGENIC CLAUDICATION: Primary | ICD-10-CM

## 2023-03-09 DIAGNOSIS — M79.604 PAIN IN BOTH LOWER EXTREMITIES: ICD-10-CM

## 2023-03-09 DIAGNOSIS — M79.605 PAIN IN BOTH LOWER EXTREMITIES: ICD-10-CM

## 2023-03-09 RX ORDER — ROPINIROLE 2 MG/1
2 TABLET, FILM COATED ORAL 2 TIMES DAILY
Qty: 60 TABLET | Refills: 0 | Status: SHIPPED | OUTPATIENT
Start: 2023-03-09

## 2023-03-09 RX ORDER — GABAPENTIN 300 MG/1
300 CAPSULE ORAL 2 TIMES DAILY
Qty: 60 CAPSULE | Refills: 1 | Status: SHIPPED | OUTPATIENT
Start: 2023-03-09 | End: 2023-05-08

## 2023-03-09 ASSESSMENT — PATIENT HEALTH QUESTIONNAIRE - PHQ9
SUM OF ALL RESPONSES TO PHQ QUESTIONS 1-9: 1
SUM OF ALL RESPONSES TO PHQ QUESTIONS 1-9: 1
SUM OF ALL RESPONSES TO PHQ9 QUESTIONS 1 & 2: 1
2. FEELING DOWN, DEPRESSED OR HOPELESS: 1
1. LITTLE INTEREST OR PLEASURE IN DOING THINGS: 0
SUM OF ALL RESPONSES TO PHQ QUESTIONS 1-9: 1
SUM OF ALL RESPONSES TO PHQ QUESTIONS 1-9: 1

## 2023-03-09 NOTE — PROGRESS NOTES
Tate Bautista  Patient's  is 1935  Seen in office on 3/9/2023      SUBJECTIVE:  Dayne Stacy anand 81 y. o.year old female presents today   Chief Complaint   Patient presents with    Follow-up    Allergies     Outside pollen bothering her    Leg Pain     Starts from her feet up, Dr Virgil Keys office has not called her yet, will resend referral.     Patient is here for follow-up of the leg pains. Patient states she continues to have the pain in the leg and she is taking both ropinirole and gabapentin and he still has the pain. No weakness of the legs. Pain is in the daytime also. She did not get any call from the neurologist yet  She has some allergies. No sneezing  Taking medications regularly. No side effects noted. Review of Systems    OBJECTIVE: BP (!) 142/72   Pulse 76   Temp 97.2 °F (36.2 °C) (Temporal)   Resp 20   Wt 241 lb 9.6 oz (109.6 kg)   LMP  (LMP Unknown)   SpO2 96%   BMI 41.47 kg/m²     Wt Readings from Last 3 Encounters:   23 241 lb 9.6 oz (109.6 kg)   23 233 lb 3.2 oz (105.8 kg)   22 214 lb (97.1 kg)      GENERAL: - Alert, oriented, pleasant, in no apparent distress. HEENT: - Conjunctiva pink, no scleral icterus. ENT clear. NECK: -Supple. No jugular venous distention noted. No masses felt,  CARDIOVASCULAR: - Normal S1 and S2    PULMONARY: - No respiratory distress. No wheezes or rales. ABDOMEN: - Soft and non-tender,no masses  ororganomegaly. EXTREMITIES: - No cyanosis, clubbing, or significant edema. SKIN: Skin is warm and dry. NEUROLOGICAL: - Cranial nerves II through XII are grossly intact. IMPRESSION:    Encounter Diagnoses   Name Primary? Lumbar stenosis with neurogenic claudication Yes    Restless leg syndrome     Pain in both lower extremities        ASSESSMENT/PLAN:    1. We will adjust the medications  2. Increase gabapentin to 300 mg twice a day  3. Continue ropinirole 2 mg twice a day  4. See neurologist  5.   Advised patient not to change the medication on her own unless she notifies us    Return to office in a month    Mediations reviewed with the patient. Continue current medications. Appropriate prescriptions are addressed. After visit summeryprovided. Follow up as directed sooner if needed. Questions answered and patient verbalizes understanding. Call for any problems, questions, or concerns.        No Known Allergies  Current Outpatient Medications   Medication Sig Dispense Refill    ketoconazole (NIZORAL) 2 % cream       metoprolol succinate (TOPROL XL) 25 MG extended release tablet Take 1 tablet by mouth daily 90 tablet 1    gabapentin (NEURONTIN) 100 MG capsule Take gabapentin 100 mg twice a day for one week then 200 mg twice a day 120 capsule 0    rOPINIRole (REQUIP) 2 MG tablet Take 1 tablet by mouth nightly Take 2 mg at 3 pm and at 10 pm (Patient taking differently: Take 2 mg by mouth bid) 30 tablet 0    glipiZIDE (GLUCOTROL XL) 5 MG extended release tablet Take 1 tablet by mouth daily 90 tablet 1    losartan (COZAAR) 50 MG tablet Take 1 tablet by mouth 2 times daily 180 tablet 1    rosuvastatin (CRESTOR) 10 MG tablet TAKE 1 TABLET BY MOUTH NIGHTLY 90 tablet 1    sennosides-docusate sodium (SENOKOT-S) 8.6-50 MG tablet Take 2 tablets by mouth 2 times daily as needed for Constipation 180 tablet 2    Multiple Vitamins-Minerals (PRESERVISION AREDS) TABS Take 1 tablet by mouth 2 times daily      hydroxychloroquine (PLAQUENIL) 200 MG tablet Take 200 mg by mouth daily       TRUEplus Lancets 33G MISC       TRUE METRIX BLOOD GLUCOSE TEST strip       fluorometholone (FML) 0.1 % ophthalmic suspension       Cholecalciferol (VITAMIN D3) 125 MCG (5000 UT) CAPS Take 1 capsule by mouth daily 30 capsule 1    Fluticasone Furoate-Vilanterol (BREO ELLIPTA) 100-25 MCG/ACT AEPB Inhale 1 puff into the lungs daily 1 each 3    albuterol sulfate HFA (PROVENTIL;VENTOLIN;PROAIR) 108 (90 Base) MCG/ACT inhaler Inhale 2 puffs into the lungs 4 times daily as needed for Wheezing (Patient not taking: Reported on 1/5/2023) 8.5 g 5    furosemide (LASIX) 20 MG tablet Take 1 tablet by mouth daily 90 tablet 1    Coenzyme Q10 (COQ10) 200 MG CAPS Take 200 mg by mouth 2 times daily      clobetasol (TEMOVATE) 0.05 % cream Apply topically 2 times daily      aspirin 81 MG EC tablet Take 81 mg by mouth daily  30 tablet      No current facility-administered medications for this visit. Past Medical History:   Diagnosis Date    Arthritis     left knee pain, sees Dr. Hernan Andrews    CAD (coronary artery disease)     sees Dr. Denzel Calhoun    Chronic midline low back pain without sciatica 9/28/2016    Had PT in 2016    Claustrophobia     Colonoscopy refused     discussed in 7/15    DM (diabetes mellitus), type 2 ([facility] 02/2006    Dupuytren's contracture of right hand     Endometrial stripe increased 9/25/2014    Saw NP Estefany Snide. Was normal exam per pt. Gastrointestinal hemorrhage 11/2/2015    Patient had GI bleeding. Colon refused. Discussed with patient in detail. Glaucoma 4/1/2014    H/O echocardiogram 10/02/2014    Mildly depressed left ventricular function; ejection fraction of 45%. Moderate pulmonary hypertension. H/O echocardiogram 08/28/2018    EF 50-55%. Mitral annular calcification is present. No other significant valvulopathy seen. History of nuclear stress test 08/28/2018    EF 59%. ECG portion of stress test is negative for ischemia by diagnostic criteria. fixed inferior large size severe defect in rest and stress images. Mild hubert infarct ischemia.     HTN (hypertension)     Hyperlipidemia     Kidney stone     hx-\"been about 3 yrs ago\"    MI (myocardial infarction) ([facility] 02/1998    treated with TPA    Obesity     Open wound of right foot 7/27/2020    Parainfluenza infection 12/5/2021    Pneumonia of right lower lobe due to infectious organism 4/5/2018    Vertigo     'have been having this since I had cataract surgery\"\"that is why they are doing the MRA of my brain(10/31/2014)    WD-Traumatic ulcer of foot, right, with fat layer exposed ([address] 8/3/2020     Past Surgical History:   Procedure Laterality Date    CATARACT REMOVAL Bilateral     5/14,8/14    INGUINAL HERNIA REPAIR Left 45 yrs ago    LUMBAR SPINE SURGERY N/A 5/12/2022    L1-4 LAMINECTOMIES performed by Delvin Perry MD at [address] [place] [address] N/A 5/12/2022    L2 AND L3 BILATERAL BALLOON KYPHOPLASTY AND L2 AND L3 NEEDLE BIOPSY performed by Delvin Perry MD at [address] [place] [address] History     Tobacco Use    Smoking status: Never    Smokeless tobacco: Never   Substance Use Topics    Alcohol use: Not Currently     Comment: OCCASSIONAL       LAB REVIEW:  CBC:   Lab Results   Component Value Date/Time    WBC 9.3 02/07/2023 12:00 AM    HGB 12.3 02/07/2023 12:00 AM    HCT 38.2 02/07/2023 12:00 AM     02/07/2023 12:00 AM     Lipids:   Lab Results   Component Value Date    HDL 63 (H) 10/13/2022    LDLCALC 68 10/13/2022    LDLDIRECT 69 08/18/2017    TRIGLYCFAST 115 10/13/2022    CHOLFAST 154 10/13/2022     Renal:   Lab Results   Component Value Date/Time    BUN 14 02/07/2023 12:00 AM    CREATININE 0.52 02/07/2023 12:00 AM     02/07/2023 12:00 AM    K 4.5 02/07/2023 12:00 AM    K 3.0 03/08/2018 05:20 AM    ALT 12 02/07/2023 12:00 AM    AST 24 02/07/2023 12:00 AM    GLUCOSE 90 02/07/2023 12:00 AM    GLUF 211 12/30/2021 01:20 PM     PT/INR:   Lab Results   Component Value Date/Time    INR 0.97 10/21/2022 01:36 AM     A1C:   Lab Results   Component Value Date    LABA1C 6.0 08/15/2022           Odessa Rene MD, 3/9/2023 , 3:02 PM

## 2023-03-13 ENCOUNTER — TELEPHONE (OUTPATIENT)
Dept: INTERNAL MEDICINE CLINIC | Age: 88
End: 2023-03-13

## 2023-03-14 NOTE — ASSESSMENT & PLAN NOTE
Clinic Administered Medication Documentation    Administrations This Visit     ketorolac (TORADOL) injection 30 mg     Admin Date  03/14/2023 Action  $Given Dose  30 mg Route  Intramuscular Site   Administered By  Lily Canela    Ordering Provider: Joanne Weiner MD    Patient Supplied?: No                  Injectable Medication Documentation    Patient was given Ketorolac Tromethamine (Toradol). Prior to medication administration, verified patients identity using patient s name and date of birth. Please see MAR and medication order for additional information. Patient instructed to remain in clinic for 15 minutes.      Was entire vial of medication used? Yes  Vial/Syringe: Single dose vial  Expiration Date:  3/24/24  Was this medication supplied by the patient? No   Patient was told to get colonoscopy. But she refused. Was referred but she did not keep the appointment.

## 2023-03-20 ENCOUNTER — TELEPHONE (OUTPATIENT)
Dept: INTERNAL MEDICINE CLINIC | Age: 88
End: 2023-03-20

## 2023-03-20 NOTE — TELEPHONE ENCOUNTER
Patient stated that her legs are leaking - stated last 2 days she has taken 2 ropinrole.  And her legs are swollen and leaking wants to know if this is normal , should she be concerned wants to know what she should do

## 2023-03-22 ENCOUNTER — HOSPITAL ENCOUNTER (OUTPATIENT)
Age: 88
Discharge: HOME | End: 2023-03-22
Payer: MEDICARE

## 2023-03-22 ENCOUNTER — OFFICE VISIT (OUTPATIENT)
Dept: INTERNAL MEDICINE CLINIC | Age: 88
End: 2023-03-22
Payer: MEDICARE

## 2023-03-22 VITALS
SYSTOLIC BLOOD PRESSURE: 138 MMHG | TEMPERATURE: 97.1 F | DIASTOLIC BLOOD PRESSURE: 62 MMHG | BODY MASS INDEX: 41.16 KG/M2 | RESPIRATION RATE: 16 BRPM | WEIGHT: 239.8 LBS | HEART RATE: 80 BPM | OXYGEN SATURATION: 96 %

## 2023-03-22 DIAGNOSIS — R60.0 PEDAL EDEMA: Primary | ICD-10-CM

## 2023-03-22 DIAGNOSIS — G25.81 RESTLESS LEG SYNDROME: ICD-10-CM

## 2023-03-22 LAB
ANION GAP SERPL CALCULATED.3IONS-SCNC: 7 MMOL/L (ref 4–16)
BASOPHILS ABSOLUTE: 0.1 K/CU MM
BASOPHILS RELATIVE PERCENT: 0.7 % (ref 0–1)
BUN SERPL-MCNC: 20 MG/DL (ref 6–23)
CALCIUM SERPL-MCNC: 10 MG/DL (ref 8.3–10.6)
CHLORIDE BLD-SCNC: 101 MMOL/L (ref 99–110)
CO2: 35 MMOL/L (ref 21–32)
CREAT SERPL-MCNC: 0.6 MG/DL (ref 0.6–1.1)
DIFFERENTIAL TYPE: ABNORMAL
EOSINOPHILS ABSOLUTE: 0.1 K/CU MM
EOSINOPHILS RELATIVE PERCENT: 1.2 % (ref 0–3)
FERRITIN: 33 NG/ML (ref 15–150)
GFR SERPL CREATININE-BSD FRML MDRD: >60 ML/MIN/1.73M2
GLUCOSE P FAST SERPL-MCNC: 89 MG/DL (ref 70–99)
HCT VFR BLD CALC: 40.2 % (ref 37–47)
HEMOGLOBIN: 12.3 GM/DL (ref 12.5–16)
IMMATURE NEUTROPHIL %: 1.2 % (ref 0–0.43)
LYMPHOCYTES ABSOLUTE: 1.9 K/CU MM
LYMPHOCYTES RELATIVE PERCENT: 22.6 % (ref 24–44)
MAGNESIUM: 2 MG/DL (ref 1.8–2.4)
MCH RBC QN AUTO: 28.7 PG (ref 27–31)
MCHC RBC AUTO-ENTMCNC: 30.6 % (ref 32–36)
MCV RBC AUTO: 93.7 FL (ref 78–100)
MONOCYTES ABSOLUTE: 1.8 K/CU MM
MONOCYTES RELATIVE PERCENT: 20.5 % (ref 0–4)
PDW BLD-RTO: 14.2 % (ref 11.7–14.9)
PLATELET # BLD: 182 K/CU MM (ref 140–440)
PMV BLD AUTO: 9.3 FL (ref 7.5–11.1)
POTASSIUM SERPL-SCNC: 4.2 MMOL/L (ref 3.5–5.1)
RBC # BLD: 4.29 M/CU MM (ref 4.2–5.4)
SEGMENTED NEUTROPHILS ABSOLUTE COUNT: 4.6 K/CU MM
SEGMENTED NEUTROPHILS RELATIVE PERCENT: 53.8 % (ref 36–66)
SODIUM BLD-SCNC: 143 MMOL/L (ref 135–145)
TOTAL IMMATURE NEUTOROPHIL: 0.1 K/CU MM
WBC # BLD: 8.6 K/CU MM (ref 4–10.5)

## 2023-03-22 PROCEDURE — 85025 COMPLETE CBC W/AUTO DIFF WBC: CPT

## 2023-03-22 PROCEDURE — G8484 FLU IMMUNIZE NO ADMIN: HCPCS | Performed by: INTERNAL MEDICINE

## 2023-03-22 PROCEDURE — 1124F ACP DISCUSS-NO DSCNMKR DOCD: CPT | Performed by: INTERNAL MEDICINE

## 2023-03-22 PROCEDURE — 82728 ASSAY OF FERRITIN: CPT

## 2023-03-22 PROCEDURE — G8417 CALC BMI ABV UP PARAM F/U: HCPCS | Performed by: INTERNAL MEDICINE

## 2023-03-22 PROCEDURE — G8427 DOCREV CUR MEDS BY ELIG CLIN: HCPCS | Performed by: INTERNAL MEDICINE

## 2023-03-22 PROCEDURE — 80048 BASIC METABOLIC PNL TOTAL CA: CPT

## 2023-03-22 PROCEDURE — 1090F PRES/ABSN URINE INCON ASSESS: CPT | Performed by: INTERNAL MEDICINE

## 2023-03-22 PROCEDURE — 99213 OFFICE O/P EST LOW 20 MIN: CPT | Performed by: INTERNAL MEDICINE

## 2023-03-22 PROCEDURE — 36415 COLL VENOUS BLD VENIPUNCTURE: CPT

## 2023-03-22 PROCEDURE — 83735 ASSAY OF MAGNESIUM: CPT

## 2023-03-22 PROCEDURE — 1036F TOBACCO NON-USER: CPT | Performed by: INTERNAL MEDICINE

## 2023-03-22 ASSESSMENT — ENCOUNTER SYMPTOMS
EYES NEGATIVE: 1
GASTROINTESTINAL NEGATIVE: 1
SHORTNESS OF BREATH: 0

## 2023-03-22 NOTE — PROGRESS NOTES
masses felt,  CARDIOVASCULAR: - Normal S1 and S2    PULMONARY: - No respiratory distress. No wheezes or rales. ABDOMEN: - Soft and non-tender,no masses  ororganomegaly. EXTREMITIES: - No cyanosis, clubbing. Tenderness on palpation of DIP and PIP of right little finger. Edema in both lower extremities. SKIN:  Abrasions on right lower extremity with clear fluid seeping out, R>L. Skin is warm and well perfused. NEUROLOGICAL: - Cranial nerves II through XII are grossly intact. IMPRESSION:    Encounter Diagnoses   Name Primary? Restless leg syndrome Yes    Pedal edema        ASSESSMENT/PLAN:    Do not take Requip during day as it can be causing the foggy brain feeling. Swelling of legs could be due to gabapentin. Take 2 doses of the lasix for a week to decrease the swelling. If swelling improves over course of the week, can return to once a day. Patient planning on getting lab work today- will potentially add in [address] if it is low. Can apply neosporin topically over abrasion on right lower extremity. If finger pain does not improve, can evaluate further with x-rays. Patient will inform us. Keep leg elevated      Mediations reviewed with the patient. Continue current medications. Appropriate prescriptions are addressed. After visit summeryprovided. Follow up as directed sooner if needed. Questions answered and patient verbalizes understanding. Call for any problems, questions, or concerns. No Known Allergies  Current Outpatient Medications   Medication Sig Dispense Refill    gabapentin (NEURONTIN) 300 MG capsule Take 1 capsule by mouth 2 times daily for 60 days. Intended supply: 90 days 60 capsule 1    rOPINIRole (REQUIP) 2 MG tablet Take 1 tablet by mouth in the morning and 1 tablet in the evening. bid.  60 tablet 0    fluorometholone (FML) 0.1 % ophthalmic suspension       metoprolol succinate (TOPROL XL) 25 MG extended release tablet Take 1 tablet by mouth daily 90 tablet 1

## 2023-03-22 NOTE — TELEPHONE ENCOUNTER
Spoke with patient this morning and her kids cannot bring her to the office. She is going to see if she could find some transportation. I will call her back to check with her.

## 2023-03-27 RX ORDER — FUROSEMIDE 20 MG/1
20 TABLET ORAL DAILY
Qty: 90 TABLET | Refills: 1 | Status: SHIPPED | OUTPATIENT
Start: 2023-03-27

## 2023-03-29 ENCOUNTER — TELEPHONE (OUTPATIENT)
Dept: INTERNAL MEDICINE CLINIC | Age: 88
End: 2023-03-29

## 2023-04-04 RX ORDER — GLIPIZIDE 5 MG/1
5 TABLET, FILM COATED, EXTENDED RELEASE ORAL DAILY
Qty: 90 TABLET | Refills: 1 | Status: SHIPPED | OUTPATIENT
Start: 2023-04-04

## 2023-04-04 NOTE — TELEPHONE ENCOUNTER
Refill request rto. 5-10-23. Also states that she has finished the 7 days of 40 mg lasix and now back on 20 mg and not urinating much at all, please advise.

## 2023-04-04 NOTE — TELEPHONE ENCOUNTER
Patient identified and notified to drink 6 glasses of water daily. She is concerned that her legs are still seeping. Appt made for tomorrow for evaluation.

## 2023-04-14 ENCOUNTER — TELEPHONE (OUTPATIENT)
Dept: INTERNAL MEDICINE CLINIC | Age: 88
End: 2023-04-14

## 2023-04-17 ENCOUNTER — OFFICE VISIT (OUTPATIENT)
Dept: INTERNAL MEDICINE CLINIC | Age: 88
End: 2023-04-17
Payer: MEDICARE

## 2023-04-17 ENCOUNTER — HOSPITAL ENCOUNTER (INPATIENT)
Age: 88
LOS: 3 days | Discharge: HOME | DRG: 603 | End: 2023-04-20
Attending: INTERNAL MEDICINE | Admitting: INTERNAL MEDICINE
Payer: MEDICARE

## 2023-04-17 VITALS — DIASTOLIC BLOOD PRESSURE: 80 MMHG | HEIGHT: 64 IN | BODY MASS INDEX: 40.78 KG/M2 | SYSTOLIC BLOOD PRESSURE: 138 MMHG

## 2023-04-17 DIAGNOSIS — I25.10 CORONARY ARTERY DISEASE INVOLVING NATIVE CORONARY ARTERY OF NATIVE HEART WITHOUT ANGINA PECTORIS: ICD-10-CM

## 2023-04-17 DIAGNOSIS — L03.115 CELLULITIS OF RIGHT LEG: Primary | ICD-10-CM

## 2023-04-17 DIAGNOSIS — M05.79 RHEUMATOID ARTHRITIS INVOLVING MULTIPLE SITES WITH POSITIVE RHEUMATOID FACTOR (HCC): ICD-10-CM

## 2023-04-17 DIAGNOSIS — E11.9 TYPE 2 DIABETES MELLITUS WITHOUT COMPLICATION, WITHOUT LONG-TERM CURRENT USE OF INSULIN (HCC): ICD-10-CM

## 2023-04-17 DIAGNOSIS — G25.81 RESTLESS LEG SYNDROME: ICD-10-CM

## 2023-04-17 DIAGNOSIS — I50.32 CHRONIC DIASTOLIC CONGESTIVE HEART FAILURE (HCC): ICD-10-CM

## 2023-04-17 DIAGNOSIS — E78.2 MIXED HYPERLIPIDEMIA: ICD-10-CM

## 2023-04-17 DIAGNOSIS — M48.062 LUMBAR STENOSIS WITH NEUROGENIC CLAUDICATION: ICD-10-CM

## 2023-04-17 DIAGNOSIS — R60.0 PEDAL EDEMA: ICD-10-CM

## 2023-04-17 PROBLEM — L03.90 CELLULITIS: Status: ACTIVE | Noted: 2023-04-17

## 2023-04-17 LAB
ALBUMIN SERPL-MCNC: 3.8 GM/DL (ref 3.4–5)
ALP BLD-CCNC: 70 IU/L (ref 40–129)
ALT SERPL-CCNC: 7 U/L (ref 10–40)
ANION GAP SERPL CALCULATED.3IONS-SCNC: 8 MMOL/L (ref 4–16)
AST SERPL-CCNC: 17 IU/L (ref 15–37)
BASOPHILS ABSOLUTE: 0.1 K/CU MM
BASOPHILS RELATIVE PERCENT: 0.5 % (ref 0–1)
BILIRUB SERPL-MCNC: 0.4 MG/DL (ref 0–1)
BUN SERPL-MCNC: 15 MG/DL (ref 6–23)
CALCIUM SERPL-MCNC: 9.8 MG/DL (ref 8.3–10.6)
CHLORIDE BLD-SCNC: 98 MMOL/L (ref 99–110)
CO2: 32 MMOL/L (ref 21–32)
CREAT SERPL-MCNC: 0.6 MG/DL (ref 0.6–1.1)
DIFFERENTIAL TYPE: ABNORMAL
EOSINOPHILS ABSOLUTE: 0.1 K/CU MM
EOSINOPHILS RELATIVE PERCENT: 1 % (ref 0–3)
ESTIMATED AVERAGE GLUCOSE: 134 MG/DL
GFR SERPL CREATININE-BSD FRML MDRD: >60 ML/MIN/1.73M2
GLUCOSE BLD-MCNC: 106 MG/DL (ref 70–99)
GLUCOSE BLD-MCNC: 189 MG/DL (ref 70–99)
GLUCOSE BLD-MCNC: 92 MG/DL (ref 70–99)
GLUCOSE SERPL-MCNC: 122 MG/DL (ref 70–99)
HBA1C MFR BLD: 6.3 % (ref 4.2–6.3)
HCT VFR BLD CALC: 39.3 % (ref 37–47)
HEMOGLOBIN: 12.2 GM/DL (ref 12.5–16)
IMMATURE NEUTROPHIL %: 0.9 % (ref 0–0.43)
LYMPHOCYTES ABSOLUTE: 1.8 K/CU MM
LYMPHOCYTES RELATIVE PERCENT: 16.3 % (ref 24–44)
MCH RBC QN AUTO: 28.8 PG (ref 27–31)
MCHC RBC AUTO-ENTMCNC: 31 % (ref 32–36)
MCV RBC AUTO: 92.7 FL (ref 78–100)
MONOCYTES ABSOLUTE: 1.9 K/CU MM
MONOCYTES RELATIVE PERCENT: 16.9 % (ref 0–4)
PDW BLD-RTO: 13.7 % (ref 11.7–14.9)
PLATELET # BLD: 188 K/CU MM (ref 140–440)
PMV BLD AUTO: 9.7 FL (ref 7.5–11.1)
POTASSIUM SERPL-SCNC: 4.4 MMOL/L (ref 3.5–5.1)
RBC # BLD: 4.24 M/CU MM (ref 4.2–5.4)
SEGMENTED NEUTROPHILS ABSOLUTE COUNT: 7.1 K/CU MM
SEGMENTED NEUTROPHILS RELATIVE PERCENT: 64.4 % (ref 36–66)
SODIUM BLD-SCNC: 138 MMOL/L (ref 135–145)
TOTAL IMMATURE NEUTOROPHIL: 0.1 K/CU MM
TOTAL PROTEIN: 6.2 GM/DL (ref 6.4–8.2)
WBC # BLD: 11.1 K/CU MM (ref 4–10.5)

## 2023-04-17 PROCEDURE — 1124F ACP DISCUSS-NO DSCNMKR DOCD: CPT | Performed by: INTERNAL MEDICINE

## 2023-04-17 PROCEDURE — 6370000000 HC RX 637 (ALT 250 FOR IP): Performed by: NURSE PRACTITIONER

## 2023-04-17 PROCEDURE — 1090F PRES/ABSN URINE INCON ASSESS: CPT | Performed by: INTERNAL MEDICINE

## 2023-04-17 PROCEDURE — G8427 DOCREV CUR MEDS BY ELIG CLIN: HCPCS | Performed by: INTERNAL MEDICINE

## 2023-04-17 PROCEDURE — 87077 CULTURE AEROBIC IDENTIFY: CPT

## 2023-04-17 PROCEDURE — 2580000003 HC RX 258: Performed by: NURSE PRACTITIONER

## 2023-04-17 PROCEDURE — 85025 COMPLETE CBC W/AUTO DIFF WBC: CPT

## 2023-04-17 PROCEDURE — 6360000002 HC RX W HCPCS: Performed by: NURSE PRACTITIONER

## 2023-04-17 PROCEDURE — 82962 GLUCOSE BLOOD TEST: CPT

## 2023-04-17 PROCEDURE — 1200000000 HC SEMI PRIVATE

## 2023-04-17 PROCEDURE — 87070 CULTURE OTHR SPECIMN AEROBIC: CPT

## 2023-04-17 PROCEDURE — 80053 COMPREHEN METABOLIC PANEL: CPT

## 2023-04-17 PROCEDURE — 94640 AIRWAY INHALATION TREATMENT: CPT

## 2023-04-17 PROCEDURE — G8417 CALC BMI ABV UP PARAM F/U: HCPCS | Performed by: INTERNAL MEDICINE

## 2023-04-17 PROCEDURE — 99213 OFFICE O/P EST LOW 20 MIN: CPT | Performed by: INTERNAL MEDICINE

## 2023-04-17 PROCEDURE — 83036 HEMOGLOBIN GLYCOSYLATED A1C: CPT

## 2023-04-17 PROCEDURE — 1036F TOBACCO NON-USER: CPT | Performed by: INTERNAL MEDICINE

## 2023-04-17 PROCEDURE — 87186 SC STD MICRODIL/AGAR DIL: CPT

## 2023-04-17 PROCEDURE — 87075 CULTR BACTERIA EXCEPT BLOOD: CPT

## 2023-04-17 RX ORDER — INSULIN LISPRO 100 [IU]/ML
0-4 INJECTION, SOLUTION INTRAVENOUS; SUBCUTANEOUS NIGHTLY
Status: DISCONTINUED | OUTPATIENT
Start: 2023-04-17 | End: 2023-04-20 | Stop reason: HOSPADM

## 2023-04-17 RX ORDER — HYDROXYCHLOROQUINE SULFATE 200 MG/1
200 TABLET, FILM COATED ORAL DAILY
Status: DISCONTINUED | OUTPATIENT
Start: 2023-04-18 | End: 2023-04-20 | Stop reason: HOSPADM

## 2023-04-17 RX ORDER — GABAPENTIN 300 MG/1
600 CAPSULE ORAL NIGHTLY
Status: DISCONTINUED | OUTPATIENT
Start: 2023-04-17 | End: 2023-04-18 | Stop reason: ALTCHOICE

## 2023-04-17 RX ORDER — SENNA AND DOCUSATE SODIUM 50; 8.6 MG/1; MG/1
2 TABLET, FILM COATED ORAL 2 TIMES DAILY PRN
Status: DISCONTINUED | OUTPATIENT
Start: 2023-04-17 | End: 2023-04-20 | Stop reason: HOSPADM

## 2023-04-17 RX ORDER — POLYVINYL ALCOHOL 14 MG/ML
1 SOLUTION/ DROPS OPHTHALMIC 2 TIMES DAILY
Status: DISCONTINUED | OUTPATIENT
Start: 2023-04-17 | End: 2023-04-20 | Stop reason: HOSPADM

## 2023-04-17 RX ORDER — SODIUM CHLORIDE 0.9 % (FLUSH) 0.9 %
5-40 SYRINGE (ML) INJECTION EVERY 12 HOURS SCHEDULED
Status: DISCONTINUED | OUTPATIENT
Start: 2023-04-17 | End: 2023-04-20 | Stop reason: HOSPADM

## 2023-04-17 RX ORDER — LOSARTAN POTASSIUM 50 MG/1
50 TABLET ORAL 2 TIMES DAILY
Status: DISCONTINUED | OUTPATIENT
Start: 2023-04-17 | End: 2023-04-20

## 2023-04-17 RX ORDER — TORSEMIDE 20 MG/1
20 TABLET ORAL DAILY
Status: DISCONTINUED | OUTPATIENT
Start: 2023-04-17 | End: 2023-04-20 | Stop reason: HOSPADM

## 2023-04-17 RX ORDER — ACETAMINOPHEN 325 MG/1
650 TABLET ORAL EVERY 6 HOURS PRN
Status: DISCONTINUED | OUTPATIENT
Start: 2023-04-17 | End: 2023-04-20 | Stop reason: HOSPADM

## 2023-04-17 RX ORDER — POLYETHYLENE GLYCOL 3350 17 G/17G
17 POWDER, FOR SOLUTION ORAL DAILY PRN
Status: DISCONTINUED | OUTPATIENT
Start: 2023-04-17 | End: 2023-04-20 | Stop reason: HOSPADM

## 2023-04-17 RX ORDER — CYCLOSPORINE 0.5 MG/ML
1 EMULSION OPHTHALMIC 2 TIMES DAILY
COMMUNITY
Start: 2023-04-17

## 2023-04-17 RX ORDER — SODIUM CHLORIDE 9 MG/ML
INJECTION, SOLUTION INTRAVENOUS PRN
Status: DISCONTINUED | OUTPATIENT
Start: 2023-04-17 | End: 2023-04-20 | Stop reason: HOSPADM

## 2023-04-17 RX ORDER — SODIUM CHLORIDE 0.9 % (FLUSH) 0.9 %
5-40 SYRINGE (ML) INJECTION PRN
Status: DISCONTINUED | OUTPATIENT
Start: 2023-04-17 | End: 2023-04-20 | Stop reason: HOSPADM

## 2023-04-17 RX ORDER — METOPROLOL SUCCINATE 25 MG/1
25 TABLET, EXTENDED RELEASE ORAL DAILY
Status: DISCONTINUED | OUTPATIENT
Start: 2023-04-18 | End: 2023-04-20 | Stop reason: HOSPADM

## 2023-04-17 RX ORDER — UBIDECARENONE 100 MG
200 CAPSULE ORAL 2 TIMES DAILY
Status: DISCONTINUED | OUTPATIENT
Start: 2023-04-17 | End: 2023-04-18

## 2023-04-17 RX ORDER — VANCOMYCIN 1.5 G/300ML
1500 INJECTION, SOLUTION INTRAVENOUS EVERY 24 HOURS
Status: DISCONTINUED | OUTPATIENT
Start: 2023-04-17 | End: 2023-04-20 | Stop reason: HOSPADM

## 2023-04-17 RX ORDER — ENOXAPARIN SODIUM 100 MG/ML
30 INJECTION SUBCUTANEOUS 2 TIMES DAILY
Status: DISCONTINUED | OUTPATIENT
Start: 2023-04-17 | End: 2023-04-20 | Stop reason: HOSPADM

## 2023-04-17 RX ORDER — ONDANSETRON 2 MG/ML
4 INJECTION INTRAMUSCULAR; INTRAVENOUS EVERY 6 HOURS PRN
Status: DISCONTINUED | OUTPATIENT
Start: 2023-04-17 | End: 2023-04-20 | Stop reason: HOSPADM

## 2023-04-17 RX ORDER — ONDANSETRON 4 MG/1
4 TABLET, ORALLY DISINTEGRATING ORAL EVERY 8 HOURS PRN
Status: DISCONTINUED | OUTPATIENT
Start: 2023-04-17 | End: 2023-04-20 | Stop reason: HOSPADM

## 2023-04-17 RX ORDER — ASPIRIN 81 MG/1
81 TABLET ORAL DAILY
Status: DISCONTINUED | OUTPATIENT
Start: 2023-04-18 | End: 2023-04-20 | Stop reason: HOSPADM

## 2023-04-17 RX ORDER — ACETAMINOPHEN 650 MG/1
650 SUPPOSITORY RECTAL EVERY 6 HOURS PRN
Status: DISCONTINUED | OUTPATIENT
Start: 2023-04-17 | End: 2023-04-20 | Stop reason: HOSPADM

## 2023-04-17 RX ORDER — INSULIN LISPRO 100 [IU]/ML
0-8 INJECTION, SOLUTION INTRAVENOUS; SUBCUTANEOUS
Status: DISCONTINUED | OUTPATIENT
Start: 2023-04-17 | End: 2023-04-20 | Stop reason: HOSPADM

## 2023-04-17 RX ORDER — TORSEMIDE 20 MG/1
20 TABLET ORAL DAILY
Status: DISCONTINUED | OUTPATIENT
Start: 2023-04-18 | End: 2023-04-17 | Stop reason: SDUPTHER

## 2023-04-17 RX ORDER — ROSUVASTATIN CALCIUM 5 MG/1
10 TABLET, COATED ORAL NIGHTLY
Status: DISCONTINUED | OUTPATIENT
Start: 2023-04-17 | End: 2023-04-20 | Stop reason: HOSPADM

## 2023-04-17 RX ORDER — DEXTROSE MONOHYDRATE 100 MG/ML
INJECTION, SOLUTION INTRAVENOUS CONTINUOUS PRN
Status: DISCONTINUED | OUTPATIENT
Start: 2023-04-17 | End: 2023-04-20 | Stop reason: HOSPADM

## 2023-04-17 RX ORDER — ROPINIROLE 1 MG/1
2 TABLET, FILM COATED ORAL 3 TIMES DAILY
Status: DISCONTINUED | OUTPATIENT
Start: 2023-04-17 | End: 2023-04-20 | Stop reason: HOSPADM

## 2023-04-17 RX ORDER — ROPINIROLE 1 MG/1
2 TABLET, FILM COATED ORAL 2 TIMES DAILY
Status: DISCONTINUED | OUTPATIENT
Start: 2023-04-17 | End: 2023-04-17 | Stop reason: SDUPTHER

## 2023-04-17 RX ADMIN — CEFEPIME 2000 MG: 2 INJECTION, POWDER, FOR SOLUTION INTRAVENOUS at 16:05

## 2023-04-17 RX ADMIN — SODIUM CHLORIDE, PRESERVATIVE FREE 10 ML: 5 INJECTION INTRAVENOUS at 21:22

## 2023-04-17 RX ADMIN — VANCOMYCIN 1500 MG: 1.5 INJECTION, SOLUTION INTRAVENOUS at 16:50

## 2023-04-17 RX ADMIN — TORSEMIDE 20 MG: 20 TABLET ORAL at 16:00

## 2023-04-17 RX ADMIN — POLYVINYL ALCOHOL 1 DROP: 14 SOLUTION/ DROPS OPHTHALMIC at 21:36

## 2023-04-17 RX ADMIN — ROPINIROLE HYDROCHLORIDE 2 MG: 1 TABLET, FILM COATED ORAL at 16:00

## 2023-04-17 RX ADMIN — ENOXAPARIN SODIUM 30 MG: 100 INJECTION SUBCUTANEOUS at 21:21

## 2023-04-17 RX ADMIN — GABAPENTIN 600 MG: 300 CAPSULE ORAL at 21:21

## 2023-04-17 RX ADMIN — MOMETASONE FUROATE AND FORMOTEROL FUMARATE DIHYDRATE 2 PUFF: 100; 5 AEROSOL RESPIRATORY (INHALATION) at 20:23

## 2023-04-17 RX ADMIN — ROSUVASTATIN CALCIUM 10 MG: 5 TABLET, FILM COATED ORAL at 21:21

## 2023-04-17 RX ADMIN — SODIUM CHLORIDE 25 ML: 9 INJECTION, SOLUTION INTRAVENOUS at 16:04

## 2023-04-17 RX ADMIN — LOSARTAN POTASSIUM 50 MG: 50 TABLET, FILM COATED ORAL at 21:21

## 2023-04-17 RX ADMIN — ROPINIROLE HYDROCHLORIDE 2 MG: 1 TABLET, FILM COATED ORAL at 21:21

## 2023-04-17 RX ADMIN — SODIUM CHLORIDE 25 ML: 9 INJECTION, SOLUTION INTRAVENOUS at 16:49

## 2023-04-17 ASSESSMENT — PAIN - FUNCTIONAL ASSESSMENT: PAIN_FUNCTIONAL_ASSESSMENT: NONE - DENIES PAIN

## 2023-04-17 NOTE — PROGRESS NOTES
Sarmad Lozano  Patient's  is 1935  Seen in office on 2023      SUBJECTIVE:  Estell Saint isa 81 y. o.year old female presents today   Chief Complaint   Patient presents with    Leg Swelling     Leaking for a while. Has been seen twice but not getting better     Patient had pedal edema for the last few weeks. Lasix was not helping her and I changed to torsemide 20 mg daily and put her on some fluid restriction  Patient is lately started seeping and developed superficial ulcers  Patient states on the week and she developed redness and swelling of the leg with pain  She lives alone and is unable to take care of her legs  Patient denies any fever or chills. Has pain in the legs  The chest x-ray showed no acute process. BNP is normal  Taking medications regularly. No side effects noted. Review of Systems    OBJECTIVE: /80   Ht 5' 4\" (1.626 m)   LMP  (LMP Unknown)   BMI 40.78 kg/m²     Wt Readings from Last 3 Encounters:   23 237 lb 9.6 oz (107.8 kg)   23 239 lb 12.8 oz (108.8 kg)   23 241 lb 9.6 oz (109.6 kg)      GENERAL: - Alert, oriented, pleasant, in no apparent distress. HEENT: - Conjunctiva pink, no scleral icterus. ENT clear. NECK: -Supple. No jugular venous distention noted. No masses felt,  CARDIOVASCULAR: - Normal S1 and S2    PULMONARY: - No respiratory distress. No wheezes or rales. ABDOMEN: - Soft and non-tender,no masses  ororganomegaly. EXTREMITIES: - No cyanosis, clubbing, or significant edema. SKIN: Skin is warm and dry. NEUROLOGICAL: - Cranial nerves II through XII are grossly intact. Patient has 2-3+ pedal edema bilaterally  Patient skin is seeping serous fluid  Patient has superficial ulcers  Right leg is erythematous and tender. She has some tenderness in the back of the right leg  Has pedal edema with some discoloration    IMPRESSION:    Encounter Diagnoses   Name Primary?     Cellulitis of right leg Yes    Pedal edema     Chronic diastolic

## 2023-04-17 NOTE — H&P
Surrogate Decision Maker/ POA Lluvia Queen     Personally reviewed Lab Studies and Imaging     Discussed management of the case with Dr. Daniel Coles my supervising physician who is in agreement with the above noted plan of care. Case was also discussed with Crista Cruz the patient primary care physician, who to give history of the lower extremity problems and patient history as well. Drugs that require monitoring for toxicity include Lovenox, and vancomycin and the method of monitoring was CBC Lovenox, creatinine vancomycin. History from:     patient    History of Present Illness:     Chief Complaint:   Lora Theodore is a 81 y.o. female with pmh of arthritis, CAD, chronic midline low back pain, diabetes mellitus type 2, Dupuytren's contracture of the right hand, hypertension, hyperlipidemia, obesity chronic lower extremity edema who presents as a direct admission with lower extremity cellulitis right worse than left. This is a patient of Dr. Jordan Lentz, who was directly admitted to the hospital today for bilateral lower extremity cellulitis worse on the right than left. Patient has had bilateral pedal and lower extremity edema for the past few weeks. Lasix was not helping and she was changed to torsemide 20 mg daily and put on fluid restriction, however she did develop sleeping and developed superficial ulcers she states that 1 week ago she developed redness and swelling of the right leg with pain she denies any fever or chills she has no nausea vomiting or diarrhea no chest pain no shortness of breath. Last chest x-ray showed no acute processes last BNP was normal.  She will be admitted for further treatment of her bilateral lower extremity cellulitis. Review of Systems:      10 point review of systems completed and is negative with stated above.     Objective:   No intake or output data in the 24 hours ending 04/17/23 1438   Vitals:   Vitals:    04/17/23 1320   BP: (!) 151/65   Pulse:

## 2023-04-18 ENCOUNTER — APPOINTMENT (OUTPATIENT)
Dept: ULTRASOUND IMAGING | Age: 88
DRG: 603 | End: 2023-04-18
Attending: INTERNAL MEDICINE
Payer: MEDICARE

## 2023-04-18 LAB
ANION GAP SERPL CALCULATED.3IONS-SCNC: 8 MMOL/L (ref 4–16)
BASOPHILS ABSOLUTE: 0.1 K/CU MM
BASOPHILS RELATIVE PERCENT: 0.6 % (ref 0–1)
BUN SERPL-MCNC: 16 MG/DL (ref 6–23)
CALCIUM SERPL-MCNC: 8.8 MG/DL (ref 8.3–10.6)
CHLORIDE BLD-SCNC: 98 MMOL/L (ref 99–110)
CO2: 33 MMOL/L (ref 21–32)
CREAT SERPL-MCNC: 0.8 MG/DL (ref 0.6–1.1)
DIFFERENTIAL TYPE: ABNORMAL
EOSINOPHILS ABSOLUTE: 0.1 K/CU MM
EOSINOPHILS RELATIVE PERCENT: 1.3 % (ref 0–3)
GFR SERPL CREATININE-BSD FRML MDRD: >60 ML/MIN/1.73M2
GLUCOSE BLD-MCNC: 109 MG/DL (ref 70–99)
GLUCOSE BLD-MCNC: 128 MG/DL (ref 70–99)
GLUCOSE BLD-MCNC: 236 MG/DL (ref 70–99)
GLUCOSE SERPL-MCNC: 169 MG/DL (ref 70–99)
HCT VFR BLD CALC: 34.9 % (ref 37–47)
HEMOGLOBIN: 10.7 GM/DL (ref 12.5–16)
IMMATURE NEUTROPHIL %: 1.6 % (ref 0–0.43)
LYMPHOCYTES ABSOLUTE: 1.5 K/CU MM
LYMPHOCYTES RELATIVE PERCENT: 17.1 % (ref 24–44)
MAGNESIUM: 2 MG/DL (ref 1.8–2.4)
MCH RBC QN AUTO: 28.7 PG (ref 27–31)
MCHC RBC AUTO-ENTMCNC: 30.7 % (ref 32–36)
MCV RBC AUTO: 93.6 FL (ref 78–100)
MONOCYTES ABSOLUTE: 1.8 K/CU MM
MONOCYTES RELATIVE PERCENT: 19.6 % (ref 0–4)
PDW BLD-RTO: 13.7 % (ref 11.7–14.9)
PHOSPHORUS: 3.4 MG/DL (ref 2.5–4.9)
PLATELET # BLD: 178 K/CU MM (ref 140–440)
PMV BLD AUTO: 9.4 FL (ref 7.5–11.1)
POTASSIUM SERPL-SCNC: 4 MMOL/L (ref 3.5–5.1)
PROCALCITONIN SERPL-MCNC: 0.05 NG/ML
RBC # BLD: 3.73 M/CU MM (ref 4.2–5.4)
SEGMENTED NEUTROPHILS ABSOLUTE COUNT: 5.4 K/CU MM
SEGMENTED NEUTROPHILS RELATIVE PERCENT: 59.8 % (ref 36–66)
SODIUM BLD-SCNC: 139 MMOL/L (ref 135–145)
TOTAL IMMATURE NEUTOROPHIL: 0.14 K/CU MM
WBC # BLD: 9 K/CU MM (ref 4–10.5)

## 2023-04-18 PROCEDURE — 83735 ASSAY OF MAGNESIUM: CPT

## 2023-04-18 PROCEDURE — 97530 THERAPEUTIC ACTIVITIES: CPT

## 2023-04-18 PROCEDURE — 6370000000 HC RX 637 (ALT 250 FOR IP): Performed by: NURSE PRACTITIONER

## 2023-04-18 PROCEDURE — 6360000002 HC RX W HCPCS: Performed by: NURSE PRACTITIONER

## 2023-04-18 PROCEDURE — 84100 ASSAY OF PHOSPHORUS: CPT

## 2023-04-18 PROCEDURE — 94640 AIRWAY INHALATION TREATMENT: CPT

## 2023-04-18 PROCEDURE — 97110 THERAPEUTIC EXERCISES: CPT

## 2023-04-18 PROCEDURE — 1200000000 HC SEMI PRIVATE

## 2023-04-18 PROCEDURE — 97162 PT EVAL MOD COMPLEX 30 MIN: CPT

## 2023-04-18 PROCEDURE — 87040 BLOOD CULTURE FOR BACTERIA: CPT

## 2023-04-18 PROCEDURE — 85025 COMPLETE CBC W/AUTO DIFF WBC: CPT

## 2023-04-18 PROCEDURE — C1751 CATH, INF, PER/CENT/MIDLINE: HCPCS

## 2023-04-18 PROCEDURE — 97166 OT EVAL MOD COMPLEX 45 MIN: CPT

## 2023-04-18 PROCEDURE — 76937 US GUIDE VASCULAR ACCESS: CPT

## 2023-04-18 PROCEDURE — 94761 N-INVAS EAR/PLS OXIMETRY MLT: CPT

## 2023-04-18 PROCEDURE — 05HD33Z INSERTION OF INFUSION DEVICE INTO RIGHT CEPHALIC VEIN, PERCUTANEOUS APPROACH: ICD-10-PCS | Performed by: INTERNAL MEDICINE

## 2023-04-18 PROCEDURE — 80048 BASIC METABOLIC PNL TOTAL CA: CPT

## 2023-04-18 PROCEDURE — 36410 VNPNXR 3YR/> PHY/QHP DX/THER: CPT

## 2023-04-18 PROCEDURE — 2580000003 HC RX 258: Performed by: NURSE PRACTITIONER

## 2023-04-18 PROCEDURE — 99211 OFF/OP EST MAY X REQ PHY/QHP: CPT

## 2023-04-18 PROCEDURE — 93971 EXTREMITY STUDY: CPT

## 2023-04-18 PROCEDURE — 82962 GLUCOSE BLOOD TEST: CPT

## 2023-04-18 PROCEDURE — 84145 PROCALCITONIN (PCT): CPT

## 2023-04-18 RX ORDER — GABAPENTIN 600 MG/1
600 TABLET ORAL NIGHTLY
Status: DISCONTINUED | OUTPATIENT
Start: 2023-04-18 | End: 2023-04-20 | Stop reason: HOSPADM

## 2023-04-18 RX ORDER — FUROSEMIDE 10 MG/ML
20 INJECTION INTRAMUSCULAR; INTRAVENOUS ONCE
Status: COMPLETED | OUTPATIENT
Start: 2023-04-18 | End: 2023-04-18

## 2023-04-18 RX ORDER — ALBUTEROL SULFATE 2.5 MG/3ML
2.5 SOLUTION RESPIRATORY (INHALATION) EVERY 6 HOURS PRN
Status: DISCONTINUED | OUTPATIENT
Start: 2023-04-18 | End: 2023-04-20 | Stop reason: HOSPADM

## 2023-04-18 RX ORDER — UBIDECARENONE 100 MG
200 CAPSULE ORAL 2 TIMES DAILY
Status: DISCONTINUED | OUTPATIENT
Start: 2023-04-18 | End: 2023-04-20 | Stop reason: HOSPADM

## 2023-04-18 RX ORDER — ALBUTEROL SULFATE 2.5 MG/3ML
SOLUTION RESPIRATORY (INHALATION)
Status: DISPENSED
Start: 2023-04-18 | End: 2023-04-18

## 2023-04-18 RX ADMIN — ROPINIROLE HYDROCHLORIDE 2 MG: 1 TABLET, FILM COATED ORAL at 14:52

## 2023-04-18 RX ADMIN — ACETAMINOPHEN 650 MG: 325 TABLET ORAL at 14:52

## 2023-04-18 RX ADMIN — SODIUM CHLORIDE, PRESERVATIVE FREE 10 ML: 5 INJECTION INTRAVENOUS at 20:13

## 2023-04-18 RX ADMIN — TORSEMIDE 20 MG: 20 TABLET ORAL at 08:27

## 2023-04-18 RX ADMIN — ASPIRIN 81 MG: 81 TABLET, COATED ORAL at 08:27

## 2023-04-18 RX ADMIN — Medication 200 MG: at 12:10

## 2023-04-18 RX ADMIN — SODIUM CHLORIDE 25 ML: 9 INJECTION, SOLUTION INTRAVENOUS at 02:59

## 2023-04-18 RX ADMIN — FUROSEMIDE 20 MG: 10 INJECTION, SOLUTION INTRAMUSCULAR; INTRAVENOUS at 12:10

## 2023-04-18 RX ADMIN — ROSUVASTATIN CALCIUM 10 MG: 5 TABLET, FILM COATED ORAL at 20:22

## 2023-04-18 RX ADMIN — Medication 5000 UNITS: at 08:27

## 2023-04-18 RX ADMIN — SODIUM CHLORIDE, PRESERVATIVE FREE 10 ML: 5 INJECTION INTRAVENOUS at 08:28

## 2023-04-18 RX ADMIN — VANCOMYCIN 1500 MG: 1.5 INJECTION, SOLUTION INTRAVENOUS at 15:46

## 2023-04-18 RX ADMIN — POLYVINYL ALCOHOL 1 DROP: 14 SOLUTION/ DROPS OPHTHALMIC at 20:12

## 2023-04-18 RX ADMIN — Medication 200 MG: at 20:14

## 2023-04-18 RX ADMIN — POLYVINYL ALCOHOL 1 DROP: 14 SOLUTION/ DROPS OPHTHALMIC at 08:26

## 2023-04-18 RX ADMIN — ENOXAPARIN SODIUM 30 MG: 100 INJECTION SUBCUTANEOUS at 08:26

## 2023-04-18 RX ADMIN — ENOXAPARIN SODIUM 30 MG: 100 INJECTION SUBCUTANEOUS at 20:15

## 2023-04-18 RX ADMIN — LOSARTAN POTASSIUM 50 MG: 50 TABLET, FILM COATED ORAL at 08:26

## 2023-04-18 RX ADMIN — ROPINIROLE HYDROCHLORIDE 2 MG: 1 TABLET, FILM COATED ORAL at 20:13

## 2023-04-18 RX ADMIN — ACETAMINOPHEN 650 MG: 325 TABLET ORAL at 08:27

## 2023-04-18 RX ADMIN — HYDROXYCHLOROQUINE SULFATE 200 MG: 200 TABLET ORAL at 08:27

## 2023-04-18 RX ADMIN — MOMETASONE FUROATE AND FORMOTEROL FUMARATE DIHYDRATE 2 PUFF: 100; 5 AEROSOL RESPIRATORY (INHALATION) at 07:10

## 2023-04-18 RX ADMIN — ROPINIROLE HYDROCHLORIDE 2 MG: 1 TABLET, FILM COATED ORAL at 08:26

## 2023-04-18 RX ADMIN — GABAPENTIN 600 MG: 600 TABLET, FILM COATED ORAL at 20:14

## 2023-04-18 RX ADMIN — LOSARTAN POTASSIUM 50 MG: 50 TABLET, FILM COATED ORAL at 20:13

## 2023-04-18 RX ADMIN — METOPROLOL SUCCINATE 25 MG: 25 TABLET, EXTENDED RELEASE ORAL at 08:27

## 2023-04-18 RX ADMIN — CEFEPIME 2000 MG: 2 INJECTION, POWDER, FOR SOLUTION INTRAVENOUS at 03:00

## 2023-04-18 RX ADMIN — SODIUM CHLORIDE: 9 INJECTION, SOLUTION INTRAVENOUS at 14:53

## 2023-04-18 RX ADMIN — CEFEPIME 2000 MG: 2 INJECTION, POWDER, FOR SOLUTION INTRAVENOUS at 14:53

## 2023-04-18 RX ADMIN — ACETAMINOPHEN 650 MG: 325 TABLET ORAL at 00:12

## 2023-04-18 ASSESSMENT — PAIN - FUNCTIONAL ASSESSMENT: PAIN_FUNCTIONAL_ASSESSMENT: ACTIVITIES ARE NOT PREVENTED

## 2023-04-18 ASSESSMENT — PAIN DESCRIPTION - PAIN TYPE: TYPE: ACUTE PAIN

## 2023-04-18 ASSESSMENT — PAIN DESCRIPTION - FREQUENCY: FREQUENCY: INTERMITTENT

## 2023-04-18 ASSESSMENT — PAIN DESCRIPTION - DESCRIPTORS: DESCRIPTORS: ACHING

## 2023-04-18 ASSESSMENT — PAIN SCALES - GENERAL
PAINLEVEL_OUTOF10: 3
PAINLEVEL_OUTOF10: 0

## 2023-04-18 ASSESSMENT — PAIN DESCRIPTION - LOCATION: LOCATION: LEG

## 2023-04-18 ASSESSMENT — PAIN DESCRIPTION - ONSET: ONSET: ON-GOING

## 2023-04-18 ASSESSMENT — PAIN DESCRIPTION - ORIENTATION: ORIENTATION: RIGHT;LEFT

## 2023-04-18 NOTE — CARE COORDINATION
Identified Issues/Barriers to RETURNING to current housing: None   Potential Assistance needed at discharge: Home Care            Potential DME: None   Patient expects to discharge to: [address] [place] [address] for transportation at discharge: Family    Financial    Payor: Manny Carroll / Plan: [facility] HMO / Product Type: *No Product type* /     Does insurance require precert for SNF: Yes    Potential assistance Purchasing Medications: No  Meds-to-Beds request: N/A      [facility] - [place], [place] - [address] -  [phone]  [address]  [facility] [address]  Phone: [phone] Fax: [phone]      Notes:    Factors facilitating achievement of predicted outcomes: Family support, Caregiver support, Cooperative, and Pleasant    Barriers to discharge: BLE cellulitis     Additional Case Management Notes: This CM is familiar with the patient from previous hospital admissions and swing bed admissions. CM met with the patient to initiate discharge planning, patient setting up on the edge of the bed. Patient lives alone in a single level apartment (1 small threshold to enter, no steps), has insurance with Rx coverage & PCP, and stated that she was independent with ADLs prior to admission. Patient stated that she uses a cane and rollator at home and does not require home oxygen but does use an inhaler. Patient stated that she receives services through the [employer] waiver program which includes personal aide services through [facility], emergency response system, home delivered meals, and incontinence supplies. The patient's PASSPORT  is Kurtis Schrader ([phone]). Patient stated that her personal aide visits on Mon/Tues/Wed/Fri for a total of 11 hours each week. The patient's aide performs light housekeeping chores and does the patient's grocery shopping. Patient reports that her daughter also provides assistance as needed.   Patient reports chronic

## 2023-04-18 NOTE — CONSULTS
breakdown AEB ashley. Specialty Bed Required : no  [] Low Air Loss   [] Pressure Redistribution  [] Fluid Immersion  [] Bariatric  [] Total Pressure Relief  [] Other:     Discharge Plan:  Placement for patient upon discharge: tbd  Hospice Care: no  Patient appropriate for Outpatient [address]: [facility]    Patient/Caregiver Teaching:  Level of patient/caregiver understanding able to: pt voiced understanding. Electronically signed by Merly Rolon RN,  on 4/18/2023 at 2:15 PM

## 2023-04-19 LAB
ANION GAP SERPL CALCULATED.3IONS-SCNC: 10 MMOL/L (ref 4–16)
BASOPHILS ABSOLUTE: 0.1 K/CU MM
BASOPHILS RELATIVE PERCENT: 0.7 % (ref 0–1)
BUN SERPL-MCNC: 16 MG/DL (ref 6–23)
CALCIUM SERPL-MCNC: 8.8 MG/DL (ref 8.3–10.6)
CHLORIDE BLD-SCNC: 100 MMOL/L (ref 99–110)
CO2: 30 MMOL/L (ref 21–32)
CREAT SERPL-MCNC: 0.7 MG/DL (ref 0.6–1.1)
DIFFERENTIAL TYPE: ABNORMAL
EOSINOPHILS ABSOLUTE: 0.1 K/CU MM
EOSINOPHILS RELATIVE PERCENT: 1.3 % (ref 0–3)
GFR SERPL CREATININE-BSD FRML MDRD: >60 ML/MIN/1.73M2
GLUCOSE BLD-MCNC: 113 MG/DL (ref 70–99)
GLUCOSE BLD-MCNC: 117 MG/DL (ref 70–99)
GLUCOSE BLD-MCNC: 135 MG/DL (ref 70–99)
GLUCOSE BLD-MCNC: 136 MG/DL (ref 70–99)
GLUCOSE BLD-MCNC: 137 MG/DL (ref 70–99)
GLUCOSE BLD-MCNC: 97 MG/DL (ref 70–99)
GLUCOSE SERPL-MCNC: 99 MG/DL (ref 70–99)
HCT VFR BLD CALC: 37.9 % (ref 37–47)
HEMOGLOBIN: 11.6 GM/DL (ref 12.5–16)
IMMATURE NEUTROPHIL %: 0.8 % (ref 0–0.43)
LYMPHOCYTES ABSOLUTE: 1.9 K/CU MM
LYMPHOCYTES RELATIVE PERCENT: 21.6 % (ref 24–44)
MAGNESIUM: 2.1 MG/DL (ref 1.8–2.4)
MCH RBC QN AUTO: 28.2 PG (ref 27–31)
MCHC RBC AUTO-ENTMCNC: 30.6 % (ref 32–36)
MCV RBC AUTO: 92.2 FL (ref 78–100)
MONOCYTES ABSOLUTE: 1.7 K/CU MM
MONOCYTES RELATIVE PERCENT: 18.9 % (ref 0–4)
PDW BLD-RTO: 13.7 % (ref 11.7–14.9)
PHOSPHORUS: 3.4 MG/DL (ref 2.5–4.9)
PLATELET # BLD: 194 K/CU MM (ref 140–440)
PMV BLD AUTO: 9.8 FL (ref 7.5–11.1)
POTASSIUM SERPL-SCNC: 4.1 MMOL/L (ref 3.5–5.1)
RBC # BLD: 4.11 M/CU MM (ref 4.2–5.4)
SEGMENTED NEUTROPHILS ABSOLUTE COUNT: 5 K/CU MM
SEGMENTED NEUTROPHILS RELATIVE PERCENT: 56.7 % (ref 36–66)
SODIUM BLD-SCNC: 140 MMOL/L (ref 135–145)
TOTAL IMMATURE NEUTOROPHIL: 0.07 K/CU MM
VANCOMYCIN RANDOM: 14.5 UG/ML
WBC # BLD: 8.7 K/CU MM (ref 4–10.5)

## 2023-04-19 PROCEDURE — 83735 ASSAY OF MAGNESIUM: CPT

## 2023-04-19 PROCEDURE — 85025 COMPLETE CBC W/AUTO DIFF WBC: CPT

## 2023-04-19 PROCEDURE — 84100 ASSAY OF PHOSPHORUS: CPT

## 2023-04-19 PROCEDURE — 6370000000 HC RX 637 (ALT 250 FOR IP): Performed by: NURSE PRACTITIONER

## 2023-04-19 PROCEDURE — 82962 GLUCOSE BLOOD TEST: CPT

## 2023-04-19 PROCEDURE — 6360000002 HC RX W HCPCS: Performed by: NURSE PRACTITIONER

## 2023-04-19 PROCEDURE — 94761 N-INVAS EAR/PLS OXIMETRY MLT: CPT

## 2023-04-19 PROCEDURE — 94640 AIRWAY INHALATION TREATMENT: CPT

## 2023-04-19 PROCEDURE — 2580000003 HC RX 258: Performed by: NURSE PRACTITIONER

## 2023-04-19 PROCEDURE — 80202 ASSAY OF VANCOMYCIN: CPT

## 2023-04-19 PROCEDURE — 80048 BASIC METABOLIC PNL TOTAL CA: CPT

## 2023-04-19 PROCEDURE — 1200000000 HC SEMI PRIVATE

## 2023-04-19 RX ORDER — 0.9 % SODIUM CHLORIDE 0.9 %
500 INTRAVENOUS SOLUTION INTRAVENOUS ONCE
Status: COMPLETED | OUTPATIENT
Start: 2023-04-19 | End: 2023-04-19

## 2023-04-19 RX ADMIN — POLYVINYL ALCOHOL 1 DROP: 14 SOLUTION/ DROPS OPHTHALMIC at 21:47

## 2023-04-19 RX ADMIN — ENOXAPARIN SODIUM 30 MG: 100 INJECTION SUBCUTANEOUS at 08:13

## 2023-04-19 RX ADMIN — ACETAMINOPHEN 650 MG: 325 TABLET ORAL at 06:21

## 2023-04-19 RX ADMIN — MOMETASONE FUROATE AND FORMOTEROL FUMARATE DIHYDRATE 2 PUFF: 100; 5 AEROSOL RESPIRATORY (INHALATION) at 07:37

## 2023-04-19 RX ADMIN — SODIUM CHLORIDE, PRESERVATIVE FREE 10 ML: 5 INJECTION INTRAVENOUS at 09:33

## 2023-04-19 RX ADMIN — METOPROLOL SUCCINATE 25 MG: 25 TABLET, EXTENDED RELEASE ORAL at 09:05

## 2023-04-19 RX ADMIN — SODIUM CHLORIDE 500 ML: 9 INJECTION, SOLUTION INTRAVENOUS at 10:18

## 2023-04-19 RX ADMIN — ASPIRIN 81 MG: 81 TABLET, COATED ORAL at 08:16

## 2023-04-19 RX ADMIN — Medication 5000 UNITS: at 08:16

## 2023-04-19 RX ADMIN — CEFEPIME 2000 MG: 2 INJECTION, POWDER, FOR SOLUTION INTRAVENOUS at 02:53

## 2023-04-19 RX ADMIN — POLYVINYL ALCOHOL 1 DROP: 14 SOLUTION/ DROPS OPHTHALMIC at 08:13

## 2023-04-19 RX ADMIN — ACETAMINOPHEN 650 MG: 325 TABLET ORAL at 21:41

## 2023-04-19 RX ADMIN — ENOXAPARIN SODIUM 30 MG: 100 INJECTION SUBCUTANEOUS at 21:40

## 2023-04-19 RX ADMIN — SODIUM CHLORIDE 25 ML: 9 INJECTION, SOLUTION INTRAVENOUS at 02:50

## 2023-04-19 RX ADMIN — ROPINIROLE HYDROCHLORIDE 2 MG: 1 TABLET, FILM COATED ORAL at 08:15

## 2023-04-19 RX ADMIN — ROPINIROLE HYDROCHLORIDE 2 MG: 1 TABLET, FILM COATED ORAL at 21:47

## 2023-04-19 RX ADMIN — TORSEMIDE 20 MG: 20 TABLET ORAL at 09:05

## 2023-04-19 RX ADMIN — Medication 200 MG: at 08:15

## 2023-04-19 RX ADMIN — LOSARTAN POTASSIUM 50 MG: 50 TABLET, FILM COATED ORAL at 09:05

## 2023-04-19 RX ADMIN — ROSUVASTATIN CALCIUM 10 MG: 5 TABLET, FILM COATED ORAL at 21:41

## 2023-04-19 RX ADMIN — SODIUM CHLORIDE, PRESERVATIVE FREE 10 ML: 5 INJECTION INTRAVENOUS at 21:00

## 2023-04-19 RX ADMIN — GABAPENTIN 600 MG: 600 TABLET, FILM COATED ORAL at 21:41

## 2023-04-19 RX ADMIN — ROPINIROLE HYDROCHLORIDE 2 MG: 1 TABLET, FILM COATED ORAL at 15:46

## 2023-04-19 RX ADMIN — LOSARTAN POTASSIUM 50 MG: 50 TABLET, FILM COATED ORAL at 21:41

## 2023-04-19 RX ADMIN — CEFEPIME 2000 MG: 2 INJECTION, POWDER, FOR SOLUTION INTRAVENOUS at 15:48

## 2023-04-19 RX ADMIN — Medication 200 MG: at 21:41

## 2023-04-19 RX ADMIN — SODIUM CHLORIDE: 9 INJECTION, SOLUTION INTRAVENOUS at 15:48

## 2023-04-19 RX ADMIN — VANCOMYCIN 1500 MG: 1.5 INJECTION, SOLUTION INTRAVENOUS at 17:44

## 2023-04-19 RX ADMIN — HYDROXYCHLOROQUINE SULFATE 200 MG: 200 TABLET ORAL at 08:14

## 2023-04-19 ASSESSMENT — PAIN DESCRIPTION - LOCATION
LOCATION: LEG
LOCATION: LEG

## 2023-04-19 ASSESSMENT — PAIN DESCRIPTION - ORIENTATION
ORIENTATION: RIGHT;LEFT;LOWER
ORIENTATION: LEFT;RIGHT

## 2023-04-19 ASSESSMENT — PAIN DESCRIPTION - FREQUENCY
FREQUENCY: INTERMITTENT
FREQUENCY: INTERMITTENT

## 2023-04-19 ASSESSMENT — PAIN DESCRIPTION - PAIN TYPE
TYPE: ACUTE PAIN
TYPE: CHRONIC PAIN

## 2023-04-19 ASSESSMENT — PAIN DESCRIPTION - DESCRIPTORS
DESCRIPTORS: ACHING
DESCRIPTORS: CRAMPING

## 2023-04-19 ASSESSMENT — PAIN SCALES - GENERAL
PAINLEVEL_OUTOF10: 3
PAINLEVEL_OUTOF10: 3
PAINLEVEL_OUTOF10: 0

## 2023-04-19 ASSESSMENT — PAIN - FUNCTIONAL ASSESSMENT
PAIN_FUNCTIONAL_ASSESSMENT: ACTIVITIES ARE NOT PREVENTED
PAIN_FUNCTIONAL_ASSESSMENT: PREVENTS OR INTERFERES SOME ACTIVE ACTIVITIES AND ADLS

## 2023-04-19 ASSESSMENT — PAIN DESCRIPTION - ONSET
ONSET: GRADUAL
ONSET: AWAKENED FROM SLEEP

## 2023-04-20 VITALS
RESPIRATION RATE: 17 BRPM | SYSTOLIC BLOOD PRESSURE: 103 MMHG | HEIGHT: 64 IN | TEMPERATURE: 97.8 F | HEART RATE: 80 BPM | OXYGEN SATURATION: 97 % | BODY MASS INDEX: 42.39 KG/M2 | DIASTOLIC BLOOD PRESSURE: 36 MMHG | WEIGHT: 248.3 LBS

## 2023-04-20 LAB
ANION GAP SERPL CALCULATED.3IONS-SCNC: 10 MMOL/L (ref 4–16)
BASOPHILS ABSOLUTE: 0.1 K/CU MM
BASOPHILS RELATIVE PERCENT: 0.8 % (ref 0–1)
BUN SERPL-MCNC: 20 MG/DL (ref 6–23)
CALCIUM SERPL-MCNC: 9.1 MG/DL (ref 8.3–10.6)
CHLORIDE BLD-SCNC: 100 MMOL/L (ref 99–110)
CO2: 29 MMOL/L (ref 21–32)
CREAT SERPL-MCNC: 0.8 MG/DL (ref 0.6–1.1)
DIFFERENTIAL TYPE: ABNORMAL
EOSINOPHILS ABSOLUTE: 0.1 K/CU MM
EOSINOPHILS RELATIVE PERCENT: 1.4 % (ref 0–3)
GFR SERPL CREATININE-BSD FRML MDRD: >60 ML/MIN/1.73M2
GLUCOSE BLD-MCNC: 105 MG/DL (ref 70–99)
GLUCOSE BLD-MCNC: 110 MG/DL (ref 70–99)
GLUCOSE SERPL-MCNC: 90 MG/DL (ref 70–99)
HCT VFR BLD CALC: 37.3 % (ref 37–47)
HEMOGLOBIN: 11.2 GM/DL (ref 12.5–16)
IMMATURE NEUTROPHIL %: 0.7 % (ref 0–0.43)
LYMPHOCYTES ABSOLUTE: 2 K/CU MM
LYMPHOCYTES RELATIVE PERCENT: 22.3 % (ref 24–44)
MAGNESIUM: 2.1 MG/DL (ref 1.8–2.4)
MCH RBC QN AUTO: 28.1 PG (ref 27–31)
MCHC RBC AUTO-ENTMCNC: 30 % (ref 32–36)
MCV RBC AUTO: 93.5 FL (ref 78–100)
MONOCYTES ABSOLUTE: 1.9 K/CU MM
MONOCYTES RELATIVE PERCENT: 21.5 % (ref 0–4)
PDW BLD-RTO: 13.6 % (ref 11.7–14.9)
PHOSPHORUS: 3.2 MG/DL (ref 2.5–4.9)
PLATELET # BLD: 195 K/CU MM (ref 140–440)
PMV BLD AUTO: 10.5 FL (ref 7.5–11.1)
POTASSIUM SERPL-SCNC: 4.3 MMOL/L (ref 3.5–5.1)
RBC # BLD: 3.99 M/CU MM (ref 4.2–5.4)
SEGMENTED NEUTROPHILS ABSOLUTE COUNT: 4.7 K/CU MM
SEGMENTED NEUTROPHILS RELATIVE PERCENT: 53.3 % (ref 36–66)
SODIUM BLD-SCNC: 139 MMOL/L (ref 135–145)
TOTAL IMMATURE NEUTOROPHIL: 0.06 K/CU MM
WBC # BLD: 8.9 K/CU MM (ref 4–10.5)

## 2023-04-20 PROCEDURE — 82962 GLUCOSE BLOOD TEST: CPT

## 2023-04-20 PROCEDURE — 84100 ASSAY OF PHOSPHORUS: CPT

## 2023-04-20 PROCEDURE — 94761 N-INVAS EAR/PLS OXIMETRY MLT: CPT

## 2023-04-20 PROCEDURE — 83735 ASSAY OF MAGNESIUM: CPT

## 2023-04-20 PROCEDURE — 97530 THERAPEUTIC ACTIVITIES: CPT

## 2023-04-20 PROCEDURE — 85025 COMPLETE CBC W/AUTO DIFF WBC: CPT

## 2023-04-20 PROCEDURE — 6360000002 HC RX W HCPCS: Performed by: NURSE PRACTITIONER

## 2023-04-20 PROCEDURE — 2580000003 HC RX 258: Performed by: NURSE PRACTITIONER

## 2023-04-20 PROCEDURE — 6370000000 HC RX 637 (ALT 250 FOR IP): Performed by: NURSE PRACTITIONER

## 2023-04-20 PROCEDURE — 80048 BASIC METABOLIC PNL TOTAL CA: CPT

## 2023-04-20 RX ORDER — AMOXICILLIN AND CLAVULANATE POTASSIUM 875; 125 MG/1; MG/1
1 TABLET, FILM COATED ORAL 2 TIMES DAILY
Qty: 14 TABLET | Refills: 0 | Status: SHIPPED | OUTPATIENT
Start: 2023-04-20 | End: 2023-04-27

## 2023-04-20 RX ORDER — LOSARTAN POTASSIUM 25 MG/1
25 TABLET ORAL DAILY
Status: DISCONTINUED | OUTPATIENT
Start: 2023-04-20 | End: 2023-04-20 | Stop reason: HOSPADM

## 2023-04-20 RX ORDER — LOSARTAN POTASSIUM 25 MG/1
25 TABLET ORAL DAILY
Qty: 30 TABLET | Refills: 3 | Status: SHIPPED | OUTPATIENT
Start: 2023-04-21

## 2023-04-20 RX ADMIN — TORSEMIDE 20 MG: 20 TABLET ORAL at 10:39

## 2023-04-20 RX ADMIN — SODIUM CHLORIDE 25 ML: 9 INJECTION, SOLUTION INTRAVENOUS at 02:50

## 2023-04-20 RX ADMIN — ROPINIROLE HYDROCHLORIDE 2 MG: 1 TABLET, FILM COATED ORAL at 16:04

## 2023-04-20 RX ADMIN — ASPIRIN 81 MG: 81 TABLET, COATED ORAL at 10:38

## 2023-04-20 RX ADMIN — LOSARTAN POTASSIUM 25 MG: 25 TABLET, FILM COATED ORAL at 10:38

## 2023-04-20 RX ADMIN — CEFEPIME 2000 MG: 2 INJECTION, POWDER, FOR SOLUTION INTRAVENOUS at 02:51

## 2023-04-20 RX ADMIN — ENOXAPARIN SODIUM 30 MG: 100 INJECTION SUBCUTANEOUS at 10:40

## 2023-04-20 RX ADMIN — METOPROLOL SUCCINATE 25 MG: 25 TABLET, EXTENDED RELEASE ORAL at 10:38

## 2023-04-20 RX ADMIN — Medication 5000 UNITS: at 10:39

## 2023-04-20 RX ADMIN — HYDROXYCHLOROQUINE SULFATE 200 MG: 200 TABLET ORAL at 10:39

## 2023-04-20 RX ADMIN — POLYVINYL ALCOHOL 1 DROP: 14 SOLUTION/ DROPS OPHTHALMIC at 10:41

## 2023-04-20 RX ADMIN — SODIUM CHLORIDE, PRESERVATIVE FREE 10 ML: 5 INJECTION INTRAVENOUS at 10:41

## 2023-04-20 RX ADMIN — ACETAMINOPHEN 650 MG: 325 TABLET ORAL at 05:00

## 2023-04-20 RX ADMIN — Medication 200 MG: at 10:38

## 2023-04-20 RX ADMIN — ROPINIROLE HYDROCHLORIDE 2 MG: 1 TABLET, FILM COATED ORAL at 06:10

## 2023-04-20 ASSESSMENT — PAIN DESCRIPTION - DESCRIPTORS
DESCRIPTORS: ACHING;DISCOMFORT
DESCRIPTORS: ACHING

## 2023-04-20 ASSESSMENT — PAIN DESCRIPTION - ORIENTATION
ORIENTATION: LEFT;RIGHT
ORIENTATION: RIGHT;LEFT

## 2023-04-20 ASSESSMENT — PAIN DESCRIPTION - FREQUENCY
FREQUENCY: INTERMITTENT
FREQUENCY: INTERMITTENT

## 2023-04-20 ASSESSMENT — PAIN DESCRIPTION - PAIN TYPE
TYPE: CHRONIC PAIN
TYPE: CHRONIC PAIN

## 2023-04-20 ASSESSMENT — PAIN SCALES - GENERAL
PAINLEVEL_OUTOF10: 4
PAINLEVEL_OUTOF10: 1
PAINLEVEL_OUTOF10: 7
PAINLEVEL_OUTOF10: 3

## 2023-04-20 ASSESSMENT — PAIN DESCRIPTION - ONSET
ONSET: GRADUAL
ONSET: GRADUAL

## 2023-04-20 ASSESSMENT — PAIN DESCRIPTION - LOCATION
LOCATION: LEG
LOCATION: LEG

## 2023-04-20 NOTE — DISCHARGE SUMMARY
V2.0  Discharge Summary    Name:  Octavia Mahoney /Age/Sex: 1935 (81 y.o. female)   Admit Date: 2023  Discharge Date: 23    MRN & CSN:  [id number] & [id number] Encounter Date and Time 23 2:09 PM EDT    Attending:  Felicia Espino MD Discharging Provider: Navneet Soto APRN - CNP       Hospital Course:     Brief HPI: Octavia Mahoney is a 81 y.o. female who presented with cellulitis    Brief Problem Based Course:     Bilateral lower extremity cellulitis worse on the right than the left, wound culture positive for enterococcus faecalis; was on vancomycin and cefepime. Will switch to Augmentin for a total of 10 days on discharge. Wound care follow up; R LEVD negative for DVT, blood cultures negative at 48 hours  Diabetes mellitus type 2, with skin complications, resume home medications on discharge  Hypertension, decrease Cozaar to 25mg PO daily (pt with complaints of dizziness 23); f/u with PCP for continued monitoring and adjustments. Hyperlipidemia, continue statin on discharge  History of chronic low back pain, pain is as needed, she is status post L1 and L4 laminectomy/bilateral L2-3 balloon kyphoplasty on 2022. History of lumbar stenosis with neurogenic claudication/restless leg syndrome, continue Requip on discharge  History of CAD/MI, patient is not complaining of any chest pain, continue home medications on discharge  History of arthritis continue Plaquenil on discharge      The patient expressed appropriate understanding of, and agreement with the discharge recommendations, medications, and plan.      Consults this admission:  PHARMACY TO DOSE VANCOMYCIN  PHARMACY TO DOSE VANCOMYCIN    Discharge Diagnosis:   Cellulitis    Discharge Instruction:   Follow up appointments: wound clinic  Primary care physician: Day Crespo MD within 2 weeks  Diet: diabetic diet   Activity: activity as tolerated  Disposition: Discharged to:   [x]Home, []HHC, []SNF, []Acute Rehab, []Hospice

## 2023-04-20 NOTE — PROGRESS NOTES
[facility]  consulted by Verenice Reid CNP for monitoring and adjustment. Indication for treatment: Vancomycin indication: SSTI with risk factors   Goal trough: Trough Goal: 10-15 mcg/mL  AUC/PASTOR: <500    Risk Factors for MRSA Identified:   Received IV antibiotics within the past 90 days    Pertinent Laboratory Values:   Temp Readings from Last 3 Encounters:   04/20/23 97.2 °F (36.2 °C) (Oral)   03/22/23 97.1 °F (36.2 °C) (Temporal)   03/09/23 97.2 °F (36.2 °C) (Temporal)     Recent Labs     04/18/23  0950 04/19/23  0300 04/20/23  0300   WBC 9.0 8.7 8.9       Recent Labs     04/18/23  0950 04/19/23  0300 04/20/23  0300   BUN 16 16 20   CREATININE 0.8 0.7 0.8       Estimated Creatinine Clearance: 62 mL/min (based on SCr of 0.8 mg/dL). Intake/Output Summary (Last 24 hours) at 4/20/2023 0758  Last data filed at 4/19/2023 1328  Gross per 24 hour   Intake 600 ml   Output --   Net 600 ml       Pertinent Cultures:   Date    Source    Results  4/18   Blood    In process  4/17   Wound    ENTEROCOCCUS FAECALIS     Vancomycin level:   TROUGH:  No results for input(s): VANCOTROUGH in the last 72 hours. RANDOM:    Recent Labs     04/19/23  0300   VANCORANDOM 14.5       Assessment:  HPI: Bilateral lower extremity cellulitis  SCr, BUN, and urine output: stable  Day(s) of therapy: 4 of 5  Vancomycin concentration:   4/19: 14.5 drawn ~11 hours post dose with predicted trough 15.3 and     Plan:  Continue vancomycin 1500mg IV daily (WBC normal, patient - plan discharge today)  Pharmacy will continue to monitor patient and adjust therapy as indicated    Thank you for the consult.   Shravan Flores, [facility]  4/20/2023 7:58 AM
[facility]  consulted by Lali Ovalles CNP for monitoring and adjustment. Indication for treatment: Vancomycin indication: SSTI with risk factors   Goal trough: Trough Goal: 10-15 mcg/mL  AUC/PASTOR: <500    Risk Factors for MRSA Identified:   Received IV antibiotics within the past 90 days    Pertinent Laboratory Values:   Temp Readings from Last 3 Encounters:   04/19/23 98 °F (36.7 °C) (Oral)   03/22/23 97.1 °F (36.2 °C) (Temporal)   03/09/23 97.2 °F (36.2 °C) (Temporal)     Recent Labs     04/17/23  1510 04/18/23  0950 04/19/23  0300   WBC 11.1* 9.0 8.7     Recent Labs     04/17/23  1510 04/18/23  0950 04/19/23  0300   BUN 15 16 16   CREATININE 0.6 0.8 0.7     Estimated Creatinine Clearance: 71 mL/min (based on SCr of 0.7 mg/dL). Intake/Output Summary (Last 24 hours) at 4/19/2023 0726  Last data filed at 4/18/2023 1819  Gross per 24 hour   Intake 720 ml   Output --   Net 720 ml     Pertinent Cultures:   Date    Source    Results  4/18   Blood    In process  4/17   Wound    ENTEROCOCCUS FAECALIS     Vancomycin level:   TROUGH:  No results for input(s): VANCOTROUGH in the last 72 hours. RANDOM:    Recent Labs     04/19/23  0300   VANCORANDOM 14.5     Assessment:  HPI: Bilateral lower extremity cellulitis  SCr, BUN, and urine output: stable  Day(s) of therapy: 3 of 5  Vancomycin concentration:   4/19: 14.5 drawn ~11 hours post dose with predicted trough 15.3 and     Plan:  Continue vancomycin 1500mg IV daily  Pharmacy will continue to monitor patient and adjust therapy as indicated    Thank you for the consult.   Gertha Moritz, [facility]  4/19/2023 7:26 AM
[facility]  consulted by Triston Harris CNP for monitoring and adjustment. Indication for treatment: Vancomycin indication: SSTI with risk factors   Goal trough: Trough Goal: 10-15 mcg/mL  AUC/PASTOR: <500    Risk Factors for MRSA Identified:   Received IV antibiotics within the past 90 days    Pertinent Laboratory Values:   Temp Readings from Last 3 Encounters:   04/18/23 98.4 °F (36.9 °C) (Oral)   03/22/23 97.1 °F (36.2 °C) (Temporal)   03/09/23 97.2 °F (36.2 °C) (Temporal)     Recent Labs     04/17/23  1510 04/18/23  0950   WBC 11.1* 9.0     Recent Labs     04/17/23  1510 04/18/23  0950   BUN 15 16   CREATININE 0.6 0.8     Estimated Creatinine Clearance: 62 mL/min (based on SCr of 0.8 mg/dL). Intake/Output Summary (Last 24 hours) at 4/18/2023 1136  Last data filed at 4/18/2023 0911  Gross per 24 hour   Intake 240 ml   Output 800 ml   Net -560 ml       Pertinent Cultures:   Date    Source    Results  4/18   Blood    In process  4/17   Wound    In process    Vancomycin level:   TROUGH:  No results for input(s): VANCOTROUGH in the last 72 hours. RANDOM:  No results for input(s): VANCORANDOM in the last 72 hours. Assessment:  HPI: Bilateral lower extremity cellulitis  SCr, BUN, and urine output: SCr trending up slightly   Day(s) of therapy: 2 of 5  Vancomycin concentration: to be collected    Plan:  Continue vancomycin 1500mg IV daily  Pharmacy will continue to monitor patient and adjust therapy as indicated    VANCOMYCIN CONCENTRATION SCHEDULED FOR 4/19 @ 0600    Thank you for the consult.   Harshil Yusuf, [facility]  4/18/2023 11:36 AM
[address]  consulted by  MARAL Young for monitoring and adjustment. Indication for treatment: Vancomycin indication: SSTI with risk factors   Goal trough: Trough Goal: 10-15 mcg/mL       Risk Factors for MRSA Identified:   Received IV antibiotics within the past 90 days    Pertinent Laboratory Values:   Temp Readings from Last 3 Encounters:   04/17/23 97.4 °F (36.3 °C) (Oral)   03/22/23 97.1 °F (36.2 °C) (Temporal)   03/09/23 97.2 °F (36.2 °C) (Temporal)     Recent Labs     04/17/23  1510   WBC 11.1*     No results for input(s): BUN, CREATININE in the last 72 hours. Estimated Creatinine Clearance: 83 mL/min (based on SCr of 0.6 mg/dL). No intake or output data in the 24 hours ending 04/17/23 1558    Pertinent Cultures:   Date    Source    Results     4/17   Wound    pending    Vancomycin level:   TROUGH:  No results for input(s): VANCOTROUGH in the last 72 hours. RANDOM:  No results for input(s): VANCORANDOM in the last 72 hours. Assessment:  HPI: Bilateral lower extremity cellulitis  SCr, BUN, and urine output: as above  Day(s) of therapy: 1  Vancomycin concentration: to be collected    Plan:  Vancomycin 1500 mg every 24 hr  Pharmacy will continue to monitor patient and adjust therapy as indicated    Bharath 3 4/19 @0600    Thank you for the consult.   Nicki Lara, [facility]  4/17/2023 3:58 PM
Discharge instructions given to patient, all questions answered, verbalized understanding, patient taken to car in wheelchair with all belongings to be driven home by son in law.
Facility/Department: [facility] UNIT  Physical Therapy Initial Assessment    Name Jeremías ARDON 1935   MRN [id number]   Date of Service 2023   Patient Room  -     Patient Diagnoses There were no encounter diagnoses. Past Medical History  has a past medical history of Arthritis, CAD (coronary artery disease), Chronic midline low back pain without sciatica, Claustrophobia, Colonoscopy refused, DM (diabetes mellitus), type 2 ([address] Dupuytren's contracture of right hand, Endometrial stripe increased, Gastrointestinal hemorrhage, Glaucoma, H/O echocardiogram, H/O echocardiogram, History of nuclear stress test, HTN (hypertension), Hyperlipidemia, Kidney stone, MI (myocardial infarction) ([facility] [address] Obesity, Open wound of right foot, Parainfluenza infection, Pneumonia of right lower lobe due to infectious organism, Vertigo, and WD-Traumatic ulcer of foot, right, with fat layer exposed ([facility] [address] Past Surgical History  has a past surgical history that includes Cataract removal (Bilateral); Inguinal hernia repair (Left, 45 yrs ago); Lumbar spine surgery (N/A, 2022); and Spine surgery (N/A, 2022). Discharge Recommendations:  Home with assist PRN        Assessment: Body Structures, Functions, Activity Limitations Requiring Skilled Therapeutic Intervention: Decreased functional mobility ; Increased pain;Decreased strength;Decreased safe awareness;Decreased endurance    Assessment: Patient is a 81 y.o. female who presents to [facility] with cellulitis. Patient would benefit from continued skilled physical therapy services to address decreased strength,  endurance, and decline in functional mobility. Treatment Diagnosis: generalized weakness  Therapy Prognosis: Good  Decision Making: Medium Complexity  Activity Tolerance  Activity Tolerance: Patient tolerated evaluation without incident;Patient limited by pain; Patient limited by fatigue     Plan:  Physcial Therapy Plan  General Plan: 2-3
Occupational Therapy    Occupational Therapy Treatment Note  Name: Gia Jones MRN: [id number] :   1935   Date:  2023   Admission Date: 2023 Room:  [address] [place] [address]   Restrictions/Precautions:  Restrictions/Precautions  Restrictions/Precautions: Fall Risk  Required Braces or Orthoses?: No     Communication with other providers:   Cleared for treatment by RN. Subjective:  Patient states:  \"I need some info on that butt wiper thingy\"    Objective:    Observation:  Pt alert and oriented      Therapeutic Activity Training: Therapist educated pt on different types of toilet aides for hygiene and printed off a list of products to choose and therapist recommended tong type due to easier to use and cheapest to purchase. Pt was agreeable to purchase reacher at [facility] or [facility] and report having a couple of them at home but wants another so she doesn't have to carry them around the house. Safety Measures: Gait belt used, Left in bed handoff to nursing      Assessment / Impression:        Patient's tolerance of treatment: Good   Adverse Reaction: None  Significant change in status and impact:  None  Barriers to improvement:  Dec strength and enduracne    Plan for Next Session:    Continue with POC.     Time in:  1555  Time out:  1605  Total treatment time:  10  Billed Units: 1 TA  Electronically signed by:    Isiah Rivera, [address]    2023, 4:13 PM    Previously filed values:     Short Term Goals  Time Frame for Short Term Goals: Until D/C or all goals met  Short Term Goal 1: Pt will complete UB ADL's with Mod Ind after set up  Short Term Goal 2: Pt will complete LB ADL's with Mod Ind after set up  Short Term Goal 3: Pt will complete toilet transfers with supervison with DME as appropriate  Short Term Goal 4: Pt will tolerate 15+ minutes of activity for greater Ind and safety with ADL's and prep for return to home
Pt refused assistance to the bathroom. House Supervisor and RN notified.
Son in Maurice Dumont, reports that he should be contacted first for updates or questions but Severiano Mejia should not be contacted at this time because family will contact Oj Irma as needed.
V2.0  [facility] Hospitalist Progress Note      Name:  Soledad Malone /Age/Sex: 1935  (81 y.o. female)   MRN & CSN:  [id number] & [id number] Encounter Date/Time: 2023 1:06 PM EDT    Location:   PCP: Stan Scott MD       Hospital Day: 2    Assessment and Plan:   Soledad Malone is a 81 y.o. female who presents with Cellulitis    Plan:    Bilateral lower extremity cellulitis worse on the right than the left, continue vancomycin and cefepime. Wound care consult pending; R LEVD pending, wound cultures, blood cultures pending  Diabetes mellitus type 2, with skin complications, will place on insulin sliding scale before meals and at bedtime medium dose, will add Lantus nightly if needed, p.o. medications will be placed on hold while inpatient. Hypertension, continue current medications as listed below  Hyperlipidemia, continue statin  History of chronic low back pain, pain is as needed, she is status post L1 and L4 laminectomy/bilateral L2-3 balloon kyphoplasty on 2022. History of lumbar stenosis with neurogenic claudication/restless leg syndrome, continue Requip  History of CAD/MI, patient is not complaining of any chest pain, continue current medications as listed below  History of arthritis continue Plaquenil    Diet ADULT DIET; Regular; 4 carb choices (60 gm/meal); Low Fat/Low Chol/High Fiber/EL; Low Sodium (2 gm)   DVT Prophylaxis [x] Lovenox, []  Heparin, [] SCDs, [] Ambulation,  [] Eliquis, [] Xarelto  [] Coumadin   Code Status Full Code   Disposition From: Home  Expected Disposition: Home  Estimated Date of Discharge: TBD  Patient requires continued admission due to pending tests   Surrogate Decision Maker/ POA child     Subjective:      Patient seen and examined this morning. Endorses pain and numbness to bilateral toes. Denies any other complaints. Review of Systems:    Review of Systems    10 point review of systems complete, negative unless stated above.     Objective:
sodium chloride 25 mL (04/19/23 0250)     PRN Meds: albuterol, 2.5 mg, Q6H PRN  glucose, 4 tablet, PRN  dextrose bolus, 125 mL, PRN   Or  dextrose bolus, 250 mL, PRN  glucagon (rDNA), 1 mg, PRN  dextrose, , Continuous PRN  sodium chloride flush, 5-40 mL, PRN  sodium chloride, , PRN  ondansetron, 4 mg, Q8H PRN   Or  ondansetron, 4 mg, Q6H PRN  polyethylene glycol, 17 g, Daily PRN  acetaminophen, 650 mg, Q6H PRN   Or  acetaminophen, 650 mg, Q6H PRN  sennosides-docusate sodium, 2 tablet, BID PRN        Labs      Recent Results (from the past 24 hour(s))   POCT Glucose    Collection Time: 04/18/23  7:44 PM   Result Value Ref Range    POC Glucose 236 (H) 70 - 99 MG/DL   POCT Glucose    Collection Time: 04/19/23  2:30 AM   Result Value Ref Range    POC Glucose 117 (H) 70 - 99 MG/DL   Vancomycin Level, Random    Collection Time: 04/19/23  3:00 AM   Result Value Ref Range    Vancomycin Rm 14.5 UG/ML   CBC with Auto Differential    Collection Time: 04/19/23  3:00 AM   Result Value Ref Range    WBC 8.7 4.0 - 10.5 K/CU MM    RBC 4.11 (L) 4.2 - 5.4 M/CU MM    Hemoglobin 11.6 (L) 12.5 - 16.0 GM/DL    Hematocrit 37.9 37 - 47 %    MCV 92.2 78 - 100 FL    MCH 28.2 27 - 31 PG    MCHC 30.6 (L) 32.0 - 36.0 %    RDW 13.7 11.7 - 14.9 %    Platelets 876 982 - 646 K/CU MM    MPV 9.8 7.5 - 11.1 FL    Differential Type AUTOMATED DIFFERENTIAL     Segs Relative 56.7 36 - 66 %    Lymphocytes % 21.6 (L) 24 - 44 %    Monocytes % 18.9 (H) 0 - 4 %    Eosinophils % 1.3 0 - 3 %    Basophils % 0.7 0 - 1 %    Segs Absolute 5.0 K/CU MM    Lymphocytes Absolute 1.9 K/CU MM    Monocytes Absolute 1.7 K/CU MM    Eosinophils Absolute 0.1 K/CU MM    Basophils Absolute 0.1 K/CU MM    Immature Neutrophil % 0.8 (H) 0 - 0.43 %    Total Immature Neutrophil 0.07 K/CU MM   Critical Care Panel    Collection Time: 04/19/23  3:00 AM   Result Value Ref Range    Sodium 140 135 - 145 MMOL/L    Potassium 4.1 3.5 - 5.1 MMOL/L    Chloride 100 99 - 110 mMol/L    CO2 30 21 -
Goals  Time Frame for Short Term Goals: Until D/C or all goals met  Short Term Goal 1: Pt will complete UB ADL's with Mod Ind after set up  Short Term Goal 2: Pt will complete LB ADL's with Mod Ind after set up  Short Term Goal 3: Pt will complete toilet transfers with supervison with DME as appropriate  Short Term Goal 4: Pt will tolerate 15+ minutes of activity for greater Ind and safety with ADL's and prep for return to home       Therapy Time   Individual Concurrent Group Co-treatment   Time In 1024         Time Out 1052         Minutes 28         Timed Code Treatment Minutes: [address] OT/L [id number]

## 2023-04-20 NOTE — PLAN OF CARE
Problem: ABCDS Injury Assessment  Goal: Absence of physical injury  Outcome: Not Progressing     Problem: Discharge Planning  Goal: Discharge to home or other facility with appropriate resources  Outcome: Progressing     Problem: Chronic Conditions and Co-morbidities  Goal: Patient's chronic conditions and co-morbidity symptoms are monitored and maintained or improved  Outcome: Progressing     Problem: Safety - Adult  Goal: Free from fall injury  Outcome: Adequate for Discharge     Problem: Pain  Goal: Verbalizes/displays adequate comfort level or baseline comfort level  Outcome: Adequate for Discharge     Problem: Skin/Tissue Integrity  Goal: Absence of new skin breakdown  Description: 1. Monitor for areas of redness and/or skin breakdown  2. Assess vascular access sites hourly  3. Every 4-6 hours minimum:  Change oxygen saturation probe site  4. Every 4-6 hours:  If on nasal continuous positive airway pressure, respiratory therapy assess nares and determine need for appliance change or resting period.   Outcome: Adequate for Discharge     Problem: ABCDS Injury Assessment  Goal: Absence of physical injury  Outcome: Not Progressing
Problem: Discharge Planning  Goal: Discharge to home or other facility with appropriate resources  4/20/2023 1547 by Melanie Washington RN  Outcome: Completed  4/20/2023 1452 by Galo Woo  Outcome: Progressing     Problem: Safety - Adult  Goal: Free from fall injury  4/20/2023 1547 by Melanie Washington RN  Outcome: Completed  4/20/2023 1452 by Galo Woo  Outcome: Adequate for Discharge     Problem: ABCDS Injury Assessment  Goal: Absence of physical injury  4/20/2023 1547 by Melanie Washington RN  Outcome: Completed  4/20/2023 1452 by Galo Woo  Outcome: Not Progressing     Problem: Chronic Conditions and Co-morbidities  Goal: Patient's chronic conditions and co-morbidity symptoms are monitored and maintained or improved  4/20/2023 1547 by Melanie Washington RN  Outcome: Completed  4/20/2023 1452 by Galo Woo  Outcome: Progressing     Problem: Pain  Goal: Verbalizes/displays adequate comfort level or baseline comfort level  4/20/2023 1547 by Melanie Washington RN  Outcome: Completed  4/20/2023 1452 by Galo Woo  Outcome: Adequate for Discharge     Problem: Skin/Tissue Integrity  Goal: Absence of new skin breakdown  Description: 1. Monitor for areas of redness and/or skin breakdown  2. Assess vascular access sites hourly  3. Every 4-6 hours minimum:  Change oxygen saturation probe site  4. Every 4-6 hours:  If on nasal continuous positive airway pressure, respiratory therapy assess nares and determine need for appliance change or resting period.   4/20/2023 1547 by Melanie Washington RN  Outcome: Completed  4/20/2023 1452 by Galo Woo  Outcome: Adequate for Discharge     Problem: ABCDS Injury Assessment  Goal: Absence of physical injury  4/20/2023 1547 by Melanie Washington RN  Outcome: Completed  4/20/2023 1452 by Galo Woo  Outcome: Not Progressing
Problem: Discharge Planning  Goal: Discharge to home or other facility with appropriate resources  Outcome: Progressing     Problem: ABCDS Injury Assessment  Goal: Absence of physical injury  Outcome: Progressing     Problem: Chronic Conditions and Co-morbidities  Goal: Patient's chronic conditions and co-morbidity symptoms are monitored and maintained or improved  Outcome: Progressing     Problem: Pain  Goal: Verbalizes/displays adequate comfort level or baseline comfort level  Outcome: Progressing     Problem: Skin/Tissue Integrity  Goal: Absence of new skin breakdown  Description: 1. Monitor for areas of redness and/or skin breakdown  2. Assess vascular access sites hourly  3. Every 4-6 hours minimum:  Change oxygen saturation probe site  4. Every 4-6 hours:  If on nasal continuous positive airway pressure, respiratory therapy assess nares and determine need for appliance change or resting period.   Outcome: Progressing
Problem: Discharge Planning  Goal: Discharge to home or other facility with appropriate resources  Outcome: Progressing     Problem: Safety - Adult  Goal: Free from fall injury  Outcome: Progressing     Problem: ABCDS Injury Assessment  Goal: Absence of physical injury  Outcome: Progressing     Problem: Chronic Conditions and Co-morbidities  Goal: Patient's chronic conditions and co-morbidity symptoms are monitored and maintained or improved  Outcome: Progressing     Problem: Pain  Goal: Verbalizes/displays adequate comfort level or baseline comfort level  Outcome: Progressing     Problem: Skin/Tissue Integrity  Goal: Absence of new skin breakdown  Description: 1. Monitor for areas of redness and/or skin breakdown  2. Assess vascular access sites hourly  3. Every 4-6 hours minimum:  Change oxygen saturation probe site  4. Every 4-6 hours:  If on nasal continuous positive airway pressure, respiratory therapy assess nares and determine need for appliance change or resting period.   Outcome: Progressing
Problem: Discharge Planning  Goal: Discharge to home or other facility with appropriate resources  Outcome: Progressing  Flowsheets (Taken 4/17/2023 5228 by Radha Vyas RN)  Discharge to home or other facility with appropriate resources:   Identify barriers to discharge with patient and caregiver   Arrange for needed discharge resources and transportation as appropriate     Problem: Safety - Adult  Goal: Free from fall injury  Outcome: Progressing     Problem: ABCDS Injury Assessment  Goal: Absence of physical injury  Outcome: Progressing     Problem: Chronic Conditions and Co-morbidities  Goal: Patient's chronic conditions and co-morbidity symptoms are monitored and maintained or improved  Outcome: Progressing
saturation probe site  4. Every 4-6 hours:  If on nasal continuous positive airway pressure, respiratory therapy assess nares and determine need for appliance change or resting period.   Outcome: Progressing

## 2023-04-21 ENCOUNTER — CARE COORDINATION (OUTPATIENT)
Dept: CASE MANAGEMENT | Age: 88
End: 2023-04-21

## 2023-04-21 ENCOUNTER — TELEPHONE (OUTPATIENT)
Dept: OTHER | Age: 88
End: 2023-04-21

## 2023-04-21 DIAGNOSIS — L03.115 CELLULITIS OF RIGHT LEG: Primary | ICD-10-CM

## 2023-04-21 PROCEDURE — 1111F DSCHRG MED/CURRENT MED MERGE: CPT | Performed by: INTERNAL MEDICINE

## 2023-04-21 NOTE — TELEPHONE ENCOUNTER
Reached out to patients CM at [facility] [phone]. She confirmed that Vandana Lemus does not have any skilled therapy from [facility] [place]. Only an aide. Frank Galvez has been in contact with Vandana Lemus since she has been home. Patient has not expressed a need to [employer] for anyone to come to the home to help with wrapping her legs.

## 2023-04-21 NOTE — CARE COORDINATION
[facility] Care Transitions Initial Follow Up Call    Call within 2 business days of discharge: Yes    Patient Current Location:  Home: [facility] Transition Nurse contacted the patient by telephone to perform post hospital discharge assessment. Verified name and  with patient as identifiers. Provided introduction to self, and explanation of the Care Transition Nurse role. Patient: Ryan Casas Patient : 1935   MRN: [id number]  Reason for Admission: RLE Cellulitis  Discharge Date: 23 RARS: Readmission Risk Score: 17.3      Last Discharge  [facility]       Date Complaint Diagnosis Description Type Department Provider    23   Admission (Discharged) Kira Brock MD            Challenges to be reviewed by the provider   Additional needs identified to be addressed with provider: No  none               Method of communication with provider: none. Spoke with Gwendolyn Pichardo, says she is doing ok. Says she is more tired than usual. Says it looks as if BLE Cellulitis is improving somewhat. No pus like drainage. No foul smell from wounds. Denies fever, chills. No chest pain/sob/vu. Says she is able to ambulate with her RW. She live alone in a sigle level apartment in [place]. She has an aide/caregiver from [facility] provided by [employer]". Says her [employer] CM is Jose Miguel Guerriers [phone] who she is regular contact with. Gwendolyn Pichardo says her aide provides transport to appts, cooks, cleans & occas assists with her ADL's. Discussed importance of keeping a log for BP & BS.   BP today-108/78, . Reviewed DM mgmt:  Take all diabetic meds and insulins as directed  Ensure glucometer is in good working order and you have all supplies  Routinely monitor blood sugar as directed.  Notify MD if noting blood sugars are running below 70 two or more times per month or if noting constantly higher readings  Keep all scheduled MD appts, labs  Healthy eating habits;

## 2023-04-22 LAB
CULTURE: ABNORMAL
CULTURE: ABNORMAL
Lab: ABNORMAL
SPECIMEN: ABNORMAL

## 2023-04-23 LAB
CULTURE: NORMAL
CULTURE: NORMAL
Lab: NORMAL
Lab: NORMAL
SPECIMEN: NORMAL
SPECIMEN: NORMAL

## 2023-04-24 ENCOUNTER — TELEPHONE (OUTPATIENT)
Dept: INTERNAL MEDICINE CLINIC | Age: 88
End: 2023-04-24

## 2023-04-24 NOTE — TELEPHONE ENCOUNTER
Care Transitions Initial Follow Up Call    Outreach made within 2 business days of discharge: Yes    Patient: Tiffany Fuenets Patient : 1935   MRN: [id number]  Reason for Admission: There are no discharge diagnoses documented for the most recent discharge. Discharge Date: 23       Spoke with: patient    Discharge department/facility: [facility]    TCM Interactive Patient Contact:  Was patient able to fill all prescriptions: Yes  Was patient instructed to bring all medications to the follow-up visit: Yes  Is patient taking all medications as directed in the discharge summary?  Yes  Does patient understand their discharge instructions: Yes  Does patient have questions or concerns that need addressed prior to 7-14 day follow up office visit: yes -     Scheduled appointment with PCP within 7-14 days    Follow Up  Future Appointments   Date Time Provider Rose Rendon   2023 11:15 AM Hebert Mahoney APRN - [id number] [address] [facility] [place]   2023 12:45 PM Christina Wetzel MD [facility]   5/10/2023  3:15 PM Christina Wetzel MD [address] [facility]   2023  3:45 PM Kenney Brooks  [facility] -       Nancy Hawk MA

## 2023-04-25 ENCOUNTER — OFFICE VISIT (OUTPATIENT)
Dept: INTERNAL MEDICINE CLINIC | Age: 88
End: 2023-04-25

## 2023-04-25 ENCOUNTER — HOSPITAL ENCOUNTER (OUTPATIENT)
Dept: WOUND CARE | Age: 88
Discharge: HOME | End: 2023-04-25
Payer: MEDICARE

## 2023-04-25 VITALS
WEIGHT: 249 LBS | OXYGEN SATURATION: 97 % | BODY MASS INDEX: 42.74 KG/M2 | SYSTOLIC BLOOD PRESSURE: 140 MMHG | HEART RATE: 70 BPM | DIASTOLIC BLOOD PRESSURE: 80 MMHG

## 2023-04-25 VITALS
DIASTOLIC BLOOD PRESSURE: 67 MMHG | RESPIRATION RATE: 16 BRPM | HEART RATE: 65 BPM | TEMPERATURE: 98 F | SYSTOLIC BLOOD PRESSURE: 151 MMHG

## 2023-04-25 DIAGNOSIS — R60.0 PEDAL EDEMA: ICD-10-CM

## 2023-04-25 DIAGNOSIS — L97.212 VENOUS STASIS ULCER OF RIGHT CALF WITH FAT LAYER EXPOSED, UNSPECIFIED WHETHER VARICOSE VEINS PRESENT (HCC): ICD-10-CM

## 2023-04-25 DIAGNOSIS — G25.81 RESTLESS LEG SYNDROME: ICD-10-CM

## 2023-04-25 DIAGNOSIS — I87.2 VENOUS INSUFFICIENCY OF BOTH LOWER EXTREMITIES: ICD-10-CM

## 2023-04-25 DIAGNOSIS — I10 ESSENTIAL HYPERTENSION: ICD-10-CM

## 2023-04-25 DIAGNOSIS — M48.062 LUMBAR STENOSIS WITH NEUROGENIC CLAUDICATION: ICD-10-CM

## 2023-04-25 DIAGNOSIS — L03.115 CELLULITIS OF RIGHT LEG: Primary | ICD-10-CM

## 2023-04-25 DIAGNOSIS — L03.119 CELLULITIS OF LOWER EXTREMITY, UNSPECIFIED LATERALITY: Primary | ICD-10-CM

## 2023-04-25 DIAGNOSIS — I83.012 VENOUS STASIS ULCER OF RIGHT CALF WITH FAT LAYER EXPOSED, UNSPECIFIED WHETHER VARICOSE VEINS PRESENT (HCC): ICD-10-CM

## 2023-04-25 DIAGNOSIS — E66.01 MORBID OBESITY WITH BMI OF 40.0-44.9, ADULT (HCC): ICD-10-CM

## 2023-04-25 DIAGNOSIS — M05.79 RHEUMATOID ARTHRITIS INVOLVING MULTIPLE SITES WITH POSITIVE RHEUMATOID FACTOR (HCC): ICD-10-CM

## 2023-04-25 DIAGNOSIS — E78.2 MIXED HYPERLIPIDEMIA: ICD-10-CM

## 2023-04-25 DIAGNOSIS — I25.10 CORONARY ARTERY DISEASE INVOLVING NATIVE CORONARY ARTERY OF NATIVE HEART WITHOUT ANGINA PECTORIS: ICD-10-CM

## 2023-04-25 DIAGNOSIS — E11.9 TYPE 2 DIABETES MELLITUS WITHOUT COMPLICATION, WITHOUT LONG-TERM CURRENT USE OF INSULIN (HCC): ICD-10-CM

## 2023-04-25 DIAGNOSIS — Z09 HOSPITAL DISCHARGE FOLLOW-UP: ICD-10-CM

## 2023-04-25 PROBLEM — R19.7 DIARRHEA: Status: RESOLVED | Noted: 2023-01-05 | Resolved: 2023-04-25

## 2023-04-25 PROBLEM — U07.1 COVID-19 VIRUS INFECTION: Status: RESOLVED | Noted: 2023-01-05 | Resolved: 2023-04-25

## 2023-04-25 PROCEDURE — 11042 DBRDMT SUBQ TIS 1ST 20SQCM/<: CPT

## 2023-04-25 RX ORDER — BETAMETHASONE DIPROPIONATE 0.05 %
OINTMENT (GRAM) TOPICAL ONCE
Status: CANCELLED | OUTPATIENT
Start: 2023-04-25 | End: 2023-04-25

## 2023-04-25 RX ORDER — GINSENG 100 MG
CAPSULE ORAL ONCE
OUTPATIENT
Start: 2023-04-25 | End: 2023-04-25

## 2023-04-25 RX ORDER — LIDOCAINE HYDROCHLORIDE 20 MG/ML
JELLY TOPICAL ONCE
Status: CANCELLED | OUTPATIENT
Start: 2023-04-25 | End: 2023-04-25

## 2023-04-25 RX ORDER — GINSENG 100 MG
CAPSULE ORAL ONCE
Status: CANCELLED | OUTPATIENT
Start: 2023-04-25 | End: 2023-04-25

## 2023-04-25 RX ORDER — LIDOCAINE HYDROCHLORIDE 40 MG/ML
SOLUTION TOPICAL ONCE
OUTPATIENT
Start: 2023-04-25 | End: 2023-04-25

## 2023-04-25 RX ORDER — BACITRACIN ZINC AND POLYMYXIN B SULFATE 500; 1000 [USP'U]/G; [USP'U]/G
OINTMENT TOPICAL ONCE
OUTPATIENT
Start: 2023-04-25 | End: 2023-04-25

## 2023-04-25 RX ORDER — LIDOCAINE HYDROCHLORIDE 20 MG/ML
JELLY TOPICAL ONCE
OUTPATIENT
Start: 2023-04-25 | End: 2023-04-25

## 2023-04-25 RX ORDER — LIDOCAINE 40 MG/G
CREAM TOPICAL ONCE
OUTPATIENT
Start: 2023-04-25 | End: 2023-04-25

## 2023-04-25 RX ORDER — LIDOCAINE 40 MG/G
CREAM TOPICAL ONCE
Status: CANCELLED | OUTPATIENT
Start: 2023-04-25 | End: 2023-04-25

## 2023-04-25 RX ORDER — LIDOCAINE HYDROCHLORIDE 40 MG/ML
SOLUTION TOPICAL ONCE
Status: CANCELLED | OUTPATIENT
Start: 2023-04-25 | End: 2023-04-25

## 2023-04-25 RX ORDER — BACITRACIN, NEOMYCIN, POLYMYXIN B 400; 3.5; 5 [USP'U]/G; MG/G; [USP'U]/G
OINTMENT TOPICAL ONCE
OUTPATIENT
Start: 2023-04-25 | End: 2023-04-25

## 2023-04-25 RX ORDER — GENTAMICIN SULFATE 1 MG/G
OINTMENT TOPICAL ONCE
OUTPATIENT
Start: 2023-04-25 | End: 2023-04-25

## 2023-04-25 RX ORDER — GENTAMICIN SULFATE 1 MG/G
OINTMENT TOPICAL ONCE
Status: CANCELLED | OUTPATIENT
Start: 2023-04-25 | End: 2023-04-25

## 2023-04-25 RX ORDER — BACITRACIN ZINC AND POLYMYXIN B SULFATE 500; 1000 [USP'U]/G; [USP'U]/G
OINTMENT TOPICAL ONCE
Status: CANCELLED | OUTPATIENT
Start: 2023-04-25 | End: 2023-04-25

## 2023-04-25 RX ORDER — BACITRACIN, NEOMYCIN, POLYMYXIN B 400; 3.5; 5 [USP'U]/G; MG/G; [USP'U]/G
OINTMENT TOPICAL ONCE
Status: CANCELLED | OUTPATIENT
Start: 2023-04-25 | End: 2023-04-25

## 2023-04-25 RX ORDER — CLOBETASOL PROPIONATE 0.5 MG/G
OINTMENT TOPICAL ONCE
OUTPATIENT
Start: 2023-04-25 | End: 2023-04-25

## 2023-04-25 RX ORDER — LIDOCAINE 50 MG/G
OINTMENT TOPICAL ONCE
OUTPATIENT
Start: 2023-04-25 | End: 2023-04-25

## 2023-04-25 RX ORDER — LIDOCAINE 50 MG/G
OINTMENT TOPICAL ONCE
Status: CANCELLED | OUTPATIENT
Start: 2023-04-25 | End: 2023-04-25

## 2023-04-25 RX ORDER — BETAMETHASONE DIPROPIONATE 0.05 %
OINTMENT (GRAM) TOPICAL ONCE
OUTPATIENT
Start: 2023-04-25 | End: 2023-04-25

## 2023-04-25 RX ORDER — CLOBETASOL PROPIONATE 0.5 MG/G
OINTMENT TOPICAL ONCE
Status: DISCONTINUED | OUTPATIENT
Start: 2023-04-25 | End: 2023-04-25

## 2023-04-25 RX ORDER — CLOBETASOL PROPIONATE 0.5 MG/G
OINTMENT TOPICAL ONCE
Status: CANCELLED | OUTPATIENT
Start: 2023-04-25 | End: 2023-04-25

## 2023-04-25 SDOH — ECONOMIC STABILITY: FOOD INSECURITY: WITHIN THE PAST 12 MONTHS, THE FOOD YOU BOUGHT JUST DIDN'T LAST AND YOU DIDN'T HAVE MONEY TO GET MORE.: NEVER TRUE

## 2023-04-25 SDOH — ECONOMIC STABILITY: FOOD INSECURITY: WITHIN THE PAST 12 MONTHS, YOU WORRIED THAT YOUR FOOD WOULD RUN OUT BEFORE YOU GOT MONEY TO BUY MORE.: NEVER TRUE

## 2023-04-25 SDOH — ECONOMIC STABILITY: HOUSING INSECURITY
IN THE LAST 12 MONTHS, WAS THERE A TIME WHEN YOU DID NOT HAVE A STEADY PLACE TO SLEEP OR SLEPT IN A SHELTER (INCLUDING NOW)?: NO

## 2023-04-25 SDOH — ECONOMIC STABILITY: INCOME INSECURITY: HOW HARD IS IT FOR YOU TO PAY FOR THE VERY BASICS LIKE FOOD, HOUSING, MEDICAL CARE, AND HEATING?: NOT VERY HARD

## 2023-04-25 NOTE — PROGRESS NOTES
Post-Discharge Transitional Care  Follow Up      Ardyth Koyanagi   YOB: 1935    Date of Office Visit:  4/25/2023  Date of Hospital Admission: 4/17/23  Date of Hospital Discharge: 4/20/23  Risk of hospital readmission (high >=14%. Medium >=10%) :Readmission Risk Score: 17.3      Care management risk score Rising risk (score 2-5) and Complex Care (Scores >=6): No Risk Score On File     Non face to face  following discharge, date last encounter closed (first attempt may have been earlier): 04/24/2023    Call initiated 2 business days of discharge: Yes    ASSESSMENT/PLAN:   Cellulitis of lower extremity, unspecified laterality : on Augmentin. Pt is going to wound clinic for right leg wound     Pedal edema : on torsemide 20 mg daily . Keep leg elevated    Essential hypertension : dose of losartan decreased  to 25 mg daily, on metoprolol ER 25 mg daily. BP has improved    Mixed hyperlipidemia : on crestor 10 mg daily    Type 2 diabetes mellitus without complication, without long-term current use of insulin (HCC) : on  glipizide ER 5 mg daily . 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 Does not want to get off of it now     Morbid obesity with BMI of 40.0-44.9, adult [facility])      Medical Decision Making: moderate complexity  Return for Patient has follow up appointment. Subjective:   HPI:  Follow up of Hospital problems/diagnosis(es):   Pt was sent for hospital admission because of out patient failure of treatment of cellulitis and edema of the leg  She was given IV antibiotics and IV diuretics with improvement  She was sent to wound clinic alos      Inpatient course: Discharge summary reviewed- see chart.     Interval

## 2023-04-25 NOTE — PROGRESS NOTES
Multilayer Compression Wrap   (Not Unna) Below the Knee    NAME:  Amanda Cotto  YOB: 1935  MEDICAL RECORD NUMBER:  [id number]  DATE:  4/25/2023    Multilayer compression wrap: Removed old Multilayer wrap if indicated and wash leg with mild soap/water. Applied moisturizing agent to dry skin as needed. Applied primary and secondary dressing as ordered. Applied multilayered dressing below the knee to right lower leg. Instructed patient/caregiver not to remove dressing and to keep it clean and dry. Instructed patient/caregiver on complications to report to provider, such as pain, numbness in toes, heavy drainage, and slippage of dressing. Instructed patient on purpose of compression dressing and on activity and exercise recommendations. Patient tolerated treatment well.       Electronically signed by Indira Johnson RN on 4/25/2023 at 12:23 PM
multiple sites with positive rheumatoid factor (HCC)    CAD s/p MI, TPA in 1998    Gastrointestinal hemorrhage    Cyst, kidney, acquired    Restless leg syndrome    Chronic diastolic congestive heart failure ([facility] [address]    Suspected sleep apnea    Morbid obesity with BMI of 40.0-44.9, adult (HCC)    Moderate persistent asthma with exacerbation    Compression fracture of L2 vertebra with delayed healing    Lumbar stenosis with neurogenic claudication    H/O laminectomy    Diarrhea    COVID-19 virus infection    Pedal edema    WD-Cellulitis of right leg    Cellulitis    WD-Venous insufficiency of both lower extremities    WD-Venous stasis ulcer of right calf with fat layer exposed ([facility] [address]       REVIEW OF SYSTEMS    Constitutional: negative for anorexia, chills, fatigue, fevers, malaise, sweats, and weight loss  Respiratory: negative for pleurisy/chest pain, pneumonia, shortness of breath, sputum, stridor, and wheezing  Cardiovascular: negative for near-syncope, orthopnea, palpitations, paroxysmal nocturnal dyspnea, syncope, and tachypnea  Integument/breast: positive for skin color change and skin lesion(s)      Objective:      BP (!) 151/67   Pulse 65   Temp 98 °F (36.7 °C) (Temporal)   Resp 16   LMP  (LMP Unknown)     PHYSICAL EXAM  General Appearance: alert and oriented to person, place and time, well-developed and well-nourished, in no acute distress  Pulmonary/Chest: clear to auscultation bilaterally- no wheezes, rales or rhonchi, normal air movement, no respiratory distress  Cardiovascular: normal rate, normal S1 and S2, no gallops, intact distal pulses, and no carotid bruits  Dermatologic exam: Visual inspection of the periwound reveals the skin to be dry and edematous  Wound exam: see wound description below in procedure note      Assessment:       Jeremias Tate  appears to have a non-healing wound of the right lower extremity . The etiology of the wound is felt to be venous and cellulitis .  There are multiple

## 2023-04-25 NOTE — DISCHARGE INSTRUCTIONS
PHYSICIAN ORDERS AND DISCHARGE INSTRUCTIONS  NOTE: Upon discharge from the [address], you will receive a patient experience survey via E-mail. We would be grateful if you would take the time to fill this survey out. Wound care order history:   SULTANA's   Right  0.92     Left 0.8   Date 04/25/2025   Vascular studies/Intervention: . Cultures: . Antibiotics: . HbA1c:  . Compression/Lymph Pumps: [facility] Grafts: Damari Black: . Continuing wound care orders and information:              Residence: . Continue home health care with:. [facility] Phone # [phone]   Your wound-care supplies will be provided by: [facility]: . Wound cleansing:      Do not scrub or use excessive force. Wash hands with soap and water before and after dressing changes. Prior to applying a clean dressing, cleanse wound with normal saline,    wound cleanser, or mild soap and water. Ask your physician or nurse before getting the wound(s) wet in the shower. Daily Wound management:    Keep weight off wounds and reposition every 2 hours. Avoid standing for long periods of time. Evaluate legs to the level of the heart or above for 30 minutes 4-5 times a day and/or when sitting. When taking antibiotics take entire prescription as ordered by MD do not stop taking until medicine is all gone. Orders for this week 04/25/2023:  - Lower Right Leg Wounds-   Wash with mild soap and water, rinse with saline, pat dry with 4x4  Apply Santyl and Hydroferra blue to wound bed, apply Clobetasol and A&D to intact skin   Cover with Ca Alginate   Wrap with Coban 2 Lites  Change dressing weekly        Follow up with Maximo Espinal CNP  In 1 weeks in the wound care center  Call [phone] for any questions or concerns.   Date__________   Time____________

## 2023-04-26 DIAGNOSIS — E78.2 MIXED HYPERLIPIDEMIA: ICD-10-CM

## 2023-04-27 RX ORDER — ROSUVASTATIN CALCIUM 10 MG/1
10 TABLET, COATED ORAL NIGHTLY
Qty: 90 TABLET | Refills: 1 | Status: SHIPPED | OUTPATIENT
Start: 2023-04-27

## 2023-04-28 ENCOUNTER — CARE COORDINATION (OUTPATIENT)
Dept: CASE MANAGEMENT | Age: 88
End: 2023-04-28

## 2023-04-28 NOTE — CARE COORDINATION
[facility] Care Transitions Follow Up Call    Patient Current Location:  Home: [facility] Transition Nurse contacted the patient by telephone to follow up after admission on 23. Verified name and  with patient as identifiers. Patient: Hua Villagomez  Patient : 1935   MRN: [id number]  Reason for Admission: RLE Cellulitis  Discharge Date: 23 RARS: Readmission Risk Score: 17.3      Needs to be reviewed by the provider   Additional needs identified to be addressed with provider: No  none             Method of communication with provider: none. Contacted patient for care transition follow up. Deja Thomason states she is doing okay. Reports she had follow up with PCP and wound clinic. Reports that the wound clinic wrapped her RLE. She is unsure of whether or not there is any drainage. She states they wrapped her leg tight. We discussed numbness, tingling, color changes in leg or foot and when to contact provider as well as when to go to UC/WIC or ED to be evaluated. She has been keeping legs elevated. She is not weighing herself. Discussed importance of monitoring weight and when to contact providers with a weight gain of 3 lbs within 24 hours and 5 lbs within a week. She verbalized understanding. She is eating and drinking fluids w/o issues. FBS usually running around 100. She has another wound care appointment on 5/3/23. She does not have any questions or needs at this time. Addressed changes since last contact:   Had wound care appt on 23  Discussed follow-up appointments. If no appointment was previously scheduled, appointment scheduling offered: Yes. Is follow up appointment scheduled within 7 days of discharge? Yes.     Follow Up  Future Appointments   Date Time Provider Rose Rendon   5/3/2023  3:00 PM Angela Carranza APRN - CNP [facility]   5/10/2023  3:15 PM Mary Anne Bey MD [address] [facility] [place]   2023  3:45 PM Marlen Tan

## 2023-05-02 ENCOUNTER — HOSPITAL ENCOUNTER (OUTPATIENT)
Dept: WOUND CARE | Age: 88
Discharge: HOME | End: 2023-05-02
Payer: MEDICARE

## 2023-05-02 VITALS
SYSTOLIC BLOOD PRESSURE: 164 MMHG | HEART RATE: 71 BPM | RESPIRATION RATE: 16 BRPM | DIASTOLIC BLOOD PRESSURE: 69 MMHG | TEMPERATURE: 97.2 F

## 2023-05-02 DIAGNOSIS — I87.2 VENOUS INSUFFICIENCY OF BOTH LOWER EXTREMITIES: Primary | ICD-10-CM

## 2023-05-02 DIAGNOSIS — L97.212 VENOUS STASIS ULCER OF RIGHT CALF WITH FAT LAYER EXPOSED, UNSPECIFIED WHETHER VARICOSE VEINS PRESENT (HCC): ICD-10-CM

## 2023-05-02 DIAGNOSIS — L03.115 CELLULITIS OF RIGHT LEG: ICD-10-CM

## 2023-05-02 DIAGNOSIS — I83.012 VENOUS STASIS ULCER OF RIGHT CALF WITH FAT LAYER EXPOSED, UNSPECIFIED WHETHER VARICOSE VEINS PRESENT (HCC): ICD-10-CM

## 2023-05-02 PROCEDURE — 11042 DBRDMT SUBQ TIS 1ST 20SQCM/<: CPT

## 2023-05-02 PROCEDURE — 6370000000 HC RX 637 (ALT 250 FOR IP): Performed by: NURSE PRACTITIONER

## 2023-05-02 RX ORDER — LIDOCAINE HYDROCHLORIDE 20 MG/ML
JELLY TOPICAL ONCE
OUTPATIENT
Start: 2023-05-02 | End: 2023-05-02

## 2023-05-02 RX ORDER — GINSENG 100 MG
CAPSULE ORAL ONCE
OUTPATIENT
Start: 2023-05-02 | End: 2023-05-02

## 2023-05-02 RX ORDER — LIDOCAINE 50 MG/G
OINTMENT TOPICAL ONCE
OUTPATIENT
Start: 2023-05-02 | End: 2023-05-02

## 2023-05-02 RX ORDER — BACITRACIN, NEOMYCIN, POLYMYXIN B 400; 3.5; 5 [USP'U]/G; MG/G; [USP'U]/G
OINTMENT TOPICAL ONCE
OUTPATIENT
Start: 2023-05-02 | End: 2023-05-02

## 2023-05-02 RX ORDER — CLOBETASOL PROPIONATE 0.5 MG/G
OINTMENT TOPICAL ONCE
Status: COMPLETED | OUTPATIENT
Start: 2023-05-02 | End: 2023-05-02

## 2023-05-02 RX ORDER — GENTAMICIN SULFATE 1 MG/G
OINTMENT TOPICAL ONCE
OUTPATIENT
Start: 2023-05-02 | End: 2023-05-02

## 2023-05-02 RX ORDER — LIDOCAINE 40 MG/G
CREAM TOPICAL ONCE
OUTPATIENT
Start: 2023-05-02 | End: 2023-05-02

## 2023-05-02 RX ORDER — BACITRACIN ZINC AND POLYMYXIN B SULFATE 500; 1000 [USP'U]/G; [USP'U]/G
OINTMENT TOPICAL ONCE
OUTPATIENT
Start: 2023-05-02 | End: 2023-05-02

## 2023-05-02 RX ORDER — CLOBETASOL PROPIONATE 0.5 MG/G
OINTMENT TOPICAL ONCE
OUTPATIENT
Start: 2023-05-02 | End: 2023-05-02

## 2023-05-02 RX ORDER — BETAMETHASONE DIPROPIONATE 0.05 %
OINTMENT (GRAM) TOPICAL ONCE
OUTPATIENT
Start: 2023-05-02 | End: 2023-05-02

## 2023-05-02 RX ORDER — LIDOCAINE HYDROCHLORIDE 40 MG/ML
SOLUTION TOPICAL ONCE
OUTPATIENT
Start: 2023-05-02 | End: 2023-05-02

## 2023-05-02 RX ADMIN — CLOBETASOL PROPIONATE: 0.5 OINTMENT TOPICAL at 15:46

## 2023-05-02 RX ADMIN — COLLAGENASE SANTYL: 250 OINTMENT TOPICAL at 15:47

## 2023-05-02 ASSESSMENT — PAIN - FUNCTIONAL ASSESSMENT: PAIN_FUNCTIONAL_ASSESSMENT: PREVENTS OR INTERFERES SOME ACTIVE ACTIVITIES AND ADLS

## 2023-05-02 ASSESSMENT — PAIN DESCRIPTION - ONSET: ONSET: GRADUAL

## 2023-05-02 ASSESSMENT — PAIN DESCRIPTION - FREQUENCY: FREQUENCY: INTERMITTENT

## 2023-05-02 ASSESSMENT — PAIN DESCRIPTION - LOCATION: LOCATION: LEG

## 2023-05-02 ASSESSMENT — PAIN SCALES - GENERAL: PAINLEVEL_OUTOF10: 7

## 2023-05-02 ASSESSMENT — PAIN DESCRIPTION - DESCRIPTORS: DESCRIPTORS: SQUEEZING

## 2023-05-02 ASSESSMENT — PAIN DESCRIPTION - ORIENTATION: ORIENTATION: RIGHT

## 2023-05-02 ASSESSMENT — PAIN DESCRIPTION - PAIN TYPE: TYPE: CHRONIC PAIN

## 2023-05-02 NOTE — DISCHARGE INSTRUCTIONS
PHYSICIAN ORDERS AND DISCHARGE INSTRUCTIONS  NOTE: Upon discharge from the [address], you will receive a patient experience survey via E-mail. We would be grateful if you would take the time to fill this survey out. Wound care order history:              SULTANA's   Right  0.92     Left 0.8   Date 04/25/2025              Vascular studies/Intervention: . Cultures: . Antibiotics: . HbA1c:  . Compression/Lymph Pumps: [facility] Grafts: Bob Pascual: . Continuing wound care orders and information:              Residence: . Continue home health care with:. [facility] Phone # [phone]              Your wound-care supplies will be provided by: [facility]: . Wound cleansing:                           Do not scrub or use excessive force. Wash hands with soap and water before and after dressing changes. Prior to applying a clean dressing, cleanse wound with normal saline,                          wound cleanser, or mild soap and water. Ask your physician or nurse before getting the wound(s) wet in the shower. Daily Wound management:                          Keep weight off wounds and reposition every 2 hours. Avoid standing for long periods of time. Evaluate legs to the level of the heart or above for 30 minutes 4-5 times a day and/or when sitting. When taking antibiotics take entire prescription as ordered by MD do not stop taking until medicine is all gone.                                                                  Orders for this week 05/02/2023:  - Lower Right Leg Wounds-   Wash with mild soap and water, rinse with saline, pat dry with 4x4  Apply Santyl and Hydroferra blue to wound bed, apply Clobetasol and A&D to intact skin   Cover

## 2023-05-02 NOTE — PROGRESS NOTES
Right Distal Anterior Lower Leg (Active)   Wound Image   04/25/23 1143   Wound Etiology Venous 05/02/23 1450   Dressing Status Clean;Dry; Intact; New dressing applied 04/25/23 1213   Wound Cleansed Soap and water; Wound cleanser 05/02/23 1450   Dressing/Treatment Moisturizing cream;Hydrofera blue;Alginate;Coban/self-adherent bandages 04/25/23 1213   Offloading for Diabetic Foot Ulcers Walker 05/02/23 1450   Wound Length (cm) 0.5 cm 05/02/23 1450   Wound Width (cm) 0.3 cm 05/02/23 1450   Wound Depth (cm) 0.1 cm 05/02/23 1450   Wound Surface Area (cm^2) 0.15 cm^2 05/02/23 1450   Change in Wound Size % (l*w) -150 05/02/23 1450   Wound Volume (cm^3) 0.015 cm^3 05/02/23 1450   Wound Healing % -150 05/02/23 1450   Post-Procedure Length (cm) 0.5 cm 05/02/23 1507   Post-Procedure Width (cm) 0.3 cm 05/02/23 1507   Post-Procedure Depth (cm) 0.1 cm 05/02/23 1507   Post-Procedure Surface Area (cm^2) 0.15 cm^2 05/02/23 1507   Post-Procedure Volume (cm^3) 0.015 cm^3 05/02/23 1507   Distance Tunneling (cm) 0 cm 05/02/23 1450   Tunneling Position ___ O'Clock 0 05/02/23 1450   Undermining Starts ___ O'Clock 0 05/02/23 1450   Undermining Ends___ O'Clock 0 05/02/23 1450   Undermining Maxium Distance (cm) 0 05/02/23 1450   Wound Assessment Slough 05/02/23 1450   Drainage Amount Moderate 05/02/23 1450   Drainage Description Clear 05/02/23 1450   Odor None 05/02/23 1450   Vera-wound Assessment Edematous 05/02/23 1450   Margins Defined edges; Unattached edges 05/02/23 1450   Wound Thickness Description not for Pressure Injury Full thickness 05/02/23 1450   Number of days: 7       Percent of Wound(s) Debrided: approximately 100%    Total  Area  Debrided:  3.6 sq cm     Bleeding:  Minimal    Hemostasis Achieved:  by pressure    Procedural Pain:  2  / 10     Post Procedural Pain:  0 / 10     Response to treatment:  Well tolerated by patient. Status of wound progress and description from last visit:   Pain better this week.   Reports that when

## 2023-05-03 ENCOUNTER — CARE COORDINATION (OUTPATIENT)
Dept: CASE MANAGEMENT | Age: 88
End: 2023-05-03

## 2023-05-03 NOTE — CARE COORDINATION
[facility] Care Transitions Follow Up Call    Patient Current Location:  Home: [facility] Cristian Coordinator contacted the patient by telephone to follow up after admission on 2023. Verified name and  with patient as identifiers. Patient: Kimber Currie  Patient : 1935   MRN: [id number]  Reason for Admission: BLE Cellulitis Right worse than left  Discharge Date: 23 RARS: Readmission Risk Score: 17.3      Needs to be reviewed by the provider   Additional needs identified to be addressed with provider: No  none             Method of communication with provider: none. Spoke with Kimber Currie who reported that she is doing ok. Patient reported that she went to the wound clinic Yesterday and they did a double wrap on her legs. She stated that it is thick and she can not put a shoe on it. Patient reported that yes that her legs are painful and very unconformable. She stated that they are still draining. Patient denied cp, sob, cough, dizziness, headache, n/v, diarrhea, abdominal pains, fever, or chills. Patient report that appetite and fluid intake is good and denied any problems with bowel or bladder. Patient reported that he is taking all medications as ordered. Patient denied any other needs at this time. Patient instructed to continue to monitor s/s, reporting any that may present to MD immediately for early intervention. Patient is agreeable to f/u calls. Addressed changes since last contact:  none  Discussed follow-up appointments. If no appointment was previously scheduled, appointment scheduling offered: Yes. Is follow up appointment scheduled within 7 days of discharge? Yes.     Follow Up  Future Appointments   Date Time Provider Rose Rendon   2023  2:45 PM Maddy Shelton APRN - CNP [facility]   5/10/2023  2:15 PM Cuco English MD [facility] [place] [facility] [place]   5/10/2023  3:15 PM Nas Wood MD [facility]   2023

## 2023-05-05 ENCOUNTER — HOSPITAL ENCOUNTER (OUTPATIENT)
Dept: WOUND CARE | Age: 88
Discharge: HOME | End: 2023-05-05
Payer: MEDICARE

## 2023-05-05 VITALS — RESPIRATION RATE: 16 BRPM | TEMPERATURE: 97.2 F

## 2023-05-05 DIAGNOSIS — I83.012 VENOUS STASIS ULCER OF RIGHT CALF WITH FAT LAYER EXPOSED, UNSPECIFIED WHETHER VARICOSE VEINS PRESENT (HCC): ICD-10-CM

## 2023-05-05 DIAGNOSIS — L03.115 CELLULITIS OF RIGHT LEG: Primary | ICD-10-CM

## 2023-05-05 DIAGNOSIS — L97.212 VENOUS STASIS ULCER OF RIGHT CALF WITH FAT LAYER EXPOSED, UNSPECIFIED WHETHER VARICOSE VEINS PRESENT (HCC): ICD-10-CM

## 2023-05-05 DIAGNOSIS — I87.2 VENOUS INSUFFICIENCY OF BOTH LOWER EXTREMITIES: ICD-10-CM

## 2023-05-05 PROCEDURE — 29581 APPL MULTLAYER CMPRN SYS LEG: CPT

## 2023-05-05 NOTE — PROGRESS NOTES
Multilayer Compression Wrap   (Not Unna) Below the Knee    NAME:  Marylene Carolin  YOB: 1935  MEDICAL RECORD NUMBER:  [id number]  DATE:  5/5/2023    Multilayer compression wrap: Removed old Multilayer wrap if indicated and wash leg with mild soap/water. Applied moisturizing agent to dry skin as needed. Applied primary and secondary dressing as ordered. Applied multilayered dressing below the knee to right lower leg. Instructed patient/caregiver not to remove dressing and to keep it clean and dry. Instructed patient/caregiver on complications to report to provider, such as pain, numbness in toes, heavy drainage, and slippage of dressing. Instructed patient on purpose of compression dressing and on activity and exercise recommendations. Patient tolerated treatment well.       Electronically signed by Lydia Kong RN on 5/5/2023 at 3:26 PM

## 2023-05-10 ENCOUNTER — CARE COORDINATION (OUTPATIENT)
Dept: CASE MANAGEMENT | Age: 88
End: 2023-05-10

## 2023-05-10 ENCOUNTER — OFFICE VISIT (OUTPATIENT)
Dept: INTERNAL MEDICINE CLINIC | Age: 88
End: 2023-05-10

## 2023-05-10 ENCOUNTER — HOSPITAL ENCOUNTER (OUTPATIENT)
Dept: WOUND CARE | Age: 88
Discharge: HOME | End: 2023-05-10
Payer: MEDICARE

## 2023-05-10 VITALS
DIASTOLIC BLOOD PRESSURE: 70 MMHG | HEART RATE: 76 BPM | OXYGEN SATURATION: 95 % | BODY MASS INDEX: 43.71 KG/M2 | HEIGHT: 64 IN | SYSTOLIC BLOOD PRESSURE: 124 MMHG | RESPIRATION RATE: 16 BRPM | WEIGHT: 256 LBS

## 2023-05-10 VITALS
DIASTOLIC BLOOD PRESSURE: 68 MMHG | RESPIRATION RATE: 20 BRPM | TEMPERATURE: 98.5 F | SYSTOLIC BLOOD PRESSURE: 126 MMHG | HEART RATE: 61 BPM

## 2023-05-10 DIAGNOSIS — E11.9 TYPE 2 DIABETES MELLITUS WITHOUT COMPLICATION, WITHOUT LONG-TERM CURRENT USE OF INSULIN (HCC): ICD-10-CM

## 2023-05-10 DIAGNOSIS — I25.10 CORONARY ARTERY DISEASE INVOLVING NATIVE CORONARY ARTERY OF NATIVE HEART WITHOUT ANGINA PECTORIS: ICD-10-CM

## 2023-05-10 DIAGNOSIS — L03.119 CELLULITIS OF LOWER EXTREMITY, UNSPECIFIED LATERALITY: Primary | ICD-10-CM

## 2023-05-10 DIAGNOSIS — M79.89 LEG SWELLING: ICD-10-CM

## 2023-05-10 DIAGNOSIS — I21.9 MYOCARDIAL INFARCTION, UNSPECIFIED MI TYPE, UNSPECIFIED ARTERY (HCC): Primary | ICD-10-CM

## 2023-05-10 DIAGNOSIS — I10 ESSENTIAL HYPERTENSION: ICD-10-CM

## 2023-05-10 DIAGNOSIS — I50.31 ACUTE DIASTOLIC CONGESTIVE HEART FAILURE (HCC): Primary | ICD-10-CM

## 2023-05-10 PROCEDURE — 99213 OFFICE O/P EST LOW 20 MIN: CPT

## 2023-05-10 RX ORDER — HYDROCODONE BITARTRATE AND ACETAMINOPHEN 5; 325 MG/1; MG/1
TABLET ORAL
COMMUNITY
Start: 2023-05-08 | End: 2023-05-10

## 2023-05-10 RX ORDER — LOSARTAN POTASSIUM 25 MG/1
25 TABLET ORAL DAILY
Qty: 30 TABLET | Refills: 3 | Status: SHIPPED | OUTPATIENT
Start: 2023-05-10

## 2023-05-10 RX ORDER — GLIPIZIDE 5 MG/1
5 TABLET, FILM COATED, EXTENDED RELEASE ORAL DAILY
Qty: 90 TABLET | Refills: 1 | Status: SHIPPED | OUTPATIENT
Start: 2023-05-10

## 2023-05-10 RX ORDER — GABAPENTIN 300 MG/1
600 CAPSULE ORAL NIGHTLY
Qty: 60 CAPSULE | Refills: 1 | Status: SHIPPED | OUTPATIENT
Start: 2023-05-10 | End: 2023-07-09

## 2023-05-10 RX ORDER — TORSEMIDE 20 MG/1
20 TABLET ORAL DAILY
Qty: 30 TABLET | Refills: 0 | Status: SHIPPED | OUTPATIENT
Start: 2023-05-10

## 2023-05-10 RX ORDER — ROPINIROLE 2 MG/1
2 TABLET, FILM COATED ORAL 2 TIMES DAILY
Qty: 60 TABLET | Refills: 0 | Status: SHIPPED | OUTPATIENT
Start: 2023-05-10

## 2023-05-10 ASSESSMENT — PAIN DESCRIPTION - ONSET: ONSET: ON-GOING

## 2023-05-10 ASSESSMENT — PAIN DESCRIPTION - PAIN TYPE: TYPE: CHRONIC PAIN

## 2023-05-10 ASSESSMENT — PAIN - FUNCTIONAL ASSESSMENT: PAIN_FUNCTIONAL_ASSESSMENT: PREVENTS OR INTERFERES SOME ACTIVE ACTIVITIES AND ADLS

## 2023-05-10 ASSESSMENT — PAIN DESCRIPTION - LOCATION: LOCATION: LEG

## 2023-05-10 ASSESSMENT — PAIN DESCRIPTION - DESCRIPTORS: DESCRIPTORS: SORE

## 2023-05-10 ASSESSMENT — PAIN DESCRIPTION - ORIENTATION: ORIENTATION: RIGHT

## 2023-05-10 ASSESSMENT — PAIN DESCRIPTION - FREQUENCY: FREQUENCY: CONTINUOUS

## 2023-05-10 ASSESSMENT — PAIN SCALES - GENERAL: PAINLEVEL_OUTOF10: 7

## 2023-05-10 NOTE — DISCHARGE INSTRUCTIONS
PHYSICIAN ORDERS AND DISCHARGE INSTRUCTIONS  NOTE: Upon discharge from the [address], you will receive a patient experience survey via E-mail. We would be grateful if you would take the time to fill this survey out. Wound care order history:              SULTANA's   Right  0.92     Left 0.8   Date 04/25/2025              Vascular studies/Intervention: .  US ordered via Dr Candice Cruz on 05/10/2023              Cultures: . Antibiotics: . HbA1c:  . Compression/Lymph Pumps:   Dr Candice Cruz advised pt to buy lymphedema pumps off [address], she wants insurance to pay for pumps, provider needs to discuss compression therapy required for pumps (at next appt) to be ordered as client is declining compression therapy at this time. Grafts: Reymundo Lango: . Continuing wound care orders and information:              Residence: . Continue home health care with:. Alan Metcalf Phone # [phone]              Your wound-care supplies will be provided by: [facility]: . Wound cleansing:                           Do not scrub or use excessive force. Wash hands with soap and water before and after dressing changes. Prior to applying a clean dressing, cleanse wound with normal saline,                          wound cleanser, or mild soap and water. Ask your physician or nurse before getting the wound(s) wet in the shower. Daily Wound management:                          Keep weight off wounds and reposition every 2 hours. Avoid standing for long periods of time. Evaluate legs to the level of the heart or above for 30 minutes 4-5 times a day and/or when sitting. When taking antibiotics take entire prescription as ordered by MD do not stop taking until medicine is all gone.

## 2023-05-10 NOTE — CARE COORDINATION
[facility] Care Transitions Follow Up Call:    Noted per chart review that Amanda Hoffmann has follow up appts today, Cardiology & PCP. Has been going to [facility] weekly. CTN call deferred today due to appts. Will attempt call tomorrow.        Etta Nolasco RN

## 2023-05-10 NOTE — PROGRESS NOTES
Wound Care Center Progress Note       Laurita Tate  AGE: 81 y.o. GENDER: female  : 1935  TODAY'S DATE:  5/10/2023        Subjective:     Chief Complaint   Patient presents with    Wound Check         HISTORY of PRESENT ILLNESS     Arturo Partida is a 81 y.o. 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 The ulcer is of moderate severity. The underlying cause of the wound is moderate. Wound Pain Timing/Severity: intermittent  Quality of pain: sharp, dull, aching, radiating to feet bilaterally  Severity of pain:  3 / 10   Modifying Factors: edema and venous stasis  Associated Signs/Symptoms: edema and pain        Hypertension diabetes and diastolic heart failure patient had a history of heart attack in the past and tPA was given at 89  PAST MEDICAL HISTORY        Diagnosis Date    Arthritis     left knee pain, sees Dr. Braulio Holman    CAD (coronary artery disease)     sees Dr. Rosalio Atwood    Chronic midline low back pain without sciatica 2016    Had PT in 2016    Claustrophobia     Colonoscopy refused     discussed in 7/15    COVID-19 virus infection 2023    DM (diabetes mellitus), type 2 ([facility] [address] 2006    Dupuytren's contracture of right hand     Endometrial stripe increased 2014    Saw NP Lali garrett. Was normal exam per pt. Gastrointestinal hemorrhage 2015    Patient had GI bleeding. Colon refused. Discussed with patient in detail. Glaucoma 2014    H/O echocardiogram 10/02/2014    Mildly depressed left ventricular function; ejection fraction of 45%. Moderate pulmonary hypertension. H/O echocardiogram 2018    EF 50-55%. Mitral annular calcification is present. No other significant valvulopathy seen. History of nuclear stress test 2018    EF 59%. ECG portion of stress test is negative for ischemia by diagnostic criteria. fixed inferior large size severe defect in rest and stress images.   Mild hubert

## 2023-05-10 NOTE — PROGRESS NOTES
visit. Past Medical History:   Diagnosis Date    Arthritis     left knee pain, sees Dr. Mac Cotto    CAD (coronary artery disease)     sees Dr. Libertad Cortes    Chronic midline low back pain without sciatica 9/28/2016    Had PT in 2016    Claustrophobia     Colonoscopy refused     discussed in 7/15    COVID-19 virus infection 1/5/2023    DM (diabetes mellitus), type 2 ([facility] [address] 02/2006    Dupuytren's contracture of right hand     Endometrial stripe increased 9/25/2014    Saw NP Candelaria Finn. Was normal exam per pt. Gastrointestinal hemorrhage 11/2/2015    Patient had GI bleeding. Colon refused. Discussed with patient in detail. Glaucoma 4/1/2014    H/O echocardiogram 10/02/2014    Mildly depressed left ventricular function; ejection fraction of 45%. Moderate pulmonary hypertension. H/O echocardiogram 08/28/2018    EF 50-55%. Mitral annular calcification is present. No other significant valvulopathy seen. History of nuclear stress test 08/28/2018    EF 59%. ECG portion of stress test is negative for ischemia by diagnostic criteria. fixed inferior large size severe defect in rest and stress images. Mild hubert infarct ischemia.     HTN (hypertension)     Hyperlipidemia     Kidney stone     hx-\"been about 3 yrs ago\"    MI (myocardial infarction) ([facility] [address] 02/1998    treated with TPA    Obesity     Open wound of right foot 7/27/2020    Parainfluenza infection 12/5/2021    Pneumonia of right lower lobe due to infectious organism 4/5/2018    Vertigo     'have been having this since I had cataract surgery\"\"that is why they are doing the MRA of my brain(10/31/2014)    WD-Traumatic ulcer of foot, right, with fat layer exposed ([facility] [address] 8/3/2020     Past Surgical History:   Procedure Laterality Date    CATARACT REMOVAL Bilateral     5/14,8/14    INGUINAL HERNIA REPAIR Left 45 yrs ago    [address] N/A 5/12/2022    L1-4 LAMINECTOMIES performed by Brittany Suazo MD at [address] 5/12/2022    L2 AND L3

## 2023-05-11 ENCOUNTER — CARE COORDINATION (OUTPATIENT)
Dept: CASE MANAGEMENT | Age: 88
End: 2023-05-11

## 2023-05-11 NOTE — CARE COORDINATION
[facility] Care Transitions Follow Up Call    Patient Current Location:  [facility] [address] [facility] Transition Nurse contacted the patient by telephone to follow up after admission on 23-23. Verified name and  with patient as identifiers. Patient: Harley Enrique  Patient : 1935   MRN: [id number]  Reason for Admission: Cellulitis of Rt leg  Discharge Date: 23 RARS: Readmission Risk Score: 17.3      Needs to be reviewed by the provider   Additional needs identified to be addressed with provider: No  none             Method of communication with provider: none. Spoke with Mati Hernández, says she is doing pretty good. Says her RLE Cellulitis has improved. Says less swollen, no drainage. Denies fever/chills. She said she had follow up appt yesterday w/PCP & WCC. She says she feels better not having the tight bounded wraps on. She said the [facility] MD & RN have recommended the Lymphadema pumps be worn. Mati Hernández said she did not fully understand what she needs to do. CTN placed call to the [facility] ([address]) spoke with Mark Alexander, says she did make a call to pt's insurance, said the Lymphdema pumps would be covered if she wears the wraps x 4 weeks. Rabia states pt has worn the wraps x2 weeks & couldn't tolerate them longer. CTN called pt back, explained this, she is in fear of wearing them again due to pain & swelling. The [facility] physician yesterday had recommended she can get Compressions pumps on  [address]. Pt aware, her son may get them on  [address] or purchase @ a local [employer]. Discussed RPM with Mati Hernández, she says she will consider @ a later date but wants to get  this wound care treatment plan sorted out first. , /82. Hasn't weighed herself today, denies weight increase. Stressed importance of keeping a log for VS & importance of reporting wt gain of 2-3 lbs in 24 hrs & 5 lbs w/in 1 week. She verbalizes ubderstanding.  .     Addressed changes since last contact:

## 2023-05-17 ENCOUNTER — HOSPITAL ENCOUNTER (OUTPATIENT)
Dept: WOUND CARE | Age: 88
Discharge: HOME | End: 2023-05-17
Payer: MEDICARE

## 2023-05-17 VITALS
HEART RATE: 71 BPM | DIASTOLIC BLOOD PRESSURE: 63 MMHG | TEMPERATURE: 98.7 F | RESPIRATION RATE: 16 BRPM | SYSTOLIC BLOOD PRESSURE: 133 MMHG

## 2023-05-17 PROCEDURE — 99213 OFFICE O/P EST LOW 20 MIN: CPT

## 2023-05-18 ENCOUNTER — CARE COORDINATION (OUTPATIENT)
Dept: CASE MANAGEMENT | Age: 88
End: 2023-05-18

## 2023-05-18 NOTE — CARE COORDINATION
[facility] Care Transitions Follow Up Call      Patient: Teodoro Cunningham  Patient : 1935   MRN: [id number]  Reason for Admission:  Cellulitis of Rt leg  Discharge Date: 23 RARS: Readmission Risk Score: 17.3        1st attempt to reach for Care Transition follow up call unsuccessful. HIPAA compliant message left requesting call back.        Follow Up  Future Appointments   Date Time Provider Rose Rendon   2023  2:00 PM Rachael Alatorre MD [facility]   2023 12:15 PM SCHEDULE, [facility]   2023  1:00 PM SCHEDULE, [facility]   2023 11:30 AM SCHEDULE, [facility]   2023  3:45 PM May Remi, DO [address] [place] [address] Neurology -   7/10/2023  1:15 PM Hyacinth Orellana MD [address] [place] [address] [facility] KAY Michaels RN

## 2023-05-19 RX ORDER — ALBUTEROL SULFATE 90 UG/1
2 AEROSOL, METERED RESPIRATORY (INHALATION) 4 TIMES DAILY PRN
Qty: 8.5 G | Refills: 5 | Status: SHIPPED | OUTPATIENT
Start: 2023-05-19

## 2023-05-24 ENCOUNTER — HOSPITAL ENCOUNTER (OUTPATIENT)
Dept: WOUND CARE | Age: 88
Discharge: HOME | End: 2023-05-24
Payer: MEDICARE

## 2023-05-24 VITALS
RESPIRATION RATE: 16 BRPM | HEART RATE: 69 BPM | TEMPERATURE: 97.6 F | SYSTOLIC BLOOD PRESSURE: 124 MMHG | DIASTOLIC BLOOD PRESSURE: 91 MMHG

## 2023-05-24 DIAGNOSIS — L03.115 CELLULITIS OF RIGHT LEG: ICD-10-CM

## 2023-05-24 DIAGNOSIS — I83.012 VENOUS STASIS ULCER OF RIGHT CALF WITH FAT LAYER EXPOSED, UNSPECIFIED WHETHER VARICOSE VEINS PRESENT (HCC): ICD-10-CM

## 2023-05-24 DIAGNOSIS — I87.2 VENOUS INSUFFICIENCY OF BOTH LOWER EXTREMITIES: ICD-10-CM

## 2023-05-24 DIAGNOSIS — L97.212 VENOUS STASIS ULCER OF RIGHT CALF WITH FAT LAYER EXPOSED, UNSPECIFIED WHETHER VARICOSE VEINS PRESENT (HCC): ICD-10-CM

## 2023-05-24 DIAGNOSIS — L03.115 CELLULITIS OF RIGHT LEG: Primary | ICD-10-CM

## 2023-05-24 PROCEDURE — 99213 OFFICE O/P EST LOW 20 MIN: CPT

## 2023-05-24 PROCEDURE — 6370000000 HC RX 637 (ALT 250 FOR IP): Performed by: NURSE PRACTITIONER

## 2023-05-24 PROCEDURE — 99213 OFFICE O/P EST LOW 20 MIN: CPT | Performed by: INTERNAL MEDICINE

## 2023-05-24 RX ORDER — BETAMETHASONE DIPROPIONATE 0.05 %
OINTMENT (GRAM) TOPICAL ONCE
OUTPATIENT
Start: 2023-05-24 | End: 2023-05-24

## 2023-05-24 RX ORDER — BACITRACIN, NEOMYCIN, POLYMYXIN B 400; 3.5; 5 [USP'U]/G; MG/G; [USP'U]/G
OINTMENT TOPICAL ONCE
OUTPATIENT
Start: 2023-05-24 | End: 2023-05-24

## 2023-05-24 RX ORDER — GENTAMICIN SULFATE 1 MG/G
OINTMENT TOPICAL ONCE
OUTPATIENT
Start: 2023-05-24 | End: 2023-05-24

## 2023-05-24 RX ORDER — LIDOCAINE 50 MG/G
OINTMENT TOPICAL ONCE
OUTPATIENT
Start: 2023-05-24 | End: 2023-05-24

## 2023-05-24 RX ORDER — CLOBETASOL PROPIONATE 0.5 MG/G
OINTMENT TOPICAL ONCE
OUTPATIENT
Start: 2023-05-24 | End: 2023-05-24

## 2023-05-24 RX ORDER — LIDOCAINE 40 MG/G
CREAM TOPICAL ONCE
OUTPATIENT
Start: 2023-05-24 | End: 2023-05-24

## 2023-05-24 RX ORDER — GINSENG 100 MG
CAPSULE ORAL ONCE
OUTPATIENT
Start: 2023-05-24 | End: 2023-05-24

## 2023-05-24 RX ORDER — LIDOCAINE HYDROCHLORIDE 40 MG/ML
SOLUTION TOPICAL ONCE
OUTPATIENT
Start: 2023-05-24 | End: 2023-05-24

## 2023-05-24 RX ORDER — BACITRACIN ZINC AND POLYMYXIN B SULFATE 500; 1000 [USP'U]/G; [USP'U]/G
OINTMENT TOPICAL ONCE
OUTPATIENT
Start: 2023-05-24 | End: 2023-05-24

## 2023-05-24 RX ORDER — CLOBETASOL PROPIONATE 0.5 MG/G
OINTMENT TOPICAL ONCE
Status: COMPLETED | OUTPATIENT
Start: 2023-05-24 | End: 2023-05-24

## 2023-05-24 RX ORDER — AMOXICILLIN AND CLAVULANATE POTASSIUM 875; 125 MG/1; MG/1
1 TABLET, FILM COATED ORAL 2 TIMES DAILY
Qty: 14 TABLET | Refills: 0 | Status: SHIPPED | OUTPATIENT
Start: 2023-05-24 | End: 2023-05-31

## 2023-05-24 RX ORDER — LIDOCAINE HYDROCHLORIDE 20 MG/ML
JELLY TOPICAL ONCE
OUTPATIENT
Start: 2023-05-24 | End: 2023-05-24

## 2023-05-24 RX ADMIN — CLOBETASOL PROPIONATE: 0.5 OINTMENT TOPICAL at 14:50

## 2023-05-24 NOTE — DISCHARGE INSTRUCTIONS
PHYSICIAN ORDERS AND DISCHARGE INSTRUCTIONS  NOTE: Upon discharge from the [address], you will receive a patient experience survey via E-mail. We would be grateful if you would take the time to fill this survey out. Wound care order history:              SULTANA's   Right  0.92     Left 0.8   Date 04/25/2025              Vascular studies/Intervention: .  US ordered via Dr Nader Crenshaw on 05/10/2023              Cultures: . Antibiotics: . HbA1c:  . Compression/Lymph Pumps: Discussed need for compression therapy x 4 week for insurance to pay for lymph pumps             Grafts: Danuta Bhagat: . Continuing wound care orders and information:              Residence: . Private              Continue home health care with:. Alan rodriguezIndira Noguera Phone # [phone]              Your wound-care supplies will be provided by: [facility]: . Wound cleansing:                           Do not scrub or use excessive force. Wash hands with soap and water before and after dressing changes. Prior to applying a clean dressing, cleanse wound with normal saline,                          wound cleanser, or mild soap and water. Ask your physician or nurse before getting the wound(s) wet in the shower. Daily Wound management:                          Keep weight off wounds and reposition every 2 hours. Avoid standing for long periods of time. Evaluate legs to the level of the heart or above for 30 minutes 4-5 times a day and/or when sitting. When taking antibiotics take entire prescription as ordered by MD do not stop taking until medicine is all gone.                                                                  Orders for this week 05/124/2023:  Lower Right Leg Wounds- Wash with mild soap and water, rinse with

## 2023-05-24 NOTE — PROGRESS NOTES
cm^3 05/10/23 1432   Distance Tunneling (cm) 0 cm 05/24/23 1410   Tunneling Position ___ O'Clock 0 05/24/23 1410   Undermining Starts ___ O'Clock 0 05/24/23 1410   Undermining Ends___ O'Clock 0 05/24/23 1410   Undermining Maxium Distance (cm) 0 05/24/23 1410   Wound Assessment Slough 05/24/23 1410   Drainage Amount Small 05/24/23 1410   Drainage Description Yellow 05/24/23 1410   Odor None 05/24/23 1410   Vera-wound Assessment Edematous 05/24/23 1410   Margins Defined edges; Unattached edges 05/24/23 1410   Wound Thickness Description not for Pressure Injury Full thickness 05/24/23 1410   Number of days: 14       Assessment:     Rt leg cellulitis and possible venous reflux disease  Problem List Items Addressed This Visit       WD-Cellulitis of right leg - Primary    Relevant Orders    Initiate Outpatient Wound Care Protocol    WD-Venous insufficiency of both lower extremities    Relevant Orders    Initiate Outpatient Wound Care Protocol    WD-Venous stasis ulcer of right calf with fat layer exposed ([facility] [address]    Relevant Orders    Initiate Outpatient Wound Care Protocol       Status of wound progress and description from last visit:   worse. Plan:   Add antibioitcs augmentin and further care as below  Discharge Instructions         [address]  NOTE: Upon discharge from the [address], you will receive a patient experience survey via E-mail. We would be grateful if you would take the time to fill this survey out. Wound care order history:              SULTANA's   Right  0.92     Left 0.8   Date 04/25/2025              Vascular studies/Intervention: .  US ordered via Dr Yousif Benson on 05/10/2023              Cultures: . Antibiotics: . HbA1c:  .                  Compression/Lymph Pumps:   Dr Yousif Benson advised pt to buy lymphedema pumps off [address], she wants insurance to pay for pumps, provider needs to discuss compression therapy required for pumps (at next appt) to

## 2023-05-30 ENCOUNTER — TELEPHONE (OUTPATIENT)
Dept: CARDIOLOGY CLINIC | Age: 88
End: 2023-05-30

## 2023-05-31 ENCOUNTER — HOSPITAL ENCOUNTER (OUTPATIENT)
Dept: WOUND CARE | Age: 88
Discharge: HOME | End: 2023-05-31
Payer: MEDICARE

## 2023-05-31 VITALS
SYSTOLIC BLOOD PRESSURE: 124 MMHG | DIASTOLIC BLOOD PRESSURE: 62 MMHG | TEMPERATURE: 98 F | RESPIRATION RATE: 16 BRPM | HEART RATE: 79 BPM

## 2023-05-31 DIAGNOSIS — L03.115 CELLULITIS OF RIGHT LEG: ICD-10-CM

## 2023-05-31 DIAGNOSIS — L97.212 VENOUS STASIS ULCER OF RIGHT CALF WITH FAT LAYER EXPOSED, UNSPECIFIED WHETHER VARICOSE VEINS PRESENT (HCC): ICD-10-CM

## 2023-05-31 DIAGNOSIS — I83.012 VENOUS STASIS ULCER OF RIGHT CALF WITH FAT LAYER EXPOSED, UNSPECIFIED WHETHER VARICOSE VEINS PRESENT (HCC): ICD-10-CM

## 2023-05-31 DIAGNOSIS — I87.2 VENOUS INSUFFICIENCY OF BOTH LOWER EXTREMITIES: Primary | ICD-10-CM

## 2023-05-31 PROCEDURE — 11042 DBRDMT SUBQ TIS 1ST 20SQCM/<: CPT

## 2023-05-31 PROCEDURE — 11042 DBRDMT SUBQ TIS 1ST 20SQCM/<: CPT | Performed by: INTERNAL MEDICINE

## 2023-05-31 RX ORDER — BETAMETHASONE DIPROPIONATE 0.05 %
OINTMENT (GRAM) TOPICAL ONCE
OUTPATIENT
Start: 2023-05-31 | End: 2023-05-31

## 2023-05-31 RX ORDER — GENTAMICIN SULFATE 1 MG/G
OINTMENT TOPICAL ONCE
OUTPATIENT
Start: 2023-05-31 | End: 2023-05-31

## 2023-05-31 RX ORDER — BACITRACIN ZINC AND POLYMYXIN B SULFATE 500; 1000 [USP'U]/G; [USP'U]/G
OINTMENT TOPICAL ONCE
OUTPATIENT
Start: 2023-05-31 | End: 2023-05-31

## 2023-05-31 RX ORDER — LIDOCAINE 40 MG/G
CREAM TOPICAL ONCE
OUTPATIENT
Start: 2023-05-31 | End: 2023-05-31

## 2023-05-31 RX ORDER — LIDOCAINE HYDROCHLORIDE 20 MG/ML
JELLY TOPICAL ONCE
OUTPATIENT
Start: 2023-05-31 | End: 2023-05-31

## 2023-05-31 RX ORDER — LIDOCAINE HYDROCHLORIDE 40 MG/ML
SOLUTION TOPICAL ONCE
OUTPATIENT
Start: 2023-05-31 | End: 2023-05-31

## 2023-05-31 RX ORDER — BACITRACIN, NEOMYCIN, POLYMYXIN B 400; 3.5; 5 [USP'U]/G; MG/G; [USP'U]/G
OINTMENT TOPICAL ONCE
OUTPATIENT
Start: 2023-05-31 | End: 2023-05-31

## 2023-05-31 RX ORDER — LIDOCAINE 50 MG/G
OINTMENT TOPICAL ONCE
OUTPATIENT
Start: 2023-05-31 | End: 2023-05-31

## 2023-05-31 RX ORDER — GINSENG 100 MG
CAPSULE ORAL ONCE
OUTPATIENT
Start: 2023-05-31 | End: 2023-05-31

## 2023-05-31 RX ORDER — CLOBETASOL PROPIONATE 0.5 MG/G
OINTMENT TOPICAL ONCE
OUTPATIENT
Start: 2023-05-31 | End: 2023-05-31

## 2023-05-31 NOTE — PROGRESS NOTES
Wound Care Center Progress Note With Procedure    Jayshree Tate  AGE: 81 y.o. GENDER: female  : 1935  EPISODE DATE:  2023     Subjective:     Chief Complaint   Patient presents with    Wound Check     BLE           HISTORY of PRESENT ILLNESS    Sorin Lopez is a 81 y.o. 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 The ulcer is of moderate severity. The underlying cause of the wound is moderate. Wound Pain Timing/Severity: intermittent  Quality of pain: sharp, dull, aching, radiating to feet bilaterally  Severity of pain:  3 / 10   Modifying Factors: edema and venous stasis  Associated Signs/Symptoms: edema and pain                                        Hypertension diabetes and diastolic heart failure patient had a history of heart attack in the past and tPA was given at 89                    Patient has not used compression socks, her insurance company did not approve her lympmadema pump. Last week augmentin was added for her rt leg cellulitis and venous doppler was also ordered, she did not get venous doppler, used antibioitcs, however, had some vomiting and not sure if it always absorbed it or not. PAST MEDICAL HISTORY        Diagnosis Date    Arthritis     left knee pain, sees Dr. Evelyn Fischer    CAD (coronary artery disease)     sees Dr. Jeanie Barcenas    Chronic midline low back pain without sciatica 2016    Had PT in 2016    Claustrophobia     Colonoscopy refused     discussed in 7/15    COVID-19 virus infection 2023    DM (diabetes mellitus), type 2 ([facility] [address] 2006    Dupuytren's contracture of right hand     Endometrial stripe increased 2014    Saw NP Lorin Moore. Was normal exam per pt. Gastrointestinal hemorrhage 2015    Patient had GI bleeding. Colon refused. Discussed with patient in detail.     Glaucoma 2014    H/O echocardiogram 10/02/2014    Mildly depressed left ventricular function;

## 2023-05-31 NOTE — DISCHARGE INSTRUCTIONS
PHYSICIAN ORDERS AND DISCHARGE INSTRUCTIONS  NOTE: Upon discharge from the [address], you will receive a patient experience survey via E-mail. We would be grateful if you would take the time to fill this survey out. Wound care order history:              SULTANA's   Right  0.92     Left 0.8   Date 04/25/2025              Vascular studies/Intervention:               Cultures: . Augmentin               Antibiotics: . HbA1c:  . Compression/Lymph Pumps: Discussed need for compression therapy x 4 week for insurance to pay for lymph pumps             Grafts: Solange Dexter: . Continuing wound care orders and information:              Residence: . Private              [facility] Phone # [phone]              Your wound-care supplies will be provided by: [facility]: . Wound cleansing:                           Do not scrub or use excessive force. Wash hands with soap and water before and after dressing changes. Prior to applying a clean dressing, cleanse wound with normal saline,                          wound cleanser, or mild soap and water. Ask your physician or nurse before getting the wound(s) wet in the shower. Daily Wound management:                          Keep weight off wounds and reposition every 2 hours. Avoid standing for long periods of time. Evaluate legs to the level of the heart or above for 30 minutes 4-5 times a day and/or when sitting. When taking antibiotics take entire prescription as ordered by MD do not stop taking until medicine is all gone.                                                                  Orders for this week 05/31/2023:  Lower Right Leg Wounds- Wash with mild soap and water, rinse with saline, pat dry with

## 2023-06-06 ENCOUNTER — PROCEDURE VISIT (OUTPATIENT)
Dept: CARDIOLOGY CLINIC | Age: 88
End: 2023-06-06

## 2023-06-06 DIAGNOSIS — R06.02 SOB (SHORTNESS OF BREATH): Primary | ICD-10-CM

## 2023-06-06 NOTE — PROGRESS NOTES
Patient had a recent back surgery and is not able to lay back, she has back spasms.  And not able to do the NM test.

## 2023-06-07 RX ORDER — TORSEMIDE 20 MG/1
20 TABLET ORAL DAILY
Qty: 30 TABLET | Refills: 0 | Status: SHIPPED | OUTPATIENT
Start: 2023-06-07

## 2023-06-08 ENCOUNTER — HOSPITAL ENCOUNTER (OUTPATIENT)
Dept: WOUND CARE | Age: 88
Discharge: HOME | End: 2023-06-08
Payer: MEDICARE

## 2023-06-08 VITALS
HEART RATE: 70 BPM | SYSTOLIC BLOOD PRESSURE: 145 MMHG | DIASTOLIC BLOOD PRESSURE: 72 MMHG | TEMPERATURE: 96.7 F | RESPIRATION RATE: 16 BRPM

## 2023-06-08 DIAGNOSIS — I87.2 VENOUS INSUFFICIENCY OF BOTH LOWER EXTREMITIES: ICD-10-CM

## 2023-06-08 DIAGNOSIS — L97.212 VENOUS STASIS ULCER OF RIGHT CALF WITH FAT LAYER EXPOSED, UNSPECIFIED WHETHER VARICOSE VEINS PRESENT (HCC): ICD-10-CM

## 2023-06-08 DIAGNOSIS — I83.012 VENOUS STASIS ULCER OF RIGHT CALF WITH FAT LAYER EXPOSED, UNSPECIFIED WHETHER VARICOSE VEINS PRESENT (HCC): ICD-10-CM

## 2023-06-08 DIAGNOSIS — L03.115 CELLULITIS OF RIGHT LEG: ICD-10-CM

## 2023-06-08 DIAGNOSIS — L03.115 CELLULITIS OF RIGHT LEG: Primary | ICD-10-CM

## 2023-06-08 DIAGNOSIS — S81.811A ISTAP TYPE 2 SKIN TEAR OF RIGHT LOWER LEG: ICD-10-CM

## 2023-06-08 PROCEDURE — 11042 DBRDMT SUBQ TIS 1ST 20SQCM/<: CPT

## 2023-06-08 RX ORDER — LIDOCAINE HYDROCHLORIDE 20 MG/ML
JELLY TOPICAL ONCE
OUTPATIENT
Start: 2023-06-08 | End: 2023-06-08

## 2023-06-08 RX ORDER — GENTAMICIN SULFATE 1 MG/G
OINTMENT TOPICAL ONCE
OUTPATIENT
Start: 2023-06-08 | End: 2023-06-08

## 2023-06-08 RX ORDER — LIDOCAINE 50 MG/G
OINTMENT TOPICAL ONCE
OUTPATIENT
Start: 2023-06-08 | End: 2023-06-08

## 2023-06-08 RX ORDER — GINSENG 100 MG
CAPSULE ORAL ONCE
OUTPATIENT
Start: 2023-06-08 | End: 2023-06-08

## 2023-06-08 RX ORDER — IBUPROFEN 200 MG
TABLET ORAL ONCE
OUTPATIENT
Start: 2023-06-08 | End: 2023-06-08

## 2023-06-08 RX ORDER — BACITRACIN ZINC AND POLYMYXIN B SULFATE 500; 1000 [USP'U]/G; [USP'U]/G
OINTMENT TOPICAL ONCE
OUTPATIENT
Start: 2023-06-08 | End: 2023-06-08

## 2023-06-08 RX ORDER — CLOBETASOL PROPIONATE 0.5 MG/G
OINTMENT TOPICAL ONCE
OUTPATIENT
Start: 2023-06-08 | End: 2023-06-08

## 2023-06-08 RX ORDER — BETAMETHASONE DIPROPIONATE 0.05 %
OINTMENT (GRAM) TOPICAL ONCE
OUTPATIENT
Start: 2023-06-08 | End: 2023-06-08

## 2023-06-08 RX ORDER — LIDOCAINE HYDROCHLORIDE 40 MG/ML
SOLUTION TOPICAL ONCE
OUTPATIENT
Start: 2023-06-08 | End: 2023-06-08

## 2023-06-08 RX ORDER — LIDOCAINE 40 MG/G
CREAM TOPICAL ONCE
OUTPATIENT
Start: 2023-06-08 | End: 2023-06-08

## 2023-06-08 ASSESSMENT — PAIN SCALES - GENERAL: PAINLEVEL_OUTOF10: 4

## 2023-06-08 ASSESSMENT — PAIN DESCRIPTION - ORIENTATION: ORIENTATION: RIGHT;LEFT

## 2023-06-08 NOTE — DISCHARGE INSTRUCTIONS
and A&D to intact skin   Apply kirby to wound bed  Secure with silicone border gauze   Tubi F                 Follow up with Dr. Loretta Genao in 1 week in the wound care center  Call [phone] for any questions or concerns.   Date__________   Time____________

## 2023-06-08 NOTE — PROGRESS NOTES
Wound Care Center Progress Note With Procedure    Jayshree Tate  AGE: 81 y.o. GENDER: female  : 1935  EPISODE DATE:  2023     Subjective:     Chief Complaint   Patient presents with    Wound Check     BLE          HISTORY of PRESENT ILLNESS     Tessa Meyer is a 81 y.o. 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 The ulcer is of moderate severity. The underlying cause of the wound is moderate. 2023: Patient has a new skin tear to right lower leg just under the knee. This occurred yesterday from a fall  She is doing well with Dr. Amber Concepcion and she is having an angiogram next Thursday. Visibly suffers from arterial insufficiency. Hypertension diabetes and diastolic heart failure patient had a history of heart attack in the past and tPA was given at 98                    Patient has not used compression socks, her insurance company did not approve her lympmadema pump. Last week augmentin was added for her rt leg cellulitis and venous doppler was also ordered, she did not get venous doppler, used antibioitcs, however, had some vomiting and not sure if it always absorbed it or not.        Wound Pain Timing/Severity: intermittent  Quality of pain: sharp, dull, aching, radiating to feet bilaterally  Severity of pain:  3 / 10   Modifying Factors: edema and venous stasis  Associated Signs/Symptoms: edema and pain          PAST MEDICAL HISTORY        Diagnosis Date    Arthritis     left knee pain, sees Dr. Augusto Kent    CAD (coronary artery disease)     sees Dr. Yesenia Kelly    Chronic midline low back pain without sciatica 2016    Had PT in 2016    Claustrophobia     Colonoscopy refused     discussed in 7/15    COVID-19 virus infection 2023    DM [facility]di[facility] mellitus[facility] 2006    Dupuytren's contracture of right hand     Endometrial stripe increased 2014    Saw NP Yannick Wadsworth

## 2023-06-19 ENCOUNTER — OFFICE VISIT (OUTPATIENT)
Dept: NEUROLOGY | Age: 88
End: 2023-06-19
Payer: MEDICARE

## 2023-06-19 ENCOUNTER — APPOINTMENT (OUTPATIENT)
Dept: CT IMAGING | Age: 88
End: 2023-06-19
Payer: MEDICARE

## 2023-06-19 ENCOUNTER — HOSPITAL ENCOUNTER (EMERGENCY)
Age: 88
Discharge: HOME | End: 2023-06-20
Attending: EMERGENCY MEDICINE
Payer: MEDICARE

## 2023-06-19 ENCOUNTER — APPOINTMENT (OUTPATIENT)
Dept: GENERAL RADIOLOGY | Age: 88
End: 2023-06-19
Payer: MEDICARE

## 2023-06-19 VITALS
HEART RATE: 84 BPM | HEIGHT: 64 IN | DIASTOLIC BLOOD PRESSURE: 64 MMHG | WEIGHT: 256 LBS | BODY MASS INDEX: 43.71 KG/M2 | SYSTOLIC BLOOD PRESSURE: 130 MMHG | OXYGEN SATURATION: 94 %

## 2023-06-19 DIAGNOSIS — R07.9 CHEST PAIN, UNSPECIFIED TYPE: Primary | ICD-10-CM

## 2023-06-19 DIAGNOSIS — M79.2 NEUROPATHIC PAIN: ICD-10-CM

## 2023-06-19 DIAGNOSIS — R10.11 ABDOMINAL PAIN, RIGHT UPPER QUADRANT: ICD-10-CM

## 2023-06-19 DIAGNOSIS — E11.42 DIABETIC PERIPHERAL NEUROPATHY (HCC): Primary | ICD-10-CM

## 2023-06-19 DIAGNOSIS — R27.8 SENSORY ATAXIA: ICD-10-CM

## 2023-06-19 LAB
ALBUMIN SERPL-MCNC: 3.5 GM/DL (ref 3.4–5)
ALP BLD-CCNC: 74 IU/L (ref 40–129)
ALT SERPL-CCNC: 5 U/L (ref 10–40)
ANION GAP SERPL CALCULATED.3IONS-SCNC: 8 MMOL/L (ref 4–16)
AST SERPL-CCNC: 12 IU/L (ref 15–37)
BACTERIA: NEGATIVE /HPF
BASOPHILS ABSOLUTE: 0.1 K/CU MM
BASOPHILS RELATIVE PERCENT: 0.5 % (ref 0–1)
BILIRUB SERPL-MCNC: 0.2 MG/DL (ref 0–1)
BILIRUBIN URINE: NEGATIVE MG/DL
BLOOD, URINE: NEGATIVE
BUN SERPL-MCNC: 13 MG/DL (ref 6–23)
CALCIUM SERPL-MCNC: 9.8 MG/DL (ref 8.3–10.6)
CAST TYPE: NORMAL /HPF
CHLORIDE BLD-SCNC: 102 MMOL/L (ref 99–110)
CLARITY: CLEAR
CO2: 33 MMOL/L (ref 21–32)
COLOR: YELLOW
CREAT SERPL-MCNC: 0.7 MG/DL (ref 0.6–1.1)
CRYSTAL TYPE: NEGATIVE /HPF
DIFFERENTIAL TYPE: ABNORMAL
EOSINOPHILS ABSOLUTE: 0.3 K/CU MM
EOSINOPHILS RELATIVE PERCENT: 3.1 % (ref 0–3)
EPITHELIAL CELLS, UA: 14 /HPF
GFR SERPL CREATININE-BSD FRML MDRD: >60 ML/MIN/1.73M2
GLUCOSE SERPL-MCNC: 152 MG/DL (ref 70–99)
GLUCOSE, URINE: NEGATIVE MG/DL
HCT VFR BLD CALC: 36.6 % (ref 37–47)
HEMOGLOBIN: 10.9 GM/DL (ref 12.5–16)
IMMATURE NEUTROPHIL %: 0.4 % (ref 0–0.43)
KETONES, URINE: ABNORMAL MG/DL
LEUKOCYTE ESTERASE, URINE: NEGATIVE
LIPASE: 14 IU/L (ref 13–60)
LYMPHOCYTES ABSOLUTE: 1.6 K/CU MM
LYMPHOCYTES RELATIVE PERCENT: 16.3 % (ref 24–44)
MAGNESIUM: 2 MG/DL (ref 1.8–2.4)
MCH RBC QN AUTO: 27.2 PG (ref 27–31)
MCHC RBC AUTO-ENTMCNC: 29.8 % (ref 32–36)
MCV RBC AUTO: 91.3 FL (ref 78–100)
MONOCYTES ABSOLUTE: 1.7 K/CU MM
MONOCYTES RELATIVE PERCENT: 17.8 % (ref 0–4)
NITRITE URINE, QUANTITATIVE: NEGATIVE
PDW BLD-RTO: 13.7 % (ref 11.7–14.9)
PH, URINE: 6.5 (ref 5–8)
PLATELET # BLD: 208 K/CU MM (ref 140–440)
PMV BLD AUTO: 8.9 FL (ref 7.5–11.1)
POTASSIUM SERPL-SCNC: 4.1 MMOL/L (ref 3.5–5.1)
PROTEIN UA: ABNORMAL MG/DL
RBC # BLD: 4.01 M/CU MM (ref 4.2–5.4)
RBC URINE: 1 /HPF (ref 0–6)
SEGMENTED NEUTROPHILS ABSOLUTE COUNT: 6 K/CU MM
SEGMENTED NEUTROPHILS RELATIVE PERCENT: 61.9 % (ref 36–66)
SODIUM BLD-SCNC: 143 MMOL/L (ref 135–145)
SPECIFIC GRAVITY UA: 1.02 (ref 1–1.03)
TOTAL IMMATURE NEUTOROPHIL: 0.04 K/CU MM
TOTAL PROTEIN: 6.1 GM/DL (ref 6.4–8.2)
TROPONIN T: <0.01 NG/ML
TROPONIN T: <0.01 NG/ML
UROBILINOGEN, URINE: 1 MG/DL (ref 0.2–1)
WBC # BLD: 9.6 K/CU MM (ref 4–10.5)
WBC UA: 2 /HPF (ref 0–5)

## 2023-06-19 PROCEDURE — 1124F ACP DISCUSS-NO DSCNMKR DOCD: CPT | Performed by: PSYCHIATRY & NEUROLOGY

## 2023-06-19 PROCEDURE — 93005 ELECTROCARDIOGRAM TRACING: CPT | Performed by: EMERGENCY MEDICINE

## 2023-06-19 PROCEDURE — 99285 EMERGENCY DEPT VISIT HI MDM: CPT

## 2023-06-19 PROCEDURE — 83735 ASSAY OF MAGNESIUM: CPT

## 2023-06-19 PROCEDURE — 74177 CT ABD & PELVIS W/CONTRAST: CPT

## 2023-06-19 PROCEDURE — 6360000004 HC RX CONTRAST MEDICATION: Performed by: EMERGENCY MEDICINE

## 2023-06-19 PROCEDURE — G8417 CALC BMI ABV UP PARAM F/U: HCPCS | Performed by: PSYCHIATRY & NEUROLOGY

## 2023-06-19 PROCEDURE — 6370000000 HC RX 637 (ALT 250 FOR IP): Performed by: EMERGENCY MEDICINE

## 2023-06-19 PROCEDURE — 84484 ASSAY OF TROPONIN QUANT: CPT

## 2023-06-19 PROCEDURE — G8427 DOCREV CUR MEDS BY ELIG CLIN: HCPCS | Performed by: PSYCHIATRY & NEUROLOGY

## 2023-06-19 PROCEDURE — 80053 COMPREHEN METABOLIC PANEL: CPT

## 2023-06-19 PROCEDURE — 71046 X-RAY EXAM CHEST 2 VIEWS: CPT

## 2023-06-19 PROCEDURE — 99205 OFFICE O/P NEW HI 60 MIN: CPT | Performed by: PSYCHIATRY & NEUROLOGY

## 2023-06-19 PROCEDURE — 71275 CT ANGIOGRAPHY CHEST: CPT

## 2023-06-19 PROCEDURE — 85025 COMPLETE CBC W/AUTO DIFF WBC: CPT

## 2023-06-19 PROCEDURE — 1090F PRES/ABSN URINE INCON ASSESS: CPT | Performed by: PSYCHIATRY & NEUROLOGY

## 2023-06-19 PROCEDURE — 3044F HG A1C LEVEL LT 7.0%: CPT | Performed by: PSYCHIATRY & NEUROLOGY

## 2023-06-19 PROCEDURE — 1036F TOBACCO NON-USER: CPT | Performed by: PSYCHIATRY & NEUROLOGY

## 2023-06-19 PROCEDURE — 83690 ASSAY OF LIPASE: CPT

## 2023-06-19 PROCEDURE — 81001 URINALYSIS AUTO W/SCOPE: CPT

## 2023-06-19 RX ORDER — GABAPENTIN 300 MG/1
CAPSULE ORAL
Qty: 120 CAPSULE | Refills: 5 | Status: SHIPPED | OUTPATIENT
Start: 2023-06-19 | End: 2023-12-19

## 2023-06-19 RX ORDER — METOPROLOL SUCCINATE 25 MG/1
25 TABLET, EXTENDED RELEASE ORAL DAILY
Qty: 90 TABLET | Refills: 1 | Status: SHIPPED | OUTPATIENT
Start: 2023-06-19

## 2023-06-19 RX ORDER — ASPIRIN 81 MG/1
324 TABLET, CHEWABLE ORAL ONCE
Status: COMPLETED | OUTPATIENT
Start: 2023-06-19 | End: 2023-06-19

## 2023-06-19 RX ADMIN — ASPIRIN 81 MG CHEWABLE TABLET 324 MG: 81 TABLET CHEWABLE at 21:15

## 2023-06-19 RX ADMIN — IOPAMIDOL 100 ML: 755 INJECTION, SOLUTION INTRAVENOUS at 23:54

## 2023-06-19 NOTE — TELEPHONE ENCOUNTER
Medication:   Requested Prescriptions     Pending Prescriptions Disp Refills    metoprolol succinate (TOPROL XL) 25 MG extended release tablet [Pharmacy Med Name: METOPROLOL SUCCINATE ER 25 25 Tablet] 90 tablet 1     Sig: TAKE 1 TABLET BY MOUTH DAILY       Last Filled:      Patient Phone Number: [phone] (home)     Last appt: 5/10/2023   Next appt: 7/10/2023    Last Labs DM:   Lab Results   Component Value Date/Time    LABA1C 6.3 04/17/2023 03:10 PM     Last Lipid:   Lab Results   Component Value Date/Time    CHOL 121 08/07/2018 10:47 AM    TRIG 128 08/07/2018 10:47 AM    HDL 63 10/13/2022 08:45 AM    LDLCALC 68 10/13/2022 08:45 AM     Last PSA: No results found for: PSA  Last Thyroid:   Lab Results   Component Value Date/Time    TSH 2.61 08/07/2018 10:47 AM

## 2023-06-19 NOTE — PROGRESS NOTES
6/19/23    Blanca Lowemer  1935    Chief Complaint   Patient presents with    Extremity Weakness     Pt presents for BLE cramps        History of Present Illness  Amanda Hoffmann is a 81 y.o. female presenting today for evaluation of:  Leg pain    She states she has throbbing-like pain from her feet up to her mid thighs. Has been going on for couple years now. She is diabetic. She is unsure what her last hemoglobin A1c number was. She was placed on gabapentin 6 mg at bedtime. She is also on Requip 2 mg twice daily. She denies any symptoms of restless leg syndrome. She states that she is unsure if the Requip helps but sometimes she feels like it does. The gabapentin seems to help with the pain at night. She is on Plaquenil for rheumatoid arthritis. There is no history of gastric bypass surgery. There is no history of the use of chemotherapeutic drugs. There is no history of alcoholism or heavy drinking. She lives in a home in [place]. She has no pets. She has 4 children, 2 grandchildren, and 1 great grandchild. She does have issues with balance. She tells me that she had a fall not too long ago when she tried to grab onto a doorknob and her hand slipped off the doorknob and she fell to the ground. She laid on the ground for a couple hours before calling the squad. She does have some balance issues. She typically uses a cane to get around. She walks really well with her walker she tells me.       Subjective    Review of Symptoms:  Neurologic   Symptoms: sensory disturbances, weakness, difficulty with gait or walking, no bowel symptoms, no vertigo, no confusion, no memory loss, no speech disorder, no visual loss, no double vision, no dizziness, no loss of hearing, no headaches, no bladder symptoms, no seizures, no excessive fatigue, and no syncope    Current Outpatient Medications   Medication Sig Dispense Refill    gabapentin (NEURONTIN) 300 MG capsule Take 1 tablet in the morning, 1 tablet in

## 2023-06-20 ENCOUNTER — HOSPITAL ENCOUNTER (OUTPATIENT)
Dept: WOUND CARE | Age: 88
Discharge: HOME | End: 2023-06-20

## 2023-06-20 ENCOUNTER — TELEPHONE (OUTPATIENT)
Dept: CARDIOLOGY CLINIC | Age: 88
End: 2023-06-20

## 2023-06-20 VITALS
TEMPERATURE: 97.6 F | HEIGHT: 64 IN | RESPIRATION RATE: 21 BRPM | BODY MASS INDEX: 43.71 KG/M2 | HEART RATE: 75 BPM | WEIGHT: 256 LBS | OXYGEN SATURATION: 96 % | DIASTOLIC BLOOD PRESSURE: 84 MMHG | SYSTOLIC BLOOD PRESSURE: 117 MMHG

## 2023-06-20 LAB
EKG ATRIAL RATE: 80 BPM
EKG DIAGNOSIS: NORMAL
EKG P AXIS: 62 DEGREES
EKG P-R INTERVAL: 144 MS
EKG Q-T INTERVAL: 406 MS
EKG QRS DURATION: 98 MS
EKG QTC CALCULATION (BAZETT): 468 MS
EKG R AXIS: -3 DEGREES
EKG T AXIS: 30 DEGREES
EKG VENTRICULAR RATE: 80 BPM

## 2023-06-20 PROCEDURE — 93010 ELECTROCARDIOGRAM REPORT: CPT | Performed by: INTERNAL MEDICINE

## 2023-06-20 ASSESSMENT — HEART SCORE: ECG: 0

## 2023-06-20 NOTE — TELEPHONE ENCOUNTER
Called to patient the results of her recent testing. EF 55-60%. Grade I diastolic dysfunction. The left atrium is mildly dilated. Mitral annular calcification is present. Significant reflux noted of the Right GSV at distal calf (0.6s). Significant reflux noted in the Left GSV at mid thigh tributary (0.5s). Most likely would need varithena on left leg. Patient verbalized understanding of all information given.

## 2023-06-20 NOTE — DISCHARGE INSTRUCTIONS
PHYSICIAN ORDERS AND DISCHARGE INSTRUCTIONS  NOTE: Upon discharge from the [address], you will receive a patient experience survey via E-mail. We would be grateful if you would take the time to fill this survey out. Wound care order history:              SULTANA's   Right  0.92     Left 0.8   Date 04/25/2025              Vascular studies/Intervention:               Cultures: . Augmentin               Antibiotics: . HbA1c:  . Compression/Lymph Pumps: Discussed need for compression therapy x 4 week for insurance to pay for lymph pumps             Grafts: Claudy Lidia: . Continuing wound care orders and information:              Residence: . Private              Continue home health care with:. [facility] Phone # [phone]              Your wound-care supplies will be provided by: [facility]: . Wound cleansing:                           Do not scrub or use excessive force. Wash hands with soap and water before and after dressing changes. Prior to applying a clean dressing, cleanse wound with normal saline,                          wound cleanser, or mild soap and water. Ask your physician or nurse before getting the wound(s) wet in the shower. Daily Wound management:                          Keep weight off wounds and reposition every 2 hours. Avoid standing for long periods of time. Evaluate legs to the level of the heart or above for 30 minutes 4-5 times a day and/or when sitting. When taking antibiotics take entire prescription as ordered by MD do not stop taking until medicine is all gone.                                                                  Orders for this week 6/20/2023:  Lower Right Leg Wounds- Wash with mild soap and water, rinse with saline, pat dry with

## 2023-06-20 NOTE — DISCHARGE INSTRUCTIONS
Please follow-up with primary care physician or referral physician next 24 to 48 hours. Call to schedule appointment.     Return to the emergency department for chest pain, shortness of breath, syncope, weakness, any other concerning symptoms
stated

## 2023-06-20 NOTE — ED NOTES
Pt discharged with instructions and pt stated understanding.   Pt walked out of the [address], RN  06/20/23 2902

## 2023-06-20 NOTE — ED PROVIDER NOTES
06/20/2023 02:50:57 AM         Current Discharge Medication List            (Please note that portions of this note may have been completed with a voice recognition program. Efforts were made to edit the dictations but occasionally words are mis-transcribed.)    Anny Webber MD  [address] [place] [address]        Anny Webber MD  06/20/23 8886

## 2023-06-20 NOTE — ED NOTES
Walk with pulse ox completed O2 Sat Range 94-96 on room air, tolerated well.       Tatianna Zaidi RN  06/20/23 7405

## 2023-06-21 ENCOUNTER — TELEPHONE (OUTPATIENT)
Dept: INTERNAL MEDICINE CLINIC | Age: 88
End: 2023-06-21

## 2023-06-21 NOTE — TELEPHONE ENCOUNTER
Spoke with patient and she has to wait until she can reach [facility] so that she can make an appt for hospital follow up. [facility] is her transportation.

## 2023-06-21 NOTE — TELEPHONE ENCOUNTER
----- Message from Arlette Branham sent at 6/21/2023  9:49 AM EDT -----  Subject: Message to Provider    QUESTIONS  Information for Provider? Pt was seen in [facility] ED 6/19 for possible heart   attack. Told pt to inform PCP.   ---------------------------------------------------------------------------  --------------  Ziggy MAURO  [phone]; OK to leave message on voicemail  ---------------------------------------------------------------------------  --------------  SCRIPT ANSWERS  Relationship to Patient?  Self

## 2023-06-22 ENCOUNTER — TELEPHONE (OUTPATIENT)
Dept: CARDIOLOGY CLINIC | Age: 88
End: 2023-06-22

## 2023-06-22 NOTE — TELEPHONE ENCOUNTER
Returned call to francisco referred her to Wound clinic that is where Dr. Belkys Hudson sees her . Francisco verbalized understanding of all information given.

## 2023-06-28 ENCOUNTER — HOSPITAL ENCOUNTER (OUTPATIENT)
Dept: WOUND CARE | Age: 88
Discharge: HOME | End: 2023-06-28
Payer: MEDICARE

## 2023-06-28 DIAGNOSIS — L03.115 CELLULITIS OF RIGHT LEG: Primary | ICD-10-CM

## 2023-06-28 DIAGNOSIS — I87.2 VENOUS INSUFFICIENCY OF BOTH LOWER EXTREMITIES: ICD-10-CM

## 2023-06-28 PROCEDURE — 99213 OFFICE O/P EST LOW 20 MIN: CPT

## 2023-07-05 ENCOUNTER — HOSPITAL ENCOUNTER (OUTPATIENT)
Dept: WOUND CARE | Age: 88
Discharge: HOME | End: 2023-07-05
Payer: MEDICARE

## 2023-07-05 VITALS
SYSTOLIC BLOOD PRESSURE: 132 MMHG | RESPIRATION RATE: 18 BRPM | HEART RATE: 76 BPM | TEMPERATURE: 97.6 F | DIASTOLIC BLOOD PRESSURE: 70 MMHG

## 2023-07-05 DIAGNOSIS — I87.2 VENOUS INSUFFICIENCY OF BOTH LOWER EXTREMITIES: Primary | ICD-10-CM

## 2023-07-05 DIAGNOSIS — L03.115 CELLULITIS OF RIGHT LEG: ICD-10-CM

## 2023-07-05 DIAGNOSIS — I83.012 VENOUS STASIS ULCER OF RIGHT CALF WITH FAT LAYER EXPOSED, UNSPECIFIED WHETHER VARICOSE VEINS PRESENT (HCC): ICD-10-CM

## 2023-07-05 DIAGNOSIS — L97.212 VENOUS STASIS ULCER OF RIGHT CALF WITH FAT LAYER EXPOSED, UNSPECIFIED WHETHER VARICOSE VEINS PRESENT (HCC): ICD-10-CM

## 2023-07-05 PROCEDURE — 6370000000 HC RX 637 (ALT 250 FOR IP): Performed by: NURSE PRACTITIONER

## 2023-07-05 PROCEDURE — 99213 OFFICE O/P EST LOW 20 MIN: CPT | Performed by: INTERNAL MEDICINE

## 2023-07-05 PROCEDURE — 99213 OFFICE O/P EST LOW 20 MIN: CPT

## 2023-07-05 RX ORDER — GINSENG 100 MG
CAPSULE ORAL ONCE
OUTPATIENT
Start: 2023-07-05 | End: 2023-07-05

## 2023-07-05 RX ORDER — BACITRACIN ZINC AND POLYMYXIN B SULFATE 500; 1000 [USP'U]/G; [USP'U]/G
OINTMENT TOPICAL ONCE
OUTPATIENT
Start: 2023-07-05 | End: 2023-07-05

## 2023-07-05 RX ORDER — CLOBETASOL PROPIONATE 0.5 MG/G
OINTMENT TOPICAL ONCE
Status: COMPLETED | OUTPATIENT
Start: 2023-07-05 | End: 2023-07-05

## 2023-07-05 RX ORDER — BETAMETHASONE DIPROPIONATE 0.05 %
OINTMENT (GRAM) TOPICAL ONCE
OUTPATIENT
Start: 2023-07-05 | End: 2023-07-05

## 2023-07-05 RX ORDER — GENTAMICIN SULFATE 1 MG/G
OINTMENT TOPICAL ONCE
OUTPATIENT
Start: 2023-07-05 | End: 2023-07-05

## 2023-07-05 RX ORDER — LIDOCAINE HYDROCHLORIDE 20 MG/ML
JELLY TOPICAL ONCE
OUTPATIENT
Start: 2023-07-05 | End: 2023-07-05

## 2023-07-05 RX ORDER — LIDOCAINE HYDROCHLORIDE 40 MG/ML
SOLUTION TOPICAL ONCE
OUTPATIENT
Start: 2023-07-05 | End: 2023-07-05

## 2023-07-05 RX ORDER — CLOBETASOL PROPIONATE 0.5 MG/G
OINTMENT TOPICAL ONCE
OUTPATIENT
Start: 2023-07-05 | End: 2023-07-05

## 2023-07-05 RX ORDER — IBUPROFEN 200 MG
TABLET ORAL ONCE
OUTPATIENT
Start: 2023-07-05 | End: 2023-07-05

## 2023-07-05 RX ORDER — LIDOCAINE 40 MG/G
CREAM TOPICAL ONCE
OUTPATIENT
Start: 2023-07-05 | End: 2023-07-05

## 2023-07-05 RX ORDER — LIDOCAINE 50 MG/G
OINTMENT TOPICAL ONCE
OUTPATIENT
Start: 2023-07-05 | End: 2023-07-05

## 2023-07-05 RX ADMIN — CLOBETASOL PROPIONATE: 0.5 OINTMENT TOPICAL at 15:04

## 2023-07-05 NOTE — PROGRESS NOTES
with mild soap and water, rinse with saline, pat dry with 4x4  Clobetasol and A&D to intact skin   Apply kirby to wound bed  Secure with silicone border gauze   Tubi F                 Follow up with Dr. Sharon Horn in 1 week in the wound care center  Call [phone] for any questions or concerns.   Date__________   Time____________        Treatment Note      Written Patient Dismissal Instructions Given            Electronically signed by Parisa Ortiz MD on 7/5/2023 at 2:44 PM

## 2023-07-05 NOTE — DISCHARGE INSTRUCTIONS
silicone border gauze   Mariama RENDON                 Follow up with Dr. Esteban Valladares in 1 week in the wound care center  Call [phone] for any questions or concerns.   Date__________   Time____________

## 2023-07-06 RX ORDER — TORSEMIDE 20 MG/1
20 TABLET ORAL DAILY
Qty: 30 TABLET | Refills: 0 | Status: SHIPPED | OUTPATIENT
Start: 2023-07-06

## 2023-07-06 NOTE — TELEPHONE ENCOUNTER
Medication:   Requested Prescriptions     Pending Prescriptions Disp Refills    torsemide (DEMADEX) 20 MG tablet [Pharmacy Med Name: TORSEMIDE 20 MG TABS 20 Tablet] 30 tablet 0     Sig: TAKE 1 TABLET BY MOUTH DAILY        Last Filled:      Patient Phone Number: [phone] (home)     Last appt: 5/10/2023   Next appt: 7/10/2023    Last OARRS:   RX Monitoring 2/16/2022   Periodic Controlled Substance Monitoring No signs of potential drug abuse or diversion identified.

## 2023-07-10 ENCOUNTER — OFFICE VISIT (OUTPATIENT)
Dept: INTERNAL MEDICINE CLINIC | Age: 88
End: 2023-07-10

## 2023-07-10 VITALS
HEART RATE: 76 BPM | DIASTOLIC BLOOD PRESSURE: 60 MMHG | BODY MASS INDEX: 43.39 KG/M2 | WEIGHT: 252.8 LBS | RESPIRATION RATE: 20 BRPM | SYSTOLIC BLOOD PRESSURE: 114 MMHG

## 2023-07-10 DIAGNOSIS — E11.9 TYPE 2 DIABETES MELLITUS WITHOUT COMPLICATION, WITHOUT LONG-TERM CURRENT USE OF INSULIN (HCC): ICD-10-CM

## 2023-07-10 DIAGNOSIS — Z98.890 H/O LAMINECTOMY: ICD-10-CM

## 2023-07-10 DIAGNOSIS — J45.41 MODERATE PERSISTENT ASTHMA WITH EXACERBATION: ICD-10-CM

## 2023-07-10 DIAGNOSIS — G25.81 RESTLESS LEG SYNDROME: ICD-10-CM

## 2023-07-10 DIAGNOSIS — I50.32 CHRONIC DIASTOLIC CONGESTIVE HEART FAILURE (HCC): ICD-10-CM

## 2023-07-10 DIAGNOSIS — I25.10 CORONARY ARTERY DISEASE INVOLVING NATIVE CORONARY ARTERY OF NATIVE HEART WITHOUT ANGINA PECTORIS: ICD-10-CM

## 2023-07-10 DIAGNOSIS — E78.2 MIXED HYPERLIPIDEMIA: ICD-10-CM

## 2023-07-10 DIAGNOSIS — R26.9 GAIT DISTURBANCE: ICD-10-CM

## 2023-07-10 DIAGNOSIS — K92.2 GASTROINTESTINAL HEMORRHAGE, UNSPECIFIED GASTROINTESTINAL HEMORRHAGE TYPE: ICD-10-CM

## 2023-07-10 DIAGNOSIS — I10 ESSENTIAL HYPERTENSION: ICD-10-CM

## 2023-07-10 DIAGNOSIS — M48.062 LUMBAR STENOSIS WITH NEUROGENIC CLAUDICATION: Primary | ICD-10-CM

## 2023-07-10 DIAGNOSIS — S32.020G COMPRESSION FRACTURE OF L2 VERTEBRA WITH DELAYED HEALING: ICD-10-CM

## 2023-07-10 DIAGNOSIS — E66.01 MORBID OBESITY WITH BMI OF 40.0-44.9, ADULT (HCC): ICD-10-CM

## 2023-07-10 RX ORDER — HYDROCODONE BITARTRATE AND ACETAMINOPHEN 5; 325 MG/1; MG/1
TABLET ORAL PRN
COMMUNITY
Start: 2023-07-06

## 2023-07-10 NOTE — PROGRESS NOTES
infarct ischemia.     HTN (hypertension)     Hyperlipidemia     Kidney stone     hx-\"been about 3 yrs ago\"    MI (myocardial infarction) ([address]) 02/1998    treated with TPA    Obesity     Open wound of right foot 07/27/2020    Parainfluenza infection 12/05/2021    Pneumonia of right lower lobe due to infectious organism 04/05/2018    Vertigo     'have been having this since I had cataract surgery\"\"that is why they are doing the MRA of my brain(10/31/2014)    WD-Traumatic ulcer of foot, right, with fat layer exposed ([address]) 08/03/2020     Past Surgical History:   Procedure Laterality Date    CATARACT REMOVAL Bilateral     5/14,8/14    INGUINAL HERNIA REPAIR Left 45 yrs ago    [facility] N/A 5/12/2022    L1-4 LAMINECTOMIES performed by Davidson Rankin MD at [address] N/A 5/12/2022    L2 AND L3 BILATERAL BALLOON KYPHOPLASTY AND L2 AND L3 NEEDLE BIOPSY performed by Davidson Rankin MD at [facility] History     Tobacco Use    Smoking status: Never    Smokeless tobacco: Never   Substance Use Topics    Alcohol use: Not Currently     Comment: OCCASSIONAL       LAB REVIEW:  CBC:   Lab Results   Component Value Date/Time    WBC 9.6 06/19/2023 08:15 PM    HGB 10.9 06/19/2023 08:15 PM    HCT 36.6 06/19/2023 08:15 PM     06/19/2023 08:15 PM     Lipids:   Lab Results   Component Value Date    HDL 63 (H) 10/13/2022    LDLCALC 68 10/13/2022    LDLDIRECT 69 08/18/2017    TRIGLYCFAST 115 10/13/2022    CHOLFAST 154 10/13/2022     Renal:   Lab Results   Component Value Date/Time    BUN 13 06/19/2023 08:15 PM    CREATININE 0.7 06/19/2023 08:15 PM     06/19/2023 08:15 PM    K 4.1 06/19/2023 08:15 PM    K 3.0 03/08/2018 05:20 AM    ALT 5 06/19/2023 08:15 PM    AST 12 06/19/2023 08:15 PM    GLUCOSE 152 06/19/2023 08:15 PM    GLUF 89 03/22/2023 02:50 PM     PT/INR:   Lab Results   Component Value Date/Time    INR 0.97 10/21/2022 01:36 AM     A1C:   Lab Results   Component Value Date    LABA1C 6.3

## 2023-07-11 RX ORDER — ROPINIROLE 2 MG/1
TABLET, FILM COATED ORAL
Qty: 60 TABLET | Refills: 0 | Status: SHIPPED | OUTPATIENT
Start: 2023-07-11

## 2023-07-11 NOTE — TELEPHONE ENCOUNTER
Medication:   Requested Prescriptions     Pending Prescriptions Disp Refills    rOPINIRole (REQUIP) 2 MG tablet [Pharmacy Med Name: ROPINIROLE HCL 2 MG TABS 2 Tablet] 60 tablet 0     Sig: TAKE 1 TABLET BY MOUTH IN THE MORNING AND 1 TABLET IN THE EVENING. Last Filled:      Patient Phone Number: [phone] (home)     Last appt: 7/10/2023   Next appt: 9/11/2023    Last OARRS:   RX Monitoring 2/16/2022   Periodic Controlled Substance Monitoring No signs of potential drug abuse or diversion identified.

## 2023-07-12 ENCOUNTER — HOSPITAL ENCOUNTER (OUTPATIENT)
Dept: WOUND CARE | Age: 88
Discharge: HOME | End: 2023-07-12
Payer: MEDICARE

## 2023-07-12 VITALS
TEMPERATURE: 97.2 F | HEART RATE: 72 BPM | DIASTOLIC BLOOD PRESSURE: 62 MMHG | SYSTOLIC BLOOD PRESSURE: 126 MMHG | RESPIRATION RATE: 20 BRPM

## 2023-07-12 DIAGNOSIS — I87.2 VENOUS INSUFFICIENCY OF BOTH LOWER EXTREMITIES: ICD-10-CM

## 2023-07-12 DIAGNOSIS — L03.115 CELLULITIS OF RIGHT LEG: Primary | ICD-10-CM

## 2023-07-12 DIAGNOSIS — L03.115 CELLULITIS OF RIGHT LEG: ICD-10-CM

## 2023-07-12 DIAGNOSIS — L97.212 VENOUS STASIS ULCER OF RIGHT CALF WITH FAT LAYER EXPOSED, UNSPECIFIED WHETHER VARICOSE VEINS PRESENT (HCC): ICD-10-CM

## 2023-07-12 DIAGNOSIS — I83.012 VENOUS STASIS ULCER OF RIGHT CALF WITH FAT LAYER EXPOSED, UNSPECIFIED WHETHER VARICOSE VEINS PRESENT (HCC): ICD-10-CM

## 2023-07-12 PROCEDURE — 99213 OFFICE O/P EST LOW 20 MIN: CPT

## 2023-07-12 PROCEDURE — 99213 OFFICE O/P EST LOW 20 MIN: CPT | Performed by: INTERNAL MEDICINE

## 2023-07-12 RX ORDER — LIDOCAINE 40 MG/G
CREAM TOPICAL ONCE
OUTPATIENT
Start: 2023-07-12 | End: 2023-07-12

## 2023-07-12 RX ORDER — BETAMETHASONE DIPROPIONATE 0.05 %
OINTMENT (GRAM) TOPICAL ONCE
OUTPATIENT
Start: 2023-07-12 | End: 2023-07-12

## 2023-07-12 RX ORDER — BACITRACIN ZINC AND POLYMYXIN B SULFATE 500; 1000 [USP'U]/G; [USP'U]/G
OINTMENT TOPICAL ONCE
OUTPATIENT
Start: 2023-07-12 | End: 2023-07-12

## 2023-07-12 RX ORDER — LIDOCAINE HYDROCHLORIDE 40 MG/ML
SOLUTION TOPICAL ONCE
OUTPATIENT
Start: 2023-07-12 | End: 2023-07-12

## 2023-07-12 RX ORDER — LIDOCAINE HYDROCHLORIDE 20 MG/ML
JELLY TOPICAL ONCE
OUTPATIENT
Start: 2023-07-12 | End: 2023-07-12

## 2023-07-12 RX ORDER — LIDOCAINE 50 MG/G
OINTMENT TOPICAL ONCE
OUTPATIENT
Start: 2023-07-12 | End: 2023-07-12

## 2023-07-12 RX ORDER — GINSENG 100 MG
CAPSULE ORAL ONCE
OUTPATIENT
Start: 2023-07-12 | End: 2023-07-12

## 2023-07-12 RX ORDER — CLOBETASOL PROPIONATE 0.5 MG/G
OINTMENT TOPICAL ONCE
OUTPATIENT
Start: 2023-07-12 | End: 2023-07-12

## 2023-07-12 RX ORDER — GENTAMICIN SULFATE 1 MG/G
OINTMENT TOPICAL ONCE
OUTPATIENT
Start: 2023-07-12 | End: 2023-07-12

## 2023-07-12 RX ORDER — CLOBETASOL PROPIONATE 0.5 MG/G
OINTMENT TOPICAL ONCE
Status: DISCONTINUED | OUTPATIENT
Start: 2023-07-12 | End: 2023-07-13 | Stop reason: HOSPADM

## 2023-07-12 RX ORDER — IBUPROFEN 200 MG
TABLET ORAL ONCE
OUTPATIENT
Start: 2023-07-12 | End: 2023-07-12

## 2023-07-12 NOTE — PROGRESS NOTES
Wound Care Center Progress Note       Anaketan Tate  AGE: 81 y.o. GENDER: female  : 1935  TODAY'S DATE:  2023        Subjective:     Chief Complaint   Patient presents with    Wound Check     BLE          HISTORY of PRESENT ILLNESS  Alise Albarado is a 81 y.o. 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 The ulcer is of moderate severity. The underlying cause of the wound is moderate. Wound Pain Timing/Severity: intermittent  Quality of pain: sharp, dull, aching, radiating to feet bilaterally  Severity of pain:  3 / 10   Modifying Factors: edema and venous stasis  Associated Signs/Symptoms: edema and pain                                        Hypertension diabetes and diastolic heart failure patient had a history of heart attack in the past and tPA was given at 89                    Patient has not used compression socks, her insurance company did not approve her lympmadema pump. 2 weeks ago augmentin was added for her rt leg cellulitis and venous doppler was also ordered, she did not get venous doppler, used antibioitcs, however, had some vomiting and not sure if it always absorbed it or not. PAST MEDICAL HISTORY        Diagnosis Date    Arthritis     left knee pain, sees Dr. Elder Cain    CAD (coronary artery disease)     sees Dr. Matilda Wells    Chronic midline low back pain without sciatica 2016    Had PT in 2016    Claustrophobia     Colonoscopy refused     discussed in 7/15    COVID-19 virus infection 2023    DM (diabetes mellitus), type 2 ([address]) 2006    Dupuytren's contracture of right hand     Endometrial stripe increased 2014    Saw NP Flash Kelsey. Was normal exam per pt. Gastrointestinal hemorrhage 2015    Patient had GI bleeding. Colon refused. Discussed with patient in detail.     Glaucoma 2014    H/O Doppler ultrasound 06/15/2023    Significant reflux noted of the Right

## 2023-07-17 ENCOUNTER — TELEPHONE (OUTPATIENT)
Dept: INTERNAL MEDICINE CLINIC | Age: 88
End: 2023-07-17

## 2023-07-17 NOTE — TELEPHONE ENCOUNTER
Baylee Cid from [employer] called stating that Kinjal Aguilar no longer wants the manual wheelchair. She is not able to push herself. Instead she is wanting to see about getting a electric wheelchair.  [employer] phone # [phone]  Fax # [phone]  Thank you

## 2023-07-19 ENCOUNTER — HOSPITAL ENCOUNTER (OUTPATIENT)
Dept: WOUND CARE | Age: 88
Discharge: HOME | End: 2023-07-19
Payer: MEDICARE

## 2023-07-19 VITALS — HEART RATE: 74 BPM | DIASTOLIC BLOOD PRESSURE: 66 MMHG | TEMPERATURE: 98.6 F | SYSTOLIC BLOOD PRESSURE: 119 MMHG

## 2023-07-19 DIAGNOSIS — L03.115 CELLULITIS OF RIGHT LEG: ICD-10-CM

## 2023-07-19 DIAGNOSIS — I87.2 VENOUS INSUFFICIENCY OF BOTH LOWER EXTREMITIES: Primary | ICD-10-CM

## 2023-07-19 DIAGNOSIS — E66.01 MORBID OBESITY WITH BMI OF 40.0-44.9, ADULT (HCC): ICD-10-CM

## 2023-07-19 DIAGNOSIS — I83.012 VENOUS STASIS ULCER OF RIGHT CALF WITH FAT LAYER EXPOSED, UNSPECIFIED WHETHER VARICOSE VEINS PRESENT (HCC): ICD-10-CM

## 2023-07-19 DIAGNOSIS — L97.212 VENOUS STASIS ULCER OF RIGHT CALF WITH FAT LAYER EXPOSED, UNSPECIFIED WHETHER VARICOSE VEINS PRESENT (HCC): ICD-10-CM

## 2023-07-19 PROCEDURE — 11042 DBRDMT SUBQ TIS 1ST 20SQCM/<: CPT

## 2023-07-19 PROCEDURE — 6370000000 HC RX 637 (ALT 250 FOR IP): Performed by: NURSE PRACTITIONER

## 2023-07-19 RX ORDER — LIDOCAINE HYDROCHLORIDE 40 MG/ML
SOLUTION TOPICAL ONCE
OUTPATIENT
Start: 2023-07-19 | End: 2023-07-19

## 2023-07-19 RX ORDER — CLOBETASOL PROPIONATE 0.5 MG/G
OINTMENT TOPICAL ONCE
Status: COMPLETED | OUTPATIENT
Start: 2023-07-19 | End: 2023-07-19

## 2023-07-19 RX ORDER — CLOBETASOL PROPIONATE 0.5 MG/G
OINTMENT TOPICAL ONCE
OUTPATIENT
Start: 2023-07-19 | End: 2023-07-19

## 2023-07-19 RX ORDER — BACITRACIN ZINC AND POLYMYXIN B SULFATE 500; 1000 [USP'U]/G; [USP'U]/G
OINTMENT TOPICAL ONCE
OUTPATIENT
Start: 2023-07-19 | End: 2023-07-19

## 2023-07-19 RX ORDER — LIDOCAINE HYDROCHLORIDE 20 MG/ML
JELLY TOPICAL ONCE
OUTPATIENT
Start: 2023-07-19 | End: 2023-07-19

## 2023-07-19 RX ORDER — LIDOCAINE 50 MG/G
OINTMENT TOPICAL ONCE
OUTPATIENT
Start: 2023-07-19 | End: 2023-07-19

## 2023-07-19 RX ORDER — GINSENG 100 MG
CAPSULE ORAL ONCE
OUTPATIENT
Start: 2023-07-19 | End: 2023-07-19

## 2023-07-19 RX ORDER — LIDOCAINE 40 MG/G
CREAM TOPICAL ONCE
OUTPATIENT
Start: 2023-07-19 | End: 2023-07-19

## 2023-07-19 RX ORDER — GENTAMICIN SULFATE 1 MG/G
OINTMENT TOPICAL ONCE
OUTPATIENT
Start: 2023-07-19 | End: 2023-07-19

## 2023-07-19 RX ORDER — IBUPROFEN 200 MG
TABLET ORAL ONCE
OUTPATIENT
Start: 2023-07-19 | End: 2023-07-19

## 2023-07-19 RX ORDER — BETAMETHASONE DIPROPIONATE 0.05 %
OINTMENT (GRAM) TOPICAL ONCE
OUTPATIENT
Start: 2023-07-19 | End: 2023-07-19

## 2023-07-19 RX ADMIN — CLOBETASOL PROPIONATE: 0.5 OINTMENT TOPICAL at 15:02

## 2023-07-19 NOTE — DISCHARGE INSTRUCTIONS
PHYSICIAN ORDERS AND DISCHARGE INSTRUCTIONS  NOTE: Upon discharge from the  Medical [facility], you will receive a patient experience survey via E-mail. We would be grateful if you would take the time to fill this survey out. Wound care order history:              SULTANA's   Right  0.92     Left 0.8   Date 04/25/2025              Vascular studies/Intervention:               Cultures: . Augmentin               Antibiotics: . HbA1c:  . Compression/Lymph Pumps: Lymph Pumps ordered        Grafts: Austin Schaefer: . Continuing wound care orders and information:              Residence: . Private              Continue home health care with:. [facility] Phone # [phone]              Your wound-care supplies will be provided by: [facility]: . Wound cleansing:                           Do not scrub or use excessive force. Wash hands with soap and water before and after dressing changes. Prior to applying a clean dressing, cleanse wound with normal saline,                          wound cleanser, or mild soap and water. Ask your physician or nurse before getting the wound(s) wet in the shower. Daily Wound management:                          Keep weight off wounds and reposition every 2 hours. Avoid standing for long periods of time. Evaluate legs to the level of the heart or above for 30 minutes 4-5 times a day and/or when sitting. When taking antibiotics take entire prescription as ordered by MD do not stop taking until medicine is all gone.                                                                  Orders for this week 7/19/23:  Bilateral Lower Leg Wounds- Wash with mild soap and water, rinse with saline, pat dry with 4x4  Clobetasol and A&D to intact skin   Apply kirby to wound bed  Secure with

## 2023-07-19 NOTE — PLAN OF CARE
Problem: Wound:  Goal: Will show signs of wound healing; wound closure and no evidence of infection  Description: Will show signs of wound healing; wound closure and no evidence of infection  Outcome: Progressing
15-Mar-2017 19:42
16-Mar-2017 18:00

## 2023-07-19 NOTE — PROGRESS NOTES
with 4x4  Clobetasol and A&D to intact skin   Apply kirby to wound bed  Secure with silicone border gauze   Double Tubi F       Change on Wednesday     Follow up with Dr. Jose Vail in 1 week in the wound care center  Call [phone] for any questions or concerns.   Date__________   Time____________        Treatment Note Wound 06/08/23 #6 Left Medial Proximal Lower leg Cluster-Dressing/Treatment:  (Clobetasol and A&D, kirby, silicone border,  Double Tubi F)    Written Patient Dismissal Instructions Given            Electronically signed by ABA Polanco CNP on 7/19/2023 at 3:27 PM

## 2023-07-21 ENCOUNTER — APPOINTMENT (OUTPATIENT)
Dept: CT IMAGING | Age: 88
End: 2023-07-21
Payer: MEDICARE

## 2023-07-21 ENCOUNTER — HOSPITAL ENCOUNTER (EMERGENCY)
Age: 88
Discharge: HOME | End: 2023-07-22
Attending: EMERGENCY MEDICINE
Payer: MEDICARE

## 2023-07-21 VITALS
RESPIRATION RATE: 16 BRPM | DIASTOLIC BLOOD PRESSURE: 60 MMHG | OXYGEN SATURATION: 98 % | SYSTOLIC BLOOD PRESSURE: 134 MMHG | HEART RATE: 72 BPM

## 2023-07-21 DIAGNOSIS — R10.9 ABDOMINAL PAIN, UNSPECIFIED ABDOMINAL LOCATION: Primary | ICD-10-CM

## 2023-07-21 LAB
ALBUMIN SERPL-MCNC: 3.7 GM/DL (ref 3.4–5)
ALP BLD-CCNC: 80 IU/L (ref 40–129)
ALT SERPL-CCNC: <5 U/L (ref 10–40)
ANION GAP SERPL CALCULATED.3IONS-SCNC: 5 MMOL/L (ref 4–16)
ANISOCYTOSIS: ABNORMAL
AST SERPL-CCNC: 11 IU/L (ref 15–37)
BILIRUB SERPL-MCNC: 0.3 MG/DL (ref 0–1)
BUN SERPL-MCNC: 12 MG/DL (ref 6–23)
CALCIUM SERPL-MCNC: 9.4 MG/DL (ref 8.3–10.6)
CHLORIDE BLD-SCNC: 103 MMOL/L (ref 99–110)
CO2: 33 MMOL/L (ref 21–32)
CREAT SERPL-MCNC: 0.6 MG/DL (ref 0.6–1.1)
DIFFERENTIAL TYPE: ABNORMAL
EOSINOPHILS ABSOLUTE: 0.3 K/CU MM
EOSINOPHILS RELATIVE PERCENT: 3 % (ref 0–3)
GFR SERPL CREATININE-BSD FRML MDRD: >60 ML/MIN/1.73M2
GLUCOSE SERPL-MCNC: 117 MG/DL (ref 70–99)
HCT VFR BLD CALC: 34.9 % (ref 37–47)
HEMOGLOBIN: 10.2 GM/DL (ref 12.5–16)
LIPASE: 11 IU/L (ref 13–60)
LYMPHOCYTES ABSOLUTE: 1.8 K/CU MM
LYMPHOCYTES RELATIVE PERCENT: 22 % (ref 24–44)
MCH RBC QN AUTO: 25.4 PG (ref 27–31)
MCHC RBC AUTO-ENTMCNC: 29.2 % (ref 32–36)
MCV RBC AUTO: 86.8 FL (ref 78–100)
MONOCYTES ABSOLUTE: 1.4 K/CU MM
MONOCYTES RELATIVE PERCENT: 17 % (ref 0–4)
OVALOCYTES: ABNORMAL
PDW BLD-RTO: 14.5 % (ref 11.7–14.9)
PLATELET # BLD: 221 K/CU MM (ref 140–440)
PMV BLD AUTO: 8.9 FL (ref 7.5–11.1)
POTASSIUM SERPL-SCNC: 4.6 MMOL/L (ref 3.5–5.1)
RBC # BLD: 4.02 M/CU MM (ref 4.2–5.4)
SEGMENTED NEUTROPHILS ABSOLUTE COUNT: 4.9 K/CU MM
SEGMENTED NEUTROPHILS RELATIVE PERCENT: 58 % (ref 36–66)
SODIUM BLD-SCNC: 141 MMOL/L (ref 135–145)
TOTAL PROTEIN: 6.8 GM/DL (ref 6.4–8.2)
TROPONIN T: <0.01 NG/ML
WBC # BLD: 8.4 K/CU MM (ref 4–10.5)

## 2023-07-21 PROCEDURE — 6360000004 HC RX CONTRAST MEDICATION: Performed by: EMERGENCY MEDICINE

## 2023-07-21 PROCEDURE — 84484 ASSAY OF TROPONIN QUANT: CPT

## 2023-07-21 PROCEDURE — 99285 EMERGENCY DEPT VISIT HI MDM: CPT

## 2023-07-21 PROCEDURE — 74177 CT ABD & PELVIS W/CONTRAST: CPT

## 2023-07-21 PROCEDURE — 85027 COMPLETE CBC AUTOMATED: CPT

## 2023-07-21 PROCEDURE — 85007 BL SMEAR W/DIFF WBC COUNT: CPT

## 2023-07-21 PROCEDURE — 6370000000 HC RX 637 (ALT 250 FOR IP): Performed by: EMERGENCY MEDICINE

## 2023-07-21 PROCEDURE — 80053 COMPREHEN METABOLIC PANEL: CPT

## 2023-07-21 PROCEDURE — 83690 ASSAY OF LIPASE: CPT

## 2023-07-21 RX ORDER — METHOCARBAMOL 500 MG/1
500 TABLET, FILM COATED ORAL 4 TIMES DAILY PRN
Qty: 40 TABLET | Refills: 0 | Status: SHIPPED | OUTPATIENT
Start: 2023-07-21 | End: 2023-07-31

## 2023-07-21 RX ORDER — OXYCODONE HYDROCHLORIDE AND ACETAMINOPHEN 5; 325 MG/1; MG/1
1 TABLET ORAL ONCE
Status: COMPLETED | OUTPATIENT
Start: 2023-07-21 | End: 2023-07-21

## 2023-07-21 RX ORDER — METHOCARBAMOL 500 MG/1
500 TABLET, FILM COATED ORAL ONCE
Status: COMPLETED | OUTPATIENT
Start: 2023-07-21 | End: 2023-07-21

## 2023-07-21 RX ADMIN — OXYCODONE AND ACETAMINOPHEN 1 TABLET: 5; 325 TABLET ORAL at 23:50

## 2023-07-21 RX ADMIN — IOPAMIDOL 100 ML: 755 INJECTION, SOLUTION INTRAVENOUS at 22:04

## 2023-07-21 RX ADMIN — METHOCARBAMOL 500 MG: 500 TABLET ORAL at 23:49

## 2023-07-21 ASSESSMENT — PAIN DESCRIPTION - DESCRIPTORS: DESCRIPTORS: DULL

## 2023-07-21 ASSESSMENT — PAIN DESCRIPTION - ORIENTATION: ORIENTATION: LOWER;RIGHT

## 2023-07-21 ASSESSMENT — ENCOUNTER SYMPTOMS: ABDOMINAL PAIN: 1

## 2023-07-21 ASSESSMENT — PAIN DESCRIPTION - LOCATION: LOCATION: ABDOMEN;GROIN

## 2023-07-21 ASSESSMENT — PAIN SCALES - GENERAL: PAINLEVEL_OUTOF10: 8

## 2023-07-22 NOTE — ED PROVIDER NOTES
[address] ENCOUNTER      Pt Name: Jamie Khanna  MRN: [id number]  [facility] 1935  Date of evaluation: 7/21/2023  Provider: Vandana Martínez MD    CHIEF COMPLAINT       Chief Complaint   Patient presents with    Abdominal Pain     Abdominal pain x1 day, started this morning, shweta, 10/10         HISTORY OF PRESENT ILLNESS      Jamie Khanna is a 81 y.o. female who presents to the emergency department  for   Chief Complaint   Patient presents with    Abdominal Pain     Abdominal pain x1 day, started this morning, shweta, 10/10       81year old female presents complaining of right lower quadrant abdominal pain. She reports it started earlier this afternoon. She was compression stockings for some peripheral edema and that she has. Reports that she put on the question compression stockings earlier today in office and felt a bulge in her right lower abdomen. Denies any fall, trauma or injury. Nausea or vomiting. Denies any recent antibiotics or atypical exposures. She did take some oxycodone at home which did help the pain. She does not have any chest pain or respiratory symptoms. Nursing Notes, Triage Notes & Vital Signs were reviewed. REVIEW OF SYSTEMS    (2-9 systems for level 4, 10 or more for level 5)     Review of Systems   Gastrointestinal:  Positive for abdominal pain. Except as noted above the remainder of the review of systems was reviewed and negative.        PAST MEDICAL HISTORY     Past Medical History:   Diagnosis Date    Arthritis     left knee pain, sees Dr. Daphne Bonds    CAD (coronary artery disease)     sees Dr. Betsy Lord    Chronic midline low back pain without sciatica 09/28/2016    Had PT in 2016    Claustrophobia     Colonoscopy refused     discussed in 7/15    COVID-19 virus infection 01/05/2023    DM (diabetes mellitus), type 2 ([address]) 02/2006    Dupuytren's contracture of right hand     Endometrial stripe increased complaining of right lower quadrant abdominal pain. She reports it started earlier this afternoon. She was compression stockings for some peripheral edema and that she has. Reports that she put on the question compression stockings earlier today in office and felt a bulge in her right lower abdomen. Denies any fall, trauma or injury. Nausea or vomiting. Denies any recent antibiotics or atypical exposures. She did take some oxycodone at home which did help the pain. She does not have any chest pain or respiratory symptoms. Overall soft and nonperitoneal.  No significant bulge noted on gross exam.  Abdomen is soft and nonperitoneal.    Labs obtained including CBC, CMP, lipase and troponin. Abdominal CT scan is obtained. Labs overall nonacute and stable. Troponin undetectable. Lipase nonelevated. No leukocytosis. CMP stable. Abdominal CT scan is overall nonacute. Does show a left-sided ovarian cyst.  No other remarkable findings. Exam results discussed with patient. We did discuss with her symptoms could be musculoskeletal in origin. She does not have any surgical issues noted on CT scan. She is given oral statin Robaxin the emergency department for symptom control. I did review her PDMP. She is prescribed Norco as well as gabapentin. She will continue with her home treatments. She prescribed topical Clofenax as well as oral Robaxin for home. She will follow-up outpatient. She will follow-up with her primary care physician. She is discharged, ambulatory, with family, in stable condition with strict return precautions. -  Patient seen and evaluated in the emergency department. -  Triage and nursing notes reviewed and incorporated. -  Old chart records reviewed and incorporated. -  Work-up included:  See above  -  Results discussed with patient.   CONSULTS:  None    PROCEDURES:  None performed unless otherwise noted below     Procedures        FINAL IMPRESSION      1.

## 2023-07-22 NOTE — DISCHARGE INSTRUCTIONS
Your CT scan did not show any acute surgical issues or other acute issues. Your symptoms may be musculoskeletal.     You may find that measures like heat/ice and anti-inflammatory medications (like tylenol, ibuprofen, naproxen ) will help your symptoms. You may also find that topical medications like voltaren gel or icy hot may help. If you develop any worsening or concerning symptoms, please seek immediate medical attention.

## 2023-07-26 ENCOUNTER — HOSPITAL ENCOUNTER (OUTPATIENT)
Dept: WOUND CARE | Age: 88
Discharge: HOME | End: 2023-07-26
Payer: MEDICARE

## 2023-07-26 DIAGNOSIS — I87.2 VENOUS INSUFFICIENCY OF BOTH LOWER EXTREMITIES: Primary | ICD-10-CM

## 2023-07-26 DIAGNOSIS — L97.212 VENOUS STASIS ULCER OF RIGHT CALF WITH FAT LAYER EXPOSED, UNSPECIFIED WHETHER VARICOSE VEINS PRESENT (HCC): ICD-10-CM

## 2023-07-26 DIAGNOSIS — I83.012 VENOUS STASIS ULCER OF RIGHT CALF WITH FAT LAYER EXPOSED, UNSPECIFIED WHETHER VARICOSE VEINS PRESENT (HCC): ICD-10-CM

## 2023-07-26 DIAGNOSIS — L03.115 CELLULITIS OF RIGHT LEG: ICD-10-CM

## 2023-07-26 PROCEDURE — 99213 OFFICE O/P EST LOW 20 MIN: CPT

## 2023-07-26 PROCEDURE — 6370000000 HC RX 637 (ALT 250 FOR IP): Performed by: NURSE PRACTITIONER

## 2023-07-26 PROCEDURE — 99213 OFFICE O/P EST LOW 20 MIN: CPT | Performed by: INTERNAL MEDICINE

## 2023-07-26 RX ORDER — BACITRACIN ZINC AND POLYMYXIN B SULFATE 500; 1000 [USP'U]/G; [USP'U]/G
OINTMENT TOPICAL ONCE
OUTPATIENT
Start: 2023-07-26 | End: 2023-07-26

## 2023-07-26 RX ORDER — CLOBETASOL PROPIONATE 0.5 MG/G
OINTMENT TOPICAL ONCE
Status: COMPLETED | OUTPATIENT
Start: 2023-07-26 | End: 2023-07-26

## 2023-07-26 RX ORDER — GENTAMICIN SULFATE 1 MG/G
OINTMENT TOPICAL ONCE
OUTPATIENT
Start: 2023-07-26 | End: 2023-07-26

## 2023-07-26 RX ORDER — LIDOCAINE 50 MG/G
OINTMENT TOPICAL ONCE
OUTPATIENT
Start: 2023-07-26 | End: 2023-07-26

## 2023-07-26 RX ORDER — IBUPROFEN 200 MG
TABLET ORAL ONCE
OUTPATIENT
Start: 2023-07-26 | End: 2023-07-26

## 2023-07-26 RX ORDER — LIDOCAINE 40 MG/G
CREAM TOPICAL ONCE
OUTPATIENT
Start: 2023-07-26 | End: 2023-07-26

## 2023-07-26 RX ORDER — BETAMETHASONE DIPROPIONATE 0.05 %
OINTMENT (GRAM) TOPICAL ONCE
OUTPATIENT
Start: 2023-07-26 | End: 2023-07-26

## 2023-07-26 RX ORDER — LIDOCAINE HYDROCHLORIDE 20 MG/ML
JELLY TOPICAL ONCE
OUTPATIENT
Start: 2023-07-26 | End: 2023-07-26

## 2023-07-26 RX ORDER — GINSENG 100 MG
CAPSULE ORAL ONCE
OUTPATIENT
Start: 2023-07-26 | End: 2023-07-26

## 2023-07-26 RX ORDER — LIDOCAINE HYDROCHLORIDE 40 MG/ML
SOLUTION TOPICAL ONCE
OUTPATIENT
Start: 2023-07-26 | End: 2023-07-26

## 2023-07-26 RX ORDER — CLOBETASOL PROPIONATE 0.5 MG/G
OINTMENT TOPICAL ONCE
OUTPATIENT
Start: 2023-07-26 | End: 2023-07-26

## 2023-07-26 RX ADMIN — CLOBETASOL PROPIONATE: 0.5 OINTMENT TOPICAL at 15:06

## 2023-07-26 NOTE — PROGRESS NOTES
Cholecalciferol (VITAMIN D3) 125 MCG (5000 UT) CAPS Take 1 capsule by mouth daily 30 capsule 1    Fluticasone Furoate-Vilanterol (BREO ELLIPTA) 100-25 MCG/ACT AEPB Inhale 1 puff into the lungs daily 1 each 3    Coenzyme Q10 (COQ10) 200 MG CAPS Take 200 mg by mouth 2 times daily      clobetasol (TEMOVATE) 0.05 % cream Apply topically 2 times daily      hydroxychloroquine (PLAQUENIL) 200 MG tablet Take 1 tablet by mouth daily      aspirin 81 MG EC tablet Take 1 tablet by mouth daily 30 tablet      No current facility-administered medications on file prior to encounter. REVIEW OF SYSTEMS    Pertinent items are noted in HPI. Constitutional: Negative for systemic symptoms including fever, chills and malaise.     Objective:      LMP  (LMP Unknown)     PHYSICAL EXAM  General Appearance: alert and oriented to person, place and time, well developed and well- nourished, in no acute distress  Skin: warm and dry, no rash or erythema  Head: normocephalic and atraumatic  Eyes: pupils equal, round, and reactive to light, extraocular eye movements intact, conjunctivae normal  ENT: tympanic membrane, external ear and ear canal normal bilaterally, nose without deformity, nasal mucosa and turbinates normal without polyps  Neck: supple and non-tender without mass, no thyromegaly or thyroid nodules, no cervical lymphadenopathy  Pulmonary/Chest: clear to auscultation bilaterally- no wheezes, rales or rhonchi, normal air movement, no respiratory distress  Cardiovascular: normal rate, regular rhythm, normal S1 and S2, no murmurs, rubs, clicks, or gallops, distal pulses intact, no carotid bruits  Abdomen: soft, non-tender, non-distended, normal bowel sounds, no masses or organomegaly  Extremities:ulcers as decribed below, no cyanosis, clubbing or edema  Musculoskeletal: normal range of motion, no joint swelling, deformity or tenderness  Neurologic: reflexes normal and symmetric, no cranial nerve deficit, gait, coordination and

## 2023-07-26 NOTE — DISCHARGE INSTRUCTIONS
PHYSICIAN ORDERS AND DISCHARGE INSTRUCTIONS  NOTE: Upon discharge from the  Medical [facility], you will receive a patient experience survey via E-mail. We would be grateful if you would take the time to fill this survey out. Wound care order history:              SULTANA's   Right  0.92     Left 0.8   Date 04/25/2025              Vascular studies/Intervention:               Cultures: . Augmentin               Antibiotics: . HbA1c:  . Compression/Lymph Pumps: Lymph Pumps ordered        Grafts: Fortino Torrez: . Continuing wound care orders and information:              Residence: . Private              Continue home health care with:. [facility] Phone # [phone]              Your wound-care supplies will be provided by: [facility]: . Wound cleansing:                           Do not scrub or use excessive force. Wash hands with soap and water before and after dressing changes. Prior to applying a clean dressing, cleanse wound with normal saline,                          wound cleanser, or mild soap and water. Ask your physician or nurse before getting the wound(s) wet in the shower. Daily Wound management:                          Keep weight off wounds and reposition every 2 hours. Avoid standing for long periods of time. Evaluate legs to the level of the heart or above for 30 minutes 4-5 times a day and/or when sitting. When taking antibiotics take entire prescription as ordered by MD do not stop taking until medicine is all gone.                                                                  Orders for this week 7/26/23:  Bilateral Lower Leg Wounds- Wash with mild soap and water, rinse with saline, pat dry with 4x4  Clobetasol and A&D to intact skin   Apply kirby to wound bed  Secure with

## 2023-08-01 ENCOUNTER — TELEPHONE (OUTPATIENT)
Dept: INTERNAL MEDICINE CLINIC | Age: 88
End: 2023-08-01

## 2023-08-01 NOTE — TELEPHONE ENCOUNTER
Patient called stating that she spoke with William Horton with [employer] and Serg Herrera thinks patient will benefit greatly from a motorized wheel chair due to the weakness in her leg and unsteady gait and having muscle strength issues with her arms. Patient asking for a new order and to talk with William Horton for proper paperwork. left message on Domi's phone to call me back [phone].

## 2023-08-02 ENCOUNTER — TELEPHONE (OUTPATIENT)
Dept: INTERNAL MEDICINE CLINIC | Age: 88
End: 2023-08-02

## 2023-08-02 ENCOUNTER — HOSPITAL ENCOUNTER (OUTPATIENT)
Dept: WOUND CARE | Age: 88
Discharge: HOME | End: 2023-08-02
Payer: MEDICARE

## 2023-08-02 VITALS
DIASTOLIC BLOOD PRESSURE: 54 MMHG | TEMPERATURE: 98.4 F | RESPIRATION RATE: 16 BRPM | SYSTOLIC BLOOD PRESSURE: 116 MMHG | HEART RATE: 74 BPM

## 2023-08-02 DIAGNOSIS — I83.012 VENOUS STASIS ULCER OF RIGHT CALF WITH FAT LAYER EXPOSED, UNSPECIFIED WHETHER VARICOSE VEINS PRESENT (HCC): ICD-10-CM

## 2023-08-02 DIAGNOSIS — L03.115 CELLULITIS OF RIGHT LEG: Primary | ICD-10-CM

## 2023-08-02 DIAGNOSIS — I87.2 VENOUS INSUFFICIENCY OF BOTH LOWER EXTREMITIES: ICD-10-CM

## 2023-08-02 DIAGNOSIS — L03.115 CELLULITIS OF RIGHT LEG: ICD-10-CM

## 2023-08-02 DIAGNOSIS — L97.212 VENOUS STASIS ULCER OF RIGHT CALF WITH FAT LAYER EXPOSED, UNSPECIFIED WHETHER VARICOSE VEINS PRESENT (HCC): ICD-10-CM

## 2023-08-02 PROCEDURE — 99213 OFFICE O/P EST LOW 20 MIN: CPT

## 2023-08-02 PROCEDURE — 99213 OFFICE O/P EST LOW 20 MIN: CPT | Performed by: INTERNAL MEDICINE

## 2023-08-02 RX ORDER — BACITRACIN ZINC AND POLYMYXIN B SULFATE 500; 1000 [USP'U]/G; [USP'U]/G
OINTMENT TOPICAL ONCE
OUTPATIENT
Start: 2023-08-02 | End: 2023-08-02

## 2023-08-02 RX ORDER — LIDOCAINE HYDROCHLORIDE 20 MG/ML
JELLY TOPICAL ONCE
OUTPATIENT
Start: 2023-08-02 | End: 2023-08-02

## 2023-08-02 RX ORDER — BACLOFEN 10 MG/1
10 TABLET ORAL 3 TIMES DAILY
Qty: 15 TABLET | Refills: 0 | Status: ON HOLD | OUTPATIENT
Start: 2023-08-02 | End: 2023-08-05

## 2023-08-02 RX ORDER — LIDOCAINE HYDROCHLORIDE 40 MG/ML
SOLUTION TOPICAL ONCE
OUTPATIENT
Start: 2023-08-02 | End: 2023-08-02

## 2023-08-02 RX ORDER — BETAMETHASONE DIPROPIONATE 0.05 %
OINTMENT (GRAM) TOPICAL ONCE
OUTPATIENT
Start: 2023-08-02 | End: 2023-08-02

## 2023-08-02 RX ORDER — LIDOCAINE 40 MG/G
CREAM TOPICAL ONCE
OUTPATIENT
Start: 2023-08-02 | End: 2023-08-02

## 2023-08-02 RX ORDER — GENTAMICIN SULFATE 1 MG/G
OINTMENT TOPICAL ONCE
OUTPATIENT
Start: 2023-08-02 | End: 2023-08-02

## 2023-08-02 RX ORDER — LIDOCAINE 50 MG/G
OINTMENT TOPICAL ONCE
OUTPATIENT
Start: 2023-08-02 | End: 2023-08-02

## 2023-08-02 RX ORDER — GINSENG 100 MG
CAPSULE ORAL ONCE
OUTPATIENT
Start: 2023-08-02 | End: 2023-08-02

## 2023-08-02 RX ORDER — IBUPROFEN 200 MG
TABLET ORAL ONCE
OUTPATIENT
Start: 2023-08-02 | End: 2023-08-02

## 2023-08-02 RX ORDER — CLOBETASOL PROPIONATE 0.5 MG/G
OINTMENT TOPICAL ONCE
OUTPATIENT
Start: 2023-08-02 | End: 2023-08-02

## 2023-08-02 NOTE — DISCHARGE INSTRUCTIONS
PHYSICIAN ORDERS AND DISCHARGE INSTRUCTIONS  NOTE: Upon discharge from the  [facility], you will receive a patient experience survey via E-mail. We would be grateful if you would take the time to fill this survey out. Wound care order history:              SULTANA's   Right  0.92     Left 0.8   Date 04/25/2025              Vascular studies/Intervention:               Cultures: . Augmentin               Antibiotics: . HbA1c:  . Compression/Lymph Pumps: Lymph Pumps ordered        Grafts: Ruy Curiel: . Continuing wound care orders and information:              Residence: . Private              Continue home health care with:. [facility] Phone # [phone]              Your wound-care supplies will be provided by: [facility]: . Wound cleansing:                           Do not scrub or use excessive force. Wash hands with soap and water before and after dressing changes. Prior to applying a clean dressing, cleanse wound with normal saline,                          wound cleanser, or mild soap and water. Ask your physician or nurse before getting the wound(s) wet in the shower. Daily Wound management:                          Keep weight off wounds and reposition every 2 hours. Avoid standing for long periods of time. Evaluate legs to the level of the heart or above for 30 minutes 4-5 times a day and/or when sitting. When taking antibiotics take entire prescription as ordered by MD do not stop taking until medicine is all gone.                                                                  Orders for this week 8/2/23:  Bilateral Lower Leg Wounds- Wash with mild soap and water, rinse with saline, pat dry with 4x4  Clobetasol and A&D to intact skin   Apply kirby to wound bed  Secure with

## 2023-08-02 NOTE — TELEPHONE ENCOUNTER
Spoke with Dr Gaye Brooks concerning motorized wheelchair. Informed him that Karen Platt at [facility] states that if he would write the order for one, [facility] can get it for patient.  will check further before he decides.

## 2023-08-02 NOTE — ASSESSMENT & PLAN NOTE
She says blood pressure is generally well controlled she checks it at home. Spoke with patient's daughter, Aracely. She states that patient had BP taken with automatic cuff at [facility] this morning and BP was 171/90. Does not remember what HR was. Yesterday when she was in office for BP check, it was 130/60.     Aracely states that she is on her way to get an automatic cuff to check at home. Do you want patient to come in for a BP check in the next couple of days to recheck?      Current medications:  Minoxidil 10 mg daily  Amlodipine-atorvastatin 5-20 mg daily  Valsartan 320 mg daily

## 2023-08-03 RX ORDER — TORSEMIDE 20 MG/1
20 TABLET ORAL DAILY
Qty: 30 TABLET | Refills: 0 | Status: ON HOLD | OUTPATIENT
Start: 2023-08-03

## 2023-08-05 ENCOUNTER — APPOINTMENT (OUTPATIENT)
Dept: GENERAL RADIOLOGY | Age: 88
DRG: 291 | End: 2023-08-05
Payer: MEDICARE

## 2023-08-05 ENCOUNTER — APPOINTMENT (OUTPATIENT)
Dept: CT IMAGING | Age: 88
DRG: 291 | End: 2023-08-05
Attending: EMERGENCY MEDICINE
Payer: MEDICARE

## 2023-08-05 ENCOUNTER — APPOINTMENT (OUTPATIENT)
Dept: CT IMAGING | Age: 88
DRG: 291 | End: 2023-08-05
Payer: MEDICARE

## 2023-08-05 ENCOUNTER — HOSPITAL ENCOUNTER (INPATIENT)
Age: 88
LOS: 5 days | Discharge: NURSING, REHAB OR LONG-TERM CARE | DRG: 291 | End: 2023-08-10
Attending: EMERGENCY MEDICINE | Admitting: INTERNAL MEDICINE
Payer: MEDICARE

## 2023-08-05 DIAGNOSIS — G25.81 RESTLESS LEG: ICD-10-CM

## 2023-08-05 DIAGNOSIS — R09.02 HYPOXIA: ICD-10-CM

## 2023-08-05 DIAGNOSIS — R77.8 ELEVATED TROPONIN: ICD-10-CM

## 2023-08-05 DIAGNOSIS — I50.9 ACUTE ON CHRONIC CONGESTIVE HEART FAILURE, UNSPECIFIED HEART FAILURE TYPE (HCC): Primary | ICD-10-CM

## 2023-08-05 DIAGNOSIS — D64.9 ANEMIA, UNSPECIFIED TYPE: ICD-10-CM
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
## 2023-08-05 PROBLEM — J96.01 ACUTE RESPIRATORY FAILURE WITH HYPOXIA (HCC): Status: ACTIVE | Noted: 2023-08-05

## 2023-08-05 LAB
ALBUMIN SERPL-MCNC: 3.5 GM/DL (ref 3.4–5)
ALP BLD-CCNC: 71 IU/L (ref 40–129)
ALT SERPL-CCNC: <5 U/L (ref 10–40)
ANION GAP SERPL CALCULATED.3IONS-SCNC: 8 MMOL/L (ref 4–16)
ANISOCYTOSIS: ABNORMAL
AST SERPL-CCNC: 11 IU/L (ref 15–37)
BACTERIA: ABNORMAL /HPF
BASE EXCESS MIXED: 2 (ref 0–2.3)
BASE EXCESS: ABNORMAL (ref 0–2.4)
BASOPHILS ABSOLUTE: 0.2 K/CU MM
BASOPHILS RELATIVE PERCENT: 2 % (ref 0–1)
BILIRUB SERPL-MCNC: 0.4 MG/DL (ref 0–1)
BILIRUBIN URINE: ABNORMAL MG/DL
BLOOD, URINE: NEGATIVE
BUN SERPL-MCNC: 21 MG/DL (ref 6–23)
CALCIUM SERPL-MCNC: 9.1 MG/DL (ref 8.3–10.6)
CAST TYPE: ABNORMAL /HPF
CHLORIDE BLD-SCNC: 105 MMOL/L (ref 99–110)
CLARITY: ABNORMAL
CO2 CONTENT: 30.7 MMOL/L (ref 19–24)
CO2: 30 MMOL/L (ref 21–32)
COLOR: ABNORMAL
COMMENT UA: ABNORMAL
CREAT SERPL-MCNC: 0.9 MG/DL (ref 0.6–1.1)
CRYSTAL TYPE: NEGATIVE /HPF
DIFFERENTIAL TYPE: ABNORMAL
EKG ATRIAL RATE: 81 BPM
EKG DIAGNOSIS: NORMAL
EKG P AXIS: 60 DEGREES
EKG P-R INTERVAL: 140 MS
EKG Q-T INTERVAL: 402 MS
EKG QRS DURATION: 92 MS
EKG QTC CALCULATION (BAZETT): 466 MS
EKG R AXIS: 21 DEGREES
EKG T AXIS: 40 DEGREES
EKG VENTRICULAR RATE: 81 BPM
EOSINOPHILS ABSOLUTE: 0.1 K/CU MM
EOSINOPHILS RELATIVE PERCENT: 1 % (ref 0–3)
EPITHELIAL CELLS, UA: 5 /HPF
GFR SERPL CREATININE-BSD FRML MDRD: >60 ML/MIN/1.73M2
GLUCOSE BLD-MCNC: 175 MG/DL (ref 70–99)
GLUCOSE BLD-MCNC: 211 MG/DL (ref 70–99)
GLUCOSE BLD-MCNC: 46 MG/DL (ref 70–99)
GLUCOSE BLD-MCNC: 47 MG/DL (ref 70–99)
GLUCOSE BLD-MCNC: 54 MG/DL (ref 70–99)
GLUCOSE BLD-MCNC: 67 MG/DL (ref 70–99)
GLUCOSE BLD-MCNC: 68 MG/DL (ref 70–99)
GLUCOSE SERPL-MCNC: 104 MG/DL (ref 70–99)
GLUCOSE, URINE: NEGATIVE MG/DL
HCO3 VENOUS: 29 MMOL/L (ref 19–25)
HCT VFR BLD CALC: 33.1 % (ref 37–47)
HCT VFR BLD CALC: 33.4 % (ref 37–47)
HEMOGLOBIN: 10 GM/DL (ref 12.5–16)
HEMOGLOBIN: 9.3 GM/DL (ref 12.5–16)
HYPOCHROMIA: ABNORMAL
KETONES, URINE: ABNORMAL MG/DL
LACTIC ACID, SEPSIS: 0.8 MMOL/L (ref 0.5–1.9)
LEUKOCYTE ESTERASE, URINE: NEGATIVE
LYMPHOCYTES ABSOLUTE: 2.1 K/CU MM
LYMPHOCYTES RELATIVE PERCENT: 21 % (ref 24–44)
MCH RBC QN AUTO: 25.4 PG (ref 27–31)
MCHC RBC AUTO-ENTMCNC: 29.9 % (ref 32–36)
MCV RBC AUTO: 84.8 FL (ref 78–100)
MONOCYTES ABSOLUTE: 1.6 K/CU MM
MONOCYTES RELATIVE PERCENT: 16 % (ref 0–4)
NITRITE URINE, QUANTITATIVE: NEGATIVE
O2 SAT, VEN: 90.5 % (ref 50–70)
OVALOCYTES: ABNORMAL
PCO2, VEN: 56.1 MMHG (ref 38–52)
PDW BLD-RTO: 14.9 % (ref 11.7–14.9)
PH VENOUS: 7.32 (ref 7.32–7.42)
PH, URINE: 5.5 (ref 5–8)
PLATELET # BLD: 202 K/CU MM (ref 140–440)
PMV BLD AUTO: 9.2 FL (ref 7.5–11.1)
PO2, VEN: 66.2 MMHG (ref 28–48)
POTASSIUM SERPL-SCNC: 4.2 MMOL/L (ref 3.5–5.1)
PRO-BNP: 467.6 PG/ML
PROTEIN UA: ABNORMAL MG/DL
RBC # BLD: 3.94 M/CU MM (ref 4.2–5.4)
RBC URINE: 0 /HPF (ref 0–6)
SEGMENTED NEUTROPHILS ABSOLUTE COUNT: 6.1 K/CU MM
SEGMENTED NEUTROPHILS RELATIVE PERCENT: 60 % (ref 36–66)
SODIUM BLD-SCNC: 143 MMOL/L (ref 135–145)
SOURCE, BLOOD GAS: ABNORMAL
SPECIFIC GRAVITY UA: >1.03 (ref 1–1.03)
TOTAL PROTEIN: 6.4 GM/DL (ref 6.4–8.2)
TROPONIN T: 0.01 NG/ML
TROPONIN T: <0.01 NG/ML
UROBILINOGEN, URINE: 0.2 MG/DL (ref 0.2–1)
WBC # BLD: 10.1 K/CU MM (ref 4–10.5)
WBC UA: 1 /HPF (ref 0–5)

## 2023-08-05 PROCEDURE — 6360000002 HC RX W HCPCS: Performed by: EMERGENCY MEDICINE

## 2023-08-05 PROCEDURE — 6370000000 HC RX 637 (ALT 250 FOR IP): Performed by: NURSE PRACTITIONER

## 2023-08-05 PROCEDURE — 71045 X-RAY EXAM CHEST 1 VIEW: CPT

## 2023-08-05 PROCEDURE — 94761 N-INVAS EAR/PLS OXIMETRY MLT: CPT

## 2023-08-05 PROCEDURE — 96375 TX/PRO/DX INJ NEW DRUG ADDON: CPT

## 2023-08-05 PROCEDURE — 85018 HEMOGLOBIN: CPT

## 2023-08-05 PROCEDURE — 93005 ELECTROCARDIOGRAM TRACING: CPT | Performed by: EMERGENCY MEDICINE

## 2023-08-05 PROCEDURE — 99285 EMERGENCY DEPT VISIT HI MDM: CPT

## 2023-08-05 PROCEDURE — 2700000000 HC OXYGEN THERAPY PER DAY

## 2023-08-05 PROCEDURE — 87086 URINE CULTURE/COLONY COUNT: CPT

## 2023-08-05 PROCEDURE — 81001 URINALYSIS AUTO W/SCOPE: CPT

## 2023-08-05 PROCEDURE — 94640 AIRWAY INHALATION TREATMENT: CPT

## 2023-08-05 PROCEDURE — 85014 HEMATOCRIT: CPT

## 2023-08-05 PROCEDURE — 96365 THER/PROPH/DIAG IV INF INIT: CPT

## 2023-08-05 PROCEDURE — 85007 BL SMEAR W/DIFF WBC COUNT: CPT

## 2023-08-05 PROCEDURE — 84484 ASSAY OF TROPONIN QUANT: CPT

## 2023-08-05 PROCEDURE — 70450 CT HEAD/BRAIN W/O DYE: CPT

## 2023-08-05 PROCEDURE — 87040 BLOOD CULTURE FOR BACTERIA: CPT

## 2023-08-05 PROCEDURE — 83880 ASSAY OF NATRIURETIC PEPTIDE: CPT

## 2023-08-05 PROCEDURE — 93010 ELECTROCARDIOGRAM REPORT: CPT | Performed by: INTERNAL MEDICINE

## 2023-08-05 PROCEDURE — 1200000000 HC SEMI PRIVATE

## 2023-08-05 PROCEDURE — 2580000003 HC RX 258: Performed by: EMERGENCY MEDICINE

## 2023-08-05 PROCEDURE — 6370000000 HC RX 637 (ALT 250 FOR IP): Performed by: EMERGENCY MEDICINE

## 2023-08-05 PROCEDURE — 80053 COMPREHEN METABOLIC PANEL: CPT

## 2023-08-05 PROCEDURE — 83605 ASSAY OF LACTIC ACID: CPT

## 2023-08-05 PROCEDURE — 71250 CT THORAX DX C-: CPT

## 2023-08-05 PROCEDURE — 2580000003 HC RX 258: Performed by: NURSE PRACTITIONER

## 2023-08-05 PROCEDURE — 6360000002 HC RX W HCPCS: Performed by: NURSE PRACTITIONER

## 2023-08-05 PROCEDURE — 2500000003 HC RX 250 WO HCPCS

## 2023-08-05 PROCEDURE — 85027 COMPLETE CBC AUTOMATED: CPT

## 2023-08-05 PROCEDURE — 82962 GLUCOSE BLOOD TEST: CPT

## 2023-08-05 RX ORDER — HYDROCODONE BITARTRATE AND ACETAMINOPHEN 5; 325 MG/1; MG/1
1 TABLET ORAL EVERY 6 HOURS PRN
Status: DISCONTINUED | OUTPATIENT
Start: 2023-08-05 | End: 2023-08-10 | Stop reason: HOSPADM

## 2023-08-05 RX ORDER — POLYVINYL ALCOHOL 14 MG/ML
1 SOLUTION/ DROPS OPHTHALMIC EVERY 6 HOURS PRN
Status: DISCONTINUED | OUTPATIENT
Start: 2023-08-05 | End: 2023-08-10 | Stop reason: HOSPADM

## 2023-08-05 RX ORDER — ACETAMINOPHEN 650 MG/1
650 SUPPOSITORY RECTAL EVERY 6 HOURS PRN
Status: DISCONTINUED | OUTPATIENT
Start: 2023-08-05 | End: 2023-08-10 | Stop reason: HOSPADM

## 2023-08-05 RX ORDER — DEXTROSE MONOHYDRATE 25 G/50ML
INJECTION, SOLUTION INTRAVENOUS
Status: COMPLETED
Start: 2023-08-05 | End: 2023-08-05

## 2023-08-05 RX ORDER — DEXTROSE MONOHYDRATE 100 MG/ML
INJECTION, SOLUTION INTRAVENOUS CONTINUOUS PRN
Status: DISCONTINUED | OUTPATIENT
Start: 2023-08-05 | End: 2023-08-10 | Stop reason: HOSPADM

## 2023-08-05 RX ORDER — LOSARTAN POTASSIUM 25 MG/1
25 TABLET ORAL DAILY
Status: DISCONTINUED | OUTPATIENT
Start: 2023-08-05 | End: 2023-08-10 | Stop reason: HOSPADM

## 2023-08-05 RX ORDER — HYDROXYCHLOROQUINE SULFATE 200 MG/1
200 TABLET, FILM COATED ORAL DAILY
Status: DISCONTINUED | OUTPATIENT
Start: 2023-08-05 | End: 2023-08-10 | Stop reason: HOSPADM

## 2023-08-05 RX ORDER — ONDANSETRON 4 MG/1
4 TABLET, ORALLY DISINTEGRATING ORAL ONCE
Status: COMPLETED | OUTPATIENT
Start: 2023-08-05 | End: 2023-08-05

## 2023-08-05 RX ORDER — LOSARTAN POTASSIUM 25 MG/1
25 TABLET ORAL ONCE
Status: DISCONTINUED | OUTPATIENT
Start: 2023-08-05 | End: 2023-08-05

## 2023-08-05 RX ORDER — INSULIN LISPRO 100 [IU]/ML
0-8 INJECTION, SOLUTION INTRAVENOUS; SUBCUTANEOUS
Status: DISCONTINUED | OUTPATIENT
Start: 2023-08-05 | End: 2023-08-10 | Stop reason: HOSPADM

## 2023-08-05 RX ORDER — GABAPENTIN 300 MG/1
300 CAPSULE ORAL ONCE
Status: COMPLETED | OUTPATIENT
Start: 2023-08-05 | End: 2023-08-05

## 2023-08-05 RX ORDER — ROPINIROLE 2 MG/1
2 TABLET, FILM COATED ORAL 3 TIMES DAILY
Status: DISCONTINUED | OUTPATIENT
Start: 2023-08-05 | End: 2023-08-10 | Stop reason: HOSPADM

## 2023-08-05 RX ORDER — OMEGA-3S/DHA/EPA/FISH OIL/D3 300MG-1000
400 CAPSULE ORAL DAILY
Status: DISCONTINUED | OUTPATIENT
Start: 2023-08-05 | End: 2023-08-10 | Stop reason: HOSPADM

## 2023-08-05 RX ORDER — POLYETHYLENE GLYCOL 3350 17 G/17G
17 POWDER, FOR SOLUTION ORAL DAILY PRN
Status: DISCONTINUED | OUTPATIENT
Start: 2023-08-05 | End: 2023-08-10 | Stop reason: HOSPADM

## 2023-08-05 RX ORDER — INSULIN LISPRO 100 [IU]/ML
0-4 INJECTION, SOLUTION INTRAVENOUS; SUBCUTANEOUS NIGHTLY
Status: DISCONTINUED | OUTPATIENT
Start: 2023-08-05 | End: 2023-08-10 | Stop reason: HOSPADM

## 2023-08-05 RX ORDER — TORSEMIDE 20 MG/1
20 TABLET ORAL DAILY
Status: DISCONTINUED | OUTPATIENT
Start: 2023-08-05 | End: 2023-08-10 | Stop reason: HOSPADM

## 2023-08-05 RX ORDER — ACETAMINOPHEN 325 MG/1
650 TABLET ORAL EVERY 6 HOURS PRN
Status: DISCONTINUED | OUTPATIENT
Start: 2023-08-05 | End: 2023-08-10 | Stop reason: HOSPADM

## 2023-08-05 RX ORDER — ASPIRIN 81 MG/1
81 TABLET ORAL DAILY
Status: DISCONTINUED | OUTPATIENT
Start: 2023-08-05 | End: 2023-08-07

## 2023-08-05 RX ORDER — 0.9 % SODIUM CHLORIDE 0.9 %
500 INTRAVENOUS SOLUTION INTRAVENOUS ONCE
Status: COMPLETED | OUTPATIENT
Start: 2023-08-05 | End: 2023-08-05

## 2023-08-05 RX ORDER — NALOXONE HYDROCHLORIDE 0.4 MG/ML
0.2 INJECTION, SOLUTION INTRAMUSCULAR; INTRAVENOUS; SUBCUTANEOUS PRN
Status: DISCONTINUED | OUTPATIENT
Start: 2023-08-05 | End: 2023-08-10 | Stop reason: HOSPADM

## 2023-08-05 RX ORDER — METOPROLOL SUCCINATE 25 MG/1
25 TABLET, EXTENDED RELEASE ORAL DAILY
Status: DISCONTINUED | OUTPATIENT
Start: 2023-08-05 | End: 2023-08-07

## 2023-08-05 RX ORDER — ROSUVASTATIN CALCIUM 5 MG/1
10 TABLET, COATED ORAL NIGHTLY
Status: DISCONTINUED | OUTPATIENT
Start: 2023-08-05 | End: 2023-08-10 | Stop reason: HOSPADM

## 2023-08-05 RX ORDER — SODIUM CHLORIDE 0.9 % (FLUSH) 0.9 %
5-40 SYRINGE (ML) INJECTION EVERY 12 HOURS SCHEDULED
Status: DISCONTINUED | OUTPATIENT
Start: 2023-08-05 | End: 2023-08-10 | Stop reason: HOSPADM

## 2023-08-05 RX ORDER — DEXTROSE MONOHYDRATE 25 G/50ML
25 INJECTION, SOLUTION INTRAVENOUS ONCE
Status: COMPLETED | OUTPATIENT
Start: 2023-08-05 | End: 2023-08-05

## 2023-08-05 RX ORDER — ONDANSETRON 4 MG/1
4 TABLET, ORALLY DISINTEGRATING ORAL EVERY 8 HOURS PRN
Status: DISCONTINUED | OUTPATIENT
Start: 2023-08-05 | End: 2023-08-10 | Stop reason: HOSPADM

## 2023-08-05 RX ORDER — ENOXAPARIN SODIUM 100 MG/ML
30 INJECTION SUBCUTANEOUS 2 TIMES DAILY
Status: DISCONTINUED | OUTPATIENT
Start: 2023-08-05 | End: 2023-08-06

## 2023-08-05 RX ORDER — ALBUTEROL SULFATE 90 UG/1
2 AEROSOL, METERED RESPIRATORY (INHALATION) EVERY 4 HOURS PRN
Status: DISCONTINUED | OUTPATIENT
Start: 2023-08-05 | End: 2023-08-10 | Stop reason: HOSPADM

## 2023-08-05 RX ORDER — SODIUM CHLORIDE 9 MG/ML
INJECTION, SOLUTION INTRAVENOUS PRN
Status: DISCONTINUED | OUTPATIENT
Start: 2023-08-05 | End: 2023-08-10 | Stop reason: HOSPADM

## 2023-08-05 RX ORDER — ROPINIROLE 1 MG/1
2 TABLET, FILM COATED ORAL ONCE
Status: COMPLETED | OUTPATIENT
Start: 2023-08-05 | End: 2023-08-05

## 2023-08-05 RX ORDER — ONDANSETRON 2 MG/ML
4 INJECTION INTRAMUSCULAR; INTRAVENOUS EVERY 6 HOURS PRN
Status: DISCONTINUED | OUTPATIENT
Start: 2023-08-05 | End: 2023-08-10 | Stop reason: HOSPADM

## 2023-08-05 RX ORDER — IPRATROPIUM BROMIDE AND ALBUTEROL SULFATE 2.5; .5 MG/3ML; MG/3ML
1 SOLUTION RESPIRATORY (INHALATION)
Status: DISCONTINUED | OUTPATIENT
Start: 2023-08-05 | End: 2023-08-10 | Stop reason: HOSPADM

## 2023-08-05 RX ORDER — HYDROCODONE BITARTRATE AND ACETAMINOPHEN 5; 325 MG/1; MG/1
1 TABLET ORAL
Status: COMPLETED | OUTPATIENT
Start: 2023-08-05 | End: 2023-08-05

## 2023-08-05 RX ORDER — SODIUM CHLORIDE 0.9 % (FLUSH) 0.9 %
5-40 SYRINGE (ML) INJECTION PRN
Status: DISCONTINUED | OUTPATIENT
Start: 2023-08-05 | End: 2023-08-10 | Stop reason: HOSPADM

## 2023-08-05 RX ORDER — LOSARTAN POTASSIUM 25 MG/1
25 TABLET ORAL DAILY
Status: DISCONTINUED | OUTPATIENT
Start: 2023-08-05 | End: 2023-08-05

## 2023-08-05 RX ORDER — HYDROCODONE BITARTRATE AND ACETAMINOPHEN 7.5; 325 MG/1; MG/1
1 TABLET ORAL EVERY 6 HOURS PRN
Status: DISCONTINUED | OUTPATIENT
Start: 2023-08-05 | End: 2023-08-10 | Stop reason: HOSPADM

## 2023-08-05 RX ORDER — FUROSEMIDE 10 MG/ML
40 INJECTION INTRAMUSCULAR; INTRAVENOUS ONCE
Status: DISCONTINUED | OUTPATIENT
Start: 2023-08-05 | End: 2023-08-06

## 2023-08-05 RX ADMIN — DEXTROSE MONOHYDRATE 25 G: 25 INJECTION, SOLUTION INTRAVENOUS at 18:12

## 2023-08-05 RX ADMIN — GABAPENTIN 300 MG: 300 CAPSULE ORAL at 04:35

## 2023-08-05 RX ADMIN — ENOXAPARIN SODIUM 30 MG: 100 INJECTION SUBCUTANEOUS at 20:25

## 2023-08-05 RX ADMIN — ENOXAPARIN SODIUM 30 MG: 100 INJECTION SUBCUTANEOUS at 15:41

## 2023-08-05 RX ADMIN — SODIUM CHLORIDE, PRESERVATIVE FREE 10 ML: 5 INJECTION INTRAVENOUS at 15:41

## 2023-08-05 RX ADMIN — DICLOFENAC SODIUM 2 G: 10 GEL TOPICAL at 20:24

## 2023-08-05 RX ADMIN — ROPINIROLE HYDROCHLORIDE 2 MG: 1 TABLET, FILM COATED ORAL at 04:35

## 2023-08-05 RX ADMIN — ROPINIROLE 2 MG: 2 TABLET, FILM COATED ORAL at 20:25

## 2023-08-05 RX ADMIN — Medication 16 G: at 17:38

## 2023-08-05 RX ADMIN — CEFTRIAXONE SODIUM 1000 MG: 1 INJECTION, POWDER, FOR SOLUTION INTRAMUSCULAR; INTRAVENOUS at 09:57

## 2023-08-05 RX ADMIN — ONDANSETRON 4 MG: 4 TABLET, ORALLY DISINTEGRATING ORAL at 05:27

## 2023-08-05 RX ADMIN — SODIUM CHLORIDE, PRESERVATIVE FREE 10 ML: 5 INJECTION INTRAVENOUS at 20:27

## 2023-08-05 RX ADMIN — IPRATROPIUM BROMIDE AND ALBUTEROL SULFATE 1 DOSE: .5; 2.5 SOLUTION RESPIRATORY (INHALATION) at 19:14

## 2023-08-05 RX ADMIN — ASPIRIN 81 MG: 81 TABLET, COATED ORAL at 18:02

## 2023-08-05 RX ADMIN — SODIUM CHLORIDE 500 ML: 9 INJECTION, SOLUTION INTRAVENOUS at 09:30

## 2023-08-05 RX ADMIN — HYDROCODONE BITARTRATE AND ACETAMINOPHEN 1 TABLET: 5; 325 TABLET ORAL at 05:28

## 2023-08-05 RX ADMIN — ROSUVASTATIN CALCIUM 10 MG: 5 TABLET, FILM COATED ORAL at 20:25

## 2023-08-05 RX ADMIN — CHOLECALCIFEROL (VITAMIN D3) 10 MCG (400 UNIT) TABLET 400 UNITS: at 18:02

## 2023-08-05 RX ADMIN — AZITHROMYCIN MONOHYDRATE 500 MG: 500 INJECTION, POWDER, LYOPHILIZED, FOR SOLUTION INTRAVENOUS at 10:51

## 2023-08-05 RX ADMIN — GABAPENTIN 300 MG: 300 CAPSULE ORAL at 05:28

## 2023-08-05 RX ADMIN — ACETAMINOPHEN 650 MG: 325 TABLET ORAL at 20:25

## 2023-08-05 ASSESSMENT — PAIN SCALES - GENERAL
PAINLEVEL_OUTOF10: 3
PAINLEVEL_OUTOF10: 3

## 2023-08-05 ASSESSMENT — PAIN DESCRIPTION - DESCRIPTORS: DESCRIPTORS: GNAWING

## 2023-08-05 ASSESSMENT — PAIN - FUNCTIONAL ASSESSMENT
PAIN_FUNCTIONAL_ASSESSMENT: NONE - DENIES PAIN
PAIN_FUNCTIONAL_ASSESSMENT: PREVENTS OR INTERFERES SOME ACTIVE ACTIVITIES AND ADLS

## 2023-08-05 ASSESSMENT — PAIN DESCRIPTION - LOCATION: LOCATION: LEG

## 2023-08-05 ASSESSMENT — PAIN DESCRIPTION - PAIN TYPE: TYPE: ACUTE PAIN

## 2023-08-05 ASSESSMENT — PAIN DESCRIPTION - FREQUENCY: FREQUENCY: CONTINUOUS

## 2023-08-05 ASSESSMENT — PAIN DESCRIPTION - ORIENTATION: ORIENTATION: RIGHT

## 2023-08-05 ASSESSMENT — LIFESTYLE VARIABLES
HOW OFTEN DO YOU HAVE A DRINK CONTAINING ALCOHOL: NEVER
HOW MANY STANDARD DRINKS CONTAINING ALCOHOL DO YOU HAVE ON A TYPICAL DAY: PATIENT DOES NOT DRINK

## 2023-08-05 ASSESSMENT — PAIN DESCRIPTION - ONSET: ONSET: ON-GOING

## 2023-08-05 NOTE — PROGRESS NOTES
Current medication list requested from Roberta Park, pt's son-in-law, he states the \"med list in my-chart should be current, but call her pharmacy if you have any questions. \"    Attempted to call [address] and they are closed already for the weekend.

## 2023-08-05 NOTE — H&P
V2.0  History and Physical      Name:  Nivia Short /Age/Sex: 1935  (81 y.o. female)   MRN & CSN:  [id number] & [id number] Encounter Date/Time: 2023 10:55 AM EDT   Location:   PCP: Denisha Lan MD       Hospital Day: 1    Assessment and Plan:   Nivia Short is a 81 y.o. female with a pmh as listed below who presents with Acute respiratory failure with hypoxia [facility]    Hospital Problems             Last Modified POA    * (Principal) Acute respiratory failure with hypoxia ([address]) 2023 Yes   Acute resp fail    Plan:  Fall at home, patient fell x2 at home last p.m. called EMS x2 to help her get up, on the second call they did bring her to the hospital which she was refusing to come. Her plan was to sign out AMA from the ER. However she became very drowsy blood pressure dropped and she required oxygen while she was sleeping. Most likely this is secondary to polypharmacy. She was recently started on baclofen 3 times daily, I have discontinued this she should not take it at home. Will discuss with family that they need to throw this medication away. Acute respiratory failure with hypoxemia secondary to polypharmacy and possible diastolic CHF exacerbation, continue O2 at 2 L wean as tolerated  HFpEF, will give x1 dose of IV Lasix, monitor fluid volume status, does not appear to be significantly fluid overloaded. Will resume Demadex in a.m. continue, Cozaar, metoprolol succinate. Diabetes mellitus type 2, placed on insulin sliding scale before meals and at bedtime, monitor for signs and symptoms of hypoglycemia, hypoglycemic protocol ordered  Venous insufficiency of both lower extremities, venous stasis ulcer of the right calf, in the process of healing patient follows at wound care clinic.   Chronic back pain, follows at pain clinic  Hyperlipidemia, continue statin  CAD, no complaints of any chest pain, continue current home medications  Hypertension continue Cozaar and appearance. No large areas of pulmonary consolidation. No detectable pleural effusion, pneumothorax or mediastinal widening. Findings most consistent with congestive heart failure with associated pulmonary edema and/or bilateral pneumonia worse on the right. No detectable pleural effusions or large areas of pulmonary consolidation. Findings may be accentuated by underlying chronic lung disease.          Electronically signed by ABA Falcon NP on 8/5/2023 at 10:55 AM

## 2023-08-05 NOTE — ED NOTES
0815 - WENT IN TO SPEAK TO PT ABOUT SON  IN LAW  PICKING HER UP. Pt DIFFICULT TO WAKE UP. PLACED PT ON MONITOR  O2  SAT 84% ON RM AIR . I SPOKE W/ DR. Brandon Cuevased  ABOUT PT'S MENTAL STATUS AND LOW 02. TALKED WITH PT. ABOUT STAYING [address] AGREED. NOTIFIED PT.'S  SON IN LAW.      Octavia Rivero RN  08/05/23 4969

## 2023-08-05 NOTE — PROGRESS NOTES
Pt arrived to room 3 from ED at 1140. Pt in bed, bed alarm on. Pt awakens when spoken to but is very drowsy, Viet Blackwell NP aware. Pt on telemetry.  Pt on 2 L NC.

## 2023-08-05 NOTE — ED PROVIDER NOTES
Emergency Department Encounter  Location: [address] [place] [address]    Patient: Samantha Hammond  MRN: [id number]  : 1935  Date of evaluation: 2023  ED Provider: Nadia Victor DO    07:00a.m. Samantha Hammond was checked out to me by Dr. Shweta Gongora. Please see his/her initial documentation for details of the patient's initial ED presentation, physical exam and completed studies. In brief, Samantha Hammond is a 81 y.o. female that presented to the emergency department after 2 falls. The patient had to call EMS twice to help her up so they brought her to the ER to be checked out. The patient did not want to be transported upon arrival to the emergency department she had to be talked into getting some lab work. The patient's first troponin was slightly elevated 0.012 and Dr. Shweta Gongora wanted to admit the patient however she did not want to stay in the hospital and has agreed to sign out AMA. However upon evaluation by the nurse prior to discharge patient's vital signs were rechecked and her O2 sat had dropped into the upper 70s which may be secondary to some sleep apnea however the patient required O2 to get her sats back into the 90s and high enough for her to be coherent enough for us to talk to her. Relative to this recommending that the patient be admitted to the hospital and she has subsequently agreed. A second troponin, chest x-ray and a BNP have been ordered.     I have reviewed and interpreted all of the currently available lab results and diagnostics from this visit:  Results for orders placed or performed during the hospital encounter of 23   CBC with Auto Differential   Result Value Ref Range    WBC 10.1 4.0 - 10.5 K/CU MM    RBC 3.94 (L) 4.2 - 5.4 M/CU MM    Hemoglobin 10.0 (L) 12.5 - 16.0 GM/DL    Hematocrit 33.4 (L) 37 - 47 %    MCV 84.8 78 - 100 FL    MCH 25.4 (L) 27 - 31 PG    MCHC 29.9 (L) 32.0 - 36.0 %    RDW 14.9 11.7 - 14.9 %    Platelets 436 011 - 499 K/CU MM    MPV Troponin T <0.010 <0.01 NG/ML   Brain Natriuretic Peptide   Result Value Ref Range    Pro-.6 (H) <300 PG/ML   Lactate, Sepsis   Result Value Ref Range    Lactic Acid, Sepsis 0.8 0.5 - 1.9 mMOL/L   POCT Venous   Result Value Ref Range    pH, Mitch 7.32 7.32 - 7.42    pCO2, Mitch 56.1 (H) 38 - 52 mmHG    pO2, Mitch 66.2 (H) 28 - 48 mmHG    Base Exc, Mixed 2.0 0 - 2.3    Base Excess HIDE 0 - 2.4    HCO3, Venous 29.0 (H) 19 - 25 MMOL/L    O2 Sat, Mitch 90.5 (H) 50 - 70 %    CO2 Content 30.7 (H) 19 - 24 MMOL/L    Source: Venous    EKG 12 Lead   Result Value Ref Range    Ventricular Rate 81 BPM    Atrial Rate 81 BPM    P-R Interval 140 ms    QRS Duration 92 ms    Q-T Interval 402 ms    QTc Calculation (Bazett) 466 ms    P Axis 60 degrees    R Axis 21 degrees    T Axis 40 degrees    Diagnosis       Normal sinus rhythm  Inferior infarct (cited on or before 17-MAY-2022)  Abnormal ECG  When compared with ECG of 19-JUN-2023 20:35,  No significant change was found       CT HEAD WO CONTRAST    Result Date: 8/5/2023  EXAMINATION: CT OF THE HEAD WITHOUT CONTRAST 8/5/2023 9:55 am TECHNIQUE: CT of the head was performed without the administration of intravenous contrast. Automated exposure control, iterative reconstruction, and/or weight based adjustment of the mA/kV was utilized to reduce the radiation dose to as low as reasonably achievable. COMPARISON: Head CT 09/02/2019 HISTORY: ORDERING SYSTEM PROVIDED HISTORY: fall, decreased mental status TECHNOLOGIST PROVIDED HISTORY: Has a \"code stroke\" or \"stroke alert\" been called? ->No Reason for exam:->fall, decreased mental status FINDINGS: Patient motion in some areas, partially limiting evaluation of those areas. BRAIN/VENTRICLES:  No no obvious masses nor acute intracranial hemorrhage. New chronic appearing encephalomalacia along the right frontal convexity near the expected watershed of the right anterior cerebral and middle cerebral arteries.   Unchanged encephalomalacia involving

## 2023-08-05 NOTE — ED PROVIDER NOTES
Severe L5 compression fracture unchanged from 06/20/2023 but new from 05/20/2022. This is chronic in appearance. No acute osseous or soft tissue abnormality. 1. No acute abnormality in the abdomen or pelvis. Normal appendix. 2. 2.4 cm simple appearing left adnexal cyst.  No follow-up imaging recommended. RECOMMENDATIONS: 2.4 cm left adnexal simple-appearing cyst. No follow-up imaging is recommended. Reference: JACR 2020 Feb;17(8):248-254         MDM:     Discussion with Other Professionals : None    Social Determinants: None    Records Reviewed : Outpatient Notes show that the patient does have chronic lower extremity edema    Chronic conditions affecting care:  Peripheral edema, restless leg syndrome    diagnostics differed at this time based on clinical judgement and/or after discussion with patient/patient's family    Patient was given the following medications:  Medications   gabapentin (NEURONTIN) capsule 300 mg (300 mg Oral Given 8/5/23 0435)   rOPINIRole (REQUIP) tablet 2 mg (2 mg Oral Given 8/5/23 0435)   gabapentin (NEURONTIN) capsule 300 mg (300 mg Oral Given 8/5/23 0528)   HYDROcodone-acetaminophen (NORCO) 5-325 MG per tablet 1 tablet (1 tablet Oral Given 8/5/23 0528)   ondansetron (ZOFRAN-ODT) disintegrating tablet 4 mg (4 mg SubLINGual Given 8/5/23 0527)       Disposition Discussion: This patient wanted to leave did not want evaluation here. I did discuss that she has had multiple falls and that we can evaluate for lots of different things including kidney disease, electrolytes, anemia, cardiac issues but the patient does not want to stay for further evaluation. She wants to be discharged. She is alert and oriented expressing clear and coherent thought able to reiterate my concerns and so I do feel comfortable releasing her [address] [place] [address] at this point.   She did request her home medications for her restless leg syndrome before she leaves as she states that she was scheduled to Left AMA     I am the primary physician of record    Clinical Impression:  1. Fall, initial encounter    2. Restless leg    3. Elevated troponin    4. Anemia, unspecified type      Disposition referral (if applicable):  Darek Ireland MD  [address] [place] [address]  [phone]    Schedule an appointment as soon as possible for a visit       [facility]  [address] [place] [address] Drive  [phone]    If symptoms worsen    Disposition medications (if applicable):  New Prescriptions    No medications on file       Comment: Please note this report has been produced using speech recognition software and may contain errors related to that system including errors in grammar, punctuation, and spelling, as well as words and phrases that may be inappropriate. If there are any questions or concerns please feel free to contact the dictating provider for clarification.               Bernard Rubio MD  08/05/23 7738       Bernard Rubio MD  08/05/23 7611

## 2023-08-05 NOTE — PROGRESS NOTES
4 Eyes Skin Assessment     NAME:  Maria Alejandra Hernández  YOB: 1935  MEDICAL RECORD NUMBER:  [id number]    The patient is being assessed for  Admission    I agree that at least one RN has performed a thorough Head to Toe Skin Assessment on the patient. ALL assessment sites listed below have been assessed. Areas assessed by both nurses:    Head, Face, Ears, Shoulders, Back, Chest, Arms, Elbows, Hands, Sacrum. Buttock, Coccyx, Ischium, and Legs. Feet and Heels        Does the Patient have a Wound?  No noted wound(s)    Redness to BLE       Edd Prevention initiated by RN: No  Wound Care Orders initiated by RN: No    Pressure Injury (Stage 3,4, Unstageable, DTI, NWPT, and Complex wounds) if present, place Wound referral order by RN under : No    New Ostomies, if present place, Ostomy referral order under : No     Nurse 1 eSignature: Electronically signed by Lizeth Bingham RN on 8/5/23 at 12:35 PM EDT    **SHARE this note so that the co-signing nurse can place an eSignature**    Nurse 2 eSignature: Electronically signed by Ruchi Griffin RN on 8/5/23 at 1:02 PM EDT

## 2023-08-05 NOTE — ED NOTES
Patients daughter Shira Oliveros called per patient request, she requested that patients son in law Mendel Oreilly be contacted. Message let on Don's phone to return call to the ED.       Prudence Ziegler RN  08/05/23 9531

## 2023-08-06 LAB
ANION GAP SERPL CALCULATED.3IONS-SCNC: 6 MMOL/L (ref 4–16)
BUN SERPL-MCNC: 22 MG/DL (ref 6–23)
CALCIUM SERPL-MCNC: 8.4 MG/DL (ref 8.3–10.6)
CHLORIDE BLD-SCNC: 105 MMOL/L (ref 99–110)
CO2: 31 MMOL/L (ref 21–32)
CREAT SERPL-MCNC: 0.6 MG/DL (ref 0.6–1.1)
EKG ATRIAL RATE: 71 BPM
EKG DIAGNOSIS: NORMAL
EKG DIAGNOSIS: NORMAL
EKG P AXIS: 5 DEGREES
EKG P-R INTERVAL: 140 MS
EKG Q-T INTERVAL: 288 MS
EKG Q-T INTERVAL: 396 MS
EKG QRS DURATION: 92 MS
EKG QRS DURATION: 96 MS
EKG QTC CALCULATION (BAZETT): 408 MS
EKG QTC CALCULATION (BAZETT): 430 MS
EKG R AXIS: 18 DEGREES
EKG R AXIS: 31 DEGREES
EKG T AXIS: -10 DEGREES
EKG T AXIS: 46 DEGREES
EKG VENTRICULAR RATE: 121 BPM
EKG VENTRICULAR RATE: 71 BPM
GFR SERPL CREATININE-BSD FRML MDRD: >60 ML/MIN/1.73M2
GLUCOSE BLD-MCNC: 123 MG/DL (ref 70–99)
GLUCOSE BLD-MCNC: 73 MG/DL (ref 70–99)
GLUCOSE BLD-MCNC: 89 MG/DL (ref 70–99)
GLUCOSE BLD-MCNC: 93 MG/DL (ref 70–99)
GLUCOSE SERPL-MCNC: 75 MG/DL (ref 70–99)
HCT VFR BLD CALC: 32.7 % (ref 37–47)
HEMOGLOBIN: 9.2 GM/DL (ref 12.5–16)
MAGNESIUM: 2.3 MG/DL (ref 1.8–2.4)
MCH RBC QN AUTO: 25 PG (ref 27–31)
MCHC RBC AUTO-ENTMCNC: 28.1 % (ref 32–36)
MCV RBC AUTO: 88.9 FL (ref 78–100)
PDW BLD-RTO: 15.2 % (ref 11.7–14.9)
PLATELET # BLD: 186 K/CU MM (ref 140–440)
PMV BLD AUTO: 9.9 FL (ref 7.5–11.1)
POTASSIUM SERPL-SCNC: 4.9 MMOL/L (ref 3.5–5.1)
RBC # BLD: 3.68 M/CU MM (ref 4.2–5.4)
SODIUM BLD-SCNC: 142 MMOL/L (ref 135–145)
WBC # BLD: 8.5 K/CU MM (ref 4–10.5)

## 2023-08-06 PROCEDURE — 2580000003 HC RX 258: Performed by: NURSE PRACTITIONER

## 2023-08-06 PROCEDURE — 2500000003 HC RX 250 WO HCPCS: Performed by: NURSE PRACTITIONER

## 2023-08-06 PROCEDURE — 1200000000 HC SEMI PRIVATE

## 2023-08-06 PROCEDURE — 2700000000 HC OXYGEN THERAPY PER DAY

## 2023-08-06 PROCEDURE — 93005 ELECTROCARDIOGRAM TRACING: CPT | Performed by: NURSE PRACTITIONER

## 2023-08-06 PROCEDURE — 80048 BASIC METABOLIC PNL TOTAL CA: CPT

## 2023-08-06 PROCEDURE — 93010 ELECTROCARDIOGRAM REPORT: CPT | Performed by: INTERNAL MEDICINE

## 2023-08-06 PROCEDURE — 6370000000 HC RX 637 (ALT 250 FOR IP): Performed by: NURSE PRACTITIONER

## 2023-08-06 PROCEDURE — 82962 GLUCOSE BLOOD TEST: CPT

## 2023-08-06 PROCEDURE — 85027 COMPLETE CBC AUTOMATED: CPT

## 2023-08-06 PROCEDURE — 6360000002 HC RX W HCPCS: Performed by: NURSE PRACTITIONER

## 2023-08-06 PROCEDURE — 94640 AIRWAY INHALATION TREATMENT: CPT

## 2023-08-06 PROCEDURE — 6370000000 HC RX 637 (ALT 250 FOR IP): Performed by: FAMILY MEDICINE

## 2023-08-06 PROCEDURE — 83735 ASSAY OF MAGNESIUM: CPT

## 2023-08-06 RX ORDER — DILTIAZEM HYDROCHLORIDE 5 MG/ML
10 INJECTION INTRAVENOUS ONCE
Status: COMPLETED | OUTPATIENT
Start: 2023-08-06 | End: 2023-08-06

## 2023-08-06 RX ORDER — MAGNESIUM HYDROXIDE/ALUMINUM HYDROXICE/SIMETHICONE 120; 1200; 1200 MG/30ML; MG/30ML; MG/30ML
30 SUSPENSION ORAL EVERY 6 HOURS PRN
Status: DISCONTINUED | OUTPATIENT
Start: 2023-08-06 | End: 2023-08-10 | Stop reason: HOSPADM

## 2023-08-06 RX ORDER — XYLITOL/YERBA SANTA
AEROSOL, SPRAY WITH PUMP (ML) MUCOUS MEMBRANE PRN
Status: DISCONTINUED | OUTPATIENT
Start: 2023-08-06 | End: 2023-08-10 | Stop reason: HOSPADM

## 2023-08-06 RX ORDER — METOPROLOL TARTRATE 5 MG/5ML
5 INJECTION INTRAVENOUS
Status: DISPENSED | OUTPATIENT
Start: 2023-08-06 | End: 2023-08-06

## 2023-08-06 RX ORDER — GABAPENTIN 300 MG/1
600 CAPSULE ORAL NIGHTLY
Status: COMPLETED | OUTPATIENT
Start: 2023-08-06 | End: 2023-08-06

## 2023-08-06 RX ORDER — ENOXAPARIN SODIUM 100 MG/ML
1 INJECTION SUBCUTANEOUS 2 TIMES DAILY
Status: DISCONTINUED | OUTPATIENT
Start: 2023-08-06 | End: 2023-08-07 | Stop reason: SDUPTHER

## 2023-08-06 RX ADMIN — IPRATROPIUM BROMIDE AND ALBUTEROL SULFATE 1 DOSE: .5; 2.5 SOLUTION RESPIRATORY (INHALATION) at 15:43

## 2023-08-06 RX ADMIN — HYDROXYCHLOROQUINE SULFATE 200 MG: 200 TABLET ORAL at 08:22

## 2023-08-06 RX ADMIN — ROPINIROLE 2 MG: 2 TABLET, FILM COATED ORAL at 14:07

## 2023-08-06 RX ADMIN — Medication: at 23:27

## 2023-08-06 RX ADMIN — LOSARTAN POTASSIUM 25 MG: 25 TABLET, FILM COATED ORAL at 08:23

## 2023-08-06 RX ADMIN — ACETAMINOPHEN 650 MG: 325 TABLET ORAL at 02:30

## 2023-08-06 RX ADMIN — METOROPROLOL TARTRATE 5 MG: 5 INJECTION, SOLUTION INTRAVENOUS at 10:52

## 2023-08-06 RX ADMIN — IPRATROPIUM BROMIDE AND ALBUTEROL SULFATE 1 DOSE: .5; 2.5 SOLUTION RESPIRATORY (INHALATION) at 19:29

## 2023-08-06 RX ADMIN — MICONAZOLE NITRATE: 2 POWDER TOPICAL at 12:36

## 2023-08-06 RX ADMIN — ROSUVASTATIN CALCIUM 10 MG: 5 TABLET, FILM COATED ORAL at 19:57

## 2023-08-06 RX ADMIN — METOPROLOL SUCCINATE 25 MG: 25 TABLET, EXTENDED RELEASE ORAL at 08:22

## 2023-08-06 RX ADMIN — DILTIAZEM HYDROCHLORIDE 30 MG: 30 TABLET, FILM COATED ORAL at 23:27

## 2023-08-06 RX ADMIN — SODIUM CHLORIDE, PRESERVATIVE FREE 10 ML: 5 INJECTION INTRAVENOUS at 09:38

## 2023-08-06 RX ADMIN — ALUMINUM HYDROXIDE, MAGNESIUM HYDROXIDE, AND SIMETHICONE 30 ML: 200; 200; 20 SUSPENSION ORAL at 14:07

## 2023-08-06 RX ADMIN — CHOLECALCIFEROL (VITAMIN D3) 10 MCG (400 UNIT) TABLET 400 UNITS: at 08:22

## 2023-08-06 RX ADMIN — IPRATROPIUM BROMIDE AND ALBUTEROL SULFATE 1 DOSE: .5; 2.5 SOLUTION RESPIRATORY (INHALATION) at 07:15

## 2023-08-06 RX ADMIN — ENOXAPARIN SODIUM 30 MG: 100 INJECTION SUBCUTANEOUS at 08:23

## 2023-08-06 RX ADMIN — IPRATROPIUM BROMIDE AND ALBUTEROL SULFATE 1 DOSE: .5; 2.5 SOLUTION RESPIRATORY (INHALATION) at 23:46

## 2023-08-06 RX ADMIN — DILTIAZEM HYDROCHLORIDE 30 MG: 30 TABLET, FILM COATED ORAL at 15:38

## 2023-08-06 RX ADMIN — DICLOFENAC SODIUM 2 G: 10 GEL TOPICAL at 09:38

## 2023-08-06 RX ADMIN — DICLOFENAC SODIUM 2 G: 10 GEL TOPICAL at 22:06

## 2023-08-06 RX ADMIN — ROPINIROLE 2 MG: 2 TABLET, FILM COATED ORAL at 09:38

## 2023-08-06 RX ADMIN — GABAPENTIN 600 MG: 300 CAPSULE ORAL at 23:27

## 2023-08-06 RX ADMIN — ENOXAPARIN SODIUM 50 MG: 100 INJECTION SUBCUTANEOUS at 19:57

## 2023-08-06 RX ADMIN — HYDROCODONE BITARTRATE AND ACETAMINOPHEN 1 TABLET: 7.5; 325 TABLET ORAL at 19:57

## 2023-08-06 RX ADMIN — DILTIAZEM HYDROCHLORIDE 10 MG: 5 INJECTION INTRAVENOUS at 11:52

## 2023-08-06 RX ADMIN — ROPINIROLE 2 MG: 2 TABLET, FILM COATED ORAL at 19:57

## 2023-08-06 RX ADMIN — ASPIRIN 81 MG: 81 TABLET, COATED ORAL at 08:22

## 2023-08-06 RX ADMIN — MOMETASONE FUROATE AND FORMOTEROL FUMARATE DIHYDRATE 2 PUFF: 100; 5 AEROSOL RESPIRATORY (INHALATION) at 19:29

## 2023-08-06 RX ADMIN — ACETAMINOPHEN 650 MG: 325 TABLET ORAL at 11:04

## 2023-08-06 RX ADMIN — METOROPROLOL TARTRATE 5 MG: 5 INJECTION, SOLUTION INTRAVENOUS at 10:38

## 2023-08-06 RX ADMIN — SODIUM CHLORIDE, PRESERVATIVE FREE 10 ML: 5 INJECTION INTRAVENOUS at 19:58

## 2023-08-06 RX ADMIN — IPRATROPIUM BROMIDE AND ALBUTEROL SULFATE 1 DOSE: .5; 2.5 SOLUTION RESPIRATORY (INHALATION) at 10:20

## 2023-08-06 ASSESSMENT — PAIN DESCRIPTION - ONSET: ONSET: ON-GOING

## 2023-08-06 ASSESSMENT — PAIN SCALES - GENERAL
PAINLEVEL_OUTOF10: 3
PAINLEVEL_OUTOF10: 7
PAINLEVEL_OUTOF10: 0
PAINLEVEL_OUTOF10: 0
PAINLEVEL_OUTOF10: 3

## 2023-08-06 ASSESSMENT — PAIN DESCRIPTION - DESCRIPTORS
DESCRIPTORS: ACHING;DISCOMFORT
DESCRIPTORS: NAGGING

## 2023-08-06 ASSESSMENT — PAIN DESCRIPTION - LOCATION
LOCATION: LEG
LOCATION: HEAD

## 2023-08-06 ASSESSMENT — PAIN - FUNCTIONAL ASSESSMENT
PAIN_FUNCTIONAL_ASSESSMENT: PREVENTS OR INTERFERES SOME ACTIVE ACTIVITIES AND ADLS
PAIN_FUNCTIONAL_ASSESSMENT: ACTIVITIES ARE NOT PREVENTED

## 2023-08-06 ASSESSMENT — PAIN DESCRIPTION - FREQUENCY: FREQUENCY: CONTINUOUS

## 2023-08-06 ASSESSMENT — PAIN DESCRIPTION - PAIN TYPE: TYPE: ACUTE PAIN

## 2023-08-06 ASSESSMENT — PAIN DESCRIPTION - ORIENTATION: ORIENTATION: RIGHT

## 2023-08-06 NOTE — PROGRESS NOTES
V2.0    [facility] Progress Note      Name:  Forrest Scott /Age/Sex: 1935  (81 y.o. female)   MRN & CSN:  [id number] & [id number] Encounter Date/Time: 2023 10:17 AM EDT   Location:   PCP: Jhonny Diego MD     Attending:Walker Pena MD       Hospital Day: 2    Assessment and Recommendations   Forrest Scott is a 81 y.o. female  who presents with Acute respiratory failure with hypoxia ([address])      Plan: 1. New onset A-fib RVR, patient given IV metoprolol x2 without any improvement IV Cardizem x1 did bring rate down, will start on p.o. Cardizem 30 mg p.o. every 6 hours. Cardiology consult has been placed, echocardiogram ordered. Lovenox twice daily while hospitalized. Patient will most likely not be a candidate for anticoagulation therapy at home as she does have frequent falls, and is 81years old. 2. Fall at home, patient fell x2 at home last p.m. called EMS x2 to help her get up, on the second call they did bring her to the hospital which she was refusing to come. Her plan was to sign out AMA from the ER. However she became very drowsy blood pressure dropped and she required oxygen while she was sleeping. Patient is now awake alert and oriented x4 with no complaints. 3. Acute respiratory failure with hypoxemia secondary to polypharmacy and possible diastolic CHF exacerbation, continue to wean 02.    4. HFpEF, will give x1 dose of IV Lasix, monitor fluid volume status, does not appear to be significantly fluid overloaded. Continue Demadex and Cozaar    5. Diabetes mellitus type 2, placed on insulin sliding scale before meals and at bedtime, monitor for signs and symptoms of hypoglycemia, hypoglycemic protocol ordered    6. Venous insufficiency of both lower extremities, venous stasis ulcer of the right calf, in the process of healing patient follows at wound care clinic. 7. Chronic back pain, follows at pain clinic  8. Hyperlipidemia, continue statin  9.  CAD, no complaints of

## 2023-08-07 ENCOUNTER — TELEPHONE (OUTPATIENT)
Dept: OTHER | Age: 88
End: 2023-08-07

## 2023-08-07 LAB
ANION GAP SERPL CALCULATED.3IONS-SCNC: 5 MMOL/L (ref 4–16)
BUN SERPL-MCNC: 17 MG/DL (ref 6–23)
CALCIUM SERPL-MCNC: 8.8 MG/DL (ref 8.3–10.6)
CHLORIDE BLD-SCNC: 102 MMOL/L (ref 99–110)
CO2: 31 MMOL/L (ref 21–32)
CREAT SERPL-MCNC: 0.5 MG/DL (ref 0.6–1.1)
CULTURE: NORMAL
GFR SERPL CREATININE-BSD FRML MDRD: >60 ML/MIN/1.73M2
GLUCOSE BLD-MCNC: 103 MG/DL (ref 70–99)
GLUCOSE BLD-MCNC: 122 MG/DL (ref 70–99)
GLUCOSE BLD-MCNC: 128 MG/DL (ref 70–99)
GLUCOSE BLD-MCNC: 162 MG/DL (ref 70–99)
GLUCOSE SERPL-MCNC: 116 MG/DL (ref 70–99)
HCT VFR BLD CALC: 32.8 % (ref 37–47)
HEMOGLOBIN: 9.3 GM/DL (ref 12.5–16)
LV EF: 58 %
LVEF MODALITY: NORMAL
Lab: NORMAL
MAGNESIUM: 2.3 MG/DL (ref 1.8–2.4)
MCH RBC QN AUTO: 24.7 PG (ref 27–31)
MCHC RBC AUTO-ENTMCNC: 28.4 % (ref 32–36)
MCV RBC AUTO: 87.2 FL (ref 78–100)
PDW BLD-RTO: 14.9 % (ref 11.7–14.9)
PLATELET # BLD: 181 K/CU MM (ref 140–440)
PMV BLD AUTO: 9.2 FL (ref 7.5–11.1)
POTASSIUM SERPL-SCNC: 4.4 MMOL/L (ref 3.5–5.1)
RBC # BLD: 3.76 M/CU MM (ref 4.2–5.4)
SODIUM BLD-SCNC: 138 MMOL/L (ref 135–145)
SPECIMEN: NORMAL
WBC # BLD: 9.5 K/CU MM (ref 4–10.5)

## 2023-08-07 PROCEDURE — 94640 AIRWAY INHALATION TREATMENT: CPT

## 2023-08-07 PROCEDURE — 85027 COMPLETE CBC AUTOMATED: CPT

## 2023-08-07 PROCEDURE — 83735 ASSAY OF MAGNESIUM: CPT

## 2023-08-07 PROCEDURE — 97535 SELF CARE MNGMENT TRAINING: CPT

## 2023-08-07 PROCEDURE — 2580000003 HC RX 258: Performed by: NURSE PRACTITIONER

## 2023-08-07 PROCEDURE — 2700000000 HC OXYGEN THERAPY PER DAY

## 2023-08-07 PROCEDURE — 82962 GLUCOSE BLOOD TEST: CPT

## 2023-08-07 PROCEDURE — 93308 TTE F-UP OR LMTD: CPT

## 2023-08-07 PROCEDURE — 1200000000 HC SEMI PRIVATE

## 2023-08-07 PROCEDURE — 97166 OT EVAL MOD COMPLEX 45 MIN: CPT

## 2023-08-07 PROCEDURE — 97530 THERAPEUTIC ACTIVITIES: CPT

## 2023-08-07 PROCEDURE — 6360000002 HC RX W HCPCS: Performed by: NURSE PRACTITIONER

## 2023-08-07 PROCEDURE — 6370000000 HC RX 637 (ALT 250 FOR IP): Performed by: INTERNAL MEDICINE

## 2023-08-07 PROCEDURE — 80048 BASIC METABOLIC PNL TOTAL CA: CPT

## 2023-08-07 PROCEDURE — 6370000000 HC RX 637 (ALT 250 FOR IP): Performed by: NURSE PRACTITIONER

## 2023-08-07 PROCEDURE — 94761 N-INVAS EAR/PLS OXIMETRY MLT: CPT

## 2023-08-07 PROCEDURE — 36415 COLL VENOUS BLD VENIPUNCTURE: CPT

## 2023-08-07 PROCEDURE — 97163 PT EVAL HIGH COMPLEX 45 MIN: CPT

## 2023-08-07 PROCEDURE — 99222 1ST HOSP IP/OBS MODERATE 55: CPT | Performed by: INTERNAL MEDICINE

## 2023-08-07 RX ORDER — GABAPENTIN 300 MG/1
300 CAPSULE ORAL EVERY MORNING
Status: DISCONTINUED | OUTPATIENT
Start: 2023-08-08 | End: 2023-08-10 | Stop reason: HOSPADM

## 2023-08-07 RX ORDER — GABAPENTIN 300 MG/1
300 CAPSULE ORAL ONCE
Status: COMPLETED | OUTPATIENT
Start: 2023-08-07 | End: 2023-08-07

## 2023-08-07 RX ORDER — GABAPENTIN 600 MG/1
600 TABLET ORAL EVERY EVENING
Status: DISCONTINUED | OUTPATIENT
Start: 2023-08-07 | End: 2023-08-10 | Stop reason: HOSPADM

## 2023-08-07 RX ORDER — METOPROLOL SUCCINATE 50 MG/1
50 TABLET, EXTENDED RELEASE ORAL DAILY
Status: DISCONTINUED | OUTPATIENT
Start: 2023-08-07 | End: 2023-08-10 | Stop reason: HOSPADM

## 2023-08-07 RX ORDER — PANTOPRAZOLE SODIUM 40 MG/1
40 TABLET, DELAYED RELEASE ORAL
Status: DISCONTINUED | OUTPATIENT
Start: 2023-08-07 | End: 2023-08-10 | Stop reason: HOSPADM

## 2023-08-07 RX ORDER — GABAPENTIN 300 MG/1
300 CAPSULE ORAL
Status: DISCONTINUED | OUTPATIENT
Start: 2023-08-08 | End: 2023-08-10 | Stop reason: HOSPADM

## 2023-08-07 RX ADMIN — GABAPENTIN 600 MG: 600 TABLET, FILM COATED ORAL at 17:39

## 2023-08-07 RX ADMIN — APIXABAN 5 MG: 5 TABLET, FILM COATED ORAL at 20:57

## 2023-08-07 RX ADMIN — SODIUM CHLORIDE, PRESERVATIVE FREE 10 ML: 5 INJECTION INTRAVENOUS at 08:49

## 2023-08-07 RX ADMIN — DICLOFENAC SODIUM 2 G: 10 GEL TOPICAL at 20:57

## 2023-08-07 RX ADMIN — ONDANSETRON 4 MG: 4 TABLET, ORALLY DISINTEGRATING ORAL at 12:52

## 2023-08-07 RX ADMIN — DILTIAZEM HYDROCHLORIDE 30 MG: 30 TABLET, FILM COATED ORAL at 12:40

## 2023-08-07 RX ADMIN — ROPINIROLE 2 MG: 2 TABLET, FILM COATED ORAL at 20:57

## 2023-08-07 RX ADMIN — HYDROXYCHLOROQUINE SULFATE 200 MG: 200 TABLET ORAL at 08:51

## 2023-08-07 RX ADMIN — CHOLECALCIFEROL (VITAMIN D3) 10 MCG (400 UNIT) TABLET 400 UNITS: at 08:51

## 2023-08-07 RX ADMIN — IPRATROPIUM BROMIDE AND ALBUTEROL SULFATE 1 DOSE: .5; 2.5 SOLUTION RESPIRATORY (INHALATION) at 11:44

## 2023-08-07 RX ADMIN — PANTOPRAZOLE SODIUM 40 MG: 40 TABLET, DELAYED RELEASE ORAL at 16:28

## 2023-08-07 RX ADMIN — ACETAMINOPHEN 650 MG: 325 TABLET ORAL at 16:30

## 2023-08-07 RX ADMIN — ROSUVASTATIN CALCIUM 10 MG: 5 TABLET, FILM COATED ORAL at 20:58

## 2023-08-07 RX ADMIN — DILTIAZEM HYDROCHLORIDE 30 MG: 30 TABLET, FILM COATED ORAL at 05:19

## 2023-08-07 RX ADMIN — IPRATROPIUM BROMIDE AND ALBUTEROL SULFATE 1 DOSE: .5; 2.5 SOLUTION RESPIRATORY (INHALATION) at 19:36

## 2023-08-07 RX ADMIN — SODIUM CHLORIDE, PRESERVATIVE FREE 10 ML: 5 INJECTION INTRAVENOUS at 20:57

## 2023-08-07 RX ADMIN — ASPIRIN 81 MG: 81 TABLET, COATED ORAL at 08:52

## 2023-08-07 RX ADMIN — MOMETASONE FUROATE AND FORMOTEROL FUMARATE DIHYDRATE 2 PUFF: 100; 5 AEROSOL RESPIRATORY (INHALATION) at 19:36

## 2023-08-07 RX ADMIN — GABAPENTIN 300 MG: 300 CAPSULE ORAL at 12:41

## 2023-08-07 RX ADMIN — TORSEMIDE 20 MG: 20 TABLET ORAL at 08:52

## 2023-08-07 RX ADMIN — ENOXAPARIN SODIUM 50 MG: 100 INJECTION SUBCUTANEOUS at 08:51

## 2023-08-07 RX ADMIN — IPRATROPIUM BROMIDE AND ALBUTEROL SULFATE 1 DOSE: .5; 2.5 SOLUTION RESPIRATORY (INHALATION) at 07:17

## 2023-08-07 RX ADMIN — MOMETASONE FUROATE AND FORMOTEROL FUMARATE DIHYDRATE 2 PUFF: 100; 5 AEROSOL RESPIRATORY (INHALATION) at 07:18

## 2023-08-07 RX ADMIN — ROPINIROLE 2 MG: 2 TABLET, FILM COATED ORAL at 08:51

## 2023-08-07 RX ADMIN — MICONAZOLE NITRATE: 2 POWDER TOPICAL at 08:50

## 2023-08-07 RX ADMIN — POLYETHYLENE GLYCOL 3350 17 G: 17 POWDER, FOR SOLUTION ORAL at 12:40

## 2023-08-07 RX ADMIN — METOPROLOL SUCCINATE 50 MG: 50 TABLET, EXTENDED RELEASE ORAL at 16:28

## 2023-08-07 RX ADMIN — LOSARTAN POTASSIUM 25 MG: 25 TABLET, FILM COATED ORAL at 08:51

## 2023-08-07 RX ADMIN — ROPINIROLE 2 MG: 2 TABLET, FILM COATED ORAL at 16:28

## 2023-08-07 RX ADMIN — DICLOFENAC SODIUM 2 G: 10 GEL TOPICAL at 08:49

## 2023-08-07 NOTE — CARE COORDINATION
CM into see pt after therapy. CM asked how pt did and she replied \"terrible\". CM discussed safety concerns of pt returning home. Pt is in agreement. CM discussed snf options. Pt discussed Swing bed. Pt would like to go to Swing bed. Some of pts family live in [place] [facility] [place] and she would like to stay close. Referral made to swing bed through [employer]. CM called pts QUENTIN Don and updated.   1340 CM met with pt and her quentin Don. Pt and QUENTIN both remain in agreement. CM discussed additional options when pt is discharged from Swing bed.  [address] [facility]

## 2023-08-07 NOTE — DISCHARGE INSTR - COC
Level: 7 (headache)  Last Weight:   Wt Readings from Last 1 Encounters:   08/05/23 250 lb (113.4 kg)     Mental Status:  {IP PT MENTAL STATUS:20030}    IV Access:  { МАРИЯ IV ACCESS:997673411}    Nursing Mobility/ADLs:  Walking   {CHP DME ROIV:243644395}  Transfer  {CHP DME OPVH:095574526}  Bathing  {CHP DME QCXO:805369632}  Dressing  {CHP DME CVWY:993442132}  Toileting  {CHP DME YTTN:460549829}  Feeding  {CHP DME REJP:553320739}  Med Admin  {P DME HNYY:335707102}  Med Delivery   { МАРИЯ MED Delivery:121211834}    Wound Care Documentation and Therapy:  Wound 05/19/22 Right Medial Foot/Ankle Cluster (Active)   Number of days: 445       Wound 05/20/22 Knee Left;Lateral open lesion/red pinkish color (Active)   Number of days: 443       Wound 06/08/23 #6 Left Medial Proximal Lower Leg Cluster (Active)   Wound Image   07/26/23 1354   Dressing Status Clean;Dry;New dressing applied 07/26/23 1459   Wound Cleansed Soap and water; Wound cleanser;Cleansed with saline 08/02/23 1407   Dressing/Treatment Moisturizing cream;Silver dressing;Silicone pad 10/19/70 6206   Wound Length (cm) 0.2 cm 08/02/23 1407   Wound Width (cm) 0.2 cm 08/02/23 1407   Wound Depth (cm) 0.1 cm 08/02/23 1407   Wound Surface Area (cm^2) 0.04 cm^2 08/02/23 1407   Change in Wound Size % (l*w) 95 08/02/23 1407   Wound Volume (cm^3) 0.004 cm^3 08/02/23 1407   Wound Healing % 95 08/02/23 1407   Post-Procedure Length (cm) 16.1 cm 07/19/23 1408   Post-Procedure Width (cm) 8.7 cm 07/19/23 1408   Post-Procedure Depth (cm) 0.1 cm 07/19/23 1408   Post-Procedure Surface Area (cm^2) 140.07 cm^2 07/19/23 1408   Post-Procedure Volume (cm^3) 14.007 cm^3 07/19/23 1408   Distance Tunneling (cm) 0 cm 08/02/23 1407   Tunneling Position ___ O'Clock 0 08/02/23 1407   Undermining Starts ___ O'Clock 0 08/02/23 1407   Undermining Ends___ O'Clock 0 08/02/23 1407   Undermining Maxium Distance (cm) 0 08/02/23 1407   Wound Assessment Pink/red 08/02/23 1407   Drainage Amount Small 08/02/23 1407   Drainage Description Yellow 08/02/23 1407   Odor None 08/02/23 1407   Vera-wound Assessment Edematous 08/02/23 1407   Margins Defined edges 08/02/23 1407   Wound Thickness Description not for Pressure Injury Full thickness 08/02/23 1407   Number of days: 59       Wound 08/06/23 Arm Lower; Posterior;Proximal;Right (Active)   Number of days: 1       Incision 05/12/22 Back Lower;Medial (Active)   Number of days: 452        Elimination:  Continence: Bowel: {YES / TK:38398}  Bladder: {YES / HK:93252}  Urinary Catheter: {Urinary Catheter:637068874}   Colostomy/Ileostomy/Ileal Conduit: {YES / RI:28157}       Date of Last BM: ***    Intake/Output Summary (Last 24 hours) at 8/7/2023 1308  Last data filed at 8/7/2023 1128  Gross per 24 hour   Intake 470 ml   Output 302 ml   Net 168 ml     I/O last 3 completed shifts:   In: 80 [P.O.:710]  Out: 300 [Urine:300]    Safety Concerns:     1105 [employer] Safety Concerns:551335109}    Impairments/Disabilities:      1105 [employer] Impairments/Disabilities:202909478}      Patient's personal belongings (please select all that are sent with patient):  {CHP DME Belongings:699670012}    RN SIGNATURE:  {Esignature:423735621}    CASE MANAGEMENT/SOCIAL WORK SECTION    Inpatient Status Date: ***    Readmission Risk Assessment Score:  Readmission Risk              Risk of Unplanned Readmission:  23           Discharging to Facility/ Agency   Name:   Address:  Phone:  Fax:    Dialysis Facility (if applicable)   Name:  Address:  Dialysis Schedule:  Phone:  Fax:    / signature: {Esignature:206592887}    PHYSICIAN SECTION      Nutrition Therapy:  Current Nutrition Therapy:   [employer] МАРИЯ Diet List:571060103}    Routes of Feeding: {CHP DME Other Feedings:899144408}  Liquids: {Slp liquid thickness:60368}  Daily Fluid Restriction: {CHP DME Yes amt example:019518669}  Last Modified Barium Swallow with Video (Video Swallowing Test): {Done Not Done RHDX:729745931}    Treatments at

## 2023-08-07 NOTE — PROGRESS NOTES
V2.0    [facility] Progress Note      Name:  Nivia Short /Age/Sex: 1935  (81 y.o. female)   MRN & CSN:  [id number] & [id number] Encounter Date/Time: 2023 10:17 AM EDT   Location:   PCP: Denisha Lan MD     Attending:Walker Hendrix MD       Hospital Day: 3    Assessment and Recommendations   Nivia Short is a 81 y.o. female  who presents with Acute respiratory failure with hypoxia ([address])      Plan: 1. New onset A-fib RVR (PAF), patient given IV metoprolol x2 without any improvement IV Cardizem x1 did bring rate down, will start on p.o. Cardizem 30 mg p.o. every 6 hours. Cardiology consult has been placed, echocardiogram ordered. Lovenox twice daily while hospitalized. Patient will most likely not be a candidate for anticoagulation therapy at home as she does have frequent falls, and is 81years old. She did convert back to normal sinus rhythm, after being given IV Cardizem. Patient has been seen by cardiology, and this started on Demadex 40 mg p.o. daily for 3 days and then decrease to 20 mg p.o. daily and can continue 20 mg daily at discharge. He also did start patient on Eliquis 5 mg twice daily. Discontinue Cardizem and increase Toprol XL to 50 mg daily. She will need to follow-up outpatient with Dr. Nilay Yi. Thank you cardiology for the recommendations. 2. Fall at home, patient fell x2 at home last p.m. called EMS x2 to help her get up, on the second call they did bring her to the hospital which she was refusing to come. Her plan was to sign out AMA from the ER. However she became very drowsy blood pressure dropped and she required oxygen while she was sleeping. Patient is now awake alert and oriented x4 with no complaints. Baclofen recently started. Will discontinue, should NOT be restarted    3.  Acute respiratory failure with hypoxemia secondary to polypharmacy and possible diastolic CHF exacerbation, continue to wean 02.    4. HFpEF, will give x1 dose of IV Lasix, 138   K 4.2 4.9 4.4    105 102   CO2 30 31 31   BUN 21 22 17   CREATININE 0.9 0.6 0.5*   GLUCOSE 104* 75 116*       Hepatic:   Recent Labs     08/05/23  0505   AST 11*   ALT <5*   BILITOT 0.4   ALKPHOS 71       Lipids:   Lab Results   Component Value Date/Time    CHOL 121 08/07/2018 10:47 AM    HDL 63 10/13/2022 08:45 AM    TRIG 128 08/07/2018 10:47 AM     Hemoglobin A1C:   Lab Results   Component Value Date/Time    LABA1C 6.3 04/17/2023 03:10 PM     TSH:   Lab Results   Component Value Date/Time    TSH 2.61 08/07/2018 10:47 AM     Troponin:   Lab Results   Component Value Date/Time    TROPONINT <0.010 08/05/2023 08:20 AM    TROPONINT 0.012 08/05/2023 05:05 AM    TROPONINT <0.010 07/21/2023 08:30 PM       BNP:   Recent Labs     08/05/23  0820   PROBNP 467.6*       UA:  Lab Results   Component Value Date/Time    NITRU NEGATIVE 08/05/2023 05:40 AM    COLORU ORANGE 08/05/2023 05:40 AM    WBCUA 1 08/05/2023 05:40 AM    RBCUA 0 08/05/2023 05:40 AM    MUCUS RARE 05/24/2022 06:00 PM    TRICHOMONAS NONE SEEN 05/24/2022 06:00 PM    BACTERIA FEW 08/05/2023 05:40 AM    CLARITYU SLIGHTLY CLOUDY 08/05/2023 05:40 AM    SPECGRAV >1.030 08/05/2023 05:40 AM    LEUKOCYTESUR NEGATIVE 08/05/2023 05:40 AM    UROBILINOGEN 0.2 08/05/2023 05:40 AM    BILIRUBINUR SMALL NUMBER OR AMOUNT OBSERVED 08/05/2023 05:40 AM    BLOODU NEGATIVE 08/05/2023 05:40 AM    KETUA TRACE 08/05/2023 05:40 AM     Urine Cultures:   Lab Results   Component Value Date/Time    LABURIN 200 09/28/2016 02:43 PM       Organism:   Lab Results   Component Value Date/Time    ORG ECOL 03/01/2018 12:30 AM         Electronically signed by ABA Hou NP on 8/7/2023 at 9:55 AM

## 2023-08-07 NOTE — TELEPHONE ENCOUNTER
Patient has had several recent falls. I did receive a call from EMS that they had two back to back calls recently for a fall. Patient has all Passport services. Unsure what the needs will be on discharge. Will meet with the patient and continue to follow until discharge.

## 2023-08-07 NOTE — CARE COORDINATION
08/07/23 0956   Service Assessment   Patient Orientation Alert and Oriented;Person;Place;Situation   Cognition Alert   History Provided By Child/Family; Patient   Primary Caregiver Other (Comment)  (pt has Passport aides 3 days per week .)   [address]; Other (Comment)  (passport aides. CM for passport is Shane Queen)   [address] is: Legal Next of Kin   PCP Verified by CM Yes   Last Visit to PCP Within last 6 months   Prior Functional Level Bathing;Mobility;Housework;Cooking; Other (see comment); Assistance with the following:;Toileting   Current Functional Level Assistance with the following:;Bathing; Toileting;Cooking;Housework; Mobility; Shopping   Can patient return to prior living arrangement Unknown at present   Ability to make needs known: Good   Family able to assist with home care needs: Yes  (family can help with transportation but have concerns regarding safety of pt.)     CM into see pt. Resp sl labored. Pt is agreeable to speak with family. CM received call from [facility] son in law, information for the assessment per [facility]. Pt lives alone in a one floor apartment. [facility] voices concerns regarding safety of pt at home. Pt had a fall prior to coming to the hospital. [facility] is also concerned regarding her need for assisting with medication management. CM discussed options for discharge planning. CM explained that pt has PT/OT ordered and evals will help with discharge planning. [facility] would like pt to go to the Swing bed if recommended. Pt has a PCP. Pt has insurance and is able to obtain her meds. Pt DME includes a walker, grab bars in the bathroom, shower chair, raised toilet seat, life alert. [facility] provided transportation to pts appointments. Pt goes to the wound clinic weekly. Passport aides 3 day per week. Discharge plan is for pending. Family would like Swing if recommended. Pt lives alone/ fall. PCP and insurance.  PT/ OT pending evals

## 2023-08-07 NOTE — CONSULTS
08/07/23  0540   WBC 9.5   HGB 9.3*   HCT 32.8*         Recent Labs     08/07/23  0540      K 4.4      CO2 31   BUN 17   CREATININE 0.5*     Recent Labs     08/05/23  0505   AST 11*   ALT <5*   BILITOT 0.4   ALKPHOS 71     Recent Labs     08/05/23  0505 08/05/23  0820   TROPONINT 0.012* <0.010       Recent Labs     08/05/23  0820   PROBNP 467.6*     Lab Results   Component Value Date    INR 0.97 10/21/2022    PROTIME 11.7 10/21/2022       EKG: (reviewed by myself): EKG showed sinus rhythm with occasional PVCs. Her telemetry strips were reviewed as well which showed atrial fibrillation ventricular rate. ECHO:(reviewed by myself) her echo was reviewed as well. It did show preserved ejection fraction. She probably has diastolic dysfunction but due to body habitus we could not evaluate diastolic dysfunction on echocardiogram.  Chest Xray:(reviewed by myself): Chest x-ray showed possible pulmonary edema. All labs, medications and tests reviewed by myself including data  from outside source , patient and available family . Continue all other medications of all above medical condition listed as is. Impression and Recommendations:    Acute HFpEF  New onset atrial fibrillation  Mild troponin elevation which is likely demand mediated      81 y. o.year old with above medical history. She likely has acute HFpEF and because of these stress of fall and trauma she had atrial fibrillation with rapid ventricular rate. She is currently in sinus rhythm. At present we will stop her diltiazem and increase Toprol-XL dose to 50 mg daily. I will also give her torsemide 40 mg daily x 3 days and then decrease the dose to 20 mg daily as an outpatient. She should also be on Eliquis 5 mg twice a day for stroke prevention. I will start her on Protonix 40 mg daily and stop her aspirin to decrease her bleeding risk. As an outpatient she should follow-up with Dr. Sonia Thompson.     Thank you  much for consult and giving us the opportunity in contributing in the care of this patient. Please feel free to call me for any questions.        Hayley Reynoso MD, 8/7/2023 1:04 PM

## 2023-08-07 NOTE — PROGRESS NOTES
Occupational Therapy  Facility/Department: [facility] UNIT  Occupational Therapy Initial Assessment    Name: Nivia Short  : 1935  MRN: [id number]  Date of Service: 2023    Discharge Recommendations:  Continue to assess pending progress, IP Rehab (Swingbed)  OT Equipment Recommendations  Equipment Needed: Yes  Mobility Devices: ADL Assistive Devices; Wheelchair  Wheelchair: Standard  ADL Assistive Devices: Sock-Aid Hard       Patient Diagnosis(es): The primary encounter diagnosis was Acute on chronic congestive heart failure, unspecified heart failure type ([address] Diagnoses of Fall, initial encounter, Restless leg, Elevated troponin, Anemia, unspecified type, and Hypoxia were also pertinent to this visit. Past Medical History:  has a past medical history of Arthritis, CAD (coronary artery disease), Chronic midline low back pain without sciatica, Claustrophobia, Colonoscopy refused, COVID-19 virus infection, DM (diabetes mellitus), type 2 ([address]), Dupuytren's contracture of right hand, Endometrial stripe increased, Gastrointestinal hemorrhage, Glaucoma, H/O Doppler ultrasound, H/O echocardiogram, H/O echocardiogram, H/O echocardiogram, History of nuclear stress test, HTN (hypertension), Hyperlipidemia, Kidney stone, MI (myocardial infarction) ([address]), Obesity, Open wound of right foot, Parainfluenza infection, Pneumonia of right lower lobe due to infectious organism, Vertigo, and WD-Traumatic ulcer of foot, right, with fat layer exposed ([address]). Past Surgical History:  has a past surgical history that includes Cataract removal (Bilateral); Inguinal hernia repair (Left, 45 yrs ago); Lumbar spine surgery (N/A, 2022); and Spine surgery (N/A, 2022). Treatment Diagnosis: Weakness      Assessment   Performance deficits / Impairments: Decreased functional mobility ; Decreased ADL status; Decreased endurance;Decreased strength;Decreased balance  Assessment: Pt is an 80 yo female admitted to [facility] with Acute respiratory failure with hypoxia. Pt presents with increased SOB upon exertion, decreased strength and endurance and ADL status. Recommend OT to improve strength, endurance, functional mobility and to maximize indep with ADLs using AE and education on energy conservation. Treatment Diagnosis: Weakness  Prognosis: Good  Decision Making: Medium Complexity  REQUIRES OT FOLLOW-UP: Yes  Activity Tolerance  Activity Tolerance Comments: Limited d/t increased SOB, decreased O2 into upper 80s. upon rest increased to 100% on 2L of O2 after prompts for PLB as noted Pt was demo rapid mouth breathing.         Plan   Occupational Therapy Plan  Times Per Week: 3+  Current Treatment Recommendations: Strengthening, Balance training, Functional mobility training, Endurance training, Patient/Caregiver education & training, Equipment evaluation, education, & procurement, Self-Care / ADL, Safety education & training     Restrictions  Restrictions/Precautions  Restrictions/Precautions: Fall Risk  Position Activity Restriction  Other position/activity restrictions: O2, saline lock    Subjective   General  Patient assessed for rehabilitation services?: Yes  Family / Caregiver Present: No  Subjective  Subjective: \"My stomach hurts\"  General Comment  Comments: Pt presents asleep supine in bed     Social/Functional History  Social/Functional History  Lives With: Alone  Type of Home: Apartment  Home Layout: One level  Home Access: Stairs to enter without rails  Entrance Stairs - Number of Steps: 1 small step to get in  Bathroom Shower/Tub: Tub/Shower unit  [employer]: Handicap height  Bathroom Equipment: Shower chair, Grab bars in shower, Hand-held shower (pt reports she has an aide 3x/week and she takes showers when she is there.)  Home Equipment: Alert Button, Walker, 4 wheeled, Reacher (pt occasionally sleeps in a recliner.)  Has the patient had two or more falls in the past year or any fall with injury in the past year?:

## 2023-08-07 NOTE — PROGRESS NOTES
Physical Therapy  Facility/Department: [facility] UNIT  Physical Therapy Initial Assessment    Name: Hao Blakely  : 1935  MRN: [id number]  Date of Service: 2023    Discharge Recommendations:  Continue to assess pending progress, Subacute/Skilled Nursing Facility, 24 hour supervision or assist (swing bed when O2 better controlled. )   PT Equipment Recommendations  Other: Continue to assess      Patient Diagnosis(es): The primary encounter diagnosis was Acute on chronic congestive heart failure, unspecified heart failure type ([address]). Diagnoses of Fall, initial encounter, Restless leg, Elevated troponin, Anemia, unspecified type, and Hypoxia were also pertinent to this visit. Past Medical History:  has a past medical history of Arthritis, CAD (coronary artery disease), Chronic midline low back pain without sciatica, Claustrophobia, Colonoscopy refused, COVID-19 virus infection, DM (diabetes mellitus), type 2 ([address]), Dupuytren's contracture of right hand, Endometrial stripe increased, Gastrointestinal hemorrhage, Glaucoma, H/O Doppler ultrasound, H/O echocardiogram, H/O echocardiogram, H/O echocardiogram, History of nuclear stress test, HTN (hypertension), Hyperlipidemia, Kidney stone, MI (myocardial infarction) ([address]), Obesity, Open wound of right foot, Parainfluenza infection, Pneumonia of right lower lobe due to infectious organism, Vertigo, and WD-Traumatic ulcer of foot, right, with fat layer exposed ([address]). Past Surgical History:  has a past surgical history that includes Cataract removal (Bilateral); Inguinal hernia repair (Left, 45 yrs ago); Lumbar spine surgery (N/A, 2022); and Spine surgery (N/A, 2022). Assessment   Body Structures, Functions, Activity Limitations Requiring Skilled Therapeutic Intervention: Decreased functional mobility ; Decreased ADL status; Decreased ROM; Decreased body mechanics; Decreased tolerance to work activity; Decreased strength;Decreased safe

## 2023-08-08 ENCOUNTER — TELEPHONE (OUTPATIENT)
Dept: INTERNAL MEDICINE CLINIC | Age: 88
End: 2023-08-08

## 2023-08-08 DIAGNOSIS — M48.062 LUMBAR STENOSIS WITH NEUROGENIC CLAUDICATION: Primary | ICD-10-CM

## 2023-08-08 DIAGNOSIS — M54.50 CHRONIC MIDLINE LOW BACK PAIN WITHOUT SCIATICA: ICD-10-CM

## 2023-08-08 DIAGNOSIS — R26.9 GAIT DISTURBANCE: ICD-10-CM

## 2023-08-08 DIAGNOSIS — G89.29 CHRONIC MIDLINE LOW BACK PAIN WITHOUT SCIATICA: ICD-10-CM

## 2023-08-08 LAB
ANION GAP SERPL CALCULATED.3IONS-SCNC: 9 MMOL/L (ref 4–16)
BUN SERPL-MCNC: 19 MG/DL (ref 6–23)
CALCIUM SERPL-MCNC: 9 MG/DL (ref 8.3–10.6)
CHLORIDE BLD-SCNC: 99 MMOL/L (ref 99–110)
CO2: 31 MMOL/L (ref 21–32)
CREAT SERPL-MCNC: 0.7 MG/DL (ref 0.6–1.1)
GFR SERPL CREATININE-BSD FRML MDRD: >60 ML/MIN/1.73M2
GLUCOSE BLD-MCNC: 103 MG/DL (ref 70–99)
GLUCOSE BLD-MCNC: 121 MG/DL (ref 70–99)
GLUCOSE BLD-MCNC: 134 MG/DL (ref 70–99)
GLUCOSE SERPL-MCNC: 110 MG/DL (ref 70–99)
HCT VFR BLD CALC: 33.2 % (ref 37–47)
HEMOGLOBIN: 9.3 GM/DL (ref 12.5–16)
MAGNESIUM: 2.2 MG/DL (ref 1.8–2.4)
MCH RBC QN AUTO: 24.6 PG (ref 27–31)
MCHC RBC AUTO-ENTMCNC: 28 % (ref 32–36)
MCV RBC AUTO: 87.8 FL (ref 78–100)
PDW BLD-RTO: 14.9 % (ref 11.7–14.9)
PLATELET # BLD: 184 K/CU MM (ref 140–440)
PMV BLD AUTO: 9.4 FL (ref 7.5–11.1)
POTASSIUM SERPL-SCNC: 4.5 MMOL/L (ref 3.5–5.1)
RBC # BLD: 3.78 M/CU MM (ref 4.2–5.4)
SODIUM BLD-SCNC: 139 MMOL/L (ref 135–145)
WBC # BLD: 10 K/CU MM (ref 4–10.5)

## 2023-08-08 PROCEDURE — 6370000000 HC RX 637 (ALT 250 FOR IP): Performed by: INTERNAL MEDICINE

## 2023-08-08 PROCEDURE — 2700000000 HC OXYGEN THERAPY PER DAY

## 2023-08-08 PROCEDURE — 80048 BASIC METABOLIC PNL TOTAL CA: CPT

## 2023-08-08 PROCEDURE — 36415 COLL VENOUS BLD VENIPUNCTURE: CPT

## 2023-08-08 PROCEDURE — 85027 COMPLETE CBC AUTOMATED: CPT

## 2023-08-08 PROCEDURE — 6370000000 HC RX 637 (ALT 250 FOR IP): Performed by: NURSE PRACTITIONER

## 2023-08-08 PROCEDURE — 83735 ASSAY OF MAGNESIUM: CPT

## 2023-08-08 PROCEDURE — 1200000000 HC SEMI PRIVATE

## 2023-08-08 PROCEDURE — 94640 AIRWAY INHALATION TREATMENT: CPT

## 2023-08-08 PROCEDURE — 82962 GLUCOSE BLOOD TEST: CPT

## 2023-08-08 PROCEDURE — 94761 N-INVAS EAR/PLS OXIMETRY MLT: CPT

## 2023-08-08 PROCEDURE — 2580000003 HC RX 258: Performed by: NURSE PRACTITIONER

## 2023-08-08 RX ADMIN — GABAPENTIN 300 MG: 300 CAPSULE ORAL at 08:36

## 2023-08-08 RX ADMIN — MOMETASONE FUROATE AND FORMOTEROL FUMARATE DIHYDRATE 2 PUFF: 100; 5 AEROSOL RESPIRATORY (INHALATION) at 19:59

## 2023-08-08 RX ADMIN — ROPINIROLE 2 MG: 2 TABLET, FILM COATED ORAL at 16:56

## 2023-08-08 RX ADMIN — IPRATROPIUM BROMIDE AND ALBUTEROL SULFATE 1 DOSE: .5; 2.5 SOLUTION RESPIRATORY (INHALATION) at 07:46

## 2023-08-08 RX ADMIN — DICLOFENAC SODIUM 2 G: 10 GEL TOPICAL at 20:53

## 2023-08-08 RX ADMIN — ACETAMINOPHEN 650 MG: 325 TABLET ORAL at 20:52

## 2023-08-08 RX ADMIN — ROSUVASTATIN CALCIUM 10 MG: 5 TABLET, FILM COATED ORAL at 20:53

## 2023-08-08 RX ADMIN — GABAPENTIN 600 MG: 600 TABLET, FILM COATED ORAL at 17:16

## 2023-08-08 RX ADMIN — SODIUM CHLORIDE, PRESERVATIVE FREE 10 ML: 5 INJECTION INTRAVENOUS at 20:53

## 2023-08-08 RX ADMIN — IPRATROPIUM BROMIDE AND ALBUTEROL SULFATE 1 DOSE: .5; 2.5 SOLUTION RESPIRATORY (INHALATION) at 11:16

## 2023-08-08 RX ADMIN — MOMETASONE FUROATE AND FORMOTEROL FUMARATE DIHYDRATE 2 PUFF: 100; 5 AEROSOL RESPIRATORY (INHALATION) at 07:46

## 2023-08-08 RX ADMIN — CHOLECALCIFEROL (VITAMIN D3) 10 MCG (400 UNIT) TABLET 400 UNITS: at 08:36

## 2023-08-08 RX ADMIN — PANTOPRAZOLE SODIUM 40 MG: 40 TABLET, DELAYED RELEASE ORAL at 05:40

## 2023-08-08 RX ADMIN — APIXABAN 5 MG: 5 TABLET, FILM COATED ORAL at 20:53

## 2023-08-08 RX ADMIN — MICONAZOLE NITRATE: 2 POWDER TOPICAL at 08:43

## 2023-08-08 RX ADMIN — ROPINIROLE 2 MG: 2 TABLET, FILM COATED ORAL at 20:53

## 2023-08-08 RX ADMIN — IPRATROPIUM BROMIDE AND ALBUTEROL SULFATE 1 DOSE: .5; 2.5 SOLUTION RESPIRATORY (INHALATION) at 14:55

## 2023-08-08 RX ADMIN — ROPINIROLE 2 MG: 2 TABLET, FILM COATED ORAL at 08:36

## 2023-08-08 RX ADMIN — IPRATROPIUM BROMIDE AND ALBUTEROL SULFATE 1 DOSE: .5; 2.5 SOLUTION RESPIRATORY (INHALATION) at 19:59

## 2023-08-08 RX ADMIN — APIXABAN 5 MG: 5 TABLET, FILM COATED ORAL at 08:37

## 2023-08-08 RX ADMIN — HYDROXYCHLOROQUINE SULFATE 200 MG: 200 TABLET ORAL at 08:36

## 2023-08-08 ASSESSMENT — PAIN DESCRIPTION - FREQUENCY: FREQUENCY: INTERMITTENT

## 2023-08-08 ASSESSMENT — PAIN DESCRIPTION - PAIN TYPE: TYPE: ACUTE PAIN

## 2023-08-08 ASSESSMENT — PAIN DESCRIPTION - ONSET: ONSET: ON-GOING

## 2023-08-08 ASSESSMENT — PAIN SCALES - GENERAL
PAINLEVEL_OUTOF10: 0
PAINLEVEL_OUTOF10: 2
PAINLEVEL_OUTOF10: 0

## 2023-08-08 ASSESSMENT — PAIN - FUNCTIONAL ASSESSMENT: PAIN_FUNCTIONAL_ASSESSMENT: ACTIVITIES ARE NOT PREVENTED

## 2023-08-08 ASSESSMENT — PAIN DESCRIPTION - DESCRIPTORS: DESCRIPTORS: ACHING

## 2023-08-08 ASSESSMENT — PAIN DESCRIPTION - ORIENTATION: ORIENTATION: LOWER

## 2023-08-08 ASSESSMENT — PAIN DESCRIPTION - LOCATION: LOCATION: BACK

## 2023-08-08 NOTE — PROGRESS NOTES
Patient is requesting for motorized wheelchair as she is having difficulty ambulating. Patient states she cannot use the standard wheelchair as she does not have much strength in her upper arms and has arthritis of the shoulders.

## 2023-08-08 NOTE — PROGRESS NOTES
Patient stated to staff while picking up her lunch tray that she had been asking to get cleaned up since 9am. Per Nicolette Hebert, patient's nurse, patient stated at noon that she wanted to get cleaned up and Nicolette Hebert stated she would be back after lunch to help her get cleaned up. Patient then got her feet out of bed and was sitting on the edge of the bed making the bed alarm turn on. When this nurse entered the room, the patient kept yelling that no one was cleaning her up. Patient was re-assured that we just put her lunch tray away and we were touching base with her nurse and then would be back in with supplies. Patient was asked to put her feet back in the bed so that this nurse could go get the supplies needed to clean her up. Patient refused and stated she would not get back in bed until we got the supplies and got her fresh water. It was explained to the patient multiple times that staff could not leave the room with her sitting on the side of the bed due to frequent falls. Patient would not follow direction and would not listen to staff and continued to yell and state \"Well then I guess you can just stand there looking pretty, I'm not moving until you do what I ask\". It was explained to patient again that once she got in the bed staff could go get supplies and help her. After 10 minutes, patient still refusing to get back in bed. Patient called her son in law stating staff was not yelling at her and telling her to get back in the bed and that staff was refusing to clean her up. The son in law stated he would be here. AXEL Benjamin supervisor took over and re-iterated that the patient needed to get in the bed so we could help her. Patient's son in law came and was able to get the patient to cooperate.

## 2023-08-08 NOTE — PLAN OF CARE
Problem: Discharge Planning  Goal: Discharge to home or other facility with appropriate resources  8/7/2023 2325 by Hiren Britton LPN  Outcome: Progressing  8/7/2023 2325 by Hiren Britton LPN  Outcome: Progressing  8/7/2023 1045 by Maribel Muir RN  Outcome: Progressing     Problem: Safety - Adult  Goal: Free from fall injury  8/7/2023 2325 by Hiren Britton LPN  Outcome: Progressing  8/7/2023 2325 by Hiren Britton LPN  Outcome: Progressing  8/7/2023 1045 by Maribel Muir RN  Outcome: Progressing     Problem: Skin/Tissue Integrity  Goal: Absence of new skin breakdown  Description: 1. Monitor for areas of redness and/or skin breakdown  2. Assess vascular access sites hourly  3. Every 4-6 hours minimum:  Change oxygen saturation probe site  4. Every 4-6 hours:  If on nasal continuous positive airway pressure, respiratory therapy assess nares and determine need for appliance change or resting period.   8/7/2023 2325 by Hiren Britton LPN  Outcome: Progressing  8/7/2023 2325 by Hiren Britton LPN  Outcome: Progressing  8/7/2023 1045 by Maribel Muir RN  Outcome: Progressing     Problem: ABCDS Injury Assessment  Goal: Absence of physical injury  8/7/2023 2325 by Hiren Britton LPN  Outcome: Progressing  8/7/2023 2325 by Hiren Britton LPN  Outcome: Progressing  8/7/2023 1045 by Maribel Muir RN  Outcome: Progressing     Problem: Pain  Goal: Verbalizes/displays adequate comfort level or baseline comfort level  8/7/2023 2325 by Hiren Britton LPN  Outcome: Progressing  8/7/2023 2325 by Hiren Britton LPN  Outcome: Progressing  8/7/2023 1045 by Maribel Muir RN  Outcome: Progressing

## 2023-08-08 NOTE — TELEPHONE ENCOUNTER
Left message on [facility] message line that the order for the motorized wheelchair will be faxed to [phone]. Faxed successfully.

## 2023-08-08 NOTE — CARE COORDINATION
Plan is for the patient to admit to the swing bed program once medically stable for short-term skilled PT/OT. Patient's insurance will require pre-cert but per Rocío Barnhart NP the patient is not medically stable at this time. Patient's insurance pre-cert likely to return quickly once initiated. CM will follow.

## 2023-08-08 NOTE — PROGRESS NOTES
V2.0    [facility] Progress Note      Name:  Teri Card /Age/Sex: 1935  (81 y.o. female)   MRN & CSN:  [id number] & [id number] Encounter Date/Time: 2023 4:02 PM EDT   Location:  [address] [place] [address] PCP: Isai Reyes MD     Attending:Gladys Mancuso MD       Hospital Day: 4    Assessment and Recommendations   Teri Card is a 81 y.o. female  who presents with Acute respiratory failure with hypoxia ([address])    A-fib RVR-new onset  Resolved-current telemetry: Sinus rhythm  Received IV metoprolol x2, IV Cardizem x1  Cardiology consulted and will need close outpatient follow-up  Continue p.o. Toprol-XL 50 mg daily  [id number] Score: 7  Eliquis 5 mg twice daily recommend cardiology until outpatient follow-up  Likely poor candidate for anticoagulation due to frequent falls    Acute HFpEF  Pulmonary hypertension  Resolving  TTE () EF 55 to 60%, mild MR, moderate TR, RVSP 55 mmHg  Received torsemide 40 mg daily x3 days, now 20 daily to which patient is refusing  Monitor for clinical symptoms of hypervolemia    Acute respiratory failure with hypoxia 2/2 multifactorial etiologies noted above  Continue supplemental oxygen  Continue bronchodilators/pulmonary hygiene  Hypoxic episode this morning  Resolved after proper wearing of nasal cannula/PRN bronchodilator treatment     Recurrent falls  Generalized weakness  Attributed to hypotension/polypharmacy  PT OT consulted  Swing bed referral pending  Dispo: We will make a determination tomorrow when appropriately working with staff/therapy-currently verbally abusive to nursing staff, refusing care, and has multiple courses through swing bed program  Anemia, Chronic  H/H 9.3/33.2 appears overall stable  Normocytic hypochromic  Follows with heme-onc    DMII with chronic complications and with long term use of insulin.   Last A1c 6.3 (2023)  Monitor FSBS and cover with SSI  Hypoglycemia protocol     Venous insufficiency of both lower extremities, venous stasis ulcer of the

## 2023-08-08 NOTE — PLAN OF CARE
Problem: Discharge Planning  Goal: Discharge to home or other facility with appropriate resources  8/7/2023 2325 by Hemal Jeter LPN  Outcome: Progressing  8/7/2023 1045 by Rometta Oppenheim, RN  Outcome: Progressing     Problem: Safety - Adult  Goal: Free from fall injury  8/7/2023 2325 by Hemal Jeter LPN  Outcome: Progressing  8/7/2023 1045 by Rometta Oppenheim, RN  Outcome: Progressing     Problem: Skin/Tissue Integrity  Goal: Absence of new skin breakdown  Description: 1. Monitor for areas of redness and/or skin breakdown  2. Assess vascular access sites hourly  3. Every 4-6 hours minimum:  Change oxygen saturation probe site  4. Every 4-6 hours:  If on nasal continuous positive airway pressure, respiratory therapy assess nares and determine need for appliance change or resting period.   8/7/2023 2325 by Hemal Jeter LPN  Outcome: Progressing  8/7/2023 1045 by Rometta Oppenheim, RN  Outcome: Progressing     Problem: ABCDS Injury Assessment  Goal: Absence of physical injury  8/7/2023 2325 by Hemal Jeter LPN  Outcome: Progressing  8/7/2023 1045 by Rometta Oppenheim, RN  Outcome: Progressing     Problem: Pain  Goal: Verbalizes/displays adequate comfort level or baseline comfort level  8/7/2023 2325 by Hemal Jeter LPN  Outcome: Progressing  8/7/2023 1045 by Rometta Oppenheim, RN  Outcome: Progressing

## 2023-08-09 LAB
ALBUMIN SERPL-MCNC: 2.9 GM/DL (ref 3.4–5)
ALP BLD-CCNC: 56 IU/L (ref 40–129)
ALT SERPL-CCNC: <5 U/L (ref 7–35)
ANION GAP SERPL CALCULATED.3IONS-SCNC: 6 MMOL/L (ref 4–16)
AST SERPL-CCNC: 9 IU/L (ref 15–37)
BASOPHILS ABSOLUTE: 0 K/CU MM
BASOPHILS RELATIVE PERCENT: 0.5 % (ref 0–1)
BILIRUB SERPL-MCNC: 0.4 MG/DL (ref 0–1)
BUN SERPL-MCNC: 21 MG/DL (ref 6–23)
CALCIUM SERPL-MCNC: 8.8 MG/DL (ref 8.3–10.6)
CHLORIDE BLD-SCNC: 99 MMOL/L (ref 99–110)
CO2: 31 MMOL/L (ref 21–32)
CREAT SERPL-MCNC: 0.6 MG/DL (ref 0.6–1.1)
DIFFERENTIAL TYPE: ABNORMAL
EKG ATRIAL RATE: 75 BPM
EKG DIAGNOSIS: NORMAL
EKG P AXIS: 55 DEGREES
EKG P-R INTERVAL: 152 MS
EKG Q-T INTERVAL: 394 MS
EKG QRS DURATION: 96 MS
EKG QTC CALCULATION (BAZETT): 439 MS
EKG R AXIS: 28 DEGREES
EKG T AXIS: 25 DEGREES
EKG VENTRICULAR RATE: 75 BPM
EOSINOPHILS ABSOLUTE: 0.2 K/CU MM
EOSINOPHILS RELATIVE PERCENT: 2.7 % (ref 0–3)
GFR SERPL CREATININE-BSD FRML MDRD: >60 ML/MIN/1.73M2
GLUCOSE BLD-MCNC: 111 MG/DL (ref 70–99)
GLUCOSE BLD-MCNC: 121 MG/DL (ref 70–99)
GLUCOSE BLD-MCNC: 129 MG/DL (ref 70–99)
GLUCOSE BLD-MCNC: 167 MG/DL (ref 70–99)
GLUCOSE SERPL-MCNC: 119 MG/DL (ref 70–99)
HCT VFR BLD CALC: 31.8 % (ref 37–47)
HEMOGLOBIN: 8.9 GM/DL (ref 12.5–16)
IMMATURE NEUTROPHIL %: 0.6 % (ref 0–0.43)
LYMPHOCYTES ABSOLUTE: 1.4 K/CU MM
LYMPHOCYTES RELATIVE PERCENT: 17.5 % (ref 24–44)
MCH RBC QN AUTO: 24.5 PG (ref 27–31)
MCHC RBC AUTO-ENTMCNC: 28 % (ref 32–36)
MCV RBC AUTO: 87.4 FL (ref 78–100)
MONOCYTES ABSOLUTE: 2 K/CU MM
MONOCYTES RELATIVE PERCENT: 24.9 % (ref 0–4)
PDW BLD-RTO: 14.9 % (ref 11.7–14.9)
PLATELET # BLD: 170 K/CU MM (ref 140–440)
PMV BLD AUTO: 9.8 FL (ref 7.5–11.1)
POTASSIUM SERPL-SCNC: 4.3 MMOL/L (ref 3.5–5.1)
PRO-BNP: 795.6 PG/ML
RBC # BLD: 3.64 M/CU MM (ref 4.2–5.4)
SEGMENTED NEUTROPHILS ABSOLUTE COUNT: 4.4 K/CU MM
SEGMENTED NEUTROPHILS RELATIVE PERCENT: 53.8 % (ref 36–66)
SODIUM BLD-SCNC: 136 MMOL/L (ref 135–145)
TOTAL IMMATURE NEUTOROPHIL: 0.05 K/CU MM
TOTAL PROTEIN: 5.9 GM/DL (ref 6.4–8.2)
TROPONIN T: <0.01 NG/ML
WBC # BLD: 8.1 K/CU MM (ref 4–10.5)

## 2023-08-09 PROCEDURE — 97535 SELF CARE MNGMENT TRAINING: CPT

## 2023-08-09 PROCEDURE — 82962 GLUCOSE BLOOD TEST: CPT

## 2023-08-09 PROCEDURE — 1200000000 HC SEMI PRIVATE

## 2023-08-09 PROCEDURE — 84484 ASSAY OF TROPONIN QUANT: CPT

## 2023-08-09 PROCEDURE — 97116 GAIT TRAINING THERAPY: CPT

## 2023-08-09 PROCEDURE — 94761 N-INVAS EAR/PLS OXIMETRY MLT: CPT

## 2023-08-09 PROCEDURE — 83880 ASSAY OF NATRIURETIC PEPTIDE: CPT

## 2023-08-09 PROCEDURE — 85025 COMPLETE CBC W/AUTO DIFF WBC: CPT

## 2023-08-09 PROCEDURE — 93005 ELECTROCARDIOGRAM TRACING: CPT | Performed by: FAMILY MEDICINE

## 2023-08-09 PROCEDURE — 6370000000 HC RX 637 (ALT 250 FOR IP): Performed by: NURSE PRACTITIONER

## 2023-08-09 PROCEDURE — 97530 THERAPEUTIC ACTIVITIES: CPT

## 2023-08-09 PROCEDURE — 2700000000 HC OXYGEN THERAPY PER DAY

## 2023-08-09 PROCEDURE — 2580000003 HC RX 258: Performed by: NURSE PRACTITIONER

## 2023-08-09 PROCEDURE — 2500000003 HC RX 250 WO HCPCS: Performed by: NURSE PRACTITIONER

## 2023-08-09 PROCEDURE — 93010 ELECTROCARDIOGRAM REPORT: CPT | Performed by: INTERNAL MEDICINE

## 2023-08-09 PROCEDURE — 94640 AIRWAY INHALATION TREATMENT: CPT

## 2023-08-09 PROCEDURE — 6370000000 HC RX 637 (ALT 250 FOR IP): Performed by: INTERNAL MEDICINE

## 2023-08-09 PROCEDURE — 80053 COMPREHEN METABOLIC PANEL: CPT

## 2023-08-09 RX ORDER — CYCLOBENZAPRINE HCL 10 MG
10 TABLET ORAL 3 TIMES DAILY PRN
Status: DISCONTINUED | OUTPATIENT
Start: 2023-08-09 | End: 2023-08-10 | Stop reason: HOSPADM

## 2023-08-09 RX ORDER — LIDOCAINE 4 G/G
1 PATCH TOPICAL DAILY
Status: DISCONTINUED | OUTPATIENT
Start: 2023-08-09 | End: 2023-08-10 | Stop reason: HOSPADM

## 2023-08-09 RX ORDER — SENNA AND DOCUSATE SODIUM 50; 8.6 MG/1; MG/1
2 TABLET, FILM COATED ORAL DAILY
Status: DISCONTINUED | OUTPATIENT
Start: 2023-08-09 | End: 2023-08-10 | Stop reason: HOSPADM

## 2023-08-09 RX ADMIN — APIXABAN 5 MG: 5 TABLET, FILM COATED ORAL at 20:32

## 2023-08-09 RX ADMIN — MOMETASONE FUROATE AND FORMOTEROL FUMARATE DIHYDRATE 2 PUFF: 100; 5 AEROSOL RESPIRATORY (INHALATION) at 20:46

## 2023-08-09 RX ADMIN — MOMETASONE FUROATE AND FORMOTEROL FUMARATE DIHYDRATE 2 PUFF: 100; 5 AEROSOL RESPIRATORY (INHALATION) at 07:38

## 2023-08-09 RX ADMIN — IPRATROPIUM BROMIDE AND ALBUTEROL SULFATE 1 DOSE: .5; 2.5 SOLUTION RESPIRATORY (INHALATION) at 20:39

## 2023-08-09 RX ADMIN — HYDROXYCHLOROQUINE SULFATE 200 MG: 200 TABLET ORAL at 10:10

## 2023-08-09 RX ADMIN — ROPINIROLE 2 MG: 2 TABLET, FILM COATED ORAL at 10:10

## 2023-08-09 RX ADMIN — GABAPENTIN 300 MG: 300 CAPSULE ORAL at 10:10

## 2023-08-09 RX ADMIN — GABAPENTIN 600 MG: 600 TABLET, FILM COATED ORAL at 16:16

## 2023-08-09 RX ADMIN — APIXABAN 5 MG: 5 TABLET, FILM COATED ORAL at 10:10

## 2023-08-09 RX ADMIN — DICLOFENAC SODIUM 2 G: 10 GEL TOPICAL at 12:04

## 2023-08-09 RX ADMIN — MICONAZOLE NITRATE: 2 POWDER TOPICAL at 10:12

## 2023-08-09 RX ADMIN — SODIUM CHLORIDE, PRESERVATIVE FREE 10 ML: 5 INJECTION INTRAVENOUS at 20:33

## 2023-08-09 RX ADMIN — PANTOPRAZOLE SODIUM 40 MG: 40 TABLET, DELAYED RELEASE ORAL at 05:46

## 2023-08-09 RX ADMIN — IPRATROPIUM BROMIDE AND ALBUTEROL SULFATE 1 DOSE: .5; 2.5 SOLUTION RESPIRATORY (INHALATION) at 15:30

## 2023-08-09 RX ADMIN — MICONAZOLE NITRATE: 2 POWDER TOPICAL at 20:32

## 2023-08-09 RX ADMIN — ACETAMINOPHEN 650 MG: 325 TABLET ORAL at 20:40

## 2023-08-09 RX ADMIN — DICLOFENAC SODIUM 2 G: 10 GEL TOPICAL at 20:32

## 2023-08-09 RX ADMIN — TORSEMIDE 20 MG: 20 TABLET ORAL at 10:10

## 2023-08-09 RX ADMIN — IPRATROPIUM BROMIDE AND ALBUTEROL SULFATE 1 DOSE: .5; 2.5 SOLUTION RESPIRATORY (INHALATION) at 03:50

## 2023-08-09 RX ADMIN — SENNOSIDES AND DOCUSATE SODIUM 2 TABLET: 50; 8.6 TABLET ORAL at 17:19

## 2023-08-09 RX ADMIN — ROPINIROLE 2 MG: 2 TABLET, FILM COATED ORAL at 16:16

## 2023-08-09 RX ADMIN — GABAPENTIN 300 MG: 300 CAPSULE ORAL at 12:05

## 2023-08-09 RX ADMIN — SODIUM CHLORIDE, PRESERVATIVE FREE 10 ML: 5 INJECTION INTRAVENOUS at 10:12

## 2023-08-09 RX ADMIN — IPRATROPIUM BROMIDE AND ALBUTEROL SULFATE 1 DOSE: .5; 2.5 SOLUTION RESPIRATORY (INHALATION) at 07:39

## 2023-08-09 RX ADMIN — ROPINIROLE 2 MG: 2 TABLET, FILM COATED ORAL at 20:32

## 2023-08-09 RX ADMIN — CHOLECALCIFEROL (VITAMIN D3) 10 MCG (400 UNIT) TABLET 400 UNITS: at 10:11

## 2023-08-09 RX ADMIN — LOSARTAN POTASSIUM 25 MG: 25 TABLET, FILM COATED ORAL at 10:10

## 2023-08-09 RX ADMIN — ACETAMINOPHEN 650 MG: 325 TABLET ORAL at 02:54

## 2023-08-09 RX ADMIN — ROSUVASTATIN CALCIUM 10 MG: 5 TABLET, FILM COATED ORAL at 20:32

## 2023-08-09 RX ADMIN — IPRATROPIUM BROMIDE AND ALBUTEROL SULFATE 1 DOSE: .5; 2.5 SOLUTION RESPIRATORY (INHALATION) at 11:26

## 2023-08-09 RX ADMIN — METOPROLOL SUCCINATE 50 MG: 50 TABLET, EXTENDED RELEASE ORAL at 10:11

## 2023-08-09 ASSESSMENT — PAIN DESCRIPTION - ORIENTATION
ORIENTATION: LEFT;MID
ORIENTATION: LOWER
ORIENTATION: MID

## 2023-08-09 ASSESSMENT — PAIN SCALES - GENERAL
PAINLEVEL_OUTOF10: 0
PAINLEVEL_OUTOF10: 3
PAINLEVEL_OUTOF10: 0
PAINLEVEL_OUTOF10: 3
PAINLEVEL_OUTOF10: 9

## 2023-08-09 ASSESSMENT — PAIN DESCRIPTION - ONSET
ONSET: ON-GOING
ONSET: GRADUAL
ONSET: ON-GOING

## 2023-08-09 ASSESSMENT — PAIN DESCRIPTION - DESCRIPTORS
DESCRIPTORS: ACHING

## 2023-08-09 ASSESSMENT — PAIN DESCRIPTION - PAIN TYPE
TYPE: ACUTE PAIN

## 2023-08-09 ASSESSMENT — PAIN - FUNCTIONAL ASSESSMENT
PAIN_FUNCTIONAL_ASSESSMENT: ACTIVITIES ARE NOT PREVENTED

## 2023-08-09 ASSESSMENT — PAIN DESCRIPTION - FREQUENCY
FREQUENCY: INTERMITTENT
FREQUENCY: CONTINUOUS
FREQUENCY: INTERMITTENT

## 2023-08-09 ASSESSMENT — PAIN DESCRIPTION - LOCATION
LOCATION: CHEST
LOCATION: HEAD
LOCATION: BACK

## 2023-08-09 NOTE — FLOWSHEET NOTE
Physical Therapy Daily Treatment Note   Date: 2023 Room: [address] [place] [address]    Name: Carrie Clarke : 1935   MRN: [id number] Admission Date:2023      Restrictions/Precautions: fall risk, O2    Initial Pain level: not reported/10    Subjective/Observation: patient sitting EOB with call light on, states she needs to use the restroom. On 3L O2    Communication with other providers: co treat with OT for patient tolerance and safety      Therapeutic Activities/Neuro Re-Education/Gait Training   Date: 23   Date:  Date:  Date:    Bed   Mobility   x          STS   Transfer CGA from EOB, Min A from w/c        Stand   Pivot   Transfer   x        Commode Transfer Mod A from low commode         Sitting  Balance   Good        Standing Balance   CGA for amb and donning pants/brief         Ambulation 25+20+50ft with RW and CGA + A for O2 management and w/c follow. O2 dropped to 88% with quick recovery to 92-94% with seated rest        Stairs   x          Therapeutic Exercises   Date:    Date:  Date:  Date:                     Education provided:       Treatment/Activity Tolerance:   [x] Patient limited by fatigue   [] Patient limited by pain   [] Patient limited by other medical complications   [] Patient tolerated therapy well  [x] Other: SOB    Safety Precautions:     [] Left in bed    [x] Left in chair   [x] Call light within reach    [] Bed alarm on    [x] Personal alarm on    [] Other staff present:  [] Family/Caregiver present:      Post Tx Pain Rating:  not rated/10       Evaluation Assessment  23  Assessment: Pt is a pleasant 80 yo female admitted for 2 falls at home and decreased O2 sat and bp in ED. Pt would benefit from skilled PT to address decreased ROM, strength, balance, and functional mobility.     Discharge Recommendations:  Continue to assess pending progress, Subacute/Skilled Nursing Facility, 24 hour supervision or assist (swing bed when O2 better controlled.)        Social/Functional

## 2023-08-09 NOTE — PROGRESS NOTES
V2.0    [facility] Progress Note      Name:  Hilaria Goodpasture /Age/Sex: 1935  (81 y.o. female)   MRN & CSN:  [id number] & [id number] Encounter Date/Time: 2023 4:02 PM EDT   Location:  [facility]045-22 PCP: Constance Nunez MD     Attending:Mariusz Miranda MD       Hospital Day: 5    Assessment and Recommendations   Hilaria Goodpasture is a 81 y.o. female  who presents with Acute respiratory failure with hypoxia ([address])    A-fib RVR-new onset  Resolved-current telemetry: Sinus rhythm  Received IV metoprolol x2, IV Cardizem x1  Cardiology consulted and will need close outpatient follow-up  Continue p.o. Toprol-XL 50 mg daily  [id number] Score: 7  Eliquis 5 mg twice daily recommend cardiology until outpatient follow-up  poor candidate for anticoagulation due to frequent falls  Disposition: Hopefully will flip to swing bed program tomorrow. Did well with therapy and more independent with ADLs    Acute HFpEF  Pulmonary hypertension  Concern evolving acute on chronic heart failure due to refusing diuretics  TTE () EF 55 to 60%, mild MR, moderate TR, RVSP 55 mmHg  Continue torsemide 20 mg daily  Monitor for clinical symptoms of hypervolemia  Strict I&O's/weights (appears to be down 20 pounds?)    Acute respiratory failure with hypoxia 2/2 multifactorial etiologies noted above  Continue supplemental oxygen and wean as tolerated. Continue bronchodilators/pulmonary hygiene  Hypoxic episode documented with therapy that resolved quickly with rest   Recurrent falls  Generalized weakness  Attributed to hypotension/polypharmacy  PT OT consulted  Swing bed referral pending    Anemia, Chronic  H/H 9.3/33.2 appears overall stable  Normocytic hypochromic  Follows with heme-onc    DMII with chronic complications and with long term use of insulin.   Last A1c 6.3 (2023)  Monitor FSBS and cover with SSI  Hypoglycemia protocol     Venous insufficiency of both lower extremities, venous stasis ulcer of the right calf,   follows at

## 2023-08-09 NOTE — PROGRESS NOTES
Occupational Therapy    Occupational Therapy Treatment Note  Name: Be Carter MRN: [id number] :   1935   Date:  2023   Admission Date: 2023 Room:  [address] [place] [address]     Primary Problem:  Pt is an 80 yo female admitted to [facility] with Acute respiratory failure with hypoxia. Pt presents with increased SOB upon exertion, decreased strength and endurance and ADL status. Recommend OT to improve strength, endurance, functional mobility and to maximize indep with ADLs using AE and education on energy conservation. Restrictions/Precautions:  Restrictions/Precautions  Restrictions/Precautions: Fall Risk     Communication with other providers:   Cleared for treatment by RN, co-treat PT for functional mobility safety    Subjective:  Patient states:  \"I need to get up to the bathroom\"  Pain:   Location, Type, Intensity (0/10 to 10/10):  no pain reported    Objective:    Observation:  Pt awake sitting up at EOB  Objective Measures:  Pt seen for functional mobility, ADL training    Treatment, including education:  Transfers  Scooting :Stand By Assistance  Sit to stand :Contact Guard Assistance from EOB  Stand to sit :Stand By [address] sit to stand from low toilet with 1 rail first time, Minimal assistance 2nd trail       Self Care Training:   Activities performed today included the following: Toileting  Stand By Assistance 2 trials. Pt able to perform hubert care and pull up/down brief    UB Dress  Stand By Assistance to doff/bertha shirt    LB Dress  Minimal Assistance to thread B feet through depends seated on toilet. Pt was able to thread through shorts with SBA seated EOB      Therapeutic Activity Training:   Pt trf from EOB over to toilet ~5ft for toileting. Following toileting Pt trf back to EOB to sit and rest then changed from gown into clothing. Pt amb around bed out into hallway and amb ~25' then leaned against chair rail and wall, Pt then sat down in WC to rest a few min. Assessed O2 level and was at 88% on 3L of O2. O2 level irecovered to 94% as Pt sat. Pt then continued to amb 28', 30', 79'  with RW, CGA , back to room and trf to toilet. Following toileting Pt trf to chair with RW, CGA. Pt tolerated well requiring frequent rest breaks and cues for PLB and energy conservation. Safety Measures: Gait belt used, Left in bed/up in the recliner, Pull/Bed Alarm activated and call light left in reach      Assessment / Impression:        Patient's tolerance of treatment: Good   Adverse Reaction: None  Significant change in status and impact:  None  Barriers to improvement:  Dec strength and endurance    Plan for Next Session:    Continue with POC. Time in:  1043  Time out:  1118  Total treatment time:  35  Billed Units: 1 ADL, 1 TA  Electronically signed by:    Saúl Graf [id number]    8/9/2023, 11:55 AM    Previously filed values:  Patient Goals   Patient goals :  To return home  Short Term Goals  Time Frame for Short Term Goals: Until DC or goals met  Short Term Goal 1: Pt will complete functional trfs mod I w/o LOB  Short Term Goal 2: Pt will complete UE bathing/dressing with sup  Short Term Goal 3: Pt will complete LE bathing/dressing with sup  Short Term Goal 4: Pt will complete toileting mod I  Short Term Goal 5: Pt will complete 10+ min of BUE therex to increase strength/endurance for trfs/ADLs

## 2023-08-10 ENCOUNTER — HOSPITAL ENCOUNTER (INPATIENT)
Age: 88
LOS: 10 days | Discharge: HOME | DRG: 948 | End: 2023-08-20
Attending: FAMILY MEDICINE | Admitting: FAMILY MEDICINE
Payer: MEDICARE

## 2023-08-10 VITALS
BODY MASS INDEX: 39.21 KG/M2 | HEIGHT: 64 IN | WEIGHT: 229.7 LBS | SYSTOLIC BLOOD PRESSURE: 124 MMHG | RESPIRATION RATE: 18 BRPM | HEART RATE: 67 BPM | TEMPERATURE: 97.5 F | DIASTOLIC BLOOD PRESSURE: 56 MMHG | OXYGEN SATURATION: 100 %

## 2023-08-10 PROBLEM — R53.81 PHYSICAL DEBILITY: Status: ACTIVE | Noted: 2023-08-10

## 2023-08-10 LAB
ANION GAP SERPL CALCULATED.3IONS-SCNC: 5 MMOL/L (ref 4–16)
BUN SERPL-MCNC: 17 MG/DL (ref 6–23)
CALCIUM SERPL-MCNC: 8.9 MG/DL (ref 8.3–10.6)
CHLORIDE BLD-SCNC: 98 MMOL/L (ref 99–110)
CO2: 37 MMOL/L (ref 21–32)
CREAT SERPL-MCNC: 0.6 MG/DL (ref 0.6–1.1)
CULTURE: NORMAL
CULTURE: NORMAL
GFR SERPL CREATININE-BSD FRML MDRD: >60 ML/MIN/1.73M2
GLUCOSE BLD-MCNC: 122 MG/DL (ref 70–99)
GLUCOSE BLD-MCNC: 134 MG/DL (ref 70–99)
GLUCOSE BLD-MCNC: 137 MG/DL (ref 70–99)
GLUCOSE BLD-MCNC: 97 MG/DL (ref 70–99)
GLUCOSE SERPL-MCNC: 101 MG/DL (ref 70–99)
Lab: NORMAL
Lab: NORMAL
POTASSIUM SERPL-SCNC: 3.9 MMOL/L (ref 3.5–5.1)
SODIUM BLD-SCNC: 140 MMOL/L (ref 135–145)
SPECIMEN: NORMAL
SPECIMEN: NORMAL

## 2023-08-10 PROCEDURE — 82962 GLUCOSE BLOOD TEST: CPT

## 2023-08-10 PROCEDURE — 97162 PT EVAL MOD COMPLEX 30 MIN: CPT

## 2023-08-10 PROCEDURE — 97535 SELF CARE MNGMENT TRAINING: CPT

## 2023-08-10 PROCEDURE — 1200000002 HC SEMI PRIVATE SWING BED

## 2023-08-10 PROCEDURE — 36415 COLL VENOUS BLD VENIPUNCTURE: CPT

## 2023-08-10 PROCEDURE — 80048 BASIC METABOLIC PNL TOTAL CA: CPT

## 2023-08-10 PROCEDURE — 6370000000 HC RX 637 (ALT 250 FOR IP): Performed by: NURSE PRACTITIONER

## 2023-08-10 PROCEDURE — 2700000000 HC OXYGEN THERAPY PER DAY

## 2023-08-10 PROCEDURE — 97116 GAIT TRAINING THERAPY: CPT

## 2023-08-10 PROCEDURE — 94761 N-INVAS EAR/PLS OXIMETRY MLT: CPT

## 2023-08-10 PROCEDURE — 2500000003 HC RX 250 WO HCPCS: Performed by: NURSE PRACTITIONER

## 2023-08-10 PROCEDURE — 97530 THERAPEUTIC ACTIVITIES: CPT

## 2023-08-10 PROCEDURE — 94640 AIRWAY INHALATION TREATMENT: CPT

## 2023-08-10 PROCEDURE — 2580000003 HC RX 258: Performed by: NURSE PRACTITIONER

## 2023-08-10 PROCEDURE — 6370000000 HC RX 637 (ALT 250 FOR IP): Performed by: INTERNAL MEDICINE

## 2023-08-10 RX ORDER — LOSARTAN POTASSIUM 25 MG/1
25 TABLET ORAL DAILY
Status: CANCELLED | OUTPATIENT
Start: 2023-08-11

## 2023-08-10 RX ORDER — INSULIN LISPRO 100 [IU]/ML
0-8 INJECTION, SOLUTION INTRAVENOUS; SUBCUTANEOUS
Status: CANCELLED | OUTPATIENT
Start: 2023-08-10

## 2023-08-10 RX ORDER — POLYVINYL ALCOHOL 14 MG/ML
1 SOLUTION/ DROPS OPHTHALMIC EVERY 4 HOURS
Status: CANCELLED | OUTPATIENT
Start: 2023-08-10

## 2023-08-10 RX ORDER — NALOXONE HYDROCHLORIDE 0.4 MG/ML
0.2 INJECTION, SOLUTION INTRAMUSCULAR; INTRAVENOUS; SUBCUTANEOUS PRN
Status: CANCELLED | OUTPATIENT
Start: 2023-08-10

## 2023-08-10 RX ORDER — GABAPENTIN 600 MG/1
600 TABLET ORAL EVERY EVENING
Status: CANCELLED | OUTPATIENT
Start: 2023-08-10

## 2023-08-10 RX ORDER — INSULIN LISPRO 100 [IU]/ML
0-8 INJECTION, SOLUTION INTRAVENOUS; SUBCUTANEOUS
Status: DISCONTINUED | OUTPATIENT
Start: 2023-08-10 | End: 2023-08-20 | Stop reason: HOSPADM

## 2023-08-10 RX ORDER — ACETAMINOPHEN 325 MG/1
650 TABLET ORAL EVERY 6 HOURS PRN
Status: CANCELLED | OUTPATIENT
Start: 2023-08-10

## 2023-08-10 RX ORDER — ACETAMINOPHEN 650 MG/1
650 SUPPOSITORY RECTAL EVERY 6 HOURS PRN
Status: CANCELLED | OUTPATIENT
Start: 2023-08-10

## 2023-08-10 RX ORDER — MAGNESIUM HYDROXIDE/ALUMINUM HYDROXICE/SIMETHICONE 120; 1200; 1200 MG/30ML; MG/30ML; MG/30ML
30 SUSPENSION ORAL EVERY 6 HOURS PRN
Status: CANCELLED | OUTPATIENT
Start: 2023-08-10

## 2023-08-10 RX ORDER — GABAPENTIN 300 MG/1
300 CAPSULE ORAL
Status: CANCELLED | OUTPATIENT
Start: 2023-08-10

## 2023-08-10 RX ORDER — ROSUVASTATIN CALCIUM 5 MG/1
10 TABLET, COATED ORAL NIGHTLY
Status: CANCELLED | OUTPATIENT
Start: 2023-08-10

## 2023-08-10 RX ORDER — ONDANSETRON 2 MG/ML
4 INJECTION INTRAMUSCULAR; INTRAVENOUS EVERY 6 HOURS PRN
Status: CANCELLED | OUTPATIENT
Start: 2023-08-10

## 2023-08-10 RX ORDER — GABAPENTIN 300 MG/1
300 CAPSULE ORAL EVERY MORNING
Status: DISCONTINUED | OUTPATIENT
Start: 2023-08-11 | End: 2023-08-20 | Stop reason: HOSPADM

## 2023-08-10 RX ORDER — ACETAMINOPHEN 325 MG/1
650 TABLET ORAL EVERY 6 HOURS PRN
Status: DISCONTINUED | OUTPATIENT
Start: 2023-08-10 | End: 2023-08-20 | Stop reason: HOSPADM

## 2023-08-10 RX ORDER — SENNA AND DOCUSATE SODIUM 50; 8.6 MG/1; MG/1
2 TABLET, FILM COATED ORAL DAILY
Status: CANCELLED | OUTPATIENT
Start: 2023-08-11

## 2023-08-10 RX ORDER — HYDROCODONE BITARTRATE AND ACETAMINOPHEN 5; 325 MG/1; MG/1
1 TABLET ORAL EVERY 6 HOURS PRN
Status: DISCONTINUED | OUTPATIENT
Start: 2023-08-10 | End: 2023-08-20 | Stop reason: HOSPADM

## 2023-08-10 RX ORDER — OMEGA-3S/DHA/EPA/FISH OIL/D3 300MG-1000
400 CAPSULE ORAL DAILY
Status: DISCONTINUED | OUTPATIENT
Start: 2023-08-11 | End: 2023-08-20 | Stop reason: HOSPADM

## 2023-08-10 RX ORDER — ROPINIROLE 2 MG/1
2 TABLET, FILM COATED ORAL 3 TIMES DAILY
Status: DISCONTINUED | OUTPATIENT
Start: 2023-08-10 | End: 2023-08-20 | Stop reason: HOSPADM

## 2023-08-10 RX ORDER — ONDANSETRON 2 MG/ML
4 INJECTION INTRAMUSCULAR; INTRAVENOUS EVERY 6 HOURS PRN
Status: DISCONTINUED | OUTPATIENT
Start: 2023-08-10 | End: 2023-08-20 | Stop reason: HOSPADM

## 2023-08-10 RX ORDER — LOSARTAN POTASSIUM 25 MG/1
25 TABLET ORAL DAILY
Status: DISCONTINUED | OUTPATIENT
Start: 2023-08-11 | End: 2023-08-20 | Stop reason: HOSPADM

## 2023-08-10 RX ORDER — ACETAMINOPHEN 650 MG/1
650 SUPPOSITORY RECTAL EVERY 6 HOURS PRN
Status: DISCONTINUED | OUTPATIENT
Start: 2023-08-10 | End: 2023-08-20 | Stop reason: HOSPADM

## 2023-08-10 RX ORDER — SENNA AND DOCUSATE SODIUM 50; 8.6 MG/1; MG/1
2 TABLET, FILM COATED ORAL DAILY
Status: DISCONTINUED | OUTPATIENT
Start: 2023-08-11 | End: 2023-08-20 | Stop reason: HOSPADM

## 2023-08-10 RX ORDER — LIDOCAINE 4 G/G
1 PATCH TOPICAL DAILY
Status: DISCONTINUED | OUTPATIENT
Start: 2023-08-11 | End: 2023-08-20 | Stop reason: HOSPADM

## 2023-08-10 RX ORDER — CYCLOBENZAPRINE HCL 10 MG
10 TABLET ORAL 3 TIMES DAILY PRN
Status: CANCELLED | OUTPATIENT
Start: 2023-08-10

## 2023-08-10 RX ORDER — GABAPENTIN 300 MG/1
300 CAPSULE ORAL EVERY MORNING
Status: CANCELLED | OUTPATIENT
Start: 2023-08-11

## 2023-08-10 RX ORDER — PANTOPRAZOLE SODIUM 40 MG/1
40 TABLET, DELAYED RELEASE ORAL
Status: DISCONTINUED | OUTPATIENT
Start: 2023-08-11 | End: 2023-08-20 | Stop reason: HOSPADM

## 2023-08-10 RX ORDER — HYDROCODONE BITARTRATE AND ACETAMINOPHEN 5; 325 MG/1; MG/1
1 TABLET ORAL EVERY 6 HOURS PRN
Status: CANCELLED | OUTPATIENT
Start: 2023-08-10

## 2023-08-10 RX ORDER — IPRATROPIUM BROMIDE AND ALBUTEROL SULFATE 2.5; .5 MG/3ML; MG/3ML
1 SOLUTION RESPIRATORY (INHALATION) EVERY 4 HOURS PRN
Status: DISCONTINUED | OUTPATIENT
Start: 2023-08-10 | End: 2023-08-20 | Stop reason: HOSPADM

## 2023-08-10 RX ORDER — GABAPENTIN 600 MG/1
600 TABLET ORAL EVERY EVENING
Status: DISCONTINUED | OUTPATIENT
Start: 2023-08-10 | End: 2023-08-20 | Stop reason: HOSPADM

## 2023-08-10 RX ORDER — XYLITOL/YERBA SANTA
AEROSOL, SPRAY WITH PUMP (ML) MUCOUS MEMBRANE
Status: DISCONTINUED | OUTPATIENT
Start: 2023-08-10 | End: 2023-08-20 | Stop reason: HOSPADM

## 2023-08-10 RX ORDER — POLYETHYLENE GLYCOL 3350 17 G/17G
17 POWDER, FOR SOLUTION ORAL DAILY PRN
Status: DISCONTINUED | OUTPATIENT
Start: 2023-08-10 | End: 2023-08-20 | Stop reason: HOSPADM

## 2023-08-10 RX ORDER — HYDROCODONE BITARTRATE AND ACETAMINOPHEN 7.5; 325 MG/1; MG/1
1 TABLET ORAL EVERY 6 HOURS PRN
Status: DISCONTINUED | OUTPATIENT
Start: 2023-08-10 | End: 2023-08-20 | Stop reason: HOSPADM

## 2023-08-10 RX ORDER — LIDOCAINE 4 G/G
1 PATCH TOPICAL DAILY
Status: CANCELLED | OUTPATIENT
Start: 2023-08-11

## 2023-08-10 RX ORDER — ROPINIROLE 2 MG/1
2 TABLET, FILM COATED ORAL 3 TIMES DAILY
Status: CANCELLED | OUTPATIENT
Start: 2023-08-10

## 2023-08-10 RX ORDER — METOPROLOL SUCCINATE 50 MG/1
50 TABLET, EXTENDED RELEASE ORAL DAILY
Status: CANCELLED | OUTPATIENT
Start: 2023-08-11

## 2023-08-10 RX ORDER — OMEGA-3S/DHA/EPA/FISH OIL/D3 300MG-1000
400 CAPSULE ORAL DAILY
Status: CANCELLED | OUTPATIENT
Start: 2023-08-11

## 2023-08-10 RX ORDER — HYDROXYCHLOROQUINE SULFATE 200 MG/1
200 TABLET, FILM COATED ORAL DAILY
Status: DISCONTINUED | OUTPATIENT
Start: 2023-08-11 | End: 2023-08-20 | Stop reason: HOSPADM

## 2023-08-10 RX ORDER — NALOXONE HYDROCHLORIDE 0.4 MG/ML
0.2 INJECTION, SOLUTION INTRAMUSCULAR; INTRAVENOUS; SUBCUTANEOUS PRN
Status: DISCONTINUED | OUTPATIENT
Start: 2023-08-10 | End: 2023-08-20 | Stop reason: HOSPADM

## 2023-08-10 RX ORDER — POLYVINYL ALCOHOL 14 MG/ML
1 SOLUTION/ DROPS OPHTHALMIC EVERY 4 HOURS
Status: DISCONTINUED | OUTPATIENT
Start: 2023-08-10 | End: 2023-08-20 | Stop reason: HOSPADM

## 2023-08-10 RX ORDER — INSULIN LISPRO 100 [IU]/ML
0-4 INJECTION, SOLUTION INTRAVENOUS; SUBCUTANEOUS NIGHTLY
Status: DISCONTINUED | OUTPATIENT
Start: 2023-08-10 | End: 2023-08-20 | Stop reason: HOSPADM

## 2023-08-10 RX ORDER — ONDANSETRON 4 MG/1
4 TABLET, ORALLY DISINTEGRATING ORAL EVERY 8 HOURS PRN
Status: CANCELLED | OUTPATIENT
Start: 2023-08-10

## 2023-08-10 RX ORDER — TORSEMIDE 20 MG/1
20 TABLET ORAL DAILY
Status: DISCONTINUED | OUTPATIENT
Start: 2023-08-11 | End: 2023-08-15

## 2023-08-10 RX ORDER — INSULIN LISPRO 100 [IU]/ML
0-4 INJECTION, SOLUTION INTRAVENOUS; SUBCUTANEOUS NIGHTLY
Status: CANCELLED | OUTPATIENT
Start: 2023-08-10

## 2023-08-10 RX ORDER — ALBUTEROL SULFATE 90 UG/1
2 AEROSOL, METERED RESPIRATORY (INHALATION) EVERY 4 HOURS PRN
Status: CANCELLED | OUTPATIENT
Start: 2023-08-10

## 2023-08-10 RX ORDER — HYDROXYCHLOROQUINE SULFATE 200 MG/1
200 TABLET, FILM COATED ORAL DAILY
Status: CANCELLED | OUTPATIENT
Start: 2023-08-11

## 2023-08-10 RX ORDER — MAGNESIUM HYDROXIDE/ALUMINUM HYDROXICE/SIMETHICONE 120; 1200; 1200 MG/30ML; MG/30ML; MG/30ML
30 SUSPENSION ORAL EVERY 6 HOURS PRN
Status: DISCONTINUED | OUTPATIENT
Start: 2023-08-10 | End: 2023-08-20 | Stop reason: HOSPADM

## 2023-08-10 RX ORDER — ROSUVASTATIN CALCIUM 5 MG/1
10 TABLET, COATED ORAL NIGHTLY
Status: DISCONTINUED | OUTPATIENT
Start: 2023-08-10 | End: 2023-08-20 | Stop reason: HOSPADM

## 2023-08-10 RX ORDER — GABAPENTIN 300 MG/1
300 CAPSULE ORAL
Status: DISCONTINUED | OUTPATIENT
Start: 2023-08-10 | End: 2023-08-20 | Stop reason: HOSPADM

## 2023-08-10 RX ORDER — ALBUTEROL SULFATE 90 UG/1
2 AEROSOL, METERED RESPIRATORY (INHALATION) EVERY 4 HOURS PRN
Status: DISCONTINUED | OUTPATIENT
Start: 2023-08-10 | End: 2023-08-20 | Stop reason: HOSPADM

## 2023-08-10 RX ORDER — POLYETHYLENE GLYCOL 3350 17 G/17G
17 POWDER, FOR SOLUTION ORAL DAILY PRN
Status: CANCELLED | OUTPATIENT
Start: 2023-08-10

## 2023-08-10 RX ORDER — PANTOPRAZOLE SODIUM 40 MG/1
40 TABLET, DELAYED RELEASE ORAL
Status: CANCELLED | OUTPATIENT
Start: 2023-08-11

## 2023-08-10 RX ORDER — IPRATROPIUM BROMIDE AND ALBUTEROL SULFATE 2.5; .5 MG/3ML; MG/3ML
1 SOLUTION RESPIRATORY (INHALATION) EVERY 4 HOURS PRN
Status: CANCELLED | OUTPATIENT
Start: 2023-08-10

## 2023-08-10 RX ORDER — CYCLOBENZAPRINE HCL 10 MG
10 TABLET ORAL 3 TIMES DAILY PRN
Status: DISCONTINUED | OUTPATIENT
Start: 2023-08-10 | End: 2023-08-20 | Stop reason: HOSPADM

## 2023-08-10 RX ORDER — POLYVINYL ALCOHOL 14 MG/ML
1 SOLUTION/ DROPS OPHTHALMIC EVERY 4 HOURS
Status: DISCONTINUED | OUTPATIENT
Start: 2023-08-10 | End: 2023-08-10 | Stop reason: HOSPADM

## 2023-08-10 RX ORDER — METOPROLOL SUCCINATE 50 MG/1
50 TABLET, EXTENDED RELEASE ORAL DAILY
Status: DISCONTINUED | OUTPATIENT
Start: 2023-08-11 | End: 2023-08-13

## 2023-08-10 RX ORDER — TORSEMIDE 20 MG/1
20 TABLET ORAL DAILY
Status: CANCELLED | OUTPATIENT
Start: 2023-08-11

## 2023-08-10 RX ORDER — HYDROCODONE BITARTRATE AND ACETAMINOPHEN 7.5; 325 MG/1; MG/1
1 TABLET ORAL EVERY 6 HOURS PRN
Status: CANCELLED | OUTPATIENT
Start: 2023-08-10

## 2023-08-10 RX ORDER — XYLITOL/YERBA SANTA
AEROSOL, SPRAY WITH PUMP (ML) MUCOUS MEMBRANE PRN
Status: CANCELLED | OUTPATIENT
Start: 2023-08-10

## 2023-08-10 RX ORDER — ONDANSETRON 4 MG/1
4 TABLET, ORALLY DISINTEGRATING ORAL EVERY 8 HOURS PRN
Status: DISCONTINUED | OUTPATIENT
Start: 2023-08-10 | End: 2023-08-20 | Stop reason: HOSPADM

## 2023-08-10 RX ADMIN — APIXABAN 5 MG: 5 TABLET, FILM COATED ORAL at 21:01

## 2023-08-10 RX ADMIN — METOPROLOL SUCCINATE 50 MG: 50 TABLET, EXTENDED RELEASE ORAL at 08:22

## 2023-08-10 RX ADMIN — GABAPENTIN 300 MG: 300 CAPSULE ORAL at 14:10

## 2023-08-10 RX ADMIN — IPRATROPIUM BROMIDE AND ALBUTEROL SULFATE 1 DOSE: .5; 2.5 SOLUTION RESPIRATORY (INHALATION) at 07:35

## 2023-08-10 RX ADMIN — MICONAZOLE NITRATE: 2 POWDER TOPICAL at 21:01

## 2023-08-10 RX ADMIN — CHOLECALCIFEROL (VITAMIN D3) 10 MCG (400 UNIT) TABLET 400 UNITS: at 08:22

## 2023-08-10 RX ADMIN — ACETAMINOPHEN 650 MG: 325 TABLET ORAL at 14:08

## 2023-08-10 RX ADMIN — APIXABAN 5 MG: 5 TABLET, FILM COATED ORAL at 08:22

## 2023-08-10 RX ADMIN — SODIUM CHLORIDE, PRESERVATIVE FREE 10 ML: 5 INJECTION INTRAVENOUS at 08:24

## 2023-08-10 RX ADMIN — ROPINIROLE 2 MG: 2 TABLET, FILM COATED ORAL at 08:21

## 2023-08-10 RX ADMIN — PANTOPRAZOLE SODIUM 40 MG: 40 TABLET, DELAYED RELEASE ORAL at 05:16

## 2023-08-10 RX ADMIN — ROPINIROLE 2 MG: 2 TABLET, FILM COATED ORAL at 14:10

## 2023-08-10 RX ADMIN — LOSARTAN POTASSIUM 25 MG: 25 TABLET, FILM COATED ORAL at 08:22

## 2023-08-10 RX ADMIN — ROSUVASTATIN CALCIUM 10 MG: 5 TABLET, FILM COATED ORAL at 21:01

## 2023-08-10 RX ADMIN — POLYVINYL ALCOHOL 1 DROP: 14 SOLUTION/ DROPS OPHTHALMIC at 17:47

## 2023-08-10 RX ADMIN — SENNOSIDES AND DOCUSATE SODIUM 2 TABLET: 50; 8.6 TABLET ORAL at 08:22

## 2023-08-10 RX ADMIN — HYDROXYCHLOROQUINE SULFATE 200 MG: 200 TABLET ORAL at 08:22

## 2023-08-10 RX ADMIN — POLYVINYL ALCOHOL 1 DROP: 14 SOLUTION/ DROPS OPHTHALMIC at 21:00

## 2023-08-10 RX ADMIN — TORSEMIDE 20 MG: 20 TABLET ORAL at 08:22

## 2023-08-10 RX ADMIN — MOMETASONE FUROATE AND FORMOTEROL FUMARATE DIHYDRATE 2 PUFF: 100; 5 AEROSOL RESPIRATORY (INHALATION) at 07:35

## 2023-08-10 RX ADMIN — DICLOFENAC SODIUM 2 G: 10 GEL TOPICAL at 20:59

## 2023-08-10 RX ADMIN — MICONAZOLE NITRATE: 2 POWDER TOPICAL at 08:23

## 2023-08-10 RX ADMIN — MOMETASONE FUROATE AND FORMOTEROL FUMARATE DIHYDRATE 2 PUFF: 100; 5 AEROSOL RESPIRATORY (INHALATION) at 19:31

## 2023-08-10 RX ADMIN — DICLOFENAC SODIUM 2 G: 10 GEL TOPICAL at 08:22

## 2023-08-10 RX ADMIN — GABAPENTIN 300 MG: 300 CAPSULE ORAL at 08:22

## 2023-08-10 RX ADMIN — GABAPENTIN 600 MG: 600 TABLET, FILM COATED ORAL at 17:47

## 2023-08-10 RX ADMIN — POLYETHYLENE GLYCOL 3350 17 G: 17 POWDER, FOR SOLUTION ORAL at 08:23

## 2023-08-10 RX ADMIN — ROPINIROLE 2 MG: 2 TABLET, FILM COATED ORAL at 21:01

## 2023-08-10 RX ADMIN — ACETAMINOPHEN 650 MG: 325 TABLET ORAL at 05:16

## 2023-08-10 ASSESSMENT — PAIN DESCRIPTION - DESCRIPTORS
DESCRIPTORS: ACHING
DESCRIPTORS: ACHING

## 2023-08-10 ASSESSMENT — PAIN DESCRIPTION - LOCATION
LOCATION: RIB CAGE
LOCATION: LEG

## 2023-08-10 ASSESSMENT — PAIN DESCRIPTION - ORIENTATION
ORIENTATION: LEFT
ORIENTATION: RIGHT

## 2023-08-10 ASSESSMENT — PAIN SCALES - GENERAL
PAINLEVEL_OUTOF10: 3
PAINLEVEL_OUTOF10: 4
PAINLEVEL_OUTOF10: 8
PAINLEVEL_OUTOF10: 0
PAINLEVEL_OUTOF10: 1

## 2023-08-10 ASSESSMENT — PAIN DESCRIPTION - ONSET: ONSET: ON-GOING

## 2023-08-10 ASSESSMENT — PAIN DESCRIPTION - PAIN TYPE: TYPE: ACUTE PAIN

## 2023-08-10 ASSESSMENT — PAIN DESCRIPTION - FREQUENCY: FREQUENCY: CONTINUOUS

## 2023-08-10 NOTE — CARE COORDINATION
MIKY left a message for Kavya Harris at [facility] requesting that the patient's insurance pre-cert be initiated today. CM will follow. 10:16 AM  The patient's insurance pre-cert has been approved, the patient can now be discharge/readmitted to the swing bed program.  CM notified Kelly Alanis NP via [phone].

## 2023-08-10 NOTE — PLAN OF CARE
Problem: Discharge Planning  Goal: Discharge to home or other facility with appropriate resources  8/10/2023 0836 by Sarabjit Samaniego RN  Outcome: Progressing  8/9/2023 2230 by Bailey Sotelo LPN  Outcome: Progressing     Problem: Safety - Adult  Goal: Free from fall injury  8/10/2023 0836 by Sarabjit Samaniego RN  Outcome: Progressing  8/9/2023 2230 by Bailey Sotelo LPN  Outcome: Progressing     Problem: Skin/Tissue Integrity  Goal: Absence of new skin breakdown  Description: 1. Monitor for areas of redness and/or skin breakdown  2. Assess vascular access sites hourly  3. Every 4-6 hours minimum:  Change oxygen saturation probe site  4. Every 4-6 hours:  If on nasal continuous positive airway pressure, respiratory therapy assess nares and determine need for appliance change or resting period.   8/10/2023 0836 by Sarabjit Samaniego RN  Outcome: Progressing  8/9/2023 2230 by Bailey Sotelo LPN  Outcome: Progressing     Problem: ABCDS Injury Assessment  Goal: Absence of physical injury  8/10/2023 0836 by Sarabjit Samaniego RN  Outcome: Progressing  8/9/2023 2230 by Bailey Sotelo LPN  Outcome: Progressing     Problem: Pain  Goal: Verbalizes/displays adequate comfort level or baseline comfort level  8/10/2023 0836 by Sarabjit Samaniego RN  Outcome: Progressing  8/9/2023 2230 by Bailey Sotelo LPN  Outcome: Progressing

## 2023-08-11 LAB
GLUCOSE BLD-MCNC: 100 MG/DL (ref 70–99)
GLUCOSE BLD-MCNC: 113 MG/DL (ref 70–99)
GLUCOSE BLD-MCNC: 119 MG/DL (ref 70–99)
GLUCOSE BLD-MCNC: 128 MG/DL (ref 70–99)

## 2023-08-11 PROCEDURE — 94761 N-INVAS EAR/PLS OXIMETRY MLT: CPT

## 2023-08-11 PROCEDURE — 97110 THERAPEUTIC EXERCISES: CPT

## 2023-08-11 PROCEDURE — 6370000000 HC RX 637 (ALT 250 FOR IP): Performed by: NURSE PRACTITIONER

## 2023-08-11 PROCEDURE — 97535 SELF CARE MNGMENT TRAINING: CPT

## 2023-08-11 PROCEDURE — 2700000000 HC OXYGEN THERAPY PER DAY

## 2023-08-11 PROCEDURE — 97166 OT EVAL MOD COMPLEX 45 MIN: CPT

## 2023-08-11 PROCEDURE — 94640 AIRWAY INHALATION TREATMENT: CPT

## 2023-08-11 PROCEDURE — 1200000002 HC SEMI PRIVATE SWING BED

## 2023-08-11 PROCEDURE — 82962 GLUCOSE BLOOD TEST: CPT

## 2023-08-11 PROCEDURE — 97116 GAIT TRAINING THERAPY: CPT

## 2023-08-11 RX ORDER — LANOLIN ALCOHOL/MO/W.PET/CERES
6 CREAM (GRAM) TOPICAL NIGHTLY PRN
Status: DISCONTINUED | OUTPATIENT
Start: 2023-08-11 | End: 2023-08-20 | Stop reason: HOSPADM

## 2023-08-11 RX ADMIN — MOMETASONE FUROATE AND FORMOTEROL FUMARATE DIHYDRATE 2 PUFF: 100; 5 AEROSOL RESPIRATORY (INHALATION) at 19:26

## 2023-08-11 RX ADMIN — CHOLECALCIFEROL (VITAMIN D3) 10 MCG (400 UNIT) TABLET 400 UNITS: at 08:11

## 2023-08-11 RX ADMIN — TORSEMIDE 20 MG: 20 TABLET ORAL at 08:11

## 2023-08-11 RX ADMIN — ROSUVASTATIN CALCIUM 10 MG: 5 TABLET, FILM COATED ORAL at 21:28

## 2023-08-11 RX ADMIN — APIXABAN 5 MG: 5 TABLET, FILM COATED ORAL at 08:11

## 2023-08-11 RX ADMIN — Medication 6 MG: at 21:53

## 2023-08-11 RX ADMIN — POLYVINYL ALCOHOL 1 DROP: 14 SOLUTION/ DROPS OPHTHALMIC at 15:18

## 2023-08-11 RX ADMIN — POLYVINYL ALCOHOL 1 DROP: 14 SOLUTION/ DROPS OPHTHALMIC at 01:47

## 2023-08-11 RX ADMIN — MOMETASONE FUROATE AND FORMOTEROL FUMARATE DIHYDRATE 2 PUFF: 100; 5 AEROSOL RESPIRATORY (INHALATION) at 07:40

## 2023-08-11 RX ADMIN — ROPINIROLE 2 MG: 2 TABLET, FILM COATED ORAL at 21:28

## 2023-08-11 RX ADMIN — SENNOSIDES AND DOCUSATE SODIUM 2 TABLET: 50; 8.6 TABLET ORAL at 08:11

## 2023-08-11 RX ADMIN — GABAPENTIN 600 MG: 600 TABLET, FILM COATED ORAL at 17:55

## 2023-08-11 RX ADMIN — DICLOFENAC SODIUM 2 G: 10 GEL TOPICAL at 21:28

## 2023-08-11 RX ADMIN — APIXABAN 5 MG: 5 TABLET, FILM COATED ORAL at 21:28

## 2023-08-11 RX ADMIN — DICLOFENAC SODIUM 2 G: 10 GEL TOPICAL at 08:11

## 2023-08-11 RX ADMIN — GABAPENTIN 300 MG: 300 CAPSULE ORAL at 11:50

## 2023-08-11 RX ADMIN — METOPROLOL SUCCINATE 50 MG: 50 TABLET, EXTENDED RELEASE ORAL at 08:20

## 2023-08-11 RX ADMIN — ROPINIROLE 2 MG: 2 TABLET, FILM COATED ORAL at 15:17

## 2023-08-11 RX ADMIN — POLYVINYL ALCOHOL 1 DROP: 14 SOLUTION/ DROPS OPHTHALMIC at 17:55

## 2023-08-11 RX ADMIN — POLYVINYL ALCOHOL 1 DROP: 14 SOLUTION/ DROPS OPHTHALMIC at 21:28

## 2023-08-11 RX ADMIN — PANTOPRAZOLE SODIUM 40 MG: 40 TABLET, DELAYED RELEASE ORAL at 05:41

## 2023-08-11 RX ADMIN — HYDROXYCHLOROQUINE SULFATE 200 MG: 200 TABLET ORAL at 08:10

## 2023-08-11 RX ADMIN — LOSARTAN POTASSIUM 25 MG: 25 TABLET, FILM COATED ORAL at 08:11

## 2023-08-11 RX ADMIN — HYDROCODONE BITARTRATE AND ACETAMINOPHEN 1 TABLET: 7.5; 325 TABLET ORAL at 02:54

## 2023-08-11 RX ADMIN — GABAPENTIN 300 MG: 300 CAPSULE ORAL at 08:11

## 2023-08-11 RX ADMIN — ROPINIROLE 2 MG: 2 TABLET, FILM COATED ORAL at 08:11

## 2023-08-11 RX ADMIN — MICONAZOLE NITRATE: 2 POWDER TOPICAL at 10:40

## 2023-08-11 RX ADMIN — POLYVINYL ALCOHOL 1 DROP: 14 SOLUTION/ DROPS OPHTHALMIC at 05:41

## 2023-08-11 RX ADMIN — POLYVINYL ALCOHOL 1 DROP: 14 SOLUTION/ DROPS OPHTHALMIC at 08:12

## 2023-08-11 RX ADMIN — MICONAZOLE NITRATE: 2 POWDER TOPICAL at 21:28

## 2023-08-11 ASSESSMENT — PAIN SCALES - GENERAL
PAINLEVEL_OUTOF10: 9
PAINLEVEL_OUTOF10: 1
PAINLEVEL_OUTOF10: 1
PAINLEVEL_OUTOF10: 8

## 2023-08-11 ASSESSMENT — PAIN DESCRIPTION - ONSET: ONSET: SUDDEN

## 2023-08-11 ASSESSMENT — PAIN DESCRIPTION - DESCRIPTORS
DESCRIPTORS: DISCOMFORT;THROBBING
DESCRIPTORS: DISCOMFORT;ACHING

## 2023-08-11 ASSESSMENT — PAIN DESCRIPTION - FREQUENCY
FREQUENCY: INTERMITTENT
FREQUENCY: INTERMITTENT

## 2023-08-11 ASSESSMENT — PAIN DESCRIPTION - LOCATION
LOCATION: ABDOMEN;RIB CAGE
LOCATION: RIB CAGE

## 2023-08-11 ASSESSMENT — PAIN DESCRIPTION - DIRECTION: RADIATING_TOWARDS: NO

## 2023-08-11 ASSESSMENT — PAIN DESCRIPTION - PAIN TYPE
TYPE: ACUTE PAIN
TYPE: ACUTE PAIN

## 2023-08-11 ASSESSMENT — PAIN DESCRIPTION - ORIENTATION
ORIENTATION: LEFT;OUTER
ORIENTATION: LEFT;OUTER

## 2023-08-11 NOTE — CARE COORDINATION
CM spoke with the patient's son-in-law Samuel Dwyer on the telephone. Alexleonard Leo advised that his wife is the patient's oldest daughter and the family has concerns about the patient's ability to continue living independently alone in her apartment. Jimmy Bailey had questions regarding local assisted living facilities and payment sources. Alexleonard Leo is interested in the assisted living facilities that accept Medicaid and plans to look into [facility]'s assisted living program.  Jimmy Bailey advised that the family is aware that the patient will not be pleased with the idea of assisted living and they will likely have to have a difficult conversation with the patient after the family discusses the options. Patient's next insurance review is Monday 8/14/23. CM will follow. 12:08 PM  CM spoke with the patient's son-in-law Jimmy Bailey on the phone to notify him that [facility]'s assisted living program does accept the [facility] waiver but their waiting list is currently approximately 2 years, Jimmy Hugoarianna voiced understanding.

## 2023-08-11 NOTE — H&P
right may also contribute to this appearance. No large areas of pulmonary consolidation. No detectable pleural effusion, pneumothorax or mediastinal widening. Findings most consistent with congestive heart failure with associated pulmonary edema and/or bilateral pneumonia worse on the right. No detectable pleural effusions or large areas of pulmonary consolidation. Findings may be accentuated by underlying chronic lung disease.          Electronically signed by Boby Man MD on 8/11/2023 at 7:15 AM

## 2023-08-12 LAB
GLUCOSE BLD-MCNC: 133 MG/DL (ref 70–99)
GLUCOSE BLD-MCNC: 134 MG/DL (ref 70–99)
GLUCOSE BLD-MCNC: 218 MG/DL (ref 70–99)
GLUCOSE BLD-MCNC: 99 MG/DL (ref 70–99)

## 2023-08-12 PROCEDURE — 97530 THERAPEUTIC ACTIVITIES: CPT

## 2023-08-12 PROCEDURE — 6370000000 HC RX 637 (ALT 250 FOR IP): Performed by: NURSE PRACTITIONER

## 2023-08-12 PROCEDURE — 97110 THERAPEUTIC EXERCISES: CPT

## 2023-08-12 PROCEDURE — 82962 GLUCOSE BLOOD TEST: CPT

## 2023-08-12 PROCEDURE — 1200000002 HC SEMI PRIVATE SWING BED

## 2023-08-12 PROCEDURE — 94761 N-INVAS EAR/PLS OXIMETRY MLT: CPT

## 2023-08-12 PROCEDURE — 2700000000 HC OXYGEN THERAPY PER DAY

## 2023-08-12 PROCEDURE — 94640 AIRWAY INHALATION TREATMENT: CPT

## 2023-08-12 RX ADMIN — HYDROCODONE BITARTRATE AND ACETAMINOPHEN 1 TABLET: 5; 325 TABLET ORAL at 07:52

## 2023-08-12 RX ADMIN — CHOLECALCIFEROL (VITAMIN D3) 10 MCG (400 UNIT) TABLET 400 UNITS: at 10:15

## 2023-08-12 RX ADMIN — TORSEMIDE 20 MG: 20 TABLET ORAL at 10:16

## 2023-08-12 RX ADMIN — Medication 6 MG: at 21:19

## 2023-08-12 RX ADMIN — GABAPENTIN 300 MG: 300 CAPSULE ORAL at 12:01

## 2023-08-12 RX ADMIN — DICLOFENAC SODIUM 2 G: 10 GEL TOPICAL at 21:19

## 2023-08-12 RX ADMIN — MICONAZOLE NITRATE: 2 POWDER TOPICAL at 21:20

## 2023-08-12 RX ADMIN — MOMETASONE FUROATE AND FORMOTEROL FUMARATE DIHYDRATE 2 PUFF: 100; 5 AEROSOL RESPIRATORY (INHALATION) at 07:38

## 2023-08-12 RX ADMIN — HYDROCODONE BITARTRATE AND ACETAMINOPHEN 1 TABLET: 7.5; 325 TABLET ORAL at 15:10

## 2023-08-12 RX ADMIN — CYCLOBENZAPRINE 10 MG: 10 TABLET, FILM COATED ORAL at 10:16

## 2023-08-12 RX ADMIN — HYDROCODONE BITARTRATE AND ACETAMINOPHEN 1 TABLET: 5; 325 TABLET ORAL at 01:43

## 2023-08-12 RX ADMIN — ROSUVASTATIN CALCIUM 10 MG: 5 TABLET, FILM COATED ORAL at 21:19

## 2023-08-12 RX ADMIN — SENNOSIDES AND DOCUSATE SODIUM 2 TABLET: 50; 8.6 TABLET ORAL at 10:15

## 2023-08-12 RX ADMIN — GABAPENTIN 300 MG: 300 CAPSULE ORAL at 10:15

## 2023-08-12 RX ADMIN — ROPINIROLE 2 MG: 2 TABLET, FILM COATED ORAL at 21:19

## 2023-08-12 RX ADMIN — LOSARTAN POTASSIUM 25 MG: 25 TABLET, FILM COATED ORAL at 10:16

## 2023-08-12 RX ADMIN — POLYVINYL ALCOHOL 1 DROP: 14 SOLUTION/ DROPS OPHTHALMIC at 05:19

## 2023-08-12 RX ADMIN — CYCLOBENZAPRINE 10 MG: 10 TABLET, FILM COATED ORAL at 21:19

## 2023-08-12 RX ADMIN — HYDROXYCHLOROQUINE SULFATE 200 MG: 200 TABLET ORAL at 10:16

## 2023-08-12 RX ADMIN — MICONAZOLE NITRATE: 2 POWDER TOPICAL at 10:16

## 2023-08-12 RX ADMIN — GABAPENTIN 600 MG: 600 TABLET, FILM COATED ORAL at 17:12

## 2023-08-12 RX ADMIN — MOMETASONE FUROATE AND FORMOTEROL FUMARATE DIHYDRATE 2 PUFF: 100; 5 AEROSOL RESPIRATORY (INHALATION) at 19:19

## 2023-08-12 RX ADMIN — APIXABAN 5 MG: 5 TABLET, FILM COATED ORAL at 10:15

## 2023-08-12 RX ADMIN — ROPINIROLE 2 MG: 2 TABLET, FILM COATED ORAL at 10:15

## 2023-08-12 RX ADMIN — POLYVINYL ALCOHOL 1 DROP: 14 SOLUTION/ DROPS OPHTHALMIC at 01:45

## 2023-08-12 RX ADMIN — APIXABAN 5 MG: 5 TABLET, FILM COATED ORAL at 21:19

## 2023-08-12 RX ADMIN — DICLOFENAC SODIUM 2 G: 10 GEL TOPICAL at 10:17

## 2023-08-12 RX ADMIN — ROPINIROLE 2 MG: 2 TABLET, FILM COATED ORAL at 14:26

## 2023-08-12 RX ADMIN — PANTOPRAZOLE SODIUM 40 MG: 40 TABLET, DELAYED RELEASE ORAL at 05:19

## 2023-08-12 RX ADMIN — METOPROLOL SUCCINATE 50 MG: 50 TABLET, EXTENDED RELEASE ORAL at 10:15

## 2023-08-12 RX ADMIN — HYDROCODONE BITARTRATE AND ACETAMINOPHEN 1 TABLET: 7.5; 325 TABLET ORAL at 21:19

## 2023-08-12 RX ADMIN — CYCLOBENZAPRINE 10 MG: 10 TABLET, FILM COATED ORAL at 01:43

## 2023-08-12 ASSESSMENT — PAIN DESCRIPTION - DIRECTION
RADIATING_TOWARDS: NO
RADIATING_TOWARDS: TO RIGHT LEG

## 2023-08-12 ASSESSMENT — PAIN DESCRIPTION - PAIN TYPE
TYPE: ACUTE PAIN
TYPE: ACUTE PAIN
TYPE: CHRONIC PAIN
TYPE: ACUTE PAIN

## 2023-08-12 ASSESSMENT — PAIN - FUNCTIONAL ASSESSMENT
PAIN_FUNCTIONAL_ASSESSMENT: ACTIVITIES ARE NOT PREVENTED

## 2023-08-12 ASSESSMENT — PAIN DESCRIPTION - FREQUENCY
FREQUENCY: CONTINUOUS
FREQUENCY: INTERMITTENT
FREQUENCY: CONTINUOUS
FREQUENCY: INTERMITTENT

## 2023-08-12 ASSESSMENT — PAIN SCALES - GENERAL
PAINLEVEL_OUTOF10: 2
PAINLEVEL_OUTOF10: 7
PAINLEVEL_OUTOF10: 0
PAINLEVEL_OUTOF10: 2
PAINLEVEL_OUTOF10: 8
PAINLEVEL_OUTOF10: 6
PAINLEVEL_OUTOF10: 5
PAINLEVEL_OUTOF10: 3
PAINLEVEL_OUTOF10: 7
PAINLEVEL_OUTOF10: 9

## 2023-08-12 ASSESSMENT — PAIN DESCRIPTION - DESCRIPTORS
DESCRIPTORS: DISCOMFORT;DULL;SORE
DESCRIPTORS: TIGHTNESS;THROBBING
DESCRIPTORS: ACHING
DESCRIPTORS: THROBBING

## 2023-08-12 ASSESSMENT — PAIN DESCRIPTION - ONSET
ONSET: ON-GOING
ONSET: ON-GOING
ONSET: SUDDEN
ONSET: GRADUAL

## 2023-08-12 ASSESSMENT — PAIN DESCRIPTION - ORIENTATION
ORIENTATION: LEFT
ORIENTATION: RIGHT;LEFT
ORIENTATION: RIGHT;LEFT
ORIENTATION: LEFT

## 2023-08-12 ASSESSMENT — PAIN DESCRIPTION - LOCATION
LOCATION: LEG
LOCATION: RIB CAGE
LOCATION: BACK
LOCATION: LEG

## 2023-08-13 LAB
ALBUMIN SERPL-MCNC: 3.1 GM/DL (ref 3.4–5)
ALP BLD-CCNC: 56 IU/L (ref 40–129)
ALT SERPL-CCNC: 5 U/L (ref 10–40)
ANION GAP SERPL CALCULATED.3IONS-SCNC: 4 MMOL/L (ref 4–16)
AST SERPL-CCNC: 13 IU/L (ref 15–37)
BASOPHILS ABSOLUTE: 0 K/CU MM
BASOPHILS RELATIVE PERCENT: 0.6 % (ref 0–1)
BILIRUB SERPL-MCNC: 0.3 MG/DL (ref 0–1)
BUN SERPL-MCNC: 16 MG/DL (ref 6–23)
CALCIUM SERPL-MCNC: 8.9 MG/DL (ref 8.3–10.6)
CHLORIDE BLD-SCNC: 94 MMOL/L (ref 99–110)
CO2: 40 MMOL/L (ref 21–32)
CREAT SERPL-MCNC: 0.8 MG/DL (ref 0.6–1.1)
DIFFERENTIAL TYPE: ABNORMAL
EOSINOPHILS ABSOLUTE: 0.1 K/CU MM
EOSINOPHILS RELATIVE PERCENT: 1.8 % (ref 0–3)
GFR SERPL CREATININE-BSD FRML MDRD: >60 ML/MIN/1.73M2
GLUCOSE BLD-MCNC: 145 MG/DL (ref 70–99)
GLUCOSE BLD-MCNC: 148 MG/DL (ref 70–99)
GLUCOSE BLD-MCNC: 92 MG/DL (ref 70–99)
GLUCOSE BLD-MCNC: 96 MG/DL (ref 70–99)
GLUCOSE SERPL-MCNC: 125 MG/DL (ref 70–99)
HCT VFR BLD CALC: 35.5 % (ref 37–47)
HEMOGLOBIN: 10.5 GM/DL (ref 12.5–16)
IMMATURE NEUTROPHIL %: 0.7 % (ref 0–0.43)
LYMPHOCYTES ABSOLUTE: 1.2 K/CU MM
LYMPHOCYTES RELATIVE PERCENT: 16.8 % (ref 24–44)
MCH RBC QN AUTO: 24.5 PG (ref 27–31)
MCHC RBC AUTO-ENTMCNC: 29.6 % (ref 32–36)
MCV RBC AUTO: 82.9 FL (ref 78–100)
MONOCYTES ABSOLUTE: 1.5 K/CU MM
MONOCYTES RELATIVE PERCENT: 20.4 % (ref 0–4)
PDW BLD-RTO: 14.8 % (ref 11.7–14.9)
PLATELET # BLD: 207 K/CU MM (ref 140–440)
PMV BLD AUTO: 8.9 FL (ref 7.5–11.1)
POTASSIUM SERPL-SCNC: 3.8 MMOL/L (ref 3.5–5.1)
RBC # BLD: 4.28 M/CU MM (ref 4.2–5.4)
SEGMENTED NEUTROPHILS ABSOLUTE COUNT: 4.3 K/CU MM
SEGMENTED NEUTROPHILS RELATIVE PERCENT: 59.7 % (ref 36–66)
SODIUM BLD-SCNC: 138 MMOL/L (ref 135–145)
TOTAL IMMATURE NEUTOROPHIL: 0.05 K/CU MM
TOTAL PROTEIN: 6 GM/DL (ref 6.4–8.2)
WBC # BLD: 7.2 K/CU MM (ref 4–10.5)

## 2023-08-13 PROCEDURE — 80053 COMPREHEN METABOLIC PANEL: CPT

## 2023-08-13 PROCEDURE — 94640 AIRWAY INHALATION TREATMENT: CPT

## 2023-08-13 PROCEDURE — 6370000000 HC RX 637 (ALT 250 FOR IP): Performed by: NURSE PRACTITIONER

## 2023-08-13 PROCEDURE — 2700000000 HC OXYGEN THERAPY PER DAY

## 2023-08-13 PROCEDURE — 82962 GLUCOSE BLOOD TEST: CPT

## 2023-08-13 PROCEDURE — 85025 COMPLETE CBC W/AUTO DIFF WBC: CPT

## 2023-08-13 PROCEDURE — 1200000002 HC SEMI PRIVATE SWING BED

## 2023-08-13 RX ORDER — METOPROLOL SUCCINATE 25 MG/1
25 TABLET, EXTENDED RELEASE ORAL DAILY
Status: DISCONTINUED | OUTPATIENT
Start: 2023-08-14 | End: 2023-08-20 | Stop reason: HOSPADM

## 2023-08-13 RX ADMIN — MICONAZOLE NITRATE: 2 POWDER TOPICAL at 08:50

## 2023-08-13 RX ADMIN — PANTOPRAZOLE SODIUM 40 MG: 40 TABLET, DELAYED RELEASE ORAL at 05:42

## 2023-08-13 RX ADMIN — Medication 6 MG: at 20:49

## 2023-08-13 RX ADMIN — CYCLOBENZAPRINE 10 MG: 10 TABLET, FILM COATED ORAL at 08:48

## 2023-08-13 RX ADMIN — ROPINIROLE 2 MG: 2 TABLET, FILM COATED ORAL at 08:47

## 2023-08-13 RX ADMIN — MOMETASONE FUROATE AND FORMOTEROL FUMARATE DIHYDRATE 2 PUFF: 100; 5 AEROSOL RESPIRATORY (INHALATION) at 07:35

## 2023-08-13 RX ADMIN — CYCLOBENZAPRINE 10 MG: 10 TABLET, FILM COATED ORAL at 21:49

## 2023-08-13 RX ADMIN — CHOLECALCIFEROL (VITAMIN D3) 10 MCG (400 UNIT) TABLET 400 UNITS: at 08:47

## 2023-08-13 RX ADMIN — ROSUVASTATIN CALCIUM 10 MG: 5 TABLET, FILM COATED ORAL at 20:49

## 2023-08-13 RX ADMIN — MOMETASONE FUROATE AND FORMOTEROL FUMARATE DIHYDRATE 2 PUFF: 100; 5 AEROSOL RESPIRATORY (INHALATION) at 19:47

## 2023-08-13 RX ADMIN — DICLOFENAC SODIUM 2 G: 10 GEL TOPICAL at 20:49

## 2023-08-13 RX ADMIN — HYDROCODONE BITARTRATE AND ACETAMINOPHEN 1 TABLET: 7.5; 325 TABLET ORAL at 20:49

## 2023-08-13 RX ADMIN — APIXABAN 5 MG: 5 TABLET, FILM COATED ORAL at 20:49

## 2023-08-13 RX ADMIN — GABAPENTIN 600 MG: 600 TABLET, FILM COATED ORAL at 17:09

## 2023-08-13 RX ADMIN — GABAPENTIN 300 MG: 300 CAPSULE ORAL at 11:52

## 2023-08-13 RX ADMIN — GABAPENTIN 300 MG: 300 CAPSULE ORAL at 08:47

## 2023-08-13 RX ADMIN — ROPINIROLE 2 MG: 2 TABLET, FILM COATED ORAL at 14:02

## 2023-08-13 RX ADMIN — HYDROCODONE BITARTRATE AND ACETAMINOPHEN 1 TABLET: 7.5; 325 TABLET ORAL at 06:52

## 2023-08-13 RX ADMIN — HYDROXYCHLOROQUINE SULFATE 200 MG: 200 TABLET ORAL at 08:55

## 2023-08-13 RX ADMIN — APIXABAN 5 MG: 5 TABLET, FILM COATED ORAL at 08:47

## 2023-08-13 RX ADMIN — SENNOSIDES AND DOCUSATE SODIUM 2 TABLET: 50; 8.6 TABLET ORAL at 08:48

## 2023-08-13 RX ADMIN — ROPINIROLE 2 MG: 2 TABLET, FILM COATED ORAL at 20:49

## 2023-08-13 RX ADMIN — DICLOFENAC SODIUM 2 G: 10 GEL TOPICAL at 08:49

## 2023-08-13 ASSESSMENT — PAIN DESCRIPTION - ORIENTATION
ORIENTATION: LEFT
ORIENTATION: RIGHT;LEFT
ORIENTATION: LEFT

## 2023-08-13 ASSESSMENT — PAIN - FUNCTIONAL ASSESSMENT
PAIN_FUNCTIONAL_ASSESSMENT: ACTIVITIES ARE NOT PREVENTED

## 2023-08-13 ASSESSMENT — PAIN DESCRIPTION - FREQUENCY
FREQUENCY: CONTINUOUS
FREQUENCY: CONTINUOUS

## 2023-08-13 ASSESSMENT — PAIN SCALES - GENERAL
PAINLEVEL_OUTOF10: 5
PAINLEVEL_OUTOF10: 2
PAINLEVEL_OUTOF10: 8
PAINLEVEL_OUTOF10: 5
PAINLEVEL_OUTOF10: 2
PAINLEVEL_OUTOF10: 7

## 2023-08-13 ASSESSMENT — PAIN DESCRIPTION - LOCATION
LOCATION: LEG
LOCATION: RIB CAGE
LOCATION: RIB CAGE

## 2023-08-13 ASSESSMENT — PAIN DESCRIPTION - ONSET
ONSET: ON-GOING
ONSET: ON-GOING

## 2023-08-13 ASSESSMENT — PAIN DESCRIPTION - DESCRIPTORS
DESCRIPTORS: SPASM;DISCOMFORT
DESCRIPTORS: TIGHTNESS;THROBBING
DESCRIPTORS: TIGHTNESS

## 2023-08-13 ASSESSMENT — PAIN DESCRIPTION - PAIN TYPE
TYPE: CHRONIC PAIN
TYPE: CHRONIC PAIN

## 2023-08-13 NOTE — CARE COORDINATION
CM submitted updated clinical documentation to [facility] to request an extension to the patient's stay in the swing bed program.  CM will follow.

## 2023-08-14 ENCOUNTER — TELEPHONE (OUTPATIENT)
Dept: INTERNAL MEDICINE CLINIC | Age: 88
End: 2023-08-14

## 2023-08-14 LAB
GLUCOSE BLD-MCNC: 102 MG/DL (ref 70–99)
GLUCOSE BLD-MCNC: 102 MG/DL (ref 70–99)
GLUCOSE BLD-MCNC: 111 MG/DL (ref 70–99)
GLUCOSE BLD-MCNC: 151 MG/DL (ref 70–99)

## 2023-08-14 PROCEDURE — 6370000000 HC RX 637 (ALT 250 FOR IP): Performed by: NURSE PRACTITIONER

## 2023-08-14 PROCEDURE — 97110 THERAPEUTIC EXERCISES: CPT

## 2023-08-14 PROCEDURE — 1200000002 HC SEMI PRIVATE SWING BED

## 2023-08-14 PROCEDURE — 94761 N-INVAS EAR/PLS OXIMETRY MLT: CPT

## 2023-08-14 PROCEDURE — 94640 AIRWAY INHALATION TREATMENT: CPT

## 2023-08-14 PROCEDURE — 97530 THERAPEUTIC ACTIVITIES: CPT

## 2023-08-14 PROCEDURE — 97116 GAIT TRAINING THERAPY: CPT

## 2023-08-14 RX ADMIN — APIXABAN 5 MG: 5 TABLET, FILM COATED ORAL at 07:45

## 2023-08-14 RX ADMIN — METOPROLOL SUCCINATE 25 MG: 25 TABLET, EXTENDED RELEASE ORAL at 07:44

## 2023-08-14 RX ADMIN — HYDROXYCHLOROQUINE SULFATE 200 MG: 200 TABLET ORAL at 07:46

## 2023-08-14 RX ADMIN — POLYVINYL ALCOHOL 1 DROP: 14 SOLUTION/ DROPS OPHTHALMIC at 20:47

## 2023-08-14 RX ADMIN — MOMETASONE FUROATE AND FORMOTEROL FUMARATE DIHYDRATE 2 PUFF: 100; 5 AEROSOL RESPIRATORY (INHALATION) at 20:16

## 2023-08-14 RX ADMIN — ROPINIROLE 2 MG: 2 TABLET, FILM COATED ORAL at 20:37

## 2023-08-14 RX ADMIN — GABAPENTIN 300 MG: 300 CAPSULE ORAL at 11:40

## 2023-08-14 RX ADMIN — SENNOSIDES AND DOCUSATE SODIUM 2 TABLET: 50; 8.6 TABLET ORAL at 07:45

## 2023-08-14 RX ADMIN — DICLOFENAC SODIUM 2 G: 10 GEL TOPICAL at 20:36

## 2023-08-14 RX ADMIN — APIXABAN 5 MG: 5 TABLET, FILM COATED ORAL at 20:38

## 2023-08-14 RX ADMIN — ROPINIROLE 2 MG: 2 TABLET, FILM COATED ORAL at 07:45

## 2023-08-14 RX ADMIN — ONDANSETRON 4 MG: 4 TABLET, ORALLY DISINTEGRATING ORAL at 08:49

## 2023-08-14 RX ADMIN — CHOLECALCIFEROL (VITAMIN D3) 10 MCG (400 UNIT) TABLET 400 UNITS: at 07:45

## 2023-08-14 RX ADMIN — GABAPENTIN 300 MG: 300 CAPSULE ORAL at 07:45

## 2023-08-14 RX ADMIN — GABAPENTIN 600 MG: 600 TABLET, FILM COATED ORAL at 17:01

## 2023-08-14 RX ADMIN — HYDROCODONE BITARTRATE AND ACETAMINOPHEN 1 TABLET: 7.5; 325 TABLET ORAL at 20:37

## 2023-08-14 RX ADMIN — POLYVINYL ALCOHOL 1 DROP: 14 SOLUTION/ DROPS OPHTHALMIC at 10:30

## 2023-08-14 RX ADMIN — Medication 6 MG: at 20:38

## 2023-08-14 RX ADMIN — ROPINIROLE 2 MG: 2 TABLET, FILM COATED ORAL at 14:59

## 2023-08-14 RX ADMIN — ROSUVASTATIN CALCIUM 10 MG: 5 TABLET, FILM COATED ORAL at 20:37

## 2023-08-14 RX ADMIN — DICLOFENAC SODIUM 2 G: 10 GEL TOPICAL at 07:45

## 2023-08-14 RX ADMIN — MICONAZOLE NITRATE: 2 POWDER TOPICAL at 20:47

## 2023-08-14 RX ADMIN — HYDROCODONE BITARTRATE AND ACETAMINOPHEN 1 TABLET: 5; 325 TABLET ORAL at 12:14

## 2023-08-14 RX ADMIN — MICONAZOLE NITRATE: 2 POWDER TOPICAL at 07:46

## 2023-08-14 RX ADMIN — MOMETASONE FUROATE AND FORMOTEROL FUMARATE DIHYDRATE 2 PUFF: 100; 5 AEROSOL RESPIRATORY (INHALATION) at 08:03

## 2023-08-14 RX ADMIN — PANTOPRAZOLE SODIUM 40 MG: 40 TABLET, DELAYED RELEASE ORAL at 05:25

## 2023-08-14 ASSESSMENT — PAIN DESCRIPTION - ORIENTATION
ORIENTATION: RIGHT;LEFT
ORIENTATION: RIGHT
ORIENTATION: RIGHT;LEFT

## 2023-08-14 ASSESSMENT — PAIN SCALES - GENERAL
PAINLEVEL_OUTOF10: 5
PAINLEVEL_OUTOF10: 0
PAINLEVEL_OUTOF10: 7
PAINLEVEL_OUTOF10: 3

## 2023-08-14 ASSESSMENT — PAIN DESCRIPTION - LOCATION
LOCATION: LEG
LOCATION: HIP
LOCATION: HIP;LEG

## 2023-08-14 ASSESSMENT — PAIN DESCRIPTION - DESCRIPTORS
DESCRIPTORS: ACHING;TENDER
DESCRIPTORS: ACHING;DISCOMFORT
DESCRIPTORS: ACHING;TENDER

## 2023-08-14 ASSESSMENT — PAIN DESCRIPTION - FREQUENCY
FREQUENCY: CONTINUOUS

## 2023-08-14 ASSESSMENT — PAIN DESCRIPTION - PAIN TYPE
TYPE: CHRONIC PAIN

## 2023-08-14 ASSESSMENT — PAIN DESCRIPTION - ONSET
ONSET: ON-GOING

## 2023-08-14 ASSESSMENT — PAIN - FUNCTIONAL ASSESSMENT
PAIN_FUNCTIONAL_ASSESSMENT: ACTIVITIES ARE NOT PREVENTED

## 2023-08-14 NOTE — TELEPHONE ENCOUNTER
Was going to call patient about paperwork we received from [facility] and noted that she is still in hospital.
room air

## 2023-08-15 LAB
GLUCOSE BLD-MCNC: 119 MG/DL (ref 70–99)
GLUCOSE BLD-MCNC: 121 MG/DL (ref 70–99)
GLUCOSE BLD-MCNC: 138 MG/DL (ref 70–99)
GLUCOSE BLD-MCNC: 163 MG/DL (ref 70–99)

## 2023-08-15 PROCEDURE — 6370000000 HC RX 637 (ALT 250 FOR IP): Performed by: NURSE PRACTITIONER

## 2023-08-15 PROCEDURE — 97535 SELF CARE MNGMENT TRAINING: CPT

## 2023-08-15 PROCEDURE — 2700000000 HC OXYGEN THERAPY PER DAY

## 2023-08-15 PROCEDURE — 97530 THERAPEUTIC ACTIVITIES: CPT

## 2023-08-15 PROCEDURE — 94761 N-INVAS EAR/PLS OXIMETRY MLT: CPT

## 2023-08-15 PROCEDURE — 97112 NEUROMUSCULAR REEDUCATION: CPT

## 2023-08-15 PROCEDURE — 1200000002 HC SEMI PRIVATE SWING BED

## 2023-08-15 PROCEDURE — 97110 THERAPEUTIC EXERCISES: CPT

## 2023-08-15 PROCEDURE — 82962 GLUCOSE BLOOD TEST: CPT

## 2023-08-15 PROCEDURE — 94640 AIRWAY INHALATION TREATMENT: CPT

## 2023-08-15 PROCEDURE — 97116 GAIT TRAINING THERAPY: CPT

## 2023-08-15 RX ORDER — TORSEMIDE 10 MG/1
10 TABLET ORAL DAILY
Status: DISCONTINUED | OUTPATIENT
Start: 2023-08-15 | End: 2023-08-20 | Stop reason: HOSPADM

## 2023-08-15 RX ADMIN — METOPROLOL SUCCINATE 25 MG: 25 TABLET, EXTENDED RELEASE ORAL at 09:11

## 2023-08-15 RX ADMIN — APIXABAN 5 MG: 5 TABLET, FILM COATED ORAL at 09:10

## 2023-08-15 RX ADMIN — GABAPENTIN 300 MG: 300 CAPSULE ORAL at 09:11

## 2023-08-15 RX ADMIN — PANTOPRAZOLE SODIUM 40 MG: 40 TABLET, DELAYED RELEASE ORAL at 04:27

## 2023-08-15 RX ADMIN — POLYVINYL ALCOHOL 1 DROP: 14 SOLUTION/ DROPS OPHTHALMIC at 09:12

## 2023-08-15 RX ADMIN — CHOLECALCIFEROL (VITAMIN D3) 10 MCG (400 UNIT) TABLET 400 UNITS: at 09:11

## 2023-08-15 RX ADMIN — GABAPENTIN 300 MG: 300 CAPSULE ORAL at 11:24

## 2023-08-15 RX ADMIN — ROPINIROLE 2 MG: 2 TABLET, FILM COATED ORAL at 14:33

## 2023-08-15 RX ADMIN — DICLOFENAC SODIUM 2 G: 10 GEL TOPICAL at 21:07

## 2023-08-15 RX ADMIN — MICONAZOLE NITRATE: 2 POWDER TOPICAL at 09:12

## 2023-08-15 RX ADMIN — ROPINIROLE 2 MG: 2 TABLET, FILM COATED ORAL at 21:03

## 2023-08-15 RX ADMIN — GABAPENTIN 600 MG: 600 TABLET, FILM COATED ORAL at 17:25

## 2023-08-15 RX ADMIN — HYDROCODONE BITARTRATE AND ACETAMINOPHEN 1 TABLET: 7.5; 325 TABLET ORAL at 11:24

## 2023-08-15 RX ADMIN — SENNOSIDES AND DOCUSATE SODIUM 2 TABLET: 50; 8.6 TABLET ORAL at 09:10

## 2023-08-15 RX ADMIN — POLYVINYL ALCOHOL 1 DROP: 14 SOLUTION/ DROPS OPHTHALMIC at 17:25

## 2023-08-15 RX ADMIN — HYDROXYCHLOROQUINE SULFATE 200 MG: 200 TABLET ORAL at 09:12

## 2023-08-15 RX ADMIN — ROPINIROLE 2 MG: 2 TABLET, FILM COATED ORAL at 09:10

## 2023-08-15 RX ADMIN — CYCLOBENZAPRINE 10 MG: 10 TABLET, FILM COATED ORAL at 02:53

## 2023-08-15 RX ADMIN — MOMETASONE FUROATE AND FORMOTEROL FUMARATE DIHYDRATE 2 PUFF: 100; 5 AEROSOL RESPIRATORY (INHALATION) at 07:46

## 2023-08-15 RX ADMIN — MOMETASONE FUROATE AND FORMOTEROL FUMARATE DIHYDRATE 2 PUFF: 100; 5 AEROSOL RESPIRATORY (INHALATION) at 20:20

## 2023-08-15 RX ADMIN — Medication 6 MG: at 21:04

## 2023-08-15 RX ADMIN — DICLOFENAC SODIUM 2 G: 10 GEL TOPICAL at 09:08

## 2023-08-15 RX ADMIN — ROSUVASTATIN CALCIUM 10 MG: 5 TABLET, FILM COATED ORAL at 21:03

## 2023-08-15 RX ADMIN — APIXABAN 5 MG: 5 TABLET, FILM COATED ORAL at 21:03

## 2023-08-15 RX ADMIN — HYDROCODONE BITARTRATE AND ACETAMINOPHEN 1 TABLET: 7.5; 325 TABLET ORAL at 21:03

## 2023-08-15 RX ADMIN — HYDROCODONE BITARTRATE AND ACETAMINOPHEN 1 TABLET: 7.5; 325 TABLET ORAL at 04:26

## 2023-08-15 RX ADMIN — POLYVINYL ALCOHOL 1 DROP: 14 SOLUTION/ DROPS OPHTHALMIC at 14:33

## 2023-08-15 RX ADMIN — TORSEMIDE 10 MG: 10 TABLET ORAL at 09:10

## 2023-08-15 ASSESSMENT — PAIN DESCRIPTION - PAIN TYPE
TYPE: CHRONIC PAIN

## 2023-08-15 ASSESSMENT — PAIN DESCRIPTION - DESCRIPTORS
DESCRIPTORS: ACHING
DESCRIPTORS: ACHING;TIGHTNESS
DESCRIPTORS: ACHING
DESCRIPTORS: DISCOMFORT

## 2023-08-15 ASSESSMENT — PAIN DESCRIPTION - FREQUENCY
FREQUENCY: INTERMITTENT
FREQUENCY: CONTINUOUS

## 2023-08-15 ASSESSMENT — PAIN DESCRIPTION - LOCATION
LOCATION: SHOULDER
LOCATION: HIP
LOCATION: SHOULDER
LOCATION: SHOULDER

## 2023-08-15 ASSESSMENT — PAIN DESCRIPTION - ONSET
ONSET: ON-GOING
ONSET: GRADUAL

## 2023-08-15 ASSESSMENT — PAIN DESCRIPTION - ORIENTATION
ORIENTATION: RIGHT
ORIENTATION: LEFT

## 2023-08-15 ASSESSMENT — PAIN SCALES - GENERAL
PAINLEVEL_OUTOF10: 8
PAINLEVEL_OUTOF10: 4
PAINLEVEL_OUTOF10: 0
PAINLEVEL_OUTOF10: 7
PAINLEVEL_OUTOF10: 8

## 2023-08-15 ASSESSMENT — PAIN SCALES - WONG BAKER: WONGBAKER_NUMERICALRESPONSE: 0

## 2023-08-16 ENCOUNTER — HOSPITAL ENCOUNTER (OUTPATIENT)
Dept: WOUND CARE | Age: 88
Discharge: HOME | End: 2023-08-16

## 2023-08-16 LAB
GLUCOSE BLD-MCNC: 103 MG/DL (ref 70–99)
GLUCOSE BLD-MCNC: 123 MG/DL (ref 70–99)
GLUCOSE BLD-MCNC: 134 MG/DL (ref 70–99)
GLUCOSE BLD-MCNC: 141 MG/DL (ref 70–99)

## 2023-08-16 PROCEDURE — 97530 THERAPEUTIC ACTIVITIES: CPT

## 2023-08-16 PROCEDURE — 6370000000 HC RX 637 (ALT 250 FOR IP): Performed by: NURSE PRACTITIONER

## 2023-08-16 PROCEDURE — 97110 THERAPEUTIC EXERCISES: CPT

## 2023-08-16 PROCEDURE — 82962 GLUCOSE BLOOD TEST: CPT

## 2023-08-16 PROCEDURE — 97116 GAIT TRAINING THERAPY: CPT

## 2023-08-16 PROCEDURE — 94640 AIRWAY INHALATION TREATMENT: CPT

## 2023-08-16 PROCEDURE — 2700000000 HC OXYGEN THERAPY PER DAY

## 2023-08-16 PROCEDURE — 1200000002 HC SEMI PRIVATE SWING BED

## 2023-08-16 PROCEDURE — 94761 N-INVAS EAR/PLS OXIMETRY MLT: CPT

## 2023-08-16 RX ADMIN — ROSUVASTATIN CALCIUM 10 MG: 5 TABLET, FILM COATED ORAL at 21:09

## 2023-08-16 RX ADMIN — Medication 6 MG: at 21:09

## 2023-08-16 RX ADMIN — ROPINIROLE 2 MG: 2 TABLET, FILM COATED ORAL at 15:12

## 2023-08-16 RX ADMIN — POLYVINYL ALCOHOL 1 DROP: 14 SOLUTION/ DROPS OPHTHALMIC at 21:08

## 2023-08-16 RX ADMIN — POLYVINYL ALCOHOL 1 DROP: 14 SOLUTION/ DROPS OPHTHALMIC at 06:20

## 2023-08-16 RX ADMIN — DICLOFENAC SODIUM 2 G: 10 GEL TOPICAL at 08:16

## 2023-08-16 RX ADMIN — HYDROCODONE BITARTRATE AND ACETAMINOPHEN 1 TABLET: 7.5; 325 TABLET ORAL at 21:08

## 2023-08-16 RX ADMIN — GABAPENTIN 600 MG: 600 TABLET, FILM COATED ORAL at 17:43

## 2023-08-16 RX ADMIN — CHOLECALCIFEROL (VITAMIN D3) 10 MCG (400 UNIT) TABLET 400 UNITS: at 08:15

## 2023-08-16 RX ADMIN — DICLOFENAC SODIUM 2 G: 10 GEL TOPICAL at 21:10

## 2023-08-16 RX ADMIN — TORSEMIDE 10 MG: 10 TABLET ORAL at 08:15

## 2023-08-16 RX ADMIN — ROPINIROLE 2 MG: 2 TABLET, FILM COATED ORAL at 08:14

## 2023-08-16 RX ADMIN — GABAPENTIN 300 MG: 300 CAPSULE ORAL at 08:15

## 2023-08-16 RX ADMIN — POLYVINYL ALCOHOL 1 DROP: 14 SOLUTION/ DROPS OPHTHALMIC at 15:12

## 2023-08-16 RX ADMIN — HYDROXYCHLOROQUINE SULFATE 200 MG: 200 TABLET ORAL at 09:00

## 2023-08-16 RX ADMIN — POLYVINYL ALCOHOL 1 DROP: 14 SOLUTION/ DROPS OPHTHALMIC at 17:43

## 2023-08-16 RX ADMIN — HYDROCODONE BITARTRATE AND ACETAMINOPHEN 1 TABLET: 7.5; 325 TABLET ORAL at 07:38

## 2023-08-16 RX ADMIN — MOMETASONE FUROATE AND FORMOTEROL FUMARATE DIHYDRATE 2 PUFF: 100; 5 AEROSOL RESPIRATORY (INHALATION) at 07:35

## 2023-08-16 RX ADMIN — METOPROLOL SUCCINATE 25 MG: 25 TABLET, EXTENDED RELEASE ORAL at 08:15

## 2023-08-16 RX ADMIN — APIXABAN 5 MG: 5 TABLET, FILM COATED ORAL at 08:14

## 2023-08-16 RX ADMIN — SENNOSIDES AND DOCUSATE SODIUM 2 TABLET: 50; 8.6 TABLET ORAL at 08:15

## 2023-08-16 RX ADMIN — APIXABAN 5 MG: 5 TABLET, FILM COATED ORAL at 21:09

## 2023-08-16 RX ADMIN — PANTOPRAZOLE SODIUM 40 MG: 40 TABLET, DELAYED RELEASE ORAL at 06:20

## 2023-08-16 RX ADMIN — MOMETASONE FUROATE AND FORMOTEROL FUMARATE DIHYDRATE 2 PUFF: 100; 5 AEROSOL RESPIRATORY (INHALATION) at 19:45

## 2023-08-16 RX ADMIN — ROPINIROLE 2 MG: 2 TABLET, FILM COATED ORAL at 21:09

## 2023-08-16 RX ADMIN — GABAPENTIN 300 MG: 300 CAPSULE ORAL at 11:41

## 2023-08-16 ASSESSMENT — PAIN - FUNCTIONAL ASSESSMENT
PAIN_FUNCTIONAL_ASSESSMENT: ACTIVITIES ARE NOT PREVENTED

## 2023-08-16 ASSESSMENT — PAIN DESCRIPTION - DESCRIPTORS
DESCRIPTORS: ACHING;DISCOMFORT
DESCRIPTORS: ACHING
DESCRIPTORS: ACHING

## 2023-08-16 ASSESSMENT — PAIN DESCRIPTION - DIRECTION
RADIATING_TOWARDS: NO
RADIATING_TOWARDS: NO

## 2023-08-16 ASSESSMENT — PAIN DESCRIPTION - LOCATION
LOCATION: SHOULDER
LOCATION: SHOULDER
LOCATION: ABDOMEN

## 2023-08-16 ASSESSMENT — PAIN SCALES - GENERAL
PAINLEVEL_OUTOF10: 0
PAINLEVEL_OUTOF10: 7
PAINLEVEL_OUTOF10: 7
PAINLEVEL_OUTOF10: 3

## 2023-08-16 ASSESSMENT — PAIN DESCRIPTION - ONSET
ONSET: ON-GOING

## 2023-08-16 ASSESSMENT — PAIN DESCRIPTION - FREQUENCY
FREQUENCY: INTERMITTENT
FREQUENCY: INTERMITTENT
FREQUENCY: CONTINUOUS

## 2023-08-16 ASSESSMENT — PAIN DESCRIPTION - PAIN TYPE
TYPE: ACUTE PAIN
TYPE: CHRONIC PAIN

## 2023-08-16 ASSESSMENT — PAIN DESCRIPTION - ORIENTATION
ORIENTATION: LEFT
ORIENTATION: LEFT
ORIENTATION: MID

## 2023-08-16 NOTE — CARE COORDINATION
SWING BED WEEKLY TEAM Nicole Tate   8/16/2023 WEEK # 1    Care Management    Issues to be resolved before discharge: Increased strength/balance/mobility     Family Education: Patient/staff to update family     Discharge Plan: Home   Patient/Family Adjustment: N/A     Goals of previous week:   [] 1st team   [] met   [] partially met    [x] not met             Why goals were not met: Continued weakness     Skilled Level of Care Remains   [x] yes   [] no    Estimated length of stay: Next insurance review due 8/18/23   Patient/Family Concerns/input: Family would prefer patient admit to an assisted living facility and not return home alone     Patient/Family  Signature _________________  8/16/2023       Care Management Signature:  Amada Hudson RN

## 2023-08-17 LAB
GLUCOSE BLD-MCNC: 103 MG/DL (ref 70–99)
GLUCOSE BLD-MCNC: 104 MG/DL (ref 70–99)
GLUCOSE BLD-MCNC: 116 MG/DL (ref 70–99)
GLUCOSE BLD-MCNC: 128 MG/DL (ref 70–99)
GLUCOSE BLD-MCNC: 134 MG/DL (ref 70–99)

## 2023-08-17 PROCEDURE — 2700000000 HC OXYGEN THERAPY PER DAY

## 2023-08-17 PROCEDURE — 97110 THERAPEUTIC EXERCISES: CPT

## 2023-08-17 PROCEDURE — 82962 GLUCOSE BLOOD TEST: CPT

## 2023-08-17 PROCEDURE — 94640 AIRWAY INHALATION TREATMENT: CPT

## 2023-08-17 PROCEDURE — 6370000000 HC RX 637 (ALT 250 FOR IP): Performed by: NURSE PRACTITIONER

## 2023-08-17 PROCEDURE — 97530 THERAPEUTIC ACTIVITIES: CPT

## 2023-08-17 PROCEDURE — 97535 SELF CARE MNGMENT TRAINING: CPT

## 2023-08-17 PROCEDURE — 1200000002 HC SEMI PRIVATE SWING BED

## 2023-08-17 RX ADMIN — DICLOFENAC SODIUM 2 G: 10 GEL TOPICAL at 08:11

## 2023-08-17 RX ADMIN — HYDROCODONE BITARTRATE AND ACETAMINOPHEN 1 TABLET: 5; 325 TABLET ORAL at 17:49

## 2023-08-17 RX ADMIN — Medication 6 MG: at 20:46

## 2023-08-17 RX ADMIN — MOMETASONE FUROATE AND FORMOTEROL FUMARATE DIHYDRATE 2 PUFF: 100; 5 AEROSOL RESPIRATORY (INHALATION) at 19:41

## 2023-08-17 RX ADMIN — ALUMINUM HYDROXIDE, MAGNESIUM HYDROXIDE, AND SIMETHICONE 30 ML: 200; 200; 20 SUSPENSION ORAL at 14:38

## 2023-08-17 RX ADMIN — GABAPENTIN 300 MG: 300 CAPSULE ORAL at 08:10

## 2023-08-17 RX ADMIN — GABAPENTIN 300 MG: 300 CAPSULE ORAL at 11:41

## 2023-08-17 RX ADMIN — APIXABAN 5 MG: 5 TABLET, FILM COATED ORAL at 08:11

## 2023-08-17 RX ADMIN — CHOLECALCIFEROL (VITAMIN D3) 10 MCG (400 UNIT) TABLET 400 UNITS: at 08:11

## 2023-08-17 RX ADMIN — PANTOPRAZOLE SODIUM 40 MG: 40 TABLET, DELAYED RELEASE ORAL at 06:21

## 2023-08-17 RX ADMIN — MOMETASONE FUROATE AND FORMOTEROL FUMARATE DIHYDRATE 2 PUFF: 100; 5 AEROSOL RESPIRATORY (INHALATION) at 08:40

## 2023-08-17 RX ADMIN — CYCLOBENZAPRINE 10 MG: 10 TABLET, FILM COATED ORAL at 20:48

## 2023-08-17 RX ADMIN — POLYVINYL ALCOHOL 1 DROP: 14 SOLUTION/ DROPS OPHTHALMIC at 06:21

## 2023-08-17 RX ADMIN — POLYVINYL ALCOHOL 1 DROP: 14 SOLUTION/ DROPS OPHTHALMIC at 08:11

## 2023-08-17 RX ADMIN — HYDROXYCHLOROQUINE SULFATE 200 MG: 200 TABLET ORAL at 08:12

## 2023-08-17 RX ADMIN — SENNOSIDES AND DOCUSATE SODIUM 2 TABLET: 50; 8.6 TABLET ORAL at 08:11

## 2023-08-17 RX ADMIN — GABAPENTIN 600 MG: 600 TABLET, FILM COATED ORAL at 17:49

## 2023-08-17 RX ADMIN — ROPINIROLE 2 MG: 2 TABLET, FILM COATED ORAL at 14:38

## 2023-08-17 RX ADMIN — ROPINIROLE 2 MG: 2 TABLET, FILM COATED ORAL at 08:10

## 2023-08-17 RX ADMIN — DICLOFENAC SODIUM 2 G: 10 GEL TOPICAL at 20:59

## 2023-08-17 RX ADMIN — ROPINIROLE 2 MG: 2 TABLET, FILM COATED ORAL at 20:46

## 2023-08-17 RX ADMIN — POLYVINYL ALCOHOL 1 DROP: 14 SOLUTION/ DROPS OPHTHALMIC at 20:45

## 2023-08-17 RX ADMIN — ROSUVASTATIN CALCIUM 10 MG: 5 TABLET, FILM COATED ORAL at 20:46

## 2023-08-17 RX ADMIN — METOPROLOL SUCCINATE 25 MG: 25 TABLET, EXTENDED RELEASE ORAL at 08:03

## 2023-08-17 RX ADMIN — HYDROCODONE BITARTRATE AND ACETAMINOPHEN 1 TABLET: 7.5; 325 TABLET ORAL at 03:09

## 2023-08-17 RX ADMIN — MICONAZOLE NITRATE: 2 POWDER TOPICAL at 20:45

## 2023-08-17 RX ADMIN — APIXABAN 5 MG: 5 TABLET, FILM COATED ORAL at 20:46

## 2023-08-17 ASSESSMENT — PAIN DESCRIPTION - FREQUENCY
FREQUENCY: CONTINUOUS

## 2023-08-17 ASSESSMENT — PAIN - FUNCTIONAL ASSESSMENT
PAIN_FUNCTIONAL_ASSESSMENT: ACTIVITIES ARE NOT PREVENTED

## 2023-08-17 ASSESSMENT — PAIN SCALES - GENERAL
PAINLEVEL_OUTOF10: 0
PAINLEVEL_OUTOF10: 6
PAINLEVEL_OUTOF10: 8
PAINLEVEL_OUTOF10: 5
PAINLEVEL_OUTOF10: 0
PAINLEVEL_OUTOF10: 8

## 2023-08-17 ASSESSMENT — PAIN DESCRIPTION - DESCRIPTORS
DESCRIPTORS: ACHING
DESCRIPTORS: ACHING;TENDER;THROBBING
DESCRIPTORS: ACHING;TENDER
DESCRIPTORS: ACHING;TENDER

## 2023-08-17 ASSESSMENT — PAIN DESCRIPTION - ORIENTATION
ORIENTATION: LEFT
ORIENTATION: RIGHT

## 2023-08-17 ASSESSMENT — PAIN DESCRIPTION - ONSET
ONSET: ON-GOING

## 2023-08-17 ASSESSMENT — PAIN DESCRIPTION - LOCATION
LOCATION: SHOULDER
LOCATION: ABDOMEN

## 2023-08-17 ASSESSMENT — PAIN DESCRIPTION - PAIN TYPE
TYPE: ACUTE PAIN

## 2023-08-17 ASSESSMENT — PAIN SCALES - WONG BAKER: WONGBAKER_NUMERICALRESPONSE: 0

## 2023-08-17 NOTE — CARE COORDINATION
CM submitted updated clinical documentation to [facility] as requested. CM will follow.

## 2023-08-18 LAB
GLUCOSE BLD-MCNC: 100 MG/DL (ref 70–99)
GLUCOSE BLD-MCNC: 87 MG/DL (ref 70–99)
GLUCOSE BLD-MCNC: 94 MG/DL (ref 70–99)

## 2023-08-18 PROCEDURE — 97116 GAIT TRAINING THERAPY: CPT

## 2023-08-18 PROCEDURE — 94640 AIRWAY INHALATION TREATMENT: CPT

## 2023-08-18 PROCEDURE — 97110 THERAPEUTIC EXERCISES: CPT

## 2023-08-18 PROCEDURE — 94761 N-INVAS EAR/PLS OXIMETRY MLT: CPT

## 2023-08-18 PROCEDURE — 6370000000 HC RX 637 (ALT 250 FOR IP): Performed by: NURSE PRACTITIONER

## 2023-08-18 PROCEDURE — 97530 THERAPEUTIC ACTIVITIES: CPT

## 2023-08-18 PROCEDURE — 1200000002 HC SEMI PRIVATE SWING BED

## 2023-08-18 PROCEDURE — 97535 SELF CARE MNGMENT TRAINING: CPT

## 2023-08-18 PROCEDURE — 82962 GLUCOSE BLOOD TEST: CPT

## 2023-08-18 PROCEDURE — 2700000000 HC OXYGEN THERAPY PER DAY

## 2023-08-18 RX ADMIN — GABAPENTIN 600 MG: 600 TABLET, FILM COATED ORAL at 18:34

## 2023-08-18 RX ADMIN — TORSEMIDE 10 MG: 10 TABLET ORAL at 08:33

## 2023-08-18 RX ADMIN — ROPINIROLE 2 MG: 2 TABLET, FILM COATED ORAL at 20:25

## 2023-08-18 RX ADMIN — ROPINIROLE 2 MG: 2 TABLET, FILM COATED ORAL at 08:33

## 2023-08-18 RX ADMIN — ONDANSETRON 4 MG: 4 TABLET, ORALLY DISINTEGRATING ORAL at 20:20

## 2023-08-18 RX ADMIN — APIXABAN 5 MG: 5 TABLET, FILM COATED ORAL at 20:20

## 2023-08-18 RX ADMIN — PANTOPRAZOLE SODIUM 40 MG: 40 TABLET, DELAYED RELEASE ORAL at 05:35

## 2023-08-18 RX ADMIN — MOMETASONE FUROATE AND FORMOTEROL FUMARATE DIHYDRATE 2 PUFF: 100; 5 AEROSOL RESPIRATORY (INHALATION) at 20:23

## 2023-08-18 RX ADMIN — HYDROCODONE BITARTRATE AND ACETAMINOPHEN 1 TABLET: 7.5; 325 TABLET ORAL at 02:00

## 2023-08-18 RX ADMIN — APIXABAN 5 MG: 5 TABLET, FILM COATED ORAL at 08:33

## 2023-08-18 RX ADMIN — ROPINIROLE 2 MG: 2 TABLET, FILM COATED ORAL at 15:01

## 2023-08-18 RX ADMIN — POLYVINYL ALCOHOL 1 DROP: 14 SOLUTION/ DROPS OPHTHALMIC at 10:56

## 2023-08-18 RX ADMIN — MOMETASONE FUROATE AND FORMOTEROL FUMARATE DIHYDRATE 2 PUFF: 100; 5 AEROSOL RESPIRATORY (INHALATION) at 08:10

## 2023-08-18 RX ADMIN — CHOLECALCIFEROL (VITAMIN D3) 10 MCG (400 UNIT) TABLET 400 UNITS: at 08:34

## 2023-08-18 RX ADMIN — GABAPENTIN 300 MG: 300 CAPSULE ORAL at 12:01

## 2023-08-18 RX ADMIN — MICONAZOLE NITRATE: 2 POWDER TOPICAL at 08:36

## 2023-08-18 RX ADMIN — DICLOFENAC SODIUM 2 G: 10 GEL TOPICAL at 08:34

## 2023-08-18 RX ADMIN — SENNOSIDES AND DOCUSATE SODIUM 2 TABLET: 50; 8.6 TABLET ORAL at 08:34

## 2023-08-18 RX ADMIN — GABAPENTIN 300 MG: 300 CAPSULE ORAL at 08:34

## 2023-08-18 RX ADMIN — ROSUVASTATIN CALCIUM 10 MG: 5 TABLET, FILM COATED ORAL at 20:20

## 2023-08-18 RX ADMIN — CYCLOBENZAPRINE 10 MG: 10 TABLET, FILM COATED ORAL at 03:51

## 2023-08-18 RX ADMIN — Medication 6 MG: at 20:20

## 2023-08-18 RX ADMIN — HYDROXYCHLOROQUINE SULFATE 200 MG: 200 TABLET ORAL at 09:00

## 2023-08-18 RX ADMIN — HYDROCODONE BITARTRATE AND ACETAMINOPHEN 1 TABLET: 7.5; 325 TABLET ORAL at 20:19

## 2023-08-18 RX ADMIN — CYCLOBENZAPRINE 10 MG: 10 TABLET, FILM COATED ORAL at 20:20

## 2023-08-18 RX ADMIN — POLYVINYL ALCOHOL 1 DROP: 14 SOLUTION/ DROPS OPHTHALMIC at 15:04

## 2023-08-18 RX ADMIN — METOPROLOL SUCCINATE 25 MG: 25 TABLET, EXTENDED RELEASE ORAL at 08:33

## 2023-08-18 ASSESSMENT — PAIN - FUNCTIONAL ASSESSMENT
PAIN_FUNCTIONAL_ASSESSMENT: ACTIVITIES ARE NOT PREVENTED
PAIN_FUNCTIONAL_ASSESSMENT: PREVENTS OR INTERFERES SOME ACTIVE ACTIVITIES AND ADLS
PAIN_FUNCTIONAL_ASSESSMENT: ACTIVITIES ARE NOT PREVENTED

## 2023-08-18 ASSESSMENT — PAIN SCALES - GENERAL
PAINLEVEL_OUTOF10: 0
PAINLEVEL_OUTOF10: 8
PAINLEVEL_OUTOF10: 9
PAINLEVEL_OUTOF10: 0
PAINLEVEL_OUTOF10: 8
PAINLEVEL_OUTOF10: 4
PAINLEVEL_OUTOF10: 7

## 2023-08-18 ASSESSMENT — PAIN DESCRIPTION - FREQUENCY
FREQUENCY: INTERMITTENT
FREQUENCY: INTERMITTENT
FREQUENCY: CONTINUOUS

## 2023-08-18 ASSESSMENT — PAIN DESCRIPTION - ONSET
ONSET: GRADUAL
ONSET: ON-GOING
ONSET: ON-GOING

## 2023-08-18 ASSESSMENT — PAIN DESCRIPTION - DESCRIPTORS
DESCRIPTORS: CRAMPING
DESCRIPTORS: SPASM
DESCRIPTORS: ACHING

## 2023-08-18 ASSESSMENT — PAIN DESCRIPTION - LOCATION
LOCATION: LEG
LOCATION: LEG
LOCATION: ABDOMEN

## 2023-08-18 ASSESSMENT — PAIN DESCRIPTION - ORIENTATION
ORIENTATION: RIGHT;LEFT
ORIENTATION: LOWER
ORIENTATION: RIGHT

## 2023-08-18 ASSESSMENT — PAIN DESCRIPTION - PAIN TYPE
TYPE: ACUTE PAIN
TYPE: CHRONIC PAIN
TYPE: ACUTE PAIN

## 2023-08-18 NOTE — CARE COORDINATION
Per Eula Ireland stay continues to be approved with next review due 8/20 Pt from home via [facility] for intoxication.

## 2023-08-19 LAB
GLUCOSE BLD-MCNC: 127 MG/DL (ref 70–99)
GLUCOSE BLD-MCNC: 136 MG/DL (ref 70–99)

## 2023-08-19 PROCEDURE — 2700000000 HC OXYGEN THERAPY PER DAY

## 2023-08-19 PROCEDURE — 6370000000 HC RX 637 (ALT 250 FOR IP): Performed by: NURSE PRACTITIONER

## 2023-08-19 PROCEDURE — 82962 GLUCOSE BLOOD TEST: CPT

## 2023-08-19 PROCEDURE — 94640 AIRWAY INHALATION TREATMENT: CPT

## 2023-08-19 PROCEDURE — 94761 N-INVAS EAR/PLS OXIMETRY MLT: CPT

## 2023-08-19 PROCEDURE — 1200000002 HC SEMI PRIVATE SWING BED

## 2023-08-19 PROCEDURE — 97535 SELF CARE MNGMENT TRAINING: CPT

## 2023-08-19 RX ADMIN — MICONAZOLE NITRATE: 2 POWDER TOPICAL at 20:32

## 2023-08-19 RX ADMIN — METOPROLOL SUCCINATE 25 MG: 25 TABLET, EXTENDED RELEASE ORAL at 08:52

## 2023-08-19 RX ADMIN — ROPINIROLE 2 MG: 2 TABLET, FILM COATED ORAL at 08:51

## 2023-08-19 RX ADMIN — HYDROCODONE BITARTRATE AND ACETAMINOPHEN 1 TABLET: 7.5; 325 TABLET ORAL at 20:33

## 2023-08-19 RX ADMIN — LOSARTAN POTASSIUM 25 MG: 25 TABLET, FILM COATED ORAL at 08:52

## 2023-08-19 RX ADMIN — GABAPENTIN 300 MG: 300 CAPSULE ORAL at 12:33

## 2023-08-19 RX ADMIN — ROPINIROLE 2 MG: 2 TABLET, FILM COATED ORAL at 20:32

## 2023-08-19 RX ADMIN — MOMETASONE FUROATE AND FORMOTEROL FUMARATE DIHYDRATE 2 PUFF: 100; 5 AEROSOL RESPIRATORY (INHALATION) at 07:57

## 2023-08-19 RX ADMIN — APIXABAN 5 MG: 5 TABLET, FILM COATED ORAL at 20:32

## 2023-08-19 RX ADMIN — PANTOPRAZOLE SODIUM 40 MG: 40 TABLET, DELAYED RELEASE ORAL at 04:07

## 2023-08-19 RX ADMIN — HYDROXYCHLOROQUINE SULFATE 200 MG: 200 TABLET ORAL at 08:51

## 2023-08-19 RX ADMIN — ROPINIROLE 2 MG: 2 TABLET, FILM COATED ORAL at 15:27

## 2023-08-19 RX ADMIN — GABAPENTIN 600 MG: 600 TABLET, FILM COATED ORAL at 17:22

## 2023-08-19 RX ADMIN — ROSUVASTATIN CALCIUM 10 MG: 5 TABLET, FILM COATED ORAL at 20:32

## 2023-08-19 RX ADMIN — POLYVINYL ALCOHOL 1 DROP: 14 SOLUTION/ DROPS OPHTHALMIC at 08:58

## 2023-08-19 RX ADMIN — CHOLECALCIFEROL (VITAMIN D3) 10 MCG (400 UNIT) TABLET 400 UNITS: at 08:52

## 2023-08-19 RX ADMIN — APIXABAN 5 MG: 5 TABLET, FILM COATED ORAL at 08:51

## 2023-08-19 RX ADMIN — GABAPENTIN 300 MG: 300 CAPSULE ORAL at 08:57

## 2023-08-19 RX ADMIN — TORSEMIDE 10 MG: 10 TABLET ORAL at 08:51

## 2023-08-19 RX ADMIN — CYCLOBENZAPRINE 10 MG: 10 TABLET, FILM COATED ORAL at 20:32

## 2023-08-19 RX ADMIN — SENNOSIDES AND DOCUSATE SODIUM 2 TABLET: 50; 8.6 TABLET ORAL at 08:51

## 2023-08-19 RX ADMIN — HYDROCODONE BITARTRATE AND ACETAMINOPHEN 1 TABLET: 7.5; 325 TABLET ORAL at 04:07

## 2023-08-19 RX ADMIN — MOMETASONE FUROATE AND FORMOTEROL FUMARATE DIHYDRATE 2 PUFF: 100; 5 AEROSOL RESPIRATORY (INHALATION) at 20:39

## 2023-08-19 RX ADMIN — Medication 6 MG: at 20:32

## 2023-08-19 RX ADMIN — CYCLOBENZAPRINE 10 MG: 10 TABLET, FILM COATED ORAL at 08:51

## 2023-08-19 RX ADMIN — POLYVINYL ALCOHOL 1 DROP: 14 SOLUTION/ DROPS OPHTHALMIC at 20:25

## 2023-08-19 ASSESSMENT — PAIN DESCRIPTION - FREQUENCY
FREQUENCY: INTERMITTENT

## 2023-08-19 ASSESSMENT — PAIN DESCRIPTION - PAIN TYPE
TYPE: CHRONIC PAIN

## 2023-08-19 ASSESSMENT — PAIN DESCRIPTION - ONSET
ONSET: GRADUAL
ONSET: AWAKENED FROM SLEEP
ONSET: GRADUAL

## 2023-08-19 ASSESSMENT — PAIN DESCRIPTION - ORIENTATION
ORIENTATION: LOWER
ORIENTATION: LOWER
ORIENTATION: MID

## 2023-08-19 ASSESSMENT — PAIN SCALES - GENERAL
PAINLEVEL_OUTOF10: 0
PAINLEVEL_OUTOF10: 0
PAINLEVEL_OUTOF10: 8
PAINLEVEL_OUTOF10: 0
PAINLEVEL_OUTOF10: 7
PAINLEVEL_OUTOF10: 7
PAINLEVEL_OUTOF10: 4

## 2023-08-19 ASSESSMENT — PAIN DESCRIPTION - LOCATION
LOCATION: BACK

## 2023-08-19 ASSESSMENT — PAIN DESCRIPTION - DESCRIPTORS
DESCRIPTORS: ACHING
DESCRIPTORS: ACHING
DESCRIPTORS: CRAMPING

## 2023-08-19 ASSESSMENT — PAIN - FUNCTIONAL ASSESSMENT
PAIN_FUNCTIONAL_ASSESSMENT: ACTIVITIES ARE NOT PREVENTED
PAIN_FUNCTIONAL_ASSESSMENT: PREVENTS OR INTERFERES SOME ACTIVE ACTIVITIES AND ADLS
PAIN_FUNCTIONAL_ASSESSMENT: PREVENTS OR INTERFERES SOME ACTIVE ACTIVITIES AND ADLS

## 2023-08-19 NOTE — FLOWSHEET NOTE
Occupational Therapy Treatment Note  Name: Alise Albarado MRN: [id number] :   1935   Date:  2023   Admission Date: 8/10/2023 Room:  [address] [place] [address]     Primary Problem:  Pt is an 82 yo female admitted to [facility] with Acute respiratory failure with hypoxia, Dx with CHF. Pt presents with increased SOB upon exertion, decreased strength and endurance and ADL status. Restrictions/Precautions:  Restrictions/Precautions  Restrictions/Precautions: Fall Risk, Up as Tolerated, Bed Alarm  Required Braces or Orthoses?: No     Communication with other providers:   Cleared for treatment by  RN. Subjective:  Patient states:  \"I am feeling a little better today, I have a pain patch on my stomach where I had pain yesterday\"  Pain:   Location, Type, Intensity (0/10 to 10/10):  Pt did not report any pain    Objective:    Observation:  Pt awake sitting up in chair agreeable to therapy  Objective Measures: Pt requested to not wear O2 while doing ADLs today, however during ambulation in the blanchard Pt had increased SOB, instructed on PLB. Returned to Pt's room and O2 was 84%, applied 2L of O2 and increased to 94%. Pt then completed ADLs to shower wearing O2, Pt became SOB and required increase to 3L on O2 tank. After returning to room O2 sats were 94%.       Treatment, including education:  Transfers  Sit to stand :Modified Independent from recliner,   Stand to sit :Modified Independent to recliner  Toilet: Modified Independent  Shower: Stand by assistance using shower bench, grab bars to trf from [facility] to shower bench    Self Care Training:   Activities performed today included the following:    Grooming  Modified Independent after s/u in chair to apply hair products/comb hair    Bathing   Stand By Assistance during shower as Pt washed hair, upper body/lower body, using grab bars when standing    UB Dress  Modified Independent to bertha shirt    LB Dress  Minimal Assistance to thread R foot through depends/shorts as Pt has
Occupational Therapy Treatment Note  Name: Corinne Morocco MRN: [id number] :   1935   Date:  8/10/2023   Admission Date: 8/10/2023 Room:  [address] [address]   Restrictions/Precautions:        Primary Problem:  Pt is an 82 yo female admitted to [facility] with Acute respiratory failure with hypoxia. Pt presents with increased SOB upon exertion, decreased strength and endurance and ADL status. Recommend OT to improve strength, endurance, functional mobility and to maximize indep with ADLs using AE and education on energy conservation. Communication with other providers:   Cleared for treatment by Simon Clayton RN. Subjective:  Patient states: Theotis Ken are seeing if I can stay for rehab\"  Pain:   Location, Type, Intensity (0/10 to 10/10):  back pain: I always have back pain, not rated during session today    Objective:    Observation:  Pt sitting on commode upon arrival    Treatment, including education:  Transfers    Sit to stand :Supervision, from recliner  Stand to sit :Supervision, to [address], with grab bar on left    Self Care Training:   Activities performed today included the following: Toileting  Supervision, x 2 Mod Ind hygiene after urination, reports she could use some toilet tongs for after BM's      Grooming  Ind, after set up from chair, supervision from standing at sink. Pt reports she has already brushed her teeth, from sitting she used mouthwash     Bathing   Minimal Assistance for UB sponge bathing and Mod A for LB sponge bathing after set up    [address], for changing gown    LB Dress  Supervision, pt changed adult protective undergarments x 2, reports she often can not make it in time due to urinary incontinence. Changed from commode level.   Min cues for sequencing, instructed to place R LE in first as it is weaker    Safety Measures: Gait belt used, Left up in the recliner, Pull/Bed Alarm activated and call light left in reach    Assessment / Impression:
Occupational Therapy Treatment Note  Name: Fariba Talavera MRN: [id number] :   1935   Date:  2023   Admission Date: 8/10/2023 Room:  [address] [place] [address]     Primary Problem:  Pt is an 80 yo female admitted to [facility] with Acute respiratory failure with hypoxia, Dx with CHF. Pt presents with increased SOB upon exertion, decreased strength and endurance and ADL status. Restrictions/Precautions:  Restrictions/Precautions  Restrictions/Precautions: Fall Risk, Up as Tolerated, Bed Alarm  Required Braces or Orthoses?: No     Communication with other providers:   Cleared for treatment by  RN. Subjective:  Patient states:  \"I am having pain in my R leg, inner thigh area\"  Pain:   Location, Type, Intensity (0/10 to 10/10):  Pt did not quantify pain level    Objective:    Observation:  Pt awake sitting up in chair finishing with PT in therapy gym. Pt agreeable to work with OT  Objective Measures: O2 sats dropped to 83 w/o O2 following exercises and amb back to room. Applied 2L of O2 and sats increased to 93%. Following ADLs with 2L O2 on, Pt went up to 98%. Treatment, including education:  Transfers  Sit to stand :Modified Independent from Rico ov[place]o WC  Stand to sit :Modified Independent to preet Gómez    Self Care Training:   Activities performed today included the following:    Grooming  Modified Independent to apply lotion to BLE seated in chair    Bathing   Minimal Assistance during sponge bath seated in chair, for thoroughness to wash buttocks    UB Dress  Modified Independent to bertha shirt    LB Dress  Minimal Assistance to thread R foot through depends/shorts as Pt reported increased RLE pain today and difficulty lifting R foot. Therapeutic Exercise:  Pt complete UE/LE therex to increase strength/endurance for ADLs/functional mobility. Pt completed 7 min on NuStep Lv 1 resistance 476 steps taking 3 resting breaks.        Therapeutic Activity Training:   Pt trf from [facility] and amb to [facility]
Occupational Therapy Treatment Note  Name: Wenceslao Rahman MRN: [id number] :   1935   Date:  2023   Admission Date: 8/10/2023 Room:  [address] [place] [address]   Restrictions/Precautions:  Restrictions/Precautions  Restrictions/Precautions: Fall Risk, Up as Tolerated, Bed Alarm  Required Braces or Orthoses?: No     Primary Problem:  Pt is an 80 yo female admitted to [facility] with Acute respiratory failure with hypoxia, Dx with CHF. Pt presents with increased SOB upon exertion, decreased strength and endurance and ADL status. Communication with other providers:   Cleared for treatment by Harsh Melo RN. Subjective:  Patient states:  \"I was feeling dizzy this morning, I thought I would get to go home, but now I don't think I can\"  Pain:   Location, Type, Intensity (0/10 to 10/10):  No c/o pain during session    Objective:    Observation:  Sitting up in chair, dozing off, reports she did not sleep well last night. Objective Measures:  O@ sats at rest with 2L nasal cannul, 87% initially, with some deep breathing able to get it up to 92%. After ADL's it was at 96% still with 2L nasal cannula. Treatment, including education:  Transfers    Sit to stand :Supervision from recliner, due to earlier dizziness  Stand to sit :Supervision, to recliner, due to earlier dizziness  Toilet:Supervision, with grab bar on L      Self Care Training:   Activities performed today included the following: Toileting  Supervision Pt performed pant management and hygiene after urinating. Opt is considering toilet tongs for at home and recommended bidet as well    Grooming  Supervision, pt stood at sink to brush her dentures and complete oral care x 2 minutes    Bathing   Minimal Assistance, from recliner chair with set up washed except for feet and back which I completed for her.       UB Dress  Modified Independent, from istting in chair able to doff and bertha overhead shirts    LB Dress  Supervision, from Drip In, to
Physical Therapy Daily Treatment Note   Date: 2023 Room: [address] [place] [address]    Name: Blanco Mendieta : 1935   MRN: [id number] Admission Date:8/10/2023      Restrictions/Precautions: fall risk    Initial Pain level: 0/10. Subjective/Observation: Pt presented in chair. She reports she had terrible stomach cramps last night and had to get up and use the bathroom 2x last night. She reports she woke up about 12:30 am and did not go back to sleep last n ight and today she is feeling draggy. Pt removed O2 and O2 sat was 96%     Communication with other providers: Pt left with SNOW in therapy gym. Therapeutic Activities/Neuro Re-Education/Gait Training   Date: 23   Date: 8/15/23 Date: 23 Date: 23   Bed   Mobility   x     x   x   Not performed. STS   Transfer   SBA     SBA   SUP Sit to stand: mod I  Stand to sit: mod I   Commode Transfer   x     x   SUP Supervision    Pt able to perform hubert care   Sitting Balance  Dynamic sitting balance for LE dressing with SBA pt able to reach to feet without LOB x Not performed. Standing Balance     CGA for amb SBA for amb  Short dynamic standing bouts for LE dressing including short SL stance to remove shorts with CGA and UE support on RW, no LOB. Pt educated on increased safety with dressing in seated position. SBA for amb Pt stood at the sink to brush teeth with mod I x4'. She also stood several other times while PT took O2 sats. Ambulation 50ft with RW and CGA. Short standing rest at wall as pt states she is \"not feeling well\". No SOB, however mild dizziness reported. After short break, pt able to ambulate back to room without symptoms. 2x40ft with RW and SBA. No dizziness this date 75ft x3 throughout session with RW and SBA. Long seated breaks between. Forward flexed posture that improved with verbal cues. Pt reports SOB after ambulation. SPO2 90%.  Upon returning to room, pt requests O2 back on Pt ambulated in the blanchard x55 feet with
Physical Therapy Daily Treatment Note   Date: 2023 Room: [address] [place] [address]    Name: Samuel Park : 1935   MRN: [id number] Admission Date:8/10/2023      Restrictions/Precautions: fall risk    Initial Pain level: 0/10. Subjective/Observation: Pt presented in chair. She reports she had terrible stomach cramps last night and had to get up and use the bathroom 2x last night. She reports she woke up about 12:30 am and did not go back to sleep last n ight and today she is feeling draggy. Pt removed O2 and O2 sat was 96%     Communication with other providers: Pt left with SNOW in therapy gym. Therapeutic Activities/Neuro Re-Education/Gait Training   Date: 23   Date: 8/15/23 Date: 23 Date: 23   Bed   Mobility   x     x   x   Not performed. SBA   STS   Transfer   SBA     SBA   SUP Sit to stand: mod I  Stand to sit: mod I  Sit to stand: mod I  Stand to sit: mod I   Commode Transfer   x     x   SUP Supervision    Pt able to perform hubert care  Supervision    Pt able to perform hubert care   Sitting Balance  Dynamic sitting balance for LE dressing with SBA pt able to reach to feet without LOB x Not performed. x   Standing Balance     CGA for amb SBA for amb  Short dynamic standing bouts for LE dressing including short SL stance to remove shorts with CGA and UE support on RW, no LOB. Pt educated on increased safety with dressing in seated position. SBA for amb Pt stood at the sink to brush teeth with mod I x4'. She also stood several other times while PT took O2 sats. SBA for amb     Ambulation 50ft with RW and CGA. Short standing rest at wall as pt states she is \"not feeling well\". No SOB, however mild dizziness reported. After short break, pt able to ambulate back to room without symptoms. 2x40ft with RW and SBA. No dizziness this date 75ft x3 throughout session with RW and SBA. Long seated breaks between. Forward flexed posture that improved with verbal cues.  Pt reports SOB after
Physical Therapy Daily Treatment Note   Date: 2023 Room: [address] [place] [address]    Name: Samantha Hammond : 1935   MRN: [id number] Admission Date:8/10/2023      Restrictions/Precautions: fall risk    Initial Pain level: Pt reports L shoulder pain but does not rate    Subjective/Observation: \"let's get up and walk\"    Communication with other providers: Therapeutic Activities/Neuro Re-Education/Gait Training   Date: 23   Date: 8/15/23 Date: 23 Date:    Bed   Mobility   x     x   x      STS   Transfer   SBA     SBA   SUP    Commode Transfer   x     x   SUP    Sitting Balance  Dynamic sitting balance for LE dressing with SBA pt able to reach to feet without LOB x    Standing Balance     CGA for amb SBA for amb  Short dynamic standing bouts for LE dressing including short SL stance to remove shorts with CGA and UE support on RW, no LOB. Pt educated on increased safety with dressing in seated position. SBA for amb      Ambulation 50ft with RW and CGA. Short standing rest at wall as pt states she is \"not feeling well\". No SOB, however mild dizziness reported. After short break, pt able to ambulate back to room without symptoms. 2x40ft with RW and SBA. No dizziness this date 75ft x3 throughout session with RW and SBA. Long seated breaks between. Forward flexed posture that improved with verbal cues. Pt reports SOB after ambulation. SPO2 90%.  Upon returning to room, pt requests O2 back on      Therapeutic Exercises   Date: 23   Date:  Date:  Date:   Ankle Pumps/ Circles 3x20 BLE      LAQ 3x20 BLE      Seated march 3x20 BLE          Education provided:       Treatment/Activity Tolerance:   [] Patient limited by fatigue   [] Patient limited by pain   [] Patient limited by other medical complications   [x] Patient tolerated therapy well  [] Other:     Safety Precautions:     [] Left in bed    [x] Left in chair   [x] Call light within reach    [] Bed alarm on    [] Personal alarm on    [] Other staff
Physical Therapy Daily Treatment Note   Date: 2023 Room: [address] [place] [address]    Name: Forrest Scott : 1935   MRN: [id number] Admission Date:8/10/2023      Restrictions/Precautions: fall risk    Initial Pain level: 0/10    Subjective/Observation: \"I had an episode of vomiting this morning and now I feel dizzy\"    Communication with other providers: nursing notified of patient's symptoms, blood sugar checked: 111 mmol/L      Therapeutic Activities/Neuro Re-Education/Gait Training   Date: 23   Date:  Date:  Date:    Bed   Mobility   x          STS   Transfer   SBA        Commode Transfer   x        Standing Balance     CGA for amb        Ambulation 50ft with RW and CGA. Short standing rest at wall as pt states she is \"not feeling well\". No SOB, however mild dizziness reported. After short break, pt able to ambulate back to room without symptoms. Stairs   x          Therapeutic Exercises   Date:    Date:  Date:  Date:                     Education provided:       Treatment/Activity Tolerance:   [x] Patient limited by fatigue   [] Patient limited by pain   [] Patient limited by other medical complications   [] Patient tolerated therapy well  [x] Other: dizziness    Safety Precautions:     [] Left in bed    [x] Left in chair   [x] Call light within reach    [] Bed alarm on    [] Personal alarm on    [] Other staff present:  [] Family/Caregiver present:      Post Tx Pain Ratin/10       Evaluation Assessment  8/10/23  Assessment: Pt is a pleasant 82 yo female admitted for 2 falls at home and decreased O2 sat and bp in ED. Patient now transitioning to swingbed. Pt would benefit from skilled PT to address decreased ROM, strength, balance, and functional mobility.     Discharge Recommendations:  Home with assist PRN, Home with Home health PT        Social/Functional History:  Lives With: Alone  Type of Home: Apartment  Home Layout: One level  Home Access:  (1 Step to enter building)  Entrance Stairs -
Physical Therapy Daily Treatment Note   Date: 8/15/2023 Room: [address] [place] [address]    Name: Corinne Morocco : 1935   MRN: [id number] Admission Date:8/10/2023      Restrictions/Precautions: fall risk    Initial Pain level: 0/10    Subjective/Observation: Patient sitting in chair, agreeable to therapy    Communication with other providers: Therapeutic Activities/Neuro Re-Education/Gait Training   Date: 23   Date: 8/15/23 Date:  Date:    Bed   Mobility   x     x       STS   Transfer   SBA     SBA     Commode Transfer   x     x     Sitting Balance  Dynamic sitting balance for LE dressing with SBA pt able to reach to feet without LOB     Standing Balance     CGA for amb SBA for amb  Short dynamic standing bouts for LE dressing including short SL stance to remove shorts with CGA and UE support on RW, no LOB. Pt educated on increased safety with dressing in seated position. Ambulation 50ft with RW and CGA. Short standing rest at wall as pt states she is \"not feeling well\". No SOB, however mild dizziness reported. After short break, pt able to ambulate back to room without symptoms. 2x40ft with RW and SBA. No dizziness this date       Therapeutic Exercises   Date:    Date:  Date:  Date:                     Education provided:       Treatment/Activity Tolerance:   [x] Patient limited by fatigue   [] Patient limited by pain   [] Patient limited by other medical complications   [] Patient tolerated therapy well  [] Other:     Safety Precautions:     [] Left in bed    [x] Left in chair   [x] Call light within reach    [] Bed alarm on    [] Personal alarm on    [] Other staff present:  [] Family/Caregiver present:      Post Tx Pain Ratin/10       Evaluation Assessment  8/10/23  Assessment: Pt is a pleasant 82 yo female admitted for 2 falls at home and decreased O2 sat and bp in ED. Patient now transitioning to swingbed.  Pt would benefit from skilled PT to address decreased ROM, strength, balance, and
Physical Therapy Treatment Note   Date: 2023 Room: [unfilled]   Name: Alise Albarado : 1935   MRN: [id number] Admission Date:8/10/2023    Primary Problem:   Past Medical History:   Diagnosis Date    Arthritis     left knee pain, sees Dr. Elder Cain    CAD (coronary artery disease)     sees Dr. Matilda Wells    Chronic midline low back pain without sciatica 2016    Had PT in     Claustrophobia     Colonoscopy refused     discussed in 7/15    COVID-19 virus infection 2023    DM (diabetes mellitus), type 2 ([facility]) 2006    Dupuytren's contracture of right hand     Endometrial stripe increased 2014    Saw NP Flash Kelsey. Was normal exam per pt. Gastrointestinal hemorrhage 2015    Patient had GI bleeding. Colon refused. Discussed with patient in detail. Glaucoma 2014    H/O Doppler ultrasound 06/15/2023    Significant reflux noted of the Right GSV at distal calf (0.6s). Significant reflux noted in the Left GSV at mid thigh tributary (0.5s). Most likely would need varithena on left leg. H/O echocardiogram 10/02/2014    Mildly depressed left ventricular function; ejection fraction of 45%. Moderate pulmonary hypertension. H/O echocardiogram 2018    EF 50-55%. Mitral annular calcification is present. No other significant valvulopathy seen. H/O echocardiogram 06/15/2023    EF 55-60%. Grade I diastolic dysfunction. The left atrium is mildly dilated. Mitral annular calcification is present. History of nuclear stress test 2018    EF 59%. ECG portion of stress test is negative for ischemia by diagnostic criteria. fixed inferior large size severe defect in rest and stress images. Mild hubert infarct ischemia.     HTN (hypertension)     Hyperlipidemia     Kidney stone     hx-\"been about 3 yrs ago\"    MI (myocardial infarction) ([address]) 1998    treated with TPA    Obesity     Open wound of right foot 2020    Parainfluenza infection 2021    Pneumonia of right
Patient's tolerance of treatment: Good, was ready for O2 upon return to room   Adverse Reaction: None  Significant change in status and impact:  None  Barriers to improvement:  Dec strength and endurance    Plan for Next Session:    Continue with POC. Time in:  15:03  Time out:  15:48  Total treatment time:  45 minutes  Billed Units: Therapeutic activity x 1, exercise x 2  Electronically signed by:    Siva Saavedra OT/JESSENIA [id number]    8/16/2023, 3:55 PM    Previously filed values:  Patient Goals   Patient goals :  To get well enough to go back ot her apartment  Short Term Goals  Time Frame for Short Term Goals: Until DC or goals met  Short Term Goal 1: Pt will complete toileting with Mod Ind, and have good understanding of adaptive techniques  Short Term Goal 2: Pt will complete UE bathing/dressing with sup  Short Term Goal 3: Pt will complete LE bathing/dressing with Min A  Short Term Goal 4: Pt will complete toileting mod I  Short Term Goal 5: Pt will complete 10-20 min of BUE therex to increase strength/endurance for trfs/ADLs

## 2023-08-19 NOTE — CARE COORDINATION
Received call from pt's nurse [facility], pt/family requesting [phone] Referral to [address] [place] [address] OF [place], [facility].. DISCHARGING NURSE: Please call [facility] [address] [facility] [phone]) to notify pt was discharged. Also, [phone] fax number is [phone]. Fax the AVS, d/c Summary if available & Inpatient Home Health Needs order to the above number. If the fax fails to send call [phone] to receive a additional fax number to send the mentioned information.

## 2023-08-20 VITALS
OXYGEN SATURATION: 95 % | HEART RATE: 62 BPM | DIASTOLIC BLOOD PRESSURE: 48 MMHG | WEIGHT: 229 LBS | HEIGHT: 64 IN | RESPIRATION RATE: 16 BRPM | BODY MASS INDEX: 39.09 KG/M2 | SYSTOLIC BLOOD PRESSURE: 102 MMHG | TEMPERATURE: 97.3 F

## 2023-08-20 LAB — GLUCOSE BLD-MCNC: 99 MG/DL (ref 70–99)

## 2023-08-20 PROCEDURE — 6370000000 HC RX 637 (ALT 250 FOR IP): Performed by: NURSE PRACTITIONER

## 2023-08-20 PROCEDURE — 94640 AIRWAY INHALATION TREATMENT: CPT

## 2023-08-20 PROCEDURE — 82962 GLUCOSE BLOOD TEST: CPT

## 2023-08-20 RX ORDER — LIDOCAINE 4 G/G
1 PATCH TOPICAL DAILY
Qty: 30 EACH | Refills: 0 | Status: SHIPPED | OUTPATIENT
Start: 2023-08-20

## 2023-08-20 RX ORDER — TORSEMIDE 10 MG/1
10 TABLET ORAL DAILY
Qty: 30 TABLET | Refills: 0 | Status: SHIPPED | OUTPATIENT
Start: 2023-08-20

## 2023-08-20 RX ADMIN — TORSEMIDE 10 MG: 10 TABLET ORAL at 08:36

## 2023-08-20 RX ADMIN — APIXABAN 5 MG: 5 TABLET, FILM COATED ORAL at 08:37

## 2023-08-20 RX ADMIN — METOPROLOL SUCCINATE 25 MG: 25 TABLET, EXTENDED RELEASE ORAL at 08:38

## 2023-08-20 RX ADMIN — PANTOPRAZOLE SODIUM 40 MG: 40 TABLET, DELAYED RELEASE ORAL at 04:59

## 2023-08-20 RX ADMIN — HYDROXYCHLOROQUINE SULFATE 200 MG: 200 TABLET ORAL at 08:37

## 2023-08-20 RX ADMIN — MOMETASONE FUROATE AND FORMOTEROL FUMARATE DIHYDRATE 2 PUFF: 100; 5 AEROSOL RESPIRATORY (INHALATION) at 09:02

## 2023-08-20 RX ADMIN — LOSARTAN POTASSIUM 25 MG: 25 TABLET, FILM COATED ORAL at 08:36

## 2023-08-20 RX ADMIN — CHOLECALCIFEROL (VITAMIN D3) 10 MCG (400 UNIT) TABLET 400 UNITS: at 08:36

## 2023-08-20 RX ADMIN — GABAPENTIN 300 MG: 300 CAPSULE ORAL at 08:37

## 2023-08-20 RX ADMIN — SENNOSIDES AND DOCUSATE SODIUM 2 TABLET: 50; 8.6 TABLET ORAL at 08:36

## 2023-08-20 RX ADMIN — ROPINIROLE 2 MG: 2 TABLET, FILM COATED ORAL at 08:36

## 2023-08-20 NOTE — PLAN OF CARE
Problem: Safety - Adult  Goal: Free from fall injury  8/14/2023 0915 by Ken Bliss RN  Outcome: Progressing  8/13/2023 2310 by Marie Stacy RN  Outcome: Progressing     Problem: Discharge Planning  Goal: Discharge to home or other facility with appropriate resources  8/14/2023 0915 by Ken Bliss RN  Outcome: Progressing  8/13/2023 2310 by Marie Stacy RN  Outcome: Progressing     Problem: Pain  Goal: Verbalizes/displays adequate comfort level or baseline comfort level  8/14/2023 0915 by Ken Bliss RN  Outcome: Progressing  8/13/2023 2310 by Marie Stacy RN  Outcome: Progressing     Problem: ABCDS Injury Assessment  Goal: Absence of physical injury  8/14/2023 0915 by Ken Bliss RN  Outcome: Progressing  8/13/2023 2310 by Marie Stacy RN  Outcome: Progressing
Problem: Safety - Adult  Goal: Free from fall injury  8/15/2023 2054 by Aj Noris, RN  Outcome: Progressing  8/15/2023 1507 by Aj Noris, RN  Outcome: Progressing     Problem: Discharge Planning  Goal: Discharge to home or other facility with appropriate resources  8/15/2023 2054 by Aj Noris, RN  Outcome: Progressing  8/15/2023 1507 by Aj Noris, RN  Outcome: Progressing     Problem: Pain  Goal: Verbalizes/displays adequate comfort level or baseline comfort level  8/15/2023 2054 by Aj Noris, RN  Outcome: Progressing  8/15/2023 1507 by Aj Noris, RN  Outcome: Progressing     Problem: ABCDS Injury Assessment  Goal: Absence of physical injury  8/15/2023 2054 by Aj Noris, RN  Outcome: Progressing  8/15/2023 1507 by [facility], RN  Outcome: Progressing     Problem: Sleep Disturbance  Goal: Will exhibit normal sleeping pattern  Description: INTERVENTIONS:  1. Administer medication as ordered  2. Decrease environmental stimuli, including noise, as appropriate  3.  Discourage social isolation and naps during the day  8/15/2023 2054 by Aj Noris, RN  Outcome: Progressing  8/15/2023 1507 by [facility], RN  Outcome: Progressing     Problem: Chronic Conditions and Co-morbidities  Goal: Patient's chronic conditions and co-morbidity symptoms are monitored and maintained or improved  8/15/2023 2054 by Aj Noris, RN  Outcome: Progressing  8/15/2023 1507 by [facility], RN  Outcome: Progressing     Problem: Cardiovascular - Adult  Goal: Maintains optimal cardiac output and hemodynamic stability  8/15/2023 2054 by Aj Noris, RN  Outcome: Progressing  8/15/2023 1507 by Aj Noris, RN  Outcome: Progressing     Problem: Respiratory - Adult  Goal: Achieves optimal ventilation and oxygenation  8/15/2023 2054 by Aj Noris, RN  Outcome: Progressing  8/15/2023 1507 by Aj Noris, RN  Outcome: Progressing     Problem: Skin/Tissue Integrity -
Problem: Safety - Adult  Goal: Free from fall injury  8/19/2023 2142 by Napoleon Carrillo RN  Outcome: Progressing  8/19/2023 1011 by Yaneli Hatfield RN  Outcome: Progressing     Problem: Discharge Planning  Goal: Discharge to home or other facility with appropriate resources  8/19/2023 2142 by Napoleon Carrillo RN  Outcome: Progressing  8/19/2023 1011 by Yaneli Hatfield RN  Outcome: Progressing     Problem: Pain  Goal: Verbalizes/displays adequate comfort level or baseline comfort level  8/19/2023 2142 by Napoleon Carrillo RN  Outcome: Progressing  8/19/2023 1011 by Yaneli Hatfield RN  Outcome: Progressing     Problem: ABCDS Injury Assessment  Goal: Absence of physical injury  8/19/2023 2142 by Napoleon Carrillo RN  Outcome: Progressing  8/19/2023 1011 by Yaneli Hatfield RN  Outcome: Progressing     Problem: Sleep Disturbance  Goal: Will exhibit normal sleeping pattern  Description: INTERVENTIONS:  1. Administer medication as ordered  2. Decrease environmental stimuli, including noise, as appropriate  3.  Discourage social isolation and naps during the day  8/19/2023 2142 by Napoleon Carrillo RN  Outcome: Progressing  8/19/2023 1011 by Yaneli Hatfield RN  Outcome: Progressing     Problem: Chronic Conditions and Co-morbidities  Goal: Patient's chronic conditions and co-morbidity symptoms are monitored and maintained or improved  8/19/2023 2142 by Napoleon Carrillo RN  Outcome: Progressing  8/19/2023 1011 by Yaneli Hatfield RN  Outcome: Progressing     Problem: Respiratory - Adult  Goal: Achieves optimal ventilation and oxygenation  8/19/2023 2142 by Napoleon Carrillo RN  Outcome: Progressing  8/19/2023 1011 by Yaneli Hatfield RN  Outcome: Progressing     Problem: Skin/Tissue Integrity - Adult  Goal: Skin integrity remains intact  8/19/2023 2142 by Napoleon Carrillo RN  Outcome: Progressing  8/19/2023 1011 by Yaneli Hatfield RN  Outcome: Progressing     Problem: Musculoskeletal - Adult  Goal: Return mobility to safest level of
Problem: Safety - Adult  Goal: Free from fall injury  Outcome: Progressing     Problem: Discharge Planning  Goal: Discharge to home or other facility with appropriate resources  Outcome: Progressing     Problem: Pain  Goal: Verbalizes/displays adequate comfort level or baseline comfort level  Outcome: Progressing
Problem: Safety - Adult  Goal: Free from fall injury  Outcome: Progressing     Problem: Discharge Planning  Goal: Discharge to home or other facility with appropriate resources  Outcome: Progressing     Problem: Pain  Goal: Verbalizes/displays adequate comfort level or baseline comfort level  Outcome: Progressing     Problem: ABCDS Injury Assessment  Goal: Absence of physical injury  Outcome: Progressing
Problem: Safety - Adult  Goal: Free from fall injury  Outcome: Progressing     Problem: Discharge Planning  Goal: Discharge to home or other facility with appropriate resources  Outcome: Progressing     Problem: Pain  Goal: Verbalizes/displays adequate comfort level or baseline comfort level  Outcome: Progressing     Problem: ABCDS Injury Assessment  Goal: Absence of physical injury  Outcome: Progressing
Problem: Safety - Adult  Goal: Free from fall injury  Outcome: Progressing     Problem: Discharge Planning  Goal: Discharge to home or other facility with appropriate resources  Outcome: Progressing     Problem: Pain  Goal: Verbalizes/displays adequate comfort level or baseline comfort level  Outcome: Progressing     Problem: ABCDS Injury Assessment  Goal: Absence of physical injury  Outcome: Progressing     Problem: Sleep Disturbance  Goal: Will exhibit normal sleeping pattern  Description: INTERVENTIONS:  1. Administer medication as ordered  2. Decrease environmental stimuli, including noise, as appropriate  3.  Discourage social isolation and naps during the day  Outcome: Progressing     Problem: Chronic Conditions and Co-morbidities  Goal: Patient's chronic conditions and co-morbidity symptoms are monitored and maintained or improved  Outcome: Progressing     Problem: Cardiovascular - Adult  Goal: Maintains optimal cardiac output and hemodynamic stability  Outcome: Progressing     Problem: Respiratory - Adult  Goal: Achieves optimal ventilation and oxygenation  Outcome: Progressing     Problem: Skin/Tissue Integrity - Adult  Goal: Skin integrity remains intact  Outcome: Progressing     Problem: Musculoskeletal - Adult  Goal: Return mobility to safest level of function  Outcome: Progressing  Goal: Maintain proper alignment of affected body part  Outcome: Progressing  Goal: Return ADL status to a safe level of function  Outcome: Progressing
integrity remains intact  8/19/2023 1011 by Christin Muller RN  Outcome: Progressing  8/19/2023 0222 by Miguel Angel Lyon RN  Outcome: Progressing     Problem: Musculoskeletal - Adult  Goal: Return mobility to safest level of function  8/19/2023 1011 by Christin Muller RN  Outcome: Progressing  8/19/2023 0222 by Miguel Angel Lyon RN  Outcome: Progressing  Goal: Maintain proper alignment of affected body part  8/19/2023 1011 by Christin Muller RN  Outcome: Progressing  8/19/2023 0222 by Miguel Angel Lyon RN  Outcome: Progressing  Goal: Return ADL status to a safe level of function  8/19/2023 1011 by Christin Muller RN  Outcome: Progressing  8/19/2023 0222 by Miguel Angel Lyon RN  Outcome: Progressing
of function  8/20/2023 0957 by Christiana Porras RN  Outcome: Completed  8/19/2023 2142 by Joya Cruz RN  Outcome: Progressing  Goal: Maintain proper alignment of affected body part  8/20/2023 0957 by Christiana Porras RN  Outcome: Completed  8/19/2023 2142 by Joya Cruz RN  Outcome: Progressing  Goal: Return ADL status to a safe level of function  8/20/2023 0957 by Christiana Porras RN  Outcome: Completed  8/19/2023 2142 by Joya Cruz RN  Outcome: Progressing     Problem: Skin/Tissue Integrity - Adult  Goal: Skin integrity remains intact  8/20/2023 0957 by Christiana Porras RN  Outcome: Completed  8/19/2023 2142 by Joya Cruz RN  Outcome: Progressing

## 2023-08-20 NOTE — PROGRESS NOTES
08/19/23 0943   Resting (Room Air)   SpO2 83   HR 80   Resting (On O2)   SpO2 95   HR 80   O2 Flow Rate (l/min) 2 l/min   After Walk   Does the Patient Qualify for Home O2 Yes   Liter Flow at Rest 2   Does the Patient Need Portable Oxygen Tanks Yes
[address] [place] [address] triage RN Hortencia notified of pt's dc. Contact information for nurses station given to [address] [place] [address] in case of further questions or needs.
Discharge instructions reviewed with pt who states understanding. All belongings from closet packed for pt for dc.
Elvira Johnson called and left  re: [facility]. Awaiting callback.
Facility/Department: [facility] UNIT  Physical Therapy Initial Assessment    Name Blanco Mendieta    1935   MRN [id number]   Date of Service 8/10/2023   Patient Room  010/010-01     Patient Diagnoses Physical Debility   Past Medical History  has a past medical history of Arthritis, CAD (coronary artery disease), Chronic midline low back pain without sciatica, Claustrophobia, Colonoscopy refused, COVID-19 virus infection, DM (diabetes mellitus), type 2 ([address]), Dupuytren's contracture of right hand, Endometrial stripe increased, Gastrointestinal hemorrhage, Glaucoma, H/O Doppler ultrasound, H/O echocardiogram, H/O echocardiogram, H/O echocardiogram, History of nuclear stress test, HTN (hypertension), Hyperlipidemia, Kidney stone, MI (myocardial infarction) ([address]), Obesity, Open wound of right foot, Parainfluenza infection, Pneumonia of right lower lobe due to infectious organism, Vertigo, and WD-Traumatic ulcer of foot, right, with fat layer exposed ([address]). Past Surgical History  has a past surgical history that includes Cataract removal (Bilateral); Inguinal hernia repair (Left, 45 yrs ago); Lumbar spine surgery (N/A, 2022); and Spine surgery (N/A, 2022). Discharge Recommendations:  Home with assist PRN, Home with Home health PT        Assessment: Body Structures, Functions, Activity Limitations Requiring Skilled Therapeutic Intervention: Decreased functional mobility ; Decreased ADL status; Decreased ROM; Decreased body mechanics; Decreased tolerance to work activity; Decreased strength;Decreased safe awareness;Decreased endurance;Decreased balance;Decreased high-level IADLs;Decreased coordination; Increased pain;Decreased posture  Assessment: Pt is a pleasant 80 yo female admitted for 2 falls at home and decreased O2 sat and bp in ED. Patient now transitioning to swingbed. Pt would benefit from skilled PT to address decreased ROM, strength, balance, and functional mobility.      Treatment
Met with Patient today. Patient and I discussed the Reflections/Inspirations  for her activity and Patient was left with a coloring sheet. Patient said she has no other activity needs at this time. Patient said she did enjoy the book also.   Cody James,  Activities Asst.
Met with Patient today. Patient enjoyed our activity of having a vase of hand picked garden flowers. Patient and I had a discussion about the beautiful hurst she has around her home and her special vases of hurst. She loves all hurst of any type. Patient said she has no other activity needs at this time.   Matthew Cruz,  Activities Asst.
Met with Patient today. Patient showed me her flower that she finished painting and we also played Would you Rather Game today which she enjoyed and we also had a discussion about [place] and all the things you can do there along with the [place] ride to the [place]. Patient said she has no other activity needs at this time.   Darius Costa,  Activities Asst.
Met with Patient today. Patient wanted to participate in the painting activity today, but wanted to paint later today after her dinner. Patient was left with the wooden flower and paints/brush for her activity later. Patient did say she likes to do word search also. Patient was left with a word search/pen. 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 Patient said she has no other activity needs at this time.   Mariano Marx,  Activities Asst.
Met with Patient today. Patient was showing me her work search for her activity and we discussed the work search page she was working on. Patient said she did not have any other activity needs at this time.   Yessi Cortes Asst.
Met with Patient. Patient was resting. I left a Reflection/Inspiration for an activity which the Patient can read at her leisure. Patient has no other activity needs at this time.   Rochelle Miller,  Activities Asst.
Met with patient today for swing bed activity for weekend. Patient enjoyed the reminiscing and pictures of the beautiful butterflies and the variety that she would see on her flowers in the pots on her porch. Patient read the positive reflection and we talked about it. Patient also appreciative of the Sunday spiritual reflection for tomorrow. Family into visit that patient was looking forward to seeing.  No current activity request.  Kanchan Dickinsonter  
Met with patient today for swing bed activity with visit with pet therapy dog and talked with owner. Patient recalling visit from [facility] when patient was here previously. Patient very talkative as she really enjoyed visit. Patient currently has no additional activity request or needs at this time.   Gail Villafana  
O 2 saturation at rest on room air = 83 %    O 2 saturation on 2 LPM at rest = 95 %    Patient qualifies for home oxygen    Issac Moreno
Occupational Therapy    Occupational Therapy Treatment Note  Name: Josue Medina MRN: [id number] :   1935   Date:  8/15/2023   Admission Date: 8/10/2023 Room:  [address] [place] [address]     Primary Problem:  Pt is an 82 yo female admitted to [facility] with Acute respiratory failure with hypoxia, Dx with CHF. Pt presents with increased SOB upon exertion, decreased strength and endurance and ADL status. Restrictions/Precautions:  Restrictions/Precautions  Restrictions/Precautions: Fall Risk, Up as Tolerated, Bed Alarm  Required Braces or Orthoses?: No     Communication with other providers:   Cleared for treatment by  RN. Subjective:  Patient states:  \"I want a shower today but I want a girl\"  Pain:   Location, Type, Intensity (0/10 to 10/10):  6/10 legs had pain meds    Objective:    Observation:  pt alert and oriented up in recliner      Treatment, including education:  Transfers    Sit to stand :Contact Guard Assistance  Stand to sit :[address] Training:   Activities performed today included the following: Toileting  Stand By Assistance with clothing management and toilet hygiene. Therapeutic Exercise: Pt completed UE exercises to increase strength and ROM to facilitate increase for ADLs and functional mobility. Pt completed Nustep on Level 2 x 10 Min for 560 steps with 3 rest breaks. Therapeutic Activity Training: pt completed functional mobility with RW x 50-60' x 3 attempts with SBA. Pt stood x 5 min with CGA with RW to facilitate increased endurance/strength for ADL tasks and transfers.           Safety Measures: Gait belt used, up in the recliner, Pull/Bed Alarm activated and call light left in reach        Assessment / Impression:        Patient's tolerance of treatment: Good   Adverse Reaction: None  Significant change in status and impact:  None  Barriers to improvement:  Dec strength and
Occupational Therapy    Occupational Therapy Treatment Note  Name: Nakul Godfrey MRN: [id number] :   1935   Date:  2023   Admission Date: 8/10/2023 Room:  [address] [place] [address]     Primary Problem:  Pt is an 80 yo female admitted to [facility] with Acute respiratory failure with hypoxia, Dx with CHF. Pt presents with increased SOB upon exertion, decreased strength and endurance and ADL status. Restrictions/Precautions:  Restrictions/Precautions  Restrictions/Precautions: Fall Risk, Up as Tolerated, Bed Alarm  Required Braces or Orthoses?: No     Communication with other providers:   Cleared for treatment by  RN. Subjective:  Patient states:  \"my brother was brought in hospital and isn't doing well\" pt was tearful during session. Pain:   Location, Type, Intensity (0/10 to 10/10):  5/10 legs had pain meds    Objective:    Observation:  pt alert and oriented up in recliner      Treatment, including education:  Transfers    Sit to stand :Contact Guard Assistance  Stand to sit :Contact Guard Assistance  SPT:Contact Guard Assistance        Therapeutic Exercise: Pt completed UE exercises to increase strength and ROM to facilitate increase for ADLs and functional mobility. Pt completed B UEs with 2# dumbbell exercises with curls, shoulder presses, punch, stirring and wrist curls x 20 reps with mod rest breaks due to fatigue. Pt completed red theraband pulls horizontally, punches and upwards x 20 reps with mod rest breaks. Therapeutic Activity Training: pt completed functional mobility with RW x 50-60' x 2 attempts with SBA with  standing rest breaks.         Safety Measures: Gait belt used, up in the recliner, Pull/Bed Alarm activated and call light left in reach        Assessment / Impression:        Patient's tolerance of treatment: Good   Adverse Reaction: None  Significant change in status and impact:  None  Barriers to improvement:  Dec strength and endurance    Plan for Next Session:    Continue with
Occupational Therapy    Occupational Therapy Treatment Note  Name: Tiffany Rausch MRN: [id number] :   1935   Date:  2023   Admission Date: 8/10/2023 Room:  [address] [place] [address]     Primary Problem:  Pt is an 82 yo female admitted to [facility] with Acute respiratory failure with hypoxia, Dx with CHF. Pt presents with increased SOB upon exertion, decreased strength and endurance and ADL status. Restrictions/Precautions:  Restrictions/Precautions  Restrictions/Precautions: Fall Risk, Up as Tolerated, Bed Alarm  Required Braces or Orthoses?: No     Communication with other providers:   Cleared for treatment by  RN. Subjective:  Patient states:  \"I want to try that machine again\"  Pain:   Location, Type, Intensity (0/10 to 10/10):  7/10 legs had pain meds    Objective:    Observation:  pt alert and oriented up in recliner  Objective Measures:  98% on room air in therapy and was 84% after therapy and pt  was placed back on 1L and recovered to >90% within 30 seconds. Treatment, including education:  Transfers    Sit to stand :Contact Guard Assistance  Stand to sit :[address] Training:   Activities performed today included the following: Toileting  Stand By Assistance with clothing management and toilet hygiene. Therapeutic Exercise: Pt completed UE exercises to increase strength and ROM to facilitate increase for ADLs and functional mobility. Pt completed Nustep on Level 1 x 7 Min for 263 steps with 3 rest breaks. Therapeutic Activity Training: pt completed functional mobility with RW x 50-60' x 2 attempts with SBA.         Safety Measures: Gait belt used, up in the recliner, Pull/Bed Alarm activated and call light left in reach        Assessment / Impression:        Patient's tolerance of treatment: Good   Adverse Reaction: None  Significant change in status and impact:  None  Barriers to improvement:  Dec
Occupational Therapy  SWING BED WEEKLY TEAM Charmainehomer Lowemer  8/16/2023   WEEK # 1    Occupational Therapy    Feeding [x] Independent [] Mod Indep [] SBA [] CGA [] Min A  [] Mod A [] Max A [] Total A   Grooming [x] Independent [] Mod Indep [] SBA [] CGA [] Min A  [] Mod A [] Max A  [] Total A   UB Bathing [] Independent [x] Mod Indep [x] SBA [] CGA [] Min A  [] Mod A [] Max A  [] Total A   LB Bathing [] Independent [] Mod Indep [] SBA [] CGA [x] Min A  [] Mod A [] Max A  [] Total A   UB Dressing [] Independent [] Mod Indep [] SBA [] CGA [x] Min A  [] Mod A [] Max A  [] Total A   LB Dressing [] Independent [] Mod Indep [x] SBA [] CGA [] Min A  [] Mod A [] Max A  [] Total A   Toileting [] Independent [] Mod Indep [x] SBA [] CGA [] Min A  [] Mod A [] Max A  [] Total A   Safety Awareness [] Independent [] Mod Indep [x] SBA [] CGA [] Min A  [] Mod A [] Max A  [] Total A       Home Management: home alone, some family assistance     Cognitive/Perception: WNL    Upper extremity motor control: WNL        Goals of Previous Week [] Not Met [x] Partially Met  [] Met   Why Goals were Not Met: dec strength and endurance    Issues to be resolved before discharge: improve functional mobility and ADL Indep.     Occupational Therapy Signature: Juana SNOW/JESSENIA SUSAN [id number]
Patient placed on room air at this time. Oleg Carlos RN notified.       08/12/23 1925   Oxygen Therapy/Pulse Ox   O2 Therapy Room air   O2 Device None (Room air)
Patient received [address] from insurance today notifying patient of last covered day of 8/20/2023. Patient signed paperwork that information was provided and informed her that she has the right to appeal.  Patient declined stating she is planning on returning to her home on 8/20/2023 in the .[facility]
Pt's son-in-law here for dc. Pt escorted out in w/c for dc. Pt has new home O2 with here.
RAHS, DC summary, facesheet and HC order faxed to [facility] fax number provided by Freddie Pineda.  [phone]
SWING BED WEEKLY TEAM SHEET      WEEK#  2     NUTRITION   Diet:    Diabetic Low Sodium                                           TF: n/a                                    TPN:  n/a      Meal intakes: 0%, 25%, 51-75%     Weight: no new                 BMI:   39.4                                               Significant Change: Pt denies wt changes recently and also denies changes in appetite. Nutrition Assessment: Pt remains in swing bed status at this time. Pt continues on diabetic low sodium diet. Noted intakes decreased on 8/17 to 0%, 25%-pt reports \"It was me-I think I just ordered the wrong thing and I don't feel right by calling and asking for something else. I ordered it so I should have to deal with it. \" Did again encourage pt to request alternatives if this happens again. Otherwise, pt denies further nutritional needs at this time. Pt continues to have no s/s malnutrition. Will continue to follow at low nutrition risk. Recommendations: Continue to provide current diet order, encourage intake at meals, continued adequate glycemic control. Will monitor weights, labs, po intakes and plan of care. Issues to be resolved before discharge: Continued po intake of 75% or more at most meals by discharge. Please see adult nutrition eval flowsheet for full  in depth assessment.       Dietitian Signature:   Gracy Chavira RD, LD  [phone]
SWING BED WEEKLY TEAM SHEET     WEEK#  1     NUTRITION   Diet:    Diabetic Low Sodium                   TF: n/a         TPN:  n/a     Meal intakes: %, 51-75% x 2 meals, 26-50%    Weight: 229#     BMI:   39.4             Significant Change: Pt denies wt changes recently and also denies changes in appetite. Nutrition Assessment: Pt admitted to swing bed for physical debility after acute IP stay for afib with RVR, respiratory failure, generalized weakness. Hx includes CAD, HTN, HLD, diastolic HF, DM. Diet order diabetic low sodium at this time and pt reports a very good appetite with intakes consistently greater than 50%. Pt denies recent wt changes at this time. Denies further nutrition interventions or needs. No s/s malnutrition at this time. Will monitor and follow at low nutrition risk. Recommendations: Continue to provide current diet order, encourage intake at meals, continued adequate glycemic control. Will monitor weights, labs, po intakes and plan of care. Issues to be resolved before discharge: Continued po intake of 75% or more at most meals by discharge. Please see adult nutrition eval flowsheet for full  in depth assessment.      Dietitian Signature:   Daniel Perry RD, LD  [phone]
Up with walker from home going to shower with OT and caught right leg on walker. Small puncture site noted on right leg.
V2.0  [facility] Hospitalist Progress Note      Name:  Chapito Gaspar /Age/Sex: 1935  (81 y.o. female)   MRN & CSN:  [id number] & [id number] Encounter Date/Time: 8/15/2023 1:04 PM EDT    Location:  [facility] PCP: Viet Gama MD       Hospital Day: 6    Assessment and Plan:   Chapito Gaspar is a 81 y.o. female with pmh of CAD, hypertension, hyperlipidemia who presents with Physical debility      Plan:  Debility with Generalized weakness: Secondary to hospitalization and comorbidities with advanced age. PT/OT continue follow-up    A. Fib with RVR: Cardiology followed on recent admit and pt received IV BB and Cardizem . Continue Eliquis and metoprolol    HFpEF: EF 55-60% with moderate TR. Resume torsemide today. Acute Respiratory Failure with Hypoxia. Resolved    Chronic Anemia: Hemoglobin stable    DM2: currently not insulin dependent. A1c 6.3 in 2023. Continue to monitor. SSI. Chronic Venous stasis: compression stockings. Continue Torsemide. Chronic Back Pain: with known L2 compression fracture/lumbar stenosis. Continue Neurontin, Lidoderm patch. Follow up with pain management as OP. CAD: Not on ASA per cardiology. Continue statin. Essential HTN: Patient did have an episode of hypotension x1 with a blood pressure of 99/44 on 2023 at 7 AM.  Since blood pressures have been stable at 136/60 122/62 119/54. Will resume furosemide, monitor heart rate as metoprolol has been decreased to 25 mg daily. May need increased if patient goes back into A-fib with RVR. Discussed management of this case with Dr. Maceo Cushing my supervising physician. He is in agreement with the above-noted plan of care. Also discussed case with  regarding discharge planning and home health care. Diet ADULT DIET; Regular; 4 carb choices (60 gm/meal);  Low Sodium (2 gm)   DVT Prophylaxis [] Lovenox, []  Heparin, [] SCDs, [] Ambulation,  [x] Eliquis, [] Xarelto  [] Coumadin n   Code
V2.0  [facility] Hospitalist Progress Note      Name:  Nohelia Mccurdy /Age/Sex: 1935  (81 y.o. female)   MRN & CSN:  [id number] & [id number] Encounter Date/Time: 2023 1:04 PM EDT    Location:  [address] [place] [address] PCP: Jeanmarie Dimas MD       Hospital Day: 8    Assessment and Plan:   Nohelia Mccurdy is a 81 y.o. female with pmh of CAD, hypertension, hyperlipidemia who presents with Physical debility      Plan:  Debility with Generalized weakness: Secondary to hospitalization and comorbidities with advanced age. PT/OT continue follow-up    A. Fib with RVR: Continue Eliquis and metoprolol. Patient has not had any further episodes of atrial fibrillation. HFpEF: EF 55-60% with moderate TR. Resume torsemide hold orders have been placed. Continue metoprolol    Acute Respiratory Failure with Hypoxia. Resolved    Chronic Anemia: Hemoglobin stable    DM2: currently not insulin dependent. A1c 6.3 in 2023. Continue with sliding scale before meals and at bedtime, monitor for signs and symptoms of hypoglycemia, hypoglycemic protocol is ordered. Chronic Venous stasis: Continue with compression stockings and torsemide. Chronic Back Pain: with known L2 compression fracture/lumbar stenosis. Continue Neurontin, Lidoderm patch. Follow up with pain management as OP. CAD: Not on ASA per cardiology. Continue statin. Essential HTN: Continue metoprolol    Discussed management of this case with Dr. Lanre Abreu my supervising physician. He is in agreement with the above-noted plan of care. Diet ADULT DIET; Regular; 4 carb choices (60 gm/meal);  Low Sodium (2 gm)   DVT Prophylaxis [] Lovenox, []  Heparin, [] SCDs, [] Ambulation,  [x] Eliquis, [] Xarelto  [] Coumadin n   Code Status Full Code   Disposition From: Home  Expected Disposition: Home  Estimated Date of Discharge: TBD  Patient requires continued admission due to physical debility   Surrogate Decision Maker/ POA      Subjective:     Chief Complaint:
Full Code   Disposition From: Home  Expected Disposition: Home  Estimated Date of Discharge: TBD  Patient requires continued admission due to physical debility   Surrogate Decision Maker/ POA      Subjective:     Chief Complaint: Physical debility    Patient seen and examined today. She has no complaints. She is disappointed that she is not going home. Review of Systems:      10 point review systems completed and is negative unless stated above. Objective: Intake/Output Summary (Last 24 hours) at 8/19/2023 3721  Last data filed at 8/18/2023 1655  Gross per 24 hour   Intake 240 ml   Output --   Net 240 ml          Vitals:   Vitals:    08/19/23 0758   BP:    Pulse:    Resp:    Temp:    SpO2: 100%       Physical Exam: 8/19/2023     General: NAD  Eyes: EOMI  ENT: neck supple  Cardiovascular: Regular rate and rhythm  Respiratory: Clear to auscultation bilaterally throughout lung fields  Gastrointestinal: Soft, non tender nondistended bowel sounds present in all 4 quadrants  Genitourinary: no suprapubic tenderness  Musculoskeletal: No edema, no gross joint deformities  Skin: warm, dry, normal coloration  Neuro: Alert and oriented x4  Psych: Mood appropriate.      Medications:   Medications:    torsemide  10 mg Oral Daily    metoprolol succinate  25 mg Oral Daily    vitamin D3  400 Units Oral Daily    hydroxychloroquine  200 mg Oral Daily    losartan  25 mg Oral Daily    rosuvastatin  10 mg Oral Nightly    insulin lispro  0-8 Units SubCUTAneous TID WC    insulin lispro  0-4 Units SubCUTAneous Nightly    rOPINIRole  2 mg Oral TID    diclofenac sodium  2 g Topical BID    mometasone-formoterol  2 puff Inhalation BID RT    miconazole   Topical BID    gabapentin  300 mg Oral QAM    gabapentin  300 mg Oral Lunch    gabapentin  600 mg Oral QPM    apixaban  5 mg Oral BID    pantoprazole  40 mg Oral QAM AC    lidocaine  1 patch TransDERmal Daily    sennosides-docusate sodium  2 tablet Oral Daily    polyvinyl
PRN   Or  acetaminophen, 650 mg, Q6H PRN  naloxone, 0.2 mg, PRN  HYDROcodone 5 mg - acetaminophen, 1 tablet, Q6H PRN  HYDROcodone-acetaminophen, 1 tablet, Q6H PRN  glucose, 4 tablet, PRN  glucagon (rDNA), 1 mg, PRN  albuterol sulfate HFA, 2 puff, Q4H PRN  ipratropium 0.5 mg-albuterol 2.5 mg, 1 Dose, Q4H PRN  aluminum & magnesium hydroxide-simethicone, 30 mL, Q6H PRN  mouth kote, , Q2H PRN  cyclobenzaprine, 10 mg, TID PRN        Labs      Recent Results (from the past 24 hour(s))   POCT Glucose    Collection Time: 08/12/23  4:35 PM   Result Value Ref Range    POC Glucose 133 (H) 70 - 99 MG/DL   POCT Glucose    Collection Time: 08/12/23  7:59 PM   Result Value Ref Range    POC Glucose 218 (H) 70 - 99 MG/DL   POCT Glucose    Collection Time: 08/13/23  7:35 AM   Result Value Ref Range    POC Glucose 96 70 - 99 MG/DL   POCT Glucose    Collection Time: 08/13/23 11:50 AM   Result Value Ref Range    POC Glucose 145 (H) 70 - 99 MG/DL        Imaging/Diagnostics Last 24 Hours   No results found.     Electronically signed by ABA Minaya CNP on 8/13/2023 at 1:04 PM
Goals  Short Term Goals  Time Frame for Short Term Goals: Until DC or goals met  Short Term Goal 1: Pt will complete toileting with Mod Ind, and have good understanding of adaptive techniques  Short Term Goal 2: Pt will complete UE bathing/dressing with sup  Short Term Goal 3: Pt will complete LE bathing/dressing with Min A  Short Term Goal 4: Pt will complete toileting mod I  Short Term Goal 5: Pt will complete 10-20 min of BUE therex to increase strength/endurance for trfs/ADLs  Patient Goals   Patient goals :  To get well enough to go back ot her apartment       Therapy Time   Individual Concurrent Group Co-treatment   Time In [address] [place] [address]         Time Out 0916         Minutes 41            [place]: [address], [address]  [phone]

## 2023-08-21 ENCOUNTER — CARE COORDINATION (OUTPATIENT)
Dept: CASE MANAGEMENT | Age: 88
End: 2023-08-21

## 2023-08-21 NOTE — CARE COORDINATION
Care Transitions Outreach Attempt    Call within 2 business days of discharge: Yes     Patient: Maya Watkins Patient : 1935 MRN: [id number]    1st attempt to reach for  initial Care Transitions call  unsuccessful. HIPAA compliant message left requesting call back to [phone]. CTN will try again.       Last Discharge [address] [facility],[address]       Date Complaint Diagnosis Description Type Department Provider    8/10/23 Fall Fall, Acute Resp Failure Admission (Discharged) Marguarite Favre, MD         Discharge Facility: [address] [place] [address] bed    Noted following upcoming appointments from discharge chart review:   [facility] follow up appointment(s):   Future Appointments   Date Time Provider [address]   2023  1:45 PM Charla Mejia MD [facility]   2023  9:20 AM Lina Funes MD [facility]   2023  1:15 PM [facility] -     Non-[facility] follow up appointment(s): NA

## 2023-08-22 ENCOUNTER — TELEPHONE (OUTPATIENT)
Dept: INTERNAL MEDICINE CLINIC | Age: 88
End: 2023-08-22

## 2023-08-22 ENCOUNTER — TELEPHONE (OUTPATIENT)
Dept: WOUND CARE | Age: 88
End: 2023-08-22

## 2023-08-22 ENCOUNTER — CARE COORDINATION (OUTPATIENT)
Dept: CASE MANAGEMENT | Age: 88
End: 2023-08-22

## 2023-08-22 DIAGNOSIS — L03.115 CELLULITIS OF RIGHT LEG: Primary | ICD-10-CM

## 2023-08-22 DIAGNOSIS — L97.212 VENOUS STASIS ULCER OF RIGHT CALF WITH FAT LAYER EXPOSED, UNSPECIFIED WHETHER VARICOSE VEINS PRESENT (HCC): ICD-10-CM

## 2023-08-22 DIAGNOSIS — I83.012 VENOUS STASIS ULCER OF RIGHT CALF WITH FAT LAYER EXPOSED, UNSPECIFIED WHETHER VARICOSE VEINS PRESENT (HCC): ICD-10-CM

## 2023-08-22 DIAGNOSIS — I87.2 VENOUS INSUFFICIENCY OF BOTH LOWER EXTREMITIES: ICD-10-CM

## 2023-08-22 NOTE — TELEPHONE ENCOUNTER
Received call from home care stating that the pt is requesting to continue taking her glipizide that the hospital has discontinued. During her hospital stay it looks like pt's BS had dropped to 46 on 8/5/23 which I'm guessing is why they discontinued the medication but pt has told home care that she is going to continue taking it. Please advise.

## 2023-08-22 NOTE — CARE COORDINATION
you have all of your prescriptions and are they filled?: Yes  Post Acute Services: Other (Comment: [facility])  Do you have support at home?: Alone  Are you an active caregiver in your home?: No  Care Transitions Interventions    Physical Therapy: Completed       Transportation Support: Declined     Registered Dietician: Completed DME Assistance: Declined     Senior Services: Declined    Diabetes Education: Completed      Disease Specific Clinic: Completed Occupational Therapy: Completed     Disease Association: Completed               Discussed follow-up appointments. If no appointment was previously scheduled, appointment scheduling offered: Yes. Is follow up appointment scheduled within 7 days of discharge? No.    Follow Up  Future Appointments   Date Time Provider [address]   9/11/2023  1:45 PM Evelyn Lr MD [facility]   9/13/2023  9:20 AM Arik Rivera MD [address]   9/19/2023  1:15 PM ABA Barth - CNP [address] [facility] 30 Neurology -       Care Transition Nurse provided contact information. Plan for follow-up call in 5-7 days based on severity of symptoms and risk factors. Plan for next call: symptom management-falls? Weakness? safety @ home. BS? Weight gain , swelling?  follow-up appointment-OP [facility]? PCP sooner than 9/11/23? medication management-changes?     Eddi Barcenas RN

## 2023-08-23 ENCOUNTER — TELEMEDICINE (OUTPATIENT)
Dept: INTERNAL MEDICINE CLINIC | Age: 88
End: 2023-08-23
Payer: MEDICARE

## 2023-08-23 DIAGNOSIS — M48.062 LUMBAR STENOSIS WITH NEUROGENIC CLAUDICATION: Primary | ICD-10-CM

## 2023-08-23 PROCEDURE — 99442 PR PHYS/QHP TELEPHONE EVALUATION 11-20 MIN: CPT | Performed by: INTERNAL MEDICINE

## 2023-08-23 RX ORDER — HYDROCODONE BITARTRATE AND ACETAMINOPHEN 5; 325 MG/1; MG/1
1 TABLET ORAL EVERY 6 HOURS PRN
Qty: 20 TABLET | Refills: 0 | Status: SHIPPED | OUTPATIENT
Start: 2023-08-23 | End: 2023-08-28

## 2023-08-23 NOTE — PROGRESS NOTES
Documentation:  I communicated with the patient and/or health care decision maker about   Patient admission to the hospital  Patient states she was having back pain and weakness and was admitted to the hospital and then was on the swing bed for few more days. She was discharged on 8/20/2023    Patient is complaining of a lot of back pain in the left shoulder pain also and does not have any pain medication. She was going to the pain clinic and was seeing Dr. Seng Silverman who had given her Norco for the pain control but she has only Tylenol now and she is not able to see Dr. Seng Silverman as she was in the hospital and had canceled appointment. She also had atrial fibrillation and was started on Eliquis. Blood sugar was in the low 100s and glipizide was discontinued in the hospital now patient blood sugars are running slightly high at home and she started taking back glipizide 5 mg daily    . Details of this discussion including any medical advice provided:     Back pain : still bothering : will give few days of Norco : pt to f/u with pain clinic   Debility : improving  AF : on Eliquis  DM : pt is back on glipizide 5 mg daily :   Left shoulder pain : continue pain med       Orders Placed This Encounter   Medications    HYDROcodone-acetaminophen (NORCO) 5-325 MG per tablet     Sig: Take 1 tablet by mouth every 6 hours as needed for Pain for up to 5 days. Intended supply: 5 days. Take lowest dose possible to manage pain Max Daily Amount: 4 tablets     Dispense:  20 tablet     Refill:  0     Reduce doses taken as pain becomes manageable           Total Time: minutes: 21-30 minutes    Jayshree Lowemer was evaluated through a synchronous (real-time) audio encounter. Patient identification was verified at the start of the visit. She (or guardian if applicable) is aware that this is a billable service, which includes applicable co-pays. This visit was conducted with the patient's (and/or legal guardian's) verbal consent.  She has

## 2023-08-24 NOTE — TELEPHONE ENCOUNTER
Patient called and reported they were healed. No need for further followup.     Electronically signed by Lexa Lu RN on 8/24/2023 at 10:47 AM

## 2023-08-28 RX ORDER — GLIPIZIDE 5 MG/1
5 TABLET, FILM COATED, EXTENDED RELEASE ORAL DAILY
Qty: 30 TABLET | Refills: 3
Start: 2023-08-28

## 2023-08-29 ENCOUNTER — CARE COORDINATION (OUTPATIENT)
Dept: CASE MANAGEMENT | Age: 88
End: 2023-08-29

## 2023-08-29 ENCOUNTER — TELEPHONE (OUTPATIENT)
Dept: INTERNAL MEDICINE CLINIC | Age: 88
End: 2023-08-29

## 2023-08-29 RX ORDER — AZITHROMYCIN 250 MG/1
TABLET, FILM COATED ORAL
Qty: 1 PACKET | Refills: 0 | Status: SHIPPED | OUTPATIENT
Start: 2023-08-29

## 2023-08-29 NOTE — CARE COORDINATION
[facility] -     Non-[facility] follow up appointment(s): TIFFANY    Care Transition Nurse reviewed discharge instructions, medical action plan, and red flags with patient and discussed any barriers to care and/or understanding of plan of care after discharge. Discussed appropriate site of care based on symptoms and resources available to patient including: PCP  Specialist  Benefits related nurse triage line  Urgent care clinics  [facility]  When to call [phone]. The patient agrees to contact the PCP office for questions related to their healthcare. Advance Care Planning:   not on file and patient declined education. Patients top risk factors for readmission: Interventions to address risk factors: DM, Falls Communication with home health agencies or other community services the patient is currently using-[facility], Education of patient/family/caregiver/guardian to support self-management-DM, and Assessment and support for treatment adherence and medication management-med review    Offered patient enrollment in the Remote Patient Monitoring (RPM) program for in-home monitoring: Patient declined. Care Transitions Subsequent and Final Call    Subsequent and Final Calls  Are you currently active with any services?: Other  Care Transitions Interventions    Physical Therapy: Completed       Transportation Support: Declined     Registered Dietician: Completed DME Assistance: Declined     Senior Services: Declined    Diabetes Education: Completed      Disease Specific Clinic: Completed Occupational Therapy: Completed     Disease Association: Completed      Other Interventions:             Care Transition Nurse provided contact information for future needs. Plan for follow-up call in 7-10 days based on severity of symptoms and risk factors. Plan for next call: symptom management-sore throat? Fever? Cough? Lethargy? BS? Falls?  BLE wounds?   follow-up appointment-9/6/22 [address] [place] [address], 9/11 PCP  medication

## 2023-08-29 NOTE — TELEPHONE ENCOUNTER
Hugh Tadeo with [address] went to  see patient today - stated that patient is complaining of  sore throat, cough x 3 days with yellow sputum, no fever on 2 liter oxygen, 95 % oxygen- lungs sound clear some wheezing in upper lung area

## 2023-09-06 ENCOUNTER — HOSPITAL ENCOUNTER (OUTPATIENT)
Dept: WOUND CARE | Age: 88
Discharge: HOME | End: 2023-09-06
Payer: MEDICARE

## 2023-09-06 DIAGNOSIS — I83.012 VENOUS STASIS ULCER OF RIGHT CALF WITH FAT LAYER EXPOSED, UNSPECIFIED WHETHER VARICOSE VEINS PRESENT (HCC): ICD-10-CM

## 2023-09-06 DIAGNOSIS — L97.212 VENOUS STASIS ULCER OF RIGHT CALF WITH FAT LAYER EXPOSED, UNSPECIFIED WHETHER VARICOSE VEINS PRESENT (HCC): ICD-10-CM

## 2023-09-06 DIAGNOSIS — I87.2 VENOUS INSUFFICIENCY OF BOTH LOWER EXTREMITIES: Primary | ICD-10-CM

## 2023-09-06 DIAGNOSIS — L03.115 CELLULITIS OF RIGHT LEG: ICD-10-CM

## 2023-09-06 PROCEDURE — 97597 DBRDMT OPN WND 1ST 20 CM/<: CPT

## 2023-09-06 PROCEDURE — 99213 OFFICE O/P EST LOW 20 MIN: CPT | Performed by: INTERNAL MEDICINE

## 2023-09-06 RX ORDER — GINSENG 100 MG
CAPSULE ORAL ONCE
OUTPATIENT
Start: 2023-09-06 | End: 2023-09-06

## 2023-09-06 RX ORDER — LIDOCAINE 50 MG/G
OINTMENT TOPICAL ONCE
OUTPATIENT
Start: 2023-09-06 | End: 2023-09-06

## 2023-09-06 RX ORDER — CLOBETASOL PROPIONATE 0.5 MG/G
OINTMENT TOPICAL ONCE
OUTPATIENT
Start: 2023-09-06 | End: 2023-09-06

## 2023-09-06 RX ORDER — CLOBETASOL PROPIONATE 0.5 MG/G
OINTMENT TOPICAL ONCE
Status: DISCONTINUED | OUTPATIENT
Start: 2023-09-06 | End: 2023-09-07 | Stop reason: HOSPADM

## 2023-09-06 RX ORDER — LIDOCAINE HYDROCHLORIDE 40 MG/ML
SOLUTION TOPICAL ONCE
OUTPATIENT
Start: 2023-09-06 | End: 2023-09-06

## 2023-09-06 RX ORDER — GENTAMICIN SULFATE 1 MG/G
OINTMENT TOPICAL ONCE
OUTPATIENT
Start: 2023-09-06 | End: 2023-09-06

## 2023-09-06 RX ORDER — LIDOCAINE HYDROCHLORIDE 20 MG/ML
JELLY TOPICAL ONCE
OUTPATIENT
Start: 2023-09-06 | End: 2023-09-06

## 2023-09-06 RX ORDER — BETAMETHASONE DIPROPIONATE 0.05 %
OINTMENT (GRAM) TOPICAL ONCE
OUTPATIENT
Start: 2023-09-06 | End: 2023-09-06

## 2023-09-06 RX ORDER — BACITRACIN ZINC AND POLYMYXIN B SULFATE 500; 1000 [USP'U]/G; [USP'U]/G
OINTMENT TOPICAL ONCE
OUTPATIENT
Start: 2023-09-06 | End: 2023-09-06

## 2023-09-06 RX ORDER — IBUPROFEN 200 MG
TABLET ORAL ONCE
OUTPATIENT
Start: 2023-09-06 | End: 2023-09-06

## 2023-09-06 RX ORDER — LIDOCAINE 40 MG/G
CREAM TOPICAL ONCE
OUTPATIENT
Start: 2023-09-06 | End: 2023-09-06

## 2023-09-06 NOTE — DISCHARGE INSTRUCTIONS
silicone border gauze   Tubi F       Change on Wednesday     Follow up with Dr. Simi Garcia if needed in the wound care center  Call [phone] for any questions or concerns.   Date__________   Time____________

## 2023-09-06 NOTE — PROGRESS NOTES
LUNGS 4 TIMES DAILY AS NEEDED FOR WHEEZING 8.5 g 5    losartan (COZAAR) 25 MG tablet Take 1 tablet by mouth daily 30 tablet 3    rosuvastatin (CRESTOR) 10 MG tablet TAKE 1 TABLET BY MOUTH NIGHTLY 90 tablet 1    RESTASIS 0.05 % ophthalmic emulsion Apply 1 drop to eye in the morning and at bedtime      ketoconazole (NIZORAL) 2 % cream Apply topically daily to right foot      Cholecalciferol (VITAMIN D3) 125 MCG (5000 UT) CAPS Take 1 capsule by mouth daily 30 capsule 1    Fluticasone Furoate-Vilanterol (BREO ELLIPTA) 100-25 MCG/ACT AEPB Inhale 1 puff into the lungs daily 1 each 3    Coenzyme Q10 (COQ10) 200 MG CAPS Take 200 mg by mouth 2 times daily      clobetasol (TEMOVATE) 0.05 % cream Apply topically 2 times daily      hydroxychloroquine (PLAQUENIL) 200 MG tablet Take 1 tablet by mouth daily       No current facility-administered medications on file prior to encounter. REVIEW OF SYSTEMS    Pertinent items are noted in HPI. Constitutional: Negative for systemic symptoms including fever, chills and malaise.       Objective:      LMP  (LMP Unknown)     PHYSICAL EXAM  General Appearance: alert and oriented to person, place and time, well developed and well- nourished, in no acute distress  Skin: warm and dry, no rash or erythema  Head: normocephalic and atraumatic  Eyes: pupils equal, round, and reactive to light, extraocular eye movements intact, conjunctivae normal  ENT: tympanic membrane, external ear and ear canal normal bilaterally, nose without deformity, nasal mucosa and turbinates normal without polyps  Neck: supple and non-tender without mass, no thyromegaly or thyroid nodules, no cervical lymphadenopathy  Pulmonary/Chest: clear to auscultation bilaterally- no wheezes, rales or rhonchi, normal air movement, no respiratory distress  Cardiovascular: normal rate, regular rhythm, normal S1 and S2, no murmurs, rubs, clicks, or gallops, distal pulses intact, no carotid bruits  Abdomen: soft, non-tender,

## 2023-09-08 ENCOUNTER — CARE COORDINATION (OUTPATIENT)
Dept: CASE MANAGEMENT | Age: 88
End: 2023-09-08

## 2023-09-08 NOTE — CARE COORDINATION
Care Transitions Outreach Attempt    Attempted to reach patient for transitions of care follow up.  Unable to reach patient for subsequent care transitions call (#1) CTN will try again    Patient: Nohelia Mccurdy Patient : 1935 MRN: [id number]    Last Discharge [address]       Date Complaint Diagnosis Description Type Department Provider    8/10/23   Admission (Discharged) Juan David Bradford MD         Discharge Facility: [facility]    Noted following upcoming appointments from discharge chart review:   [address] [facility] [facility] follow up appointment(s):   Future Appointments   Date Time Provider [address]  [facility]   2023  1:45 PM Jeanmarie Dimas MD [facility]   2023  9:20 AM Garrett Bae MD [facility]   2023  1:15 PM ABA [facility] -     Non-[facility] follow up appointment(s): TIFFANY Prakash RN [phone]

## 2023-09-11 ENCOUNTER — OFFICE VISIT (OUTPATIENT)
Dept: INTERNAL MEDICINE CLINIC | Age: 88
End: 2023-09-11
Payer: MEDICARE

## 2023-09-11 VITALS
HEART RATE: 70 BPM | BODY MASS INDEX: 39.2 KG/M2 | HEIGHT: 64 IN | OXYGEN SATURATION: 94 % | SYSTOLIC BLOOD PRESSURE: 108 MMHG | WEIGHT: 229.6 LBS | DIASTOLIC BLOOD PRESSURE: 66 MMHG | RESPIRATION RATE: 18 BRPM

## 2023-09-11 DIAGNOSIS — E66.01 MORBID OBESITY WITH BMI OF 40.0-44.9, ADULT (HCC): ICD-10-CM

## 2023-09-11 DIAGNOSIS — E11.9 TYPE 2 DIABETES MELLITUS WITHOUT COMPLICATION, WITHOUT LONG-TERM CURRENT USE OF INSULIN (HCC): ICD-10-CM

## 2023-09-11 DIAGNOSIS — M05.79 RHEUMATOID ARTHRITIS INVOLVING MULTIPLE SITES WITH POSITIVE RHEUMATOID FACTOR (HCC): ICD-10-CM

## 2023-09-11 DIAGNOSIS — I50.32 CHRONIC DIASTOLIC CONGESTIVE HEART FAILURE (HCC): ICD-10-CM

## 2023-09-11 DIAGNOSIS — I10 ESSENTIAL HYPERTENSION: ICD-10-CM

## 2023-09-11 DIAGNOSIS — I25.10 CORONARY ARTERY DISEASE INVOLVING NATIVE CORONARY ARTERY OF NATIVE HEART WITHOUT ANGINA PECTORIS: Primary | ICD-10-CM

## 2023-09-11 DIAGNOSIS — M48.062 LUMBAR STENOSIS WITH NEUROGENIC CLAUDICATION: ICD-10-CM

## 2023-09-11 DIAGNOSIS — S32.020G COMPRESSION FRACTURE OF L2 VERTEBRA WITH DELAYED HEALING: ICD-10-CM

## 2023-09-11 DIAGNOSIS — E78.2 MIXED HYPERLIPIDEMIA: ICD-10-CM

## 2023-09-11 DIAGNOSIS — J45.41 MODERATE PERSISTENT ASTHMA WITH EXACERBATION: ICD-10-CM

## 2023-09-11 PROCEDURE — 1123F ACP DISCUSS/DSCN MKR DOCD: CPT | Performed by: INTERNAL MEDICINE

## 2023-09-11 PROCEDURE — 1090F PRES/ABSN URINE INCON ASSESS: CPT | Performed by: INTERNAL MEDICINE

## 2023-09-11 PROCEDURE — 1036F TOBACCO NON-USER: CPT | Performed by: INTERNAL MEDICINE

## 2023-09-11 PROCEDURE — 1111F DSCHRG MED/CURRENT MED MERGE: CPT | Performed by: INTERNAL MEDICINE

## 2023-09-11 PROCEDURE — G8417 CALC BMI ABV UP PARAM F/U: HCPCS | Performed by: INTERNAL MEDICINE

## 2023-09-11 PROCEDURE — G8427 DOCREV CUR MEDS BY ELIG CLIN: HCPCS | Performed by: INTERNAL MEDICINE

## 2023-09-11 PROCEDURE — 99214 OFFICE O/P EST MOD 30 MIN: CPT | Performed by: INTERNAL MEDICINE

## 2023-09-11 PROCEDURE — 3044F HG A1C LEVEL LT 7.0%: CPT | Performed by: INTERNAL MEDICINE

## 2023-09-11 RX ORDER — DEXTROMETHORPHAN HYDROBROMIDE AND PROMETHAZINE HYDROCHLORIDE 15; 6.25 MG/5ML; MG/5ML
5 SYRUP ORAL 4 TIMES DAILY PRN
Qty: 180 ML | Refills: 0 | Status: SHIPPED | OUTPATIENT
Start: 2023-09-11

## 2023-09-11 RX ORDER — AZITHROMYCIN 250 MG/1
TABLET, FILM COATED ORAL
Qty: 1 PACKET | Refills: 0 | Status: SHIPPED | OUTPATIENT
Start: 2023-09-11 | End: 2023-09-13 | Stop reason: ALTCHOICE

## 2023-09-11 NOTE — PROGRESS NOTES
hypertension. H/O echocardiogram 08/28/2018    EF 50-55%. Mitral annular calcification is present. No other significant valvulopathy seen. H/O echocardiogram 06/15/2023    EF 55-60%. Grade I diastolic dysfunction. The left atrium is mildly dilated. Mitral annular calcification is present. History of nuclear stress test 08/28/2018    EF 59%. ECG portion of stress test is negative for ischemia by diagnostic criteria. fixed inferior large size severe defect in rest and stress images. Mild hubert infarct ischemia.     HTN (hypertension)     Hyperlipidemia     Kidney stone     hx-\"been about 3 yrs ago\"    MI (myocardial infarction) ([address]) 02/1998    treated with TPA    Obesity     Open wound of right foot 07/27/2020    Parainfluenza infection 12/05/2021    Pneumonia of right lower lobe due to infectious organism 04/05/2018    Vertigo     'have been having this since I had cataract surgery\"\"that is why they are doing the MRA of my brain(10/31/2014)    WD-Traumatic ulcer of foot, right, with fat layer exposed ([address]) 08/03/2020     Past Surgical History:   Procedure Laterality Date    CATARACT REMOVAL Bilateral     5/14,8/14    INGUINAL HERNIA REPAIR Left 45 yrs ago    [facility] N/A 5/12/2022    L1-4 LAMINECTOMIES performed by Ynes Remy MD at [address] N/A 5/12/2022    L2 AND L3 BILATERAL BALLOON KYPHOPLASTY AND L2 AND L3 NEEDLE BIOPSY performed by Ynes Remy MD at [address] [facility] History     Tobacco Use    Smoking status: Never    Smokeless tobacco: Never   Substance Use Topics    Alcohol use: Not Currently     Comment: OCCASSIONAL       LAB REVIEW:  CBC:   Lab Results   Component Value Date/Time    WBC 7.2 08/13/2023 01:10 PM    HGB 10.5 08/13/2023 01:10 PM    HCT 35.5 08/13/2023 01:10 PM     08/13/2023 01:10 PM     Lipids:   Lab Results   Component Value Date    HDL 63 (H) 10/13/2022    LDLCALC 68 10/13/2022    LDLDIRECT 69 08/18/2017    TRIGLYCFAST 115 10/13/2022

## 2023-09-12 ENCOUNTER — CARE COORDINATION (OUTPATIENT)
Dept: CASE MANAGEMENT | Age: 88
End: 2023-09-12

## 2023-09-12 NOTE — CARE COORDINATION
[address] [address] [facility] 250 Care Transitions Follow Up Call    Patient Current Location:  Home: [address] [facility] Transition Nurse contacted the patient by telephone to follow up after admission on  Verified name and  with patient as identifiers. Patient: Ruslan Grimm  Patient : 1935   MRN: [id number]  Reason for Admission: Acute Resp Failure, Fall @ home    Discharge Date: 23 RARS: Readmission Risk Score: 17.8      Needs to be reviewed by the provider   Additional needs identified to be addressed with provider: No  none             Method of communication with provider: none. Spoke with Jerson Chinchilla, says doing a little bit better, still with a mild cough. Some sputum production (whitish-clear) but it has lessened. No fever/chills. Finished course of Zpack. No falls since home. Home O2 @ 2L/NC, no sob, Pao2-94%. Blood [id number]. On Glypizide. Will be going to  [facility]) weekly, went last week, 23 (A/C DM wounds)       Addressed changes since last contact : [address] [place] [address] DM wounds. Resp congestion better-finished course of Po abx. Discussed follow-up appointments. If no appointment was previously scheduled, appointment scheduling offered: Yes. Is follow up appointment scheduled within 7 days of discharge? Yes. Follow Up  Future Appointments   Date Time Provider Department Center   2023  9:20 MD [facility]   2023  3:15 PM Osmel Levy MD [facility]     Non-[facility] follow up appointment(s): TIFFANY    Care Transition Nurse reviewed discharge instructions, medical action plan, and red flags with patient and discussed any barriers to care and/or understanding of plan of care after discharge. Discussed appropriate site of care based on symptoms and resources available to patient including: PCP  Specialist  Urgent care clinics  When to call Amanda Callejasore Makayla.  The patient agrees to contact the PCP office for questions related to

## 2023-09-13 ENCOUNTER — OFFICE VISIT (OUTPATIENT)
Dept: CARDIOLOGY CLINIC | Age: 88
End: 2023-09-13
Payer: MEDICARE

## 2023-09-13 VITALS
HEIGHT: 64 IN | DIASTOLIC BLOOD PRESSURE: 66 MMHG | SYSTOLIC BLOOD PRESSURE: 120 MMHG | BODY MASS INDEX: 39.27 KG/M2 | WEIGHT: 230 LBS | HEART RATE: 80 BPM

## 2023-09-13 DIAGNOSIS — E78.5 DYSLIPIDEMIA: ICD-10-CM

## 2023-09-13 DIAGNOSIS — M79.89 LEG SWELLING: ICD-10-CM

## 2023-09-13 DIAGNOSIS — R06.02 SOB (SHORTNESS OF BREATH): ICD-10-CM

## 2023-09-13 DIAGNOSIS — I50.31 ACUTE DIASTOLIC CONGESTIVE HEART FAILURE (HCC): ICD-10-CM

## 2023-09-13 DIAGNOSIS — I25.10 CORONARY ARTERY DISEASE INVOLVING NATIVE CORONARY ARTERY OF NATIVE HEART WITHOUT ANGINA PECTORIS: Primary | ICD-10-CM

## 2023-09-13 PROCEDURE — G8427 DOCREV CUR MEDS BY ELIG CLIN: HCPCS | Performed by: INTERNAL MEDICINE

## 2023-09-13 PROCEDURE — G8417 CALC BMI ABV UP PARAM F/U: HCPCS | Performed by: INTERNAL MEDICINE

## 2023-09-13 PROCEDURE — 1090F PRES/ABSN URINE INCON ASSESS: CPT | Performed by: INTERNAL MEDICINE

## 2023-09-13 PROCEDURE — 99204 OFFICE O/P NEW MOD 45 MIN: CPT | Performed by: INTERNAL MEDICINE

## 2023-09-13 RX ORDER — ASPIRIN 81 MG/1
81 TABLET ORAL DAILY
Qty: 90 TABLET | Refills: 1 | Status: SHIPPED | OUTPATIENT
Start: 2023-09-13

## 2023-09-13 NOTE — PROGRESS NOTES
CARDIOLOGY CONSULT NOTE   Reason for consultation:  CAD    Referring physician:  Taryn Purdy MD    Primary care physician: Taryn Purdy MD      Dear Taryn Purdy MD  Thanks for the consult. History of present illness:Jayshree is a 81 y. o.year old who  presents for management of PAF,CAD, Dyslipidemia, HTN, she has diastolic heart failure patient had a history of heart attack in the past and tPA was given at 81                    Patient has used compression socks, her insurance company did not approve her lympmadema pump. Inpast she had augmentin  for her rt leg cellulitis and venous doppler was also ordered, she did not get venous doppler, used antibioitcs, however, had some vomiting and not sure if it always absorbed it or not. HER LAST stress test in 2018, had  normal LVEF with large fixed inferior wall defect, last echo was on 8/7/23, her LVEF was normal, she has stopped aspirin since she started eliquis, PAITIENT denied any dizziness, chest pain or shortness of breath, she is on eliquis since she had PAF in August,  Chief Complaint   Patient presents with    Coronary Artery Disease    Hyperlipidemia    Hypertension    Consultation     Blood pressure, cholesterol, blood glucose and weight are well controlled.     Past medical history:    has a past medical history of Arthritis, CAD (coronary artery disease), Chronic midline low back pain without sciatica, Claustrophobia, Colonoscopy refused, COVID-19 virus infection, DM (diabetes mellitus), type 2 ([address]), Dupuytren's contracture of right hand, Endometrial stripe increased, Gastrointestinal hemorrhage, Glaucoma, H/O Doppler ultrasound, H/O echocardiogram, H/O echocardiogram, H/O echocardiogram, History of nuclear stress test, HTN (hypertension), Hyperlipidemia, Kidney stone, MI (myocardial infarction) ([address]), Obesity, Open wound of right foot, Parainfluenza infection, Pneumonia of right lower lobe due to infectious organism, Vertigo, and

## 2023-09-22 ENCOUNTER — CARE COORDINATION (OUTPATIENT)
Dept: CASE MANAGEMENT | Age: 88
End: 2023-09-22

## 2023-09-22 NOTE — CARE COORDINATION
Care Transitions Outreach Attempt    Attempted to reach patient for transitions of care follow up. Unable to reach patient (#2 subsequent) Episode will be closed per protocol.     Patient: Mona Delong Patient : 1935 MRN: [id number]    Last Discharge [address]       Date Complaint Diagnosis Description Type Department Provider    8/10/23   Admission (Discharged) Husam Yun MD        Noted following upcoming appointments from discharge chart review:   [facility] follow up appointment(s):   Future Appointments   Date Time Provider [address] [place] [address] Ct   2023  3:15 PM Alvino Dailey MD [facility] MMA   3/20/2024 11:50 AM Analisa Eli MD [facility]     Non-[facility] follow up appointment(s): TIFFANY English RN [phone]

## 2023-10-02 ENCOUNTER — TELEPHONE (OUTPATIENT)
Dept: INTERNAL MEDICINE CLINIC | Age: 88
End: 2023-10-02

## 2023-10-02 ENCOUNTER — TELEPHONE (OUTPATIENT)
Dept: CARDIOLOGY CLINIC | Age: 88
End: 2023-10-02

## 2023-10-02 ENCOUNTER — TELEPHONE (OUTPATIENT)
Dept: WOUND CARE | Age: 88
End: 2023-10-02

## 2023-10-02 DIAGNOSIS — L97.212 VENOUS STASIS ULCER OF RIGHT CALF WITH FAT LAYER EXPOSED, UNSPECIFIED WHETHER VARICOSE VEINS PRESENT (HCC): ICD-10-CM

## 2023-10-02 DIAGNOSIS — L03.115 CELLULITIS OF RIGHT LEG: Primary | ICD-10-CM

## 2023-10-02 DIAGNOSIS — I83.012 VENOUS STASIS ULCER OF RIGHT CALF WITH FAT LAYER EXPOSED, UNSPECIFIED WHETHER VARICOSE VEINS PRESENT (HCC): ICD-10-CM

## 2023-10-02 DIAGNOSIS — I87.2 VENOUS INSUFFICIENCY OF BOTH LOWER EXTREMITIES: ICD-10-CM

## 2023-10-02 NOTE — TELEPHONE ENCOUNTER
Jitendra Esquivel with [employer] [facility] called requesting 2 more visits for the patients knee. Please advise.    Call back number [phone]

## 2023-10-02 NOTE — TELEPHONE ENCOUNTER
Cardiologist: Dr. Andrey Blackwood  Surgeon:  Pain management   Surgery: Rt. L2 L3 TFESI   Anesthesia: TBD  Date: ASAP  FAX# [phone]    Ph# [phone]    Last OV 9/13/23 w/Ruth    They want to hold asa for 10 days and eliquis for 3 days

## 2023-10-02 NOTE — TELEPHONE ENCOUNTER
Pt was healed at last visit. No need for further f/u.     Electronically signed by Coreen Lujan RN on 10/2/2023 at 10:21 AM

## 2023-10-13 ENCOUNTER — TELEPHONE (OUTPATIENT)
Dept: INTERNAL MEDICINE CLINIC | Age: 88
End: 2023-10-13

## 2023-10-13 RX ORDER — TORSEMIDE 10 MG/1
10 TABLET ORAL DAILY
Qty: 30 TABLET | Refills: 0 | Status: SHIPPED | OUTPATIENT
Start: 2023-10-13

## 2023-10-13 NOTE — TELEPHONE ENCOUNTER
----- Message from Lynda Royal sent at 10/12/2023 11:21 AM EDT -----  Subject: Refill Request    QUESTIONS  Name of Medication? Other - porsemide 20 mg 1x a day  Patient-reported dosage and instructions? 20 mg 1x a day  How many days do you have left? 0  [facility] phone number (if available)? [phone]  Additional Information for Provider? [facility] requesting a refill.   ---------------------------------------------------------------------------  --------------  CALL BACK INFO  What is the best way for the office to contact you? OK to leave message on   voicemail  Preferred Call Back Phone Number? [phone]  ---------------------------------------------------------------------------  --------------  SCRIPT ANSWERS  Relationship to Patient?  Self

## 2023-10-13 NOTE — TELEPHONE ENCOUNTER
Patient was given the medication at the hospital and would like a refill for swelling. The instructions in epic say to stop taking at discharge. Tordemide(demadex) 20mg I qd.      Please advise and prescribe to the [facility].

## 2023-10-19 RX ORDER — ROPINIROLE 2 MG/1
TABLET, FILM COATED ORAL
Qty: 60 TABLET | Refills: 3 | Status: SHIPPED | OUTPATIENT
Start: 2023-10-19

## 2023-10-19 NOTE — TELEPHONE ENCOUNTER
Cesar Neil contacted office in need of elliquis refill. Stated that the hospital put her on that when she was admitted. Can you refill this? Medication:   Requested Prescriptions     Pending Prescriptions Disp Refills    apixaban (ELIQUIS) 5 MG TABS tablet 60 tablet 1     Sig: Take 1 tablet by mouth 2 times daily        Last Filled:      Patient Phone Number: [phone] (home)     Last appt: 9/11/2023   Next appt: 11/7/2023    Last OARRS:       2/16/2022     1:35 PM   RX Monitoring   Periodic Controlled Substance Monitoring No signs of potential drug abuse or diversion identified.         [facility] thank you

## 2023-10-23 ENCOUNTER — APPOINTMENT (OUTPATIENT)
Dept: CT IMAGING | Age: 88
End: 2023-10-23
Payer: MEDICARE

## 2023-10-23 ENCOUNTER — HOSPITAL ENCOUNTER (EMERGENCY)
Age: 88
Discharge: ANOTHER HOSPITAL | End: 2023-10-23
Attending: EMERGENCY MEDICINE
Payer: MEDICARE

## 2023-10-23 ENCOUNTER — TELEPHONE (OUTPATIENT)
Dept: INTERNAL MEDICINE CLINIC | Age: 88
End: 2023-10-23

## 2023-10-23 ENCOUNTER — HOSPITAL ENCOUNTER (INPATIENT)
Age: 88
LOS: 2 days | Discharge: HOME HEALTH | DRG: 065 | End: 2023-10-25
Attending: INTERNAL MEDICINE | Admitting: INTERNAL MEDICINE
Payer: MEDICARE

## 2023-10-23 ENCOUNTER — APPOINTMENT (OUTPATIENT)
Dept: GENERAL RADIOLOGY | Age: 88
End: 2023-10-23
Payer: MEDICARE

## 2023-10-23 VITALS
RESPIRATION RATE: 18 BRPM | BODY MASS INDEX: 39.27 KG/M2 | OXYGEN SATURATION: 97 % | TEMPERATURE: 98.8 F | HEIGHT: 64 IN | DIASTOLIC BLOOD PRESSURE: 65 MMHG | HEART RATE: 94 BPM | SYSTOLIC BLOOD PRESSURE: 122 MMHG | WEIGHT: 230 LBS

## 2023-10-23 DIAGNOSIS — H53.9 VISUAL CHANGES: Primary | ICD-10-CM

## 2023-10-23 PROBLEM — R29.90 STROKE-LIKE SYMPTOMS: Status: ACTIVE | Noted: 2023-10-23

## 2023-10-23 LAB
ALBUMIN SERPL-MCNC: 3.7 GM/DL (ref 3.4–5)
ALP BLD-CCNC: 92 IU/L (ref 40–129)
ALT SERPL-CCNC: 5 U/L (ref 10–40)
ANION GAP SERPL CALCULATED.3IONS-SCNC: 9 MMOL/L (ref 4–16)
AST SERPL-CCNC: 15 IU/L (ref 15–37)
BASOPHILS ABSOLUTE: 0 K/CU MM
BASOPHILS RELATIVE PERCENT: 0.1 % (ref 0–1)
BILIRUB SERPL-MCNC: 0.2 MG/DL (ref 0–1)
BUN SERPL-MCNC: 21 MG/DL (ref 6–23)
CALCIUM SERPL-MCNC: 9.7 MG/DL (ref 8.3–10.6)
CHLORIDE BLD-SCNC: 103 MMOL/L (ref 99–110)
CO2: 30 MMOL/L (ref 21–32)
CREAT SERPL-MCNC: 0.6 MG/DL (ref 0.6–1.1)
DIFFERENTIAL TYPE: ABNORMAL
EOSINOPHILS ABSOLUTE: 0 K/CU MM
EOSINOPHILS RELATIVE PERCENT: 0.1 % (ref 0–3)
GFR SERPL CREATININE-BSD FRML MDRD: >60 ML/MIN/1.73M2
GLUCOSE SERPL-MCNC: 84 MG/DL (ref 70–99)
HCT VFR BLD CALC: 42.9 % (ref 37–47)
HEMOGLOBIN: 12.4 GM/DL (ref 12.5–16)
IMMATURE NEUTROPHIL %: 0.4 % (ref 0–0.43)
LYMPHOCYTES ABSOLUTE: 2.6 K/CU MM
LYMPHOCYTES RELATIVE PERCENT: 18.4 % (ref 24–44)
MCH RBC QN AUTO: 23.7 PG (ref 27–31)
MCHC RBC AUTO-ENTMCNC: 28.9 % (ref 32–36)
MCV RBC AUTO: 81.9 FL (ref 78–100)
MONOCYTES ABSOLUTE: 3.3 K/CU MM
MONOCYTES RELATIVE PERCENT: 23.6 % (ref 0–4)
PDW BLD-RTO: 19.5 % (ref 11.7–14.9)
PLATELET # BLD: 185 K/CU MM (ref 140–440)
PMV BLD AUTO: 9.2 FL (ref 7.5–11.1)
POTASSIUM SERPL-SCNC: 4.6 MMOL/L (ref 3.5–5.1)
RBC # BLD: 5.24 M/CU MM (ref 4.2–5.4)
SEGMENTED NEUTROPHILS ABSOLUTE COUNT: 7.9 K/CU MM
SEGMENTED NEUTROPHILS RELATIVE PERCENT: 57.4 % (ref 36–66)
SODIUM BLD-SCNC: 142 MMOL/L (ref 135–145)
TOTAL IMMATURE NEUTOROPHIL: 0.05 K/CU MM
TOTAL PROTEIN: 6.6 GM/DL (ref 6.4–8.2)
WBC # BLD: 13.8 K/CU MM (ref 4–10.5)

## 2023-10-23 PROCEDURE — 80053 COMPREHEN METABOLIC PANEL: CPT

## 2023-10-23 PROCEDURE — 70498 CT ANGIOGRAPHY NECK: CPT

## 2023-10-23 PROCEDURE — 99285 EMERGENCY DEPT VISIT HI MDM: CPT

## 2023-10-23 PROCEDURE — 85025 COMPLETE CBC W/AUTO DIFF WBC: CPT

## 2023-10-23 PROCEDURE — 6360000004 HC RX CONTRAST MEDICATION: Performed by: EMERGENCY MEDICINE

## 2023-10-23 PROCEDURE — 71045 X-RAY EXAM CHEST 1 VIEW: CPT

## 2023-10-23 PROCEDURE — 70450 CT HEAD/BRAIN W/O DYE: CPT

## 2023-10-23 PROCEDURE — 1200000000 HC SEMI PRIVATE

## 2023-10-23 PROCEDURE — 6370000000 HC RX 637 (ALT 250 FOR IP): Performed by: EMERGENCY MEDICINE

## 2023-10-23 PROCEDURE — 93005 ELECTROCARDIOGRAM TRACING: CPT | Performed by: EMERGENCY MEDICINE

## 2023-10-23 RX ORDER — ASPIRIN 81 MG/1
81 TABLET, CHEWABLE ORAL DAILY
Status: DISCONTINUED | OUTPATIENT
Start: 2023-10-24 | End: 2023-10-25 | Stop reason: HOSPADM

## 2023-10-23 RX ORDER — SODIUM CHLORIDE 0.9 % (FLUSH) 0.9 %
5-40 SYRINGE (ML) INJECTION EVERY 12 HOURS SCHEDULED
Status: DISCONTINUED | OUTPATIENT
Start: 2023-10-23 | End: 2023-10-25 | Stop reason: HOSPADM

## 2023-10-23 RX ORDER — ONDANSETRON 4 MG/1
4 TABLET, ORALLY DISINTEGRATING ORAL EVERY 8 HOURS PRN
Status: DISCONTINUED | OUTPATIENT
Start: 2023-10-23 | End: 2023-10-25 | Stop reason: HOSPADM

## 2023-10-23 RX ORDER — LABETALOL HYDROCHLORIDE 5 MG/ML
10 INJECTION, SOLUTION INTRAVENOUS EVERY 10 MIN PRN
Status: DISCONTINUED | OUTPATIENT
Start: 2023-10-23 | End: 2023-10-25 | Stop reason: HOSPADM

## 2023-10-23 RX ORDER — ASPIRIN 300 MG/1
300 SUPPOSITORY RECTAL DAILY
Status: DISCONTINUED | OUTPATIENT
Start: 2023-10-24 | End: 2023-10-25 | Stop reason: HOSPADM

## 2023-10-23 RX ORDER — ONDANSETRON 2 MG/ML
4 INJECTION INTRAMUSCULAR; INTRAVENOUS EVERY 6 HOURS PRN
Status: DISCONTINUED | OUTPATIENT
Start: 2023-10-23 | End: 2023-10-25 | Stop reason: HOSPADM

## 2023-10-23 RX ORDER — ATORVASTATIN CALCIUM 40 MG/1
40 TABLET, FILM COATED ORAL NIGHTLY
Status: DISCONTINUED | OUTPATIENT
Start: 2023-10-24 | End: 2023-10-25 | Stop reason: HOSPADM

## 2023-10-23 RX ORDER — GABAPENTIN 300 MG/1
600 CAPSULE ORAL ONCE
Status: COMPLETED | OUTPATIENT
Start: 2023-10-23 | End: 2023-10-23

## 2023-10-23 RX ORDER — SODIUM CHLORIDE 0.9 % (FLUSH) 0.9 %
5-40 SYRINGE (ML) INJECTION PRN
Status: DISCONTINUED | OUTPATIENT
Start: 2023-10-23 | End: 2023-10-25 | Stop reason: HOSPADM

## 2023-10-23 RX ORDER — SODIUM CHLORIDE 9 MG/ML
INJECTION, SOLUTION INTRAVENOUS PRN
Status: DISCONTINUED | OUTPATIENT
Start: 2023-10-23 | End: 2023-10-25 | Stop reason: HOSPADM

## 2023-10-23 RX ORDER — POLYETHYLENE GLYCOL 3350 17 G/17G
17 POWDER, FOR SOLUTION ORAL DAILY PRN
Status: DISCONTINUED | OUTPATIENT
Start: 2023-10-23 | End: 2023-10-25 | Stop reason: HOSPADM

## 2023-10-23 RX ADMIN — IOPAMIDOL 75 ML: 755 INJECTION, SOLUTION INTRAVENOUS at 20:52

## 2023-10-23 RX ADMIN — GABAPENTIN 600 MG: 300 CAPSULE ORAL at 22:17

## 2023-10-23 NOTE — TELEPHONE ENCOUNTER
Patient called stating that the \"arthritis clinic in [place]\" called her stating that she is \"dangerously anemic and needs to be seen. Dr Ana Maria Echevarria out of office today and tomorrow and patient states that she cannot see well at all and is very scared. Inst patient to go to ED for evaluation. Verbal understanding returned.

## 2023-10-24 ENCOUNTER — APPOINTMENT (OUTPATIENT)
Dept: MRI IMAGING | Age: 88
DRG: 065 | End: 2023-10-24
Attending: INTERNAL MEDICINE
Payer: MEDICARE

## 2023-10-24 LAB
ANION GAP SERPL CALCULATED.3IONS-SCNC: 8 MMOL/L (ref 4–16)
BILIRUBIN URINE: NEGATIVE MG/DL
BLOOD, URINE: NEGATIVE
BUN SERPL-MCNC: 24 MG/DL (ref 6–23)
CALCIUM SERPL-MCNC: 9.5 MG/DL (ref 8.3–10.6)
CHLORIDE BLD-SCNC: 102 MMOL/L (ref 99–110)
CHOLEST SERPL-MCNC: 157 MG/DL
CLARITY: CLEAR
CO2: 29 MMOL/L (ref 21–32)
COLOR: YELLOW
COMMENT UA: NORMAL
CREAT SERPL-MCNC: 0.6 MG/DL (ref 0.6–1.1)
EKG ATRIAL RATE: 73 BPM
EKG DIAGNOSIS: NORMAL
EKG P AXIS: 26 DEGREES
EKG P-R INTERVAL: 124 MS
EKG Q-T INTERVAL: 406 MS
EKG QRS DURATION: 88 MS
EKG QTC CALCULATION (BAZETT): 447 MS
EKG R AXIS: 12 DEGREES
EKG T AXIS: 4 DEGREES
EKG VENTRICULAR RATE: 73 BPM
ESTIMATED AVERAGE GLUCOSE: 143 MG/DL
GFR SERPL CREATININE-BSD FRML MDRD: >60 ML/MIN/1.73M2
GLUCOSE BLD-MCNC: 110 MG/DL (ref 70–99)
GLUCOSE BLD-MCNC: 111 MG/DL (ref 70–99)
GLUCOSE BLD-MCNC: 113 MG/DL (ref 70–99)
GLUCOSE BLD-MCNC: 130 MG/DL (ref 70–99)
GLUCOSE SERPL-MCNC: 95 MG/DL (ref 70–99)
GLUCOSE, URINE: NEGATIVE MG/DL
HBA1C MFR BLD: 6.6 % (ref 4.2–6.3)
HCT VFR BLD CALC: 41.8 % (ref 37–47)
HDLC SERPL-MCNC: 50 MG/DL
HEMOGLOBIN: 12.1 GM/DL (ref 12.5–16)
KETONES, URINE: NEGATIVE MG/DL
LDLC SERPL CALC-MCNC: 80 MG/DL
LEUKOCYTE ESTERASE, URINE: NEGATIVE
MCH RBC QN AUTO: 23.5 PG (ref 27–31)
MCHC RBC AUTO-ENTMCNC: 28.9 % (ref 32–36)
MCV RBC AUTO: 81.3 FL (ref 78–100)
NITRITE URINE, QUANTITATIVE: NEGATIVE
PDW BLD-RTO: 19.5 % (ref 11.7–14.9)
PH, URINE: 5.5 (ref 5–8)
PLATELET # BLD: 156 K/CU MM (ref 140–440)
PMV BLD AUTO: 10 FL (ref 7.5–11.1)
POTASSIUM SERPL-SCNC: 5.3 MMOL/L (ref 3.5–5.1)
PROCALCITONIN SERPL-MCNC: 0.06 NG/ML
PROTEIN UA: NEGATIVE MG/DL
RBC # BLD: 5.14 M/CU MM (ref 4.2–5.4)
SODIUM BLD-SCNC: 139 MMOL/L (ref 135–145)
SPECIFIC GRAVITY UA: 1.02 (ref 1–1.03)
TRIGL SERPL-MCNC: 137 MG/DL
TROPONIN, HIGH SENSITIVITY: 23 NG/L (ref 0–14)
TROPONIN, HIGH SENSITIVITY: 23 NG/L (ref 0–14)
UROBILINOGEN, URINE: 0.2 MG/DL (ref 0.2–1)
WBC # BLD: 16.2 K/CU MM (ref 4–10.5)

## 2023-10-24 PROCEDURE — 1200000000 HC SEMI PRIVATE

## 2023-10-24 PROCEDURE — 70551 MRI BRAIN STEM W/O DYE: CPT

## 2023-10-24 PROCEDURE — 6370000000 HC RX 637 (ALT 250 FOR IP): Performed by: INTERNAL MEDICINE

## 2023-10-24 PROCEDURE — 6360000002 HC RX W HCPCS

## 2023-10-24 PROCEDURE — 97166 OT EVAL MOD COMPLEX 45 MIN: CPT

## 2023-10-24 PROCEDURE — 94761 N-INVAS EAR/PLS OXIMETRY MLT: CPT

## 2023-10-24 PROCEDURE — 99223 1ST HOSP IP/OBS HIGH 75: CPT | Performed by: INTERNAL MEDICINE

## 2023-10-24 PROCEDURE — 6370000000 HC RX 637 (ALT 250 FOR IP): Performed by: STUDENT IN AN ORGANIZED HEALTH CARE EDUCATION/TRAINING PROGRAM

## 2023-10-24 PROCEDURE — 84145 PROCALCITONIN (PCT): CPT

## 2023-10-24 PROCEDURE — 92610 EVALUATE SWALLOWING FUNCTION: CPT

## 2023-10-24 PROCEDURE — 97535 SELF CARE MNGMENT TRAINING: CPT

## 2023-10-24 PROCEDURE — 94640 AIRWAY INHALATION TREATMENT: CPT

## 2023-10-24 PROCEDURE — 84484 ASSAY OF TROPONIN QUANT: CPT

## 2023-10-24 PROCEDURE — 93005 ELECTROCARDIOGRAM TRACING: CPT | Performed by: INTERNAL MEDICINE

## 2023-10-24 PROCEDURE — 6370000000 HC RX 637 (ALT 250 FOR IP): Performed by: NURSE PRACTITIONER

## 2023-10-24 PROCEDURE — 80061 LIPID PANEL: CPT

## 2023-10-24 PROCEDURE — 97116 GAIT TRAINING THERAPY: CPT

## 2023-10-24 PROCEDURE — 36415 COLL VENOUS BLD VENIPUNCTURE: CPT

## 2023-10-24 PROCEDURE — 82962 GLUCOSE BLOOD TEST: CPT

## 2023-10-24 PROCEDURE — 6370000000 HC RX 637 (ALT 250 FOR IP)

## 2023-10-24 PROCEDURE — 81003 URINALYSIS AUTO W/O SCOPE: CPT

## 2023-10-24 PROCEDURE — 80048 BASIC METABOLIC PNL TOTAL CA: CPT

## 2023-10-24 PROCEDURE — 87040 BLOOD CULTURE FOR BACTERIA: CPT

## 2023-10-24 PROCEDURE — 93308 TTE F-UP OR LMTD: CPT

## 2023-10-24 PROCEDURE — 97161 PT EVAL LOW COMPLEX 20 MIN: CPT

## 2023-10-24 PROCEDURE — 83036 HEMOGLOBIN GLYCOSYLATED A1C: CPT

## 2023-10-24 PROCEDURE — 6360000002 HC RX W HCPCS: Performed by: INTERNAL MEDICINE

## 2023-10-24 PROCEDURE — 93010 ELECTROCARDIOGRAM REPORT: CPT | Performed by: INTERNAL MEDICINE

## 2023-10-24 PROCEDURE — 2580000003 HC RX 258: Performed by: INTERNAL MEDICINE

## 2023-10-24 PROCEDURE — 85027 COMPLETE CBC AUTOMATED: CPT

## 2023-10-24 RX ORDER — RANOLAZINE 500 MG/1
500 TABLET, EXTENDED RELEASE ORAL 2 TIMES DAILY
Status: DISCONTINUED | OUTPATIENT
Start: 2023-10-24 | End: 2023-10-25 | Stop reason: HOSPADM

## 2023-10-24 RX ORDER — METOPROLOL SUCCINATE 25 MG/1
25 TABLET, EXTENDED RELEASE ORAL DAILY
Status: DISCONTINUED | OUTPATIENT
Start: 2023-10-24 | End: 2023-10-25 | Stop reason: HOSPADM

## 2023-10-24 RX ORDER — BUDESONIDE AND FORMOTEROL FUMARATE DIHYDRATE 80; 4.5 UG/1; UG/1
2 AEROSOL RESPIRATORY (INHALATION)
Status: DISCONTINUED | OUTPATIENT
Start: 2023-10-24 | End: 2023-10-25 | Stop reason: HOSPADM

## 2023-10-24 RX ORDER — TORSEMIDE 20 MG/1
10 TABLET ORAL DAILY
Status: DISCONTINUED | OUTPATIENT
Start: 2023-10-24 | End: 2023-10-25 | Stop reason: HOSPADM

## 2023-10-24 RX ORDER — INSULIN LISPRO 100 [IU]/ML
0-4 INJECTION, SOLUTION INTRAVENOUS; SUBCUTANEOUS
Status: DISCONTINUED | OUTPATIENT
Start: 2023-10-24 | End: 2023-10-25 | Stop reason: HOSPADM

## 2023-10-24 RX ORDER — GABAPENTIN 300 MG/1
600 CAPSULE ORAL NIGHTLY
Status: DISCONTINUED | OUTPATIENT
Start: 2023-10-24 | End: 2023-10-25 | Stop reason: HOSPADM

## 2023-10-24 RX ORDER — LOSARTAN POTASSIUM 25 MG/1
25 TABLET ORAL DAILY
Status: DISCONTINUED | OUTPATIENT
Start: 2023-10-24 | End: 2023-10-25 | Stop reason: HOSPADM

## 2023-10-24 RX ORDER — GLUCAGON 1 MG/ML
1 KIT INJECTION PRN
Status: DISCONTINUED | OUTPATIENT
Start: 2023-10-24 | End: 2023-10-25 | Stop reason: HOSPADM

## 2023-10-24 RX ORDER — GABAPENTIN 300 MG/1
300 CAPSULE ORAL 2 TIMES DAILY
Status: DISCONTINUED | OUTPATIENT
Start: 2023-10-24 | End: 2023-10-25 | Stop reason: HOSPADM

## 2023-10-24 RX ORDER — INSULIN LISPRO 100 [IU]/ML
0-4 INJECTION, SOLUTION INTRAVENOUS; SUBCUTANEOUS NIGHTLY
Status: DISCONTINUED | OUTPATIENT
Start: 2023-10-24 | End: 2023-10-25 | Stop reason: HOSPADM

## 2023-10-24 RX ORDER — ROPINIROLE 1 MG/1
2 TABLET, FILM COATED ORAL 2 TIMES DAILY
Status: DISCONTINUED | OUTPATIENT
Start: 2023-10-24 | End: 2023-10-25 | Stop reason: HOSPADM

## 2023-10-24 RX ORDER — ALPRAZOLAM 0.5 MG/1
0.5 TABLET ORAL
Status: DISCONTINUED | OUTPATIENT
Start: 2023-10-24 | End: 2023-10-24

## 2023-10-24 RX ORDER — ROPINIROLE 1 MG/1
2 TABLET, FILM COATED ORAL ONCE
Status: COMPLETED | OUTPATIENT
Start: 2023-10-24 | End: 2023-10-24

## 2023-10-24 RX ORDER — UBIDECARENONE 100 MG
200 CAPSULE ORAL 2 TIMES DAILY
Status: DISCONTINUED | OUTPATIENT
Start: 2023-10-24 | End: 2023-10-25 | Stop reason: HOSPADM

## 2023-10-24 RX ORDER — DEXTROSE MONOHYDRATE 100 MG/ML
INJECTION, SOLUTION INTRAVENOUS CONTINUOUS PRN
Status: DISCONTINUED | OUTPATIENT
Start: 2023-10-24 | End: 2023-10-25 | Stop reason: HOSPADM

## 2023-10-24 RX ORDER — MORPHINE SULFATE 2 MG/ML
2 INJECTION, SOLUTION INTRAMUSCULAR; INTRAVENOUS ONCE
Status: COMPLETED | OUTPATIENT
Start: 2023-10-24 | End: 2023-10-24

## 2023-10-24 RX ORDER — LORAZEPAM 1 MG/1
1 TABLET ORAL
Status: COMPLETED | OUTPATIENT
Start: 2023-10-24 | End: 2023-10-24

## 2023-10-24 RX ORDER — HYDROXYCHLOROQUINE SULFATE 200 MG/1
200 TABLET, FILM COATED ORAL DAILY
Status: DISCONTINUED | OUTPATIENT
Start: 2023-10-24 | End: 2023-10-25 | Stop reason: HOSPADM

## 2023-10-24 RX ORDER — ALBUTEROL SULFATE 90 UG/1
2 AEROSOL, METERED RESPIRATORY (INHALATION) 4 TIMES DAILY PRN
Status: DISCONTINUED | OUTPATIENT
Start: 2023-10-24 | End: 2023-10-25 | Stop reason: HOSPADM

## 2023-10-24 RX ADMIN — ROPINIROLE HYDROCHLORIDE 2 MG: 1 TABLET, FILM COATED ORAL at 08:13

## 2023-10-24 RX ADMIN — ONDANSETRON 4 MG: 2 INJECTION INTRAMUSCULAR; INTRAVENOUS at 04:48

## 2023-10-24 RX ADMIN — ROPINIROLE HYDROCHLORIDE 2 MG: 1 TABLET, FILM COATED ORAL at 17:16

## 2023-10-24 RX ADMIN — ATORVASTATIN CALCIUM 40 MG: 40 TABLET, FILM COATED ORAL at 00:32

## 2023-10-24 RX ADMIN — APIXABAN 5 MG: 5 TABLET, FILM COATED ORAL at 23:17

## 2023-10-24 RX ADMIN — GABAPENTIN 300 MG: 300 CAPSULE ORAL at 06:04

## 2023-10-24 RX ADMIN — ATORVASTATIN CALCIUM 40 MG: 40 TABLET, FILM COATED ORAL at 23:18

## 2023-10-24 RX ADMIN — RANOLAZINE 500 MG: 500 TABLET, EXTENDED RELEASE ORAL at 15:05

## 2023-10-24 RX ADMIN — SODIUM CHLORIDE, PRESERVATIVE FREE 10 ML: 5 INJECTION INTRAVENOUS at 23:16

## 2023-10-24 RX ADMIN — MORPHINE SULFATE 2 MG: 2 INJECTION, SOLUTION INTRAMUSCULAR; INTRAVENOUS at 07:08

## 2023-10-24 RX ADMIN — ROPINIROLE HYDROCHLORIDE 2 MG: 1 TABLET, FILM COATED ORAL at 00:32

## 2023-10-24 RX ADMIN — Medication 200 MG: at 08:15

## 2023-10-24 RX ADMIN — GABAPENTIN 600 MG: 300 CAPSULE ORAL at 23:17

## 2023-10-24 RX ADMIN — GABAPENTIN 300 MG: 300 CAPSULE ORAL at 10:52

## 2023-10-24 RX ADMIN — DICLOFENAC SODIUM 4 G: 10 GEL TOPICAL at 12:03

## 2023-10-24 RX ADMIN — HYDROXYCHLOROQUINE SULFATE 200 MG: 200 TABLET, FILM COATED ORAL at 08:16

## 2023-10-24 RX ADMIN — LORAZEPAM 1 MG: 1 TABLET ORAL at 18:00

## 2023-10-24 RX ADMIN — DICLOFENAC SODIUM 4 G: 10 GEL TOPICAL at 09:47

## 2023-10-24 RX ADMIN — SODIUM CHLORIDE, PRESERVATIVE FREE 10 ML: 5 INJECTION INTRAVENOUS at 08:18

## 2023-10-24 RX ADMIN — BUDESONIDE AND FORMOTEROL FUMARATE DIHYDRATE 2 PUFF: 80; 4.5 AEROSOL RESPIRATORY (INHALATION) at 07:38

## 2023-10-24 RX ADMIN — Medication 200 MG: at 23:20

## 2023-10-24 RX ADMIN — TORSEMIDE 10 MG: 20 TABLET ORAL at 08:16

## 2023-10-24 RX ADMIN — APIXABAN 5 MG: 5 TABLET, FILM COATED ORAL at 08:13

## 2023-10-24 RX ADMIN — RANOLAZINE 500 MG: 500 TABLET, EXTENDED RELEASE ORAL at 23:17

## 2023-10-24 RX ADMIN — DICLOFENAC SODIUM 4 G: 10 GEL TOPICAL at 23:19

## 2023-10-24 RX ADMIN — DICLOFENAC SODIUM 4 G: 10 GEL TOPICAL at 17:15

## 2023-10-24 RX ADMIN — SODIUM CHLORIDE, PRESERVATIVE FREE 10 ML: 5 INJECTION INTRAVENOUS at 00:32

## 2023-10-24 ASSESSMENT — PAIN SCALES - GENERAL
PAINLEVEL_OUTOF10: 3
PAINLEVEL_OUTOF10: 8
PAINLEVEL_OUTOF10: 0
PAINLEVEL_OUTOF10: 8
PAINLEVEL_OUTOF10: 3
PAINLEVEL_OUTOF10: 0

## 2023-10-24 ASSESSMENT — PAIN DESCRIPTION - LOCATION: LOCATION: CHEST

## 2023-10-24 ASSESSMENT — PAIN DESCRIPTION - DESCRIPTORS
DESCRIPTORS: ACHING;DISCOMFORT
DESCRIPTORS: JABBING

## 2023-10-24 ASSESSMENT — PAIN DESCRIPTION - ONSET: ONSET: ON-GOING

## 2023-10-24 ASSESSMENT — PAIN DESCRIPTION - PAIN TYPE: TYPE: ACUTE PAIN

## 2023-10-24 ASSESSMENT — PAIN DESCRIPTION - FREQUENCY: FREQUENCY: INTERMITTENT

## 2023-10-24 ASSESSMENT — PAIN DESCRIPTION - ORIENTATION: ORIENTATION: MID

## 2023-10-24 NOTE — CARE COORDINATION
Pt lives home alone in an apt. Pt has a walker at home. Pt has had [facility] in the past. OT recs for home with [facility]. LSW contacted Zaida with [facility] to check if pt is still active with [facility]. Pt plans home with HC. CM will need a inpt consult to [facility] for nursing and PT/OT. 10/24/23 3223   Service Assessment   Patient Orientation Alert and Oriented   Cognition Alert   History Provided By Patient;Medical Record   Primary Caregiver Self   Support Systems Spouse/Significant Other   Patient's Healthcare Decision Maker is: Legal Next of Kin   PCP Verified by CM Yes   Prior Functional Level Independent in ADLs/IADLs   Current Functional Level Assistance with the following:   Ability to make needs known: Good   Family able to assist with home care needs: No   Would you like for me to discuss the discharge plan with any other family members/significant others, and if so, who? No   Discharge Planning   Type of Residence Apartment   Patient expects to be discharged to: Apartment   Condition of Participation: Discharge Planning   The Patient and/or Patient Representative was provided with a Choice of Provider? Patient   The Patient and/Or Patient Representative agree with the Discharge Plan?  Yes

## 2023-10-24 NOTE — CONSULTS
treatment minutes: 9  Total time: 25    Electronically signed by:    Guido Stone, PT  10/24/2023, 4:15 PM
112/55   10/23/23 122/65   09/13/23 120/66     Pulse Readings from Last 3 Encounters:   10/24/23 79   10/23/23 94   09/13/23 80        Gen: A&O x 4, NAD, cooperative  HEENT: NC/AT, EOMI, PERRL, mmm, neck supple, no meningeal signs  Heart: NSR  Lungs: Respirations even and unlabored  Ext: no edema, no calf tenderness b/l  Psych: normal mood and affect  Skin: no rashes or lesions    NEUROLOGIC EXAM:    Mental Status: A&O to self, location, month and year, NAD, speech clear, language fluent, repetition and naming intact, follows commands appropriately    Cranial Nerve Exam:   CN II-XII: PERRL, right inferior quadrantanopia, no nystagmus, no gaze paresis, sensation V1-V3 intact b/l, muscles of facial expression symmetric; hearing intact to conversational tone, palate elevates symmetrically, shoulder elevation symmetric and tongue protrudes midline with movement side to side. Motor Exam:       Strength 5/5 UE's/LE's b/l  Tone and bulk normal   No pronator drift    Deep Tendon Reflexes: 2/4 biceps, triceps, brachioradialis, patellar, and achilles b/l; flexor plantar responses b/l    Sensation: Intact light touch/pinprick/vibration UE's/LE's b/l    Coordination/Cerebellum:       Tremors--none      Rapidly alternating movements: no dysdiadochokinesia b/l                Heel-to-Shin: no dysmetria b/l      Finger-to-Nose: no dysmetria b/l    Gait and stance:      Gait: deferred      LABS:     Recent Labs     10/23/23  1845 10/24/23  0305   WBC 13.8* 16.2*    139   K 4.6 5.3*    102   CO2 30 29   BUN 21 24*   CREATININE 0.6 0.6   GLUCOSE 84 95           IMAGING:    CT head w/o contrast:  IMPRESSION:  1. Patchy hypoattenuation in the medial left occipital lobe may represent an  acute/subacute versus chronic stroke. Follow-up brain MRI may be helpful for  further characterization as warranted. 2.  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      3.  No large acute intracranial hemorrhage
midline low back pain without sciatica, Claustrophobia, Colonoscopy refused, COVID-19 virus infection, DM (diabetes mellitus), type 2 ([address]), Dupuytren's contracture of right hand, Endometrial stripe increased, Gastrointestinal hemorrhage, Glaucoma, H/O Doppler ultrasound, H/O echocardiogram, H/O echocardiogram, H/O echocardiogram, History of nuclear stress test, HTN (hypertension), Hyperlipidemia, Kidney stone, MI (myocardial infarction) ([address]), Obesity, Open wound of right foot, Parainfluenza infection, Pneumonia of right lower lobe due to infectious organism, Vertigo, and WD-Traumatic ulcer of foot, right, with fat layer exposed ([address]). Medical Decision Making :       History of coronary disease requiring tPA in 1998  Acute onset chest pain overnight at 3 AM    Stress test was in 2018 which was negative for ischemia however did show inferior wall infarction with possible hubert-infarct ischemia. An echocardiogram in August 2023 does not show any significant wall motion, normal LV ejection fraction. Patient is not interested in ischemic work-up at this time  Recommend medical management  Continue with aspirin 81 mg daily  Continue with Toprol-XL 25 mg p.o. daily  Add Ranexa 500 mg p.o. twice daily    Outpatient follow-up with Dr. Jeanmarie Kwok, if patient continues to have chest discomfort, will consider stress MPI. History of paroxysmal atrial fibrillation: Currently patient is in sinus rhythm. Continue with Eliquis 5 mg p.o. twice daily for stroke prophylaxis, continue with beta-blocker: Metoprolol XL 25 daily  Hyperlipidemia: Continue with Lipitor 40 mg p.o. daily  Rule out stroke: Management as per primary care hospitalist team.  Continue with aspirin/Eliquis. Continue with statin therapy  Diabetes mellitus:  On medications including insulin         Prior documentations in EMR were personally reviewed at length  EKGs, labs, imaging were personally reviewed and interpreted  Case discussed with Nursing Staff

## 2023-10-24 NOTE — H&P
miconazole (MICOTIN) 2 % powder Apply topically 2 times daily. 8/20/23   Margareth Medicus, APRN - NP   diclofenac sodium (VOLTAREN) 1 % GEL Apply 4 g topically 4 times daily for 10 days 7/21/23 9/11/23  Vandana Martínez MD   Multiple Vitamins-Minerals (PRESERVISION AREDS 2 PO) Take by mouth daily    Tisha Mayen MD   HYDROcodone-acetaminophen (NORCO) 5-325 MG per tablet Take 1 tablet by mouth every 12 hours as needed.  7/6/23   Tisha Mayen MD   metoprolol succinate (TOPROL XL) 25 MG extended release tablet TAKE 1 TABLET BY MOUTH DAILY 6/19/23   Darek Ireland MD   gabapentin (NEURONTIN) 300 MG capsule Take 1 tablet in the morning, 1 tablet in the afternoon, and 2 tablets in the evening 6/19/23 12/19/23  Diana Silva DO   albuterol sulfate HFA (PROVENTIL;VENTOLIN;PROAIR) 108 (90 Base) MCG/ACT inhaler INHALE 2 PUFFS INTO THE LUNGS 4 TIMES DAILY AS NEEDED FOR WHEEZING 5/19/23   Darek Ireland MD   losartan (COZAAR) 25 MG tablet Take 1 tablet by mouth daily 5/10/23   Darek Ireland MD   rosuvastatin (CRESTOR) 10 MG tablet TAKE 1 TABLET BY MOUTH NIGHTLY 4/27/23   Darek Ireland MD   RESTASIS 0.05 % ophthalmic emulsion Apply 1 drop to eye in the morning and at bedtime 4/17/23   Tisha Mayen MD   ketoconazole (NIZORAL) 2 % cream Apply topically daily to right foot 1/31/23   Tisha Mayen MD   Cholecalciferol (VITAMIN D3) 125 MCG (5000 UT) CAPS Take 1 capsule by mouth daily 2/9/23   Darek Ireland MD   Fluticasone Furoate-Vilanterol (BREO ELLIPTA) 100-25 MCG/ACT AEPB Inhale 1 puff into the lungs daily 2/9/23   Darek Ireland MD   Coenzyme Q10 (COQ10) 200 MG CAPS Take 200 mg by mouth 2 times daily    Tisha Mayen MD   clobetasol (TEMOVATE) 0.05 % cream Apply topically 2 times daily    Tisha Mayen MD   hydroxychloroquine (PLAQUENIL) 200 MG tablet Take 1 tablet by mouth daily    Provider, Tisha, MD       Physical Exam: Need 8 Elements   Physical Exam     GEN  -Awake,

## 2023-10-24 NOTE — ED PROVIDER NOTES
ADDENDUM:    Care of the patient was assumed  from Dr. Trena Green. I have reviewed the notes, assessments, and/or procedures performed, I concur with her/his documentation on [employer]. I reviewed the medical record and evaluated the patient with the previous physician. See above physician note for HPI,  physical exam and other details. This was a checked out patient to me due to end of shift by above physician. My role was to check the results of the patient's CT scans and then make appropriate disposition    ED COURSE/MDM:  Laboratory and imaging data were reviewed and care plan was arranged and discussed with the patient(see separate lab/imaging reports). RADIOLOGY:  Already resulted studies have been reviewed. CTA HEAD NECK W CONTRAST   Final Result   Severe narrowing of the right V1 origin. Severe narrowing of the left proximal P2 segment. CT HEAD WO CONTRAST   Final Result   1. Patchy hypoattenuation in the medial left occipital lobe may represent an   acute/subacute versus chronic stroke. Follow-up brain MRI may be helpful for   further characterization as warranted. 2.  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 3.  No large acute intracranial hemorrhage given patient motion and streak   artifact. XR CHEST PORTABLE   Final Result   1. No focal airspace disease. 2.  Small left pleural effusion versus pleural thickening.              Labs Reviewed   CBC WITH AUTO DIFFERENTIAL - Abnormal; Notable for the following components:       Result Value    WBC 13.8 (*)     Hemoglobin 12.4 (*)     MCH 23.7 (*)     MCHC 28.9 (*)     RDW 19.5 (*)     Lymphocytes % 18.4 (*)     Monocytes % 23.6 (*)     All other components within normal limits   COMPREHENSIVE METABOLIC PANEL - Abnormal; Notable for the following components:    ALT 5 (*)     All other components within normal limits       Medications   iopamidol (ISOVUE-370) 76 % injection 75 mL (75
extremities spontaneously. PSYCHIATRIC: Normal mood. Labs:  Results for orders placed or performed during the hospital encounter of 10/23/23   EKG 12 Lead   Result Value Ref Range    Ventricular Rate 73 BPM    Atrial Rate 73 BPM    P-R Interval 124 ms    QRS Duration 88 ms    Q-T Interval 406 ms    QTc Calculation (Bazett) 447 ms    P Axis 26 degrees    R Axis 12 degrees    T Axis 4 degrees    Diagnosis       Normal sinus rhythm  Normal ECG  When compared with ECG of 09-AUG-2023 03:45,  Criteria for Inferior infarct are no longer present         EKG (if obtained): (All EKG's are interpreted by myself in the absence of a cardiologist)  The Ekg interpreted by me in the absence of a cardiologist shows. normal sinus rhythm with a rate of 73  Axis is  normal  QTc is   447  Intervals and Durations are unremarkable. No specific ST-T wave changes appreciated. No evidence of acute ischemia. No significant change from prior EKG dated 9 August 2023     Radiographs (if obtained):  [] The following radiograph was interpreted by myself in the absence of a radiologist:  [x] Radiologist's Report reviewed at time of ED visit:  XR CHEST PORTABLE    (Results Pending)   CT HEAD WO CONTRAST    (Results Pending)       ED Course and MDM:  This patient presents to the emergency department on the advice of her rheumatologist who got lab work on her last week and called her and told her it was abnormal.  She tried to go to her primary care doctor's office today but he is out sick so she presents to the emergency department. She is unsure as to which lab is normal and how abnormal it is but states that she was told she was \"anemic\" and that it was \"dangerous\". Patient is complaining of visual changes today also complains of pins-and-needles feeling to her hands.   CC/HPI Summary, DDx, ED Course, and Reassessment: Evaluation here in the ER is pending with laboratory data outstanding    History from : Patient    Limitations to

## 2023-10-25 VITALS
HEART RATE: 77 BPM | TEMPERATURE: 98 F | RESPIRATION RATE: 16 BRPM | DIASTOLIC BLOOD PRESSURE: 97 MMHG | HEIGHT: 64 IN | SYSTOLIC BLOOD PRESSURE: 120 MMHG | WEIGHT: 223.55 LBS | OXYGEN SATURATION: 95 % | BODY MASS INDEX: 38.16 KG/M2

## 2023-10-25 PROBLEM — I63.9 ACUTE STROKE DUE TO ISCHEMIA (HCC): Status: ACTIVE | Noted: 2023-10-25

## 2023-10-25 LAB
ANION GAP SERPL CALCULATED.3IONS-SCNC: 10 MMOL/L (ref 4–16)
BANDED NEUTROPHILS ABSOLUTE COUNT: 0.39 K/CU MM
BANDED NEUTROPHILS RELATIVE PERCENT: 3 % (ref 5–11)
BUN SERPL-MCNC: 23 MG/DL (ref 6–23)
CALCIUM SERPL-MCNC: 9.6 MG/DL (ref 8.3–10.6)
CHLORIDE BLD-SCNC: 97 MMOL/L (ref 99–110)
CO2: 27 MMOL/L (ref 21–32)
CREAT SERPL-MCNC: 0.7 MG/DL (ref 0.6–1.1)
DIFFERENTIAL TYPE: ABNORMAL
EKG ATRIAL RATE: 73 BPM
EKG DIAGNOSIS: NORMAL
EKG P AXIS: 30 DEGREES
EKG P-R INTERVAL: 124 MS
EKG Q-T INTERVAL: 428 MS
EKG QRS DURATION: 86 MS
EKG QTC CALCULATION (BAZETT): 471 MS
EKG R AXIS: 26 DEGREES
EKG T AXIS: 49 DEGREES
EKG VENTRICULAR RATE: 73 BPM
EOSINOPHILS ABSOLUTE: 0.1 K/CU MM
EOSINOPHILS RELATIVE PERCENT: 1 % (ref 0–3)
GFR SERPL CREATININE-BSD FRML MDRD: >60 ML/MIN/1.73M2
GLUCOSE SERPL-MCNC: 177 MG/DL (ref 70–99)
HCT VFR BLD CALC: 41.7 % (ref 37–47)
HEMOGLOBIN: 12.1 GM/DL (ref 12.5–16)
LYMPHOCYTES ABSOLUTE: 2.9 K/CU MM
LYMPHOCYTES RELATIVE PERCENT: 22 % (ref 24–44)
MCH RBC QN AUTO: 23.6 PG (ref 27–31)
MCHC RBC AUTO-ENTMCNC: 29 % (ref 32–36)
MCV RBC AUTO: 81.4 FL (ref 78–100)
MONOCYTES ABSOLUTE: 2.6 K/CU MM
MONOCYTES RELATIVE PERCENT: 20 % (ref 0–4)
PDW BLD-RTO: 18.9 % (ref 11.7–14.9)
PLATELET # BLD: 157 K/CU MM (ref 140–440)
PMV BLD AUTO: 8.8 FL (ref 7.5–11.1)
POTASSIUM SERPL-SCNC: 4.7 MMOL/L (ref 3.5–5.1)
RBC # BLD: 5.12 M/CU MM (ref 4.2–5.4)
SEGMENTED NEUTROPHILS ABSOLUTE COUNT: 7.1 K/CU MM
SEGMENTED NEUTROPHILS RELATIVE PERCENT: 54 % (ref 36–66)
SODIUM BLD-SCNC: 134 MMOL/L (ref 135–145)
WBC # BLD: 13.1 K/CU MM (ref 4–10.5)

## 2023-10-25 PROCEDURE — APPSS30 APP SPLIT SHARED TIME 16-30 MINUTES

## 2023-10-25 PROCEDURE — 36415 COLL VENOUS BLD VENIPUNCTURE: CPT

## 2023-10-25 PROCEDURE — 80048 BASIC METABOLIC PNL TOTAL CA: CPT

## 2023-10-25 PROCEDURE — 93010 ELECTROCARDIOGRAM REPORT: CPT | Performed by: INTERNAL MEDICINE

## 2023-10-25 PROCEDURE — 85007 BL SMEAR W/DIFF WBC COUNT: CPT

## 2023-10-25 PROCEDURE — 6370000000 HC RX 637 (ALT 250 FOR IP): Performed by: INTERNAL MEDICINE

## 2023-10-25 PROCEDURE — 99232 SBSQ HOSP IP/OBS MODERATE 35: CPT | Performed by: INTERNAL MEDICINE

## 2023-10-25 PROCEDURE — 99233 SBSQ HOSP IP/OBS HIGH 50: CPT | Performed by: NURSE PRACTITIONER

## 2023-10-25 PROCEDURE — 85027 COMPLETE CBC AUTOMATED: CPT

## 2023-10-25 RX ORDER — ACETAMINOPHEN 325 MG/1
650 TABLET ORAL EVERY 4 HOURS PRN
Status: DISCONTINUED | OUTPATIENT
Start: 2023-10-25 | End: 2023-10-25 | Stop reason: HOSPADM

## 2023-10-25 RX ORDER — ACETAMINOPHEN 650 MG/1
650 SUPPOSITORY RECTAL EVERY 4 HOURS PRN
Status: DISCONTINUED | OUTPATIENT
Start: 2023-10-25 | End: 2023-10-25

## 2023-10-25 RX ORDER — ATORVASTATIN CALCIUM 40 MG/1
40 TABLET, FILM COATED ORAL NIGHTLY
Qty: 30 TABLET | Refills: 3 | Status: SHIPPED | OUTPATIENT
Start: 2023-10-25

## 2023-10-25 RX ADMIN — Medication 200 MG: at 08:44

## 2023-10-25 RX ADMIN — RANOLAZINE 500 MG: 500 TABLET, EXTENDED RELEASE ORAL at 08:44

## 2023-10-25 RX ADMIN — TORSEMIDE 10 MG: 20 TABLET ORAL at 08:44

## 2023-10-25 RX ADMIN — ROPINIROLE HYDROCHLORIDE 2 MG: 1 TABLET, FILM COATED ORAL at 08:44

## 2023-10-25 RX ADMIN — METOPROLOL SUCCINATE 25 MG: 25 TABLET, EXTENDED RELEASE ORAL at 08:44

## 2023-10-25 RX ADMIN — GABAPENTIN 300 MG: 300 CAPSULE ORAL at 10:57

## 2023-10-25 RX ADMIN — APIXABAN 5 MG: 5 TABLET, FILM COATED ORAL at 08:44

## 2023-10-25 RX ADMIN — BUDESONIDE AND FORMOTEROL FUMARATE DIHYDRATE 2 PUFF: 80; 4.5 AEROSOL RESPIRATORY (INHALATION) at 09:25

## 2023-10-25 RX ADMIN — HYDROXYCHLOROQUINE SULFATE 200 MG: 200 TABLET, FILM COATED ORAL at 08:44

## 2023-10-25 RX ADMIN — GABAPENTIN 300 MG: 300 CAPSULE ORAL at 07:07

## 2023-10-25 RX ADMIN — DICLOFENAC SODIUM 4 G: 10 GEL TOPICAL at 08:49

## 2023-10-25 ASSESSMENT — PAIN SCALES - GENERAL: PAINLEVEL_OUTOF10: 0

## 2023-10-25 NOTE — PROGRESS NOTES
4 Eyes Skin Assessment     NAME:  Hong Madden  YOB: 1935  MEDICAL RECORD NUMBER:  [id number]    The patient is being assessed for  Admission    I agree that at least one RN has performed a thorough Head to Toe Skin Assessment on the patient. ALL assessment sites listed below have been assessed. Areas assessed by both nurses:    Head, Face, Ears, Shoulders, Back, Chest, Arms, Elbows, Hands, Sacrum. Buttock, Coccyx, Ischium, Legs. Feet and Heels, and Under Medical Devices         Does the Patient have a Wound?  No noted wound(s)       Edd Prevention initiated by RN: Yes  Wound Care Orders initiated by RN: No    Pressure Injury (Stage 3,4, Unstageable, DTI, NWPT, and Complex wounds) if present, place Wound referral order by RN under : No    New Ostomies, if present place, Ostomy referral order under : No     Nurse 1 eSignature: Electronically signed by Mick Sotomayor RN on 10/24/23 at 6:57 AM EDT    **SHARE this note so that the co-signing nurse can place an eSignature**    Nurse 2 eSignature: Electronically signed by Geraldo Vidal RN on 10/24/23 at 7:15 AM EDT
Attempted to contact MRI two times, prn meds ordered.  Just need MRI to notify me when pt can come again
Facility/Department: [id number] [facility]   CLINICAL BEDSIDE SWALLOW EVALUATION    NAME: Samantha Hammond  : 1935  MRN: [id number]    IMPRESSIONS AND RECOMMENDATIONS: Samantha Hammond was referred for a bedside swallow evaluation following admission to [facility] with vision changes, tinging in bilateral hands concerning for CVA. Per radiologist, head CT reveals \"Patchy hypoattenuation in the medial left occipital lobe may represent an acute/subacute versus chronic stroke. .. 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 \" MRI pending. Medical hx includes afib, CAD, DM, HTN, HLD. No known history of dysphagia prior to admission. Pt seen for evaluation seated upright in chair, alert, cooperative. Oral mechanism examination grossly WFL without obvious asymmetry. Pt presented with PO trials of thin liquids via cup/straw, puree, regular solids. Oral stage WFL with intact labial seal, mastication, oral clearance. Pharyngeal stage WFL without s/s aspiration. Speech/language/cognition screened and judged [facility]. Pt denies any communication or swallowing changes and states she is concerned about changes in her vision. Recommend initiation of regular diet/thin liquids. No further acute SLP needs identified. ADMISSION DATE: 10/23/2023  ADMITTING DIAGNOSIS: has Essential hypertension; Mixed hyperlipidemia; Type 2 diabetes mellitus without complication ([address]); Dupuytren's contracture of right hand; Rheumatoid arthritis involving multiple sites with positive rheumatoid factor (HCC); CAD s/p MI, TPA in ; Gastrointestinal hemorrhage; Cyst, kidney, acquired; Restless leg syndrome; Chronic diastolic congestive heart failure ([address]); Suspected sleep apnea; Morbid obesity with BMI of 40.0-44.9, adult ([address]); Moderate persistent asthma with exacerbation; Compression fracture of L2 vertebra with delayed healing; Lumbar stenosis with neurogenic claudication; H/O laminectomy;  Pedal edema; WD-Cellulitis of right leg;
Occupational [facility] ACUTE CARE OCCUPATIONAL THERAPY EVALUATION  [address] [facility], 1935, [id number]/[id number], 10/24/2023    Discharge Recommendation:  Home Health OT services w/ assist prn     History  [place]:  There were no encounter diagnoses. Patient  has a past medical history of Arthritis, Atrial fibrillation, chronic (HCC), CAD (coronary artery disease), Chronic midline low back pain without sciatica, Claustrophobia, Colonoscopy refused, COVID-19 virus infection, DM (diabetes mellitus), type 2 ([address]), Dupuytren's contracture of right hand, Endometrial stripe increased, Gastrointestinal hemorrhage, Glaucoma, H/O Doppler ultrasound, H/O echocardiogram, H/O echocardiogram, H/O echocardiogram, History of nuclear stress test, HTN (hypertension), Hyperlipidemia, Kidney stone, MI (myocardial infarction) ([address]), Obesity, Open wound of right foot, Parainfluenza infection, Pneumonia of right lower lobe due to infectious organism, Vertigo, and WD-Traumatic ulcer of foot, right, with fat layer exposed ([address]). Patient  has a past surgical history that includes Cataract removal (Bilateral); Inguinal hernia repair (Left, 45 yrs ago); Lumbar spine surgery (N/A, 5/12/2022); and Spine surgery (N/A, 5/12/2022). Subjective:  Patient states: \"When you look out that window there, what do you see? \" . Pain: Denied.     Communication with other providers: RN, handoff from MD.  Restrictions: General Precautions, Fall Risk     Home Setup/Prior level of function      Social/Functional History  Lives With: Alone  Type of Home: Apartment  Home Layout: One level  Home Access: Stairs to enter without rails  Entrance Stairs - Number of Steps: threshold  Bathroom Shower/Tub: Tub/Shower unit  Bathroom Toilet: Handicap height  Bathroom Equipment: Grab bars in shower,Hand-held shower,Shower [id number]: [address] Help From: Personal care attendant (Pt typically has hired help for 11
Outpatient Pharmacy Progress Note for Meds-to-Beds    Total number of Prescriptions Filled: 1  The following medications were dispensed to the patient during the discharge process:  Atorvastatin    Additional Documentation:  Medication(s) were delivered to the patient's room prior to discharge      Thank you for letting us serve your patients.   [address]    [address] [place] [address], [address] [place] [address]    Phone: [phone]    Fax: [phone]
Transport came to get pt for MRI. She stated she needs something for anxiety, this morning when completing the form she stated that she did not have any problems to me.  Dr Jhon Vinson was notified, waiting on new orders
(coronary artery disease), Chronic midline low back pain without sciatica, Claustrophobia, Colonoscopy refused, COVID-19 virus infection, DM (diabetes mellitus), type 2 ([address]), Dupuytren's contracture of right hand, Endometrial stripe increased, Gastrointestinal hemorrhage, Glaucoma, H/O Doppler ultrasound, H/O echocardiogram, H/O echocardiogram, H/O echocardiogram, History of nuclear stress test, HTN (hypertension), Hyperlipidemia, Kidney stone, MI (myocardial infarction) ([address]), Obesity, Open wound of right foot, Parainfluenza infection, Pneumonia of right lower lobe due to infectious organism, Vertigo, and WD-Traumatic ulcer of foot, right, with fat layer exposed ([address] has a past surgical history that includes Cataract removal (Bilateral); Inguinal hernia repair (Left, 45 yrs ago); Lumbar spine surgery (N/A, 5/12/2022); and Spine surgery (N/A, 5/12/2022). Physical Exam  Constitutional:       General: She is not in acute distress. Appearance: She is not diaphoretic. HENT:      Head: Normocephalic and atraumatic. Mouth/Throat:      Mouth: Mucous membranes are moist.   Eyes:      Extraocular Movements: Extraocular movements intact. Comments: Pupils equal and round   Cardiovascular:      Rate and Rhythm: Normal rate and regular rhythm. Pulses: Normal pulses. Heart sounds: S1 normal and S2 normal. No murmur heard. No friction rub. No gallop. Pulmonary:      Effort: Pulmonary effort is normal.      Breath sounds: Normal breath sounds. No rales. Chest:      Chest wall: No tenderness. Abdominal:      Palpations: Abdomen is soft. Tenderness: There is no abdominal tenderness. Musculoskeletal:      Right lower leg: No edema. Left lower leg: No edema. Skin:     General: Skin is warm and dry. Capillary Refill: Capillary refill takes less than 2 seconds. Findings: No rash.       Comments: Skin turgor brisk   Neurological:      Mental Status: She is alert and
motion and streak artifact. XR CHEST PORTABLE    Result Date: 10/23/2023  EXAMINATION: ONE XRAY VIEW OF THE CHEST 10/23/2023 6:19 pm COMPARISON: August 5, 2023. HISTORY: ORDERING SYSTEM PROVIDED HISTORY: chest pain TECHNOLOGIST PROVIDED HISTORY: Reason for exam:->chest pain FINDINGS: Frontal portable view of the chest.  Overlying upper extremities partially obscure the upper chest.  No focal airspace disease. Mild blunting of the left costophrenic angle could represent small left pleural effusion versus pleural thickening. No pneumothorax. Stable cardiomediastinal silhouette and great vessels with redemonstration of atherosclerotic thoracic aorta. Multilevel degenerative disc disease. 1.  No focal airspace disease. 2.  Small left pleural effusion versus pleural thickening. CBC:   Recent Labs     10/23/23  1845 10/24/23  0305   WBC 13.8* 16.2*   HGB 12.4* 12.1*    156     BMP:    Recent Labs     10/23/23  1845 10/24/23  0305    139   K 4.6 5.3*    102   CO2 30 29   BUN 21 24*   CREATININE 0.6 0.6   GLUCOSE 84 95     Hepatic:   Recent Labs     10/23/23  1845   AST 15   ALT 5*   BILITOT 0.2   ALKPHOS 92     Lipids:   Lab Results   Component Value Date/Time    CHOL 157 10/24/2023 03:05 AM    CHOL 121 08/07/2018 10:47 AM    HDL 50 10/24/2023 03:05 AM    TRIG 137 10/24/2023 03:05 AM     Hemoglobin A1C:   Lab Results   Component Value Date/Time    LABA1C 6.6 10/24/2023 03:05 AM     TSH:   Lab Results   Component Value Date/Time    TSH 2.61 08/07/2018 10:47 AM     Troponin:   Lab Results   Component Value Date/Time    TROPONINT <0.010 08/09/2023 03:45 AM    TROPONINT <0.010 08/05/2023 08:20 AM    TROPONINT 0.012 08/05/2023 05:05 AM     Lactic Acid: No results for input(s): \"LACTA\" in the last 72 hours. BNP: No results for input(s): \"PROBNP\" in the last 72 hours.   UA:  Lab Results   Component Value Date/Time    NITRU NEGATIVE 08/05/2023 05:40 AM    COLORU ORANGE 08/05/2023 05:40 AM
CNP, 10/25/2023

## 2023-10-25 NOTE — CARE COORDINATION
LSW spoke with pt regarding her HC choice. Referral made to [facility] with [id number]

## 2023-10-25 NOTE — CARE COORDINATION
[facility] Liaison aware of discharge & will initiate [facility].

## 2023-10-26 ENCOUNTER — CARE COORDINATION (OUTPATIENT)
Dept: CASE MANAGEMENT | Age: 88
End: 2023-10-26

## 2023-10-26 DIAGNOSIS — R29.90 STROKE-LIKE SYMPTOMS: Primary | ICD-10-CM

## 2023-10-26 PROCEDURE — 1111F DSCHRG MED/CURRENT MED MERGE: CPT | Performed by: INTERNAL MEDICINE

## 2023-10-26 NOTE — CARE COORDINATION
community services the patient is currently using-[facility]  Education of patient/family/caregiver/guardian to support self-management-s/s stroke  Assessment and support for treatment adherence and medication management-med review    Offered patient enrollment in the Remote Patient Monitoring (RPM) program for in-home monitoring: Patient declined 9/11/23 & 10/26/23    Care Transitions 24 Hour Call    Schedule Follow Up Appointment with PCP: Declined  Do you have a copy of your discharge instructions?: Yes  Do you have all of your prescriptions and are they filled?: Yes  Have you been contacted by a [employer] Pharmacist?: No  Have you scheduled your follow up appointment?: Yes (Comment: PCP 11/7/23)  How are you going to get to your appointment?: Car - family or friend to transport  Post Acute Services: Other (Comment: [facility])  Do you have support at home?: Alone  Do you feel like you have everything you need to keep you well at home?: Yes  Are you an active caregiver in your home?: No  Care Transitions Interventions    Physical Therapy: Completed       Transportation Support: Declined     Registered Dietician: Completed DME Assistance: Declined     Senior Services: Declined    Diabetes Education: Completed      Disease Specific Clinic: Completed Occupational Therapy: Completed     Disease Association: Completed               Discussed follow-up appointments. If no appointment was previously scheduled, appointment scheduling offered: Yes. Is follow up appointment scheduled within 7 days of discharge? No. 12 days pt wont change    Follow Up  Future Appointments   Date Time Provider [address] [place] [address]   11/7/2023  3:15 PM Denisha Lan MD [facility]   3/20/2024 11:50 AM Jorge Miranda MD [address] [facility]       Care Transition Nurse provided contact information. Plan for follow-up call in 3-5 days based on severity of symptoms and risk factors.   Plan for next call: symptom management-s/s stroke,

## 2023-10-29 LAB
CULTURE: NORMAL
CULTURE: NORMAL
Lab: NORMAL
Lab: NORMAL
SPECIMEN: NORMAL
SPECIMEN: NORMAL

## 2023-10-30 ENCOUNTER — TELEPHONE (OUTPATIENT)
Dept: INTERNAL MEDICINE CLINIC | Age: 88
End: 2023-10-30

## 2023-10-30 NOTE — TELEPHONE ENCOUNTER
[facility] home care nurse contacted office stating that Lissett has 2 open places on L lower leg and she is concerned that Raulito Romero is getting cellulitis. Please advise. Thank you.

## 2023-10-30 NOTE — TELEPHONE ENCOUNTER
Talked to [facility] home health care nurse. She called wound care center and waiting for the reply. Advised her if she does not hear anything from wound care to give me a call back.

## 2023-11-01 ENCOUNTER — HOSPITAL ENCOUNTER (OUTPATIENT)
Dept: WOUND CARE | Age: 88
Discharge: HOME | End: 2023-11-01

## 2023-11-01 ENCOUNTER — CARE COORDINATION (OUTPATIENT)
Dept: CASE MANAGEMENT | Age: 88
End: 2023-11-01

## 2023-11-01 VITALS — TEMPERATURE: 97.8 F | RESPIRATION RATE: 16 BRPM

## 2023-11-01 DIAGNOSIS — I83.012 VENOUS STASIS ULCER OF RIGHT CALF WITH FAT LAYER EXPOSED, UNSPECIFIED WHETHER VARICOSE VEINS PRESENT (HCC): ICD-10-CM

## 2023-11-01 DIAGNOSIS — L97.212 VENOUS STASIS ULCER OF RIGHT CALF WITH FAT LAYER EXPOSED, UNSPECIFIED WHETHER VARICOSE VEINS PRESENT (HCC): ICD-10-CM

## 2023-11-01 DIAGNOSIS — I87.2 VENOUS INSUFFICIENCY OF BOTH LOWER EXTREMITIES: Primary | ICD-10-CM

## 2023-11-01 DIAGNOSIS — L03.115 CELLULITIS OF RIGHT LEG: ICD-10-CM

## 2023-11-01 RX ORDER — GINSENG 100 MG
CAPSULE ORAL ONCE
OUTPATIENT
Start: 2023-11-01 | End: 2023-11-01

## 2023-11-01 RX ORDER — BETAMETHASONE DIPROPIONATE 0.05 %
OINTMENT (GRAM) TOPICAL ONCE
OUTPATIENT
Start: 2023-11-01 | End: 2023-11-01

## 2023-11-01 RX ORDER — CLOBETASOL PROPIONATE 0.5 MG/G
OINTMENT TOPICAL ONCE
OUTPATIENT
Start: 2023-11-01 | End: 2023-11-01

## 2023-11-01 RX ORDER — LIDOCAINE HYDROCHLORIDE 20 MG/ML
JELLY TOPICAL ONCE
OUTPATIENT
Start: 2023-11-01 | End: 2023-11-01

## 2023-11-01 RX ORDER — GENTAMICIN SULFATE 1 MG/G
OINTMENT TOPICAL ONCE
OUTPATIENT
Start: 2023-11-01 | End: 2023-11-01

## 2023-11-01 RX ORDER — BACITRACIN ZINC AND POLYMYXIN B SULFATE 500; 1000 [USP'U]/G; [USP'U]/G
OINTMENT TOPICAL ONCE
OUTPATIENT
Start: 2023-11-01 | End: 2023-11-01

## 2023-11-01 RX ORDER — LIDOCAINE HYDROCHLORIDE 40 MG/ML
SOLUTION TOPICAL ONCE
OUTPATIENT
Start: 2023-11-01 | End: 2023-11-01

## 2023-11-01 RX ORDER — LIDOCAINE 40 MG/G
CREAM TOPICAL ONCE
OUTPATIENT
Start: 2023-11-01 | End: 2023-11-01

## 2023-11-01 RX ORDER — IBUPROFEN 200 MG
TABLET ORAL ONCE
OUTPATIENT
Start: 2023-11-01 | End: 2023-11-01

## 2023-11-01 RX ORDER — TRIAMCINOLONE ACETONIDE 1 MG/G
OINTMENT TOPICAL ONCE
OUTPATIENT
Start: 2023-11-01 | End: 2023-11-01

## 2023-11-01 RX ORDER — LIDOCAINE 50 MG/G
OINTMENT TOPICAL ONCE
OUTPATIENT
Start: 2023-11-01 | End: 2023-11-01

## 2023-11-01 NOTE — PATIENT INSTRUCTIONS
PHYSICIAN ORDERS AND DISCHARGE INSTRUCTIONS  NOTE: Upon discharge from the  Medical [facility], you will receive a patient experience survey via E-mail. We would be grateful if you would take the time to fill this survey out. Wound care order history:   SULTANA's   Right       Left    Date    Vascular studies/Intervention: . Cultures: . Antibiotics: . HbA1c:  .               Grafts:  .   Juxta Lites & Lymph Pumps:  Continuing wound care orders and information:              Residence: . Continue home health care with:. Your wound-care supplies will be provided by: [facility]: . Wound cleansing:      Do not scrub or use excessive force. Wash hands with soap and water before and after dressing changes. Prior to applying a clean dressing, cleanse wound with normal saline,    wound cleanser, or mild soap and water. Ask your physician or nurse before getting the wound(s) wet in the shower. Daily Wound management:    Keep weight off wounds and reposition every 2 hours. Avoid standing for long periods of time. Evaluate legs to the level of the heart or above for 30 minutes 4-5 times a day and/or when sitting. When taking antibiotics take entire prescription as ordered by MD do not stop taking until medicine is all gone. Documentation:  Compression: . Offloading: . Orders for this week (11/1/2023): Bilateral Lower Legs- Wash with mild soap and water, rinse with saline, pat dry with 4x4  Clobetasol and A&D to intact skin   Apply kirby to wound bed  Secure with silicone border gauze   Double Tubi F       Change on Wednesday     Please dispense 30 day quantity when sending supplies     Follow up with Dr. Shanon Rocha  In 1 weeks in the wound care center  Call [phone] for any questions or concerns.

## 2023-11-01 NOTE — CARE COORDINATION
Care Transitions Follow Up Call        Patient: Beka Perez  Patient : 1935   MRN: <[id number]>  Reason for Admission: Stroke like symptoms; Chest pain  Discharge Date: 10/25/23 RARS: Readmission Risk Score: 20.9    Attempted to contact patient for follow up transition call. No answer. Unable to leave a voicemail message to return call. No voicemail setup. Will continue to follow.       Follow Up  Future Appointments   Date Time Provider [address] [place] [address]   2023  2:30 PM Maureen Aguilar MD [facility]   2023  3:15 PM Taryn Purdy MD [facility]   3/20/2024 11:50 AM Don Martínez MD [address] [place] [address]        Care Transitions Subsequent and Final Call    Subsequent and Final Calls  Are you currently active with any services?: Other  Care Transitions Interventions    Physical Therapy: Completed       Transportation Support: Declined     Registered Dietician: Completed DME Assistance: Declined     Senior Services: Declined    Diabetes Education: Completed      Disease Specific Clinic: Completed Occupational Therapy: Completed     Disease Association: Completed      Other Interventions:             Dang Avendaño LPN  Care Coordinator  [phone] Health Maintenance Summary     Topic Due On Due Status Completed On    MAMMOGRAM - BREAST CANCER SCREENING Nov 2, 2012 Overdue Nov 2, 2010    Pap Smear - Cervical Cancer Screening  May 29, 2020 Not Due May 29, 2015    Immunization-Zoster Jun 24, 2013 Overdue     Immunization - TDAP Pregnancy  Hidden     IMMUNIZATION - DTaP/Tdap/Td Nov 10, 2024 Not Due Nov 10, 2014    Immunization-Influenza Sep 1, 2017 Not Due Oct 19, 2015          Patient is due for topics as listed above, she wishes to discuss with provider .       No

## 2023-11-06 RX ORDER — TORSEMIDE 10 MG/1
10 TABLET ORAL DAILY
Qty: 30 TABLET | Refills: 0 | Status: SHIPPED | OUTPATIENT
Start: 2023-11-06

## 2023-11-06 NOTE — TELEPHONE ENCOUNTER
Medication:   Requested Prescriptions     Pending Prescriptions Disp Refills    torsemide (DEMADEX) 10 MG tablet 30 tablet 0     Sig: Take 1 tablet by mouth daily        Last Filled:      Patient Phone Number: [phone] (home)     Last appt: 9/11/2023   Next appt: 11/7/2023    Last OARRS:       2/16/2022     1:35 PM   RX Monitoring   Periodic Controlled Substance Monitoring No signs of potential drug abuse or diversion identified.         Bradley Du is completely out of this   Happy Druggist Thank you

## 2023-11-07 ENCOUNTER — OFFICE VISIT (OUTPATIENT)
Dept: INTERNAL MEDICINE CLINIC | Age: 88
End: 2023-11-07
Payer: MEDICARE

## 2023-11-07 VITALS
BODY MASS INDEX: 38.17 KG/M2 | DIASTOLIC BLOOD PRESSURE: 72 MMHG | TEMPERATURE: 98.2 F | WEIGHT: 222.4 LBS | HEART RATE: 94 BPM | RESPIRATION RATE: 20 BRPM | SYSTOLIC BLOOD PRESSURE: 138 MMHG | OXYGEN SATURATION: 95 %

## 2023-11-07 DIAGNOSIS — I50.32 CHRONIC DIASTOLIC CONGESTIVE HEART FAILURE (HCC): ICD-10-CM

## 2023-11-07 DIAGNOSIS — I10 ESSENTIAL HYPERTENSION: ICD-10-CM

## 2023-11-07 DIAGNOSIS — E66.01 MORBID OBESITY WITH BMI OF 40.0-44.9, ADULT (HCC): ICD-10-CM

## 2023-11-07 DIAGNOSIS — E78.2 MIXED HYPERLIPIDEMIA: ICD-10-CM

## 2023-11-07 DIAGNOSIS — M48.062 LUMBAR STENOSIS WITH NEUROGENIC CLAUDICATION: ICD-10-CM

## 2023-11-07 DIAGNOSIS — I25.10 CORONARY ARTERY DISEASE INVOLVING NATIVE CORONARY ARTERY OF NATIVE HEART WITHOUT ANGINA PECTORIS: ICD-10-CM

## 2023-11-07 DIAGNOSIS — H53.8 BLURRED VISION, BILATERAL: ICD-10-CM

## 2023-11-07 DIAGNOSIS — M05.79 RHEUMATOID ARTHRITIS INVOLVING MULTIPLE SITES WITH POSITIVE RHEUMATOID FACTOR (HCC): ICD-10-CM

## 2023-11-07 DIAGNOSIS — E11.9 TYPE 2 DIABETES MELLITUS WITHOUT COMPLICATION, WITHOUT LONG-TERM CURRENT USE OF INSULIN (HCC): ICD-10-CM

## 2023-11-07 DIAGNOSIS — J45.41 MODERATE PERSISTENT ASTHMA WITH EXACERBATION: ICD-10-CM

## 2023-11-07 DIAGNOSIS — I63.9 ACUTE STROKE DUE TO ISCHEMIA (HCC): Primary | ICD-10-CM

## 2023-11-07 DIAGNOSIS — G25.81 RESTLESS LEG SYNDROME: ICD-10-CM

## 2023-11-07 PROCEDURE — 1123F ACP DISCUSS/DSCN MKR DOCD: CPT | Performed by: INTERNAL MEDICINE

## 2023-11-07 PROCEDURE — G8417 CALC BMI ABV UP PARAM F/U: HCPCS | Performed by: INTERNAL MEDICINE

## 2023-11-07 PROCEDURE — 99214 OFFICE O/P EST MOD 30 MIN: CPT | Performed by: INTERNAL MEDICINE

## 2023-11-07 PROCEDURE — 1111F DSCHRG MED/CURRENT MED MERGE: CPT | Performed by: INTERNAL MEDICINE

## 2023-11-07 PROCEDURE — G8484 FLU IMMUNIZE NO ADMIN: HCPCS | Performed by: INTERNAL MEDICINE

## 2023-11-07 PROCEDURE — 3044F HG A1C LEVEL LT 7.0%: CPT | Performed by: INTERNAL MEDICINE

## 2023-11-07 PROCEDURE — 1090F PRES/ABSN URINE INCON ASSESS: CPT | Performed by: INTERNAL MEDICINE

## 2023-11-07 PROCEDURE — G8427 DOCREV CUR MEDS BY ELIG CLIN: HCPCS | Performed by: INTERNAL MEDICINE

## 2023-11-07 PROCEDURE — 1036F TOBACCO NON-USER: CPT | Performed by: INTERNAL MEDICINE

## 2023-11-07 NOTE — PROGRESS NOTES
Was in hospital 10-23-23. Stated the cardio said she had stroke. Having trouble with eye sight ever since. Goes to the wound clinic tomorrow.
infarction) ([address]) 02/1998    treated with TPA    Obesity     Open wound of right foot 07/27/2020    Parainfluenza infection 12/05/2021    Pneumonia of right lower lobe due to infectious organism 04/05/2018    Vertigo     'have been having this since I had cataract surgery\"\"that is why they are doing the MRA of my brain(10/31/2014)    WD-Traumatic ulcer of foot, right, with fat layer exposed ([address]) 08/03/2020     Past Surgical History:   Procedure Laterality Date    CATARACT REMOVAL Bilateral     5/14,8/14    INGUINAL HERNIA REPAIR Left 45 yrs ago    [address] N/A 5/12/2022    L1-4 LAMINECTOMIES performed by Rhonda Ferris MD at [address] 6 5/12/2022    L2 AND L3 BILATERAL BALLOON KYPHOPLASTY AND L2 AND L3 NEEDLE BIOPSY performed by Rhonda Ferris MD at [address] [facility] [address] History     Tobacco Use    Smoking status: Never    Smokeless tobacco: Never   Substance Use Topics    Alcohol use: Not Currently     Comment: OCCASSIONAL       LAB REVIEW:  CBC:   Lab Results   Component Value Date/Time    WBC 13.1 10/25/2023 10:06 AM    HGB 12.1 10/25/2023 10:06 AM    HCT 41.7 10/25/2023 10:06 AM     10/25/2023 10:06 AM     Lipids:   Lab Results   Component Value Date    HDL 50 10/24/2023    LDLCALC 80 10/24/2023    LDLDIRECT 69 08/18/2017    TRIGLYCFAST 115 10/13/2022    CHOLFAST 154 10/13/2022     Renal:   Lab Results   Component Value Date/Time    BUN 23 10/25/2023 10:06 AM    CREATININE 0.7 10/25/2023 10:06 AM     10/25/2023 10:06 AM    K 4.7 10/25/2023 10:06 AM    K 3.0 03/08/2018 05:20 AM    ALKPHOS 92 10/23/2023 06:45 PM    ALT 5 10/23/2023 06:45 PM    AST 15 10/23/2023 06:45 PM    GLUCOSE 177 10/25/2023 10:06 AM    GLUF 89 03/22/2023 02:50 PM     PT/INR:   Lab Results   Component Value Date/Time    INR 0.97 10/21/2022 01:36 AM     A1C:   Lab Results   Component Value Date    LABA1C 6.6 (H) 10/24/2023           Melissa Noland MD, 11/7/2023 , 4:13 PM

## 2023-11-08 ENCOUNTER — CARE COORDINATION (OUTPATIENT)
Dept: CASE MANAGEMENT | Age: 88
End: 2023-11-08

## 2023-11-08 ENCOUNTER — HOSPITAL ENCOUNTER (OUTPATIENT)
Dept: WOUND CARE | Age: 88
Discharge: HOME | End: 2023-11-08
Payer: MEDICARE

## 2023-11-08 VITALS
TEMPERATURE: 97.7 F | SYSTOLIC BLOOD PRESSURE: 140 MMHG | HEART RATE: 70 BPM | DIASTOLIC BLOOD PRESSURE: 70 MMHG | RESPIRATION RATE: 18 BRPM

## 2023-11-08 DIAGNOSIS — L03.115 CELLULITIS OF RIGHT LEG: ICD-10-CM

## 2023-11-08 DIAGNOSIS — L97.212 VENOUS STASIS ULCER OF RIGHT CALF WITH FAT LAYER EXPOSED, UNSPECIFIED WHETHER VARICOSE VEINS PRESENT (HCC): ICD-10-CM

## 2023-11-08 DIAGNOSIS — I83.012 VENOUS STASIS ULCER OF RIGHT CALF WITH FAT LAYER EXPOSED, UNSPECIFIED WHETHER VARICOSE VEINS PRESENT (HCC): ICD-10-CM

## 2023-11-08 DIAGNOSIS — I87.2 VENOUS INSUFFICIENCY OF BOTH LOWER EXTREMITIES: Primary | ICD-10-CM

## 2023-11-08 PROCEDURE — 6370000000 HC RX 637 (ALT 250 FOR IP): Performed by: NURSE PRACTITIONER

## 2023-11-08 PROCEDURE — 11042 DBRDMT SUBQ TIS 1ST 20SQCM/<: CPT | Performed by: NURSE PRACTITIONER

## 2023-11-08 PROCEDURE — 11042 DBRDMT SUBQ TIS 1ST 20SQCM/<: CPT

## 2023-11-08 RX ORDER — GINSENG 100 MG
CAPSULE ORAL ONCE
OUTPATIENT
Start: 2023-11-08 | End: 2023-11-08

## 2023-11-08 RX ORDER — BETAMETHASONE DIPROPIONATE 0.05 %
OINTMENT (GRAM) TOPICAL ONCE
OUTPATIENT
Start: 2023-11-08 | End: 2023-11-08

## 2023-11-08 RX ORDER — LIDOCAINE 50 MG/G
OINTMENT TOPICAL ONCE
OUTPATIENT
Start: 2023-11-08 | End: 2023-11-08

## 2023-11-08 RX ORDER — LIDOCAINE HYDROCHLORIDE 20 MG/ML
JELLY TOPICAL ONCE
OUTPATIENT
Start: 2023-11-08 | End: 2023-11-08

## 2023-11-08 RX ORDER — GENTAMICIN SULFATE 1 MG/G
OINTMENT TOPICAL ONCE
Status: COMPLETED | OUTPATIENT
Start: 2023-11-08 | End: 2023-11-08

## 2023-11-08 RX ORDER — LIDOCAINE HYDROCHLORIDE 40 MG/ML
SOLUTION TOPICAL ONCE
OUTPATIENT
Start: 2023-11-08 | End: 2023-11-08

## 2023-11-08 RX ORDER — GENTAMICIN SULFATE 1 MG/G
OINTMENT TOPICAL ONCE
OUTPATIENT
Start: 2023-11-08 | End: 2023-11-08

## 2023-11-08 RX ORDER — TRIAMCINOLONE ACETONIDE 1 MG/G
OINTMENT TOPICAL ONCE
OUTPATIENT
Start: 2023-11-08 | End: 2023-11-08

## 2023-11-08 RX ORDER — CLOBETASOL PROPIONATE 0.5 MG/G
OINTMENT TOPICAL ONCE
OUTPATIENT
Start: 2023-11-08 | End: 2023-11-08

## 2023-11-08 RX ORDER — LIDOCAINE 40 MG/G
CREAM TOPICAL ONCE
OUTPATIENT
Start: 2023-11-08 | End: 2023-11-08

## 2023-11-08 RX ORDER — BACITRACIN ZINC AND POLYMYXIN B SULFATE 500; 1000 [USP'U]/G; [USP'U]/G
OINTMENT TOPICAL ONCE
OUTPATIENT
Start: 2023-11-08 | End: 2023-11-08

## 2023-11-08 RX ORDER — IBUPROFEN 200 MG
TABLET ORAL ONCE
OUTPATIENT
Start: 2023-11-08 | End: 2023-11-08

## 2023-11-08 RX ADMIN — GENTAMICIN SULFATE: 1 OINTMENT TOPICAL at 16:05

## 2023-11-08 NOTE — PLAN OF CARE
Problem: Wound:  Goal: Will show signs of wound healing; wound closure and no evidence of infection  Description: Will show signs of wound healing; wound closure and no evidence of infection  Outcome: Progressing  Note: See flowsheet     Problem: Wound:  Intervention: Assess ankle, calf, or foot circumference blilaterally  Note: See flowsheet  Intervention: Assess pain status  Note: See flowsheet  Intervention: Assess wound size, appearance and drainage  Note: See flowsheet  Intervention: Assess pedal pulses bilaterally if patient has a foot or leg ulcer  Note: See flowsheet  Intervention: Doppler if unable to palpate pedal pulse  Note: See flowsheet

## 2023-11-08 NOTE — CARE COORDINATION
Dietician: Completed DME Assistance: Declined     Senior Services: Declined    Diabetes Education: Completed      Disease Specific Clinic: Completed Occupational Therapy: Completed     Disease Association: Completed      Other Interventions:             Care Transition Nurse provided contact information for future needs. Plan for follow-up call in 5-7 days based on severity of symptoms and risk factors. Plan for next call: symptom management-BLE wounds, Lymphedema? Swelling? Weeping?   follow-up appointment-11/15/23 [address] [place] [address]  medication management-changes?     Keyona Cummings RN

## 2023-11-08 NOTE — PATIENT INSTRUCTIONS
PHYSICIAN ORDERS AND DISCHARGE INSTRUCTIONS  NOTE: Upon discharge from the  Medical [facility], you will receive a patient experience survey via E-mail. We would be grateful if you would take the time to fill this survey out. Wound care order history:   SULTANA's   Right       Left    Date    Vascular studies/Intervention: . Cultures: . Antibiotics: . HbA1c:  .               Grafts:  .   Juxta Lites & Lymph Pumps:  Continuing wound care orders and information:              Residence: . Continue home health care with:. Your wound-care supplies will be provided by: [facility]: . Wound cleansing:      Do not scrub or use excessive force. Wash hands with soap and water before and after dressing changes. Prior to applying a clean dressing, cleanse wound with normal saline,    wound cleanser, or mild soap and water. Ask your physician or nurse before getting the wound(s) wet in the shower. Daily Wound management:    Keep weight off wounds and reposition every 2 hours. Avoid standing for long periods of time. Evaluate legs to the level of the heart or above for 30 minutes 4-5 times a day and/or when sitting. When taking antibiotics take entire prescription as ordered by MD do not stop taking until medicine is all gone. Documentation:  Compression: . Offloading: . Orders for this week (11/8/2023): Bilateral Lower Legs- Wash with mild soap and water, rinse with saline, pat dry with 4x4  Apply Gentamicin & kirby to wound bed  Wrap with Coflex Lite Wrap first layer then place Large Sorbex for Drainage, then wrap 2nd layer  Change on Wednesday     Please dispense 30 day quantity when sending supplies     Follow up with Dr. Rani Riley  In 1 weeks in the wound care center  Call [phone] for any questions or concerns.

## 2023-11-08 NOTE — PROGRESS NOTES
Multilayer Compression Wrap   (Not Unna) Below the Knee    NAME:  Hilaria Goodpasture  YOB: 1935  MEDICAL RECORD NUMBER:  [id number]  DATE:  11/8/2023    Multilayer compression wrap: Removed old Multilayer wrap if indicated and wash leg with mild soap/water. Applied moisturizing agent to dry skin as needed. Applied primary and secondary dressing as ordered. Applied multilayered dressing below the knee to right lower leg. Applied multilayered dressing below the knee to left lower leg. Instructed patient/caregiver not to remove dressing and to keep it clean and dry. Instructed patient/caregiver on complications to report to provider, such as pain, numbness in toes, heavy drainage, and slippage of dressing. Instructed patient on purpose of compression dressing and on activity and exercise recommendations. Patient tolerated treatment well.       Electronically signed by Elie Bonner RN on 11/8/2023 at 4:04 PM
promethazine-dextromethorphan (PROMETHAZINE-DM) 6.25-15 MG/5ML syrup Take 5 mLs by mouth 4 times daily as needed for Cough 180 mL 0    glipiZIDE (GLUCOTROL XL) 5 MG extended release tablet Take 1 tablet by mouth daily 30 tablet 3    lidocaine 4 % external patch Place 1 patch onto the skin daily (Patient not taking: Reported on 11/7/2023) 30 each 0    miconazole (MICOTIN) 2 % powder Apply topically 2 times daily. (Patient not taking: Reported on 11/7/2023) 45 g 0    diclofenac sodium (VOLTAREN) 1 % GEL Apply 4 g topically 4 times daily for 10 days 160 g 0    Multiple Vitamins-Minerals (PRESERVISION AREDS 2 PO) Take by mouth daily      HYDROcodone-acetaminophen (NORCO) 5-325 MG per tablet Take 1 tablet by mouth every 12 hours as needed. metoprolol succinate (TOPROL XL) 25 MG extended release tablet TAKE 1 TABLET BY MOUTH DAILY 90 tablet 1    gabapentin (NEURONTIN) 300 MG capsule Take 1 tablet in the morning, 1 tablet in the afternoon, and 2 tablets in the evening 120 capsule 5    albuterol sulfate HFA (PROVENTIL;VENTOLIN;PROAIR) 108 (90 Base) MCG/ACT inhaler INHALE 2 PUFFS INTO THE LUNGS 4 TIMES DAILY AS NEEDED FOR WHEEZING 8.5 g 5    RESTASIS 0.05 % ophthalmic emulsion Apply 1 drop to eye in the morning and at bedtime      ketoconazole (NIZORAL) 2 % cream Apply topically daily to right foot      Cholecalciferol (VITAMIN D3) 125 MCG (5000 UT) CAPS Take 1 capsule by mouth daily 30 capsule 1    Fluticasone Furoate-Vilanterol (BREO ELLIPTA) 100-25 MCG/ACT AEPB Inhale 1 puff into the lungs daily 1 each 3    Coenzyme Q10 (COQ10) 200 MG CAPS Take 200 mg by mouth 2 times daily      clobetasol (TEMOVATE) 0.05 % cream Apply topically 2 times daily      hydroxychloroquine (PLAQUENIL) 200 MG tablet Take 1 tablet by mouth daily       No current facility-administered medications on file prior to encounter.        PROBLEM LIST    Patient Active Problem List   Diagnosis    Essential hypertension    Mixed hyperlipidemia    Type 2

## 2023-11-15 ENCOUNTER — HOSPITAL ENCOUNTER (OUTPATIENT)
Dept: WOUND CARE | Age: 88
Discharge: HOME | End: 2023-11-15
Payer: MEDICARE

## 2023-11-15 VITALS
RESPIRATION RATE: 18 BRPM | TEMPERATURE: 97.9 F | SYSTOLIC BLOOD PRESSURE: 137 MMHG | HEART RATE: 73 BPM | DIASTOLIC BLOOD PRESSURE: 74 MMHG

## 2023-11-15 DIAGNOSIS — I83.012 VENOUS STASIS ULCER OF RIGHT CALF WITH FAT LAYER EXPOSED, UNSPECIFIED WHETHER VARICOSE VEINS PRESENT (HCC): ICD-10-CM

## 2023-11-15 DIAGNOSIS — L97.212 VENOUS STASIS ULCER OF RIGHT CALF WITH FAT LAYER EXPOSED, UNSPECIFIED WHETHER VARICOSE VEINS PRESENT (HCC): ICD-10-CM

## 2023-11-15 DIAGNOSIS — I87.2 VENOUS INSUFFICIENCY OF BOTH LOWER EXTREMITIES: Primary | ICD-10-CM

## 2023-11-15 DIAGNOSIS — L03.115 CELLULITIS OF RIGHT LEG: ICD-10-CM

## 2023-11-15 PROCEDURE — 6370000000 HC RX 637 (ALT 250 FOR IP): Performed by: NURSE PRACTITIONER

## 2023-11-15 PROCEDURE — 99213 OFFICE O/P EST LOW 20 MIN: CPT

## 2023-11-15 PROCEDURE — 29581 APPL MULTLAYER CMPRN SYS LEG: CPT

## 2023-11-15 RX ORDER — GINSENG 100 MG
CAPSULE ORAL ONCE
OUTPATIENT
Start: 2023-11-15 | End: 2023-11-15

## 2023-11-15 RX ORDER — TRIAMCINOLONE ACETONIDE 1 MG/G
OINTMENT TOPICAL ONCE
OUTPATIENT
Start: 2023-11-15 | End: 2023-11-15

## 2023-11-15 RX ORDER — CLOBETASOL PROPIONATE 0.5 MG/G
OINTMENT TOPICAL ONCE
OUTPATIENT
Start: 2023-11-15 | End: 2023-11-15

## 2023-11-15 RX ORDER — LIDOCAINE 40 MG/G
CREAM TOPICAL ONCE
OUTPATIENT
Start: 2023-11-15 | End: 2023-11-15

## 2023-11-15 RX ORDER — BACITRACIN ZINC AND POLYMYXIN B SULFATE 500; 1000 [USP'U]/G; [USP'U]/G
OINTMENT TOPICAL ONCE
OUTPATIENT
Start: 2023-11-15 | End: 2023-11-15

## 2023-11-15 RX ORDER — LIDOCAINE HYDROCHLORIDE 20 MG/ML
JELLY TOPICAL ONCE
OUTPATIENT
Start: 2023-11-15 | End: 2023-11-15

## 2023-11-15 RX ORDER — GENTAMICIN SULFATE 1 MG/G
OINTMENT TOPICAL ONCE
OUTPATIENT
Start: 2023-11-15 | End: 2023-11-15

## 2023-11-15 RX ORDER — GENTAMICIN SULFATE 1 MG/G
OINTMENT TOPICAL ONCE
Status: COMPLETED | OUTPATIENT
Start: 2023-11-15 | End: 2023-11-15

## 2023-11-15 RX ORDER — LIDOCAINE HYDROCHLORIDE 40 MG/ML
SOLUTION TOPICAL ONCE
OUTPATIENT
Start: 2023-11-15 | End: 2023-11-15

## 2023-11-15 RX ORDER — IBUPROFEN 200 MG
TABLET ORAL ONCE
OUTPATIENT
Start: 2023-11-15 | End: 2023-11-15

## 2023-11-15 RX ORDER — LIDOCAINE 50 MG/G
OINTMENT TOPICAL ONCE
OUTPATIENT
Start: 2023-11-15 | End: 2023-11-15

## 2023-11-15 RX ORDER — BETAMETHASONE DIPROPIONATE 0.05 %
OINTMENT (GRAM) TOPICAL ONCE
OUTPATIENT
Start: 2023-11-15 | End: 2023-11-15

## 2023-11-15 RX ADMIN — GENTAMICIN SULFATE: 1 OINTMENT TOPICAL at 14:48

## 2023-11-15 NOTE — PROGRESS NOTES
Multilayer Compression Wrap   (Not Unna) Below the Knee    NAME:  Be Carter  YOB: 1935  MEDICAL RECORD NUMBER:  [id number]  DATE:  11/15/2023    Multilayer compression wrap: Removed old Multilayer wrap if indicated and wash leg with mild soap/water. Applied moisturizing agent to dry skin as needed. Applied primary and secondary dressing as ordered. Applied multilayered dressing below the knee to right lower leg. Applied multilayered dressing below the knee to left lower leg. Instructed patient/caregiver not to remove dressing and to keep it clean and dry. Instructed patient/caregiver on complications to report to provider, such as pain, numbness in toes, heavy drainage, and slippage of dressing. Instructed patient on purpose of compression dressing and on activity and exercise recommendations.       Electronically signed by Collette Pat LPN on 79/75/9061 at 2:45 PM
worser. Plan:     Patient is recommended to start lasix and use as intracuted, also recommend to use compression socks  Patient Instructions   PHYSICIAN ORDERS AND DISCHARGE INSTRUCTIONS  NOTE: Upon discharge from the  [facility], you will receive a patient experience survey via E-mail. We would be grateful if you would take the time to fill this survey out. Wound care order history:   SULTANA's   Right       Left    Date    Vascular studies/Intervention: . Cultures: . Antibiotics: . HbA1c:  .               Grafts:  .   Juxta Lites & Lymph Pumps:  Continuing wound care orders and information:              Residence: . Continue home health care with:. Your wound-care supplies will be provided by: [facility]: . Wound cleansing:      Do not scrub or use excessive force. Wash hands with soap and water before and after dressing changes. Prior to applying a clean dressing, cleanse wound with normal saline,    wound cleanser, or mild soap and water. Ask your physician or nurse before getting the wound(s) wet in the shower. Daily Wound management:    Keep weight off wounds and reposition every 2 hours. Avoid standing for long periods of time. Evaluate legs to the level of the heart or above for 30 minutes 4-5 times a day and/or when sitting. When taking antibiotics take entire prescription as ordered by MD do not stop taking until medicine is all gone. Documentation:  Compression: . Offloading: . Orders for this week (11/15/2023):   Bilateral Lower Legs- Wash with mild soap and water, rinse with saline, pat dry with 4x4  Apply Gentamicin & kirby to wound bed  Wrap with Coflex Lite Unna  layer  Cover with Large Sorbex  Wrap with Coban layer of Coflex Lite   Change on Wednesday     Please dispense 30 day quantity when sending supplies     Follow up with Dr. Martín Joyce  In 1 weeks in the wound care center  Call [phone] for

## 2023-11-17 ENCOUNTER — CARE COORDINATION (OUTPATIENT)
Dept: CASE MANAGEMENT | Age: 88
End: 2023-11-17

## 2023-11-17 NOTE — CARE COORDINATION
Care Transitions Follow Up Call    Patient Current Location:  Home: [address] Coordinator contacted the patient by telephone to follow up after admission on 10/23/2023. Verified name and  with patient as identifiers. Patient: Samantha Hammond  Patient : 1935   MRN: <[id number]>  Reason for Admission: Stroke like symptoms; Chest pain  Discharge Date: 10/25/23 RARS: Readmission Risk Score: 20.9      Needs to be reviewed by the provider   Additional needs identified to be addressed with provider: No  none             Method of communication with provider: none. Spoke with patient. She says she is feeling pretty good. She has vision problems and only sees shadows. She is going to have more testing done. Patient denies cp, numbness, tingling, sob, fever, palpitations, cough, pain or swelling. She goes to the wound clinic weekly for her BLE wound drainage. Dressings are intact. She stated home care nurse is coming today. She has OT and PT. FBS 97. Appetite and fluid intake is good. No elimination problems. No needs. Will continue to follow. Addressed changes since last contact:  none  Discussed follow-up appointments. If no appointment was previously scheduled, appointment scheduling offered: No.   Is follow up appointment scheduled within 7 days of discharge? No.    Follow Up  Future Appointments   Date Time Provider [facility] [address]   2023  2:00 PM Ximena Stone MD [facility]   2023  2:45 PM Amira Guerrero MD [facility]   3/20/2024 11:50 AM Ximena Stone MD [facility]     External follow up appointment(s):     LPN Care Coordinator reviewed red flags with patient and discussed any barriers to care and/or understanding of plan of care after discharge. Discussed appropriate site of care based on symptoms and resources available to patient including: PCP  Specialist  Urgent care clinics  Home health.  The patient

## 2023-11-24 ENCOUNTER — APPOINTMENT (OUTPATIENT)
Dept: GENERAL RADIOLOGY | Age: 88
DRG: 189 | End: 2023-11-24
Payer: MEDICARE

## 2023-11-24 ENCOUNTER — HOSPITAL ENCOUNTER (INPATIENT)
Age: 88
LOS: 4 days | Discharge: HOME HEALTH | DRG: 189 | End: 2023-11-28
Attending: EMERGENCY MEDICINE | Admitting: INTERNAL MEDICINE
Payer: MEDICARE

## 2023-11-24 ENCOUNTER — CARE COORDINATION (OUTPATIENT)
Dept: CASE MANAGEMENT | Age: 88
End: 2023-11-24

## 2023-11-24 DIAGNOSIS — R09.02 HYPOXIA: Primary | ICD-10-CM

## 2023-11-24 DIAGNOSIS — B33.8 RSV INFECTION: ICD-10-CM

## 2023-11-24 LAB
ALBUMIN SERPL-MCNC: 3.4 GM/DL (ref 3.4–5)
ALP BLD-CCNC: 83 IU/L (ref 40–129)
ALT SERPL-CCNC: 7 U/L (ref 10–40)
ANION GAP SERPL CALCULATED.3IONS-SCNC: 8 MMOL/L (ref 4–16)
AST SERPL-CCNC: 17 IU/L (ref 15–37)
B PARAP IS1001 DNA NPH QL NAA+NON-PROBE: NOT DETECTED
B PERT.PT PRMT NPH QL NAA+NON-PROBE: NOT DETECTED
BASE EXCESS MIXED: 7 (ref 0–2.3)
BASE EXCESS: ABNORMAL (ref 0–2.4)
BASOPHILS ABSOLUTE: 0.1 K/CU MM
BASOPHILS RELATIVE PERCENT: 0.7 % (ref 0–1)
BILIRUB SERPL-MCNC: 0.2 MG/DL (ref 0–1)
BUN SERPL-MCNC: 19 MG/DL (ref 6–23)
C PNEUM DNA NPH QL NAA+NON-PROBE: NOT DETECTED
CALCIUM SERPL-MCNC: 9.1 MG/DL (ref 8.3–10.6)
CHLORIDE BLD-SCNC: 101 MMOL/L (ref 99–110)
CO2 CONTENT: 35.9 MMOL/L (ref 19–24)
CO2: 33 MMOL/L (ref 21–32)
CREAT SERPL-MCNC: 0.7 MG/DL (ref 0.6–1.1)
DIFFERENTIAL TYPE: ABNORMAL
EOSINOPHILS ABSOLUTE: 0.2 K/CU MM
EOSINOPHILS RELATIVE PERCENT: 2.4 % (ref 0–3)
FLUAV H1 2009 PAN RNA NPH NAA+NON-PROBE: NOT DETECTED
FLUAV H1 RNA NPH QL NAA+NON-PROBE: NOT DETECTED
FLUAV H3 RNA NPH QL NAA+NON-PROBE: NOT DETECTED
FLUAV RNA NPH QL NAA+NON-PROBE: NOT DETECTED
FLUBV RNA NPH QL NAA+NON-PROBE: NOT DETECTED
GFR SERPL CREATININE-BSD FRML MDRD: >60 ML/MIN/1.73M2
GLUCOSE SERPL-MCNC: 125 MG/DL (ref 70–99)
HADV DNA NPH QL NAA+NON-PROBE: NOT DETECTED
HCO3 VENOUS: 34.1 MMOL/L (ref 19–25)
HCOV 229E RNA NPH QL NAA+NON-PROBE: NOT DETECTED
HCOV HKU1 RNA NPH QL NAA+NON-PROBE: NOT DETECTED
HCOV NL63 RNA NPH QL NAA+NON-PROBE: NOT DETECTED
HCOV OC43 RNA NPH QL NAA+NON-PROBE: NOT DETECTED
HCT VFR BLD CALC: 42.4 % (ref 37–47)
HEMOGLOBIN: 12.2 GM/DL (ref 12.5–16)
HMPV RNA NPH QL NAA+NON-PROBE: NOT DETECTED
HPIV1 RNA NPH QL NAA+NON-PROBE: NOT DETECTED
HPIV2 RNA NPH QL NAA+NON-PROBE: NOT DETECTED
HPIV3 RNA NPH QL NAA+NON-PROBE: NOT DETECTED
HPIV4 RNA NPH QL NAA+NON-PROBE: NOT DETECTED
IMMATURE NEUTROPHIL %: 0.2 % (ref 0–0.43)
LACTIC ACID, SEPSIS: 1.3 MMOL/L (ref 0.4–2)
LYMPHOCYTES ABSOLUTE: 1.6 K/CU MM
LYMPHOCYTES RELATIVE PERCENT: 18.6 % (ref 24–44)
M PNEUMO DNA NPH QL NAA+NON-PROBE: NOT DETECTED
MCH RBC QN AUTO: 24 PG (ref 27–31)
MCHC RBC AUTO-ENTMCNC: 28.8 % (ref 32–36)
MCV RBC AUTO: 83.5 FL (ref 78–100)
MONOCYTES ABSOLUTE: 2.2 K/CU MM
MONOCYTES RELATIVE PERCENT: 24.6 % (ref 0–4)
O2 SAT, VEN: 60.1 % (ref 50–70)
PCO2, VEN: 57.4 MMHG (ref 38–52)
PDW BLD-RTO: 20.8 % (ref 11.7–14.9)
PH VENOUS: 7.38 (ref 7.32–7.42)
PLATELET # BLD: 145 K/CU MM (ref 140–440)
PMV BLD AUTO: 8.6 FL (ref 7.5–11.1)
PO2, VEN: 32.7 MMHG (ref 28–48)
POTASSIUM SERPL-SCNC: 3.2 MMOL/L (ref 3.5–5.1)
PRO-BNP: 437.3 PG/ML
RBC # BLD: 5.08 M/CU MM (ref 4.2–5.4)
RSV RNA NPH QL NAA+NON-PROBE: ABNORMAL
RV+EV RNA NPH QL NAA+NON-PROBE: NOT DETECTED
SARS-COV-2 RNA NPH QL NAA+NON-PROBE: NOT DETECTED
SEGMENTED NEUTROPHILS ABSOLUTE COUNT: 4.7 K/CU MM
SEGMENTED NEUTROPHILS RELATIVE PERCENT: 53.5 % (ref 36–66)
SODIUM BLD-SCNC: 142 MMOL/L (ref 135–145)
SOURCE, BLOOD GAS: ABNORMAL
TOTAL IMMATURE NEUTOROPHIL: 0.02 K/CU MM
TOTAL PROTEIN: 6.2 GM/DL (ref 6.4–8.2)
TROPONIN, HIGH SENSITIVITY: 29 NG/L (ref 0–14)
TROPONIN, HIGH SENSITIVITY: 33 NG/L (ref 0–14)
WBC # BLD: 8.8 K/CU MM (ref 4–10.5)

## 2023-11-24 PROCEDURE — 83880 ASSAY OF NATRIURETIC PEPTIDE: CPT

## 2023-11-24 PROCEDURE — 87040 BLOOD CULTURE FOR BACTERIA: CPT

## 2023-11-24 PROCEDURE — 6370000000 HC RX 637 (ALT 250 FOR IP): Performed by: NURSE PRACTITIONER

## 2023-11-24 PROCEDURE — 85025 COMPLETE CBC W/AUTO DIFF WBC: CPT

## 2023-11-24 PROCEDURE — 6360000002 HC RX W HCPCS: Performed by: EMERGENCY MEDICINE

## 2023-11-24 PROCEDURE — 6370000000 HC RX 637 (ALT 250 FOR IP): Performed by: EMERGENCY MEDICINE

## 2023-11-24 PROCEDURE — 99285 EMERGENCY DEPT VISIT HI MDM: CPT

## 2023-11-24 PROCEDURE — 94664 DEMO&/EVAL PT USE INHALER: CPT

## 2023-11-24 PROCEDURE — 94640 AIRWAY INHALATION TREATMENT: CPT

## 2023-11-24 PROCEDURE — 93005 ELECTROCARDIOGRAM TRACING: CPT | Performed by: EMERGENCY MEDICINE

## 2023-11-24 PROCEDURE — 84484 ASSAY OF TROPONIN QUANT: CPT

## 2023-11-24 PROCEDURE — 94761 N-INVAS EAR/PLS OXIMETRY MLT: CPT

## 2023-11-24 PROCEDURE — 80053 COMPREHEN METABOLIC PANEL: CPT

## 2023-11-24 PROCEDURE — 71045 X-RAY EXAM CHEST 1 VIEW: CPT

## 2023-11-24 PROCEDURE — 0202U NFCT DS 22 TRGT SARS-COV-2: CPT

## 2023-11-24 PROCEDURE — 2580000003 HC RX 258: Performed by: NURSE PRACTITIONER

## 2023-11-24 PROCEDURE — 83605 ASSAY OF LACTIC ACID: CPT

## 2023-11-24 PROCEDURE — 1200000000 HC SEMI PRIVATE

## 2023-11-24 RX ORDER — GABAPENTIN 300 MG/1
300 CAPSULE ORAL EVERY MORNING
Status: DISCONTINUED | OUTPATIENT
Start: 2023-11-25 | End: 2023-11-28 | Stop reason: HOSPADM

## 2023-11-24 RX ORDER — HYDROCODONE BITARTRATE AND ACETAMINOPHEN 5; 325 MG/1; MG/1
1 TABLET ORAL EVERY 12 HOURS PRN
Status: DISCONTINUED | OUTPATIENT
Start: 2023-11-24 | End: 2023-11-28 | Stop reason: HOSPADM

## 2023-11-24 RX ORDER — CYCLOSPORINE 0.5 MG/ML
1 EMULSION OPHTHALMIC 2 TIMES DAILY
Status: DISCONTINUED | OUTPATIENT
Start: 2023-11-24 | End: 2023-11-24 | Stop reason: CLARIF

## 2023-11-24 RX ORDER — METOPROLOL SUCCINATE 25 MG/1
25 TABLET, EXTENDED RELEASE ORAL DAILY
Status: DISCONTINUED | OUTPATIENT
Start: 2023-11-24 | End: 2023-11-28 | Stop reason: HOSPADM

## 2023-11-24 RX ORDER — DEXTROMETHORPHAN HYDROBROMIDE AND PROMETHAZINE HYDROCHLORIDE 15; 6.25 MG/5ML; MG/5ML
5 SYRUP ORAL ONCE
Status: COMPLETED | OUTPATIENT
Start: 2023-11-24 | End: 2023-11-24

## 2023-11-24 RX ORDER — SODIUM CHLORIDE 9 MG/ML
250 INJECTION, SOLUTION INTRAVENOUS PRN
Status: DISCONTINUED | OUTPATIENT
Start: 2023-11-24 | End: 2023-11-28 | Stop reason: HOSPADM

## 2023-11-24 RX ORDER — ATORVASTATIN CALCIUM 40 MG/1
40 TABLET, FILM COATED ORAL NIGHTLY
Status: DISCONTINUED | OUTPATIENT
Start: 2023-11-24 | End: 2023-11-28 | Stop reason: HOSPADM

## 2023-11-24 RX ORDER — ACETAMINOPHEN 325 MG/1
650 TABLET ORAL EVERY 6 HOURS PRN
Status: DISCONTINUED | OUTPATIENT
Start: 2023-11-24 | End: 2023-11-28 | Stop reason: HOSPADM

## 2023-11-24 RX ORDER — ACETAMINOPHEN 650 MG/1
650 SUPPOSITORY RECTAL EVERY 6 HOURS PRN
Status: DISCONTINUED | OUTPATIENT
Start: 2023-11-24 | End: 2023-11-28 | Stop reason: HOSPADM

## 2023-11-24 RX ORDER — LOSARTAN POTASSIUM 25 MG/1
25 TABLET ORAL DAILY
COMMUNITY

## 2023-11-24 RX ORDER — IPRATROPIUM BROMIDE AND ALBUTEROL SULFATE 2.5; .5 MG/3ML; MG/3ML
1 SOLUTION RESPIRATORY (INHALATION) ONCE
Status: COMPLETED | OUTPATIENT
Start: 2023-11-24 | End: 2023-11-24

## 2023-11-24 RX ORDER — POLYVINYL ALCOHOL 14 MG/ML
1 SOLUTION/ DROPS OPHTHALMIC PRN
Status: DISCONTINUED | OUTPATIENT
Start: 2023-11-24 | End: 2023-11-25 | Stop reason: RX

## 2023-11-24 RX ORDER — ONDANSETRON 4 MG/1
4 TABLET, ORALLY DISINTEGRATING ORAL EVERY 8 HOURS PRN
Status: DISCONTINUED | OUTPATIENT
Start: 2023-11-24 | End: 2023-11-28 | Stop reason: HOSPADM

## 2023-11-24 RX ORDER — HYDROXYCHLOROQUINE SULFATE 200 MG/1
200 TABLET, FILM COATED ORAL DAILY
Status: DISCONTINUED | OUTPATIENT
Start: 2023-11-24 | End: 2023-11-28 | Stop reason: HOSPADM

## 2023-11-24 RX ORDER — HYDROCODONE POLISTIREX AND CHLORPHENIRAMINE POLISTIREX 10; 8 MG/5ML; MG/5ML
5 SUSPENSION, EXTENDED RELEASE ORAL EVERY 12 HOURS PRN
Status: DISCONTINUED | OUTPATIENT
Start: 2023-11-24 | End: 2023-11-28 | Stop reason: HOSPADM

## 2023-11-24 RX ORDER — ROPINIROLE 2 MG/1
2 TABLET, FILM COATED ORAL 2 TIMES DAILY
Status: DISCONTINUED | OUTPATIENT
Start: 2023-11-24 | End: 2023-11-28

## 2023-11-24 RX ORDER — ONDANSETRON 2 MG/ML
4 INJECTION INTRAMUSCULAR; INTRAVENOUS EVERY 6 HOURS PRN
Status: DISCONTINUED | OUTPATIENT
Start: 2023-11-24 | End: 2023-11-28 | Stop reason: HOSPADM

## 2023-11-24 RX ORDER — SODIUM CHLORIDE 0.9 % (FLUSH) 0.9 %
5-40 SYRINGE (ML) INJECTION PRN
Status: DISCONTINUED | OUTPATIENT
Start: 2023-11-24 | End: 2023-11-28 | Stop reason: HOSPADM

## 2023-11-24 RX ORDER — VITS A,C,E/LUTEIN/MINERALS 300MCG-200
1 TABLET ORAL DAILY
Status: DISCONTINUED | OUTPATIENT
Start: 2023-11-24 | End: 2023-11-28 | Stop reason: HOSPADM

## 2023-11-24 RX ORDER — IPRATROPIUM BROMIDE AND ALBUTEROL SULFATE 2.5; .5 MG/3ML; MG/3ML
1 SOLUTION RESPIRATORY (INHALATION)
Status: DISCONTINUED | OUTPATIENT
Start: 2023-11-24 | End: 2023-11-26

## 2023-11-24 RX ORDER — GABAPENTIN 300 MG/1
300 CAPSULE ORAL
Status: DISCONTINUED | OUTPATIENT
Start: 2023-11-24 | End: 2023-11-28 | Stop reason: HOSPADM

## 2023-11-24 RX ORDER — POTASSIUM CHLORIDE 20 MEQ/1
40 TABLET, EXTENDED RELEASE ORAL ONCE
Status: COMPLETED | OUTPATIENT
Start: 2023-11-24 | End: 2023-11-24

## 2023-11-24 RX ORDER — BENZONATATE 100 MG/1
200 CAPSULE ORAL ONCE
Status: COMPLETED | OUTPATIENT
Start: 2023-11-24 | End: 2023-11-24

## 2023-11-24 RX ORDER — GABAPENTIN 300 MG/1
600 CAPSULE ORAL NIGHTLY
Status: DISCONTINUED | OUTPATIENT
Start: 2023-11-24 | End: 2023-11-25 | Stop reason: ALTCHOICE

## 2023-11-24 RX ORDER — DEXAMETHASONE SODIUM PHOSPHATE 10 MG/ML
10 INJECTION, SOLUTION INTRAMUSCULAR; INTRAVENOUS ONCE
Status: COMPLETED | OUTPATIENT
Start: 2023-11-24 | End: 2023-11-24

## 2023-11-24 RX ORDER — SODIUM CHLORIDE 0.9 % (FLUSH) 0.9 %
5-40 SYRINGE (ML) INJECTION EVERY 12 HOURS SCHEDULED
Status: DISCONTINUED | OUTPATIENT
Start: 2023-11-24 | End: 2023-11-28 | Stop reason: HOSPADM

## 2023-11-24 RX ORDER — POLYETHYLENE GLYCOL 3350 17 G/17G
17 POWDER, FOR SOLUTION ORAL DAILY PRN
Status: DISCONTINUED | OUTPATIENT
Start: 2023-11-24 | End: 2023-11-28 | Stop reason: HOSPADM

## 2023-11-24 RX ORDER — TORSEMIDE 10 MG/1
10 TABLET ORAL DAILY
Status: DISCONTINUED | OUTPATIENT
Start: 2023-11-24 | End: 2023-11-28 | Stop reason: HOSPADM

## 2023-11-24 RX ADMIN — POTASSIUM CHLORIDE 40 MEQ: 1500 TABLET, EXTENDED RELEASE ORAL at 15:14

## 2023-11-24 RX ADMIN — BENZONATATE 200 MG: 100 CAPSULE ORAL at 11:15

## 2023-11-24 RX ADMIN — APIXABAN 5 MG: 5 TABLET, FILM COATED ORAL at 15:13

## 2023-11-24 RX ADMIN — ROPINIROLE HYDROCHLORIDE 2 MG: 2 TABLET, FILM COATED ORAL at 15:13

## 2023-11-24 RX ADMIN — ROPINIROLE HYDROCHLORIDE 2 MG: 2 TABLET, FILM COATED ORAL at 20:25

## 2023-11-24 RX ADMIN — IPRATROPIUM BROMIDE AND ALBUTEROL SULFATE 1 DOSE: .5; 3 SOLUTION RESPIRATORY (INHALATION) at 19:06

## 2023-11-24 RX ADMIN — ATORVASTATIN CALCIUM 40 MG: 40 TABLET, FILM COATED ORAL at 20:25

## 2023-11-24 RX ADMIN — METOPROLOL SUCCINATE 25 MG: 25 TABLET, EXTENDED RELEASE ORAL at 15:14

## 2023-11-24 RX ADMIN — MOMETASONE FUROATE AND FORMOTEROL FUMARATE DIHYDRATE 2 PUFF: 100; 5 AEROSOL RESPIRATORY (INHALATION) at 19:06

## 2023-11-24 RX ADMIN — GABAPENTIN 600 MG: 300 CAPSULE ORAL at 20:25

## 2023-11-24 RX ADMIN — SODIUM CHLORIDE, PRESERVATIVE FREE 10 ML: 5 INJECTION INTRAVENOUS at 20:24

## 2023-11-24 RX ADMIN — IPRATROPIUM BROMIDE AND ALBUTEROL SULFATE 1 DOSE: .5; 3 SOLUTION RESPIRATORY (INHALATION) at 15:35

## 2023-11-24 RX ADMIN — Medication 5 ML: at 15:14

## 2023-11-24 RX ADMIN — IPRATROPIUM BROMIDE AND ALBUTEROL SULFATE 1 DOSE: .5; 2.5 SOLUTION RESPIRATORY (INHALATION) at 11:25

## 2023-11-24 RX ADMIN — Medication 1 TABLET: at 15:14

## 2023-11-24 RX ADMIN — HYDROXYCHLOROQUINE SULFATE 200 MG: 200 TABLET ORAL at 15:13

## 2023-11-24 RX ADMIN — TORSEMIDE 10 MG: 10 TABLET ORAL at 15:13

## 2023-11-24 RX ADMIN — DEXAMETHASONE SODIUM PHOSPHATE 10 MG: 10 INJECTION, SOLUTION INTRAMUSCULAR; INTRAVENOUS at 15:12

## 2023-11-24 RX ADMIN — APIXABAN 5 MG: 5 TABLET, FILM COATED ORAL at 20:25

## 2023-11-24 RX ADMIN — GABAPENTIN 300 MG: 300 CAPSULE ORAL at 15:13

## 2023-11-24 RX ADMIN — Medication 5 ML: at 11:15

## 2023-11-24 ASSESSMENT — PAIN SCALES - GENERAL
PAINLEVEL_OUTOF10: 0
PAINLEVEL_OUTOF10: 4
PAINLEVEL_OUTOF10: 6

## 2023-11-24 ASSESSMENT — PAIN DESCRIPTION - ONSET: ONSET: GRADUAL

## 2023-11-24 ASSESSMENT — PAIN DESCRIPTION - LOCATION: LOCATION: CHEST

## 2023-11-24 ASSESSMENT — LIFESTYLE VARIABLES
HOW OFTEN DO YOU HAVE A DRINK CONTAINING ALCOHOL: NEVER
HOW MANY STANDARD DRINKS CONTAINING ALCOHOL DO YOU HAVE ON A TYPICAL DAY: PATIENT DOES NOT DRINK
HOW MANY STANDARD DRINKS CONTAINING ALCOHOL DO YOU HAVE ON A TYPICAL DAY: PATIENT DOES NOT DRINK
HOW OFTEN DO YOU HAVE A DRINK CONTAINING ALCOHOL: NEVER

## 2023-11-24 ASSESSMENT — PAIN DESCRIPTION - DESCRIPTORS: DESCRIPTORS: SORE

## 2023-11-24 ASSESSMENT — PAIN DESCRIPTION - FREQUENCY: FREQUENCY: INTERMITTENT

## 2023-11-24 ASSESSMENT — PAIN - FUNCTIONAL ASSESSMENT
PAIN_FUNCTIONAL_ASSESSMENT: ACTIVITIES ARE NOT PREVENTED
PAIN_FUNCTIONAL_ASSESSMENT: NONE - DENIES PAIN

## 2023-11-24 ASSESSMENT — PAIN DESCRIPTION - PAIN TYPE: TYPE: ACUTE PAIN

## 2023-11-24 ASSESSMENT — PAIN DESCRIPTION - ORIENTATION: ORIENTATION: UPPER

## 2023-11-24 NOTE — CARE COORDINATION
CTN call deferred today as Karn Schilder is currently in the ED @ [facility] with Shortness of breath/cough & congestion. CTN will follow up to see if admitted vs dc'd out of ED.       Madeleine Alva RN CTN

## 2023-11-24 NOTE — PLAN OF CARE
Problem: Pain  Goal: Verbalizes/displays adequate comfort level or baseline comfort level  Outcome: Progressing  Flowsheets (Taken 11/24/2023 1426)  Verbalizes/displays adequate comfort level or baseline comfort level:   Encourage patient to monitor pain and request assistance   Assess pain using appropriate pain scale   Administer analgesics based on type and severity of pain and evaluate response   Implement non-pharmacological measures as appropriate and evaluate response   Notify Licensed Independent Practitioner if interventions unsuccessful or patient reports new pain     Problem: Skin/Tissue Integrity  Goal: Absence of new skin breakdown  Description: 1. Monitor for areas of redness and/or skin breakdown  2. Assess vascular access sites hourly  3. Every 4-6 hours minimum:  Change oxygen saturation probe site  4. Every 4-6 hours:  If on nasal continuous positive airway pressure, respiratory therapy assess nares and determine need for appliance change or resting period.   Outcome: Progressing     Problem: Respiratory - Adult  Goal: Achieves optimal ventilation and oxygenation  Outcome: Progressing     Problem: Skin/Tissue Integrity - Adult  Goal: Skin integrity remains intact  Outcome: Progressing  Goal: Incisions, wounds, or drain sites healing without S/S of infection  Outcome: Progressing  Goal: Oral mucous membranes remain intact  Outcome: Progressing     Problem: Infection - Adult  Goal: Absence of infection at discharge  Outcome: Progressing  Goal: Absence of infection during hospitalization  Outcome: Progressing  Goal: Absence of fever/infection during anticipated neutropenic period  Outcome: Progressing     Problem: Discharge Planning  Goal: Discharge to home or other facility with appropriate resources  Outcome: Progressing  Flowsheets (Taken 11/24/2023 1445)  Discharge to home or other facility with appropriate resources:   Identify barriers to discharge with patient and caregiver   Identify

## 2023-11-24 NOTE — ED PROVIDER NOTES
appreciated. No evidence of acute ischemia. No significant change from prior EKG dated 24 October 2023    Radiographs (if obtained):  [] The following radiograph was interpreted by myself in the absence of a radiologist:  [x] Radiologist's Report reviewed at time of ED visit:  XR CHEST PORTABLE   Final Result   1. No active pulmonary disease. ED Course and MDM:  Patient presents to the ER with complaints of shortness of breath she has cough. She does not want to stay in the hospital but she came in with an O2 sat of 87% and is currently on 2 L per nasal cannula. She continues to cough in spite of having Tessalon and Phenergan DM. She is positive for RSV. CC/HPI Summary, DDx, ED Course, and Reassessment: I discussed the case with the hospitalist who has agreed to admit this patient for continued evaluation and treatment.     History from : Patient    Limitations to history : None    Patient was given the following medications:  Medications   dexamethasone (PF) (DECADRON) injection 10 mg (has no administration in time range)   potassium chloride (KLOR-CON M) extended release tablet 40 mEq (has no administration in time range)   sodium chloride flush 0.9 % injection 5-40 mL (has no administration in time range)   sodium chloride flush 0.9 % injection 5-40 mL (has no administration in time range)   0.9 % sodium chloride infusion (has no administration in time range)   ondansetron (ZOFRAN-ODT) disintegrating tablet 4 mg (has no administration in time range)     Or   ondansetron (ZOFRAN) injection 4 mg (has no administration in time range)   polyethylene glycol (GLYCOLAX) packet 17 g (has no administration in time range)   acetaminophen (TYLENOL) tablet 650 mg (has no administration in time range)     Or   acetaminophen (TYLENOL) suppository 650 mg (has no administration in time range)   HYDROcodone-chlorpheniramine (TUSSIONEX) 10-8 MG/5ML oral suspension 5 mL (has no administration in time range)

## 2023-11-24 NOTE — PROGRESS NOTES
4 Eyes Skin Assessment     NAME:  Josue Medina  YOB: 1935  MEDICAL RECORD NUMBER:  [id number]    The patient is being assessed for  Admission    I agree that at least one RN has performed a thorough Head to Toe Skin Assessment on the patient. ALL assessment sites listed below have been assessed. Areas assessed by both nurses:    Head, Face, Ears, Shoulders, Back, Chest, Arms, Elbows, Hands, Sacrum. Buttock, Coccyx, Ischium, and Legs. Feet and Heels        Does the Patient have a Wound? Yes wound(s) were present on assessment.  LDA wound assessment was Initiated and completed by RN       Edd Prevention initiated by RN: Yes  Wound Care Orders initiated by RN: No    Pressure Injury (Stage 3,4, Unstageable, DTI, NWPT, and Complex wounds) if present, place Wound referral order by RN under : No    New Ostomies, if present place, Ostomy referral order under : No     Nurse 1 eSignature: Electronically signed by Clay Olivas RN on 11/24/23 at 3:23 PM EST    **SHARE this note so that the co-signing nurse can place an eSignature**    Nurse 2 eSignature: Electronically signed by Artie Sommer RN on 11/24/23 at 4:33 PM EST

## 2023-11-25 LAB
ANION GAP SERPL CALCULATED.3IONS-SCNC: 9 MMOL/L (ref 4–16)
BUN SERPL-MCNC: 20 MG/DL (ref 6–23)
CALCIUM SERPL-MCNC: 8.9 MG/DL (ref 8.3–10.6)
CHLORIDE BLD-SCNC: 103 MMOL/L (ref 99–110)
CO2: 31 MMOL/L (ref 21–32)
CREAT SERPL-MCNC: 0.6 MG/DL (ref 0.6–1.1)
GFR SERPL CREATININE-BSD FRML MDRD: >60 ML/MIN/1.73M2
GLUCOSE SERPL-MCNC: 182 MG/DL (ref 70–99)
HCT VFR BLD CALC: 42 % (ref 37–47)
HEMOGLOBIN: 12.1 GM/DL (ref 12.5–16)
MAGNESIUM: 2 MG/DL (ref 1.8–2.4)
MCH RBC QN AUTO: 24.1 PG (ref 27–31)
MCHC RBC AUTO-ENTMCNC: 28.8 % (ref 32–36)
MCV RBC AUTO: 83.7 FL (ref 78–100)
PDW BLD-RTO: 21 % (ref 11.7–14.9)
PLATELET # BLD: 161 K/CU MM (ref 140–440)
PMV BLD AUTO: 9.3 FL (ref 7.5–11.1)
POTASSIUM SERPL-SCNC: 3.9 MMOL/L (ref 3.5–5.1)
RBC # BLD: 5.02 M/CU MM (ref 4.2–5.4)
SODIUM BLD-SCNC: 143 MMOL/L (ref 135–145)
WBC # BLD: 7.8 K/CU MM (ref 4–10.5)

## 2023-11-25 PROCEDURE — 97162 PT EVAL MOD COMPLEX 30 MIN: CPT

## 2023-11-25 PROCEDURE — 6370000000 HC RX 637 (ALT 250 FOR IP): Performed by: NURSE PRACTITIONER

## 2023-11-25 PROCEDURE — 1200000000 HC SEMI PRIVATE

## 2023-11-25 PROCEDURE — 2700000000 HC OXYGEN THERAPY PER DAY

## 2023-11-25 PROCEDURE — 97535 SELF CARE MNGMENT TRAINING: CPT

## 2023-11-25 PROCEDURE — 6360000002 HC RX W HCPCS: Performed by: NURSE PRACTITIONER

## 2023-11-25 PROCEDURE — 97166 OT EVAL MOD COMPLEX 45 MIN: CPT

## 2023-11-25 PROCEDURE — 94640 AIRWAY INHALATION TREATMENT: CPT

## 2023-11-25 PROCEDURE — 2500000003 HC RX 250 WO HCPCS: Performed by: NURSE PRACTITIONER

## 2023-11-25 PROCEDURE — 94761 N-INVAS EAR/PLS OXIMETRY MLT: CPT

## 2023-11-25 PROCEDURE — 83735 ASSAY OF MAGNESIUM: CPT

## 2023-11-25 PROCEDURE — 85027 COMPLETE CBC AUTOMATED: CPT

## 2023-11-25 PROCEDURE — 80048 BASIC METABOLIC PNL TOTAL CA: CPT

## 2023-11-25 RX ORDER — GABAPENTIN 600 MG/1
600 TABLET ORAL NIGHTLY
Status: DISCONTINUED | OUTPATIENT
Start: 2023-11-25 | End: 2023-11-28 | Stop reason: HOSPADM

## 2023-11-25 RX ORDER — CARBOXYMETHYLCELLULOSE SODIUM 5 MG/ML
1 SOLUTION/ DROPS OPHTHALMIC EVERY 4 HOURS PRN
Status: DISCONTINUED | OUTPATIENT
Start: 2023-11-25 | End: 2023-11-28 | Stop reason: HOSPADM

## 2023-11-25 RX ORDER — ACETYLCYSTEINE 100 MG/ML
4 SOLUTION ORAL; RESPIRATORY (INHALATION)
Status: DISCONTINUED | OUTPATIENT
Start: 2023-11-25 | End: 2023-11-27

## 2023-11-25 RX ORDER — GUAIFENESIN 200 MG/10ML
200 LIQUID ORAL EVERY 4 HOURS PRN
Status: DISCONTINUED | OUTPATIENT
Start: 2023-11-25 | End: 2023-11-28 | Stop reason: HOSPADM

## 2023-11-25 RX ADMIN — ACETYLCYSTEINE 400 MG: 100 SOLUTION ORAL; RESPIRATORY (INHALATION) at 19:14

## 2023-11-25 RX ADMIN — GABAPENTIN 600 MG: 600 TABLET, FILM COATED ORAL at 20:59

## 2023-11-25 RX ADMIN — ACETYLCYSTEINE 400 MG: 100 SOLUTION ORAL; RESPIRATORY (INHALATION) at 11:51

## 2023-11-25 RX ADMIN — IPRATROPIUM BROMIDE AND ALBUTEROL SULFATE 1 DOSE: .5; 3 SOLUTION RESPIRATORY (INHALATION) at 19:14

## 2023-11-25 RX ADMIN — IPRATROPIUM BROMIDE AND ALBUTEROL SULFATE 1 DOSE: .5; 3 SOLUTION RESPIRATORY (INHALATION) at 07:33

## 2023-11-25 RX ADMIN — GUAIFENESIN 200 MG: 200 SOLUTION ORAL at 08:11

## 2023-11-25 RX ADMIN — ACETYLCYSTEINE 400 MG: 100 SOLUTION ORAL; RESPIRATORY (INHALATION) at 15:15

## 2023-11-25 RX ADMIN — APIXABAN 5 MG: 5 TABLET, FILM COATED ORAL at 08:12

## 2023-11-25 RX ADMIN — ROPINIROLE HYDROCHLORIDE 2 MG: 2 TABLET, FILM COATED ORAL at 21:00

## 2023-11-25 RX ADMIN — IPRATROPIUM BROMIDE AND ALBUTEROL SULFATE 1 DOSE: .5; 3 SOLUTION RESPIRATORY (INHALATION) at 00:06

## 2023-11-25 RX ADMIN — IPRATROPIUM BROMIDE AND ALBUTEROL SULFATE 1 DOSE: .5; 3 SOLUTION RESPIRATORY (INHALATION) at 15:15

## 2023-11-25 RX ADMIN — ATORVASTATIN CALCIUM 40 MG: 40 TABLET, FILM COATED ORAL at 20:59

## 2023-11-25 RX ADMIN — IPRATROPIUM BROMIDE AND ALBUTEROL SULFATE 1 DOSE: .5; 3 SOLUTION RESPIRATORY (INHALATION) at 11:38

## 2023-11-25 RX ADMIN — Medication 1 TABLET: at 08:12

## 2023-11-25 RX ADMIN — APIXABAN 5 MG: 5 TABLET, FILM COATED ORAL at 20:59

## 2023-11-25 RX ADMIN — MOMETASONE FUROATE AND FORMOTEROL FUMARATE DIHYDRATE 2 PUFF: 100; 5 AEROSOL RESPIRATORY (INHALATION) at 07:31

## 2023-11-25 RX ADMIN — ROPINIROLE HYDROCHLORIDE 2 MG: 2 TABLET, FILM COATED ORAL at 08:12

## 2023-11-25 RX ADMIN — GABAPENTIN 300 MG: 300 CAPSULE ORAL at 08:12

## 2023-11-25 RX ADMIN — HYDROXYCHLOROQUINE SULFATE 200 MG: 200 TABLET ORAL at 08:12

## 2023-11-25 RX ADMIN — Medication 5 ML: at 17:26

## 2023-11-25 RX ADMIN — GUAIFENESIN 200 MG: 200 SOLUTION ORAL at 01:30

## 2023-11-25 RX ADMIN — ACETAMINOPHEN 650 MG: 325 TABLET ORAL at 11:06

## 2023-11-25 RX ADMIN — GABAPENTIN 300 MG: 300 CAPSULE ORAL at 17:21

## 2023-11-25 RX ADMIN — MOMETASONE FUROATE AND FORMOTEROL FUMARATE DIHYDRATE 2 PUFF: 100; 5 AEROSOL RESPIRATORY (INHALATION) at 19:15

## 2023-11-25 ASSESSMENT — PAIN SCALES - GENERAL
PAINLEVEL_OUTOF10: 4
PAINLEVEL_OUTOF10: 0

## 2023-11-25 ASSESSMENT — PAIN DESCRIPTION - LOCATION: LOCATION: HEAD

## 2023-11-25 NOTE — PROGRESS NOTES
V2.0    [facility] Progress Note      Name:  Ruslan Grimm /Age/Sex: 1935  (81 y.o. female)   MRN & CSN:  [id number] & [id number] Encounter Date/Time: 2023 11:03 AM EST   Location:   PCP: Osmel Levy MD     Attending:Abby Orlando MD       Hospital Day: 2    Assessment and Recommendations   Ruslan Grimm is a 81 y.o. female  who presents with Acute respiratory failure with hypoxia ([address])      Plan:     Plan:  Acute respiratory failure with hypoxia, most likely secondary to RSV. Patient is currently on 3L nasal cannula   Will attempt to wean as tolerated  RSV, supportive care  Diabetes mellitus type 2, hold home medications placed on insulin sliding scale before meals and at bedtime. Monitor for signs and symptoms of hypoglycemia, hypoglycemic protocol ordered. Atrial fibrillation, continue Eliquis and metoprolol daily  HFpEF, continue Demadex, last echocardiogram completed 10/24/2023. Left ventricular systolic function normal, EF 55%, mild left ventricular hypertrophy. Hypertension, continue metoprolol  Hyperlipidemia, continue statin  Diabetic neuropathy continue gabapentin  Chronic anemia  Chronic venous stasis, continue with compression stockings and furosemide  Chronic back pain, with known L2 compression fracture/lumbar stenosis, continue Neurontin pain medication as at home from pain clinic, and Lidoderm patch. CAD, not on aspirin per cardiology, continue statin, no complaints of any chest pain. Elevated troponin, no complaints of any chest pain no EKG changes no EKG signs of ischemia. Diet ADULT DIET; Regular; 4 carb choices (60 gm/meal);  Low Fat/Low Chol/High Fiber/EL   DVT Prophylaxis [] Lovenox, []  Heparin, [] SCDs, [] Ambulation,  [x] Eliquis, [] Xarelto  [] Coumadin   Code Status Full Code   Disposition From: Home  Expected Disposition: Home  Estimated Date of Discharge: 24 to 48 hours  Patient requires continued admission due to acute respiratory failure chloride flush, 5-40 mL, PRN  sodium chloride, 250 mL, PRN  ondansetron, 4 mg, Q8H PRN   Or  ondansetron, 4 mg, Q6H PRN  polyethylene glycol, 17 g, Daily PRN  acetaminophen, 650 mg, Q6H PRN   Or  acetaminophen, 650 mg, Q6H PRN  HYDROcodone-chlorpheniramine, 5 mL, Q12H PRN  polyvinyl alcohol, 1 drop, PRN        Labs and Imaging   XR CHEST PORTABLE    Result Date: 11/24/2023  EXAMINATION: ONE XRAY VIEW OF THE CHEST 11/24/2023 11:17 am COMPARISON: 10/23/2023. HISTORY: ORDERING SYSTEM PROVIDED HISTORY: chest pain TECHNOLOGIST PROVIDED HISTORY: Reason for exam:->chest pain FINDINGS: The heart size is within normal limits. The pulmonary vasculature is also within normal limits. No acute infiltrates are seen. No pneumothoraces are noted. 1. No active pulmonary disease. CBC:   Recent Labs     11/24/23  1059 11/25/23  0421   WBC 8.8 7.8   HGB 12.2* 12.1*    161     BMP:    Recent Labs     11/24/23  1059 11/25/23  0421    143   K 3.2* 3.9    103   CO2 33* 31   BUN 19 20   CREATININE 0.7 0.6   GLUCOSE 125* 182*     Hepatic:   Recent Labs     11/24/23  1059   AST 17   ALT 7*   BILITOT 0.2   ALKPHOS 83     Lipids:   Lab Results   Component Value Date/Time    CHOL 157 10/24/2023 03:05 AM    CHOL 121 08/07/2018 10:47 AM    HDL 50 10/24/2023 03:05 AM    TRIG 137 10/24/2023 03:05 AM     Hemoglobin A1C:   Lab Results   Component Value Date/Time    LABA1C 6.6 10/24/2023 03:05 AM     TSH:   Lab Results   Component Value Date/Time    TSH 2.61 08/07/2018 10:47 AM     Troponin:   Lab Results   Component Value Date/Time    TROPONINT <0.010 08/09/2023 03:45 AM    TROPONINT <0.010 08/05/2023 08:20 AM    TROPONINT 0.012 08/05/2023 05:05 AM     Lactic Acid: No results for input(s): \"LACTA\" in the last 72 hours.   BNP:   Recent Labs     11/24/23  1059   PROBNP 437.3*     UA:  Lab Results   Component Value Date/Time    NITRU NEGATIVE 10/24/2023 09:40 AM    COLORU YELLOW 10/24/2023 09:40 AM    WBCUA 1 08/05/2023 05:40

## 2023-11-25 NOTE — PROGRESS NOTES
Patient called this nurse to bedside asking for a piece of tape to place on tissue box to discern which box she was going to hide her checkbook in. Patient concerned about her children stealing her money and checks. Per patient, she lost a daughter recently and came into a settlement of money and all her children have been trying to take/find her money. Upon admission, daughter did accompany patient per nursing supervisor Rabia and inquired as to where patients purse was. Patient distraught, crying and upset stating \"a person should be more important than money\", \"money makes people wicked\". Patient told this nurse the last time she was at a previous facility and left her keys on the bedside table and went for a test, upon arrival back from the test she was told that security had taken her keys, according to security they had never taken her keys. Patient states feelings of uneasiness and not being able to sleep as afraid someone might come in and attempt to steal her money. This nurse reassured patient and provided emotional support, ensured that she was safe in the hospital and would keep an eye on her door. Rabia, nursing supervisor made aware of concerns, Sushila Cole CM notified of concerns as well.

## 2023-11-25 NOTE — PROGRESS NOTES
Occupational Therapy  Facility/Department: [facility]  Occupational Therapy Initial Assessment    Name: Mily Ribeiro  : 1935  MRN: [id number]  Date of Service: 2023    Discharge Recommendations:  Home with Home health OT          Patient Diagnosis(es): The primary encounter diagnosis was Hypoxia. A diagnosis of RSV infection was also pertinent to this visit. Past Medical History:  has a past medical history of Arthritis, Atrial fibrillation, chronic ([address]), CAD (coronary artery disease), Chronic midline low back pain without sciatica, Claustrophobia, Colonoscopy refused, COVID-19 virus infection, DM (diabetes mellitus), type 2 ([address]), Dupuytren's contracture of right hand, Endometrial stripe increased, Gastrointestinal hemorrhage, Glaucoma, H/O Doppler ultrasound, H/O echocardiogram, H/O echocardiogram, H/O echocardiogram, History of nuclear stress test, HTN (hypertension), Hyperlipidemia, Kidney stone, MI (myocardial infarction) ([address]), Obesity, Open wound of right foot, Parainfluenza infection, Pneumonia of right lower lobe due to infectious organism, Vertigo, and WD-Traumatic ulcer of foot, right, with fat layer exposed ([address]). Past Surgical History:  has a past surgical history that includes Cataract removal (Bilateral); Inguinal hernia repair (Left, 45 yrs ago); Lumbar spine surgery (N/A, 2022); and Spine surgery (N/A, 2022). Treatment Diagnosis: rhinovirus, acute respiratory failure, with hypoxia      Assessment   Performance deficits / Impairments: Decreased functional mobility ; Decreased ADL status; Decreased endurance  Assessment: Pt is an 81year old female who presented to ER with coughing, congestion, and shortness of breath. O2 sats upon arrival on room air 82%, placed on O2 2L and Dx with rhinovirus, acute respiratory failure, with hypoxia.   Pt would benefit from OT services during stay for progressing overall endurance, strength, and safety for greater Ind with

## 2023-11-25 NOTE — PROGRESS NOTES
Physical Therapy  Facility/Department: [facility] UNIT  Physical Therapy Initial Assessment    Name: Anselmo Santiago  : 1935  MRN: [id number]  Date of Service: 2023    Discharge Recommendations:  Home with Home health PT   PT Equipment Recommendations  Equipment Needed: No      Patient Diagnosis(es): The primary encounter diagnosis was Hypoxia. A diagnosis of RSV infection was also pertinent to this visit. Past Medical History:  has a past medical history of Arthritis, Atrial fibrillation, chronic ([address]), CAD (coronary artery disease), Chronic midline low back pain without sciatica, Claustrophobia, Colonoscopy refused, COVID-19 virus infection, DM (diabetes mellitus), type 2 ([address]), Dupuytren's contracture of right hand, Endometrial stripe increased, Gastrointestinal hemorrhage, Glaucoma, H/O Doppler ultrasound, H/O echocardiogram, H/O echocardiogram, H/O echocardiogram, History of nuclear stress test, HTN (hypertension), Hyperlipidemia, Kidney stone, MI (myocardial infarction) ([address]), Obesity, Open wound of right foot, Parainfluenza infection, Pneumonia of right lower lobe due to infectious organism, Vertigo, and WD-Traumatic ulcer of foot, right, with fat layer exposed ([address]). Past Surgical History:  has a past surgical history that includes Cataract removal (Bilateral); Inguinal hernia repair (Left, 45 yrs ago); Lumbar spine surgery (N/A, 2022); and Spine surgery (N/A, 2022). Assessment   Body Structures, Functions, Activity Limitations Requiring Skilled Therapeutic Intervention: Decreased endurance  Assessment: 81 y.o. female that presents to the emergency department by squad with complaints of shortness of breath. Upon arrival to the ER the patient had an O2 sat of 82%. She is on supplemental oxygen is currently satting 95 to 100%. She does not use supplemental oxygen at home. She has been having upper respiratory symptoms with a cough for the past 3 to 4 days.   The patient

## 2023-11-26 ENCOUNTER — APPOINTMENT (OUTPATIENT)
Dept: GENERAL RADIOLOGY | Age: 88
DRG: 189 | End: 2023-11-26
Payer: MEDICARE

## 2023-11-26 LAB
ANION GAP SERPL CALCULATED.3IONS-SCNC: 10 MMOL/L (ref 4–16)
BUN SERPL-MCNC: 19 MG/DL (ref 6–23)
CALCIUM SERPL-MCNC: 9.4 MG/DL (ref 8.3–10.6)
CHLORIDE BLD-SCNC: 102 MMOL/L (ref 99–110)
CO2: 29 MMOL/L (ref 21–32)
CREAT SERPL-MCNC: 0.5 MG/DL (ref 0.6–1.1)
EKG ATRIAL RATE: 71 BPM
EKG DIAGNOSIS: NORMAL
EKG P AXIS: 76 DEGREES
EKG P-R INTERVAL: 138 MS
EKG Q-T INTERVAL: 414 MS
EKG QRS DURATION: 96 MS
EKG QTC CALCULATION (BAZETT): 449 MS
EKG R AXIS: 3 DEGREES
EKG T AXIS: 31 DEGREES
EKG VENTRICULAR RATE: 71 BPM
GFR SERPL CREATININE-BSD FRML MDRD: >60 ML/MIN/1.73M2
GLUCOSE SERPL-MCNC: 139 MG/DL (ref 70–99)
HCT VFR BLD CALC: 43.2 % (ref 37–47)
HEMOGLOBIN: 12.4 GM/DL (ref 12.5–16)
MAGNESIUM: 2.2 MG/DL (ref 1.8–2.4)
MCH RBC QN AUTO: 24.1 PG (ref 27–31)
MCHC RBC AUTO-ENTMCNC: 28.7 % (ref 32–36)
MCV RBC AUTO: 83.9 FL (ref 78–100)
PDW BLD-RTO: 21.2 % (ref 11.7–14.9)
PLATELET # BLD: 170 K/CU MM (ref 140–440)
PMV BLD AUTO: 9.4 FL (ref 7.5–11.1)
POTASSIUM SERPL-SCNC: 4.2 MMOL/L (ref 3.5–5.1)
RBC # BLD: 5.15 M/CU MM (ref 4.2–5.4)
SODIUM BLD-SCNC: 141 MMOL/L (ref 135–145)
WBC # BLD: 14.5 K/CU MM (ref 4–10.5)

## 2023-11-26 PROCEDURE — 6370000000 HC RX 637 (ALT 250 FOR IP): Performed by: NURSE PRACTITIONER

## 2023-11-26 PROCEDURE — 2500000003 HC RX 250 WO HCPCS: Performed by: NURSE PRACTITIONER

## 2023-11-26 PROCEDURE — 6360000002 HC RX W HCPCS: Performed by: NURSE PRACTITIONER

## 2023-11-26 PROCEDURE — 1200000000 HC SEMI PRIVATE

## 2023-11-26 PROCEDURE — 6370000000 HC RX 637 (ALT 250 FOR IP): Performed by: PHYSICIAN ASSISTANT

## 2023-11-26 PROCEDURE — 83735 ASSAY OF MAGNESIUM: CPT

## 2023-11-26 PROCEDURE — 2700000000 HC OXYGEN THERAPY PER DAY

## 2023-11-26 PROCEDURE — 85027 COMPLETE CBC AUTOMATED: CPT

## 2023-11-26 PROCEDURE — 71045 X-RAY EXAM CHEST 1 VIEW: CPT

## 2023-11-26 PROCEDURE — 2580000003 HC RX 258: Performed by: NURSE PRACTITIONER

## 2023-11-26 PROCEDURE — 80048 BASIC METABOLIC PNL TOTAL CA: CPT

## 2023-11-26 PROCEDURE — 93010 ELECTROCARDIOGRAM REPORT: CPT | Performed by: INTERNAL MEDICINE

## 2023-11-26 PROCEDURE — 94640 AIRWAY INHALATION TREATMENT: CPT

## 2023-11-26 RX ORDER — IPRATROPIUM BROMIDE AND ALBUTEROL SULFATE 2.5; .5 MG/3ML; MG/3ML
1 SOLUTION RESPIRATORY (INHALATION)
Status: DISCONTINUED | OUTPATIENT
Start: 2023-11-26 | End: 2023-11-28 | Stop reason: HOSPADM

## 2023-11-26 RX ORDER — FUROSEMIDE 10 MG/ML
20 INJECTION INTRAMUSCULAR; INTRAVENOUS ONCE
Status: DISCONTINUED | OUTPATIENT
Start: 2023-11-26 | End: 2023-11-26

## 2023-11-26 RX ADMIN — SODIUM CHLORIDE, PRESERVATIVE FREE 10 ML: 5 INJECTION INTRAVENOUS at 08:12

## 2023-11-26 RX ADMIN — MOMETASONE FUROATE AND FORMOTEROL FUMARATE DIHYDRATE 2 PUFF: 100; 5 AEROSOL RESPIRATORY (INHALATION) at 19:17

## 2023-11-26 RX ADMIN — IPRATROPIUM BROMIDE AND ALBUTEROL SULFATE 1 DOSE: 2.5; .5 SOLUTION RESPIRATORY (INHALATION) at 15:58

## 2023-11-26 RX ADMIN — GABAPENTIN 300 MG: 300 CAPSULE ORAL at 17:33

## 2023-11-26 RX ADMIN — MOMETASONE FUROATE AND FORMOTEROL FUMARATE DIHYDRATE 2 PUFF: 100; 5 AEROSOL RESPIRATORY (INHALATION) at 07:30

## 2023-11-26 RX ADMIN — GABAPENTIN 300 MG: 300 CAPSULE ORAL at 08:12

## 2023-11-26 RX ADMIN — METOPROLOL SUCCINATE 25 MG: 25 TABLET, EXTENDED RELEASE ORAL at 08:11

## 2023-11-26 RX ADMIN — GUAIFENESIN 200 MG: 200 SOLUTION ORAL at 08:11

## 2023-11-26 RX ADMIN — TORSEMIDE 10 MG: 10 TABLET ORAL at 08:12

## 2023-11-26 RX ADMIN — GABAPENTIN 600 MG: 600 TABLET, FILM COATED ORAL at 20:21

## 2023-11-26 RX ADMIN — HYDROXYCHLOROQUINE SULFATE 200 MG: 200 TABLET ORAL at 08:11

## 2023-11-26 RX ADMIN — GUAIFENESIN 200 MG: 200 SOLUTION ORAL at 13:43

## 2023-11-26 RX ADMIN — ROPINIROLE HYDROCHLORIDE 2 MG: 2 TABLET, FILM COATED ORAL at 08:11

## 2023-11-26 RX ADMIN — IPRATROPIUM BROMIDE AND ALBUTEROL SULFATE 1 DOSE: 2.5; .5 SOLUTION RESPIRATORY (INHALATION) at 11:19

## 2023-11-26 RX ADMIN — ACETYLCYSTEINE 400 MG: 100 SOLUTION ORAL; RESPIRATORY (INHALATION) at 19:16

## 2023-11-26 RX ADMIN — ACETYLCYSTEINE 400 MG: 100 SOLUTION ORAL; RESPIRATORY (INHALATION) at 11:19

## 2023-11-26 RX ADMIN — ROPINIROLE HYDROCHLORIDE 2 MG: 2 TABLET, FILM COATED ORAL at 20:21

## 2023-11-26 RX ADMIN — ATORVASTATIN CALCIUM 40 MG: 40 TABLET, FILM COATED ORAL at 20:21

## 2023-11-26 RX ADMIN — ACETYLCYSTEINE 400 MG: 100 SOLUTION ORAL; RESPIRATORY (INHALATION) at 07:30

## 2023-11-26 RX ADMIN — ACETYLCYSTEINE 400 MG: 100 SOLUTION ORAL; RESPIRATORY (INHALATION) at 15:57

## 2023-11-26 RX ADMIN — HYDROCODONE BITARTRATE AND ACETAMINOPHEN 1 TABLET: 5; 325 TABLET ORAL at 08:26

## 2023-11-26 RX ADMIN — APIXABAN 5 MG: 5 TABLET, FILM COATED ORAL at 08:11

## 2023-11-26 RX ADMIN — APIXABAN 5 MG: 5 TABLET, FILM COATED ORAL at 20:21

## 2023-11-26 RX ADMIN — IPRATROPIUM BROMIDE AND ALBUTEROL SULFATE 1 DOSE: 2.5; .5 SOLUTION RESPIRATORY (INHALATION) at 19:17

## 2023-11-26 RX ADMIN — Medication 5 ML: at 17:33

## 2023-11-26 RX ADMIN — ONDANSETRON 4 MG: 2 INJECTION INTRAMUSCULAR; INTRAVENOUS at 17:01

## 2023-11-26 RX ADMIN — IPRATROPIUM BROMIDE AND ALBUTEROL SULFATE 1 DOSE: .5; 3 SOLUTION RESPIRATORY (INHALATION) at 07:30

## 2023-11-26 RX ADMIN — GUAIFENESIN 200 MG: 200 SOLUTION ORAL at 20:21

## 2023-11-26 RX ADMIN — IPRATROPIUM BROMIDE AND ALBUTEROL SULFATE 1 DOSE: 2.5; .5 SOLUTION RESPIRATORY (INHALATION) at 23:31

## 2023-11-26 RX ADMIN — Medication 1 TABLET: at 08:11

## 2023-11-26 RX ADMIN — Medication 5 ML: at 05:11

## 2023-11-26 RX ADMIN — ACETYLCYSTEINE 400 MG: 100 SOLUTION ORAL; RESPIRATORY (INHALATION) at 23:31

## 2023-11-26 ASSESSMENT — PAIN SCALES - GENERAL
PAINLEVEL_OUTOF10: 0
PAINLEVEL_OUTOF10: 8
PAINLEVEL_OUTOF10: 0

## 2023-11-26 ASSESSMENT — PAIN DESCRIPTION - ORIENTATION: ORIENTATION: MID

## 2023-11-26 ASSESSMENT — PAIN DESCRIPTION - LOCATION: LOCATION: GENERALIZED

## 2023-11-26 ASSESSMENT — PAIN - FUNCTIONAL ASSESSMENT: PAIN_FUNCTIONAL_ASSESSMENT: PREVENTS OR INTERFERES SOME ACTIVE ACTIVITIES AND ADLS

## 2023-11-26 ASSESSMENT — PAIN DESCRIPTION - DESCRIPTORS: DESCRIPTORS: ACHING

## 2023-11-26 NOTE — PROGRESS NOTES
Reviewed previous wound care documentation for bilat lower leg wounds. Dressings are changed at the wound clinic using a silver dressing and coban. Primary RN to apply dressing today.

## 2023-11-26 NOTE — PLAN OF CARE
determine need for appliance change or resting period  Goal: Incisions, wounds, or drain sites healing without S/S of infection  Outcome: Progressing  Flowsheets (Taken 11/26/2023 0800)  Incisions, Wounds, or Drain Sites Healing Without Sign and Symptoms of Infection:   ADMISSION and DAILY: Assess and document risk factors for pressure ulcer development   TWICE DAILY: Assess and document skin integrity   TWICE DAILY: Assess and document dressing/incision, wound bed, drain sites and surrounding tissue   Implement wound care per orders   Initiate isolation precautions as appropriate   Initiate pressure ulcer prevention bundle as indicated  Goal: Oral mucous membranes remain intact  Outcome: Progressing  Flowsheets (Taken 11/26/2023 0800)  Oral Mucous Membranes Remain Intact:   Assess oral mucosa and hygiene practices   Implement preventative oral hygiene regimen   Implement oral medicated treatments as ordered     Problem: Infection - Adult  Goal: Absence of infection at discharge  Outcome: Progressing  Flowsheets (Taken 11/26/2023 0800)  Absence of infection at discharge:   Assess and monitor for signs and symptoms of infection   Monitor lab/diagnostic results   Monitor all insertion sites i.e., indwelling lines, tubes and drains   Administer medications as ordered   [place] appropriate cooling/warming therapies per order   Instruct and encourage patient and family to use good hand hygiene technique   Identify and instruct in appropriate isolation precautions for identified infection/condition  Goal: Absence of infection during hospitalization  Outcome: Progressing  Flowsheets (Taken 11/26/2023 0800)  Absence of infection during hospitalization:   Assess and monitor for signs and symptoms of infection   Monitor lab/diagnostic results   Monitor all insertion sites i.e., indwelling lines, tubes and drains   [place] appropriate cooling/warming therapies per order   Administer medications as ordered   Instruct and are exacerbated and prevent overall improvement and discharge

## 2023-11-26 NOTE — PROGRESS NOTES
V2.0    [facility] Progress Note      Name:  Kennedi Carrillo /Age/Sex: 1935  (81 y.o. female)   MRN & CSN:  [id number] & [id number] Encounter Date/Time: 2023 11:03 AM EST   Location:   PCP: Nakul Kaiser MD     Attending:Fausto Orlando MD       Hospital Day: 3    Assessment and Recommendations   Kennedi Carrillo is a 81 y.o. female  who presents with Acute respiratory failure with hypoxia ([address])      Plan:     Plan:  Acute respiratory failure with hypoxia, most likely secondary to RSV. Patient is currently on 2-3L nasal cannula   Will attempt to wean as tolerated, elevated white count to 14k today from 7.8, with hypoxia and tachypnea noted this am, ordered CXR to f/u any signs of superimposed bacterial pneumonia, 1x IV lasix 20 mg ordered  RSV, supportive care  Diabetes mellitus type 2, hold home medications placed on insulin sliding scale before meals and at bedtime. Monitor for signs and symptoms of hypoglycemia, hypoglycemic protocol ordered. Atrial fibrillation, continue Eliquis and metoprolol daily  HFpEF, continue Demadex, last echocardiogram completed 10/24/2023. Left ventricular systolic function normal, EF 55%, mild left ventricular hypertrophy. Hypertension, continue metoprolol  Hyperlipidemia, continue statin  Diabetic neuropathy continue gabapentin  Chronic anemia  Chronic venous stasis, continue with compression stockings and demadex  Chronic back pain, with known L2 compression fracture/lumbar stenosis, continue Neurontin pain medication as at home from pain clinic, and Lidoderm patch. CAD, not on aspirin per cardiology, continue statin, no complaints of any chest pain. Elevated troponin, no complaints of any chest pain no EKG changes no EKG signs of ischemia. Diet ADULT DIET; Regular; 4 carb choices (60 gm/meal);  Low Fat/Low Chol/High Fiber/EL   DVT Prophylaxis [] Lovenox, []  Heparin, [] SCDs, [] Ambulation,  [x] Eliquis, [] Xarelto  [] Coumadin   Code Status Full

## 2023-11-27 LAB
ANION GAP SERPL CALCULATED.3IONS-SCNC: 16 MMOL/L (ref 4–16)
BUN SERPL-MCNC: 21 MG/DL (ref 6–23)
CALCIUM SERPL-MCNC: 9.1 MG/DL (ref 8.3–10.6)
CHLORIDE BLD-SCNC: 101 MMOL/L (ref 99–110)
CO2: 26 MMOL/L (ref 21–32)
CREAT SERPL-MCNC: 0.7 MG/DL (ref 0.6–1.1)
GFR SERPL CREATININE-BSD FRML MDRD: >60 ML/MIN/1.73M2
GLUCOSE SERPL-MCNC: 168 MG/DL (ref 70–99)
HCT VFR BLD CALC: 46.1 % (ref 37–47)
HEMOGLOBIN: 12.5 GM/DL (ref 12.5–16)
MAGNESIUM: 2.2 MG/DL (ref 1.8–2.4)
MCH RBC QN AUTO: 23.9 PG (ref 27–31)
MCHC RBC AUTO-ENTMCNC: 27.1 % (ref 32–36)
MCV RBC AUTO: 88.1 FL (ref 78–100)
PDW BLD-RTO: 22.3 % (ref 11.7–14.9)
PLATELET # BLD: 183 K/CU MM (ref 140–440)
PMV BLD AUTO: 10.3 FL (ref 7.5–11.1)
POTASSIUM SERPL-SCNC: 4 MMOL/L (ref 3.5–5.1)
PRO-BNP: 684.2 PG/ML
RBC # BLD: 5.23 M/CU MM (ref 4.2–5.4)
SODIUM BLD-SCNC: 143 MMOL/L (ref 135–145)
WBC # BLD: 14.8 K/CU MM (ref 4–10.5)

## 2023-11-27 PROCEDURE — 36415 COLL VENOUS BLD VENIPUNCTURE: CPT

## 2023-11-27 PROCEDURE — 97535 SELF CARE MNGMENT TRAINING: CPT

## 2023-11-27 PROCEDURE — 97530 THERAPEUTIC ACTIVITIES: CPT

## 2023-11-27 PROCEDURE — 85027 COMPLETE CBC AUTOMATED: CPT

## 2023-11-27 PROCEDURE — 83735 ASSAY OF MAGNESIUM: CPT

## 2023-11-27 PROCEDURE — 2580000003 HC RX 258: Performed by: NURSE PRACTITIONER

## 2023-11-27 PROCEDURE — 1200000000 HC SEMI PRIVATE

## 2023-11-27 PROCEDURE — 80048 BASIC METABOLIC PNL TOTAL CA: CPT

## 2023-11-27 PROCEDURE — 6360000002 HC RX W HCPCS: Performed by: NURSE PRACTITIONER

## 2023-11-27 PROCEDURE — 2700000000 HC OXYGEN THERAPY PER DAY

## 2023-11-27 PROCEDURE — 94640 AIRWAY INHALATION TREATMENT: CPT

## 2023-11-27 PROCEDURE — 94761 N-INVAS EAR/PLS OXIMETRY MLT: CPT

## 2023-11-27 PROCEDURE — 6370000000 HC RX 637 (ALT 250 FOR IP): Performed by: PHYSICIAN ASSISTANT

## 2023-11-27 PROCEDURE — 2500000003 HC RX 250 WO HCPCS: Performed by: NURSE PRACTITIONER

## 2023-11-27 PROCEDURE — 6370000000 HC RX 637 (ALT 250 FOR IP): Performed by: NURSE PRACTITIONER

## 2023-11-27 PROCEDURE — 6360000002 HC RX W HCPCS: Performed by: PHYSICIAN ASSISTANT

## 2023-11-27 PROCEDURE — 83880 ASSAY OF NATRIURETIC PEPTIDE: CPT

## 2023-11-27 RX ORDER — LOSARTAN POTASSIUM 25 MG/1
25 TABLET ORAL DAILY
Status: DISCONTINUED | OUTPATIENT
Start: 2023-11-28 | End: 2023-11-28 | Stop reason: HOSPADM

## 2023-11-27 RX ORDER — PREDNISONE 20 MG/1
40 TABLET ORAL DAILY
Status: DISCONTINUED | OUTPATIENT
Start: 2023-11-28 | End: 2023-11-28 | Stop reason: HOSPADM

## 2023-11-27 RX ADMIN — GABAPENTIN 300 MG: 300 CAPSULE ORAL at 08:32

## 2023-11-27 RX ADMIN — METOPROLOL SUCCINATE 25 MG: 25 TABLET, EXTENDED RELEASE ORAL at 08:34

## 2023-11-27 RX ADMIN — IPRATROPIUM BROMIDE AND ALBUTEROL SULFATE 1 DOSE: 2.5; .5 SOLUTION RESPIRATORY (INHALATION) at 15:10

## 2023-11-27 RX ADMIN — GABAPENTIN 600 MG: 600 TABLET, FILM COATED ORAL at 20:35

## 2023-11-27 RX ADMIN — APIXABAN 5 MG: 5 TABLET, FILM COATED ORAL at 08:33

## 2023-11-27 RX ADMIN — SODIUM CHLORIDE, PRESERVATIVE FREE 10 ML: 5 INJECTION INTRAVENOUS at 20:38

## 2023-11-27 RX ADMIN — SODIUM CHLORIDE, PRESERVATIVE FREE 10 ML: 5 INJECTION INTRAVENOUS at 14:58

## 2023-11-27 RX ADMIN — ACETAMINOPHEN 650 MG: 325 TABLET ORAL at 20:34

## 2023-11-27 RX ADMIN — Medication 5 ML: at 08:32

## 2023-11-27 RX ADMIN — IPRATROPIUM BROMIDE AND ALBUTEROL SULFATE 1 DOSE: 2.5; .5 SOLUTION RESPIRATORY (INHALATION) at 19:23

## 2023-11-27 RX ADMIN — MOMETASONE FUROATE AND FORMOTEROL FUMARATE DIHYDRATE 2 PUFF: 100; 5 AEROSOL RESPIRATORY (INHALATION) at 07:17

## 2023-11-27 RX ADMIN — IPRATROPIUM BROMIDE AND ALBUTEROL SULFATE 1 DOSE: 2.5; .5 SOLUTION RESPIRATORY (INHALATION) at 11:37

## 2023-11-27 RX ADMIN — ACETAMINOPHEN 650 MG: 325 TABLET ORAL at 08:32

## 2023-11-27 RX ADMIN — TORSEMIDE 10 MG: 10 TABLET ORAL at 08:33

## 2023-11-27 RX ADMIN — IPRATROPIUM BROMIDE AND ALBUTEROL SULFATE 1 DOSE: 2.5; .5 SOLUTION RESPIRATORY (INHALATION) at 04:04

## 2023-11-27 RX ADMIN — ONDANSETRON 4 MG: 4 TABLET, ORALLY DISINTEGRATING ORAL at 14:59

## 2023-11-27 RX ADMIN — METHYLPREDNISOLONE SODIUM SUCCINATE 60 MG: 125 INJECTION, POWDER, FOR SOLUTION INTRAMUSCULAR; INTRAVENOUS at 18:33

## 2023-11-27 RX ADMIN — ACETYLCYSTEINE 400 MG: 100 SOLUTION ORAL; RESPIRATORY (INHALATION) at 07:17

## 2023-11-27 RX ADMIN — GUAIFENESIN 200 MG: 200 SOLUTION ORAL at 20:34

## 2023-11-27 RX ADMIN — APIXABAN 5 MG: 5 TABLET, FILM COATED ORAL at 20:35

## 2023-11-27 RX ADMIN — GABAPENTIN 300 MG: 300 CAPSULE ORAL at 14:59

## 2023-11-27 RX ADMIN — ROPINIROLE HYDROCHLORIDE 2 MG: 2 TABLET, FILM COATED ORAL at 20:35

## 2023-11-27 RX ADMIN — MOMETASONE FUROATE AND FORMOTEROL FUMARATE DIHYDRATE 2 PUFF: 100; 5 AEROSOL RESPIRATORY (INHALATION) at 19:24

## 2023-11-27 RX ADMIN — HYDROXYCHLOROQUINE SULFATE 200 MG: 200 TABLET ORAL at 08:33

## 2023-11-27 RX ADMIN — Medication 1 TABLET: at 08:32

## 2023-11-27 RX ADMIN — IPRATROPIUM BROMIDE AND ALBUTEROL SULFATE 1 DOSE: 2.5; .5 SOLUTION RESPIRATORY (INHALATION) at 07:17

## 2023-11-27 RX ADMIN — ROPINIROLE HYDROCHLORIDE 2 MG: 2 TABLET, FILM COATED ORAL at 08:33

## 2023-11-27 RX ADMIN — ATORVASTATIN CALCIUM 40 MG: 40 TABLET, FILM COATED ORAL at 20:35

## 2023-11-27 ASSESSMENT — PAIN SCALES - GENERAL
PAINLEVEL_OUTOF10: 3
PAINLEVEL_OUTOF10: 3
PAINLEVEL_OUTOF10: 0
PAINLEVEL_OUTOF10: 8
PAINLEVEL_OUTOF10: 3
PAINLEVEL_OUTOF10: 3

## 2023-11-27 ASSESSMENT — PAIN DESCRIPTION - ORIENTATION
ORIENTATION: DISTAL;LOWER
ORIENTATION: DISTAL;LOWER
ORIENTATION: MID

## 2023-11-27 ASSESSMENT — PAIN - FUNCTIONAL ASSESSMENT
PAIN_FUNCTIONAL_ASSESSMENT: ACTIVITIES ARE NOT PREVENTED

## 2023-11-27 ASSESSMENT — PAIN DESCRIPTION - LOCATION
LOCATION: HEAD

## 2023-11-27 ASSESSMENT — PAIN DESCRIPTION - PAIN TYPE
TYPE: ACUTE PAIN

## 2023-11-27 ASSESSMENT — PAIN DESCRIPTION - ONSET
ONSET: GRADUAL
ONSET: GRADUAL

## 2023-11-27 ASSESSMENT — PAIN DESCRIPTION - DESCRIPTORS
DESCRIPTORS: ACHING

## 2023-11-27 ASSESSMENT — PAIN DESCRIPTION - DIRECTION
RADIATING_TOWARDS: NO
RADIATING_TOWARDS: NO

## 2023-11-27 ASSESSMENT — PAIN DESCRIPTION - FREQUENCY
FREQUENCY: INTERMITTENT
FREQUENCY: INTERMITTENT
FREQUENCY: CONTINUOUS

## 2023-11-27 NOTE — PROGRESS NOTES
[facility] [place]      Name:  Teri Card /Age/Sex: 1935  (81 y.o. female)   MRN & CSN:  [id number] & [id number] Encounter Date/Time: 2023 11:03 AM EST   Location:   PCP: Isai Reyes MD     Attending:McQuillen, Levy Kussmaul, MD       Hospital Day: 4    Assessment and Recommendations   Teri Card is a 81 y.o. female  who presents with Acute respiratory failure with hypoxia ([address])    Plan:  Acute respiratory failure with hypoxia related to RSV. Patient is currently on 2L nasal cannula. Patient with ongoing wheezing. Will attempt to wean oxygen as tolerated  Ongoing leukocytosis, continue to trend and monitor for other signs of infection  CXR  and  unremarkable  Supportive care  Continue inhalers and breathing treatments  Blood cultures negative at 48 hours  CM involved, plan to DC with [facility] when ready  Updated patient's son at bedside  Diabetes mellitus type 2, hold home medications placed on insulin sliding scale before meals and at bedtime. Monitor for signs and symptoms of hypoglycemia, hypoglycemic protocol ordered. Atrial fibrillation, continue Eliquis and metoprolol daily  HFpEF, not in exacerbation, continue Demadex, last echocardiogram completed 10/24/2023. Left ventricular systolic function normal, EF 55%, mild left ventricular hypertrophy. Hypertension, continue metoprolol and cozaar  Hyperlipidemia, continue statin  Diabetic neuropathy, continue gabapentin  Chronic anemia, stable  Chronic venous stasis, continue with compression stockings and demadex  Chronic back pain, with known L2 compression fracture/lumbar stenosis, continue Neurontin from pain clinic and Lidoderm patch  CAD, not on aspirin per cardiology, continue statin, no complaints of any chest pain. Elevated troponin, no complaints of any chest pain no EKG changes or EKG signs of ischemia. Previous GI bleed  Arthritis, on plaquenil       Diet ADULT DIET;  Regular; 4 carb choices (60 gm/meal); related to chronic venous insufficiency  Skin: warm, dry  Neuro: Awake, no focal neurological deficits  Psych: Mood appropriate. Medications:   Medications:    ipratropium 0.5 mg-albuterol 2.5 mg  1 Dose Inhalation Q4H RT    acetylcysteine  4 mL Inhalation Q4H WA RT    gabapentin  600 mg Oral Nightly    apixaban  5 mg Oral BID    atorvastatin  40 mg Oral Nightly    mometasone-formoterol  2 puff Inhalation BID RT    gabapentin  300 mg Oral QAM    gabapentin  300 mg Oral Daily before dinner    hydroxychloroquine  200 mg Oral Daily    metoprolol succinate  25 mg Oral Daily    ocuvite-lutein  1 tablet Oral Daily    rOPINIRole  2 mg Oral BID    torsemide  10 mg Oral Daily    sodium chloride flush  5-40 mL IntraVENous 2 times per day      Infusions:    sodium chloride       PRN Meds: guaiFENesin, 200 mg, Q4H PRN  carboxymethylcellulose, 1 drop, Q4H PRN  HYDROcodone-acetaminophen, 1 tablet, Q12H PRN  sodium chloride flush, 5-40 mL, PRN  sodium chloride, 250 mL, PRN  ondansetron, 4 mg, Q8H PRN   Or  ondansetron, 4 mg, Q6H PRN  polyethylene glycol, 17 g, Daily PRN  acetaminophen, 650 mg, Q6H PRN   Or  acetaminophen, 650 mg, Q6H PRN  HYDROcodone-chlorpheniramine, 5 mL, Q12H PRN        Labs and Imaging   XR CHEST PORTABLE    Result Date: 11/24/2023  EXAMINATION: ONE XRAY VIEW OF THE CHEST 11/24/2023 11:17 am COMPARISON: 10/23/2023. HISTORY: ORDERING SYSTEM PROVIDED HISTORY: chest pain TECHNOLOGIST PROVIDED HISTORY: Reason for exam:->chest pain FINDINGS: The heart size is within normal limits. The pulmonary vasculature is also within normal limits. No acute infiltrates are seen. No pneumothoraces are noted. 1. No active pulmonary disease.        CBC:   Recent Labs     11/25/23 0421 11/26/23  0520 11/27/23  0600   WBC 7.8 14.5* 14.8*   HGB 12.1* 12.4* 12.5    170 183       BMP:    Recent Labs     11/25/23 0421 11/26/23  0520 11/27/23  0600    141 143   K 3.9 4.2 4.0    102 101   CO2 31 29 26   BUN

## 2023-11-27 NOTE — CARE COORDINATION
CM received call  back from [employer] [facility] with [employer]. Pt is receiving aide service 3 hrs on MTF and 2 hrs on Wed from [employer]. Pt is receiving 14 delivered meals per week from [employer]   Life alert from [employer] and pt receives monthly supply of poise pads. Pt is also active with [address] [place] [address].  [facility]

## 2023-11-27 NOTE — FLOWSHEET NOTE
Occupational Therapy Treatment Note  Name: Ruslan Grimm MRN: [id number] :   1935   Date:  2023   Admission Date: 2023 Room:  [address] [place] [address]     Primary Problem:  rhinovirus, acute respiratory failure, with hypoxia    Restrictions/Precautions:  Restrictions/Precautions  Restrictions/Precautions: Isolation, Up as Tolerated  Required Braces or Orthoses?: No     Communication with other providers:   Cleared for treatment by Brie English RN. Subjective:  Patient states:  \"I feel pretty good but I feel like I am getting an ear infection\"   Pain:   Location, Type, Intensity (0/10 to 10/10): Pt reports a little pain in ear but did not quantify    Objective:    Observation:  Pt awake sitting up in chair with 1L of O2 agreeable to therapy  Objective Measures:  Pt alert and oriented    Treatment, including education:  Transfers  Sit to stand :Stand By Assistance from chair  Stand to sit :Stand By Assistance to chair      Self Care Training:   Activities performed today included the following:    Bathing   Stand By Assistance to complete sponge bath after s/u seated in chair for UE and in standing for LE, hubert/buttocks    UB Dress  Stand By Assistance to [employer] gown    LB Dress  Minimal Assistance to doff depends and to thread BLE through depends, Pt SBA in standing to pull up    Therapeutic Activity Training:   Pt completed sit to stand from chair during sponge bath and stood ~3 min for LE bathing. Pt sat down to bertha brief then stood again to pull up, with RW in front , SBA however Pt did not need to use for balance. Pt then amb in room around bed and back to chair using RW, SBA. Pt reported no increased SOB. Pt left seated up in chair. Pt tolerated well. Pt reports she would like to take a long walk next session.         Safety Measures: Gait belt used, Left up in the recliner, Pull/Bed Alarm activated and call light left in reach    Assessment / Impression:        Patient's tolerance of treatment:

## 2023-11-27 NOTE — CONSULTS
[facility] Continence Nurse  Consult Note       Kathy Tate  AGE: 81 y.o. GENDER: female  : 1935  TODAY'S DATE:  2023    Subjective:     Reason for [facility] Evaluation and Assessment: bilateral legs      Cesar Neil is a 81 y.o. female referred by:   [] Physician  [x] Nursing  [] Other:     Wound Identification:  Wound Type: venous  Contributing Factors: edema, venous stasis, lymphedema, and decreased mobility        PAST MEDICAL HISTORY        Diagnosis Date    Arthritis     left knee pain, sees Dr. Edith Blevins fibrillation, chronic ([address])     CAD (coronary artery disease)     sees Dr. Cristóbal Gama    Chronic midline low back pain without sciatica 2016    Had PT in     Claustrophobia     Colonoscopy refused     discussed in 7/15    COVID-19 virus infection 2023    DM (diabetes mellitus), type 2 ([address]) 2006    Dupuytren's contracture of right hand     Endometrial stripe increased 2014    Saw NP Lauryn garrett. Was normal exam per pt. Gastrointestinal hemorrhage 2015    Patient had GI bleeding. Colon refused. Discussed with patient in detail. Glaucoma 2014    H/O Doppler ultrasound 06/15/2023    Significant reflux noted of the Right GSV at distal calf (0.6s). Significant reflux noted in the Left GSV at mid thigh tributary (0.5s). Most likely would need varithena on left leg. H/O echocardiogram 10/02/2014    Mildly depressed left ventricular function; ejection fraction of 45%. Moderate pulmonary hypertension. H/O echocardiogram 2018    EF 50-55%. Mitral annular calcification is present. No other significant valvulopathy seen. H/O echocardiogram 06/15/2023    EF 55-60%. Grade I diastolic dysfunction. The left atrium is mildly dilated. Mitral annular calcification is present. History of nuclear stress test 2018    EF 59%. ECG portion of stress test is negative for ischemia by diagnostic criteria.  fixed inferior large size severe Wound 11/01/23 #2 Left Anterior Lower Leg Cluster-Dressing/Treatment: Collagen, ABD, Ace wrap  Wound 11/01/23 #1 Right Anterior Lower Leg Cluster-Dressing/Treatment: Collagen, ABD, Ace wrap    Patient resting in chair at bedside, agreeable to wound care at this time. Patient known to this RN through outpatient wound care. Patient has bilateral lower leg venous wound, both much improved from last visit at wound care. Wounds were cleansed, measured, photographed and dressed as documented. No redness noted to heel. Some blanchable redness noted to coccyx, padded with sacral border.      Specialty Bed Required : no  [] Low Air Loss   [] Pressure Redistribution  [] Fluid Immersion  [] Bariatric  [] Total Pressure Relief  [] Other:     Discharge Plan:  Placement for patient upon discharge: tbd  Hospice Care: no  Patient appropriate for Outpatient [address]: yes, current patient    Patient/Caregiver Teaching:  Level of patient/caregiver understanding able to:   Verbalizes understanding       Electronically signed by Marlene Draper RN, [facility] on 11/27/2023 at 12:09 PM

## 2023-11-27 NOTE — CARE COORDINATION
23 0915   Service Assessment   Patient Orientation Alert and Oriented;Person;Place;Situation   Cognition Alert   History Provided By Patient   Primary Caregiver Self  ( pt has a waiver program and has an aide 11 hrs per week.)   [address] Staff; Other (Comment); Family Members  (pt has [address] and a waiver program for aide service)   [address] is: Legal Next of [address] [facility]   PCP Verified by CM Yes   Last Visit to PCP Within last year   Prior Functional Level Assistance with the following:;Bathing;Housework;Cooking; Shopping   Current Functional Level Assistance with the following:;Cooking;Housework; Shopping; Bathing   Ability to make needs known: Good   Family able to assist with home care needs: No  (pt has an aide, life alert and home care)   Would you like for me to discuss the discharge plan with any other family members/significant others, and if so, who? No  (pt does not want discussed and plans to return home)     CM into see pt to initiate a safe discharge plan. Cm introduced self and explained role of CM. Pt is kind, alert and oriented. Pt lives alone in a senior housing one floor apartment. Pt has a Waiver program that provides delivered meals, life alert and 11 hrs of aide service per week. Pt has no home O2. Pt's aides assist with showering if needed. Provides transportation if needed/ groceries. Pt has a grand dtr that assist with home if needed. Dtr that lives in [facility] with limited support. Pt informed CM that she had a dtr who  in Sept and very tearful. CM provided emotional support/ prayer for pt. CM called [facility] and spoke with [place] and confirmed that pt is active with [facility]. Home care will continue to follow when pt is discharged. Discharge plan is home alone. [email or web address] to follow. [facility] to follow with 11 aide hours. Has delivered meals, DME. PCP and insurance. CM provided card and encouraged to call for any needs or concern.    CM is available if any needs arise. Upon discharge pt will be going home with [facility] home care  Please fax H/P, D/S.  AVS, order, and face sheet to [phone]  Please call [phone] and inform of discharge    CM called [address] [facility] [phone]. CM informed that Jenny Traylor is her CM. Eliz Numbers ext [phone]. CM called and left her a VM to collaborate with discharge planning.  [address]

## 2023-11-28 ENCOUNTER — CARE COORDINATION (OUTPATIENT)
Dept: CASE MANAGEMENT | Age: 88
End: 2023-11-28

## 2023-11-28 VITALS
BODY MASS INDEX: 38.77 KG/M2 | DIASTOLIC BLOOD PRESSURE: 53 MMHG | HEIGHT: 64 IN | WEIGHT: 227.1 LBS | SYSTOLIC BLOOD PRESSURE: 126 MMHG | HEART RATE: 79 BPM | RESPIRATION RATE: 18 BRPM | TEMPERATURE: 96 F | OXYGEN SATURATION: 94 %

## 2023-11-28 PROCEDURE — 97535 SELF CARE MNGMENT TRAINING: CPT

## 2023-11-28 PROCEDURE — 94640 AIRWAY INHALATION TREATMENT: CPT

## 2023-11-28 PROCEDURE — 6370000000 HC RX 637 (ALT 250 FOR IP): Performed by: PHYSICIAN ASSISTANT

## 2023-11-28 PROCEDURE — 2580000003 HC RX 258: Performed by: NURSE PRACTITIONER

## 2023-11-28 PROCEDURE — 97530 THERAPEUTIC ACTIVITIES: CPT

## 2023-11-28 PROCEDURE — 2700000000 HC OXYGEN THERAPY PER DAY

## 2023-11-28 PROCEDURE — 94761 N-INVAS EAR/PLS OXIMETRY MLT: CPT

## 2023-11-28 PROCEDURE — 6370000000 HC RX 637 (ALT 250 FOR IP): Performed by: NURSE PRACTITIONER

## 2023-11-28 PROCEDURE — 6370000000 HC RX 637 (ALT 250 FOR IP): Performed by: FAMILY MEDICINE

## 2023-11-28 RX ORDER — ROPINIROLE 2 MG/1
2 TABLET, FILM COATED ORAL 2 TIMES DAILY
Status: DISCONTINUED | OUTPATIENT
Start: 2023-11-28 | End: 2023-11-28 | Stop reason: HOSPADM

## 2023-11-28 RX ORDER — GUAIFENESIN 200 MG/10ML
200 LIQUID ORAL EVERY 4 HOURS PRN
COMMUNITY
Start: 2023-11-28

## 2023-11-28 RX ORDER — PREDNISONE 20 MG/1
40 TABLET ORAL DAILY
Qty: 6 TABLET | Refills: 0 | Status: SHIPPED | OUTPATIENT
Start: 2023-11-29 | End: 2023-12-02

## 2023-11-28 RX ADMIN — PREDNISONE 40 MG: 20 TABLET ORAL at 08:27

## 2023-11-28 RX ADMIN — IPRATROPIUM BROMIDE AND ALBUTEROL SULFATE 1 DOSE: 2.5; .5 SOLUTION RESPIRATORY (INHALATION) at 07:33

## 2023-11-28 RX ADMIN — GABAPENTIN 300 MG: 300 CAPSULE ORAL at 08:26

## 2023-11-28 RX ADMIN — SODIUM CHLORIDE, PRESERVATIVE FREE 10 ML: 5 INJECTION INTRAVENOUS at 08:29

## 2023-11-28 RX ADMIN — APIXABAN 5 MG: 5 TABLET, FILM COATED ORAL at 08:28

## 2023-11-28 RX ADMIN — IPRATROPIUM BROMIDE AND ALBUTEROL SULFATE 1 DOSE: 2.5; .5 SOLUTION RESPIRATORY (INHALATION) at 11:10

## 2023-11-28 RX ADMIN — ROPINIROLE HYDROCHLORIDE 2 MG: 2 TABLET, FILM COATED ORAL at 06:16

## 2023-11-28 RX ADMIN — LOSARTAN POTASSIUM 25 MG: 25 TABLET, FILM COATED ORAL at 08:26

## 2023-11-28 RX ADMIN — ACETAMINOPHEN 650 MG: 325 TABLET ORAL at 06:16

## 2023-11-28 RX ADMIN — METOPROLOL SUCCINATE 25 MG: 25 TABLET, EXTENDED RELEASE ORAL at 08:27

## 2023-11-28 RX ADMIN — MOMETASONE FUROATE AND FORMOTEROL FUMARATE DIHYDRATE 2 PUFF: 100; 5 AEROSOL RESPIRATORY (INHALATION) at 07:33

## 2023-11-28 RX ADMIN — IPRATROPIUM BROMIDE AND ALBUTEROL SULFATE 1 DOSE: 2.5; .5 SOLUTION RESPIRATORY (INHALATION) at 00:04

## 2023-11-28 RX ADMIN — TORSEMIDE 10 MG: 10 TABLET ORAL at 08:28

## 2023-11-28 RX ADMIN — Medication 5 ML: at 13:44

## 2023-11-28 RX ADMIN — HYDROXYCHLOROQUINE SULFATE 200 MG: 200 TABLET ORAL at 08:26

## 2023-11-28 RX ADMIN — IPRATROPIUM BROMIDE AND ALBUTEROL SULFATE 1 DOSE: 2.5; .5 SOLUTION RESPIRATORY (INHALATION) at 04:03

## 2023-11-28 RX ADMIN — Medication 1 TABLET: at 08:27

## 2023-11-28 ASSESSMENT — PAIN DESCRIPTION - LOCATION: LOCATION: LEG

## 2023-11-28 ASSESSMENT — PAIN DESCRIPTION - PAIN TYPE: TYPE: ACUTE PAIN

## 2023-11-28 ASSESSMENT — PAIN DESCRIPTION - ONSET: ONSET: GRADUAL

## 2023-11-28 ASSESSMENT — PAIN DESCRIPTION - FREQUENCY: FREQUENCY: INTERMITTENT

## 2023-11-28 ASSESSMENT — PAIN DESCRIPTION - DESCRIPTORS: DESCRIPTORS: NAGGING

## 2023-11-28 ASSESSMENT — PAIN DESCRIPTION - ORIENTATION: ORIENTATION: RIGHT;LEFT

## 2023-11-28 ASSESSMENT — PAIN - FUNCTIONAL ASSESSMENT: PAIN_FUNCTIONAL_ASSESSMENT: ACTIVITIES ARE NOT PREVENTED

## 2023-11-28 ASSESSMENT — PAIN SCALES - GENERAL
PAINLEVEL_OUTOF10: 3
PAINLEVEL_OUTOF10: 0

## 2023-11-28 NOTE — PLAN OF CARE
Problem: Pain  Goal: Verbalizes/displays adequate comfort level or baseline comfort level  Outcome: Completed     Problem: Skin/Tissue Integrity  Goal: Absence of new skin breakdown  Description: 1. Monitor for areas of redness and/or skin breakdown  2. Assess vascular access sites hourly  3. Every 4-6 hours minimum:  Change oxygen saturation probe site  4. Every 4-6 hours:  If on nasal continuous positive airway pressure, respiratory therapy assess nares and determine need for appliance change or resting period.   Outcome: Completed     Problem: Respiratory - Adult  Goal: Achieves optimal ventilation and oxygenation  Outcome: Completed     Problem: Skin/Tissue Integrity - Adult  Goal: Skin integrity remains intact  Outcome: Completed  Goal: Incisions, wounds, or drain sites healing without S/S of infection  Outcome: Completed  Goal: Oral mucous membranes remain intact  Outcome: Completed     Problem: Infection - Adult  Goal: Absence of infection at discharge  Outcome: Completed  Goal: Absence of infection during hospitalization  Outcome: Completed  Goal: Absence of fever/infection during anticipated neutropenic period  Outcome: Completed     Problem: Hematologic - Adult  Goal: Maintains hematologic stability  Outcome: Completed     Problem: Discharge Planning  Goal: Discharge to home or other facility with appropriate resources  Outcome: Completed     Problem: ABCDS Injury Assessment  Goal: Absence of physical injury  Outcome: Completed     Problem: Safety - Adult  Goal: Free from fall injury  Outcome: Completed     Problem: Chronic Conditions and Co-morbidities  Goal: Patient's chronic conditions and co-morbidity symptoms are monitored and maintained or improved  Outcome: Completed

## 2023-11-28 NOTE — PROGRESS NOTES
AVS, H&P, and discharge summary faxed to [facility], phone call to [facility] informed of discharge today.

## 2023-11-28 NOTE — CARE COORDINATION
Noted-- Rosa Cartwright readmitted to [facility] on  11/24/23 for A/C resp failure/Hypoxia. Per protocol, Care Transitions episode closed.  CTN team will follow up on hospital discharge if eligible    Eddi Barcenas RN CTN

## 2023-11-28 NOTE — PROGRESS NOTES
Went over discharge instructions with patient, pt verbalized understanding. Removed I.v and dressing applied. Patient went home with son in law with all belongings.

## 2023-11-28 NOTE — PROGRESS NOTES
Patient taken off O2 with Sao2 dropping to 82% on room air @ rest. O2 reapplied @ 2lnc with Sao2 recovering to 92%

## 2023-11-29 ENCOUNTER — HOSPITAL ENCOUNTER (OUTPATIENT)
Dept: WOUND CARE | Age: 88
Discharge: HOME | End: 2023-11-29

## 2023-11-29 ENCOUNTER — TELEPHONE (OUTPATIENT)
Dept: INTERNAL MEDICINE CLINIC | Age: 88
End: 2023-11-29

## 2023-11-29 ENCOUNTER — CARE COORDINATION (OUTPATIENT)
Dept: CASE MANAGEMENT | Age: 88
End: 2023-11-29

## 2023-11-29 DIAGNOSIS — B33.8 RSV INFECTION: Primary | ICD-10-CM

## 2023-11-29 PROCEDURE — 1111F DSCHRG MED/CURRENT MED MERGE: CPT | Performed by: INTERNAL MEDICINE

## 2023-11-29 NOTE — CARE COORDINATION
within 7 days of discharge? No.  Future Appointments   Date Time Provider [address]   12/6/2023  2:00 PM Ciara Boone MD [facility]   12/11/2023  2:45 PM Gerson Borges MD [facility]   3/20/2024 11:50 AM Ciara Boone MD [facility]       Patient requested to end call and requests no further calls. CT program closed, no further outreach scheduled.    Giovanni Chen RN

## 2023-11-29 NOTE — TELEPHONE ENCOUNTER
Called for verbal order to continue nursing and pt for patient - verbal ok given to continue    Patient stated she has stopped taking preservision- updated medication list to reflect this

## 2023-11-30 LAB
CULTURE: NORMAL
CULTURE: NORMAL
Lab: NORMAL
Lab: NORMAL
SPECIMEN: NORMAL
SPECIMEN: NORMAL

## 2023-12-05 RX ORDER — TORSEMIDE 10 MG/1
10 TABLET ORAL DAILY
Qty: 30 TABLET | Refills: 0 | Status: SHIPPED | OUTPATIENT
Start: 2023-12-05

## 2023-12-05 RX ORDER — ALBUTEROL SULFATE 90 UG/1
2 AEROSOL, METERED RESPIRATORY (INHALATION) 4 TIMES DAILY PRN
Qty: 8.5 G | Refills: 5 | Status: SHIPPED | OUTPATIENT
Start: 2023-12-05

## 2023-12-05 NOTE — TELEPHONE ENCOUNTER
Medication:   Requested Prescriptions     Pending Prescriptions Disp Refills    albuterol sulfate HFA (PROVENTIL;VENTOLIN;PROAIR) 108 (90 Base) MCG/ACT inhaler [Pharmacy Med Name: ALBUTEROL SULFATE  ( 108 (90 BAS Aerosol] 8.5 g 5     Sig: INHALE 2 PUFFS INTO THE LUNGS 4 TIMES DAILY AS NEEDED FOR WHEEZING        Last Filled:  05/19/2023    Patient Phone Number: [phone] (home)     Last appt: 11/7/2023   Next appt: 12/11/2023    Last OARRS:       2/16/2022     1:35 PM   RX Monitoring   Periodic Controlled Substance Monitoring No signs of potential drug abuse or diversion identified.

## 2023-12-11 ENCOUNTER — OFFICE VISIT (OUTPATIENT)
Dept: INTERNAL MEDICINE CLINIC | Age: 88
End: 2023-12-11
Payer: MEDICARE

## 2023-12-11 VITALS
WEIGHT: 210.2 LBS | RESPIRATION RATE: 16 BRPM | BODY MASS INDEX: 36.08 KG/M2 | DIASTOLIC BLOOD PRESSURE: 64 MMHG | TEMPERATURE: 97.2 F | SYSTOLIC BLOOD PRESSURE: 132 MMHG | HEART RATE: 88 BPM

## 2023-12-11 DIAGNOSIS — I10 ESSENTIAL HYPERTENSION: ICD-10-CM

## 2023-12-11 DIAGNOSIS — Z98.890 H/O LAMINECTOMY: ICD-10-CM

## 2023-12-11 DIAGNOSIS — J45.41 MODERATE PERSISTENT ASTHMA WITH EXACERBATION: ICD-10-CM

## 2023-12-11 DIAGNOSIS — E11.9 TYPE 2 DIABETES MELLITUS WITHOUT COMPLICATION, WITHOUT LONG-TERM CURRENT USE OF INSULIN (HCC): ICD-10-CM

## 2023-12-11 DIAGNOSIS — G25.81 RESTLESS LEG SYNDROME: ICD-10-CM

## 2023-12-11 DIAGNOSIS — L97.212 VENOUS STASIS ULCER OF RIGHT CALF WITH FAT LAYER EXPOSED, UNSPECIFIED WHETHER VARICOSE VEINS PRESENT (HCC): ICD-10-CM

## 2023-12-11 DIAGNOSIS — I83.012 VENOUS STASIS ULCER OF RIGHT CALF WITH FAT LAYER EXPOSED, UNSPECIFIED WHETHER VARICOSE VEINS PRESENT (HCC): ICD-10-CM

## 2023-12-11 DIAGNOSIS — I25.10 CORONARY ARTERY DISEASE INVOLVING NATIVE CORONARY ARTERY OF NATIVE HEART WITHOUT ANGINA PECTORIS: ICD-10-CM

## 2023-12-11 DIAGNOSIS — J96.01 ACUTE RESPIRATORY FAILURE WITH HYPOXIA (HCC): Primary | ICD-10-CM

## 2023-12-11 DIAGNOSIS — E78.2 MIXED HYPERLIPIDEMIA: ICD-10-CM

## 2023-12-11 DIAGNOSIS — I63.9 ACUTE STROKE DUE TO ISCHEMIA (HCC): ICD-10-CM

## 2023-12-11 DIAGNOSIS — M05.79 RHEUMATOID ARTHRITIS INVOLVING MULTIPLE SITES WITH POSITIVE RHEUMATOID FACTOR (HCC): ICD-10-CM

## 2023-12-11 DIAGNOSIS — I50.32 CHRONIC DIASTOLIC CONGESTIVE HEART FAILURE (HCC): ICD-10-CM

## 2023-12-11 PROBLEM — L03.90 CELLULITIS: Status: RESOLVED | Noted: 2023-04-17 | Resolved: 2023-12-11

## 2023-12-11 PROCEDURE — 1036F TOBACCO NON-USER: CPT | Performed by: INTERNAL MEDICINE

## 2023-12-11 PROCEDURE — 1123F ACP DISCUSS/DSCN MKR DOCD: CPT | Performed by: INTERNAL MEDICINE

## 2023-12-11 PROCEDURE — G8427 DOCREV CUR MEDS BY ELIG CLIN: HCPCS | Performed by: INTERNAL MEDICINE

## 2023-12-11 PROCEDURE — 3044F HG A1C LEVEL LT 7.0%: CPT | Performed by: INTERNAL MEDICINE

## 2023-12-11 PROCEDURE — 1090F PRES/ABSN URINE INCON ASSESS: CPT | Performed by: INTERNAL MEDICINE

## 2023-12-11 PROCEDURE — 99214 OFFICE O/P EST MOD 30 MIN: CPT | Performed by: INTERNAL MEDICINE

## 2023-12-11 PROCEDURE — 1111F DSCHRG MED/CURRENT MED MERGE: CPT | Performed by: INTERNAL MEDICINE

## 2023-12-11 PROCEDURE — G8417 CALC BMI ABV UP PARAM F/U: HCPCS | Performed by: INTERNAL MEDICINE

## 2023-12-11 PROCEDURE — G8484 FLU IMMUNIZE NO ADMIN: HCPCS | Performed by: INTERNAL MEDICINE

## 2023-12-14 RX ORDER — APIXABAN 5 MG/1
5 TABLET, FILM COATED ORAL 2 TIMES DAILY
Qty: 60 TABLET | Refills: 1 | Status: SHIPPED | OUTPATIENT
Start: 2023-12-14

## 2023-12-14 NOTE — TELEPHONE ENCOUNTER
Medication:   Requested Prescriptions     Pending Prescriptions Disp Refills    ELIQUIS 5 MG TABS tablet [Pharmacy Med Name: ELIQUIS 5 MG TABLET 5 Tablet] 60 tablet 1     Sig: TAKE 1 TABLET BY MOUTH 2 TIMES DAILY        Last Filled:      Patient Phone Number: [phone] (home)     Last appt: 12/11/2023   Next appt: 2/13/2024    Last OARRS:       2/16/2022     1:35 PM   RX Monitoring   Periodic Controlled Substance Monitoring No signs of potential drug abuse or diversion identified.

## 2023-12-28 ENCOUNTER — TELEPHONE (OUTPATIENT)
Dept: INTERNAL MEDICINE CLINIC | Age: 88
End: 2023-12-28

## 2023-12-28 NOTE — TELEPHONE ENCOUNTER
Jayshree with [facility] called stating she is with the pt and she is feeling tightness in the chest but not SOB and is having no other symptoms but her O2 Sat is 90-91 on 1 L, deep breaths brings it up to 93-94 but drops again. Advised to go to ER due to provider being out of office and chest tightness along with low O2 Sats

## 2024-01-02 RX ORDER — TORSEMIDE 10 MG/1
10 TABLET ORAL DAILY
Qty: 30 TABLET | Refills: 0 | Status: SHIPPED | OUTPATIENT
Start: 2024-01-02

## 2024-01-09 ENCOUNTER — TELEPHONE (OUTPATIENT)
Dept: INTERNAL MEDICINE CLINIC | Age: 89
End: 2024-01-09

## 2024-01-09 NOTE — TELEPHONE ENCOUNTER
----- Message from Angie Blue sent at 1/9/2024  9:44 AM EST -----  Subject: Medication Problem     Medication: rOPINIRole (REQUIP) 2 MG tablet  Dosage: TAKE ONE TABLET BY MOUTH AT 3PM AND 10PM ##NEWEST DIRECTIONS 1   TABLET IN THE MORNING AND 1 IN THE EVENING  Ordering Provider: Jennifer Doyle    Question/Problem: Medication is not strong enough. Patient is experiencing   more pain then before.       Pharmacy: [facility] [address] -  [phone] - F [phone]    ---------------------------------------------------------------------------  --------------  CALL BACK INFO  [phone]; OK to leave message on voicemail  ---------------------------------------------------------------------------  --------------    SCRIPT ANSWERS  Relationship to Patient: Self

## 2024-01-09 NOTE — TELEPHONE ENCOUNTER
----- Message from Angie Blue sent at 1/9/2024  9:44 AM EST -----  Subject: Medication Problem     Medication: rOPINIRole (REQUIP) 2 MG tablet  Dosage: TAKE ONE TABLET BY MOUTH AT 3PM AND 10PM ##NEWEST DIRECTIONS 1   TABLET IN THE MORNING AND 1 IN THE EVENING  Ordering Provider: Jennifer Doyle    Question/Problem: Medication is not strong enough. Patient is experiencing   more pain then before.       Pharmacy: [facility] [address] -  [phone] - F [phone]    ---------------------------------------------------------------------------  --------------  CALL BACK INFO  [phone]; OK to leave message on voicemail  ---------------------------------------------------------------------------  --------------    SCRIPT ANSWERS  Relationship to Patient: Self

## 2024-01-09 NOTE — TELEPHONE ENCOUNTER
Spoke with patient and she said that the medication is not strong enough and has deceided to discuss with doctor at her visit on Monday 1-15-23.

## 2024-01-10 ENCOUNTER — HOSPITAL ENCOUNTER (OUTPATIENT)
Dept: WOUND CARE | Age: 89
Discharge: HOME | End: 2024-01-10
Payer: MEDICARE

## 2024-01-10 DIAGNOSIS — L03.115 CELLULITIS OF RIGHT LEG: Primary | ICD-10-CM

## 2024-01-10 DIAGNOSIS — L97.212 VENOUS STASIS ULCER OF RIGHT CALF WITH FAT LAYER EXPOSED, UNSPECIFIED WHETHER VARICOSE VEINS PRESENT (HCC): ICD-10-CM

## 2024-01-10 DIAGNOSIS — I83.012 VENOUS STASIS ULCER OF RIGHT CALF WITH FAT LAYER EXPOSED, UNSPECIFIED WHETHER VARICOSE VEINS PRESENT (HCC): ICD-10-CM

## 2024-01-10 DIAGNOSIS — I87.2 VENOUS INSUFFICIENCY OF BOTH LOWER EXTREMITIES: ICD-10-CM

## 2024-01-10 PROCEDURE — 99212 OFFICE O/P EST SF 10 MIN: CPT

## 2024-01-10 RX ORDER — LIDOCAINE 40 MG/G
CREAM TOPICAL ONCE
OUTPATIENT
Start: 2024-01-10 | End: 2024-01-10

## 2024-01-10 RX ORDER — GINSENG 100 MG
CAPSULE ORAL ONCE
OUTPATIENT
Start: 2024-01-10 | End: 2024-01-10

## 2024-01-10 RX ORDER — BETAMETHASONE DIPROPIONATE 0.05 %
OINTMENT (GRAM) TOPICAL ONCE
OUTPATIENT
Start: 2024-01-10 | End: 2024-01-10

## 2024-01-10 RX ORDER — GENTAMICIN SULFATE 1 MG/G
OINTMENT TOPICAL ONCE
OUTPATIENT
Start: 2024-01-10 | End: 2024-01-10

## 2024-01-10 RX ORDER — TRIAMCINOLONE ACETONIDE 1 MG/G
OINTMENT TOPICAL ONCE
OUTPATIENT
Start: 2024-01-10 | End: 2024-01-10

## 2024-01-10 RX ORDER — IBUPROFEN 200 MG
TABLET ORAL ONCE
OUTPATIENT
Start: 2024-01-10 | End: 2024-01-10

## 2024-01-10 RX ORDER — BACITRACIN ZINC AND POLYMYXIN B SULFATE 500; 1000 [USP'U]/G; [USP'U]/G
OINTMENT TOPICAL ONCE
OUTPATIENT
Start: 2024-01-10 | End: 2024-01-10

## 2024-01-10 RX ORDER — LIDOCAINE 50 MG/G
OINTMENT TOPICAL ONCE
OUTPATIENT
Start: 2024-01-10 | End: 2024-01-10

## 2024-01-10 RX ORDER — CLOBETASOL PROPIONATE 0.5 MG/G
OINTMENT TOPICAL ONCE
OUTPATIENT
Start: 2024-01-10 | End: 2024-01-10

## 2024-01-10 RX ORDER — LIDOCAINE HYDROCHLORIDE 20 MG/ML
JELLY TOPICAL ONCE
OUTPATIENT
Start: 2024-01-10 | End: 2024-01-10

## 2024-01-10 RX ORDER — LIDOCAINE HYDROCHLORIDE 40 MG/ML
SOLUTION TOPICAL ONCE
OUTPATIENT
Start: 2024-01-10 | End: 2024-01-10

## 2024-01-10 NOTE — PLAN OF CARE
Problem: Chronic Conditions and Co-morbidities  Goal: Patient's chronic conditions and co-morbidity symptoms are monitored and maintained or improved  1/10/2024 1414 by Lilly Pitt LPN  Outcome: Progressing     Problem: Wound:  Goal: Will show signs of wound healing; wound closure and no evidence of infection  Description: Will show signs of wound healing; wound closure and no evidence of infection  Note: See flowsheet  Intervention: Assess ankle, calf, or foot circumference blilaterally  Note: See flowsheet  Intervention: Assess pain status  Note: See flowsheet  Intervention: Assess wound size, appearance and drainage  Note: See flowsheet  Intervention: Assess pedal pulses bilaterally if patient has a foot or leg ulcer  Note: See flowsheet  Intervention: Doppler if unable to palpate pedal pulse  Note: See flowsheet

## 2024-01-10 NOTE — PATIENT INSTRUCTIONS
PHYSICIAN ORDERS AND DISCHARGE INSTRUCTIONS  NOTE: Upon discharge from the Wound Center, you will receive a patient experience survey via E-mail. We would be grateful if you would take the time to fill this survey out.  Wound care order history:   SULTANA's   Right       Left    Date    Vascular studies/Intervention: .     Cultures: .               Antibiotics: .Augmentin 12/20/23                HbA1c:  .               Grafts:  .   Juxta Lites & Lymph Pumps:  Continuing wound care orders and information:              Residence: .              Continue home health care with:.    Your wound-care supplies will be provided by: .              Pharmacy: .  Wound cleansing:      Do not scrub or use excessive force.    Wash hands with soap and water before and after dressing changes.    Prior to applying a clean dressing, cleanse wound with normal saline,    wound cleanser, or mild soap and water.     Ask your physician or nurse before getting the wound(s) wet in the shower.  Daily Wound management:    Keep weight off wounds and reposition every 2 hours.    Avoid standing for long periods of time.    Evaluate legs to the level of the heart or above for 30 minutes 4-5 times a day and/or when sitting.       When taking antibiotics take entire prescription as ordered by MD do not stop taking until medicine is all gone.     Documentation:  Compression: .   Offloading: .        Orders for this week (1/10/2024):  Bilateral Lower Legs- Wash with mild soap and water, rinse with saline, pat dry with 4x4  Tubi F  At home Daily soak leg in epsom salt water for 20 minutes.  Mix 1/2  cup of epsom salt to water in 5 gallon bucket. Dry leg by patting dry.  Put on tubi   F    Please dispense 30 day quantity when sending supplies     Follow up with Dr. Miranda in 1 weeks in the wound care center  Call [phone] for any questions or concerns.   normal...

## 2024-01-12 NOTE — PROGRESS NOTES
to Encounter   Medication Sig Dispense Refill    torsemide (DEMADEX) 10 MG tablet TAKE 1 TABLET BY MOUTH DAILY 30 tablet 0    losartan (COZAAR) 25 MG tablet Take 1 tablet by mouth daily 30 tablet 3    ELIQUIS 5 MG TABS tablet TAKE 1 TABLET BY MOUTH 2 TIMES DAILY 60 tablet 1    OXYGEN 2 Liter O2 - CONTINUOUS (route: Oxygen)      albuterol sulfate HFA (PROVENTIL;VENTOLIN;PROAIR) 108 (90 Base) MCG/ACT inhaler INHALE 2 PUFFS INTO THE LUNGS 4 TIMES DAILY AS NEEDED FOR WHEEZING 8.5 g 5    atorvastatin (LIPITOR) 40 MG tablet Take 1 tablet by mouth nightly 30 tablet 3    rOPINIRole (REQUIP) 2 MG tablet TAKE ONE TABLET BY MOUTH AT 3PM AND 10PM **NEWEST DIRECTIONS 1 TABLET IN THE MORNING AND 1 IN THE EVENING 60 tablet 3    HYDROcodone-acetaminophen (NORCO) 5-325 MG per tablet Take 1 tablet by mouth every 12 hours as needed. (Patient not taking: Reported on 12/11/2023)      metoprolol succinate (TOPROL XL) 25 MG extended release tablet TAKE 1 TABLET BY MOUTH DAILY 90 tablet 1    gabapentin (NEURONTIN) 300 MG capsule Take 1 tablet in the morning, 1 tablet in the afternoon, and 2 tablets in the evening 120 capsule 5    ketoconazole (NIZORAL) 2 % cream Apply topically daily to right foot      Cholecalciferol (VITAMIN D3) 125 MCG (5000 UT) CAPS Take 1 capsule by mouth daily 30 capsule 1    Fluticasone Furoate-Vilanterol (BREO ELLIPTA) 100-25 MCG/ACT AEPB Inhale 1 puff into the lungs daily 1 each 3    Coenzyme Q10 (COQ10) 200 MG CAPS Take 200 mg by mouth 2 times daily      clobetasol (TEMOVATE) 0.05 % cream Apply topically daily      hydroxychloroquine (PLAQUENIL) 200 MG tablet Take 1 tablet by mouth daily       No current facility-administered medications on file prior to encounter.       REVIEW OF SYSTEMS    Pertinent items are noted in HPI.    Constitutional: Negative for systemic symptoms including fever, chills and malaise.    Objective:      LMP  (LMP Unknown)     PHYSICAL EXAM  General Appearance: alert and oriented to

## 2024-01-15 ENCOUNTER — OFFICE VISIT (OUTPATIENT)
Dept: INTERNAL MEDICINE CLINIC | Age: 89
End: 2024-01-15
Payer: MEDICARE

## 2024-01-15 VITALS
HEIGHT: 64 IN | WEIGHT: 218.6 LBS | DIASTOLIC BLOOD PRESSURE: 68 MMHG | RESPIRATION RATE: 16 BRPM | SYSTOLIC BLOOD PRESSURE: 130 MMHG | HEART RATE: 76 BPM | TEMPERATURE: 97.1 F | BODY MASS INDEX: 37.32 KG/M2

## 2024-01-15 VITALS
RESPIRATION RATE: 16 BRPM | BODY MASS INDEX: 37.22 KG/M2 | HEIGHT: 64 IN | HEART RATE: 76 BPM | TEMPERATURE: 97.1 F | SYSTOLIC BLOOD PRESSURE: 130 MMHG | DIASTOLIC BLOOD PRESSURE: 68 MMHG | WEIGHT: 218 LBS

## 2024-01-15 DIAGNOSIS — G25.81 RESTLESS LEG SYNDROME: ICD-10-CM

## 2024-01-15 DIAGNOSIS — R29.818 SUSPECTED SLEEP APNEA: ICD-10-CM

## 2024-01-15 DIAGNOSIS — I10 ESSENTIAL HYPERTENSION: ICD-10-CM

## 2024-01-15 DIAGNOSIS — I25.10 CORONARY ARTERY DISEASE INVOLVING NATIVE CORONARY ARTERY OF NATIVE HEART WITHOUT ANGINA PECTORIS: ICD-10-CM

## 2024-01-15 DIAGNOSIS — J45.41 MODERATE PERSISTENT ASTHMA WITH EXACERBATION: ICD-10-CM

## 2024-01-15 DIAGNOSIS — E66.01 MORBID OBESITY WITH BMI OF 40.0-44.9, ADULT (HCC): ICD-10-CM

## 2024-01-15 DIAGNOSIS — Z00.00 MEDICARE ANNUAL WELLNESS VISIT, SUBSEQUENT: Primary | ICD-10-CM

## 2024-01-15 DIAGNOSIS — M05.79 RHEUMATOID ARTHRITIS INVOLVING MULTIPLE SITES WITH POSITIVE RHEUMATOID FACTOR (HCC): ICD-10-CM

## 2024-01-15 DIAGNOSIS — Z86.73 S/P STROKE DUE TO CEREBROVASCULAR DISEASE: ICD-10-CM

## 2024-01-15 DIAGNOSIS — R60.0 PEDAL EDEMA: ICD-10-CM

## 2024-01-15 DIAGNOSIS — E66.01 SEVERE OBESITY (BMI 35.0-39.9) WITH COMORBIDITY (HCC): ICD-10-CM

## 2024-01-15 DIAGNOSIS — E11.9 TYPE 2 DIABETES MELLITUS WITHOUT COMPLICATION, WITHOUT LONG-TERM CURRENT USE OF INSULIN (HCC): Primary | ICD-10-CM

## 2024-01-15 DIAGNOSIS — E78.2 MIXED HYPERLIPIDEMIA: ICD-10-CM

## 2024-01-15 PROCEDURE — G8427 DOCREV CUR MEDS BY ELIG CLIN: HCPCS | Performed by: INTERNAL MEDICINE

## 2024-01-15 PROCEDURE — 1123F ACP DISCUSS/DSCN MKR DOCD: CPT | Performed by: INTERNAL MEDICINE

## 2024-01-15 PROCEDURE — G8417 CALC BMI ABV UP PARAM F/U: HCPCS | Performed by: INTERNAL MEDICINE

## 2024-01-15 PROCEDURE — G8484 FLU IMMUNIZE NO ADMIN: HCPCS | Performed by: INTERNAL MEDICINE

## 2024-01-15 PROCEDURE — 1090F PRES/ABSN URINE INCON ASSESS: CPT | Performed by: INTERNAL MEDICINE

## 2024-01-15 PROCEDURE — G0439 PPPS, SUBSEQ VISIT: HCPCS | Performed by: INTERNAL MEDICINE

## 2024-01-15 PROCEDURE — 1036F TOBACCO NON-USER: CPT | Performed by: INTERNAL MEDICINE

## 2024-01-15 PROCEDURE — 99214 OFFICE O/P EST MOD 30 MIN: CPT | Performed by: INTERNAL MEDICINE

## 2024-01-15 RX ORDER — MAGNESIUM OXIDE 400 MG/1
400 TABLET ORAL DAILY
Qty: 30 TABLET | Refills: 3 | Status: SHIPPED | OUTPATIENT
Start: 2024-01-15

## 2024-01-15 RX ORDER — CYCLOSPORINE 0.5 MG/ML
EMULSION OPHTHALMIC EVERY 12 HOURS
COMMUNITY
Start: 2023-12-20

## 2024-01-15 RX ORDER — VITC/E/ZINC/COPPER/LUTEIN/ZEAX 250 MG-200
1 TABLET,CHEWABLE ORAL 2 TIMES DAILY
COMMUNITY

## 2024-01-15 ASSESSMENT — PATIENT HEALTH QUESTIONNAIRE - PHQ9
SUM OF ALL RESPONSES TO PHQ9 QUESTIONS 1 & 2: 1
2. FEELING DOWN, DEPRESSED OR HOPELESS: 1
SUM OF ALL RESPONSES TO PHQ QUESTIONS 1-9: 1
1. LITTLE INTEREST OR PLEASURE IN DOING THINGS: 0
SUM OF ALL RESPONSES TO PHQ QUESTIONS 1-9: 1

## 2024-01-15 ASSESSMENT — LIFESTYLE VARIABLES
HOW MANY STANDARD DRINKS CONTAINING ALCOHOL DO YOU HAVE ON A TYPICAL DAY: PATIENT DOES NOT DRINK
HOW OFTEN DO YOU HAVE A DRINK CONTAINING ALCOHOL: NEVER

## 2024-01-15 NOTE — PROGRESS NOTES
Medicare Annual Wellness Visit    Jayshree Tate is here for Medicare AWV    Assessment & Plan   Medicare annual wellness visit, subsequent    Recommendations for Preventive Services Due: see orders and patient instructions/AVS.  Recommended screening schedule for the next 5-10 years is provided to the patient in written form: see Patient Instructions/AVS.     Return to office in 1 year for AWV     Subjective       Patient's complete Health Risk Assessment and screening values have been reviewed and are found in Flowsheets. The following problems were reviewed today and where indicated follow up appointments were made and/or referrals ordered.    Positive Risk Factor Screenings with Interventions:    Fall Risk:          Interventions:    Reviewed medications, home hazards, visual acuity, and co-morbidities that can increase risk for falls  Recommended Assisted Device             Activity, Diet, and Weight:  On average, how many days per week do you engage in moderate to strenuous exercise (like a brisk walk)?: 7 days  On average, how many minutes do you engage in exercise at this level?: 10 min    Do you eat balanced/healthy meals regularly?: (!) No    Body mass index is 37.42 kg/m². (!) Abnormal      Do you eat balanced/healthy meals regularly Interventions:  Patient advised to follow-up in this office for further evaluation and treatment  Obesity Interventions:  Patient declines any further evaluation or treatment            Dentist Screen:  Have you seen the dentist within the past year?: (!) No    Intervention:  Patient declines any further evaluation or treatment     Vision Screen:  Do you have difficulty driving, watching TV, or doing any of your daily activities because of your eyesight?: (!) Yes  Have you had an eye exam within the past year?: Yes  No results found.    Interventions:   Patient declines any further evaluation or treatment     ADL's:   Patient reports needing help with:  Select all that

## 2024-01-15 NOTE — PROGRESS NOTES
Jayshree Tate  Patient's  is 1935  Seen in office on 1/15/2024      SUBJECTIVE:  Jayshree michel 88 y.o.year old female presents today   Chief Complaint   Patient presents with    Follow-up    Leg Pain     Restless leg really bothering her,      Pt states her leg bothering her from knee down.  No chest pain. No SOB  No HA  No NVD  No dizziness. No fall  Pt is taking requip 2 mg bid for RLS  She is also on gabapentin 300 mg tid    Patient has DM. No hypoglycemia. No numbness or weakness. No dizziness. Blood sugars are good at home. BS ~ 107    Pt has sleep apnea and is using CPAP and is benefiting from it.    Pt has RA and plaquenil was d/c . Pt is not any medication except tylenol . Pt states rheumatologist stated she  does not have any medication to give her. She was restarted on Reclast     Pedal edema has improved. On diuretic    Patient has hypertension. Taking medications No headaches, no chest pain, no palpitations and no dizziness.    Patient has hyperlipidemia. Taking medications. No abdominal pain, no nausea or vomiting. No myalgias.    CAD : no angina.         Taking medications regularly. No side effects noted.    Review of Systems  Review of system is normal except as in HPI    OBJECTIVE: /68   Pulse 76   Temp 97.1 °F (36.2 °C) (Temporal)   Resp 16   Ht 1.626 m (5' 4\")   Wt 99.2 kg (218 lb 9.6 oz)   LMP  (LMP Unknown)   BMI 37.52 kg/m²     Wt Readings from Last 3 Encounters:   01/15/24 99.2 kg (218 lb 9.6 oz)   23 95.3 kg (210 lb 3.2 oz)   23 103 kg (227 lb 1.6 oz)      GENERAL: - Alert, oriented, pleasant, in no apparent distress.    HEENT: - Conjunctiva pink, no scleral icterus. ENT clear.  NECK: -Supple.  No jugular venous distention noted. No masses felt,  CARDIOVASCULAR: - Normal S1 and S2    PULMONARY: - No respiratory distress.  No wheezes or rales.    ABDOMEN: - Soft and non-tender,no masses  ororganomegaly.  EXTREMITIES: - No cyanosis, clubbing, or significant

## 2024-01-17 ENCOUNTER — HOSPITAL ENCOUNTER (OUTPATIENT)
Dept: WOUND CARE | Age: 89
Discharge: HOME | End: 2024-01-17
Attending: INTERNAL MEDICINE
Payer: MEDICARE

## 2024-01-17 VITALS — RESPIRATION RATE: 16 BRPM | TEMPERATURE: 98 F

## 2024-01-17 DIAGNOSIS — I87.2 VENOUS INSUFFICIENCY OF BOTH LOWER EXTREMITIES: ICD-10-CM

## 2024-01-17 DIAGNOSIS — L03.115 CELLULITIS OF RIGHT LEG: Primary | ICD-10-CM

## 2024-01-17 DIAGNOSIS — I83.012 VENOUS STASIS ULCER OF RIGHT CALF WITH FAT LAYER EXPOSED, UNSPECIFIED WHETHER VARICOSE VEINS PRESENT (HCC): ICD-10-CM

## 2024-01-17 DIAGNOSIS — L97.212 VENOUS STASIS ULCER OF RIGHT CALF WITH FAT LAYER EXPOSED, UNSPECIFIED WHETHER VARICOSE VEINS PRESENT (HCC): ICD-10-CM

## 2024-01-17 PROCEDURE — 10140 I&D HMTMA SEROMA/FLUID COLLJ: CPT

## 2024-01-17 PROCEDURE — 10060 I&D ABSCESS SIMPLE/SINGLE: CPT

## 2024-01-17 PROCEDURE — 10140 I&D HMTMA SEROMA/FLUID COLLJ: CPT | Performed by: NURSE PRACTITIONER

## 2024-01-17 RX ORDER — LIDOCAINE 50 MG/G
OINTMENT TOPICAL ONCE
OUTPATIENT
Start: 2024-01-17 | End: 2024-01-17

## 2024-01-17 RX ORDER — DOXYCYCLINE HYCLATE 100 MG
100 TABLET ORAL 2 TIMES DAILY
Qty: 28 TABLET | Refills: 0 | Status: SHIPPED | OUTPATIENT
Start: 2024-01-17 | End: 2024-01-31

## 2024-01-17 RX ORDER — IBUPROFEN 200 MG
TABLET ORAL ONCE
OUTPATIENT
Start: 2024-01-17 | End: 2024-01-17

## 2024-01-17 RX ORDER — LIDOCAINE 40 MG/G
CREAM TOPICAL ONCE
OUTPATIENT
Start: 2024-01-17 | End: 2024-01-17

## 2024-01-17 RX ORDER — BETAMETHASONE DIPROPIONATE 0.05 %
OINTMENT (GRAM) TOPICAL ONCE
OUTPATIENT
Start: 2024-01-17 | End: 2024-01-17

## 2024-01-17 RX ORDER — LIDOCAINE HYDROCHLORIDE 40 MG/ML
SOLUTION TOPICAL ONCE
OUTPATIENT
Start: 2024-01-17 | End: 2024-01-17

## 2024-01-17 RX ORDER — CLOBETASOL PROPIONATE 0.5 MG/G
OINTMENT TOPICAL ONCE
OUTPATIENT
Start: 2024-01-17 | End: 2024-01-17

## 2024-01-17 RX ORDER — LIDOCAINE HYDROCHLORIDE 20 MG/ML
JELLY TOPICAL ONCE
OUTPATIENT
Start: 2024-01-17 | End: 2024-01-17

## 2024-01-17 RX ORDER — TRIAMCINOLONE ACETONIDE 1 MG/G
OINTMENT TOPICAL ONCE
OUTPATIENT
Start: 2024-01-17 | End: 2024-01-17

## 2024-01-17 RX ORDER — GINSENG 100 MG
CAPSULE ORAL ONCE
OUTPATIENT
Start: 2024-01-17 | End: 2024-01-17

## 2024-01-17 RX ORDER — GENTAMICIN SULFATE 1 MG/G
OINTMENT TOPICAL ONCE
OUTPATIENT
Start: 2024-01-17 | End: 2024-01-17

## 2024-01-17 RX ORDER — BACITRACIN ZINC AND POLYMYXIN B SULFATE 500; 1000 [USP'U]/G; [USP'U]/G
OINTMENT TOPICAL ONCE
OUTPATIENT
Start: 2024-01-17 | End: 2024-01-17

## 2024-01-17 NOTE — PROGRESS NOTES
CNP    Consent obtained: Yes    Time out taken: Yes    Pain Control:   Anesthetic  Anesthetic: 4% Lidocaine Liquid Topical      Drainage Type: moderate, bloody    Instruments: curette, #15 blade scalpel, and forceps    An 15-blade scalpel was then used to make an incision parallel to the nail medially and laterally to decompress the fluctuant material. Purulent material was expressed and cultured.  Loculations were broken up using a hemostat and more of the material was able to be expressed. The wound was then irrigated and silver nitrate was used for hemostasis with good effect.      Location of Incision and Drainage:    Wound #: 2    Incision and Drainage Size cm  Wound 11/01/23 #2 Left Anterior Lower Leg Cluster (Active)   Wound Image   01/10/24 1408   Dressing Status New dressing applied 01/17/24 1448   Wound Cleansed Soap and water 01/17/24 1402   Dressing/Treatment Other (comment) 01/17/24 1448   Wound Length (cm) 4.9 cm 01/17/24 1402   Wound Width (cm) 2.4 cm 01/17/24 1402   Wound Depth (cm) 0.1 cm 01/17/24 1402   Wound Surface Area (cm^2) 11.76 cm^2 01/17/24 1402   Change in Wound Size % (l*w) 58.74 01/17/24 1402   Wound Volume (cm^3) 1.176 cm^3 01/17/24 1402   Wound Healing % 59 01/17/24 1402   Post-Procedure Length (cm) 4.9 cm 01/17/24 1442   Post-Procedure Width (cm) 2.5 cm 01/17/24 1442   Post-Procedure Depth (cm) 0.1 cm 01/17/24 1442   Post-Procedure Surface Area (cm^2) 12.25 cm^2 01/17/24 1442   Post-Procedure Volume (cm^3) 1.225 cm^3 01/17/24 1442   Distance Tunneling (cm) 0 cm 01/17/24 1402   Tunneling Position ___ O'Clock 0 01/17/24 1402   Undermining Starts ___ O'Clock 0 01/17/24 1402   Undermining Ends___ O'Clock 0 01/17/24 1402   Undermining Maxium Distance (cm) 0 01/17/24 1402   Wound Assessment Dry;Fluid filled blister 01/17/24 1402   Drainage Amount None (dry) 01/17/24 1402   Drainage Description Serosanguinous 12/20/23 1435   Odor None 01/17/24 1402   Vera-wound Assessment Fragile 01/17/24

## 2024-01-17 NOTE — PATIENT INSTRUCTIONS
PHYSICIAN ORDERS AND DISCHARGE INSTRUCTIONS  NOTE: Upon discharge from the Wound Center, you will receive a patient experience survey via E-mail. We would be grateful if you would take the time to fill this survey out.  Wound care order history:   SULTANA's   Right       Left    Date    Vascular studies/Intervention: .     Cultures: .               Antibiotics: .Augmentin 12/20/23, Doxy 1/17/24                HbA1c:  .               Grafts:  .   Juxta Lites & Lymph Pumps:  Continuing wound care orders and information:              Residence: .              Continue home health care with:.    Your wound-care supplies will be provided by: .              Pharmacy: .  Wound cleansing:      Do not scrub or use excessive force.    Wash hands with soap and water before and after dressing changes.    Prior to applying a clean dressing, cleanse wound with normal saline,    wound cleanser, or mild soap and water.     Ask your physician or nurse before getting the wound(s) wet in the shower.  Daily Wound management:    Keep weight off wounds and reposition every 2 hours.    Avoid standing for long periods of time.    Evaluate legs to the level of the heart or above for 30 minutes 4-5 times a day and/or when sitting.       When taking antibiotics take entire prescription as ordered by MD do not stop taking until medicine is all gone.     Documentation:  Compression: .   Offloading: .        Orders for this week (1/17/2024):  Bilateral Lower Legs- Wash with mild soap and water, rinse with saline, pat dry with 4x4  Apply Gentamicin  and stimulen to wound bed  Cover with Sorbex  Wrap with conform and secure with tape  Wrap with double Ace Bandages  Change on Wednesday    Please dispense 30 day quantity when sending supplies     Follow up with Dr. Miranda in 1 weeks in the wound care center  Call [phone] for any questions or concerns.

## 2024-01-24 ENCOUNTER — HOSPITAL ENCOUNTER (OUTPATIENT)
Dept: WOUND CARE | Age: 89
Discharge: HOME | End: 2024-01-24
Attending: INTERNAL MEDICINE

## 2024-01-30 RX ORDER — TORSEMIDE 10 MG/1
10 TABLET ORAL DAILY
Qty: 30 TABLET | Refills: 0 | Status: SHIPPED | OUTPATIENT
Start: 2024-01-30

## 2024-01-30 RX ORDER — ROPINIROLE 2 MG/1
TABLET, FILM COATED ORAL
Qty: 60 TABLET | Refills: 3 | Status: SHIPPED | OUTPATIENT
Start: 2024-01-30

## 2024-01-31 ENCOUNTER — HOSPITAL ENCOUNTER (OUTPATIENT)
Dept: WOUND CARE | Age: 89
Discharge: HOME | End: 2024-01-31
Attending: INTERNAL MEDICINE
Payer: MEDICARE

## 2024-01-31 VITALS
TEMPERATURE: 96.8 F | SYSTOLIC BLOOD PRESSURE: 154 MMHG | DIASTOLIC BLOOD PRESSURE: 55 MMHG | HEART RATE: 65 BPM | RESPIRATION RATE: 18 BRPM

## 2024-01-31 DIAGNOSIS — I87.2 VENOUS INSUFFICIENCY OF BOTH LOWER EXTREMITIES: ICD-10-CM

## 2024-01-31 DIAGNOSIS — I83.012 VENOUS STASIS ULCER OF RIGHT CALF WITH FAT LAYER EXPOSED, UNSPECIFIED WHETHER VARICOSE VEINS PRESENT (HCC): ICD-10-CM

## 2024-01-31 DIAGNOSIS — L97.212 VENOUS STASIS ULCER OF RIGHT CALF WITH FAT LAYER EXPOSED, UNSPECIFIED WHETHER VARICOSE VEINS PRESENT (HCC): ICD-10-CM

## 2024-01-31 DIAGNOSIS — L03.115 CELLULITIS OF RIGHT LEG: Primary | ICD-10-CM

## 2024-01-31 PROCEDURE — 99213 OFFICE O/P EST LOW 20 MIN: CPT | Performed by: INTERNAL MEDICINE

## 2024-01-31 PROCEDURE — 99213 OFFICE O/P EST LOW 20 MIN: CPT

## 2024-01-31 RX ORDER — GINSENG 100 MG
CAPSULE ORAL ONCE
OUTPATIENT
Start: 2024-01-31 | End: 2024-01-31

## 2024-01-31 RX ORDER — BETAMETHASONE DIPROPIONATE 0.05 %
OINTMENT (GRAM) TOPICAL ONCE
OUTPATIENT
Start: 2024-01-31 | End: 2024-01-31

## 2024-01-31 RX ORDER — LIDOCAINE 50 MG/G
OINTMENT TOPICAL ONCE
OUTPATIENT
Start: 2024-01-31 | End: 2024-01-31

## 2024-01-31 RX ORDER — TRIAMCINOLONE ACETONIDE 1 MG/G
OINTMENT TOPICAL ONCE
OUTPATIENT
Start: 2024-01-31 | End: 2024-01-31

## 2024-01-31 RX ORDER — IBUPROFEN 200 MG
TABLET ORAL ONCE
OUTPATIENT
Start: 2024-01-31 | End: 2024-01-31

## 2024-01-31 RX ORDER — LIDOCAINE HYDROCHLORIDE 20 MG/ML
JELLY TOPICAL ONCE
OUTPATIENT
Start: 2024-01-31 | End: 2024-01-31

## 2024-01-31 RX ORDER — LIDOCAINE 40 MG/G
CREAM TOPICAL ONCE
OUTPATIENT
Start: 2024-01-31 | End: 2024-01-31

## 2024-01-31 RX ORDER — CLOBETASOL PROPIONATE 0.5 MG/G
OINTMENT TOPICAL ONCE
OUTPATIENT
Start: 2024-01-31 | End: 2024-01-31

## 2024-01-31 RX ORDER — BACITRACIN ZINC AND POLYMYXIN B SULFATE 500; 1000 [USP'U]/G; [USP'U]/G
OINTMENT TOPICAL ONCE
OUTPATIENT
Start: 2024-01-31 | End: 2024-01-31

## 2024-01-31 RX ORDER — LIDOCAINE HYDROCHLORIDE 40 MG/ML
SOLUTION TOPICAL ONCE
OUTPATIENT
Start: 2024-01-31 | End: 2024-01-31

## 2024-01-31 RX ORDER — GENTAMICIN SULFATE 1 MG/G
OINTMENT TOPICAL ONCE
OUTPATIENT
Start: 2024-01-31 | End: 2024-01-31

## 2024-01-31 RX ORDER — GENTAMICIN SULFATE 1 MG/G
OINTMENT TOPICAL ONCE
Status: DISCONTINUED | OUTPATIENT
Start: 2024-01-31 | End: 2024-02-01 | Stop reason: HOSPADM

## 2024-01-31 ASSESSMENT — PAIN SCALES - GENERAL: PAINLEVEL_OUTOF10: 7

## 2024-01-31 ASSESSMENT — PAIN DESCRIPTION - DESCRIPTORS: DESCRIPTORS: ACHING

## 2024-01-31 ASSESSMENT — PAIN DESCRIPTION - LOCATION: LOCATION: LEG

## 2024-01-31 ASSESSMENT — PAIN DESCRIPTION - ORIENTATION: ORIENTATION: LEFT

## 2024-01-31 ASSESSMENT — PAIN DESCRIPTION - FREQUENCY: FREQUENCY: CONTINUOUS

## 2024-01-31 NOTE — PATIENT INSTRUCTIONS
PHYSICIAN ORDERS AND DISCHARGE INSTRUCTIONS  NOTE: Upon discharge from the Wound Center, you will receive a patient experience survey via E-mail. We would be grateful if you would take the time to fill this survey out.  Wound care order history:   SULTANA's   Right       Left    Date    Vascular studies/Intervention: .     Cultures: .               Antibiotics: .Augmentin 12/20/23, Doxy 1/17/24                HbA1c:  .               Grafts:  .   Juxta Lites & Lymph Pumps:  Continuing wound care orders and information:              Residence: .              Continue home health care with:.    Your wound-care supplies will be provided by: .              Pharmacy: .  Wound cleansing:      Do not scrub or use excessive force.    Wash hands with soap and water before and after dressing changes.    Prior to applying a clean dressing, cleanse wound with normal saline,    wound cleanser, or mild soap and water.     Ask your physician or nurse before getting the wound(s) wet in the shower.  Daily Wound management:    Keep weight off wounds and reposition every 2 hours.    Avoid standing for long periods of time.    Evaluate legs to the level of the heart or above for 30 minutes 4-5 times a day and/or when sitting.       When taking antibiotics take entire prescription as ordered by MD do not stop taking until medicine is all gone.     Documentation:  Compression: .   Offloading: .        Orders for this week (1/31/2024):  Bilateral Lower Legs- Wash with mild soap and water, rinse with saline, pat dry with 4x4  Tubi F  At home every two days soak leg in epsom salt water for 20 minutes.  Mix 1/2  cup of epsom salt to water in 5 gallon bucket. Dry leg by patting dry.  Put on tubi   F  Home Health- Place Tubi F on legs   Please dispense 30 day quantity when sending supplies     Follow up with Dr. Miranda in 1 weeks in the wound care center  Call [phone] for any questions or concerns.

## 2024-01-31 NOTE — PROGRESS NOTES
Wound Care Center Progress Note       Jayshree Tate  AGE: 88 y.o.   GENDER: female  : 1935  TODAY'S DATE:  2024        Subjective:     Chief Complaint   Patient presents with    Wound Check         HISTORY of PRESENT ILLNESS     Jayshree Tate is a 88 y.o. female who presents today for wound evaluation of Chronic venous ulcer(s) of legs.  The ulcer is of moderate severity.  The underlying cause of the wound is non compliance of treatment .    Wound Pain Timing/Severity: constant  Quality of pain: dull  Severity of pain:  1 / 10   Modifying Factors: edema, venous stasis, obesity, and non-adherence  Associated Signs/Symptoms: edema, erythema, and tingling        PAST MEDICAL HISTORY        Diagnosis Date    Arthritis     left knee pain, sees Dr. Skinner    Atrial fibrillation, chronic (HCC)     CAD (coronary artery disease)     sees Dr. Fletcher    Chronic midline low back pain without sciatica 2016    Had PT in     Claustrophobia     Colonoscopy refused     discussed in 7/15    COVID-19 virus infection 2023    DM (diabetes mellitus), type 2 (HCC) 2006    Dupuytren's contracture of right hand     Endometrial stripe increased 2014    Saw NP April garrett. Was normal exam per pt.    Gastrointestinal hemorrhage 2015    Patient had GI bleeding. Colon refused. Discussed with patient in detail.    Glaucoma 2014    H/O Doppler ultrasound 06/15/2023    Significant reflux noted of the Right GSV at distal calf (0.6s). Significant reflux noted in the Left GSV at mid thigh tributary (0.5s). Most likely would need varithena on left leg.    H/O echocardiogram 10/02/2014    Mildly depressed left ventricular function; ejection fraction of 45%. Moderate pulmonary hypertension.     H/O echocardiogram 2018    EF 50-55%.  Mitral annular calcification is present. No other significant valvulopathy seen.    H/O echocardiogram 06/15/2023    EF 55-60%. Grade I diastolic dysfunction. The

## 2024-02-07 ENCOUNTER — HOSPITAL ENCOUNTER (OUTPATIENT)
Dept: WOUND CARE | Age: 89
Discharge: HOME | End: 2024-02-07
Attending: INTERNAL MEDICINE
Payer: MEDICARE

## 2024-02-07 VITALS
DIASTOLIC BLOOD PRESSURE: 86 MMHG | RESPIRATION RATE: 18 BRPM | HEART RATE: 67 BPM | TEMPERATURE: 97.4 F | SYSTOLIC BLOOD PRESSURE: 138 MMHG

## 2024-02-07 DIAGNOSIS — I87.2 VENOUS INSUFFICIENCY OF BOTH LOWER EXTREMITIES: ICD-10-CM

## 2024-02-07 DIAGNOSIS — I83.012 VENOUS STASIS ULCER OF RIGHT CALF WITH FAT LAYER EXPOSED, UNSPECIFIED WHETHER VARICOSE VEINS PRESENT (HCC): ICD-10-CM

## 2024-02-07 DIAGNOSIS — L97.212 VENOUS STASIS ULCER OF RIGHT CALF WITH FAT LAYER EXPOSED, UNSPECIFIED WHETHER VARICOSE VEINS PRESENT (HCC): ICD-10-CM

## 2024-02-07 DIAGNOSIS — L03.115 CELLULITIS OF RIGHT LEG: Primary | ICD-10-CM

## 2024-02-07 PROCEDURE — 99213 OFFICE O/P EST LOW 20 MIN: CPT | Performed by: INTERNAL MEDICINE

## 2024-02-07 PROCEDURE — 99213 OFFICE O/P EST LOW 20 MIN: CPT

## 2024-02-07 RX ORDER — GINSENG 100 MG
CAPSULE ORAL ONCE
OUTPATIENT
Start: 2024-02-07 | End: 2024-02-07

## 2024-02-07 RX ORDER — LIDOCAINE 40 MG/G
CREAM TOPICAL ONCE
OUTPATIENT
Start: 2024-02-07 | End: 2024-02-07

## 2024-02-07 RX ORDER — LIDOCAINE HYDROCHLORIDE 20 MG/ML
JELLY TOPICAL ONCE
OUTPATIENT
Start: 2024-02-07 | End: 2024-02-07

## 2024-02-07 RX ORDER — BETAMETHASONE DIPROPIONATE 0.05 %
OINTMENT (GRAM) TOPICAL ONCE
OUTPATIENT
Start: 2024-02-07 | End: 2024-02-07

## 2024-02-07 RX ORDER — BACITRACIN ZINC AND POLYMYXIN B SULFATE 500; 1000 [USP'U]/G; [USP'U]/G
OINTMENT TOPICAL ONCE
OUTPATIENT
Start: 2024-02-07 | End: 2024-02-07

## 2024-02-07 RX ORDER — LIDOCAINE HYDROCHLORIDE 40 MG/ML
SOLUTION TOPICAL ONCE
OUTPATIENT
Start: 2024-02-07 | End: 2024-02-07

## 2024-02-07 RX ORDER — IBUPROFEN 200 MG
TABLET ORAL ONCE
OUTPATIENT
Start: 2024-02-07 | End: 2024-02-07

## 2024-02-07 RX ORDER — GENTAMICIN SULFATE 1 MG/G
OINTMENT TOPICAL ONCE
OUTPATIENT
Start: 2024-02-07 | End: 2024-02-07

## 2024-02-07 RX ORDER — CLOBETASOL PROPIONATE 0.5 MG/G
OINTMENT TOPICAL ONCE
OUTPATIENT
Start: 2024-02-07 | End: 2024-02-07

## 2024-02-07 RX ORDER — TRIAMCINOLONE ACETONIDE 1 MG/G
OINTMENT TOPICAL ONCE
OUTPATIENT
Start: 2024-02-07 | End: 2024-02-07

## 2024-02-07 RX ORDER — LIDOCAINE 50 MG/G
OINTMENT TOPICAL ONCE
OUTPATIENT
Start: 2024-02-07 | End: 2024-02-07

## 2024-02-07 NOTE — PATIENT INSTRUCTIONS
PHYSICIAN ORDERS AND DISCHARGE INSTRUCTIONS  NOTE: Upon discharge from the Wound Center, you will receive a patient experience survey via E-mail. We would be grateful if you would take the time to fill this survey out.  Wound care order history:   SULTANA's   Right       Left    Date    Vascular studies/Intervention: .     Cultures: .               Antibiotics: .Augmentin 12/20/23, Doxy 1/17/24                HbA1c:  .               Grafts:  .   Juxta Lites & Lymph Pumps:  Continuing wound care orders and information:              Residence: .              Continue home health care with:.    Your wound-care supplies will be provided by: .              Pharmacy: .  Wound cleansing:      Do not scrub or use excessive force.    Wash hands with soap and water before and after dressing changes.    Prior to applying a clean dressing, cleanse wound with normal saline,    wound cleanser, or mild soap and water.     Ask your physician or nurse before getting the wound(s) wet in the shower.  Daily Wound management:    Keep weight off wounds and reposition every 2 hours.    Avoid standing for long periods of time.    Evaluate legs to the level of the heart or above for 30 minutes 4-5 times a day and/or when sitting.       When taking antibiotics take entire prescription as ordered by MD do not stop taking until medicine is all gone.     Documentation:  Compression: .   Offloading: .        Orders for this week (2/7/2024):  Bilateral Lower Legs- Wash with mild soap and water, rinse with saline, pat dry with 4x4  Apply Stimulen, Gentamicin, and Sorbact to wound bed   Cover with Sorbex   Tubi F  Change every two days     At home every two days soak leg in epsom salt water for 20 minutes.  Mix 1/2  cup of epsom salt to water in 5 gallon bucket. Dry leg by patting dry.  Put on tubi   F  Home Health- Stimulen, Gentamicin, and Sorbact to wound bed   Cover with Sorbex   Tubi F  Please dispense 30 day quantity when sending supplies

## 2024-02-07 NOTE — PROGRESS NOTES
Wound Care Center Progress Note       Jayshree Tate  AGE: 88 y.o.   GENDER: female  : 1935  TODAY'S DATE:  2024        Subjective:     Chief Complaint   Patient presents with    Wound Check     BLE          HISTORY of PRESENT ILLNESS     Jayshree Tate is a 88 y.o. female who presents today for wound evaluation of Chronic venous ulcer(s) of legs.  The ulcer is of moderate severity.  The underlying cause of the wound is non compliance of treatment .    Wound Pain Timing/Severity: constant  Quality of pain: dull  Severity of pain:  1 / 10   Modifying Factors: edema, venous stasis, obesity, and non-adherence  Associated Signs/Symptoms: edema, erythema, and tingling        PAST MEDICAL HISTORY        Diagnosis Date    Arthritis     left knee pain, sees Dr. Skinner    Atrial fibrillation, chronic (HCC)     CAD (coronary artery disease)     sees Dr. Fletcher    Chronic midline low back pain without sciatica 2016    Had PT in     Claustrophobia     Colonoscopy refused     discussed in 7/15    COVID-19 virus infection 2023    DM (diabetes mellitus), type 2 (HCC) 2006    Dupuytren's contracture of right hand     Endometrial stripe increased 2014    Saw NP April garrett. Was normal exam per pt.    Gastrointestinal hemorrhage 2015    Patient had GI bleeding. Colon refused. Discussed with patient in detail.    Glaucoma 2014    H/O Doppler ultrasound 06/15/2023    Significant reflux noted of the Right GSV at distal calf (0.6s). Significant reflux noted in the Left GSV at mid thigh tributary (0.5s). Most likely would need varithena on left leg.    H/O echocardiogram 10/02/2014    Mildly depressed left ventricular function; ejection fraction of 45%. Moderate pulmonary hypertension.     H/O echocardiogram 2018    EF 50-55%.  Mitral annular calcification is present. No other significant valvulopathy seen.    H/O echocardiogram 06/15/2023    EF 55-60%. Grade I diastolic

## 2024-02-13 DIAGNOSIS — M79.2 NEUROPATHIC PAIN: ICD-10-CM

## 2024-02-13 NOTE — TELEPHONE ENCOUNTER
Patient is out of this. States that Dr Doyle has filled this before. Please advise.     Medication:   Requested Prescriptions     Pending Prescriptions Disp Refills    gabapentin (NEURONTIN) 300 MG capsule 120 capsule 5     Sig: Take 1 tablet in the morning, 1 tablet in the afternoon, and 2 tablets in the evening        Last Filled:      Patient Phone Number: [phone] (home)     Last appt: 1/15/2024   Next appt: 4/15/2024    Last OARRS:       2/16/2022     1:35 PM   RX Monitoring   Periodic Controlled Substance Monitoring No signs of potential drug abuse or diversion identified.     Matthew law

## 2024-02-14 ENCOUNTER — HOSPITAL ENCOUNTER (OUTPATIENT)
Dept: WOUND CARE | Age: 89
Discharge: HOME | End: 2024-02-14
Attending: INTERNAL MEDICINE
Payer: MEDICARE

## 2024-02-14 VITALS
TEMPERATURE: 97.6 F | DIASTOLIC BLOOD PRESSURE: 73 MMHG | HEART RATE: 61 BPM | RESPIRATION RATE: 18 BRPM | SYSTOLIC BLOOD PRESSURE: 145 MMHG

## 2024-02-14 DIAGNOSIS — I83.012 VENOUS STASIS ULCER OF RIGHT CALF WITH FAT LAYER EXPOSED, UNSPECIFIED WHETHER VARICOSE VEINS PRESENT (HCC): ICD-10-CM

## 2024-02-14 DIAGNOSIS — L97.212 VENOUS STASIS ULCER OF RIGHT CALF WITH FAT LAYER EXPOSED, UNSPECIFIED WHETHER VARICOSE VEINS PRESENT (HCC): ICD-10-CM

## 2024-02-14 DIAGNOSIS — L03.115 CELLULITIS OF RIGHT LEG: Primary | ICD-10-CM

## 2024-02-14 DIAGNOSIS — I87.2 VENOUS INSUFFICIENCY OF BOTH LOWER EXTREMITIES: ICD-10-CM

## 2024-02-14 PROCEDURE — 99213 OFFICE O/P EST LOW 20 MIN: CPT | Performed by: INTERNAL MEDICINE

## 2024-02-14 PROCEDURE — 99212 OFFICE O/P EST SF 10 MIN: CPT

## 2024-02-14 PROCEDURE — 6370000000 HC RX 637 (ALT 250 FOR IP): Performed by: NURSE PRACTITIONER

## 2024-02-14 RX ORDER — LIDOCAINE HYDROCHLORIDE 20 MG/ML
JELLY TOPICAL ONCE
OUTPATIENT
Start: 2024-02-14 | End: 2024-02-14

## 2024-02-14 RX ORDER — GENTAMICIN SULFATE 1 MG/G
OINTMENT TOPICAL ONCE
Status: COMPLETED | OUTPATIENT
Start: 2024-02-14 | End: 2024-02-14

## 2024-02-14 RX ORDER — LIDOCAINE 40 MG/G
CREAM TOPICAL ONCE
OUTPATIENT
Start: 2024-02-14 | End: 2024-02-14

## 2024-02-14 RX ORDER — GENTAMICIN SULFATE 1 MG/G
OINTMENT TOPICAL ONCE
OUTPATIENT
Start: 2024-02-14 | End: 2024-02-14

## 2024-02-14 RX ORDER — GINSENG 100 MG
CAPSULE ORAL ONCE
OUTPATIENT
Start: 2024-02-14 | End: 2024-02-14

## 2024-02-14 RX ORDER — CLOBETASOL PROPIONATE 0.5 MG/G
OINTMENT TOPICAL ONCE
OUTPATIENT
Start: 2024-02-14 | End: 2024-02-14

## 2024-02-14 RX ORDER — LIDOCAINE HYDROCHLORIDE 40 MG/ML
SOLUTION TOPICAL ONCE
OUTPATIENT
Start: 2024-02-14 | End: 2024-02-14

## 2024-02-14 RX ORDER — BACITRACIN ZINC AND POLYMYXIN B SULFATE 500; 1000 [USP'U]/G; [USP'U]/G
OINTMENT TOPICAL ONCE
OUTPATIENT
Start: 2024-02-14 | End: 2024-02-14

## 2024-02-14 RX ORDER — IBUPROFEN 200 MG
TABLET ORAL ONCE
OUTPATIENT
Start: 2024-02-14 | End: 2024-02-14

## 2024-02-14 RX ORDER — TRIAMCINOLONE ACETONIDE 1 MG/G
OINTMENT TOPICAL ONCE
OUTPATIENT
Start: 2024-02-14 | End: 2024-02-14

## 2024-02-14 RX ORDER — BETAMETHASONE DIPROPIONATE 0.05 %
OINTMENT (GRAM) TOPICAL ONCE
OUTPATIENT
Start: 2024-02-14 | End: 2024-02-14

## 2024-02-14 RX ORDER — LIDOCAINE 50 MG/G
OINTMENT TOPICAL ONCE
OUTPATIENT
Start: 2024-02-14 | End: 2024-02-14

## 2024-02-14 RX ADMIN — GENTAMICIN SULFATE: 1 OINTMENT TOPICAL at 14:40

## 2024-02-14 NOTE — PATIENT INSTRUCTIONS
PHYSICIAN ORDERS AND DISCHARGE INSTRUCTIONS  NOTE: Upon discharge from the Wound Center, you will receive a patient experience survey via E-mail. We would be grateful if you would take the time to fill this survey out.  Wound care order history:   SULTANA's   Right       Left    Date    Vascular studies/Intervention: .     Cultures: .               Antibiotics: .Augmentin 12/20/23, Doxy 1/17/24, Clobetasol ointment on 02/14/24              HbA1c:  .               Grafts:  .   Juxta Lites & Lymph Pumps:  Continuing wound care orders and information:              Residence: .              Continue home health care with:.    Your wound-care supplies will be provided by: .              [facility]: [facility] [place]  Wound cleansing:      Do not scrub or use excessive force.    Wash hands with soap and water before and after dressing changes.    Prior to applying a clean dressing, cleanse wound with normal saline,    wound cleanser, or mild soap and water.     Ask your physician or nurse before getting the wound(s) wet in the shower.  Daily Wound management:    Keep weight off wounds and reposition every 2 hours.    Avoid standing for long periods of time.    Evaluate legs to the level of the heart or above for 30 minutes 4-5 times a day and/or when sitting.       When taking antibiotics take entire prescription as ordered by MD do not stop taking until medicine is all gone.     Documentation:  Compression: .   Offloading: .        Orders for this week (2/14/2024):  Bilateral Lower Legs- Wash with mild soap and water, rinse with saline, pat dry with 4x4  Apply Stimulen, Gentamicin, and Sorbact to wound bed   Cover with Sorbex   Tubi E  Change every two days     At home every two days soak leg in epsom salt water for 20 minutes.  Mix 1/2  cup of epsom salt to water in 5 gallon bucket. Dry leg by patting dry.  Put on tubi   F  Home Health- Stimulen, Gentamicin, and Sorbact to wound bed   Cover with Sorbex   Tubi F    Please

## 2024-02-14 NOTE — PROGRESS NOTES
to Encounter   Medication Sig Dispense Refill    rOPINIRole (REQUIP) 2 MG tablet TAKE ONE TABLET BY MOUTH AT 3PM AND 10PM **NEWEST DIRECTIONS 1 TABLET IN THE MORNING AND 1 IN THE EVENING 60 tablet 3    torsemide (DEMADEX) 10 MG tablet TAKE 1 TABLET BY MOUTH DAILY 30 tablet 0    Multiple Vitamins-Minerals (SYSTANE ICAPS AREDS2) TABS Take 1 tablet by mouth in the morning and at bedtime      RESTASIS 0.05 % ophthalmic emulsion Place into both eyes every 12 hours      magnesium oxide (MAG-OX) 400 MG tablet Take 1 tablet by mouth daily 30 tablet 3    losartan (COZAAR) 25 MG tablet Take 1 tablet by mouth daily 30 tablet 3    ELIQUIS 5 MG TABS tablet TAKE 1 TABLET BY MOUTH 2 TIMES DAILY 60 tablet 1    OXYGEN 2 Liter O2 - CONTINUOUS (route: Oxygen)      albuterol sulfate HFA (PROVENTIL;VENTOLIN;PROAIR) 108 (90 Base) MCG/ACT inhaler INHALE 2 PUFFS INTO THE LUNGS 4 TIMES DAILY AS NEEDED FOR WHEEZING 8.5 g 5    atorvastatin (LIPITOR) 40 MG tablet Take 1 tablet by mouth nightly 30 tablet 3    metoprolol succinate (TOPROL XL) 25 MG extended release tablet TAKE 1 TABLET BY MOUTH DAILY 90 tablet 1    gabapentin (NEURONTIN) 300 MG capsule Take 1 tablet in the morning, 1 tablet in the afternoon, and 2 tablets in the evening 120 capsule 5    Cholecalciferol (VITAMIN D3) 125 MCG (5000 UT) CAPS Take 1 capsule by mouth daily 30 capsule 1    Fluticasone Furoate-Vilanterol (BREO ELLIPTA) 100-25 MCG/ACT AEPB Inhale 1 puff into the lungs daily 1 each 3    Coenzyme Q10 (COQ10) 200 MG CAPS Take 200 mg by mouth 2 times daily       No current facility-administered medications on file prior to encounter.       REVIEW OF SYSTEMS    Pertinent items are noted in HPI.    Constitutional: Negative for systemic symptoms including fever, chills and malaise.    Objective:      BP (!) 145/73   Pulse 61   Temp 97.6 °F (36.4 °C) (Oral)   Resp 18   LMP  (LMP Unknown)     PHYSICAL EXAM  General Appearance: alert and oriented to person, place and time,

## 2024-02-15 RX ORDER — GABAPENTIN 300 MG/1
CAPSULE ORAL
Qty: 120 CAPSULE | Refills: 5 | Status: SHIPPED | OUTPATIENT
Start: 2024-02-15 | End: 2024-09-10

## 2024-02-20 RX ORDER — TORSEMIDE 10 MG/1
10 TABLET ORAL DAILY
Qty: 30 TABLET | Refills: 0 | Status: SHIPPED | OUTPATIENT
Start: 2024-02-20

## 2024-02-20 NOTE — TELEPHONE ENCOUNTER
Medication:   Requested Prescriptions     Pending Prescriptions Disp Refills    torsemide (DEMADEX) 10 MG tablet 30 tablet 0     Sig: Take 1 tablet by mouth daily        Last Filled:      Patient Phone Number: [phone] (home)     Last appt: 1/15/2024   Next appt: 4/15/2024    Last OARRS:       2/16/2022     1:35 PM   RX Monitoring   Periodic Controlled Substance Monitoring No signs of potential drug abuse or diversion identified.        Matthew thank you

## 2024-02-21 ENCOUNTER — TELEPHONE (OUTPATIENT)
Dept: INTERNAL MEDICINE CLINIC | Age: 89
End: 2024-02-21

## 2024-02-21 ENCOUNTER — HOSPITAL ENCOUNTER (OUTPATIENT)
Dept: WOUND CARE | Age: 89
Discharge: HOME | End: 2024-02-21
Attending: INTERNAL MEDICINE
Payer: MEDICARE

## 2024-02-21 DIAGNOSIS — L03.115 CELLULITIS OF RIGHT LEG: ICD-10-CM

## 2024-02-21 DIAGNOSIS — L97.212 VENOUS STASIS ULCER OF RIGHT CALF WITH FAT LAYER EXPOSED, UNSPECIFIED WHETHER VARICOSE VEINS PRESENT (HCC): ICD-10-CM

## 2024-02-21 DIAGNOSIS — I89.0 LYMPHEDEMA OF BOTH LOWER EXTREMITIES: ICD-10-CM

## 2024-02-21 DIAGNOSIS — I87.2 VENOUS INSUFFICIENCY OF BOTH LOWER EXTREMITIES: Primary | ICD-10-CM

## 2024-02-21 DIAGNOSIS — I87.2 VENOUS STASIS DERMATITIS OF BOTH LOWER EXTREMITIES: ICD-10-CM

## 2024-02-21 DIAGNOSIS — I83.012 VENOUS STASIS ULCER OF RIGHT CALF WITH FAT LAYER EXPOSED, UNSPECIFIED WHETHER VARICOSE VEINS PRESENT (HCC): ICD-10-CM

## 2024-02-21 PROCEDURE — 11719 TRIM NAIL(S) ANY NUMBER: CPT

## 2024-02-21 PROCEDURE — 97597 DBRDMT OPN WND 1ST 20 CM/<: CPT

## 2024-02-21 PROCEDURE — 6370000000 HC RX 637 (ALT 250 FOR IP): Performed by: NURSE PRACTITIONER

## 2024-02-21 RX ORDER — LIDOCAINE HYDROCHLORIDE 20 MG/ML
JELLY TOPICAL ONCE
OUTPATIENT
Start: 2024-02-21 | End: 2024-02-21

## 2024-02-21 RX ORDER — GENTAMICIN SULFATE 1 MG/G
OINTMENT TOPICAL ONCE
Status: COMPLETED | OUTPATIENT
Start: 2024-02-21 | End: 2024-02-21

## 2024-02-21 RX ORDER — GINSENG 100 MG
CAPSULE ORAL ONCE
OUTPATIENT
Start: 2024-02-21 | End: 2024-02-21

## 2024-02-21 RX ORDER — BACITRACIN ZINC AND POLYMYXIN B SULFATE 500; 1000 [USP'U]/G; [USP'U]/G
OINTMENT TOPICAL ONCE
OUTPATIENT
Start: 2024-02-21 | End: 2024-02-21

## 2024-02-21 RX ORDER — TRIAMCINOLONE ACETONIDE 1 MG/G
OINTMENT TOPICAL ONCE
OUTPATIENT
Start: 2024-02-21 | End: 2024-02-21

## 2024-02-21 RX ORDER — LIDOCAINE HYDROCHLORIDE 40 MG/ML
SOLUTION TOPICAL ONCE
OUTPATIENT
Start: 2024-02-21 | End: 2024-02-21

## 2024-02-21 RX ORDER — CLOBETASOL PROPIONATE 0.5 MG/G
OINTMENT TOPICAL ONCE
OUTPATIENT
Start: 2024-02-21 | End: 2024-02-21

## 2024-02-21 RX ORDER — IBUPROFEN 200 MG
TABLET ORAL ONCE
OUTPATIENT
Start: 2024-02-21 | End: 2024-02-21

## 2024-02-21 RX ORDER — GENTAMICIN SULFATE 1 MG/G
OINTMENT TOPICAL ONCE
OUTPATIENT
Start: 2024-02-21 | End: 2024-02-21

## 2024-02-21 RX ORDER — LIDOCAINE 40 MG/G
CREAM TOPICAL ONCE
OUTPATIENT
Start: 2024-02-21 | End: 2024-02-21

## 2024-02-21 RX ORDER — LIDOCAINE 50 MG/G
OINTMENT TOPICAL ONCE
OUTPATIENT
Start: 2024-02-21 | End: 2024-02-21

## 2024-02-21 RX ORDER — BETAMETHASONE DIPROPIONATE 0.05 %
OINTMENT (GRAM) TOPICAL ONCE
OUTPATIENT
Start: 2024-02-21 | End: 2024-02-21

## 2024-02-21 RX ADMIN — GENTAMICIN SULFATE: 1 OINTMENT TOPICAL at 15:44

## 2024-02-21 NOTE — TELEPHONE ENCOUNTER
Spoke with patient  states that she is concerned about her bg readings. Usually they are in the range of 112-117 and now higher and she is not taking metformin or glipizide please advise.

## 2024-02-21 NOTE — TELEPHONE ENCOUNTER
Jayshree Tate would like a call back at [phone] regarding concerns with BS readings being higher than what she usually has. States the last 5 days she has had readings around 150, 151, 134

## 2024-02-21 NOTE — PATIENT INSTRUCTIONS
PHYSICIAN ORDERS AND DISCHARGE INSTRUCTIONS  NOTE: Upon discharge from the Wound Center, you will receive a patient experience survey via E-mail. We would be grateful if you would take the time to fill this survey out.  Wound care order history:   SULTANA's   Right       Left    Date    Vascular studies/Intervention: .     Cultures: .               Antibiotics: .Augmentin 12/20/23, Doxy 1/17/24, Clobetasol ointment on 02/14/24              HbA1c:  .               Grafts:  .   Juxta Lites & Lymph Pumps:  Continuing wound care orders and information:              Residence: .              Continue home health care with:.    Your wound-care supplies will be provided by: .              [facility]acy: [facility] [place]  Wound cleansing:      Do not scrub or use excessive force.    Wash hands with soap and water before and after dressing changes.    Prior to applying a clean dressing, cleanse wound with normal saline,    wound cleanser, or mild soap and water.     Ask your physician or nurse before getting the wound(s) wet in the shower.  Daily Wound management:    Keep weight off wounds and reposition every 2 hours.    Avoid standing for long periods of time.    Evaluate legs to the level of the heart or above for 30 minutes 4-5 times a day and/or when sitting.       When taking antibiotics take entire prescription as ordered by MD do not stop taking until medicine is all gone.     Documentation:  Compression: .   Offloading: .        Orders for this week (2/21/2024):  Left  Lower Leg - Wash with mild soap and water, rinse with saline, pat dry with 4x4  Apply Stimulen, Gentamicin, and Sorbact to wound bed   Cover with Sorbex or ABD  Secure with ACE wrap  Change every two days   Paint cut toe nails with betadine 2/21/24    Home Health- Stimulen, Gentamicin, and Sorbact to wound bed   Cover with Sorbex or ABD  Secure with ACE wrap    Please dispense 30 day quantity when sending supplies   Follow up with Dr. Miranda in 1 weeks in the

## 2024-02-21 NOTE — PROGRESS NOTES
changes.    Prior to applying a clean dressing, cleanse wound with normal saline,    wound cleanser, or mild soap and water.     Ask your physician or nurse before getting the wound(s) wet in the shower.  Daily Wound management:    Keep weight off wounds and reposition every 2 hours.    Avoid standing for long periods of time.    Evaluate legs to the level of the heart or above for 30 minutes 4-5 times a day and/or when sitting.       When taking antibiotics take entire prescription as ordered by MD do not stop taking until medicine is all gone.     Documentation:  Compression: .   Offloading: .        Orders for this week (2/21/2024):  Left  Lower Leg - Wash with mild soap and water, rinse with saline, pat dry with 4x4  Apply Stimulen, Gentamicin, and Sorbact to wound bed   Cover with Sorbex or ABD  Secure with ACE wrap  Change every two days   Paint cut toe nails with betadine 2/21/24    Home Health- Stimulen, Gentamicin, and Sorbact to wound bed   Cover with Sorbex or ABD  Secure with ACE wrap    Please dispense 30 day quantity when sending supplies   Follow up with Dr. Miranda in 1 weeks in the wound care center  Call [phone] for any questions or concerns.

## 2024-02-22 ENCOUNTER — OFFICE VISIT (OUTPATIENT)
Dept: INTERNAL MEDICINE CLINIC | Age: 89
End: 2024-02-22
Payer: MEDICARE

## 2024-02-22 VITALS
SYSTOLIC BLOOD PRESSURE: 128 MMHG | DIASTOLIC BLOOD PRESSURE: 60 MMHG | HEART RATE: 68 BPM | RESPIRATION RATE: 16 BRPM | OXYGEN SATURATION: 93 % | TEMPERATURE: 97.2 F

## 2024-02-22 DIAGNOSIS — E78.2 MIXED HYPERLIPIDEMIA: ICD-10-CM

## 2024-02-22 DIAGNOSIS — M48.062 LUMBAR STENOSIS WITH NEUROGENIC CLAUDICATION: ICD-10-CM

## 2024-02-22 DIAGNOSIS — E11.9 TYPE 2 DIABETES MELLITUS WITHOUT COMPLICATION, WITHOUT LONG-TERM CURRENT USE OF INSULIN (HCC): Primary | ICD-10-CM

## 2024-02-22 DIAGNOSIS — I25.10 CORONARY ARTERY DISEASE INVOLVING NATIVE CORONARY ARTERY OF NATIVE HEART WITHOUT ANGINA PECTORIS: ICD-10-CM

## 2024-02-22 DIAGNOSIS — R60.0 PEDAL EDEMA: ICD-10-CM

## 2024-02-22 DIAGNOSIS — J45.41 MODERATE PERSISTENT ASTHMA WITH EXACERBATION: ICD-10-CM

## 2024-02-22 DIAGNOSIS — H53.8 BLURRED VISION, BILATERAL: ICD-10-CM

## 2024-02-22 DIAGNOSIS — G25.81 RESTLESS LEG SYNDROME: ICD-10-CM

## 2024-02-22 DIAGNOSIS — Z86.73 S/P STROKE DUE TO CEREBROVASCULAR DISEASE: ICD-10-CM

## 2024-02-22 DIAGNOSIS — I10 ESSENTIAL HYPERTENSION: ICD-10-CM

## 2024-02-22 DIAGNOSIS — M05.79 RHEUMATOID ARTHRITIS INVOLVING MULTIPLE SITES WITH POSITIVE RHEUMATOID FACTOR (HCC): ICD-10-CM

## 2024-02-22 LAB — HBA1C MFR BLD: 7.8 %

## 2024-02-22 PROCEDURE — G8427 DOCREV CUR MEDS BY ELIG CLIN: HCPCS | Performed by: INTERNAL MEDICINE

## 2024-02-22 PROCEDURE — G8417 CALC BMI ABV UP PARAM F/U: HCPCS | Performed by: INTERNAL MEDICINE

## 2024-02-22 PROCEDURE — G8484 FLU IMMUNIZE NO ADMIN: HCPCS | Performed by: INTERNAL MEDICINE

## 2024-02-22 PROCEDURE — 1090F PRES/ABSN URINE INCON ASSESS: CPT | Performed by: INTERNAL MEDICINE

## 2024-02-22 PROCEDURE — 83036 HEMOGLOBIN GLYCOSYLATED A1C: CPT | Performed by: INTERNAL MEDICINE

## 2024-02-22 PROCEDURE — 1036F TOBACCO NON-USER: CPT | Performed by: INTERNAL MEDICINE

## 2024-02-22 PROCEDURE — 99214 OFFICE O/P EST MOD 30 MIN: CPT | Performed by: INTERNAL MEDICINE

## 2024-02-22 PROCEDURE — 1123F ACP DISCUSS/DSCN MKR DOCD: CPT | Performed by: INTERNAL MEDICINE

## 2024-02-22 PROCEDURE — 3051F HG A1C>EQUAL 7.0%<8.0%: CPT | Performed by: INTERNAL MEDICINE

## 2024-02-22 RX ORDER — CLOBETASOL PROPIONATE 0.5 MG/G
OINTMENT TOPICAL
COMMUNITY
Start: 2024-02-14

## 2024-02-22 RX ORDER — GLIPIZIDE 5 MG/1
5 TABLET, FILM COATED, EXTENDED RELEASE ORAL DAILY
Qty: 30 TABLET | Refills: 3 | Status: SHIPPED | OUTPATIENT
Start: 2024-02-22

## 2024-02-22 RX ORDER — HYDROCODONE BITARTRATE AND ACETAMINOPHEN 5; 325 MG/1; MG/1
1 TABLET ORAL EVERY 8 HOURS PRN
COMMUNITY
Start: 2024-02-17

## 2024-02-22 NOTE — TELEPHONE ENCOUNTER
Spoke with patient and she will stop by sometime today to have finger stick with Dr tierney Doyle.

## 2024-02-22 NOTE — PROGRESS NOTES
Jayshree Tate  Patient's  is 1935  Seen in office on 2024      SUBJECTIVE:  Jayshree michel 88 y.o.year old female presents today   Chief Complaint   Patient presents with    Other     A1c= 7.8 mg/dL  Having elevated BG levels at home    Eye Problem     C/o both eyes hurting her today     Patient is here for follow-up of diabetes, chronic leg pains venous insufficiency, stroke, rheumatoid arthritis, pedal edema COPD, hyperlipidemia  Patient has diabetes mellitus patient denies any hypoglycemic symptoms.  Blood sugars are high sometimes at home  Complains of some eye problems which she describes as a pressure sometimes the eyes are red.  No drainage    Patient is restless leg syndrome that is controlled with the Requip  She has rheumatoid arthritis and sees rheumatologist in [place].  History of stroke that is stable  Chronic pedal edema  Patient has hyperlipidemia. Taking medications. No abdominal pain, no nausea or vomiting. No myalgias.  Patient has hypertension. Taking medications No headaches, no chest pain, no palpitations and no dizziness.  Chronic back pain that is stable she does get pain off-and-on.    Coronary artery disease stable no angina  Taking medications regularly. No side effects noted.    Review of Systems  Review of system normal except as in HPI  OBJECTIVE: /60   Pulse 68   Temp 97.2 °F (36.2 °C) (Temporal)   Resp 16   LMP  (LMP Unknown)   SpO2 93%     Wt Readings from Last 3 Encounters:   01/15/24 98.9 kg (218 lb)   01/15/24 99.2 kg (218 lb 9.6 oz)   23 95.3 kg (210 lb 3.2 oz)      GENERAL: - Alert, oriented, pleasant, in no apparent distress.  Obese  HEENT: - Conjunctiva pink, no scleral icterus. ENT clear.  NECK: -Supple.  No jugular venous distention noted. No masses felt,  CARDIOVASCULAR: - Normal S1 and S2    PULMONARY: - No respiratory distress.  No wheezes or rales.    ABDOMEN: - Soft and non-tender,no masses  ororganomegaly.  EXTREMITIES: - No cyanosis,

## 2024-02-22 NOTE — TELEPHONE ENCOUNTER
Tried to reach patient and her phone line is ringing busy.  Doctor Jennifer would like for her to stop by the office for an HgA1c today and will deceide if she needs to go back on medication.

## 2024-02-28 ENCOUNTER — HOSPITAL ENCOUNTER (OUTPATIENT)
Dept: WOUND CARE | Age: 89
Discharge: HOME | End: 2024-02-28
Attending: INTERNAL MEDICINE
Payer: MEDICARE

## 2024-02-28 VITALS
TEMPERATURE: 97 F | HEART RATE: 90 BPM | DIASTOLIC BLOOD PRESSURE: 77 MMHG | RESPIRATION RATE: 16 BRPM | SYSTOLIC BLOOD PRESSURE: 150 MMHG

## 2024-02-28 DIAGNOSIS — L03.115 CELLULITIS OF RIGHT LEG: Primary | ICD-10-CM

## 2024-02-28 DIAGNOSIS — I87.2 VENOUS INSUFFICIENCY OF BOTH LOWER EXTREMITIES: ICD-10-CM

## 2024-02-28 DIAGNOSIS — L97.212 VENOUS STASIS ULCER OF RIGHT CALF WITH FAT LAYER EXPOSED, UNSPECIFIED WHETHER VARICOSE VEINS PRESENT (HCC): ICD-10-CM

## 2024-02-28 DIAGNOSIS — I83.012 VENOUS STASIS ULCER OF RIGHT CALF WITH FAT LAYER EXPOSED, UNSPECIFIED WHETHER VARICOSE VEINS PRESENT (HCC): ICD-10-CM

## 2024-02-28 PROBLEM — J96.01 ACUTE RESPIRATORY FAILURE WITH HYPOXIA (HCC): Status: RESOLVED | Noted: 2023-08-05 | Resolved: 2024-02-28

## 2024-02-28 PROCEDURE — 99212 OFFICE O/P EST SF 10 MIN: CPT

## 2024-02-28 PROCEDURE — 99213 OFFICE O/P EST LOW 20 MIN: CPT | Performed by: INTERNAL MEDICINE

## 2024-02-28 RX ORDER — BETAMETHASONE DIPROPIONATE 0.05 %
OINTMENT (GRAM) TOPICAL ONCE
OUTPATIENT
Start: 2024-02-28 | End: 2024-02-28

## 2024-02-28 RX ORDER — GENTAMICIN SULFATE 1 MG/G
OINTMENT TOPICAL ONCE
OUTPATIENT
Start: 2024-02-28 | End: 2024-02-28

## 2024-02-28 RX ORDER — LIDOCAINE HYDROCHLORIDE 20 MG/ML
JELLY TOPICAL ONCE
OUTPATIENT
Start: 2024-02-28 | End: 2024-02-28

## 2024-02-28 RX ORDER — LIDOCAINE 40 MG/G
CREAM TOPICAL ONCE
OUTPATIENT
Start: 2024-02-28 | End: 2024-02-28

## 2024-02-28 RX ORDER — BACITRACIN ZINC AND POLYMYXIN B SULFATE 500; 1000 [USP'U]/G; [USP'U]/G
OINTMENT TOPICAL ONCE
OUTPATIENT
Start: 2024-02-28 | End: 2024-02-28

## 2024-02-28 RX ORDER — CLOBETASOL PROPIONATE 0.5 MG/G
OINTMENT TOPICAL ONCE
OUTPATIENT
Start: 2024-02-28 | End: 2024-02-28

## 2024-02-28 RX ORDER — ATORVASTATIN CALCIUM 40 MG/1
40 TABLET, FILM COATED ORAL NIGHTLY
Qty: 30 TABLET | Refills: 3 | Status: SHIPPED | OUTPATIENT
Start: 2024-02-28

## 2024-02-28 RX ORDER — IBUPROFEN 200 MG
TABLET ORAL ONCE
OUTPATIENT
Start: 2024-02-28 | End: 2024-02-28

## 2024-02-28 RX ORDER — TRIAMCINOLONE ACETONIDE 1 MG/G
OINTMENT TOPICAL ONCE
OUTPATIENT
Start: 2024-02-28 | End: 2024-02-28

## 2024-02-28 RX ORDER — LIDOCAINE 50 MG/G
OINTMENT TOPICAL ONCE
OUTPATIENT
Start: 2024-02-28 | End: 2024-02-28

## 2024-02-28 RX ORDER — GINSENG 100 MG
CAPSULE ORAL ONCE
OUTPATIENT
Start: 2024-02-28 | End: 2024-02-28

## 2024-02-28 RX ORDER — LIDOCAINE HYDROCHLORIDE 40 MG/ML
SOLUTION TOPICAL ONCE
OUTPATIENT
Start: 2024-02-28 | End: 2024-02-28

## 2024-02-28 NOTE — TELEPHONE ENCOUNTER
Medication:   Requested Prescriptions     Pending Prescriptions Disp Refills    atorvastatin (LIPITOR) 40 MG tablet 30 tablet 3     Sig: Take 1 tablet by mouth nightly    vitamin D (VITAMIN D3) 125 MCG (5000 UT) CAPS capsule 30 capsule 1     Sig: Take 1 capsule by mouth daily        Last Filled:      Patient Phone Number: [phone] (home)     Last appt: 2/22/2024   Next appt: Visit date not found    Last OARRS:       2/16/2022     1:35 PM   RX Monitoring   Periodic Controlled Substance Monitoring No signs of potential drug abuse or diversion identified.        Matthew Thank you

## 2024-02-28 NOTE — PATIENT INSTRUCTIONS
PHYSICIAN ORDERS AND DISCHARGE INSTRUCTIONS  NOTE: Upon discharge from the Wound Center, you will receive a patient experience survey via E-mail. We would be grateful if you would take the time to fill this survey out.  Wound care order history:   SULTANA's   Right       Left    Date    Vascular studies/Intervention: .     Cultures: .               Antibiotics: .Augmentin 12/20/23, Doxy 1/17/24, Clobetasol ointment on 02/14/24              HbA1c:  .               Grafts:  .   Juxta Lites & Lymph Pumps:  Continuing wound care orders and information:              Residence: .              Continue home health care with:.    Your wound-care supplies will be provided by: .              [facility]: [facility] [place]  Wound cleansing:      Do not scrub or use excessive force.    Wash hands with soap and water before and after dressing changes.    Prior to applying a clean dressing, cleanse wound with normal saline,    wound cleanser, or mild soap and water.     Ask your physician or nurse before getting the wound(s) wet in the shower.  Daily Wound management:    Keep weight off wounds and reposition every 2 hours.    Avoid standing for long periods of time.    Evaluate legs to the level of the heart or above for 30 minutes 4-5 times a day and/or when sitting.       When taking antibiotics take entire prescription as ordered by MD do not stop taking until medicine is all gone.     Documentation:  Compression: .   Offloading: .        Orders for this week (2/28/2024):  Left  Lower Leg - HEALED     Pt has fungal rash on right foot advised to purchase OTC  antifungal cream for foot     Please dispense 30 day quantity when sending supplies     Follow up with Wound Care Center as needed in the wound care center  Call [phone] for any questions or concerns.

## 2024-02-28 NOTE — PROGRESS NOTES
the time to fill this survey out.  Wound care order history:   SULTANA's   Right       Left    Date    Vascular studies/Intervention: .     Cultures: .               Antibiotics: .Augmentin 12/20/23, Doxy 1/17/24, Clobetasol ointment on 02/14/24              HbA1c:  .               Grafts:  .   Juxta Lites & Lymph Pumps:  Continuing wound care orders and information:              Residence: .              Continue home health care with:.    Your wound-care supplies will be provided by: .              [facility]: [facility]  Wound cleansing:      Do not scrub or use excessive force.    Wash hands with soap and water before and after dressing changes.    Prior to applying a clean dressing, cleanse wound with normal saline,    wound cleanser, or mild soap and water.     Ask your physician or nurse before getting the wound(s) wet in the shower.  Daily Wound management:    Keep weight off wounds and reposition every 2 hours.    Avoid standing for long periods of time.    Evaluate legs to the level of the heart or above for 30 minutes 4-5 times a day and/or when sitting.       When taking antibiotics take entire prescription as ordered by MD do not stop taking until medicine is all gone.     Documentation:  Compression: .   Offloading: .        Orders for this week (2/28/2024):  Left  Lower Leg - HEALED     Pt has fungal rash on right foot advised to purchase OTC  antifungal cream for foot     Please dispense 30 day quantity when sending supplies     Follow up with Wound Care Center as needed in the wound care center  Call [phone] for any questions or concerns.    Treatment Note      Written Patient Dismissal Instructions Given            Electronically signed by Jordan Miranda MD on 2/28/2024 at 2:27 PM

## 2024-03-06 RX ORDER — METOPROLOL SUCCINATE 25 MG/1
25 TABLET, EXTENDED RELEASE ORAL DAILY
Qty: 90 TABLET | Refills: 1 | Status: SHIPPED | OUTPATIENT
Start: 2024-03-06

## 2024-03-06 NOTE — TELEPHONE ENCOUNTER
Medication:   Requested Prescriptions     Pending Prescriptions Disp Refills    metoprolol succinate (TOPROL XL) 25 MG extended release tablet 90 tablet 1     Sig: Take 1 tablet by mouth daily        Last Filled:      Patient Phone Number: [phone] (home)     Last appt: Visit date not found   Next appt: 4/15/2024    Last OARRS:       2/16/2022     1:35 PM   RX Monitoring   Periodic Controlled Substance Monitoring No signs of potential drug abuse or diversion identified.     EMANUEL THANK YOU

## 2024-03-19 RX ORDER — TORSEMIDE 10 MG/1
10 TABLET ORAL DAILY
Qty: 30 TABLET | Refills: 0 | Status: SHIPPED | OUTPATIENT
Start: 2024-03-19

## 2024-03-20 ENCOUNTER — TELEPHONE (OUTPATIENT)
Age: 89
End: 2024-03-20

## 2024-03-20 NOTE — TELEPHONE ENCOUNTER
Pt called and stated that she needs an order for portable Oxygen, last order was placed for stationary. She states she also needs the Portable so she can go ou of that house since she is needing the Oxygen at all times now. Please advise.

## 2024-03-20 NOTE — TELEPHONE ENCOUNTER
Spoke with patient and she does not have anyone to help her so I will call [employer] tomorrow to ask them to fax the form for portable oxygen. Verbal understanding returned by patient.

## 2024-03-21 NOTE — TELEPHONE ENCOUNTER
Order form given to DR Jennifer Doyle to sign and send to [facility]  Patient states that she cannot walk far without getting so short of breath that she has to sit down.

## 2024-03-25 ENCOUNTER — TELEPHONE (OUTPATIENT)
Age: 89
End: 2024-03-25

## 2024-03-25 NOTE — TELEPHONE ENCOUNTER
Aspen- [phone] with home care  Patient has a skin tear on right armm 2x0.2x0.1 , she would like orders for dressing and ointment if you think its needed and how often to change dressing.  Please advise.

## 2024-04-03 ENCOUNTER — TELEPHONE (OUTPATIENT)
Age: 89
End: 2024-04-03

## 2024-04-03 NOTE — TELEPHONE ENCOUNTER
Patient called through her insurance company asking about oxygen.  Informed patient that the information was sent to [facility].  Patient should reach out to them if she has not heard anything.  Number given for her to call and informed that information was faxed on 3/15.  Insurance company and patient voiced understanding and will call back if unable to reach [facility] equipment.

## 2024-04-05 RX ORDER — LOSARTAN POTASSIUM 25 MG/1
25 TABLET ORAL DAILY
Qty: 30 TABLET | Refills: 3 | Status: SHIPPED | OUTPATIENT
Start: 2024-04-05

## 2024-04-16 RX ORDER — TORSEMIDE 10 MG/1
10 TABLET ORAL DAILY
Qty: 30 TABLET | Refills: 0 | Status: SHIPPED | OUTPATIENT
Start: 2024-04-16

## 2024-04-17 ENCOUNTER — TELEPHONE (OUTPATIENT)
Age: 89
End: 2024-04-17

## 2024-04-17 DIAGNOSIS — M79.2 NEUROPATHIC PAIN: ICD-10-CM

## 2024-04-17 NOTE — TELEPHONE ENCOUNTER
Pt called stating she is having [employer] take over her scripts. Pt states that all of her scripts need to be faxed to them.   The fax number given to me was [phone].   Please advise.

## 2024-04-19 ENCOUNTER — TELEPHONE (OUTPATIENT)
Age: 89
End: 2024-04-19

## 2024-04-19 RX ORDER — GABAPENTIN 300 MG/1
CAPSULE ORAL
Qty: 120 CAPSULE | Refills: 5 | Status: CANCELLED | OUTPATIENT
Start: 2024-04-19 | End: 2024-11-13

## 2024-04-19 RX ORDER — GLIPIZIDE 5 MG/1
5 TABLET, FILM COATED, EXTENDED RELEASE ORAL DAILY
Qty: 30 TABLET | Refills: 3 | Status: CANCELLED | OUTPATIENT
Start: 2024-04-19

## 2024-04-19 RX ORDER — ATORVASTATIN CALCIUM 40 MG/1
40 TABLET, FILM COATED ORAL NIGHTLY
Qty: 30 TABLET | Refills: 3 | Status: CANCELLED | OUTPATIENT
Start: 2024-04-19

## 2024-04-19 RX ORDER — ROPINIROLE 2 MG/1
TABLET, FILM COATED ORAL
Qty: 60 TABLET | Refills: 3 | Status: CANCELLED | OUTPATIENT
Start: 2024-04-19

## 2024-04-19 RX ORDER — LOSARTAN POTASSIUM 25 MG/1
25 TABLET ORAL DAILY
Qty: 30 TABLET | Refills: 3 | Status: CANCELLED | OUTPATIENT
Start: 2024-04-19

## 2024-04-19 RX ORDER — ALBUTEROL SULFATE 90 UG/1
2 AEROSOL, METERED RESPIRATORY (INHALATION) 4 TIMES DAILY PRN
Qty: 8.5 G | Refills: 5 | Status: CANCELLED | OUTPATIENT
Start: 2024-04-19

## 2024-04-19 RX ORDER — METOPROLOL SUCCINATE 25 MG/1
25 TABLET, EXTENDED RELEASE ORAL DAILY
Qty: 90 TABLET | Refills: 1 | Status: CANCELLED | OUTPATIENT
Start: 2024-04-19

## 2024-04-19 RX ORDER — TORSEMIDE 10 MG/1
10 TABLET ORAL DAILY
Qty: 30 TABLET | Refills: 0 | Status: CANCELLED | OUTPATIENT
Start: 2024-04-19

## 2024-04-19 RX ORDER — MAGNESIUM OXIDE 400 MG/1
400 TABLET ORAL DAILY
Qty: 30 TABLET | Refills: 3 | Status: CANCELLED | OUTPATIENT
Start: 2024-04-19

## 2024-04-19 RX ORDER — FLUTICASONE FUROATE AND VILANTEROL 100; 25 UG/1; UG/1
1 POWDER RESPIRATORY (INHALATION) DAILY
Qty: 1 EACH | Refills: 3 | Status: CANCELLED | OUTPATIENT
Start: 2024-04-19

## 2024-04-19 NOTE — TELEPHONE ENCOUNTER
Jane with [facility] contacted office and reports that patient has been having symptoms of a UTI.  Patient reported hardy her that usually she can clear it up by drinking cranberry juice.  Per Jane, patient is reporting that didn't help and they are requesting a verbal to complete a UA .    Verbal order given on behalf of Dr. Doyle.

## 2024-04-20 ENCOUNTER — HOSPITAL ENCOUNTER (OUTPATIENT)
Age: 89
Setting detail: SPECIMEN
Discharge: HOME | End: 2024-04-20
Payer: MEDICARE

## 2024-04-20 PROCEDURE — 87077 CULTURE AEROBIC IDENTIFY: CPT

## 2024-04-20 PROCEDURE — 81001 URINALYSIS AUTO W/SCOPE: CPT

## 2024-04-20 PROCEDURE — 87086 URINE CULTURE/COLONY COUNT: CPT

## 2024-04-20 PROCEDURE — 87186 SC STD MICRODIL/AGAR DIL: CPT

## 2024-04-22 DIAGNOSIS — M79.2 NEUROPATHIC PAIN: ICD-10-CM

## 2024-04-22 LAB
BACTERIA: NEGATIVE /HPF
BILIRUBIN URINE: NEGATIVE MG/DL
BLOOD, URINE: NEGATIVE
CLARITY: CLEAR
COLOR: YELLOW
CULTURE: ABNORMAL
CULTURE: ABNORMAL
GLUCOSE, URINE: NEGATIVE MG/DL
KETONES, URINE: NEGATIVE MG/DL
LEUKOCYTE ESTERASE, URINE: NEGATIVE
Lab: ABNORMAL
MUCUS: ABNORMAL HPF
NITRITE URINE, QUANTITATIVE: POSITIVE
PH, URINE: 6 (ref 5–8)
PROTEIN UA: NEGATIVE MG/DL
RBC URINE: 1 /HPF (ref 0–6)
SPECIFIC GRAVITY UA: 1.01 (ref 1–1.03)
SPECIMEN: ABNORMAL
SQUAMOUS EPITHELIAL: 4 /HPF
TRICHOMONAS: ABNORMAL /HPF
UROBILINOGEN, URINE: 0.2 MG/DL (ref 0.2–1)
WBC UA: 3 /HPF (ref 0–5)

## 2024-04-22 RX ORDER — ATORVASTATIN CALCIUM 40 MG/1
40 TABLET, FILM COATED ORAL NIGHTLY
Qty: 90 TABLET | Refills: 1 | Status: SHIPPED | OUTPATIENT
Start: 2024-04-22

## 2024-04-22 RX ORDER — GLIPIZIDE 5 MG/1
5 TABLET, FILM COATED, EXTENDED RELEASE ORAL DAILY
Qty: 90 TABLET | Refills: 1 | Status: SHIPPED | OUTPATIENT
Start: 2024-04-22

## 2024-04-22 RX ORDER — FLUTICASONE FUROATE AND VILANTEROL 100; 25 UG/1; UG/1
1 POWDER RESPIRATORY (INHALATION) DAILY
Qty: 2 EACH | Refills: 4 | Status: SHIPPED | OUTPATIENT
Start: 2024-04-22

## 2024-04-22 RX ORDER — MAGNESIUM OXIDE 400 MG/1
400 TABLET ORAL DAILY
Qty: 90 TABLET | Refills: 1 | Status: SHIPPED | OUTPATIENT
Start: 2024-04-22

## 2024-04-22 RX ORDER — ROPINIROLE 2 MG/1
TABLET, FILM COATED ORAL
Qty: 180 TABLET | Refills: 1 | Status: SHIPPED | OUTPATIENT
Start: 2024-04-22

## 2024-04-22 RX ORDER — TORSEMIDE 10 MG/1
10 TABLET ORAL DAILY
Qty: 90 TABLET | Refills: 1 | Status: SHIPPED | OUTPATIENT
Start: 2024-04-22

## 2024-04-22 RX ORDER — METOPROLOL SUCCINATE 25 MG/1
25 TABLET, EXTENDED RELEASE ORAL DAILY
Qty: 90 TABLET | Refills: 1 | Status: SHIPPED | OUTPATIENT
Start: 2024-04-22

## 2024-04-22 RX ORDER — ALBUTEROL SULFATE 90 UG/1
2 AEROSOL, METERED RESPIRATORY (INHALATION) 4 TIMES DAILY PRN
Qty: 8.5 G | Refills: 5 | Status: SHIPPED | OUTPATIENT
Start: 2024-04-22

## 2024-04-22 RX ORDER — LOSARTAN POTASSIUM 25 MG/1
25 TABLET ORAL DAILY
Qty: 90 TABLET | Refills: 1 | Status: SHIPPED | OUTPATIENT
Start: 2024-04-22

## 2024-04-22 NOTE — TELEPHONE ENCOUNTER
Spoke with patient and she doesn't know name of mail order pharmacy. Would like all prescriptions sent to the fax number provided.

## 2024-04-22 NOTE — TELEPHONE ENCOUNTER
Patient does not know the name of the mail order pharmacy. Will print out rx and fax to [phone]

## 2024-04-23 ENCOUNTER — TELEPHONE (OUTPATIENT)
Age: 89
End: 2024-04-23

## 2024-04-23 RX ORDER — CEPHALEXIN 500 MG/1
500 CAPSULE ORAL 2 TIMES DAILY
Qty: 14 CAPSULE | Refills: 0 | Status: SHIPPED | OUTPATIENT
Start: 2024-04-23

## 2024-04-23 NOTE — TELEPHONE ENCOUNTER
Caitlyn with [facility] called to inform that the pt UA came back late last night and she does have a UTI, requesting antibiotics. [facility] [place]. Please advise

## 2024-04-23 NOTE — TELEPHONE ENCOUNTER
Informed patient that she has a UTI, e-coli. Dr will call medication to [facility] pharmacy in [place]. Verbal understanding returned.

## 2024-04-26 RX ORDER — APIXABAN 5 MG/1
5 TABLET, FILM COATED ORAL 2 TIMES DAILY
Qty: 60 TABLET | OUTPATIENT
Start: 2024-04-26

## 2024-04-30 RX ORDER — ROPINIROLE 2 MG/1
TABLET, FILM COATED ORAL
Qty: 180 TABLET | Refills: 1 | Status: SHIPPED | OUTPATIENT
Start: 2024-04-30

## 2024-05-05 ENCOUNTER — HOSPITAL ENCOUNTER (OUTPATIENT)
Age: 89
Setting detail: SPECIMEN
Discharge: HOME | End: 2024-05-05
Payer: MEDICARE

## 2024-05-05 PROCEDURE — 87186 SC STD MICRODIL/AGAR DIL: CPT

## 2024-05-05 PROCEDURE — 87088 URINE BACTERIA CULTURE: CPT

## 2024-05-05 PROCEDURE — 81003 URINALYSIS AUTO W/O SCOPE: CPT

## 2024-05-05 PROCEDURE — 87086 URINE CULTURE/COLONY COUNT: CPT

## 2024-05-06 LAB
BILIRUBIN, URINE: NEGATIVE MG/DL
BLOOD, URINE: NEGATIVE
CLARITY: CLEAR
COLOR: YELLOW
COMMENT UA: NORMAL
GLUCOSE URINE: NEGATIVE MG/DL
KETONES, URINE: NEGATIVE MG/DL
LEUKOCYTE ESTERASE, URINE: NEGATIVE
NITRITE URINE, QUANTITATIVE: NEGATIVE
PH, URINE: 5 (ref 5–8)
PROTEIN UA: NEGATIVE MG/DL
SPECIFIC GRAVITY UA: 1.02 (ref 1–1.03)
UROBILINOGEN, URINE: 0.2 MG/DL (ref 0.2–1)

## 2024-05-07 ENCOUNTER — TELEPHONE (OUTPATIENT)
Age: 89
End: 2024-05-07

## 2024-05-07 LAB
CULTURE: ABNORMAL
CULTURE: ABNORMAL
Lab: ABNORMAL
SPECIMEN: ABNORMAL

## 2024-05-07 RX ORDER — CEPHALEXIN 500 MG/1
500 CAPSULE ORAL 2 TIMES DAILY
Qty: 14 CAPSULE | Refills: 0 | Status: SHIPPED | OUTPATIENT
Start: 2024-05-07

## 2024-05-07 NOTE — TELEPHONE ENCOUNTER
Cristal with [facility] contacted office to see if results for the recent UA had been reviewed and if she would need to be treated.

## 2024-05-07 NOTE — TELEPHONE ENCOUNTER
Inform patient Darren was called to [facility] pharmacy.  She can take that for UTI

## 2024-05-08 ENCOUNTER — OFFICE VISIT (OUTPATIENT)
Age: 89
End: 2024-05-08
Payer: MEDICARE

## 2024-05-08 VITALS
RESPIRATION RATE: 16 BRPM | BODY MASS INDEX: 37.39 KG/M2 | WEIGHT: 219 LBS | HEIGHT: 64 IN | OXYGEN SATURATION: 94 % | SYSTOLIC BLOOD PRESSURE: 128 MMHG | HEART RATE: 78 BPM | DIASTOLIC BLOOD PRESSURE: 72 MMHG

## 2024-05-08 DIAGNOSIS — E78.2 MIXED HYPERLIPIDEMIA: ICD-10-CM

## 2024-05-08 DIAGNOSIS — Z86.73 S/P STROKE DUE TO CEREBROVASCULAR DISEASE: ICD-10-CM

## 2024-05-08 DIAGNOSIS — M05.79 RHEUMATOID ARTHRITIS INVOLVING MULTIPLE SITES WITH POSITIVE RHEUMATOID FACTOR (HCC): ICD-10-CM

## 2024-05-08 DIAGNOSIS — G25.81 RESTLESS LEG SYNDROME: ICD-10-CM

## 2024-05-08 DIAGNOSIS — M48.062 LUMBAR STENOSIS WITH NEUROGENIC CLAUDICATION: ICD-10-CM

## 2024-05-08 DIAGNOSIS — I25.10 CORONARY ARTERY DISEASE INVOLVING NATIVE CORONARY ARTERY OF NATIVE HEART WITHOUT ANGINA PECTORIS: ICD-10-CM

## 2024-05-08 DIAGNOSIS — E11.9 TYPE 2 DIABETES MELLITUS WITHOUT COMPLICATION, WITHOUT LONG-TERM CURRENT USE OF INSULIN (HCC): Primary | ICD-10-CM

## 2024-05-08 DIAGNOSIS — R29.818 SUSPECTED SLEEP APNEA: ICD-10-CM

## 2024-05-08 DIAGNOSIS — I10 ESSENTIAL HYPERTENSION: ICD-10-CM

## 2024-05-08 DIAGNOSIS — J45.41 MODERATE PERSISTENT ASTHMA WITH EXACERBATION: ICD-10-CM

## 2024-05-08 DIAGNOSIS — R60.0 PEDAL EDEMA: ICD-10-CM

## 2024-05-08 PROCEDURE — G8417 CALC BMI ABV UP PARAM F/U: HCPCS | Performed by: INTERNAL MEDICINE

## 2024-05-08 PROCEDURE — 1090F PRES/ABSN URINE INCON ASSESS: CPT | Performed by: INTERNAL MEDICINE

## 2024-05-08 PROCEDURE — 3051F HG A1C>EQUAL 7.0%<8.0%: CPT | Performed by: INTERNAL MEDICINE

## 2024-05-08 PROCEDURE — 1036F TOBACCO NON-USER: CPT | Performed by: INTERNAL MEDICINE

## 2024-05-08 PROCEDURE — 99214 OFFICE O/P EST MOD 30 MIN: CPT | Performed by: INTERNAL MEDICINE

## 2024-05-08 PROCEDURE — 1123F ACP DISCUSS/DSCN MKR DOCD: CPT | Performed by: INTERNAL MEDICINE

## 2024-05-08 PROCEDURE — G8427 DOCREV CUR MEDS BY ELIG CLIN: HCPCS | Performed by: INTERNAL MEDICINE

## 2024-05-08 SDOH — ECONOMIC STABILITY: FOOD INSECURITY: WITHIN THE PAST 12 MONTHS, YOU WORRIED THAT YOUR FOOD WOULD RUN OUT BEFORE YOU GOT MONEY TO BUY MORE.: PATIENT DECLINED

## 2024-05-08 SDOH — ECONOMIC STABILITY: FOOD INSECURITY: WITHIN THE PAST 12 MONTHS, THE FOOD YOU BOUGHT JUST DIDN'T LAST AND YOU DIDN'T HAVE MONEY TO GET MORE.: PATIENT DECLINED

## 2024-05-08 SDOH — ECONOMIC STABILITY: HOUSING INSECURITY
IN THE LAST 12 MONTHS, WAS THERE A TIME WHEN YOU DID NOT HAVE A STEADY PLACE TO SLEEP OR SLEPT IN A SHELTER (INCLUDING NOW)?: PATIENT DECLINED

## 2024-05-08 SDOH — ECONOMIC STABILITY: INCOME INSECURITY: HOW HARD IS IT FOR YOU TO PAY FOR THE VERY BASICS LIKE FOOD, HOUSING, MEDICAL CARE, AND HEATING?: PATIENT DECLINED

## 2024-05-08 NOTE — PROGRESS NOTES
daily 90 tablet 1    losartan (COZAAR) 25 MG tablet Take 1 tablet by mouth daily 90 tablet 1    magnesium oxide (MAG-OX) 400 MG tablet Take 1 tablet by mouth daily 90 tablet 1    metoprolol succinate (TOPROL XL) 25 MG extended release tablet Take 1 tablet by mouth daily 90 tablet 1    torsemide (DEMADEX) 10 MG tablet Take 1 tablet by mouth daily 90 tablet 1    vitamin D (VITAMIN D3) 125 MCG (5000 UT) CAPS capsule Take 1 capsule by mouth daily 90 capsule 1    HYDROcodone-acetaminophen (NORCO) 5-325 MG per tablet Take 1 tablet by mouth every 8 hours as needed for Pain.      clobetasol (TEMOVATE) 0.05 % ointment Apply topically      gabapentin (NEURONTIN) 300 MG capsule Take 1 tablet in the morning, 1 tablet in the afternoon, and 2 tablets in the evening 120 capsule 5    Multiple Vitamins-Minerals (SYSTANE ICAPS AREDS2) TABS Take 1 tablet by mouth in the morning and at bedtime      RESTASIS 0.05 % ophthalmic emulsion Place into both eyes every 12 hours      OXYGEN 2 Liter O2 - CONTINUOUS (route: Oxygen)      Coenzyme Q10 (COQ10) 200 MG CAPS Take 200 mg by mouth 2 times daily      cephALEXin (KEFLEX) 500 MG capsule Take 1 capsule by mouth 2 times daily (Patient not taking: Reported on 5/8/2024) 14 capsule 0    cephALEXin (KEFLEX) 500 MG capsule Take 1 capsule by mouth 2 times daily (Patient not taking: Reported on 5/8/2024) 14 capsule 0     No current facility-administered medications for this visit.     Past Medical History:   Diagnosis Date    Arthritis     left knee pain, sees Dr. Skinner    Atrial fibrillation, chronic (HCC)     CAD (coronary artery disease)     sees Dr. Fletcher    Chronic midline low back pain without sciatica 09/28/2016    Had PT in 2016    Claustrophobia     Colonoscopy refused     discussed in 7/15    COVID-19 virus infection 01/05/2023    DM (diabetes mellitus), type 2 (HCC) 02/2006    Dupuytren's contracture of right hand     Endometrial stripe increased 09/25/2014    Saw NP April garrett. Was

## 2024-05-14 RX ORDER — TORSEMIDE 10 MG/1
10 TABLET ORAL DAILY
Qty: 30 TABLET | Refills: 3 | Status: SHIPPED | OUTPATIENT
Start: 2024-05-14

## 2024-05-15 RX ORDER — ROPINIROLE 2 MG/1
TABLET, FILM COATED ORAL
Qty: 180 TABLET | Refills: 1 | Status: SHIPPED | OUTPATIENT
Start: 2024-05-15

## 2024-05-22 ENCOUNTER — HOSPITAL ENCOUNTER (OUTPATIENT)
Age: 89
Discharge: HOME | End: 2024-05-22
Payer: MEDICARE

## 2024-05-22 DIAGNOSIS — E78.2 MIXED HYPERLIPIDEMIA: ICD-10-CM

## 2024-05-22 DIAGNOSIS — E11.9 TYPE 2 DIABETES MELLITUS WITHOUT COMPLICATION, WITHOUT LONG-TERM CURRENT USE OF INSULIN (HCC): ICD-10-CM

## 2024-05-22 LAB
ALBUMIN SERPL-MCNC: 3.7 GM/DL (ref 3.4–5)
ALP BLD-CCNC: 74 IU/L (ref 40–129)
ALT SERPL-CCNC: <5 U/L (ref 10–40)
ANION GAP SERPL CALCULATED.3IONS-SCNC: 8 MMOL/L (ref 7–16)
AST SERPL-CCNC: 14 IU/L (ref 15–37)
BILIRUB SERPL-MCNC: 0.4 MG/DL (ref 0–1)
BUN SERPL-MCNC: 14 MG/DL (ref 6–23)
CALCIUM SERPL-MCNC: 9.3 MG/DL (ref 8.3–10.6)
CHLORIDE BLD-SCNC: 102 MMOL/L (ref 99–110)
CHOLESTEROL, FASTING: 119 MG/DL
CO2: 32 MMOL/L (ref 21–32)
CREAT SERPL-MCNC: 0.6 MG/DL (ref 0.6–1.1)
DIFFERENTIAL TYPE: ABNORMAL
GFR, ESTIMATED: 87 ML/MIN/1.73M2
GLUCOSE SERPL-MCNC: 88 MG/DL (ref 70–99)
HCT VFR BLD CALC: 39.7 % (ref 37–47)
HDLC SERPL-MCNC: 56 MG/DL
HEMOGLOBIN: 11.5 GM/DL (ref 12.5–16)
LDLC SERPL CALC-MCNC: 50 MG/DL
LYMPHOCYTES ABSOLUTE: 2.2 K/CU MM
LYMPHOCYTES RELATIVE PERCENT: 22 % (ref 24–44)
MCH RBC QN AUTO: 24.8 PG (ref 27–31)
MCHC RBC AUTO-ENTMCNC: 29 % (ref 32–36)
MCV RBC AUTO: 85.6 FL (ref 78–100)
MONOCYTES ABSOLUTE: 1.9 K/CU MM
MONOCYTES RELATIVE PERCENT: 19 % (ref 0–4)
NEUTROPHILS ABSOLUTE: 5.9 K/CU MM
NEUTROPHILS RELATIVE PERCENT: 59 % (ref 36–66)
PDW BLD-RTO: 17.2 % (ref 11.7–14.9)
PLATELET # BLD: 195 K/CU MM (ref 140–440)
PLT MORPHOLOGY: ADEQUATE
PMV BLD AUTO: 8.8 FL (ref 7.5–11.1)
POTASSIUM SERPL-SCNC: 4.3 MMOL/L (ref 3.5–5.1)
RBC # BLD: 4.64 M/CU MM (ref 4.2–5.4)
RBC # BLD: ABNORMAL 10*6/UL
SODIUM BLD-SCNC: 142 MMOL/L (ref 135–145)
TOTAL PROTEIN: 6.7 GM/DL (ref 6.4–8.2)
TRIGLYCERIDE, FASTING: 64 MG/DL
WBC # BLD: 10 K/CU MM (ref 4–10.5)

## 2024-05-22 PROCEDURE — 36415 COLL VENOUS BLD VENIPUNCTURE: CPT

## 2024-05-22 PROCEDURE — 85007 BL SMEAR W/DIFF WBC COUNT: CPT

## 2024-05-22 PROCEDURE — 80053 COMPREHEN METABOLIC PANEL: CPT

## 2024-05-22 PROCEDURE — 80061 LIPID PANEL: CPT

## 2024-05-22 PROCEDURE — 85027 COMPLETE CBC AUTOMATED: CPT

## 2024-05-23 ENCOUNTER — TELEPHONE (OUTPATIENT)
Age: 89
End: 2024-05-23

## 2024-05-23 NOTE — TELEPHONE ENCOUNTER
Would like a rx written for a rolater walker with seat and brakes sent to [facility] sonia [place] at [phone] or [phone]

## 2024-06-04 RX ORDER — METOPROLOL SUCCINATE 25 MG/1
25 TABLET, EXTENDED RELEASE ORAL DAILY
Qty: 90 TABLET | Refills: 0 | Status: SHIPPED | OUTPATIENT
Start: 2024-06-04

## 2024-06-05 ENCOUNTER — TELEPHONE (OUTPATIENT)
Age: 89
End: 2024-06-05

## 2024-06-05 NOTE — TELEPHONE ENCOUNTER
Inform patient she is already taking gabapentin and ropinirole.  Muscle laxer may cause her more dizziness  Take Tylenol and these medications and see if that improves

## 2024-06-05 NOTE — TELEPHONE ENCOUNTER
[facility] called stated patient is complaining of dizziness and pain right outer thigh for 2 days.  Offered patient appointment she did not want to come in , she wanted to know if you would send in muscle relaxer to [facility]

## 2024-06-07 DIAGNOSIS — R26.9 GAIT DISTURBANCE: ICD-10-CM

## 2024-06-07 DIAGNOSIS — M48.062 LUMBAR STENOSIS WITH NEUROGENIC CLAUDICATION: ICD-10-CM

## 2024-06-07 DIAGNOSIS — R53.81 PHYSICAL DEBILITY: Primary | ICD-10-CM

## 2024-06-07 RX ORDER — CALCIUM CARBONATE 160(400)MG
TABLET,CHEWABLE ORAL
Qty: 1 EACH | Refills: 0 | Status: SHIPPED | OUTPATIENT
Start: 2024-06-07

## 2024-06-11 ENCOUNTER — TELEPHONE (OUTPATIENT)
Age: 89
End: 2024-06-11

## 2024-06-11 NOTE — TELEPHONE ENCOUNTER
Spoke with patient and she stated that the pain clinic called her back and will send refill to pharmacy.

## 2024-06-11 NOTE — TELEPHONE ENCOUNTER
Pt is in severe pain but is out of pain medication but the pain clinics phones are not working right now. Pt requesting call back at [phone]

## 2024-06-11 NOTE — TELEPHONE ENCOUNTER
Tried to reach patient by phone no answer, no voicemail. Dr Rodriguez consulted and suggesting that patient to take her gabapentin and requip as directed and can take tylenol 2 tabs TID. Cannot call any pain medication in since she is going to pain clinic per Dr Rodriguez.

## 2024-06-14 ENCOUNTER — HOSPITAL ENCOUNTER (EMERGENCY)
Age: 89
Discharge: HOME | End: 2024-06-14
Attending: EMERGENCY MEDICINE
Payer: MEDICARE

## 2024-06-14 VITALS
BODY MASS INDEX: 37.22 KG/M2 | TEMPERATURE: 98.2 F | DIASTOLIC BLOOD PRESSURE: 59 MMHG | SYSTOLIC BLOOD PRESSURE: 112 MMHG | WEIGHT: 218 LBS | HEART RATE: 72 BPM | RESPIRATION RATE: 18 BRPM | HEIGHT: 64 IN | OXYGEN SATURATION: 98 %

## 2024-06-14 DIAGNOSIS — M19.90 ARTHRITIS: Primary | ICD-10-CM

## 2024-06-14 PROCEDURE — 99284 EMERGENCY DEPT VISIT MOD MDM: CPT

## 2024-06-14 PROCEDURE — 6360000002 HC RX W HCPCS: Performed by: EMERGENCY MEDICINE

## 2024-06-14 RX ORDER — FENTANYL CITRATE 50 UG/ML
25 INJECTION, SOLUTION INTRAMUSCULAR; INTRAVENOUS ONCE
Status: COMPLETED | OUTPATIENT
Start: 2024-06-14 | End: 2024-06-14

## 2024-06-14 RX ORDER — DEXAMETHASONE SODIUM PHOSPHATE 10 MG/ML
10 INJECTION, SOLUTION INTRAMUSCULAR; INTRAVENOUS ONCE
Status: COMPLETED | OUTPATIENT
Start: 2024-06-14 | End: 2024-06-14

## 2024-06-14 RX ORDER — CYCLOBENZAPRINE HCL 5 MG
TABLET ORAL
Status: ON HOLD | COMMUNITY
Start: 2024-06-11

## 2024-06-14 RX ORDER — FENTANYL CITRATE 50 UG/ML
25 INJECTION, SOLUTION INTRAMUSCULAR; INTRAVENOUS ONCE
Status: DISCONTINUED | OUTPATIENT
Start: 2024-06-14 | End: 2024-06-14

## 2024-06-14 RX ADMIN — FENTANYL CITRATE 25 MCG: 50 INJECTION INTRAMUSCULAR; INTRAVENOUS at 19:26

## 2024-06-14 RX ADMIN — DEXAMETHASONE SODIUM PHOSPHATE 10 MG: 10 INJECTION, SOLUTION INTRAMUSCULAR; INTRAVENOUS at 19:28

## 2024-06-14 ASSESSMENT — PAIN SCALES - GENERAL: PAINLEVEL_OUTOF10: 8

## 2024-06-14 ASSESSMENT — PAIN - FUNCTIONAL ASSESSMENT: PAIN_FUNCTIONAL_ASSESSMENT: 0-10

## 2024-06-14 NOTE — ED PROVIDER NOTES
Emergency Department Encounter    Patient: Jayshree Tate  MRN: [id number]  : 1935  Date of Evaluation: 6/15/2024  ED Provider:  Peggy Dietz DO    Triage Chief Complaint:   Pain (Chronic Arthritis pain, started on muscle relaxer but not helping the pain )    [place]:  Jayshree Tate is a 88 y.o. female that presents with an arthritis attack on her shoulders and hips.  She has so much pain that it is hard to get up and walk.  She takes cortisone for pain occasionally.  She had one in her shoulder and she thinks it makes her feel bad and she thinks it is a terrible drug. She has been taking Norco but can only take one every 6 hours.  She tried a muscle relaxer and thought it helped but her doctor said it could make her \"start bawling.\"  She said it doesn't make her start walking.  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  She was on it for 5 years and said it helped.  She does not recall the name.  She would like a bedside commode to make it easier.  She has a home health nurse and a care giver.  Community nurses still come to her house for her wounds on her legs so she wears \"tubies\" on her legs.      ROS - see HPI, below listed is current ROS at time of my eval:   systems reviewed and negative except as above.     Past Medical History:   Diagnosis Date    Arthritis     left knee pain, sees Dr. Skinner    Atrial fibrillation, chronic (HCC)     CAD (coronary artery disease)     sees Dr. Fletcher    Chronic midline low back pain without sciatica 2016    Had PT in 2016    Claustrophobia     Colonoscopy refused     discussed in 7/15    COVID-19 virus infection 2023    DM (diabetes mellitus), type 2 (HCC) 2006    Dupuytren's contracture of right hand     Endometrial stripe increased 2014    Saw NP April garrett. Was normal exam per pt.    Gastrointestinal hemorrhage 2015    Patient had GI bleeding. Colon refused. Discussed with patient in detail.     phrases that may be inappropriate.  Efforts were made to edit the dictations.        Peggy Dietz DO  06/15/24 1159

## 2024-06-15 ENCOUNTER — HOSPITAL ENCOUNTER (EMERGENCY)
Age: 89
Discharge: HOME | End: 2024-06-15
Attending: STUDENT IN AN ORGANIZED HEALTH CARE EDUCATION/TRAINING PROGRAM
Payer: MEDICARE

## 2024-06-15 DIAGNOSIS — S81.812A NONINFECTED SKIN TEAR OF LEFT LOWER EXTREMITY, INITIAL ENCOUNTER: Primary | ICD-10-CM

## 2024-06-15 PROCEDURE — 99284 EMERGENCY DEPT VISIT MOD MDM: CPT

## 2024-06-15 PROCEDURE — 6360000002 HC RX W HCPCS: Performed by: STUDENT IN AN ORGANIZED HEALTH CARE EDUCATION/TRAINING PROGRAM

## 2024-06-15 PROCEDURE — 90715 TDAP VACCINE 7 YRS/> IM: CPT | Performed by: STUDENT IN AN ORGANIZED HEALTH CARE EDUCATION/TRAINING PROGRAM

## 2024-06-15 PROCEDURE — 90471 IMMUNIZATION ADMIN: CPT | Performed by: STUDENT IN AN ORGANIZED HEALTH CARE EDUCATION/TRAINING PROGRAM

## 2024-06-15 RX ADMIN — TETANUS TOXOID, REDUCED DIPHTHERIA TOXOID AND ACELLULAR PERTUSSIS VACCINE, ADSORBED 0.5 ML: 5; 2.5; 8; 8; 2.5 SUSPENSION INTRAMUSCULAR at 20:18

## 2024-06-15 ASSESSMENT — PAIN - FUNCTIONAL ASSESSMENT
PAIN_FUNCTIONAL_ASSESSMENT: PREVENTS OR INTERFERES SOME ACTIVE ACTIVITIES AND ADLS
PAIN_FUNCTIONAL_ASSESSMENT: 0-10

## 2024-06-15 ASSESSMENT — PAIN DESCRIPTION - ONSET: ONSET: SUDDEN

## 2024-06-15 ASSESSMENT — PAIN DESCRIPTION - ORIENTATION: ORIENTATION: LEFT;LOWER

## 2024-06-15 ASSESSMENT — PAIN DESCRIPTION - LOCATION: LOCATION: LEG

## 2024-06-15 ASSESSMENT — PAIN SCALES - GENERAL: PAINLEVEL_OUTOF10: 10

## 2024-06-15 ASSESSMENT — PAIN DESCRIPTION - DESCRIPTORS: DESCRIPTORS: SHARP

## 2024-06-15 ASSESSMENT — PAIN DESCRIPTION - PAIN TYPE: TYPE: ACUTE PAIN

## 2024-06-15 ASSESSMENT — PAIN DESCRIPTION - FREQUENCY: FREQUENCY: CONTINUOUS

## 2024-06-15 NOTE — ED PROVIDER NOTES
Emergency Department Encounter    Patient: Jayshree Tate  MRN: [id number]  : 1935  Date of Evaluation: 2024  ED Provider:  Junior Mills MD    Triage Chief Complaint:   Laceration (Has been having trouble getting around because of her arthritis. Was trying to maneuver over some stuff and kicked sharp edge with Lt shin. Has a skin tear to shin.)    [place]:  Jayshree Tate is a 88 y.o. female with past med history of diabetes, hyperlipidemia, hypertension, rheumatoid arthritis, CVA on Eliquis that presents with laceration.  Patient reports that she was in the shower, she reached for a hole, and on her way out she chipped her leg with a new fixture in her shower.  She reports that there was some bleeding but did not stop.  She reports some burning pain over her leg.  She denies falling down.  She also denies chest pain, shortness of breath, abdominal pain.    Patient does not know her last tetanus shot.       ROS - see HPI    Past Medical History:   Diagnosis Date    Arthritis     left knee pain, sees Dr. Skinner    Atrial fibrillation, chronic (HCC)     CAD (coronary artery disease)     sees Dr. Fletcher    Chronic midline low back pain without sciatica 2016    Had PT in 2016    Claustrophobia     Colonoscopy refused     discussed in 7/15    COVID-19 virus infection 2023    DM (diabetes mellitus), type 2 (HCC) 2006    Dupuytren's contracture of right hand     Endometrial stripe increased 2014    Saw NP April garrett. Was normal exam per pt.    Gastrointestinal hemorrhage 2015    Patient had GI bleeding. Colon refused. Discussed with patient in detail.    Glaucoma 2014    H/O Doppler ultrasound 06/15/2023    Significant reflux noted of the Right GSV at distal calf (0.6s). Significant reflux noted in the Left GSV at mid thigh tributary (0.5s). Most likely would need varithena on left leg.    H/O echocardiogram 10/02/2014    Mildly depressed left ventricular function;

## 2024-06-15 NOTE — DISCHARGE INSTRUCTIONS
-Take Tylenol (1000 mg, every 6-8 hours) as needed for pain  -Follow the instructions attach and wound care and adhesive about how to take care of your laceration at home  -Follow-up with your primary care doctor in the next 3 to 5 days so they can recheck it and make sure is not getting infected  -Come back to emergency department if you develop severe pain, severe redness, fever, white thick discharge coming out of the wound, or if you are concerned

## 2024-06-16 ENCOUNTER — HOSPITAL ENCOUNTER (INPATIENT)
Age: 89
LOS: 1 days | Discharge: NURSING, REHAB OR LONG-TERM CARE | DRG: 187 | End: 2024-06-17
Attending: STUDENT IN AN ORGANIZED HEALTH CARE EDUCATION/TRAINING PROGRAM | Admitting: STUDENT IN AN ORGANIZED HEALTH CARE EDUCATION/TRAINING PROGRAM
Payer: MEDICARE

## 2024-06-16 ENCOUNTER — APPOINTMENT (OUTPATIENT)
Dept: GENERAL RADIOLOGY | Age: 89
DRG: 187 | End: 2024-06-16
Payer: MEDICARE

## 2024-06-16 ENCOUNTER — APPOINTMENT (OUTPATIENT)
Dept: CT IMAGING | Age: 89
DRG: 187 | End: 2024-06-16
Payer: MEDICARE

## 2024-06-16 VITALS
SYSTOLIC BLOOD PRESSURE: 165 MMHG | DIASTOLIC BLOOD PRESSURE: 97 MMHG | RESPIRATION RATE: 18 BRPM | HEIGHT: 64 IN | HEART RATE: 79 BPM | WEIGHT: 210 LBS | TEMPERATURE: 97.9 F | BODY MASS INDEX: 35.85 KG/M2 | OXYGEN SATURATION: 97 %

## 2024-06-16 DIAGNOSIS — M79.2 NEUROPATHIC PAIN: ICD-10-CM

## 2024-06-16 DIAGNOSIS — E66.9 CLASS 2 OBESITY WITH BODY MASS INDEX (BMI) OF 38.0 TO 38.9 IN ADULT, UNSPECIFIED OBESITY TYPE, UNSPECIFIED WHETHER SERIOUS COMORBIDITY PRESENT: ICD-10-CM

## 2024-06-16 DIAGNOSIS — R26.2 AMBULATORY DYSFUNCTION: ICD-10-CM
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
## 2024-06-16 PROBLEM — J90 PLEURAL EFFUSION: Status: ACTIVE | Noted: 2024-06-16

## 2024-06-16 LAB
ANION GAP SERPL CALCULATED.3IONS-SCNC: 7 MMOL/L (ref 7–16)
BASOPHILS ABSOLUTE: 0 K/CU MM
BASOPHILS RELATIVE PERCENT: 0.2 % (ref 0–1)
BILIRUBIN, URINE: NEGATIVE MG/DL
BLOOD, URINE: NEGATIVE
BUN SERPL-MCNC: 19 MG/DL (ref 6–23)
CALCIUM SERPL-MCNC: 8.8 MG/DL (ref 8.3–10.6)
CHLORIDE BLD-SCNC: 103 MMOL/L (ref 99–110)
CLARITY, UA: CLEAR
CO2: 31 MMOL/L (ref 21–32)
COLOR, UA: YELLOW
COMMENT UA: NORMAL
CREAT SERPL-MCNC: 0.5 MG/DL (ref 0.6–1.1)
DIFFERENTIAL TYPE: ABNORMAL
EOSINOPHILS ABSOLUTE: 0 K/CU MM
EOSINOPHILS RELATIVE PERCENT: 0.1 % (ref 0–3)
GFR, ESTIMATED: >90 ML/MIN/1.73M2
GLUCOSE BLD-MCNC: 22 MG/DL (ref 70–99)
GLUCOSE BLD-MCNC: 32 MG/DL (ref 70–99)
GLUCOSE SERPL-MCNC: 98 MG/DL (ref 70–99)
GLUCOSE URINE: NEGATIVE MG/DL
HCT VFR BLD CALC: 35.6 % (ref 37–47)
HEMOGLOBIN: 10.6 GM/DL (ref 12.5–16)
IMMATURE NEUTROPHIL %: 0.3 % (ref 0–0.43)
KETONES, URINE: NEGATIVE MG/DL
LEUKOCYTE ESTERASE, URINE: NEGATIVE
LYMPHOCYTES ABSOLUTE: 2 K/CU MM
LYMPHOCYTES RELATIVE PERCENT: 15.6 % (ref 24–44)
MCH RBC QN AUTO: 25.2 PG (ref 27–31)
MCHC RBC AUTO-ENTMCNC: 29.8 % (ref 32–36)
MCV RBC AUTO: 84.6 FL (ref 78–100)
MONOCYTES ABSOLUTE: 2.3 K/CU MM
MONOCYTES RELATIVE PERCENT: 18.4 % (ref 0–4)
NEUTROPHILS ABSOLUTE: 8.2 K/CU MM
NEUTROPHILS RELATIVE PERCENT: 65.4 % (ref 36–66)
NITRITE URINE, QUANTITATIVE: NEGATIVE
PDW BLD-RTO: 18.2 % (ref 11.7–14.9)
PH, URINE: 6 (ref 5–8)
PLATELET # BLD: 205 K/CU MM (ref 140–440)
PMV BLD AUTO: 9.4 FL (ref 7.5–11.1)
POTASSIUM SERPL-SCNC: 4.3 MMOL/L (ref 3.5–5.1)
PROTEIN UA: NEGATIVE MG/DL
RBC # BLD: 4.21 M/CU MM (ref 4.2–5.4)
SODIUM BLD-SCNC: 141 MMOL/L (ref 135–145)
SPECIFIC GRAVITY UA: 1.01 (ref 1–1.03)
TOTAL IMMATURE NEUTOROPHIL: 0.04 K/CU MM
UROBILINOGEN, URINE: 0.2 MG/DL (ref 0.2–1)
WBC # BLD: 12.6 K/CU MM (ref 4–10.5)

## 2024-06-16 PROCEDURE — 6370000000 HC RX 637 (ALT 250 FOR IP): Performed by: FAMILY MEDICINE

## 2024-06-16 PROCEDURE — 72170 X-RAY EXAM OF PELVIS: CPT

## 2024-06-16 PROCEDURE — 93005 ELECTROCARDIOGRAM TRACING: CPT | Performed by: STUDENT IN AN ORGANIZED HEALTH CARE EDUCATION/TRAINING PROGRAM

## 2024-06-16 PROCEDURE — 80048 BASIC METABOLIC PNL TOTAL CA: CPT

## 2024-06-16 PROCEDURE — 94640 AIRWAY INHALATION TREATMENT: CPT

## 2024-06-16 PROCEDURE — 2580000003 HC RX 258: Performed by: FAMILY MEDICINE

## 2024-06-16 PROCEDURE — 73502 X-RAY EXAM HIP UNI 2-3 VIEWS: CPT

## 2024-06-16 PROCEDURE — 70450 CT HEAD/BRAIN W/O DYE: CPT

## 2024-06-16 PROCEDURE — 72125 CT NECK SPINE W/O DYE: CPT

## 2024-06-16 PROCEDURE — 81003 URINALYSIS AUTO W/O SCOPE: CPT

## 2024-06-16 PROCEDURE — 71045 X-RAY EXAM CHEST 1 VIEW: CPT

## 2024-06-16 PROCEDURE — 85025 COMPLETE CBC W/AUTO DIFF WBC: CPT

## 2024-06-16 PROCEDURE — 1200000000 HC SEMI PRIVATE

## 2024-06-16 PROCEDURE — 99285 EMERGENCY DEPT VISIT HI MDM: CPT

## 2024-06-16 RX ORDER — ATORVASTATIN CALCIUM 40 MG/1
40 TABLET, FILM COATED ORAL NIGHTLY
Status: DISCONTINUED | OUTPATIENT
Start: 2024-06-17 | End: 2024-06-17 | Stop reason: HOSPADM

## 2024-06-16 RX ORDER — POTASSIUM CHLORIDE 20 MEQ/1
40 TABLET, EXTENDED RELEASE ORAL PRN
Status: DISCONTINUED | OUTPATIENT
Start: 2024-06-16 | End: 2024-06-17 | Stop reason: HOSPADM

## 2024-06-16 RX ORDER — CARBOXYMETHYLCELLULOSE SODIUM 5 MG/ML
1 SOLUTION/ DROPS OPHTHALMIC PRN
Status: DISCONTINUED | OUTPATIENT
Start: 2024-06-16 | End: 2024-06-17 | Stop reason: HOSPADM

## 2024-06-16 RX ORDER — LANOLIN ALCOHOL/MO/W.PET/CERES
400 CREAM (GRAM) TOPICAL DAILY
Status: DISCONTINUED | OUTPATIENT
Start: 2024-06-17 | End: 2024-06-17 | Stop reason: HOSPADM

## 2024-06-16 RX ORDER — ROPINIROLE 2 MG/1
2 TABLET, FILM COATED ORAL NIGHTLY
Status: DISCONTINUED | OUTPATIENT
Start: 2024-06-17 | End: 2024-06-17

## 2024-06-16 RX ORDER — POLYETHYLENE GLYCOL 3350 17 G/17G
17 POWDER, FOR SOLUTION ORAL DAILY PRN
Status: DISCONTINUED | OUTPATIENT
Start: 2024-06-16 | End: 2024-06-17 | Stop reason: HOSPADM

## 2024-06-16 RX ORDER — DEXTROSE MONOHYDRATE 100 MG/ML
INJECTION, SOLUTION INTRAVENOUS CONTINUOUS PRN
Status: DISCONTINUED | OUTPATIENT
Start: 2024-06-16 | End: 2024-06-17 | Stop reason: HOSPADM

## 2024-06-16 RX ORDER — SODIUM CHLORIDE 0.9 % (FLUSH) 0.9 %
5-40 SYRINGE (ML) INJECTION EVERY 12 HOURS SCHEDULED
Status: DISCONTINUED | OUTPATIENT
Start: 2024-06-17 | End: 2024-06-17 | Stop reason: HOSPADM

## 2024-06-16 RX ORDER — GABAPENTIN 300 MG/1
300 CAPSULE ORAL 2 TIMES DAILY
Status: DISCONTINUED | OUTPATIENT
Start: 2024-06-17 | End: 2024-06-17 | Stop reason: HOSPADM

## 2024-06-16 RX ORDER — ACETAMINOPHEN 650 MG/1
650 SUPPOSITORY RECTAL EVERY 6 HOURS PRN
Status: DISCONTINUED | OUTPATIENT
Start: 2024-06-16 | End: 2024-06-17 | Stop reason: HOSPADM

## 2024-06-16 RX ORDER — AZITHROMYCIN 250 MG/1
500 TABLET, FILM COATED ORAL ONCE
Status: COMPLETED | OUTPATIENT
Start: 2024-06-17 | End: 2024-06-17

## 2024-06-16 RX ORDER — GLIPIZIDE 5 MG/1
5 TABLET ORAL
Status: DISCONTINUED | OUTPATIENT
Start: 2024-06-17 | End: 2024-06-17

## 2024-06-16 RX ORDER — ONDANSETRON 4 MG/1
4 TABLET, ORALLY DISINTEGRATING ORAL EVERY 8 HOURS PRN
Status: DISCONTINUED | OUTPATIENT
Start: 2024-06-16 | End: 2024-06-17 | Stop reason: HOSPADM

## 2024-06-16 RX ORDER — SODIUM CHLORIDE 0.9 % (FLUSH) 0.9 %
5-40 SYRINGE (ML) INJECTION PRN
Status: DISCONTINUED | OUTPATIENT
Start: 2024-06-16 | End: 2024-06-17 | Stop reason: HOSPADM

## 2024-06-16 RX ORDER — VITS A,C,E/LUTEIN/MINERALS 300MCG-200
1 TABLET ORAL 2 TIMES DAILY
Status: DISCONTINUED | OUTPATIENT
Start: 2024-06-17 | End: 2024-06-17 | Stop reason: HOSPADM

## 2024-06-16 RX ORDER — ONDANSETRON 2 MG/ML
4 INJECTION INTRAMUSCULAR; INTRAVENOUS EVERY 6 HOURS PRN
Status: DISCONTINUED | OUTPATIENT
Start: 2024-06-16 | End: 2024-06-17 | Stop reason: HOSPADM

## 2024-06-16 RX ORDER — MAGNESIUM SULFATE IN WATER 40 MG/ML
2000 INJECTION, SOLUTION INTRAVENOUS PRN
Status: DISCONTINUED | OUTPATIENT
Start: 2024-06-16 | End: 2024-06-17 | Stop reason: HOSPADM

## 2024-06-16 RX ORDER — POTASSIUM CHLORIDE 7.45 MG/ML
10 INJECTION INTRAVENOUS PRN
Status: DISCONTINUED | OUTPATIENT
Start: 2024-06-16 | End: 2024-06-17 | Stop reason: HOSPADM

## 2024-06-16 RX ORDER — HYDROCODONE BITARTRATE AND ACETAMINOPHEN 5; 325 MG/1; MG/1
1 TABLET ORAL EVERY 6 HOURS PRN
Status: DISCONTINUED | OUTPATIENT
Start: 2024-06-16 | End: 2024-06-17 | Stop reason: HOSPADM

## 2024-06-16 RX ORDER — ACETAMINOPHEN 325 MG/1
650 TABLET ORAL ONCE
Status: DISCONTINUED | OUTPATIENT
Start: 2024-06-16 | End: 2024-06-17 | Stop reason: HOSPADM

## 2024-06-16 RX ORDER — LOSARTAN POTASSIUM 25 MG/1
25 TABLET ORAL DAILY
Status: DISCONTINUED | OUTPATIENT
Start: 2024-06-17 | End: 2024-06-17 | Stop reason: HOSPADM

## 2024-06-16 RX ORDER — IPRATROPIUM BROMIDE AND ALBUTEROL SULFATE 2.5; .5 MG/3ML; MG/3ML
1 SOLUTION RESPIRATORY (INHALATION) EVERY 4 HOURS PRN
Status: DISCONTINUED | OUTPATIENT
Start: 2024-06-16 | End: 2024-06-17 | Stop reason: HOSPADM

## 2024-06-16 RX ORDER — UBIDECARENONE 100 MG
200 CAPSULE ORAL 2 TIMES DAILY
Status: DISCONTINUED | OUTPATIENT
Start: 2024-06-17 | End: 2024-06-17 | Stop reason: HOSPADM

## 2024-06-16 RX ORDER — INSULIN LISPRO 100 [IU]/ML
0-4 INJECTION, SOLUTION INTRAVENOUS; SUBCUTANEOUS
Status: DISCONTINUED | OUTPATIENT
Start: 2024-06-17 | End: 2024-06-17 | Stop reason: HOSPADM

## 2024-06-16 RX ORDER — METOPROLOL SUCCINATE 25 MG/1
25 TABLET, EXTENDED RELEASE ORAL DAILY
Status: DISCONTINUED | OUTPATIENT
Start: 2024-06-17 | End: 2024-06-17 | Stop reason: HOSPADM

## 2024-06-16 RX ORDER — LANOLIN ALCOHOL/MO/W.PET/CERES
3 CREAM (GRAM) TOPICAL NIGHTLY
Status: DISCONTINUED | OUTPATIENT
Start: 2024-06-17 | End: 2024-06-17 | Stop reason: HOSPADM

## 2024-06-16 RX ORDER — INSULIN LISPRO 100 [IU]/ML
0-4 INJECTION, SOLUTION INTRAVENOUS; SUBCUTANEOUS NIGHTLY
Status: DISCONTINUED | OUTPATIENT
Start: 2024-06-17 | End: 2024-06-17 | Stop reason: HOSPADM

## 2024-06-16 RX ORDER — AZITHROMYCIN 250 MG/1
250 TABLET, FILM COATED ORAL NIGHTLY
Status: DISCONTINUED | OUTPATIENT
Start: 2024-06-17 | End: 2024-06-17

## 2024-06-16 RX ORDER — IBUPROFEN 600 MG/1
1 TABLET ORAL PRN
Status: DISCONTINUED | OUTPATIENT
Start: 2024-06-16 | End: 2024-06-17 | Stop reason: HOSPADM

## 2024-06-16 RX ORDER — ACETAMINOPHEN 325 MG/1
650 TABLET ORAL EVERY 6 HOURS PRN
Status: DISCONTINUED | OUTPATIENT
Start: 2024-06-16 | End: 2024-06-17 | Stop reason: HOSPADM

## 2024-06-16 RX ORDER — SODIUM CHLORIDE 9 MG/ML
INJECTION, SOLUTION INTRAVENOUS PRN
Status: DISCONTINUED | OUTPATIENT
Start: 2024-06-16 | End: 2024-06-17 | Stop reason: HOSPADM

## 2024-06-16 RX ADMIN — MOMETASONE FUROATE AND FORMOTEROL FUMARATE DIHYDRATE 2 PUFF: 100; 5 AEROSOL RESPIRATORY (INHALATION) at 23:54

## 2024-06-16 RX ADMIN — DEXTROSE MONOHYDRATE 250 ML: 100 INJECTION, SOLUTION INTRAVENOUS at 23:51

## 2024-06-16 ASSESSMENT — PAIN SCALES - GENERAL
PAINLEVEL_OUTOF10: 0

## 2024-06-16 ASSESSMENT — PAIN DESCRIPTION - LOCATION: LOCATION: GENERALIZED

## 2024-06-16 ASSESSMENT — PAIN - FUNCTIONAL ASSESSMENT: PAIN_FUNCTIONAL_ASSESSMENT: PREVENTS OR INTERFERES SOME ACTIVE ACTIVITIES AND ADLS

## 2024-06-16 NOTE — ED TRIAGE NOTES
Pt arrives via ems after having multiple falls today. Pt was also seen here yesterday for falls. Pt states she thinks she needs more help at home for mobility and would benefit from ecf placement. Pt denies any injuries from todays falls. Pt is AO x 4 PMS x 4 VSS. Pt placed on monitor and 2L O2 for comfort.

## 2024-06-16 NOTE — PROCEDURES
Procedure Note - Laceration repair:  Questions were sought and answered and verbal consent was given by patient for the procedure. The area was prepped and draped in standard bedside fashion. The wound area was not anesthetized. The wound was explored with No foreign bodies found. The wound was repaired with 5 Steri-Strips.  The wound is around 5 cm in length, D-shaped but otherwise simple. The patient tolerated the procedure well without complications and my repeat neurovascular exam post-procedure is unchanged.    Wound care and scar minimization education was provided. Instructions were given to return for increasing pain, redness, streaking, discharge, or any other worsening or worrisome concerns.

## 2024-06-16 NOTE — ED NOTES
Pt state that she was not wanting to be placed into a nursing home at this time. Pt was educated on safety at home, and accepting extra help as needed. Pt stated she would speak with her family and find a facility that suits her. Pt and family educated on the need to return to the ED if there is a change or worsening in her condition.   

## 2024-06-16 NOTE — ED PROVIDER NOTES
[facility]  Emergency Department Encounter    Pt Name:Jayshree Tate  MRN: [id number]  Birthdate 1935  Date of evaluation: 6/16/24  PCP:  Jennifer Doyle MD       CHIEF COMPLAINT       Chief Complaint   Patient presents with    Fall       HISTORY OF PRESENT ILLNESS     Jayshree Tate is a 88 y.o. female who presents with fall.    Patient has a history of A-fib on Eliquis, CAD, diabetes, previous GI bleed, hypertension, hyperlipidemia, bilateral multi extremity arthritis.    Patient presents after a fall.  Earlier today patient fell in the dining room.  She states she tried to get up and then her legs were weak and gave out on her she did strike her head on a piece of furniture.  She states that she landed on her butt.  She did not lose consciousness.  She had the squad get her up but refused transport.  Later in the day patient was trying to get into the bathtub and then she stated that her legs were weak and gave out.  She only laid there briefly as family was arriving to deliver some items to her.  Family states that they found her with her right leg twisted behind her butt and laying on her left hip.  Patient states that she did not hit her head that time.  Does have a mild right headache from where she hit the furniture earlier in the day.    Her main pain today is left ankle pain as well as a headache.  No hip pain.  No back pain.    Family is here stating that she is unsafe to live at home.  She was evaluated in the emergency department multiple times this week for falls.  Patient is agreeable to admission for placement.    PAST MEDICAL / SURGICAL / SOCIAL / FAMILY HISTORY      has a past medical history of Arthritis, Atrial fibrillation, chronic (HCC), CAD (coronary artery disease), Chronic midline low back pain without sciatica, Claustrophobia, Colonoscopy refused, COVID-19 virus infection, DM (diabetes mellitus), type 2 (HCC), Dupuytren's contracture of right hand, Endometrial stripe  pain.    CT head and cervical spine unremarkable.  Chest x-ray shows a small left pleural effusion.  Pelvis x-ray interpreted by me shows no obvious fracture however radiology notes difficult to assess if there is a right intertrochanteric fracture.  Clinically have low concern as her hips are nontender no pain on passive internal/external rotation.  I did order a dedicated right hip x-ray which is pending at this time.    During her emergency department course she did have a episode of hypotension which I believe is due to cuff positioning as it was repeated and normal.  While sleeping her oxygen did decrease and she was placed on supplemental oxygen with resolution of hypoxia.    Lab work reviewed and shows mild anemia that is stable as well as slightly elevated BNP at 500.  Otherwise unremarkable.    I called and discussed the case with the hospitalist who agrees to admit the patient pending possible need of transfer if the right hip x-ray comes back positive for hip fracture.  Patient was signed out while awaiting x-ray imaging of hip to return.    Problems Addressed:  Ambulatory dysfunction: chronic illness or injury  Class 2 obesity with body mass index (BMI) of 38.0 to 38.9 in adult, unspecified obesity type, unspecified whether serious comorbidity present: chronic illness or injury  Fall, initial encounter: acute illness or injury    Amount and/or Complexity of Data Reviewed  Labs: ordered.  Radiology: ordered.  ECG/medicine tests: ordered.    Risk  OTC drugs.  Decision regarding hospitalization.         ED Course as of 06/17/24 1233   Sun Jun 16, 2024   1751 EKG shows normal sinus rhythm normal rate normal axis, no ST elevation or ST depression T waves unremarkable.  Intervals normal.  Q wave in lead III and aVF.  When compared to EKG on 11/24/2023 no acute findings. [CS]      ED Course User Index  [CS] Modesto Guajardo,        CONSULTS:  IP CONSULT TO CASE MANAGEMENT    CRITICAL CARE:  Total

## 2024-06-17 VITALS
OXYGEN SATURATION: 98 % | DIASTOLIC BLOOD PRESSURE: 60 MMHG | HEART RATE: 64 BPM | SYSTOLIC BLOOD PRESSURE: 96 MMHG | WEIGHT: 227 LBS | HEIGHT: 64 IN | RESPIRATION RATE: 16 BRPM | TEMPERATURE: 97.9 F | BODY MASS INDEX: 38.76 KG/M2

## 2024-06-17 LAB
ALBUMIN SERPL-MCNC: 2.9 GM/DL (ref 3.4–5)
ALP BLD-CCNC: 51 IU/L (ref 40–129)
ALT SERPL-CCNC: <5 U/L (ref 10–40)
ANION GAP SERPL CALCULATED.3IONS-SCNC: 11 MMOL/L (ref 7–16)
AST SERPL-CCNC: 15 IU/L (ref 15–37)
BASOPHILS ABSOLUTE: 0 K/CU MM
BASOPHILS RELATIVE PERCENT: 0.2 % (ref 0–1)
BILIRUB SERPL-MCNC: 0.4 MG/DL (ref 0–1)
BUN SERPL-MCNC: 17 MG/DL (ref 6–23)
CALCIUM SERPL-MCNC: 8.8 MG/DL (ref 8.3–10.6)
CHLORIDE BLD-SCNC: 105 MMOL/L (ref 99–110)
CO2: 26 MMOL/L (ref 21–32)
CREAT SERPL-MCNC: 0.5 MG/DL (ref 0.6–1.1)
DIFFERENTIAL TYPE: ABNORMAL
EKG ATRIAL RATE: 59 BPM
EKG DIAGNOSIS: NORMAL
EKG P AXIS: 68 DEGREES
EKG P-R INTERVAL: 142 MS
EKG Q-T INTERVAL: 434 MS
EKG QRS DURATION: 94 MS
EKG QTC CALCULATION (BAZETT): 429 MS
EKG R AXIS: 14 DEGREES
EKG T AXIS: 1 DEGREES
EKG VENTRICULAR RATE: 59 BPM
EOSINOPHILS ABSOLUTE: 0 K/CU MM
EOSINOPHILS RELATIVE PERCENT: 0.4 % (ref 0–3)
GFR, ESTIMATED: >90 ML/MIN/1.73M2
GLUCOSE BLD-MCNC: 113 MG/DL (ref 70–99)
GLUCOSE BLD-MCNC: 118 MG/DL (ref 70–99)
GLUCOSE BLD-MCNC: 144 MG/DL (ref 70–99)
GLUCOSE BLD-MCNC: 38 MG/DL (ref 70–99)
GLUCOSE BLD-MCNC: 73 MG/DL (ref 70–99)
GLUCOSE BLD-MCNC: 90 MG/DL (ref 70–99)
GLUCOSE SERPL-MCNC: 82 MG/DL (ref 70–99)
HCT VFR BLD CALC: 34.4 % (ref 37–47)
HEMOGLOBIN: 10.2 GM/DL (ref 12.5–16)
HIGH SENSITIVE C-REACTIVE PROTEIN: 28.5 MG/L (ref 0–5)
IMMATURE NEUTROPHIL %: 0.2 % (ref 0–0.43)
LYMPHOCYTES ABSOLUTE: 1.8 K/CU MM
LYMPHOCYTES RELATIVE PERCENT: 18.7 % (ref 24–44)
MCH RBC QN AUTO: 25.6 PG (ref 27–31)
MCHC RBC AUTO-ENTMCNC: 29.7 % (ref 32–36)
MCV RBC AUTO: 86.4 FL (ref 78–100)
MONOCYTES ABSOLUTE: 2.1 K/CU MM
MONOCYTES RELATIVE PERCENT: 21.9 % (ref 0–4)
NEUTROPHILS ABSOLUTE: 5.7 K/CU MM
NEUTROPHILS RELATIVE PERCENT: 58.6 % (ref 36–66)
PDW BLD-RTO: 18.4 % (ref 11.7–14.9)
PLATELET # BLD: 184 K/CU MM (ref 140–440)
PMV BLD AUTO: 9.1 FL (ref 7.5–11.1)
POTASSIUM SERPL-SCNC: 4.1 MMOL/L (ref 3.5–5.1)
PRO-BNP: 513.2 PG/ML
PROCALCITONIN SERPL-MCNC: 0.06 NG/ML
RBC # BLD: 3.98 M/CU MM (ref 4.2–5.4)
SODIUM BLD-SCNC: 142 MMOL/L (ref 135–145)
TOTAL IMMATURE NEUTOROPHIL: 0.02 K/CU MM
TOTAL PROTEIN: 5.4 GM/DL (ref 6.4–8.2)
WBC # BLD: 9.7 K/CU MM (ref 4–10.5)

## 2024-06-17 PROCEDURE — 2580000003 HC RX 258: Performed by: FAMILY MEDICINE

## 2024-06-17 PROCEDURE — 97535 SELF CARE MNGMENT TRAINING: CPT

## 2024-06-17 PROCEDURE — 93010 ELECTROCARDIOGRAM REPORT: CPT | Performed by: INTERNAL MEDICINE

## 2024-06-17 PROCEDURE — 97116 GAIT TRAINING THERAPY: CPT

## 2024-06-17 PROCEDURE — 6370000000 HC RX 637 (ALT 250 FOR IP): Performed by: FAMILY MEDICINE

## 2024-06-17 PROCEDURE — 6370000000 HC RX 637 (ALT 250 FOR IP): Performed by: PHYSICIAN ASSISTANT

## 2024-06-17 PROCEDURE — 82962 GLUCOSE BLOOD TEST: CPT

## 2024-06-17 PROCEDURE — 84145 PROCALCITONIN (PCT): CPT

## 2024-06-17 PROCEDURE — 94761 N-INVAS EAR/PLS OXIMETRY MLT: CPT

## 2024-06-17 PROCEDURE — 83880 ASSAY OF NATRIURETIC PEPTIDE: CPT

## 2024-06-17 PROCEDURE — 86141 C-REACTIVE PROTEIN HS: CPT

## 2024-06-17 PROCEDURE — 85025 COMPLETE CBC W/AUTO DIFF WBC: CPT

## 2024-06-17 PROCEDURE — 94640 AIRWAY INHALATION TREATMENT: CPT

## 2024-06-17 PROCEDURE — 97530 THERAPEUTIC ACTIVITIES: CPT

## 2024-06-17 PROCEDURE — 2500000003 HC RX 250 WO HCPCS: Performed by: PHYSICIAN ASSISTANT

## 2024-06-17 PROCEDURE — 97162 PT EVAL MOD COMPLEX 30 MIN: CPT

## 2024-06-17 PROCEDURE — 2700000000 HC OXYGEN THERAPY PER DAY

## 2024-06-17 PROCEDURE — 80053 COMPREHEN METABOLIC PANEL: CPT

## 2024-06-17 PROCEDURE — 97166 OT EVAL MOD COMPLEX 45 MIN: CPT

## 2024-06-17 RX ORDER — GABAPENTIN 300 MG/1
CAPSULE ORAL
Qty: 12 CAPSULE | Refills: 0 | Status: SHIPPED | OUTPATIENT
Start: 2024-06-17 | End: 2025-01-11

## 2024-06-17 RX ORDER — METOPROLOL SUCCINATE 25 MG/1
25 TABLET, EXTENDED RELEASE ORAL DAILY
Qty: 90 TABLET | Refills: 0
Start: 2024-06-17

## 2024-06-17 RX ORDER — LANOLIN ALCOHOL/MO/W.PET/CERES
3 CREAM (GRAM) TOPICAL NIGHTLY
Refills: 3 | COMMUNITY
Start: 2024-06-17

## 2024-06-17 RX ORDER — HYDROCODONE BITARTRATE AND ACETAMINOPHEN 5; 325 MG/1; MG/1
1 TABLET ORAL EVERY 8 HOURS PRN
Qty: 9 TABLET | Refills: 0 | Status: SHIPPED | OUTPATIENT
Start: 2024-06-17 | End: 2024-06-20

## 2024-06-17 RX ORDER — ROPINIROLE 2 MG/1
2 TABLET, FILM COATED ORAL 2 TIMES DAILY
Status: DISCONTINUED | OUTPATIENT
Start: 2024-06-17 | End: 2024-06-17 | Stop reason: HOSPADM

## 2024-06-17 RX ORDER — LOSARTAN POTASSIUM 25 MG/1
25 TABLET ORAL DAILY
Qty: 90 TABLET | Refills: 1
Start: 2024-06-17 | End: 2024-06-17 | Stop reason: HOSPADM

## 2024-06-17 RX ADMIN — ROPINIROLE HYDROCHLORIDE 2 MG: 2 TABLET, FILM COATED ORAL at 10:16

## 2024-06-17 RX ADMIN — GABAPENTIN 300 MG: 300 CAPSULE ORAL at 00:33

## 2024-06-17 RX ADMIN — MOMETASONE FUROATE AND FORMOTEROL FUMARATE DIHYDRATE 2 PUFF: 100; 5 AEROSOL RESPIRATORY (INHALATION) at 07:07

## 2024-06-17 RX ADMIN — ROPINIROLE HYDROCHLORIDE 2 MG: 2 TABLET, FILM COATED ORAL at 00:33

## 2024-06-17 RX ADMIN — Medication 1 TABLET: at 08:37

## 2024-06-17 RX ADMIN — Medication 200 MG: at 08:40

## 2024-06-17 RX ADMIN — HYDROCODONE BITARTRATE AND ACETAMINOPHEN 1 TABLET: 5; 325 TABLET ORAL at 05:17

## 2024-06-17 RX ADMIN — HYDROCODONE BITARTRATE AND ACETAMINOPHEN 1 TABLET: 5; 325 TABLET ORAL at 16:31

## 2024-06-17 RX ADMIN — Medication 200 MG: at 00:33

## 2024-06-17 RX ADMIN — SODIUM CHLORIDE, PRESERVATIVE FREE 10 ML: 5 INJECTION INTRAVENOUS at 12:19

## 2024-06-17 RX ADMIN — APIXABAN 5 MG: 5 TABLET, FILM COATED ORAL at 08:40

## 2024-06-17 RX ADMIN — METOPROLOL SUCCINATE 25 MG: 25 TABLET, FILM COATED, EXTENDED RELEASE ORAL at 08:41

## 2024-06-17 RX ADMIN — Medication 3 MG: at 00:33

## 2024-06-17 RX ADMIN — APIXABAN 2.5 MG: 2.5 TABLET, FILM COATED ORAL at 00:33

## 2024-06-17 RX ADMIN — Medication 400 MG: at 08:41

## 2024-06-17 RX ADMIN — MICONAZOLE NITRATE: 20 POWDER TOPICAL at 10:12

## 2024-06-17 RX ADMIN — CHOLECALCIFEROL TAB 125 MCG (5000 UNIT) 5000 UNITS: 125 TAB at 08:37

## 2024-06-17 RX ADMIN — ATORVASTATIN CALCIUM 40 MG: 40 TABLET, FILM COATED ORAL at 00:33

## 2024-06-17 RX ADMIN — GABAPENTIN 300 MG: 300 CAPSULE ORAL at 08:37

## 2024-06-17 RX ADMIN — Medication 1 TABLET: at 00:33

## 2024-06-17 RX ADMIN — LOSARTAN POTASSIUM 25 MG: 25 TABLET, FILM COATED ORAL at 08:37

## 2024-06-17 RX ADMIN — AZITHROMYCIN DIHYDRATE 500 MG: 250 TABLET ORAL at 00:33

## 2024-06-17 RX ADMIN — POLYETHYLENE GLYCOL 3350 17 G: 17 POWDER, FOR SOLUTION ORAL at 08:36

## 2024-06-17 ASSESSMENT — PAIN DESCRIPTION - PAIN TYPE: TYPE: CHRONIC PAIN

## 2024-06-17 ASSESSMENT — PAIN DESCRIPTION - ONSET: ONSET: AWAKENED FROM SLEEP

## 2024-06-17 ASSESSMENT — PAIN DESCRIPTION - ORIENTATION
ORIENTATION: RIGHT;LEFT
ORIENTATION: RIGHT;LEFT

## 2024-06-17 ASSESSMENT — PAIN SCALES - GENERAL
PAINLEVEL_OUTOF10: 2
PAINLEVEL_OUTOF10: 8
PAINLEVEL_OUTOF10: 7

## 2024-06-17 ASSESSMENT — PAIN DESCRIPTION - DESCRIPTORS
DESCRIPTORS: ACHING;DISCOMFORT
DESCRIPTORS: ACHING;DISCOMFORT

## 2024-06-17 ASSESSMENT — PAIN DESCRIPTION - LOCATION
LOCATION: LEG
LOCATION: LEG

## 2024-06-17 ASSESSMENT — PAIN DESCRIPTION - FREQUENCY: FREQUENCY: CONTINUOUS

## 2024-06-17 NOTE — DISCHARGE SUMMARY
V2.0  Discharge Summary    Name:  Jayshree Tate /Age/Sex: 1935 (88 y.o. female)   Admit Date: 2024  Discharge Date: 24    MRN & CSN:  [id number] & [id number] Encounter Date and Time 24 4:20 PM EDT    Attending:  Gladys Bahena MD Discharging Provider: Diana Jalloh PA-C       Hospital Course:     Brief HPI: Jayshree Tate is a 88 y.o. female with past medical history of T2DM, Chronic respiratory failure, Afib, HLD, HTN, and polyarthritis who presents with Pleural effusion.    Problems addressed during this hospitalization:     Acute on chronic respiratory failure with hypoxia  -Required up to 3L NC in ED to keep O2 sat >90%.   - currently 96% on 2L NC, wears 2L NC at home baseline.   - Afebrile, not tachypneic, no crackles on auscultation.   - CXR shows small left pleural effusion (seen on prior imaging)  - Leukocytosis likely secondary to injury.   - Continue oxygen and CPAP at night.   - Discontinue azithromycin due to low suspicion for pneumonia.   - remained on baseline O2 at discharge      Multiple falls  Generalized weakness  - Multiple falls within the past few days.   - On Eliquis, denies head injury.   - Pelvic, C-spine, head, and R hip imaging show no evidence of fractures.   - concern for polypharmacy and hypoglycemia contributing to falls. Stopped Flexeril, limit narcotics.  - PT/OT consulted, recommended SNF. Discharged to [facility].      Hypoglycemia   - POCT glucose 22 in ED   - likely due to glipizide and patient reports no PO intake after breakfast yesterday   - improved this AM. Glipizide stopped. Encouraged PO intake.   - hypoglycemia management protocol      T2DM with diabetic neuropathy  - Hg A1c 7.8 on 24  - stopped glipizide due to hypoglycemia   - encouraged facility to monitor glucose levels and can consider starting alternative agent if warranted.     Chronic A-Fib  - sinus bradycardia on  EKG  - continue home metoprolol with hold parameters   -  continue  \"BLOODCULT2\"  Organism:   Lab Results   Component Value Date/Time    [facility] 03/01/2018 12:30 AM       Time Spent Discharging patient 35 minutes    Electronically signed by Diana Jalloh PA-C on 6/17/2024 at 4:43 PM

## 2024-06-17 NOTE — PROGRESS NOTES
Notified by Dr. Matta at 2045 of possible admission, however, Dr. Matta notices right femoral head non-displaced fracture while on phone with this provider. States ortho will be consulted prior to [facility] acceptance.

## 2024-06-17 NOTE — CARE COORDINATION
CM met with the patient to notify her that her son-in-law called and requested to speak with case management, patient gave this CM permission to speak with her son-in-law.  CM again spoke with the patient regarding SNF placement and she stated that she is agreeable to placement and her first choice would be [facility].  CM will follow.     9:06 AM  CM attempted to call Nikki at [facility] regarding bed availability and possible referral but there was no answer.  MIKY left a voicemail requesting a return call.  PT/OT evaluations remain pending at this time.  CM will follow.       10:05 AM  CM received a voicemail from Nikki at [facility] stating she is aware of the referral and will continue to monitor but until the PT/OT evaluations are available a determination cannot be made.  PT/OT evaluations remain pending at this time.  CM will follow.      2:28 PM  MIKY spoke with Nikki at [facility] to notify her that the patient's PT/OT evaluations are now available.  Nikki reviewed the evaluations and advised they can accept the patient once deemed medically stable and the insurance pre-cert has been obtained.  CM will follow.     2:34 PM  MIKY notified Maribel at [facility] that the patient's insurance pre-cert can be initiated today for admission to [facility].  CM will follow.

## 2024-06-17 NOTE — PROGRESS NOTES
Patient c/o hunger, blood sugar checked with a result of 32. Rechecked with a result of 22. Hospitalist Aj informed of results and gives a verbal order for D10 IV per the hypoglycemic protocol. The protocol calls for 250ml of D10 to be infused at a rate 937.4ml/hr. The Alaris pump would only allow the pump to infuse at 250ml/hr, Hospitalist aware. Patient was also given vegetable soup and cracker which she ate 100%.

## 2024-06-17 NOTE — PLAN OF CARE
Problem: Safety - Adult  Goal: Free from fall injury  Outcome: Progressing     Problem: Pain  Goal: Verbalizes/displays adequate comfort level or baseline comfort level  Outcome: Progressing     Problem: ABCDS Injury Assessment  Goal: Absence of physical injury  Outcome: Progressing     Problem: Respiratory - Adult  Goal: Achieves optimal ventilation and oxygenation  Outcome: Progressing     Problem: Skin/Tissue Integrity - Adult  Goal: Incisions, wounds, or drain sites healing without S/S of infection  Outcome: Progressing     Problem: Musculoskeletal - Adult  Goal: Return mobility to safest level of function  Outcome: Progressing  Goal: Maintain proper alignment of affected body part  Outcome: Progressing  Goal: Return ADL status to a safe level of function  Outcome: Progressing     Problem: Infection - Adult  Goal: Absence of infection at discharge  Outcome: Progressing     Problem: Metabolic/Fluid and Electrolytes - Adult  Goal: Glucose maintained within prescribed range  Outcome: Progressing

## 2024-06-17 NOTE — CARE COORDINATION
06/17/24 0700   Service Assessment   Patient Orientation Alert and Oriented   Cognition Alert   History Provided By Patient   Primary Caregiver Self   Support Systems Family Members;Children   Patient's Healthcare Decision Maker is: Legal Next of Kin   PCP Verified by CM Yes   Prior Functional Level Assistance with the following:;Housework;Shopping;Mobility   Current Functional Level Assistance with the following:;Housework;Shopping;Mobility;Bathing;Dressing;Toileting;Cooking   Can patient return to prior living arrangement Unknown at present   Ability to make needs known: Good   Family able to assist with home care needs: Other (comment)  (Limited assistance from family)   Financial Resources Medicare;Medicaid   Social/Functional History   Lives With Alone   Type of Home Apartment   Home Equipment Oxygen;Rollator   Receives Help From Family   Active  No   Patient's  Info Family   Discharge Planning   Type of Residence Apartment   Living Arrangements Alone   Current Services Prior To Admission Oxygen Therapy   Potential Assistance Needed Skilled Nursing Facility   Patient expects to be discharged to: Skilled nursing facility   Services At/After Discharge   Services At/After Discharge Skilled Nursing Facility (SNF)   Mode of Transport at Discharge Other (see comment)  (TBD)   Confirm Follow Up Transport Family   Condition of Participation: Discharge Planning   The Plan for Transition of Care is related to the following treatment goals: Patient agreeable to placement for skilled therapy   The Patient and/or Patient Representative was provided with a Choice of Provider? Patient   The Patient and/Or Patient Representative agree with the Discharge Plan? Yes   Freedom of Choice list was provided with basic dialogue that supports the patient's individualized plan of care/goals, treatment preferences, and shares the quality data associated with the providers?  Yes     This CM is familiar with the patient from  numerous hospitalizations and swing bed admissions.  CM met with the patient to initiate discharge planning, patient resting quietly in bed.  Patient lives alone in her apartment, has medical coverage & PCP, stated that she was mostly independent until recently, and is not an active  (family provides transportation as needed).  Patient stated that she uses a rollator at home and requires home oxygen at bedtime only (2 l/nc).  Patient was receiving [facility] services prior to admission through [facility] and also [facility] ([facility]  is Domi [place], [phone]).  Patient stated that she has had multiple falls at home as several recent visits to the ED prior to being admitted.  Patient stated that she has very limited vision in both eyes as well.  Patient stated \"I guess the plan is for me to stay here and get therapy so I can walk again and go back home\".  CM had a long discussion with patient regarding her limited ability to care for herself at home alone and her family's belief that she is unsafe at home alone.  Patient confirmed that her family has felt that she is unsafe at home alone for a long time but she does not want to be in a facility until she \"has to be\".  CM spoke with patient regarding placement in a traditional skilled facility and that she may need to consider transition into LTC or assisted living as it seems that living at home alone is not safe for her, patient expressed agreement.  PT/OT evaluations are pending at this time, patient's medical insurance will require pre-certification if SNF placement is recommended.  CM will follow.

## 2024-06-17 NOTE — PROGRESS NOTES
Report called to Soraya at [facility]. Son in law Don notified of transfer to [facility]. To front door per cart, transferred to [facility] via [facility].

## 2024-06-17 NOTE — PROGRESS NOTES
Facility/Department: [facility]  Occupational Therapy Initial Assessment    Name Jayshree Tate    1935   MRN [id number]   Date of Service 2024   Patient Room  010/010-01     Patient Diagnoses The primary encounter diagnosis was Fall, initial encounter. Diagnoses of Ambulatory dysfunction and Class 2 obesity with body mass index (BMI) of 38.0 to 38.9 in adult, unspecified obesity type, unspecified whether serious comorbidity present were also pertinent to this visit.   Past Medical History  has a past medical history of Arthritis, Atrial fibrillation, chronic (HCC), CAD (coronary artery disease), Chronic midline low back pain without sciatica, Claustrophobia, Colonoscopy refused, COVID-19 virus infection, DM (diabetes mellitus), type 2 (HCC), Dupuytren's contracture of right hand, Endometrial stripe increased, Gastrointestinal hemorrhage, Glaucoma, H/O Doppler ultrasound, H/O echocardiogram, H/O echocardiogram, H/O echocardiogram, History of nuclear stress test, HTN (hypertension), Hyperlipidemia, Kidney stone, MI (myocardial infarction) (HCC), Obesity, Open wound of right foot, Parainfluenza infection, Pneumonia of right lower lobe due to infectious organism, Vertigo, and WD-Traumatic ulcer of foot, right, with fat layer exposed (HCC).   Past Surgical History  has a past surgical history that includes Cataract removal (Bilateral); Inguinal hernia repair (Left, 45 yrs ago); Lumbar spine surgery (N/A, 2022); and Spine surgery (N/A, 2022).       Discharge Recommendations  Continue to assess pending progress or Skilled Nursing Facility- DIMITRIS appropriate  Equipment:  will continue to assess pending d/c placement    Assessment  Conditions Requiring Skilled Therapeutic Intervention: Decreased functional mobility, Decreased strength, Decreased endurance, Decreased ADL status, Decreased high level ADLs/IADLs,   Assessment of Evaluation: Patient is a 88 y.o. female who presents to [facility] inpt  (pt reports she does her own shower and HHA is not needed to assist)  Homemaking Assistance: Needs assistance  Meal Prep: Stand by (global meals, microwaved)  Laundry: Moderate  Vacuuming: Total  Cleaning: Total  Ambulation Assistance: Independent  Transfer Assistance: Independent  Active : No  Patient's  Info: Family    Examination of body systems (includes body structures/functions, activity/participation limitations)  Observation:  Pt in bed with nurse finishing wound care to legs upon arrival and agreeable to therapy  Vision: impaired, pt reports she cannot see faces or details but sees shadows, cloudy, she reports from a medication side effect she took for several years. Glasses do not help.  Hearing: WFL  Cardiopulmonary:   O2 needs 3 L NC  Cognition: WFL     Musculoskeletal  ROM BUE: WFL  Strength BUE: Grossly 4+/5  Neuro:  WFL      Mobility  Bed mobility: Stand By Assistance observed pt supine to sit eob, then pt to chair and did not observe back to bed or positioning, pain limiting in LEs  Transfers: Contact Guard Assistance once standing, needed min- mod assist sit to stand with bed height raised to help as well.  assist with rolling walker, gait belt, and o2 tank, pt unable to manage o2 cord at this time with ambulation  Toilet transfer:Minimal Assistanceverbal cues for positioning prior to sitting,  Tub transfer: N/A  Sitting balance:  good -  Standing balance:  fair+       ADLs  Self Feeding:  set up- low vision  Grooming: Minimal Assistance  Bathing: Moderate Assistance UE max A LE  Toileting: Moderate Assistance- wearing incontinence underwear and needs assist to manage,   UB Dressing: Minimal Assistance hosp gown  LB Dressing: Minimal Assistance hosp socks  Safety Awareness:  assist to manage and be aware of o2 cord and walker placement cues needed at times.  Home Management: Maximum Assistance  Community ADLs: Total Dependant        AM-PAC 6 click short form for inpatient daily  activity:   How much help from another person does the patient currently need... Unable  Dep A Lot  Max A A Lot   Mod A A Little  Min A A Little   CGA  SBA None   Mod I  Indep  Sup   1.  Putting on and taking off regular lower body clothing? [] 1    [] 2   [x] 2   [] 3   [] 3   [] 4      2. Bathing (including washing, rinsing, drying)? [] 1   [x] 2   [] 2 [] 3 [] 3 [] 4   3. Toileting, which includes using toilet, bedpan, or urinal? [] 1    [] 2   [x] 2   [] 3   [] 3   [] 4     4. Putting on and taking off regular upper body clothing? [] 1   [] 2   [] 2   [x] 3   [] 3    [] 4      5. Taking care of personal grooming such as brushing teeth? [] 1   [] 2    [] 2 [x] 3    [] 3   [] 4      6. Eating meals?   [] 1   [] 2   [] 2   [] 3   [x] 3   [] 4      Raw Score:  15     [24=0% impaired(CH), 23=1-19%(CI), 20-22=20-39%(CJ), 15-19=40-59%(CK), 10-14=60-79%(CL), 7-9=80-99%(CM), 6=100%(CN)]         Goals  Patient Goals:  Time frame: 1 week or until discharge  Patient will complete functional transfers with sba  using AE PRN and with good safety awareness.  Patient will complete all toilet tasks with sba  Patient will complete bed mobility with sba adhering to fall precautions and good safety awareness for OOB ADLs using AE PRN  Patient will complete UB ADLs with sba  Patient will complete LB ADLs with min assist I using AE PRN  Pt will complete 10+ min of BUE therex to increase strength/endurance for ADLs/functional mobility    Education  Given to: Patient  Education provided: Role of therapy, Plan of care, Transfer training, Energy conservation, and Fall Safety   Education method: Verbal and Demonstration      Therapy Time   Individual Co-Eval   Time In  11:00   Time Out  11:40   Total Time         Signed: JEREL Storey/L  [id number],   6/17/2024, 1:34 PM

## 2024-06-17 NOTE — PROGRESS NOTES
V2.0  [facility] Hospitalist Progress Note      Name:  Jayshree Tate /Age/Sex: 1935  (88 y.o. female)   MRN & CSN:  [id number] & [id number] Encounter Date/Time: 2024 10:29 AM EDT    Location:   PCP: Jennifer Doyle MD       Hospital Day: 2    Assessment and Plan:   Jayshree Tate is a 88 y.o. female with past medical history of T2DM, Chronic respiratory failure, Afib, HLD, HTN, and polyarthritis who presents with Pleural effusion    Acute on chronic respiratory failure with hypoxia  - currently 96% on 2L NC, wears 2L NC at home baseline.  - Afbrile, not tachypneic, no crackles on auscultation.   - CXR shows small left pleural effusion (seen on prior imaging)  - Leukocytosis likely secondary to injury.   - Continue oxygen and CPAP at night.   - Discontinue azithromycin due to low suspicion for pneumonia.   - Continue to monitor respiratory status.     Multiple falls  Generalized weakness  - Multiple falls within the past few days.   - Open wounds BLE secondary to recent falls.   - On Eliquis, denies head injury.   - Pelvic, C-spine, head, and R hip imaging show no evidence of fractures.   - concern for polypharmacy and hypoglycemia contributing to falls. Stopped Flexeril, limit narcotics.   - PT/OT to follow.   - Discussed consideration of long term care facility for safety.  following.     Hypoglycemia   - POCT glucose 22 in ED   - likely due to glipizide and patient reports no PO intake after breakfast yesterday   - improved this AM. Glipizide stopped. Encouraged PO intake.   - hypoglycemia management protocol     T2DM with diabetic neuropathy  - Hg A1c 7.8 on 24  - Low carb diet   - Discontinue glipizide  - SSI with low dose Humalog coverage     Chronic A-Fib  - sinus bradycardia on  EKG  - continue home metoprolol   -  continue home Eliquis     HLD  -Continue statin     Essential HTN  - BP occasionally soft  -Continue Losartan with hold parameters     Chronic Back pain  seen.  There are no bony lesions. CERVICAL SPINE:  There is no evidence of fracture or subluxation.  There is no prevertebral soft tissue swelling. Multilevel degenerative disc disease and facet arthropathy throughout the cervical spine. The spinal canal appears patent.     1.  No acute intracranial findings. 2.  No acute fracture or subluxation of the cervical spine. Electronically signed by JasonDB    CT CERVICAL SPINE WO CONTRAST    Result Date: 6/16/2024  CT of the Head and Cervical Spine INDICATION:  Trauma Multiple axial images obtained through the brain without intravenous contrast. High resolution axial images were then obtained through the cervical spine. Coronal and sagittal reformats were also evaluated.  Radiation dose reduction techniques were used for this study:  All CT scans performed at this facility use one or all of the following: Automated exposure control, adjustment of the mA and/or kVp according to patient's size, iterative reconstruction. HEAD:  Focal encephalomalacia of the right frontal and parietal lobes and the bilateral occipital lobes. No evidence of acute hemorrhage or infarction. The ventricles are normal in size. There are no extra-axial fluid collections. No masses are seen.  There are no bony lesions. CERVICAL SPINE:  There is no evidence of fracture or subluxation.  There is no prevertebral soft tissue swelling. Multilevel degenerative disc disease and facet arthropathy throughout the cervical spine. The spinal canal appears patent.     1.  No acute intracranial findings. 2.  No acute fracture or subluxation of the cervical spine. Electronically signed by JasonDB    XR PELVIS (1-2 VIEWS)    Result Date: 6/16/2024  EXAMINATION: X-ray pelvis TECHNIQUE: 2 views CLINICAL HISTORY: Pain FINDINGS: Limited study to evaluate the right femoral neck, possibly nondisplaced fracture of the right femoral neck. No other fracture or dislocation seen.     1. As above. Electronically signed by  Anh Marcus    XR CHEST PORTABLE    Result Date: 6/16/2024  Chest X-ray INDICATION: Fall. COMPARISON: 11/26/2023 TECHNIQUE: AP/PA view of the chest was obtained. FINDINGS: Small left effusion. No consolidation or pneumothorax.  The heart size is normal.  The bony thorax is intact.      Small left pleural effusion. Electronically signed by Kana Wallis

## 2024-06-17 NOTE — DISCHARGE INSTR - COC
Continuity of Care Form    Patient Name: Jayshree Tate   :  1935  MRN:  [id number]    Admit date:  2024  Discharge date:  2024     Code Status Order: Full Code   Advance Directives:     Admitting Physician:  Gladys Bahena MD  PCP: Jennifer Doyle MD    Discharging Nurse: Jane Alfonso RN   Discharging Hospital Unit/Room#: 010/010-01  Discharging Unit Phone Number: [phone]    Emergency Contact:   Extended Emergency Contact Information  Primary Emergency Contact: Ronnie Mancera  Home Phone: [phone]  Mobile Phone: [phone]  Relation: Son-in-Law  Secondary Emergency Contact: Lenora Tate  Home Phone: [phone]  Relation: Child  Preferred language: English   needed? No    Past Surgical History:  Past Surgical History:   Procedure Laterality Date    CATARACT REMOVAL Bilateral     ,    INGUINAL HERNIA REPAIR Left 45 yrs ago    LUMBAR SPINE SURGERY N/A 2022    L1-4 LAMINECTOMIES performed by Stacy Laguerre MD at [facility] OR    SPINE SURGERY N/A 2022    L2 AND L3 BILATERAL BALLOON KYPHOPLASTY AND L2 AND L3 NEEDLE BIOPSY performed by Stacy Laguerre MD at [facility] OR       Immunization History:   Immunization History   Administered Date(s) Administered    COVID-19, MODERNA BLUE border, Primary or Immunocompromised, (age 12y+), IM, 100 mcg/0.5mL 2021, 2021    COVID-19, PFIZER GRAY top, DO NOT Dilute, (age 12 y+), IM, 30 mcg/0.3 mL 2022, 08/15/2022    COVID-19, US Vaccine, Vaccine Unspecified 2021, 2021    Influenza Vaccine, unspecified formulation 2016    Influenza Virus Vaccine 2014, 2016    Influenza Whole 2016    Influenza, FLUAD, (age 65 y+), Adjuvanted, 0.5mL 10/01/2020    Influenza, High Dose (Fluzone 65 yrs and older) 10/20/2017, 10/01/2018, 2019    Pneumococcal, PCV-13, PREVNAR 13, (age 6w+), IM, 0.5mL 2017    Pneumococcal, PPSV23, PNEUMOVAX 23, (age 2y+), SC/IM, 0.5mL 2014    TDaP,  last 3 completed shifts:  In: 240 [P.O.:240]  Out: 200 [Urine:200]    Safety Concerns:     History of Falls (last 30 days) and At Risk for Falls    Impairments/Disabilities:      None    Nutrition Therapy:  Current Nutrition Therapy:   - Oral Diet:  Carb Control 4 carbs/meal (1800kcals/day)    Routes of Feeding: Oral  Liquids: Thin Liquids  Daily Fluid Restriction: no  Last Modified Barium Swallow with Video (Video Swallowing Test): not done    Treatments at the Time of Hospital Discharge:   Respiratory Treatments: albuterol inhaler PRN  Oxygen Therapy:  is on oxygen at 2 L/min per nasal cannula.  Ventilator:    - No ventilator support  HOME CPAP    Rehab Therapies: Physical Therapy and Occupational Therapy  Weight Bearing Status/Restrictions: No weight bearing restrictions  Other Medical Equipment (for information only, NOT a DME order):  walker  Other Treatments:     Patient's personal belongings (please select all that are sent with patient):  None    RN SIGNATURE:  Electronically signed by Naomi Alfonso RN on 6/17/24 at 6:05 PM EDT    CASE MANAGEMENT/SOCIAL WORK SECTION    Inpatient Status Date: 6/16/24    Readmission Risk Assessment Score:  Readmission Risk              Risk of Unplanned Readmission:  26           Discharging to Facility/ Agency   Name: [facility]   Address: [address] [place] [address]  Phone: [phone]  Fax: [phone]    Dialysis Facility (if applicable)   Name:  Address:  Dialysis Schedule:  Phone:  Fax:    / signature: Electronically signed by Wayne James RN on 6/17/24 at 2:45 PM EDT    PHYSICIAN SECTION    Prognosis: Good    Condition at Discharge: Stable    Rehab Potential (if transferring to Rehab): Good    Recommended Labs or Other Treatments After Discharge: CBC, BMP in 3 days.    Patient is a diabetic, but became severely hypoglycemic so glipizide was stopped. Check accuchecks at least twice daily (AM and before bed). Encourage

## 2024-06-17 NOTE — PLAN OF CARE
Problem: Safety - Adult  Goal: Free from fall injury  6/17/2024 1732 by Naomi Alfonso RN  Outcome: Adequate for Discharge  6/17/2024 1543 by Naomi Alfonso RN  Outcome: Progressing     Problem: Pain  Goal: Verbalizes/displays adequate comfort level or baseline comfort level  6/17/2024 1732 by Naomi Alfonso RN  Outcome: Adequate for Discharge  6/17/2024 1543 by Naomi Alfonso RN  Outcome: Adequate for Discharge     Problem: ABCDS Injury Assessment  Goal: Absence of physical injury  6/17/2024 1732 by Naomi Alfonso RN  Outcome: Adequate for Discharge  6/17/2024 1543 by Naomi Alfonso RN  Outcome: Adequate for Discharge     Problem: Respiratory - Adult  Goal: Achieves optimal ventilation and oxygenation  6/17/2024 1732 by Naomi Alfonso RN  Outcome: Adequate for Discharge  6/17/2024 1543 by Naomi Alfonso RN  Outcome: Adequate for Discharge     Problem: Skin/Tissue Integrity - Adult  Goal: Incisions, wounds, or drain sites healing without S/S of infection  6/17/2024 1732 by Naomi Alfonso RN  Outcome: Adequate for Discharge  6/17/2024 1543 by Naomi Alfonso RN  Outcome: Progressing     Problem: Musculoskeletal - Adult  Goal: Return mobility to safest level of function  6/17/2024 1732 by Naomi Alfonso RN  Outcome: Adequate for Discharge  6/17/2024 1543 by Naomi Alfonso RN  Outcome: Progressing  Goal: Maintain proper alignment of affected body part  6/17/2024 1732 by Naomi Alfonso RN  Outcome: Adequate for Discharge  6/17/2024 1543 by Naomi Alfonso RN  Outcome: Progressing  Goal: Return ADL status to a safe level of function  6/17/2024 1732 by Naomi Alfonso RN  Outcome: Adequate for Discharge  6/17/2024 1543 by Naoim Alfonso RN  Outcome: Progressing     Problem: Infection - Adult  Goal: Absence of infection at discharge  6/17/2024 1732 by Naomi Alfonso, RN  Outcome: Adequate

## 2024-06-17 NOTE — PROGRESS NOTES
15 minute repeat blood sugar was 38. Patient given another snack and D10 continues to infuse at 250ml/hr. [facility] updated.

## 2024-06-17 NOTE — PLAN OF CARE
Problem: Safety - Adult  Goal: Free from fall injury  Outcome: Progressing     Problem: Skin/Tissue Integrity - Adult  Goal: Incisions, wounds, or drain sites healing without S/S of infection  Outcome: Progressing     Problem: Musculoskeletal - Adult  Goal: Return mobility to safest level of function  Outcome: Progressing  Goal: Maintain proper alignment of affected body part  Outcome: Progressing  Goal: Return ADL status to a safe level of function  Outcome: Progressing     Problem: Infection - Adult  Goal: Absence of infection at discharge  Outcome: Progressing  Appears comfortable up in chair. Seen by PT OT.

## 2024-06-17 NOTE — H&P
V2.0  History and Physical      Name:  Jayshree Tate /Age/Sex: 1935  (88 y.o. female)   MRN & CSN:  [id number] & [id number] Encounter Date/Time: 2024 10:44 PM EDT   Location:   PCP: Jennifer Doyle MD       Hospital Day: 1    Assessment and Plan:   Jayshree Tate is a 88 y.o. female with a pmh of DM2, Chronic Resp failure who presents with Acute on Chronic Respiratory Failure with Hypoxia secondary to Pleural Effusion on left with associated leukocytosis.         Plan:  Acute on chronic Respiratory Failure with Hypoxia secondary to Pleural effusion on left with associated leukocytosis  -Wears 2L oxygen at home at baseline and requires 3L currently to maintain saturations above 90%  -WBC 12.6. Afebrile. Not tachycardic  -Continue oxygen and CPAP at night. Work to wean oxygen back to baseline  -Will manage effusion with azithromycin and monitor CBC daily    Physical Debility  -See HPI below. Multiple falls with injuries in past few days  -PT/OT consulted  -Up with assistance only  - consulted for possible long term placement    DM2 with diabetic neuropathy  - 7.8 on 24  -Low carb diet  -Continue glipizide. Decrease gabapentin to 300mg BID due to polypharmacy and multiple falls  -SSI with low dose humalog coverage    A-Fib  -Chronic  -SB on EKG  -Will decrease Eliquis to 2.5mg BID due to age. Continue metoprolol succinate    HLD  -Continue statin    Essential HTN  -Continue Losartan    Chronic Back pain with known old L2 Compression fracture/Lumbar Stenosis  -Recently prescribe Flexeril on 24, and has had multiple falls since, likely related to polypharmacy. Will not resume flexeril at this time. Continue norco prn, and requip, but at lower dose of 2mg q hs.Gabapentin also decreased due to multiple falls.     Polyarthritis  -Continue C-Q 10 and norco prn      Unable to discuss ACP with pt at this time as she is confused.     Disposition:   Current Living situation: home  10:47 AM     Troponin:   Lab Results   Component Value Date/Time    TROPONINT <0.010 08/09/2023 03:45 AM    TROPONINT <0.010 08/05/2023 08:20 AM    TROPONINT 0.012 08/05/2023 05:05 AM     Lactic Acid: No results for input(s): \"LACTA\" in the last 72 hours.  BNP: No results for input(s): \"PROBNP\" in the last 72 hours.  UA:  Lab Results   Component Value Date/Time    NITRU NEGATIVE 06/16/2024 05:45 PM    COLORU YELLOW 06/16/2024 05:45 PM    PHUR 6.0 06/16/2024 05:45 PM    WBCUA 3 04/20/2024 02:00 PM    RBCUA 1 04/20/2024 02:00 PM    MUCUS RARE 04/20/2024 02:00 PM    TRICHOMONAS NONE SEEN 04/20/2024 02:00 PM    BACTERIA NEGATIVE 04/20/2024 02:00 PM    CLARITYU CLEAR 06/16/2024 05:45 PM    LEUKOCYTESUR NEGATIVE 06/16/2024 05:45 PM    UROBILINOGEN 0.2 06/16/2024 05:45 PM    BILIRUBINUR NEGATIVE 06/16/2024 05:45 PM    BLOODU NEGATIVE 06/16/2024 05:45 PM    GLUCOSEU NEGATIVE 06/16/2024 05:45 PM    KETUA NEGATIVE 06/16/2024 05:45 PM     Urine Cultures:   Lab Results   Component Value Date/Time    LABURIN 200 09/28/2016 02:43 PM     Blood Cultures: No results found for: \"BC\"  No results found for: \"BLOODCULT2\"  Organism:   Lab Results   Component Value Date/Time    ORG ECOL 03/01/2018 12:30 AM       Imaging/Diagnostics Last 24 Hours   XR HIP RIGHT (2-3 VIEWS)    Result Date: 6/16/2024  Right Hip INDICATION: Fell, Pain COMPARISON:  None TECHNIQUE: Three views of the right hip were obtained FINDINGS: There is no evidence of fracture or dislocation. No bony abnormality is seen in the proximal right femur or adjacent pelvis.     No evidence of right hip fracture Electronically signed by Kana Wallis    CT HEAD WO CONTRAST    Result Date: 6/16/2024  CT of the Head and Cervical Spine INDICATION:  Trauma Multiple axial images obtained through the brain without intravenous contrast. High resolution axial images were then obtained through the cervical spine. Coronal and sagittal reformats were also evaluated.  Radiation dose reduction

## 2024-06-17 NOTE — PROGRESS NOTES
Pt is going to be admitted upstairs to the Inpatient Unit Rm # [id number]. Pt was made aware of this and she voiced understanding of this. Called report on Pt to Inpatient RNEstela.

## 2024-06-17 NOTE — ED PROVIDER NOTES
Emergency Department Encounter    Patient: Jayshree Tate  MRN: [id number]  : 1935  Date of Evaluation: 2024  ED Provider:  Colette Zhang MD    Briefly, Jayshree Tate is a 88 y.o. female presented to the emergency department for falls.  She is seen by previous physician.  Please that note for full HPI    I have reviewed and interpreted all of the currently available lab results from this visit (if applicable)  Results for orders placed or performed during the hospital encounter of 24   CBC with Auto Differential   Result Value Ref Range    WBC 12.6 (H) 4.0 - 10.5 K/CU MM    RBC 4.21 4.2 - 5.4 M/CU MM    Hemoglobin 10.6 (L) 12.5 - 16.0 GM/DL    Hematocrit 35.6 (L) 37 - 47 %    MCV 84.6 78 - 100 FL    MCH 25.2 (L) 27 - 31 PG    MCHC 29.8 (L) 32.0 - 36.0 %    RDW 18.2 (H) 11.7 - 14.9 %    Platelets 205 140 - 440 K/CU MM    MPV 9.4 7.5 - 11.1 FL    Differential Type AUTOMATED DIFFERENTIAL     Neutrophils % 65.4 36 - 66 %    Lymphocytes % 15.6 (L) 24 - 44 %    Monocytes % 18.4 (H) 0 - 4 %    Eosinophils % 0.1 0 - 3 %    Basophils % 0.2 0 - 1 %    Neutrophils Absolute 8.2 K/CU MM    Lymphocytes Absolute 2.0 K/CU MM    Monocytes Absolute 2.3 K/CU MM    Eosinophils Absolute 0.0 K/CU MM    Basophils Absolute 0.0 K/CU MM    Immature Neutrophil % 0.3 0 - 0.43 %    Total Immature Neutrophil 0.04 K/CU MM   BMP   Result Value Ref Range    Sodium 141 135 - 145 MMOL/L    Potassium 4.3 3.5 - 5.1 MMOL/L    Chloride 103 99 - 110 mMol/L    CO2 31 21 - 32 MMOL/L    Anion Gap 7 7 - 16    Glucose 98 70 - 99 MG/DL    BUN 19 6 - 23 MG/DL    Creatinine 0.5 (L) 0.6 - 1.1 MG/DL    Est, Glom Filt Rate >90 >60 mL/min/1.73m2    Calcium 8.8 8.3 - 10.6 MG/DL   Urinalysis   Result Value Ref Range    Color, UA YELLOW YELLOW    Clarity, UA CLEAR CLEAR    Glucose, Ur NEGATIVE NEGATIVE MG/DL    Bilirubin, Urine NEGATIVE NEGATIVE MG/DL    Ketones, Urine NEGATIVE NEGATIVE MG/DL    Specific Gravity, UA 1.010 1.001 - 1.035     Blood, Urine NEGATIVE NEGATIVE    pH, Urine 6.0 5.0 - 8.0    Protein, UA NEGATIVE NEGATIVE MG/DL    Urobilinogen, Urine 0.2 0.2 - 1.0 MG/DL    Nitrite Urine, Quantitative NEGATIVE NEGATIVE    Leukocyte Esterase, Urine NEGATIVE NEGATIVE    Urinalysis Comments       Microscopic exam not performed based on chemical results.   POCT Glucose   Result Value Ref Range    POC Glucose 38 (L) 70 - 99 MG/DL   POCT Glucose   Result Value Ref Range    POC Glucose 118 (H) 70 - 99 MG/DL   POCT Glucose   Result Value Ref Range    POC Glucose 144 (H) 70 - 99 MG/DL   EKG 12 Lead   Result Value Ref Range    Ventricular Rate 59 BPM    Atrial Rate 59 BPM    P-R Interval 142 ms    QRS Duration 94 ms    Q-T Interval 434 ms    QTc Calculation (Bazett) 429 ms    P Axis 68 degrees    R Axis 14 degrees    T Axis 1 degrees    Diagnosis       Sinus bradycardia  Inferior infarct (cited on or before 17-MAY-2022)  Abnormal ECG  When compared with ECG of 24-NOV-2023 11:12,  Inverted T waves have replaced nonspecific T wave abnormality in Inferior leads        Radiographs (if obtained):    [] Radiologist's Report Reviewed:  XR HIP RIGHT (2-3 VIEWS)   Final Result   No evidence of right hip fracture         Electronically signed by [employer]      CT CERVICAL SPINE WO CONTRAST   Final Result      1.  No acute intracranial findings.   2.  No acute fracture or subluxation of the cervical spine.         Electronically signed by [employer]      CT HEAD WO CONTRAST   Final Result      1.  No acute intracranial findings.   2.  No acute fracture or subluxation of the cervical spine.         Electronically signed by [employer]      XR CHEST PORTABLE   Final Result   Small left pleural effusion.         Electronically signed by [employer]      XR PELVIS (1-2 VIEWS)   Final Result   1. As above.      Electronically signed by Anh Marcus          MDM:    88-year-old female presenting for falls.  She is signed out to me pending review of her right hip x-ray.  She

## 2024-06-17 NOTE — CARE COORDINATION
Precert initiated via [employer]: APPROVED  [employer] Auth ID [id number]  [facility]  06/17/2024 06/17/2024-06/19/2024  Approved    PS sent to PRASHANTH KNIGHT to update.

## 2024-06-17 NOTE — PROGRESS NOTES
4 Eyes Skin Assessment     NAME:  Jayshree Tate  YOB: 1935  MEDICAL RECORD NUMBER:  [id number]    The patient is being assessed for  Admission    I agree that at least one RN has performed a thorough Head to Toe Skin Assessment on the patient. ALL assessment sites listed below have been assessed.      Areas assessed by both nurses:    Head, Face, Ears, Shoulders, Back, Chest, Arms, Elbows, Hands, Sacrum. Buttock, Coccyx, Ischium, and Legs. Feet and Heels        Does the Patient have a Wound? Yes wound(s) were present on assessment. LDA wound assessment was Initiated and completed by RN       Edd Prevention initiated by RN: No  Wound Care Orders initiated by RN: No    Pressure Injury (Stage 3,4, Unstageable, DTI, NWPT, and Complex wounds) if present, place Wound referral order by RN under : No    New Ostomies, if present place, Ostomy referral order under : No     Nurse 1 eSignature: Electronically signed by Estela Cardona RN on 6/17/24 at 1:14 AM EDT    **SHARE this note so that the co-signing nurse can place an eSignature**    Nurse 2 eSignature: Electronically signed by Jones Heredia RN on 6/17/24 at 1:30 AM EDT

## 2024-06-17 NOTE — PROGRESS NOTES
Pt's condition is currently stable- she is alert & oriented x 4, her respirations are easy & unlabored on O2 3.5 Lt NC, and her skin is warm and dry. She appears to be in no acute distress. She was transferred up to the Inpatient Unit Rm # 10 per ER bed per AXEL Zavala's assist. Pt had all of her belongings with her when she was transferred upstairs.

## 2024-06-17 NOTE — PROGRESS NOTES
Facility/Department: [facility]  Physical Therapy Initial Assessment    Name Jayshree Tate    1935   MRN [id number]   Date of Service 2024   Patient Room  010/010-01     Patient Diagnoses The primary encounter diagnosis was Fall, initial encounter. Diagnoses of Ambulatory dysfunction and Class 2 obesity with body mass index (BMI) of 38.0 to 38.9 in adult, unspecified obesity type, unspecified whether serious comorbidity present were also pertinent to this visit.   Past Medical History  has a past medical history of Arthritis, Atrial fibrillation, chronic (HCC), CAD (coronary artery disease), Chronic midline low back pain without sciatica, Claustrophobia, Colonoscopy refused, COVID-19 virus infection, DM (diabetes mellitus), type 2 (HCC), Dupuytren's contracture of right hand, Endometrial stripe increased, Gastrointestinal hemorrhage, Glaucoma, H/O Doppler ultrasound, H/O echocardiogram, H/O echocardiogram, H/O echocardiogram, History of nuclear stress test, HTN (hypertension), Hyperlipidemia, Kidney stone, MI (myocardial infarction) (HCC), Obesity, Open wound of right foot, Parainfluenza infection, Pneumonia of right lower lobe due to infectious organism, Vertigo, and WD-Traumatic ulcer of foot, right, with fat layer exposed (HCC).   Past Surgical History  has a past surgical history that includes Cataract removal (Bilateral); Inguinal hernia repair (Left, 45 yrs ago); Lumbar spine surgery (N/A, 2022); and Spine surgery (N/A, 2022).       Discharge Recommendations  Skilled Nursing Facility with possible transfer to [facility]  Equipment: None    Assessment  Conditions Requiring Skilled Therapeutic Intervention: Decreased functional mobility, Decreased strength, Decreased endurance, Decreased ADL status, Decreased high level ADLs/IADLs, Impaired safety awareness, Increased pain, and Decreased posture  Assessment of Evaluation: Patient is a 88 y.o. female who presents to [facility] with acute on

## 2024-06-21 ENCOUNTER — HOSPITAL ENCOUNTER (OUTPATIENT)
Age: 89
Setting detail: SPECIMEN
Discharge: HOME | End: 2024-06-21
Payer: MEDICARE

## 2024-06-21 LAB
ALBUMIN SERPL-MCNC: 3.3 GM/DL (ref 3.4–5)
ALP BLD-CCNC: 55 IU/L (ref 40–128)
ALT SERPL-CCNC: 5 U/L (ref 10–40)
ANION GAP SERPL CALCULATED.3IONS-SCNC: 8 MMOL/L (ref 7–16)
AST SERPL-CCNC: 13 IU/L (ref 15–37)
BASOPHILS ABSOLUTE: 0 K/CU MM
BASOPHILS RELATIVE PERCENT: 0.4 % (ref 0–1)
BILIRUB SERPL-MCNC: 0.4 MG/DL (ref 0–1)
BILIRUBIN, URINE: NEGATIVE MG/DL
BLOOD, URINE: ABNORMAL
BUN SERPL-MCNC: 9 MG/DL (ref 6–23)
CALCIUM SERPL-MCNC: 8.5 MG/DL (ref 8.3–10.6)
CHLORIDE BLD-SCNC: 101 MMOL/L (ref 99–110)
CLARITY: CLEAR
CO2: 33 MMOL/L (ref 21–32)
COLOR: YELLOW
COMMENT UA: NORMAL
CREAT SERPL-MCNC: 0.6 MG/DL (ref 0.6–1.1)
DIFFERENTIAL TYPE: ABNORMAL
EOSINOPHILS ABSOLUTE: 0.1 K/CU MM
EOSINOPHILS RELATIVE PERCENT: 1.6 % (ref 0–3)
EPITHELIAL CELLS, UA: 1 /HPF
GFR, ESTIMATED: 87 ML/MIN/1.73M2
GLUCOSE SERPL-MCNC: 104 MG/DL (ref 70–99)
GLUCOSE URINE: NEGATIVE MG/DL
HCT VFR BLD CALC: 35.2 % (ref 37–47)
HEMOGLOBIN: 10.3 GM/DL (ref 12.5–16)
IMMATURE NEUTROPHIL %: 0.2 % (ref 0–0.43)
KETONES, URINE: NEGATIVE MG/DL
LEUKOCYTE ESTERASE, URINE: NEGATIVE
LYMPHOCYTES ABSOLUTE: 1.8 K/CU MM
LYMPHOCYTES RELATIVE PERCENT: 20.2 % (ref 24–44)
MCH RBC QN AUTO: 25.2 PG (ref 27–31)
MCHC RBC AUTO-ENTMCNC: 29.3 % (ref 32–36)
MCV RBC AUTO: 86.1 FL (ref 78–100)
MONOCYTES ABSOLUTE: 2.1 K/CU MM
MONOCYTES RELATIVE PERCENT: 23.5 % (ref 0–4)
NEUTROPHILS ABSOLUTE: 4.9 K/CU MM
NEUTROPHILS RELATIVE PERCENT: 54.1 % (ref 36–66)
NITRITE URINE, QUANTITATIVE: NEGATIVE
NUCLEATED RBC %: 0 %
PDW BLD-RTO: 17.6 % (ref 11.7–14.9)
PH, URINE: 6.5 (ref 5–8)
PLATELET # BLD: 197 K/CU MM (ref 140–440)
PMV BLD AUTO: 9.1 FL (ref 7.5–11.1)
POTASSIUM SERPL-SCNC: 4 MMOL/L (ref 3.5–5.1)
PROTEIN UA: NEGATIVE MG/DL
RBC # BLD: 4.09 M/CU MM (ref 4.2–5.4)
RBC URINE: <1 /HPF (ref 0–6)
SODIUM BLD-SCNC: 142 MMOL/L (ref 135–145)
SPECIFIC GRAVITY UA: 1.01 (ref 1–1.03)
TOTAL IMMATURE NEUTOROPHIL: 0.02 K/CU MM
TOTAL NUCLEATED RBC: 0 K/CU MM
TOTAL PROTEIN: 5.6 GM/DL (ref 6.4–8.2)
UROBILINOGEN, URINE: 0.2 MG/DL (ref 0.2–1)
WBC # BLD: 9 K/CU MM (ref 4–10.5)

## 2024-06-21 PROCEDURE — 85025 COMPLETE CBC W/AUTO DIFF WBC: CPT

## 2024-06-21 PROCEDURE — 80053 COMPREHEN METABOLIC PANEL: CPT

## 2024-06-21 PROCEDURE — 87077 CULTURE AEROBIC IDENTIFY: CPT

## 2024-06-21 PROCEDURE — 36415 COLL VENOUS BLD VENIPUNCTURE: CPT

## 2024-06-21 PROCEDURE — 81001 URINALYSIS AUTO W/SCOPE: CPT

## 2024-06-21 PROCEDURE — 87086 URINE CULTURE/COLONY COUNT: CPT

## 2024-06-22 LAB
CULTURE: ABNORMAL
CULTURE: ABNORMAL
Lab: ABNORMAL
SPECIMEN: ABNORMAL

## 2024-06-24 LAB
CULTURE: ABNORMAL
CULTURE: ABNORMAL
Lab: ABNORMAL
SPECIMEN: ABNORMAL

## 2024-06-25 RX ORDER — ATORVASTATIN CALCIUM 40 MG/1
40 TABLET, FILM COATED ORAL NIGHTLY
Qty: 90 TABLET | Refills: 1 | Status: SHIPPED | OUTPATIENT
Start: 2024-06-25

## 2024-07-04 ENCOUNTER — HOSPITAL ENCOUNTER (OUTPATIENT)
Age: 89
Setting detail: SPECIMEN
Discharge: HOME | End: 2024-07-04

## 2024-07-04 PROCEDURE — 81001 URINALYSIS AUTO W/SCOPE: CPT

## 2024-07-04 PROCEDURE — 87088 URINE BACTERIA CULTURE: CPT

## 2024-07-04 PROCEDURE — 87086 URINE CULTURE/COLONY COUNT: CPT

## 2024-07-05 LAB
BACTERIA: NEGATIVE /HPF
BILIRUBIN, URINE: NEGATIVE MG/DL
BLOOD, URINE: ABNORMAL
CLARITY, UA: ABNORMAL
COLOR, UA: YELLOW
GLUCOSE URINE: NEGATIVE MG/DL
KETONES, URINE: NEGATIVE MG/DL
LEUKOCYTE ESTERASE, URINE: ABNORMAL
MUCUS: ABNORMAL HPF
NITRITE URINE, QUANTITATIVE: NEGATIVE
PH, URINE: 7 (ref 5–8)
PROTEIN UA: NEGATIVE MG/DL
RBC URINE: 1 /HPF (ref 0–6)
SPECIFIC GRAVITY UA: 1.01 (ref 1–1.03)
TRICHOMONAS: ABNORMAL /HPF
UROBILINOGEN, URINE: 0.2 MG/DL (ref 0.2–1)
WBC CLUMP: ABNORMAL /HPF
WBC UA: 64 /HPF (ref 0–5)

## 2024-07-07 LAB
CULTURE: ABNORMAL
CULTURE: ABNORMAL
Lab: ABNORMAL
SPECIMEN: ABNORMAL

## 2024-07-09 ENCOUNTER — HOSPITAL ENCOUNTER (OUTPATIENT)
Age: 89
Setting detail: SPECIMEN
Discharge: HOME | End: 2024-07-09
Payer: MEDICARE

## 2024-07-09 LAB
ESTIMATED AVERAGE GLUCOSE: 154 MG/DL
HBA1C MFR BLD: 7 % (ref 4.2–6.3)

## 2024-07-09 PROCEDURE — 83036 HEMOGLOBIN GLYCOSYLATED A1C: CPT

## 2024-07-09 PROCEDURE — 36415 COLL VENOUS BLD VENIPUNCTURE: CPT

## 2024-07-21 ENCOUNTER — HOSPITAL ENCOUNTER (OUTPATIENT)
Age: 89
Setting detail: SPECIMEN
Discharge: HOME | End: 2024-07-21
Payer: MEDICARE

## 2024-07-21 PROCEDURE — 81003 URINALYSIS AUTO W/O SCOPE: CPT

## 2024-07-22 ENCOUNTER — HOSPITAL ENCOUNTER (OUTPATIENT)
Age: 89
Setting detail: SPECIMEN
Discharge: HOME | End: 2024-07-22
Payer: MEDICARE

## 2024-07-22 LAB
ALBUMIN SERPL-MCNC: 3.2 GM/DL (ref 3.4–5)
ALP BLD-CCNC: 80 IU/L (ref 40–128)
ALT SERPL-CCNC: <5 U/L (ref 10–40)
ANION GAP SERPL CALCULATED.3IONS-SCNC: 11 MMOL/L (ref 7–16)
AST SERPL-CCNC: 14 IU/L (ref 15–37)
BASOPHILS ABSOLUTE: 0.1 K/CU MM
BASOPHILS RELATIVE PERCENT: 0.7 % (ref 0–1)
BILIRUB SERPL-MCNC: 0.5 MG/DL (ref 0–1)
BILIRUBIN, URINE: NEGATIVE MG/DL
BLOOD, URINE: NEGATIVE
BUN SERPL-MCNC: 14 MG/DL (ref 6–23)
CALCIUM SERPL-MCNC: 8.9 MG/DL (ref 8.3–10.6)
CHLORIDE BLD-SCNC: 98 MMOL/L (ref 99–110)
CLARITY, UA: CLEAR
CO2: 31 MMOL/L (ref 21–32)
COLOR, UA: YELLOW
COMMENT UA: NORMAL
CREAT SERPL-MCNC: 0.6 MG/DL (ref 0.6–1.1)
DIFFERENTIAL TYPE: ABNORMAL
EOSINOPHILS ABSOLUTE: 0.1 K/CU MM
EOSINOPHILS RELATIVE PERCENT: 0.9 % (ref 0–3)
GFR, ESTIMATED: 87 ML/MIN/1.73M2
GLUCOSE SERPL-MCNC: 60 MG/DL (ref 70–99)
GLUCOSE URINE: NEGATIVE MG/DL
HCT VFR BLD CALC: 36.2 % (ref 37–47)
HEMOGLOBIN: 10.6 GM/DL (ref 12.5–16)
IMMATURE NEUTROPHIL %: 0.5 % (ref 0–0.43)
KETONES, URINE: NEGATIVE MG/DL
LEUKOCYTE ESTERASE, URINE: NEGATIVE
LYMPHOCYTES ABSOLUTE: 2 K/CU MM
LYMPHOCYTES RELATIVE PERCENT: 22.6 % (ref 24–44)
MCH RBC QN AUTO: 25 PG (ref 27–31)
MCHC RBC AUTO-ENTMCNC: 29.3 % (ref 32–36)
MCV RBC AUTO: 85.4 FL (ref 78–100)
MONOCYTES ABSOLUTE: 1.8 K/CU MM
MONOCYTES RELATIVE PERCENT: 21 % (ref 0–4)
NEUTROPHILS ABSOLUTE: 4.7 K/CU MM
NEUTROPHILS RELATIVE PERCENT: 54.3 % (ref 36–66)
NITRITE URINE, QUANTITATIVE: NEGATIVE
NUCLEATED RBC %: 0 %
PDW BLD-RTO: 18.6 % (ref 11.7–14.9)
PH, URINE: 5.5 (ref 5–8)
PLATELET # BLD: 178 K/CU MM (ref 140–440)
PMV BLD AUTO: 9.5 FL (ref 7.5–11.1)
POTASSIUM SERPL-SCNC: 4.6 MMOL/L (ref 3.5–5.1)
PROCALCITONIN SERPL-MCNC: 0.19 NG/ML
PROTEIN UA: NEGATIVE MG/DL
RBC # BLD: 4.24 M/CU MM (ref 4.2–5.4)
SODIUM BLD-SCNC: 140 MMOL/L (ref 135–145)
SPECIFIC GRAVITY UA: 1.02 (ref 1–1.03)
TOTAL IMMATURE NEUTOROPHIL: 0.04 K/CU MM
TOTAL NUCLEATED RBC: 0 K/CU MM
TOTAL PROTEIN: 5.9 GM/DL (ref 6.4–8.2)
UROBILINOGEN, URINE: 0.2 MG/DL (ref 0.2–1)
WBC # BLD: 8.7 K/CU MM (ref 4–10.5)

## 2024-07-22 PROCEDURE — 36415 COLL VENOUS BLD VENIPUNCTURE: CPT

## 2024-07-22 PROCEDURE — 80053 COMPREHEN METABOLIC PANEL: CPT

## 2024-07-22 PROCEDURE — 84145 PROCALCITONIN (PCT): CPT

## 2024-07-22 PROCEDURE — 85025 COMPLETE CBC W/AUTO DIFF WBC: CPT

## 2024-08-16 ENCOUNTER — HOSPITAL ENCOUNTER (OUTPATIENT)
Age: 89
Setting detail: SPECIMEN
Discharge: HOME | End: 2024-08-16
Payer: MEDICARE

## 2024-08-16 LAB
ALBUMIN SERPL-MCNC: 3.1 GM/DL (ref 3.4–5)
ALP BLD-CCNC: 83 IU/L (ref 40–128)
ALT SERPL-CCNC: <5 U/L (ref 10–40)
ANION GAP SERPL CALCULATED.3IONS-SCNC: 8 MMOL/L (ref 7–16)
AST SERPL-CCNC: 14 IU/L (ref 15–37)
BACTERIA: NEGATIVE /HPF
BILIRUB SERPL-MCNC: 0.4 MG/DL (ref 0–1)
BILIRUBIN, URINE: NEGATIVE MG/DL
BLOOD, URINE: ABNORMAL
BUN SERPL-MCNC: 12 MG/DL (ref 6–23)
CALCIUM SERPL-MCNC: 8.9 MG/DL (ref 8.3–10.6)
CHLORIDE BLD-SCNC: 100 MMOL/L (ref 99–110)
CLARITY, UA: CLEAR
CO2: 32 MMOL/L (ref 21–32)
COLOR, UA: YELLOW
CREAT SERPL-MCNC: 0.7 MG/DL (ref 0.6–1.1)
GFR, ESTIMATED: 84 ML/MIN/1.73M2
GLUCOSE SERPL-MCNC: 96 MG/DL (ref 70–99)
GLUCOSE URINE: NEGATIVE MG/DL
HCT VFR BLD CALC: 36.3 % (ref 37–47)
HEMOGLOBIN: 10.8 GM/DL (ref 12.5–16)
KETONES, URINE: NEGATIVE MG/DL
LEUKOCYTE ESTERASE, URINE: ABNORMAL
MCH RBC QN AUTO: 25.2 PG (ref 27–31)
MCHC RBC AUTO-ENTMCNC: 29.8 % (ref 32–36)
MCV RBC AUTO: 84.6 FL (ref 78–100)
MUCUS: ABNORMAL HPF
NITRITE URINE, QUANTITATIVE: POSITIVE
PDW BLD-RTO: 17.6 % (ref 11.7–14.9)
PH, URINE: 8.5 (ref 5–8)
PLATELET # BLD: 180 K/CU MM (ref 140–440)
PMV BLD AUTO: 9.3 FL (ref 7.5–11.1)
POTASSIUM SERPL-SCNC: 4.3 MMOL/L (ref 3.5–5.1)
PROTEIN UA: ABNORMAL MG/DL
RBC # BLD: 4.29 M/CU MM (ref 4.2–5.4)
RBC URINE: 3 /HPF (ref 0–6)
SODIUM BLD-SCNC: 140 MMOL/L (ref 135–145)
SPECIFIC GRAVITY UA: 1.01 (ref 1–1.03)
SQUAMOUS EPITHELIAL: 2 /HPF
TOTAL PROTEIN: 6.3 GM/DL (ref 6.4–8.2)
TRICHOMONAS: ABNORMAL /HPF
TRIPLE PHOSPHATE CRYSTALS: ABNORMAL /HPF
UROBILINOGEN, URINE: 0.2 MG/DL (ref 0.2–1)
WBC # BLD: 7.5 K/CU MM (ref 4–10.5)
WBC UA: 10 /HPF (ref 0–5)

## 2024-08-16 PROCEDURE — 87077 CULTURE AEROBIC IDENTIFY: CPT

## 2024-08-16 PROCEDURE — 87086 URINE CULTURE/COLONY COUNT: CPT

## 2024-08-16 PROCEDURE — 80053 COMPREHEN METABOLIC PANEL: CPT

## 2024-08-16 PROCEDURE — 36415 COLL VENOUS BLD VENIPUNCTURE: CPT

## 2024-08-16 PROCEDURE — 85027 COMPLETE CBC AUTOMATED: CPT

## 2024-08-16 PROCEDURE — 81001 URINALYSIS AUTO W/SCOPE: CPT

## 2024-08-18 LAB
CULTURE: ABNORMAL
CULTURE: ABNORMAL
Lab: ABNORMAL
SPECIMEN: ABNORMAL

## 2024-08-28 ENCOUNTER — HOSPITAL ENCOUNTER (EMERGENCY)
Age: 89
Discharge: HOME | End: 2024-08-29
Payer: MEDICARE

## 2024-08-28 ENCOUNTER — APPOINTMENT (OUTPATIENT)
Dept: CT IMAGING | Age: 89
End: 2024-08-28
Payer: MEDICARE

## 2024-08-28 DIAGNOSIS — R10.32 ABDOMINAL PAIN, LEFT LOWER QUADRANT: Primary | ICD-10-CM

## 2024-08-28 LAB
ALBUMIN SERPL-MCNC: 3.6 GM/DL (ref 3.4–5)
ALP BLD-CCNC: 74 IU/L (ref 40–128)
ALT SERPL-CCNC: <5 U/L (ref 10–40)
ANION GAP SERPL CALCULATED.3IONS-SCNC: 6 MMOL/L (ref 7–16)
AST SERPL-CCNC: 12 IU/L (ref 15–37)
BASOPHILS ABSOLUTE: 0 K/CU MM
BASOPHILS RELATIVE PERCENT: 0.2 % (ref 0–1)
BILIRUB SERPL-MCNC: 0.4 MG/DL (ref 0–1)
BILIRUBIN, URINE: NEGATIVE MG/DL
BLOOD, URINE: NEGATIVE
BUN SERPL-MCNC: 18 MG/DL (ref 6–23)
CALCIUM SERPL-MCNC: 9.5 MG/DL (ref 8.3–10.6)
CHLORIDE BLD-SCNC: 99 MMOL/L (ref 99–110)
CLARITY, UA: CLEAR
CO2: 32 MMOL/L (ref 21–32)
COLOR, UA: YELLOW
COMMENT UA: NORMAL
CREAT SERPL-MCNC: 0.8 MG/DL (ref 0.6–1.1)
DIFFERENTIAL TYPE: ABNORMAL
EOSINOPHILS ABSOLUTE: 0 K/CU MM
EOSINOPHILS RELATIVE PERCENT: 0.1 % (ref 0–3)
GFR, ESTIMATED: 71 ML/MIN/1.73M2
GLUCOSE SERPL-MCNC: 123 MG/DL (ref 70–99)
GLUCOSE URINE: NEGATIVE MG/DL
HCT VFR BLD CALC: 38.7 % (ref 37–47)
HEMOGLOBIN: 11.7 GM/DL (ref 12.5–16)
IMMATURE NEUTROPHIL %: 0.3 % (ref 0–0.43)
KETONES, URINE: NEGATIVE MG/DL
LEUKOCYTE ESTERASE, URINE: NEGATIVE
LIPASE: 12 IU/L (ref 13–60)
LYMPHOCYTES ABSOLUTE: 2.3 K/CU MM
LYMPHOCYTES RELATIVE PERCENT: 14.1 % (ref 24–44)
MCH RBC QN AUTO: 25.4 PG (ref 27–31)
MCHC RBC AUTO-ENTMCNC: 30.2 % (ref 32–36)
MCV RBC AUTO: 83.9 FL (ref 78–100)
MONOCYTES ABSOLUTE: 4 K/CU MM
MONOCYTES RELATIVE PERCENT: 24.7 % (ref 0–4)
NEUTROPHILS ABSOLUTE: 9.9 K/CU MM
NEUTROPHILS RELATIVE PERCENT: 60.6 % (ref 36–66)
NITRITE URINE, QUANTITATIVE: NEGATIVE
NUCLEATED RBC %: 0 %
PDW BLD-RTO: 18.2 % (ref 11.7–14.9)
PH, URINE: 7 (ref 5–8)
PLATELET # BLD: 266 K/CU MM (ref 140–440)
PMV BLD AUTO: 9 FL (ref 7.5–11.1)
POTASSIUM SERPL-SCNC: 4.4 MMOL/L (ref 3.5–5.1)
PROTEIN UA: NEGATIVE MG/DL
RBC # BLD: 4.61 M/CU MM (ref 4.2–5.4)
SODIUM BLD-SCNC: 137 MMOL/L (ref 135–145)
SPECIFIC GRAVITY UA: 1.01 (ref 1–1.03)
TOTAL IMMATURE NEUTOROPHIL: 0.05 K/CU MM
TOTAL NUCLEATED RBC: 0 K/CU MM
TOTAL PROTEIN: 7.5 GM/DL (ref 6.4–8.2)
UROBILINOGEN, URINE: 0.2 MG/DL (ref 0.2–1)
WBC # BLD: 16.3 K/CU MM (ref 4–10.5)

## 2024-08-28 PROCEDURE — 96375 TX/PRO/DX INJ NEW DRUG ADDON: CPT

## 2024-08-28 PROCEDURE — 80053 COMPREHEN METABOLIC PANEL: CPT

## 2024-08-28 PROCEDURE — 87186 SC STD MICRODIL/AGAR DIL: CPT

## 2024-08-28 PROCEDURE — 96374 THER/PROPH/DIAG INJ IV PUSH: CPT

## 2024-08-28 PROCEDURE — 6360000002 HC RX W HCPCS: Performed by: PHYSICIAN ASSISTANT

## 2024-08-28 PROCEDURE — 87077 CULTURE AEROBIC IDENTIFY: CPT

## 2024-08-28 PROCEDURE — 85025 COMPLETE CBC W/AUTO DIFF WBC: CPT

## 2024-08-28 PROCEDURE — 81003 URINALYSIS AUTO W/O SCOPE: CPT

## 2024-08-28 PROCEDURE — 87086 URINE CULTURE/COLONY COUNT: CPT

## 2024-08-28 PROCEDURE — 6360000004 HC RX CONTRAST MEDICATION: Performed by: PHYSICIAN ASSISTANT

## 2024-08-28 PROCEDURE — 83690 ASSAY OF LIPASE: CPT

## 2024-08-28 PROCEDURE — 74177 CT ABD & PELVIS W/CONTRAST: CPT

## 2024-08-28 PROCEDURE — 99285 EMERGENCY DEPT VISIT HI MDM: CPT

## 2024-08-28 RX ORDER — MORPHINE SULFATE 4 MG/ML
4 INJECTION, SOLUTION INTRAMUSCULAR; INTRAVENOUS
Status: DISPENSED | OUTPATIENT
Start: 2024-08-28 | End: 2024-08-28

## 2024-08-28 RX ORDER — IOPAMIDOL 755 MG/ML
75 INJECTION, SOLUTION INTRAVASCULAR
Status: COMPLETED | OUTPATIENT
Start: 2024-08-28 | End: 2024-08-28

## 2024-08-28 RX ORDER — ONDANSETRON 2 MG/ML
4 INJECTION INTRAMUSCULAR; INTRAVENOUS ONCE
Status: COMPLETED | OUTPATIENT
Start: 2024-08-28 | End: 2024-08-28

## 2024-08-28 RX ADMIN — ONDANSETRON 4 MG: 2 INJECTION INTRAMUSCULAR; INTRAVENOUS at 18:06

## 2024-08-28 RX ADMIN — IOPAMIDOL 75 ML: 755 INJECTION, SOLUTION INTRAVENOUS at 18:59

## 2024-08-28 RX ADMIN — MORPHINE SULFATE 4 MG: 4 INJECTION, SOLUTION INTRAMUSCULAR; INTRAVENOUS at 18:05

## 2024-08-28 ASSESSMENT — PAIN DESCRIPTION - ORIENTATION
ORIENTATION: RIGHT;LEFT;LOWER
ORIENTATION: LOWER
ORIENTATION: RIGHT;LEFT;LOWER

## 2024-08-28 ASSESSMENT — PAIN - FUNCTIONAL ASSESSMENT: PAIN_FUNCTIONAL_ASSESSMENT: 0-10

## 2024-08-28 ASSESSMENT — PAIN DESCRIPTION - LOCATION
LOCATION: ABDOMEN

## 2024-08-28 ASSESSMENT — PAIN SCALES - GENERAL
PAINLEVEL_OUTOF10: 8
PAINLEVEL_OUTOF10: 8
PAINLEVEL_OUTOF10: 2

## 2024-08-28 ASSESSMENT — PAIN DESCRIPTION - DESCRIPTORS
DESCRIPTORS: ACHING;SQUEEZING;STABBING
DESCRIPTORS: ACHING;SQUEEZING;STABBING

## 2024-08-29 VITALS
WEIGHT: 204 LBS | RESPIRATION RATE: 16 BRPM | HEART RATE: 70 BPM | TEMPERATURE: 98.9 F | HEIGHT: 64 IN | BODY MASS INDEX: 34.83 KG/M2 | DIASTOLIC BLOOD PRESSURE: 60 MMHG | SYSTOLIC BLOOD PRESSURE: 131 MMHG | OXYGEN SATURATION: 96 %

## 2024-08-29 NOTE — ED PROVIDER NOTES
BY MOUTH ONCE NIGHTLY, Disp-90 tablet, R-1Normal      gabapentin (NEURONTIN) 300 MG capsule Take 1 tablet in the morning, 1 tablet in the afternoon, and 2 tablets in the evening, Disp-12 capsule, R-0Print      miconazole (MICOTIN) 2 % powder Apply topically 2 times daily., Disp-45 g, R-1, NO PRINT      melatonin 3 MG TABS tablet Take 1 tablet by mouth nightly, R-3OTC      metoprolol succinate (TOPROL XL) 25 MG extended release tablet Take 1 tablet by mouth daily Hold for SBP <100, HR <60, Disp-90 tablet, R-0NO PRINT      cyclobenzaprine (FLEXERIL) 5 MG tablet Historical Med      Misc. Devices (ROLLATOR ULTRA-LIGHT) MISC Disp-1 each, R-0, PrintRollator walker with seat and brakes      rOPINIRole (REQUIP) 2 MG tablet TAKE ONE TABLET BY MOUTH AT 3PM AND 10PM, Disp-180 tablet, R-1Normal      torsemide (DEMADEX) 10 MG tablet TAKE 1 TABLET BY MOUTH DAILY, Disp-30 tablet, R-3Normal      albuterol sulfate HFA (PROVENTIL;VENTOLIN;PROAIR) 108 (90 Base) MCG/ACT inhaler Inhale 2 puffs into the lungs 4 times daily as needed for Wheezing, Disp-8.5 g, R-5Normal      apixaban (ELIQUIS) 5 MG TABS tablet Take 1 tablet by mouth 2 times daily, Disp-180 tablet, R-1Print      fluticasone furoate-vilanterol (BREO ELLIPTA) 100-25 MCG/ACT inhaler Inhale 1 puff into the lungs daily, Disp-2 each, R-4Print      magnesium oxide (MAG-OX) 400 MG tablet Take 1 tablet by mouth daily, Disp-90 tablet, R-1Print      vitamin D (VITAMIN D3) 125 MCG (5000 UT) CAPS capsule Take 1 capsule by mouth daily, Disp-90 capsule, R-1Print      clobetasol (TEMOVATE) 0.05 % ointment Apply topically, Topical, Starting Wed 2/14/2024, Historical Med      Multiple Vitamins-Minerals (SYSTANE ICAPS AREDS2) TABS Take 1 tablet by mouth in the morning and at bedtimeHistorical Med      RESTASIS 0.05 % ophthalmic emulsion Place into both eyes every 12 hours, DAWHistorical Med      OXYGEN 2 Liter O2 - CONTINUOUS (route: Oxygen)Historical Med      Coenzyme Q10 (COQ10) 200 MG CAPS  disease), Chronic midline low back pain without sciatica (09/28/2016), Claustrophobia, Colonoscopy refused, COVID-19 virus infection (01/05/2023), DM (diabetes mellitus), type 2 ([facility]) (02/2006), Dupuytren's contracture of right hand, Endometrial stripe increased (09/25/2014), Gastrointestinal hemorrhage (11/02/2015), Glaucoma (04/01/2014), H/O Doppler ultrasound (06/15/2023), H/O echocardiogram (10/02/2014), H/O echocardiogram (08/28/2018), H/O echocardiogram (06/15/2023), History of nuclear stress test (08/28/2018), HTN (hypertension), Hyperlipidemia, Kidney stone, MI (myocardial infarction) ([facility]) (02/1998), Obesity, Open wound of right foot (07/27/2020), Parainfluenza infection (12/05/2021), Pneumonia of right lower lobe due to infectious organism (04/05/2018), Vertigo, and WD-Traumatic ulcer of foot, right, with fat layer exposed ([facility]) (08/03/2020).    I am the Primary Clinician of Record.     Patient was given the following medications:  Medications   morphine sulfate (PF) injection 4 mg (4 mg IntraVENous Given 8/28/24 1805)   ondansetron (ZOFRAN) injection 4 mg (4 mg IntraVENous Given 8/28/24 1806)   iopamidol (ISOVUE-370) 76 % injection 75 mL (75 mLs IntraVENous Given 8/28/24 1859)        Chronic conditions affecting care:    has a past medical history of Arthritis, Atrial fibrillation, chronic ([facility]), CAD (coronary artery disease), Chronic midline low back pain without sciatica (09/28/2016), Claustrophobia, Colonoscopy refused, COVID-19 virus infection (01/05/2023), DM (diabetes mellitus), type 2 ([facility]) (02/2006), Dupuytren's contracture of right hand, Endometrial stripe increased (09/25/2014), Gastrointestinal hemorrhage (11/02/2015), Glaucoma (04/01/2014), H/O Doppler ultrasound (06/15/2023), H/O echocardiogram (10/02/2014), H/O echocardiogram (08/28/2018), H/O echocardiogram (06/15/2023), History of nuclear stress test (08/28/2018), HTN (hypertension), Hyperlipidemia, Kidney stone, MI (myocardial infarction)

## 2024-08-29 NOTE — DISCHARGE INSTRUCTIONS
Follow-up with your primary care provider for reevaluation of your abdominal pain and discuss your visit to the emergency department and the results of your urine culture.    Meanwhile continue your stool softeners and laxatives to help with bowel movements as your CAT scan today showed some stool burden.    Would recommend following up with your primary care provider and/or urologist to discuss your urination, history of urinary retention, as well as the renal cyst found on your CAT scan today and previus CAT scans.    Follow-up with your primary care provider to discuss liver findings  seen on today's and previus CAT scans    Return with any fever, worsening abdominal pain, worsening symptoms or any new concerns.

## 2024-08-29 NOTE — ED NOTES
Report called to [facility]. All questions answered at this time. Report given to [facility], care transferred.

## 2024-08-30 ENCOUNTER — TELEPHONE (OUTPATIENT)
Dept: PHARMACY | Age: 89
End: 2024-08-30

## 2024-08-30 LAB
CULTURE: ABNORMAL
CULTURE: ABNORMAL
Lab: ABNORMAL
SPECIMEN: ABNORMAL

## 2024-08-30 NOTE — TELEPHONE ENCOUNTER
Pharmacy Note  ED Culture Follow-up    Jayshree Tate is a 88 y.o. female.     Allergies: Patient has no known allergies.     Labs:  Lab Results   Component Value Date    BUN 18 08/28/2024    CREATININE 0.8 08/28/2024    WBC 16.3 (H) 08/28/2024     Estimated Creatinine Clearance: 55 mL/min (based on SCr of 0.8 mg/dL).    Current antimicrobials:   None    ASSESSMENT:  Micro results:   Urine culture: positive for proteus mirabilis ESBL 25,000 CFU/ml     PLAN:  Need for intervention: Yes, but will defer to provider at nursing home  Chosen treatment:    Spoke with RN at [facility] who requested urine culture result be faxed for SNF MD to review.    Patient response:   Called and spoke with Douglas Espitia RN. Result faxed to [phone].    Called/sent in prescription to: Not applicable    Please call with any questions. Ext. [phone]    Rosalie Florez RPH, PharmD 3:24 PM 8/30/2024

## 2024-08-30 NOTE — PROGRESS NOTES
Pharmacy Note  ED Culture Follow-up    Jayshree Tate is a 88 y.o. female.     Allergies: Patient has no known allergies.     Labs:  Lab Results   Component Value Date    BUN 18 08/28/2024    CREATININE 0.8 08/28/2024    WBC 16.3 (H) 08/28/2024     Estimated Creatinine Clearance: 55 mL/min (based on SCr of 0.8 mg/dL).    Current antimicrobials:   None    ASSESSMENT:  Micro results:   Urine culture: positive for proteus mirabilis ESBL 25,000 CFU/ml     PLAN:  Need for intervention: Yes, but will defer to provider at nursing home  Chosen treatment:    Spoke with RN at [facility] who requested urine culture result be faxed for SNF MD to review.    Patient response:   Called and spoke with Douglas Espitia RN. Result faxed to [phone].    Called/sent in prescription to: Not applicable    Please call with any questions. Ext. [phone]    Rosalie Florez RPH, PharmD 3:24 PM 8/30/2024

## 2024-09-03 ENCOUNTER — HOSPITAL ENCOUNTER (OUTPATIENT)
Age: 89
Setting detail: SPECIMEN
Discharge: HOME | End: 2024-09-03
Payer: MEDICARE

## 2024-09-03 PROCEDURE — 87086 URINE CULTURE/COLONY COUNT: CPT

## 2024-09-03 PROCEDURE — 81001 URINALYSIS AUTO W/SCOPE: CPT

## 2024-09-04 LAB
BACTERIA: ABNORMAL /HPF
BILIRUBIN, URINE: NEGATIVE MG/DL
BLOOD, URINE: NEGATIVE
CLARITY, UA: CLEAR
COLOR, UA: YELLOW
CULTURE: NORMAL
GLUCOSE URINE: NEGATIVE MG/DL
HYALINE CASTS: 10 /LPF
KETONES, URINE: NEGATIVE MG/DL
LEUKOCYTE ESTERASE, URINE: ABNORMAL
Lab: NORMAL
MUCUS: ABNORMAL HPF
NITRITE URINE, QUANTITATIVE: POSITIVE
PH, URINE: 5.5 (ref 5–8)
PROTEIN UA: NEGATIVE MG/DL
RBC URINE: 1 /HPF (ref 0–6)
SPECIFIC GRAVITY UA: 1.01 (ref 1–1.03)
SPECIMEN: NORMAL
SQUAMOUS EPITHELIAL: 1 /HPF
TRICHOMONAS: ABNORMAL /HPF
UROBILINOGEN, URINE: 0.2 MG/DL (ref 0.2–1)
WBC UA: 19 /HPF (ref 0–5)

## 2024-09-20 ENCOUNTER — HOSPITAL ENCOUNTER (OUTPATIENT)
Age: 89
Setting detail: SPECIMEN
Discharge: HOME | End: 2024-09-20
Payer: MEDICARE

## 2024-09-20 PROCEDURE — 87086 URINE CULTURE/COLONY COUNT: CPT

## 2024-09-20 PROCEDURE — 81003 URINALYSIS AUTO W/O SCOPE: CPT

## 2024-09-20 PROCEDURE — 87077 CULTURE AEROBIC IDENTIFY: CPT

## 2024-09-21 LAB
BILIRUB UR QL STRIP: NEGATIVE
CLARITY UR: CLEAR
COLOR UR: YELLOW
COMMENT: NORMAL
GLUCOSE UR STRIP-MCNC: NEGATIVE MG/DL
HGB UR QL STRIP.AUTO: NEGATIVE
KETONES UR STRIP-MCNC: NEGATIVE MG/DL
LEUKOCYTE ESTERASE UR QL STRIP: NEGATIVE
NITRITE UR QL STRIP: NEGATIVE
PH UR STRIP: 5.5 [PH] (ref 5–8)
PROT UR STRIP-MCNC: NEGATIVE MG/DL
SP GR UR STRIP: 1.02 (ref 1–1.03)
UROBILINOGEN UR STRIP-ACNC: 0.2 EU/DL (ref 0.2–1)

## 2024-09-22 LAB
MICROORGANISM SPEC CULT: ABNORMAL
MICROORGANISM SPEC CULT: ABNORMAL
SPECIMEN DESCRIPTION: ABNORMAL

## 2024-09-23 RX ORDER — APIXABAN 5 MG/1
5 TABLET, FILM COATED ORAL 2 TIMES DAILY
Qty: 180 TABLET | Refills: 1 | Status: SHIPPED | OUTPATIENT
Start: 2024-09-23

## 2024-09-23 RX ORDER — TORSEMIDE 10 MG/1
10 TABLET ORAL DAILY
Qty: 90 TABLET | Refills: 1 | Status: SHIPPED | OUTPATIENT
Start: 2024-09-23

## 2024-09-23 RX ORDER — ROPINIROLE 2 MG/1
TABLET, FILM COATED ORAL
Qty: 180 TABLET | Refills: 1 | Status: SHIPPED | OUTPATIENT
Start: 2024-09-23

## 2024-09-23 RX ORDER — GLIPIZIDE 5 MG/1
5 TABLET, FILM COATED, EXTENDED RELEASE ORAL DAILY
Qty: 90 TABLET | Refills: 1 | Status: SHIPPED | OUTPATIENT
Start: 2024-09-23

## 2024-09-27 ENCOUNTER — HOSPITAL ENCOUNTER (OUTPATIENT)
Age: 89
Setting detail: SPECIMEN
Discharge: HOME | End: 2024-09-27
Payer: MEDICARE

## 2024-09-27 PROCEDURE — 81001 URINALYSIS AUTO W/SCOPE: CPT

## 2024-09-27 PROCEDURE — 87086 URINE CULTURE/COLONY COUNT: CPT

## 2024-09-27 PROCEDURE — 87088 URINE BACTERIA CULTURE: CPT

## 2024-09-28 LAB
BACTERIA URNS QL MICRO: ABNORMAL
BILIRUB UR QL STRIP: NEGATIVE
CASTS #/AREA URNS LPF: ABNORMAL /LPF
CLARITY UR: ABNORMAL
COLOR UR: YELLOW
EPI CELLS #/AREA URNS HPF: ABNORMAL /HPF
GLUCOSE UR STRIP-MCNC: NEGATIVE MG/DL
HGB UR QL STRIP.AUTO: NEGATIVE
KETONES UR STRIP-MCNC: NEGATIVE MG/DL
LEUKOCYTE ESTERASE UR QL STRIP: ABNORMAL
MUCOUS THREADS URNS QL MICRO: PRESENT
NITRITE UR QL STRIP: POSITIVE
PH UR STRIP: 6 [PH] (ref 5–8)
PROT UR STRIP-MCNC: NEGATIVE MG/DL
RBC #/AREA URNS HPF: ABNORMAL /HPF
SP GR UR STRIP: <1.005 (ref 1–1.03)
UROBILINOGEN UR STRIP-ACNC: 0.2 EU/DL (ref 0.2–1)
WBC #/AREA URNS HPF: ABNORMAL /HPF

## 2024-09-29 LAB
MICROORGANISM SPEC CULT: ABNORMAL
SPECIMEN DESCRIPTION: ABNORMAL

## 2024-09-30 ENCOUNTER — HOSPITAL ENCOUNTER (OUTPATIENT)
Age: 89
Setting detail: SPECIMEN
Discharge: HOME | End: 2024-09-30
Payer: MEDICARE

## 2024-09-30 ENCOUNTER — TELEPHONE (OUTPATIENT)
Age: 89
End: 2024-09-30

## 2024-09-30 LAB
ALBUMIN SERPL-MCNC: 3.2 G/DL (ref 3.4–5)
ALBUMIN/GLOB SERPL: 1.6 {RATIO} (ref 1.1–2.2)
ALP SERPL-CCNC: 70 U/L (ref 40–129)
ALT SERPL-CCNC: 5 U/L (ref 10–40)
ANION GAP SERPL CALCULATED.3IONS-SCNC: 12 MMOL/L (ref 9–17)
AST SERPL-CCNC: 15 U/L (ref 15–37)
BASOPHILS # BLD: 0.07 K/UL
BASOPHILS NFR BLD: 1 % (ref 0–1)
BILIRUB SERPL-MCNC: 0.3 MG/DL (ref 0–1)
BUN SERPL-MCNC: 14 MG/DL (ref 7–20)
CALCIUM SERPL-MCNC: 8.9 MG/DL (ref 8.3–10.6)
CHLORIDE SERPL-SCNC: 99 MMOL/L (ref 99–110)
CO2 SERPL-SCNC: 27 MMOL/L (ref 21–32)
CREAT SERPL-MCNC: 0.7 MG/DL (ref 0.6–1.2)
EOSINOPHIL # BLD: 0.09 K/UL
EOSINOPHILS RELATIVE PERCENT: 1 % (ref 0–3)
ERYTHROCYTE [DISTWIDTH] IN BLOOD BY AUTOMATED COUNT: 18 % (ref 11.7–14.9)
GFR, ESTIMATED: 83 ML/MIN/1.73M2
GLUCOSE SERPL-MCNC: 168 MG/DL (ref 74–99)
HCT VFR BLD AUTO: 36.1 % (ref 37–47)
HGB BLD-MCNC: 11 G/DL (ref 12.5–16)
IMM GRANULOCYTES # BLD AUTO: 0.04 K/UL
IMM GRANULOCYTES NFR BLD: 0 %
LYMPHOCYTES NFR BLD: 3.16 K/UL
LYMPHOCYTES RELATIVE PERCENT: 24 % (ref 24–44)
MCH RBC QN AUTO: 25.7 PG (ref 27–31)
MCHC RBC AUTO-ENTMCNC: 30.5 G/DL (ref 32–36)
MCV RBC AUTO: 84.3 FL (ref 78–100)
MICROORGANISM SPEC CULT: ABNORMAL
MONOCYTES NFR BLD: 2.68 K/UL
MONOCYTES NFR BLD: 20 % (ref 0–4)
NEUTROPHILS NFR BLD: 55 % (ref 36–66)
NEUTS SEG NFR BLD: 7.4 K/UL
PLATELET # BLD AUTO: 235 K/UL (ref 140–440)
PMV BLD AUTO: 9.4 FL (ref 7.5–11.1)
POTASSIUM SERPL-SCNC: 3.8 MMOL/L (ref 3.5–5.1)
PROCALCITONIN SERPL-MCNC: 0.09 NG/ML
PROT SERPL-MCNC: 5.2 G/DL (ref 6.4–8.2)
RBC # BLD AUTO: 4.28 M/UL (ref 4.2–5.4)
SODIUM SERPL-SCNC: 138 MMOL/L (ref 136–145)
SPECIMEN DESCRIPTION: ABNORMAL
WBC OTHER # BLD: 13.4 K/UL (ref 4–10.5)

## 2024-09-30 PROCEDURE — 84145 PROCALCITONIN (PCT): CPT

## 2024-09-30 PROCEDURE — 36415 COLL VENOUS BLD VENIPUNCTURE: CPT

## 2024-09-30 PROCEDURE — 80053 COMPREHEN METABOLIC PANEL: CPT

## 2024-09-30 PROCEDURE — 85025 COMPLETE CBC W/AUTO DIFF WBC: CPT

## 2024-09-30 NOTE — TELEPHONE ENCOUNTER
Fax received from [facility] pharmacy requesting clarification of Fluticasone inhaler wanting to know  if current tx was breo- paperwork filled out and given to Dr. Doyle for signature

## 2024-10-15 ENCOUNTER — HOSPITAL ENCOUNTER (OUTPATIENT)
Age: 89
Setting detail: SPECIMEN
Discharge: HOME | End: 2024-10-15
Payer: MEDICARE

## 2024-10-15 PROCEDURE — 81003 URINALYSIS AUTO W/O SCOPE: CPT

## 2024-10-15 PROCEDURE — 87086 URINE CULTURE/COLONY COUNT: CPT

## 2024-10-16 LAB
BILIRUB UR QL STRIP: NEGATIVE
CLARITY UR: CLEAR
COLOR UR: YELLOW
GLUCOSE UR STRIP-MCNC: NEGATIVE MG/DL
HGB UR QL STRIP.AUTO: NEGATIVE
KETONES UR STRIP-MCNC: ABNORMAL MG/DL
LEUKOCYTE ESTERASE UR QL STRIP: NEGATIVE
NITRITE UR QL STRIP: NEGATIVE
PH UR STRIP: 5.5 [PH] (ref 5–8)
PROT UR STRIP-MCNC: NEGATIVE MG/DL
SP GR UR STRIP: 1.02 (ref 1–1.03)
UROBILINOGEN UR STRIP-ACNC: 0.2 EU/DL (ref 0–1)

## 2024-10-17 LAB
MICROORGANISM SPEC CULT: NORMAL
SPECIMEN DESCRIPTION: NORMAL

## 2024-11-13 ENCOUNTER — HOSPITAL ENCOUNTER (OUTPATIENT)
Age: 89
Setting detail: SPECIMEN
Discharge: HOME | End: 2024-11-13
Payer: MEDICARE

## 2024-11-13 LAB
BILIRUB UR QL STRIP: NEGATIVE
CLARITY UR: CLEAR
COLOR UR: YELLOW
COMMENT: NORMAL
GLUCOSE UR STRIP-MCNC: NEGATIVE MG/DL
HGB UR QL STRIP.AUTO: NEGATIVE
KETONES UR STRIP-MCNC: NEGATIVE MG/DL
LEUKOCYTE ESTERASE UR QL STRIP: NEGATIVE
NITRITE UR QL STRIP: NEGATIVE
PH UR STRIP: 7.5 [PH] (ref 5–8)
PROT UR STRIP-MCNC: NEGATIVE MG/DL
SP GR UR STRIP: 1.01 (ref 1–1.03)
UROBILINOGEN UR STRIP-ACNC: 0.2 EU/DL (ref 0–1)

## 2024-11-13 PROCEDURE — 81003 URINALYSIS AUTO W/O SCOPE: CPT

## 2024-11-13 PROCEDURE — 87086 URINE CULTURE/COLONY COUNT: CPT

## 2024-11-14 ENCOUNTER — HOSPITAL ENCOUNTER (OUTPATIENT)
Age: 89
Setting detail: SPECIMEN
Discharge: HOME | End: 2024-11-14
Payer: MEDICARE

## 2024-11-14 LAB
ALBUMIN SERPL-MCNC: 3.2 G/DL (ref 3.4–5)
ALBUMIN/GLOB SERPL: 1.2 {RATIO} (ref 1.1–2.2)
ALP SERPL-CCNC: 82 U/L (ref 40–129)
ALT SERPL-CCNC: <5 U/L (ref 10–40)
ANION GAP SERPL CALCULATED.3IONS-SCNC: 10 MMOL/L (ref 9–17)
AST SERPL-CCNC: 15 U/L (ref 15–37)
BILIRUB SERPL-MCNC: 0.4 MG/DL (ref 0–1)
BUN SERPL-MCNC: 14 MG/DL (ref 7–20)
CALCIUM SERPL-MCNC: 9.3 MG/DL (ref 8.3–10.6)
CHLORIDE SERPL-SCNC: 99 MMOL/L (ref 99–110)
CO2 SERPL-SCNC: 31 MMOL/L (ref 21–32)
CREAT SERPL-MCNC: 0.8 MG/DL (ref 0.6–1.2)
ERYTHROCYTE [DISTWIDTH] IN BLOOD BY AUTOMATED COUNT: 17.5 % (ref 11.7–14.9)
GFR, ESTIMATED: 73 ML/MIN/1.73M2
GLUCOSE SERPL-MCNC: 169 MG/DL (ref 74–99)
HCT VFR BLD AUTO: 38 % (ref 37–47)
HGB BLD-MCNC: 11.2 G/DL (ref 12.5–16)
MCH RBC QN AUTO: 25.2 PG (ref 27–31)
MCHC RBC AUTO-ENTMCNC: 29.5 G/DL (ref 32–36)
MCV RBC AUTO: 85.6 FL (ref 78–100)
MICROORGANISM SPEC CULT: NORMAL
PLATELET # BLD AUTO: 228 K/UL (ref 140–440)
PMV BLD AUTO: 9.5 FL (ref 7.5–11.1)
POTASSIUM SERPL-SCNC: 3.6 MMOL/L (ref 3.5–5.1)
PROT SERPL-MCNC: 5.7 G/DL (ref 6.4–8.2)
RBC # BLD AUTO: 4.44 M/UL (ref 4.2–5.4)
SODIUM SERPL-SCNC: 141 MMOL/L (ref 136–145)
SPECIMEN DESCRIPTION: NORMAL
WBC OTHER # BLD: 9.4 K/UL (ref 4–10.5)

## 2024-11-14 PROCEDURE — 80053 COMPREHEN METABOLIC PANEL: CPT

## 2024-11-14 PROCEDURE — 85027 COMPLETE CBC AUTOMATED: CPT

## 2024-11-14 PROCEDURE — 36415 COLL VENOUS BLD VENIPUNCTURE: CPT

## 2024-11-24 ENCOUNTER — HOSPITAL ENCOUNTER (OUTPATIENT)
Age: 89
Setting detail: SPECIMEN
Discharge: HOME | End: 2024-11-24
Payer: MEDICARE

## 2024-11-24 PROCEDURE — 81001 URINALYSIS AUTO W/SCOPE: CPT

## 2024-11-24 PROCEDURE — 87077 CULTURE AEROBIC IDENTIFY: CPT

## 2024-11-24 PROCEDURE — 87086 URINE CULTURE/COLONY COUNT: CPT

## 2024-11-24 PROCEDURE — 87186 SC STD MICRODIL/AGAR DIL: CPT

## 2024-11-25 LAB
BILIRUB UR QL STRIP: NEGATIVE
CLARITY UR: CLEAR
COLOR UR: YELLOW
EPI CELLS #/AREA URNS HPF: ABNORMAL /HPF
GLUCOSE UR STRIP-MCNC: 100 MG/DL
HGB UR QL STRIP.AUTO: NEGATIVE
KETONES UR STRIP-MCNC: NEGATIVE MG/DL
LEUKOCYTE ESTERASE UR QL STRIP: ABNORMAL
MUCOUS THREADS URNS QL MICRO: PRESENT
NITRITE UR QL STRIP: NEGATIVE
PH UR STRIP: 5 [PH] (ref 5–8)
PROT UR STRIP-MCNC: 30 MG/DL
RBC #/AREA URNS HPF: ABNORMAL /HPF
SP GR UR STRIP: 1.02 (ref 1–1.03)
UROBILINOGEN UR STRIP-ACNC: 1 EU/DL (ref 0.2–1)
WBC #/AREA URNS HPF: ABNORMAL /HPF

## 2024-11-28 LAB
MICROORGANISM SPEC CULT: ABNORMAL
SPECIMEN DESCRIPTION: ABNORMAL

## 2025-03-10 NOTE — PROGRESS NOTES
----- Message from LETI Nicole sent at 3/10/2025  8:07 AM CDT -----  Please let patient know metabolic panel normal electrolytes, kidney function liver enzymes.  Prostate levels normal.  Complete blood count no anemia or infection.  Cholesterol panel looks good.  Lab work is stable.  Continue medication and healthy lifestyle like we discussed in the office.    Thank you, Allyson    Physical Therapy    Physical Therapy Treatment Note  Name: Starr Mcnair MRN: [id number] :   1935   Date:  2022   Admission Date: 2022 Room:  [address] [place] [address]   Restrictions/Precautions:          spinal precautions, falls   Communication with other providers: Neurosurgery PA  Subjective:  Patient states:  \"I thought I would be going back to swing bed today but I guess not\"   Pain:   Location, Type, Intensity (0/10 to 10/10):   low back and right groin, did not rate but reports it was significant   Objective:  Observation:    Side lying in bed. Cooperative with therapy. Very concerned with right groin muscle spasm (Neurosurgery PA in at end of session and pt able to address concerns). Treatment, including education/measures:  Side lying (right side) to sitting EOB: SBA for safety with use of bed rails, HOB only slightly elevated  Sitting EOB to side lying (right side): SBA for safety with use of bed rails and inc time to advance BLEs   Sitting balance: SBA at EOB static and light dynamic without UE support x ~10 minutes total between activities   Sit to stand: CGA to SBA for safety from EOB (4x) and recliner (1x) to 4ww  Stand to sit: CGA to SBA for safety to EOB and recliner. Cues for hand sequencing back to seat surface and use of LEs to control self  Standing balance: static at RW x ~1 minute (2x) SBA for safety. No signs of buckling or muscle spasms. Step pivot: CGA for safety (3x) with 4ww   Ambulation: ~10ft x 2 + 20ft with 4ww CGA for safety. Slower jackie with fwd flexed posture. First ambulation bout demonstrated a fluent gait pattern with reciprocal gait pattern. The last 2 ambulation bouts pt demonstrated a more antalgic gait pattern due to right groin spasms and inc pain with WB/stance phase. Educated pt on POC, role of PT, DME use, spinal precautions, discharge plan, progression with mobility.  Cues provided for sequencing to inc safety and indep with mobility   Assessment / Impression:    Patient's tolerance of treatment:  Good   Adverse Reaction: no  Significant change in status and impact:  no  Barriers to improvement:  Activity tolerance, strength, balance, pain   Discharge: swing bed   Plan for Next Session:    Activity tolerance, strength, balance, gait, transfers, bed mobility   Time in:  1430  Time out:  1454  Timed treatment minutes:  24  Total treatment time:  24 minutes     Previously filed items:        Long Term Goals  Time Frame for Long term goals : 2 weeks or until discharge:  Long term goal 1: Pt will demonstrate the ability to safely perform bed mobility with mod I following log rolling technique.   Long term goal 2: Pt will perform sit ><supine Fritz from flat bed surface  Long term goal 3: Pt will transfer to all surfaces Fritz  Long term goal 4: Pt will ambulate 50ft with 4ww Fritz       Electronically signed by:    Lalitha Arizmendi [id number]  5/24/2022, 2:51 PM

## (undated) DEVICE — GLOVE SURG SZ 7 L12IN FNGR THK79MIL GRN LTX FREE

## (undated) DEVICE — SUTURE VCRL SZ 2-0 L18IN ABSRB UD CT-1 L36MM 1/2 CIR J839D

## (undated) DEVICE — BIPOLAR IRRIGATOR INTEGRATED TUBING AND BIPOLAR CORD SET, DISPOSABLE, UNITIZED PLUG

## (undated) DEVICE — 3M™ IOBAN™ 2 ANTIMICROBIAL INCISE DRAPE 6650EZ: Brand: IOBAN™ 2

## (undated) DEVICE — C-ARMOR C-ARM EQUIPMENT COVERS CLEAR STERILE UNIVERSAL FIT 12 PER CASE: Brand: C-ARMOR

## (undated) DEVICE — CONTAINER,SPECIMEN,OR STERILE,4OZ: Brand: MEDLINE

## (undated) DEVICE — APPLICATOR MEDICATED 26 CC SOLUTION HI LT ORNG CHLORAPREP

## (undated) DEVICE — GLOVE SURG SZ 85 L1185IN FNGR THK75MIL STRW LTX POLYMER

## (undated) DEVICE — TUBING SUCT L10FT DIA0.25IN UNIV W/ SCALLOPED FEM CONN

## (undated) DEVICE — BONE BIOPSY DEVICE F07A TAPERED SIZE 2: Brand: MEDTRONIC REUSABLE INSTRUMENTS

## (undated) DEVICE — GLOVE SURG SZ 65 L12IN FNGR THK79MIL GRN LTX FREE

## (undated) DEVICE — BLADE SURG NO 11 CONVENTIONAL STD UNPROTECTED W/O HNDL S

## (undated) DEVICE — AGENT HEMOSTATIC SURGIFLOW MATRIX KIT W/THROMBIN

## (undated) DEVICE — GOWN,SIRUS,POLYRNF,BRTHSLV,XLN/XL,20/CS: Brand: MEDLINE

## (undated) DEVICE — ELECTRODE ES AD CRDLSS PT RET REM POLYHESIVE

## (undated) DEVICE — GLOVE SURG SZ 6 L12IN FNGR THK75MIL WHT LTX POLYMER BEAD

## (undated) DEVICE — KIT KEX202EB-CDS- 20/2 FF WITH CDS: Brand: KYPHPAK® FIRST FRACTURE TRAY

## (undated) DEVICE — PAD DRESSING TELFA OUCHLESS NONADH 3X8IN

## (undated) DEVICE — KIT KEX152EB-CDS- 15/2 FF WITH CDS: Brand: KYPHPAK® FIRST FRACTURE TRAY

## (undated) DEVICE — DRAPE C-ARM 267CM X 152CM

## (undated) DEVICE — SYRINGE MED 30ML STD CLR PLAS LUERLOCK TIP N CTRL DISP

## (undated) DEVICE — COVER,TABLE,44X90,STERILE: Brand: MEDLINE

## (undated) DEVICE — TOWEL,OR,DSP,ST,BLUE,STD,6/PK,12PK/CS: Brand: MEDLINE

## (undated) DEVICE — GOWN,ECLIPSE,POLYRNF,BRTHSLV,XL,30/CS: Brand: MEDLINE

## (undated) DEVICE — SOLUTION IV IRRIG 1000ML POUR BTL 2F7114

## (undated) DEVICE — SPONGE,NEURO,0.5"X3",XR,STRL,LF,10/PK: Brand: MEDLINE

## (undated) DEVICE — GOWN,ECLIPSE,POLYRNF,BRTHSLV,L,30/CS: Brand: MEDLINE

## (undated) DEVICE — SYRINGE IRRIG 60ML SFT PLIABLE BLB EZ TO GRP 1 HND USE W/

## (undated) DEVICE — SHEET,DRAPE,53X77,STERILE: Brand: MEDLINE

## (undated) DEVICE — SUTURE ETHLN SZ 3-0 L30IN NONABSORBABLE BLK FS-1 L24MM 3/8 669H

## (undated) DEVICE — BONE CEMENT CX01B KYPHON XPEDE W MXR US: Brand: KYPHON® XPEDE™ BONE CEMENT AND KYPHON® MIXER PACK

## (undated) DEVICE — INTENDED FOR TISSUE SEPARATION, AND OTHER PROCEDURES THAT REQUIRE A SHARP SURGICAL BLADE TO PUNCTURE OR CUT.: Brand: BARD-PARKER ® STAINLESS STEEL BLADES

## (undated) DEVICE — TUBING SUCT 12FR MAL ALUM SHFT FN CAP VENT UNIV CONN W/ OBT

## (undated) DEVICE — KIT JACK TBL PT CARE

## (undated) DEVICE — DRAPE,UTILITY,XL,4/PK,STERILE: Brand: MEDLINE

## (undated) DEVICE — GAUZE,SPONGE,4"X4",16PLY,XRAY,STRL,LF: Brand: MEDLINE

## (undated) DEVICE — PACKING 8004007 NEURAY 200PK 13X76MM: Brand: NEURAY ®

## (undated) DEVICE — COVER,MAYO STAND,STERILE: Brand: MEDLINE

## (undated) DEVICE — SUTURE VCRL SZ 0 L18IN ABSRB UD L36MM CT-1 1/2 CIR J840D

## (undated) DEVICE — UNDERGLOVE SURG SZ 8.5 FNGR THK0.21MIL GRN LTX BEAD CUF

## (undated) DEVICE — TUBING, SUCTION, 9/32" X 10', STRAIGHT: Brand: MEDLINE

## (undated) DEVICE — DRESSING TRNSPAR W2XL2.75IN FLM SHT SEMIPERMEABLE WIND

## (undated) DEVICE — BLADE SCALPEL STERILE NO 10

## (undated) DEVICE — GLOVE SURG SZ 65 CRM LTX FREE POLYISOPRENE POLYMER BEAD ANTI

## (undated) DEVICE — GLOVE SURG SZ 65 THK91MIL LTX FREE SYN POLYISOPRENE

## (undated) DEVICE — CARBIDE MATCH HEAD

## (undated) DEVICE — DRAPE,U/ SHT,SPLIT,PLAS,STERIL: Brand: MEDLINE

## (undated) DEVICE — YANKAUER,BULB TIP,W/O VENT,RIGID,STERILE: Brand: MEDLINE

## (undated) DEVICE — 3M™ STERI-DRAPE™ INSTRUMENT POUCH 1018: Brand: STERI-DRAPE™

## (undated) DEVICE — 5.0MM PRECISION ROUND

## (undated) DEVICE — BIPOLAR IRRIGATOR INTEGRATED TUBING AND BIPOLAR CORD SET, DISPOSABLE

## (undated) DEVICE — ELECTRODE ES L2.75IN S STL INSUL BLDE W/ SL EDGE

## (undated) DEVICE — TOOLKIT VPT02A VP NEEDLE SET CURV 13 GA: Brand: KYPHON KURVE™ CURVED BONE FILLER DEVICE

## (undated) DEVICE — MARKER SURG SKIN UTIL REGULAR/FINE 2 TIP W/ RUL AND 9 LBL

## (undated) DEVICE — DRAPE MICSCP W54XL150IN W/ 4 BINOC GLS LENS LEICA

## (undated) DEVICE — ADHESIVE SKIN CLSR 0.7ML TOP DERMBND ADV

## (undated) DEVICE — YANKAUER,FLEXIBLE HANDLE,REGLR CAPACITY: Brand: MEDLINE INDUSTRIES, INC.

## (undated) DEVICE — C-ARM: Brand: UNBRANDED

## (undated) DEVICE — 3.0MM NEURO (MATCH HEAD) SOFT TOUCH

## (undated) DEVICE — 6619 2 PTNT ISO SYS INCISE AREA&LT;(&GT;&&LT;)&GT;P: Brand: STERI-DRAPE™ IOBAN™ 2

## (undated) DEVICE — KIT EVAC 0.13IN RECT TB DIA10FR 400CC PVC 3 SPR Y CONN DRN

## (undated) DEVICE — NEEDLE SPNL 20GA L3.5IN YEL HUB S STL REG WALL FIT STYL W/

## (undated) DEVICE — AEGIS 1" DISK 4MM HOLE, PEEL OPEN: Brand: MEDLINE

## (undated) DEVICE — COUNTER NDL 30 COUNT FOAM STRP SGL MAG

## (undated) DEVICE — SPONGE LAP W18XL18IN WHT COT 4 PLY FLD STRUNG RADPQ DISP ST

## (undated) DEVICE — COVER MICSCP W46XL120IN 4 BINOC GLS LENS LEICA

## (undated) DEVICE — SSC BONE WAX: Brand: SSC BONE WAX